# Patient Record
Sex: MALE | Race: BLACK OR AFRICAN AMERICAN | NOT HISPANIC OR LATINO | Employment: OTHER | URBAN - METROPOLITAN AREA
[De-identification: names, ages, dates, MRNs, and addresses within clinical notes are randomized per-mention and may not be internally consistent; named-entity substitution may affect disease eponyms.]

---

## 2023-03-28 LAB
ALANINE AMINOTRANSFERASE (SGPT) (U/L) IN SER/PLAS: 16 U/L (ref 10–52)
ALBUMIN (G/DL) IN SER/PLAS: 3.9 G/DL (ref 3.4–5)
ALKALINE PHOSPHATASE (U/L) IN SER/PLAS: 59 U/L (ref 33–136)
ASPARTATE AMINOTRANSFERASE (SGOT) (U/L) IN SER/PLAS: 18 U/L (ref 9–39)
BILIRUBIN DIRECT (MG/DL) IN SER/PLAS: 0.1 MG/DL (ref 0–0.3)
BILIRUBIN TOTAL (MG/DL) IN SER/PLAS: 0.5 MG/DL (ref 0–1.2)
CHOLESTEROL (MG/DL) IN SER/PLAS: 191 MG/DL (ref 0–199)
CHOLESTEROL IN HDL (MG/DL) IN SER/PLAS: 55.9 MG/DL
CHOLESTEROL/HDL RATIO: 3.4
ESTIMATED AVERAGE GLUCOSE FOR HBA1C: 137 MG/DL
HEMOGLOBIN A1C/HEMOGLOBIN TOTAL IN BLOOD: 6.4 %
INR IN PPP BY COAGULATION ASSAY: 2.8 (ref 0.9–1.1)
LDL: 122 MG/DL (ref 0–99)
PROSTATE SPECIFIC ANTIGEN,SCREEN: 0.9 NG/ML (ref 0–4)
PROTEIN TOTAL: 7 G/DL (ref 6.4–8.2)
PROTHROMBIN TIME (PT) IN PPP BY COAGULATION ASSAY: 33 SEC (ref 9.8–13.4)
TRIGLYCERIDE (MG/DL) IN SER/PLAS: 64 MG/DL (ref 0–149)
URATE (MG/DL) IN SER/PLAS: 2.3 MG/DL (ref 4–7.5)
VLDL: 13 MG/DL (ref 0–40)

## 2023-05-22 LAB
ALANINE AMINOTRANSFERASE (SGPT) (U/L) IN SER/PLAS: 23 U/L (ref 10–52)
ALBUMIN (G/DL) IN SER/PLAS: 3.8 G/DL (ref 3.4–5)
ALKALINE PHOSPHATASE (U/L) IN SER/PLAS: 60 U/L (ref 33–136)
ASPARTATE AMINOTRANSFERASE (SGOT) (U/L) IN SER/PLAS: 23 U/L (ref 9–39)
BILIRUBIN DIRECT (MG/DL) IN SER/PLAS: 0.1 MG/DL (ref 0–0.3)
BILIRUBIN TOTAL (MG/DL) IN SER/PLAS: 0.6 MG/DL (ref 0–1.2)
CHOLESTEROL (MG/DL) IN SER/PLAS: 197 MG/DL (ref 0–199)
CHOLESTEROL IN HDL (MG/DL) IN SER/PLAS: 57.9 MG/DL
CHOLESTEROL/HDL RATIO: 3.4
ESTIMATED AVERAGE GLUCOSE FOR HBA1C: 157 MG/DL
HEMOGLOBIN A1C/HEMOGLOBIN TOTAL IN BLOOD: 7.1 %
INR IN PPP BY COAGULATION ASSAY: 1.8 (ref 0.9–1.1)
LDL: 124 MG/DL (ref 0–99)
PROSTATE SPECIFIC ANTIGEN,SCREEN: 1.08 NG/ML (ref 0–4)
PROTEIN TOTAL: 6.9 G/DL (ref 6.4–8.2)
PROTHROMBIN TIME (PT) IN PPP BY COAGULATION ASSAY: 21.5 SEC (ref 9.8–13.4)
TRIGLYCERIDE (MG/DL) IN SER/PLAS: 74 MG/DL (ref 0–149)
URATE (MG/DL) IN SER/PLAS: 5.2 MG/DL (ref 4–7.5)
VLDL: 15 MG/DL (ref 0–40)

## 2023-06-12 LAB
INR IN PPP BY COAGULATION ASSAY: 2 (ref 0.9–1.1)
PROTHROMBIN TIME (PT) IN PPP BY COAGULATION ASSAY: 23.8 SEC (ref 9.8–13.4)

## 2023-07-17 LAB
INR IN PPP BY COAGULATION ASSAY: 3.3 (ref 0.9–1.1)
PROTHROMBIN TIME (PT) IN PPP BY COAGULATION ASSAY: 37.2 SEC (ref 9.8–12.8)

## 2023-07-27 LAB
INR IN PPP BY COAGULATION ASSAY: 3.8 (ref 0.9–1.1)
PROTHROMBIN TIME (PT) IN PPP BY COAGULATION ASSAY: 43.3 SEC (ref 9.8–12.8)

## 2023-09-14 LAB
INR IN PPP BY COAGULATION ASSAY: 2.4 (ref 0.9–1.1)
PROTHROMBIN TIME (PT) IN PPP BY COAGULATION ASSAY: 26.9 SEC (ref 9.8–12.8)

## 2023-09-21 PROBLEM — I10 HTN (HYPERTENSION): Status: ACTIVE | Noted: 2023-09-21

## 2023-09-21 PROBLEM — I12.0: Status: ACTIVE | Noted: 2023-09-21

## 2023-09-21 PROBLEM — G40.409: Status: ACTIVE | Noted: 2023-09-21

## 2023-09-21 PROBLEM — N18.5 ANEMIA DUE TO STAGE 5 CHRONIC KIDNEY DISEASE (MULTI): Status: ACTIVE | Noted: 2023-09-21

## 2023-09-21 PROBLEM — N18.5: Status: ACTIVE | Noted: 2023-09-21

## 2023-09-21 PROBLEM — Z99.2 ESRD ON DIALYSIS (MULTI): Status: ACTIVE | Noted: 2023-09-21

## 2023-09-21 PROBLEM — I48.0 PAROXYSMAL ATRIAL FIBRILLATION (MULTI): Status: ACTIVE | Noted: 2023-09-21

## 2023-09-21 PROBLEM — G62.9 PERIPHERAL NEUROPATHY: Status: ACTIVE | Noted: 2023-09-21

## 2023-09-21 PROBLEM — N18.6 ESRD ON DIALYSIS (MULTI): Status: ACTIVE | Noted: 2023-09-21

## 2023-09-21 PROBLEM — E10.22: Status: ACTIVE | Noted: 2023-09-21

## 2023-09-21 PROBLEM — E78.5 DYSLIPIDEMIA: Status: ACTIVE | Noted: 2023-09-21

## 2023-09-21 PROBLEM — D63.1 ANEMIA DUE TO STAGE 5 CHRONIC KIDNEY DISEASE (MULTI): Status: ACTIVE | Noted: 2023-09-21

## 2023-09-21 PROBLEM — R55 SPELL OF LOSS OF CONSCIOUSNESS: Status: ACTIVE | Noted: 2023-09-21

## 2023-09-21 PROBLEM — R40.4 SPELL OF ALTERED CONSCIOUSNESS: Status: ACTIVE | Noted: 2023-09-21
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
## 2023-09-21 PROBLEM — I67.82 ISCHEMIC BRAIN INJURY: Status: ACTIVE | Noted: 2023-09-21

## 2023-09-21 PROBLEM — T78.3XXA ANGIOEDEMA: Status: ACTIVE | Noted: 2023-09-21

## 2023-09-21 PROBLEM — I48.91 A-FIB (MULTI): Status: ACTIVE | Noted: 2023-09-21

## 2023-09-21 PROBLEM — I49.9 CARDIAC DYSRHYTHMIA: Status: ACTIVE | Noted: 2023-09-21

## 2023-09-21 PROBLEM — N28.9 RENAL INSUFFICIENCY: Status: ACTIVE | Noted: 2023-09-21

## 2023-09-21 PROBLEM — R55 SYNCOPE, NEAR: Status: ACTIVE | Noted: 2023-09-21

## 2023-09-21 PROBLEM — N40.0 BPH (BENIGN PROSTATIC HYPERPLASIA): Status: ACTIVE | Noted: 2023-09-21

## 2023-09-21 PROBLEM — I82.409 DVT, LOWER EXTREMITY (MULTI): Status: ACTIVE | Noted: 2023-09-21

## 2023-09-21 PROBLEM — G90.9 AUTONOMIC DYSFUNCTION: Status: ACTIVE | Noted: 2023-09-21

## 2023-09-21 PROBLEM — G93.1: Status: ACTIVE | Noted: 2023-09-21

## 2023-09-21 RX ORDER — NITROGLYCERIN 0.4 MG/1
TABLET SUBLINGUAL
COMMUNITY

## 2023-09-21 RX ORDER — INSULIN LISPRO 100 [IU]/ML
INJECTION, SUSPENSION SUBCUTANEOUS 4 TIMES DAILY
Status: ON HOLD | COMMUNITY
End: 2024-03-28 | Stop reason: ALTCHOICE

## 2023-09-21 RX ORDER — HYDROCHLOROTHIAZIDE 25 MG/1
1 TABLET ORAL DAILY
COMMUNITY

## 2023-09-21 RX ORDER — WARFARIN 6 MG/1
6 TABLET ORAL
COMMUNITY

## 2023-09-21 RX ORDER — BLOOD SUGAR DIAGNOSTIC
STRIP MISCELLANEOUS
COMMUNITY
Start: 2023-01-19

## 2023-09-21 RX ORDER — VITAMIN B COMPLEX
1 CAPSULE ORAL DAILY
COMMUNITY
End: 2023-10-20 | Stop reason: ALTCHOICE

## 2023-09-21 RX ORDER — WARFARIN 4 MG/1
4 TABLET ORAL
COMMUNITY

## 2023-09-21 RX ORDER — WARFARIN 2 MG/1
4 TABLET ORAL
COMMUNITY

## 2023-09-21 RX ORDER — IPRATROPIUM BROMIDE AND ALBUTEROL SULFATE 2.5; .5 MG/3ML; MG/3ML
SOLUTION RESPIRATORY (INHALATION)
COMMUNITY
Start: 2020-02-02

## 2023-09-21 RX ORDER — METOPROLOL TARTRATE 50 MG/1
50 TABLET ORAL 2 TIMES DAILY
COMMUNITY
Start: 2021-09-26

## 2023-09-21 RX ORDER — FINASTERIDE 5 MG/1
5 TABLET, FILM COATED ORAL EVERY MORNING
COMMUNITY

## 2023-09-21 RX ORDER — LEVETIRACETAM 500 MG/1
1000 TABLET, EXTENDED RELEASE ORAL DAILY
COMMUNITY
Start: 2020-12-30

## 2023-09-21 RX ORDER — ALCOHOL 2.38 KG/3.79L
1 GEL TOPICAL EVERY MORNING
COMMUNITY
End: 2023-10-20 | Stop reason: ALTCHOICE

## 2023-10-20 ENCOUNTER — OFFICE VISIT (OUTPATIENT)
Dept: CARDIOLOGY | Facility: CLINIC | Age: 83
End: 2023-10-20
Payer: MEDICARE

## 2023-10-20 VITALS
BODY MASS INDEX: 24.38 KG/M2 | HEIGHT: 72 IN | SYSTOLIC BLOOD PRESSURE: 118 MMHG | DIASTOLIC BLOOD PRESSURE: 54 MMHG | HEART RATE: 69 BPM | WEIGHT: 180 LBS

## 2023-10-20 DIAGNOSIS — R55 SYNCOPE, NEAR: ICD-10-CM

## 2023-10-20 DIAGNOSIS — Z78.9 NONSMOKER: ICD-10-CM

## 2023-10-20 DIAGNOSIS — I15.8 OTHER SECONDARY HYPERTENSION: ICD-10-CM

## 2023-10-20 DIAGNOSIS — E78.5 DYSLIPIDEMIA: ICD-10-CM

## 2023-10-20 DIAGNOSIS — R55 SPELL OF LOSS OF CONSCIOUSNESS: ICD-10-CM

## 2023-10-20 DIAGNOSIS — I48.0 PAROXYSMAL ATRIAL FIBRILLATION (MULTI): ICD-10-CM

## 2023-10-20 DIAGNOSIS — R40.4 SPELL OF ALTERED CONSCIOUSNESS: ICD-10-CM

## 2023-10-20 DIAGNOSIS — I47.10 SUPRAVENTRICULAR TACHYCARDIA (CMS-HCC): Primary | ICD-10-CM

## 2023-10-20 PROCEDURE — 3078F DIAST BP <80 MM HG: CPT | Performed by: INTERNAL MEDICINE

## 2023-10-20 PROCEDURE — 99214 OFFICE O/P EST MOD 30 MIN: CPT | Performed by: INTERNAL MEDICINE

## 2023-10-20 PROCEDURE — 1159F MED LIST DOCD IN RCRD: CPT | Performed by: INTERNAL MEDICINE

## 2023-10-20 PROCEDURE — 3074F SYST BP LT 130 MM HG: CPT | Performed by: INTERNAL MEDICINE

## 2023-10-20 PROCEDURE — 93000 ELECTROCARDIOGRAM COMPLETE: CPT | Performed by: INTERNAL MEDICINE

## 2023-10-20 PROCEDURE — 1126F AMNT PAIN NOTED NONE PRSNT: CPT | Performed by: INTERNAL MEDICINE

## 2023-10-20 ASSESSMENT — ENCOUNTER SYMPTOMS
RESPIRATORY NEGATIVE: 1
CARDIOVASCULAR NEGATIVE: 1

## 2023-10-20 NOTE — PROGRESS NOTES
Chief Complaint:   Follow-up (Patient presented today for a 1 year follow up./)     History Of Present Illness:    Tyshawn Coles is a 83 y.o. male presenting with followup.  He is accompanied by his wife.    He feels fine.  He denies any lightheadedness, near syncope, palpitation, or syncope.     Last Recorded Vitals:  Vitals:    10/20/23 1615   BP: 118/54   BP Location: Left arm   Patient Position: Sitting   BP Cuff Size: Adult   Pulse: 69   Weight: 81.6 kg (180 lb)   Height: 1.829 m (6')       Past Medical History:  He has a past medical history of Localized edema (02/18/2020), Personal history of other diseases of the musculoskeletal system and connective tissue, and Personal history of urinary calculi.    Past Surgical History:  He has a past surgical history that includes Other surgical history (02/07/2020); Other surgical history (02/07/2020); Other surgical history (02/07/2020); Other surgical history (06/21/2022); Other surgical history (06/21/2022); Other surgical history (06/21/2022); and IR CVC tunneled (8/20/2021).      Social History:  He has no history on file for tobacco use, alcohol use, and drug use.    Family History:  Family History   Problem Relation Name Age of Onset    Diabetes Mother      Cancer Father      Other (systemic lupus erythematosus') Daughter      Other (end-stage renal disease) Daughter          was on dialysis but had a renal transplant    Asthma Son      Hypertension Other      Sickle cell trait Other          Allergies:  Patient has no known allergies.    Outpatient Medications:  Current Outpatient Medications   Medication Instructions    Accu-Chek Aundrea Plus test strp strip     finasteride (PROSCAR) 5 mg, oral, Every morning    hydroCHLOROthiazide (HYDRODiuril) 25 mg tablet 1 tablet, oral, Daily    insulin lispro protamin-lispro (HumaLOG Mix 50-50 Insuln U-100) 100 unit/mL (50-50) injection subcutaneous, 4 times daily    ipratropium-albuteroL (Duo-Neb) 0.5-2.5 mg/3 mL nebulizer  solution nebulization    levETIRAcetam XR (KEPPRA XR) 1,000 mg, oral, Daily    metoprolol tartrate (LOPRESSOR) 50 mg, oral, 2 times daily    nitroglycerin (Nitrostat) 0.4 mg SL tablet  PLACE 1 TABLET UNDER THE TONGUE EVERY 5 MINUTES FOR UP TO 3 DOSES AS NEEDED FOR CHEST PAIN.CALL 911 IF PAIN PERSISTS.    warfarin (COUMADIN) 6 mg, oral, DAILY OR AS DIRECTED PER NOH    warfarin (COUMADIN) 4 mg, oral, DAILY OR AS DIRECTED PER NOH    warfarin (COUMADIN) 4 mg, oral,  every Monday, Wednesday, and Friday (at bedtime)<BR>       Physical Exam:  Constitutional:       Appearance: Healthy appearance. Not in distress.   Neck:      Vascular: No JVR. JVD normal.   Pulmonary:      Effort: Pulmonary effort is normal.      Breath sounds: Normal breath sounds. No wheezing. No rhonchi. No rales.   Chest:      Chest wall: Not tender to palpatation.   Cardiovascular:      PMI at left midclavicular line. Normal rate. Regular rhythm. Normal S1. Normal S2.       Murmurs: There is no murmur.      No gallop.  No click. No rub.      Comments: wheelchair  Pulses:     Intact distal pulses.   Edema:     Peripheral edema absent.   Abdominal:      General: Bowel sounds are normal.      Palpations: Abdomen is soft.      Tenderness: There is no abdominal tenderness.   Musculoskeletal: Normal range of motion.         General: No tenderness. Skin:     General: Skin is warm and dry.   Neurological:      General: No focal deficit present.      Mental Status: Alert and oriented to person, place and time.       Review of Systems   Cardiovascular: Negative.    Respiratory: Negative.     Musculoskeletal:  Positive for muscle weakness.          Last Labs:  CBC -  Lab Results   Component Value Date    WBC 8.2 11/05/2021    HGB 11.2 (L) 11/05/2021    HCT 33.5 (L) 11/05/2021    MCV 81 11/05/2021     11/05/2021       CMP -  Lab Results   Component Value Date    CALCIUM 8.6 11/05/2021    PHOS 3.5 04/28/2020    PROT 6.9 05/22/2023    ALBUMIN 3.8 05/22/2023     AST 23 05/22/2023    ALT 23 05/22/2023    ALKPHOS 60 05/22/2023    BILITOT 0.6 05/22/2023       LIPID PANEL -   Lab Results   Component Value Date    CHOL 197 05/22/2023    TRIG 74 05/22/2023    HDL 57.9 05/22/2023    CHHDL 3.4 05/22/2023    LDLF 124 (H) 05/22/2023    VLDL 15 05/22/2023       RENAL FUNCTION PANEL -   Lab Results   Component Value Date    GLUCOSE 402 (H) 11/05/2021     (L) 11/05/2021    K 5.1 11/05/2021    CL 92 (L) 11/05/2021    CO2 29 11/05/2021    ANIONGAP 14 11/05/2021    BUN 63 (H) 11/05/2021    CREATININE 4.28 (H) 11/05/2021    CALCIUM 8.6 11/05/2021    PHOS 3.5 04/28/2020    ALBUMIN 3.8 05/22/2023        Lab Results   Component Value Date     (H) 08/17/2021    HGBA1C 7.1 (A) 05/22/2023    HGBA1C 7.3 (H) 10/26/2021       Last Cardiology Tests:  ECG:  Today. NSR. Normal axis. Qtc 420 ms        Lab review: I have personally reviewed the laboratory result(s) see above    Assessment/Plan   Problem List Items Addressed This Visit       Cardiac dysrhythmia - Primary    Relevant Orders    ECG 12 lead (Ancillary Performed)    Dyslipidemia    HTN (hypertension)    Syncope, near    A-fib (CMS/[facility])    BMI 24.0-24.9, adult    Nonsmoker     Syncope, spells of altered consciousness and history of fall. No recent episodes. Negative EP evaluation and loop recorder in the past . Pt and family declined and continue to decline any evaluation including event monitor, repeat EPS, or possible loop recorder. They opt for yearly EP visit and ECG. They continue to have follow-up with PCP and neurology.  Remote history of syncope with other symptoms consistent with autonomic dysfunction in past. Behavioral modification reinforced.  Negative [place] monitor in past.   Negative loop recorder over 4 years of recording. s/p explant of loop recorder in 2015.   EPS with normal S-A, A-V, and His-Purkinje function   Normal Lexiscan stress test in 2011.   Hypertension, stable, benign, chronic, controlled.  Reviewed medications. Continue medications. Discussed refills.  Diabetes. Chronic. Controlled. Follows with PCP  Hyperlipidemia .  chronic. Declined change in treatment. Follows with PCP.  Weakness and decreased activity tolerance. Chronic. Stable.  Venous insufficiency with chronic lower extremity edema and DVT. Chronic. Stable. Anticoagulated long-term as per PCP  Chronic kidney disease stage V . Stable. Dialsysi.  Remote tobacco use.        AHA recommendations for exercise, diet, and behavioral modification reviewed with pt.    The patient, wife, and I discussed the mechanism of arrhythmia, syncope, spells, heart rate, blood pressure, declines any arrhythmia evaluation except for ECG and annual EP visit, indications for and types of medications, discussion if and what medication refills needed, treatment options, risks, benefits, and imponderables. American Heart Association lifestyle changes and behavioral modification discussed. All questions answered in detail. Counseling over 50% visit regarding above. Patient and wife very appreciative of care.            Sona Mckeon MD

## 2023-11-10 ENCOUNTER — LAB (OUTPATIENT)
Dept: LAB | Facility: LAB | Age: 83
End: 2023-11-10
Payer: MEDICARE

## 2023-11-10 DIAGNOSIS — I48.0 PAROXYSMAL ATRIAL FIBRILLATION (MULTI): Primary | ICD-10-CM

## 2023-11-10 LAB
INR PPP: 1.5 (ref 0.9–1.1)
PROTHROMBIN TIME: 17.1 SECONDS (ref 9.8–12.8)

## 2023-11-10 PROCEDURE — 85610 PROTHROMBIN TIME: CPT

## 2023-11-10 PROCEDURE — 36415 COLL VENOUS BLD VENIPUNCTURE: CPT

## 2023-11-13 ENCOUNTER — ANTICOAGULATION - WARFARIN VISIT (OUTPATIENT)
Dept: CARDIOLOGY | Facility: CLINIC | Age: 83
End: 2023-11-13
Payer: MEDICARE

## 2023-11-30 DIAGNOSIS — E10.9 TYPE 1 DIABETES MELLITUS WITHOUT COMPLICATIONS (MULTI): Primary | ICD-10-CM

## 2023-11-30 DIAGNOSIS — Z13.89 ENCOUNTER FOR SCREENING FOR OTHER DISORDER: ICD-10-CM

## 2023-11-30 DIAGNOSIS — Z12.5 ENCOUNTER FOR SCREENING FOR MALIGNANT NEOPLASM OF PROSTATE: ICD-10-CM

## 2023-11-30 DIAGNOSIS — N25.0 RENAL OSTEODYSTROPHY: ICD-10-CM

## 2023-11-30 DIAGNOSIS — N18.6 END STAGE RENAL DISEASE (MULTI): ICD-10-CM

## 2023-11-30 DIAGNOSIS — Z13.220 ENCOUNTER FOR SCREENING FOR LIPOID DISORDERS: ICD-10-CM

## 2023-11-30 DIAGNOSIS — I12.0 HYPERTENSIVE CHRONIC KIDNEY DISEASE WITH STAGE 5 CHRONIC KIDNEY DISEASE OR END STAGE RENAL DISEASE (MULTI): ICD-10-CM

## 2023-11-30 DIAGNOSIS — R94.5 ABNORMAL RESULTS OF LIVER FUNCTION STUDIES: ICD-10-CM

## 2024-01-02 DIAGNOSIS — I48.0 PAROXYSMAL ATRIAL FIBRILLATION (MULTI): ICD-10-CM

## 2024-01-02 DIAGNOSIS — I82.499 DEEP VEIN THROMBOSIS (DVT) OF OTHER VEIN OF LOWER EXTREMITY, UNSPECIFIED CHRONICITY, UNSPECIFIED LATERALITY (MULTI): ICD-10-CM

## 2024-03-27 ENCOUNTER — HOSPITAL ENCOUNTER (INPATIENT)
Facility: HOSPITAL | Age: 84
LOS: 5 days | Discharge: NURSING, REHAB OR LONG-TERM CARE | DRG: 689 | End: 2024-04-02
Attending: EMERGENCY MEDICINE | Admitting: STUDENT IN AN ORGANIZED HEALTH CARE EDUCATION/TRAINING PROGRAM
Payer: MEDICARE

## 2024-03-27 ENCOUNTER — APPOINTMENT (OUTPATIENT)
Dept: RADIOLOGY | Facility: HOSPITAL | Age: 84
DRG: 689 | End: 2024-03-27
Payer: MEDICARE

## 2024-03-27 DIAGNOSIS — R53.1 GENERALIZED WEAKNESS: Primary | ICD-10-CM

## 2024-03-27 PROCEDURE — 83605 ASSAY OF LACTIC ACID: CPT | Performed by: EMERGENCY MEDICINE

## 2024-03-27 PROCEDURE — 85025 COMPLETE CBC W/AUTO DIFF WBC: CPT | Performed by: EMERGENCY MEDICINE

## 2024-03-27 PROCEDURE — 83690 ASSAY OF LIPASE: CPT | Performed by: EMERGENCY MEDICINE

## 2024-03-27 PROCEDURE — 36415 COLL VENOUS BLD VENIPUNCTURE: CPT | Performed by: EMERGENCY MEDICINE

## 2024-03-27 PROCEDURE — 87040 BLOOD CULTURE FOR BACTERIA: CPT | Mod: ELYLAB | Performed by: EMERGENCY MEDICINE

## 2024-03-27 PROCEDURE — 71045 X-RAY EXAM CHEST 1 VIEW: CPT

## 2024-03-27 PROCEDURE — 84132 ASSAY OF SERUM POTASSIUM: CPT | Performed by: EMERGENCY MEDICINE

## 2024-03-27 PROCEDURE — 99285 EMERGENCY DEPT VISIT HI MDM: CPT

## 2024-03-27 PROCEDURE — 80053 COMPREHEN METABOLIC PANEL: CPT | Performed by: EMERGENCY MEDICINE

## 2024-03-27 PROCEDURE — 84484 ASSAY OF TROPONIN QUANT: CPT | Performed by: EMERGENCY MEDICINE

## 2024-03-27 ASSESSMENT — LIFESTYLE VARIABLES
EVER HAD A DRINK FIRST THING IN THE MORNING TO STEADY YOUR NERVES TO GET RID OF A HANGOVER: NO
HAVE YOU EVER FELT YOU SHOULD CUT DOWN ON YOUR DRINKING: NO
HAVE PEOPLE ANNOYED YOU BY CRITICIZING YOUR DRINKING: NO
EVER FELT BAD OR GUILTY ABOUT YOUR DRINKING: NO
TOTAL SCORE: 0

## 2024-03-27 ASSESSMENT — PAIN - FUNCTIONAL ASSESSMENT: PAIN_FUNCTIONAL_ASSESSMENT: UNABLE TO SELF-REPORT

## 2024-03-28 ENCOUNTER — APPOINTMENT (OUTPATIENT)
Dept: DIALYSIS | Facility: HOSPITAL | Age: 84
End: 2024-03-28
Payer: MEDICARE

## 2024-03-28 ENCOUNTER — APPOINTMENT (OUTPATIENT)
Dept: CARDIOLOGY | Facility: HOSPITAL | Age: 84
DRG: 689 | End: 2024-03-28
Payer: MEDICARE

## 2024-03-28 PROBLEM — R53.1 GENERALIZED WEAKNESS: Status: ACTIVE | Noted: 2024-03-28

## 2024-03-28 LAB
ALBUMIN SERPL BCP-MCNC: 3.1 G/DL (ref 3.4–5)
ALBUMIN SERPL BCP-MCNC: 3.5 G/DL (ref 3.4–5)
ALP SERPL-CCNC: 63 U/L (ref 33–136)
ALP SERPL-CCNC: 64 U/L (ref 33–136)
ALT SERPL W P-5'-P-CCNC: 16 U/L (ref 10–52)
ALT SERPL W P-5'-P-CCNC: 18 U/L (ref 10–52)
AMMONIA PLAS-SCNC: 27 UMOL/L (ref 16–53)
ANION GAP BLDV CALCULATED.4IONS-SCNC: 13 MMOL/L (ref 10–25)
ANION GAP SERPL CALC-SCNC: 15 MMOL/L (ref 10–20)
ANION GAP SERPL CALC-SCNC: 15 MMOL/L (ref 10–20)
APPEARANCE UR: ABNORMAL
APTT PPP: 45 SECONDS (ref 27–38)
AST SERPL W P-5'-P-CCNC: 26 U/L (ref 9–39)
AST SERPL W P-5'-P-CCNC: 27 U/L (ref 9–39)
BASE EXCESS BLDV CALC-SCNC: -6 MMOL/L (ref -2–3)
BASOPHILS # BLD AUTO: 0.02 X10*3/UL (ref 0–0.1)
BASOPHILS # BLD AUTO: 0.03 X10*3/UL (ref 0–0.1)
BASOPHILS NFR BLD AUTO: 0.1 %
BASOPHILS NFR BLD AUTO: 0.2 %
BILIRUB SERPL-MCNC: 0.4 MG/DL (ref 0–1.2)
BILIRUB SERPL-MCNC: 0.5 MG/DL (ref 0–1.2)
BILIRUB UR STRIP.AUTO-MCNC: NEGATIVE MG/DL
BODY TEMPERATURE: ABNORMAL
BUN SERPL-MCNC: 61 MG/DL (ref 6–23)
BUN SERPL-MCNC: 70 MG/DL (ref 6–23)
CA-I BLDV-SCNC: 0.81 MMOL/L (ref 1.1–1.33)
CALCIUM SERPL-MCNC: 8.5 MG/DL (ref 8.6–10.3)
CALCIUM SERPL-MCNC: 8.9 MG/DL (ref 8.6–10.3)
CARDIAC TROPONIN I PNL SERPL HS: 27 NG/L (ref 0–20)
CHLORIDE BLDV-SCNC: 109 MMOL/L (ref 98–107)
CHLORIDE SERPL-SCNC: 90 MMOL/L (ref 98–107)
CHLORIDE SERPL-SCNC: 93 MMOL/L (ref 98–107)
CO2 SERPL-SCNC: 27 MMOL/L (ref 21–32)
CO2 SERPL-SCNC: 29 MMOL/L (ref 21–32)
COLOR UR: YELLOW
CREAT SERPL-MCNC: 5.2 MG/DL (ref 0.5–1.3)
CREAT SERPL-MCNC: 5.35 MG/DL (ref 0.5–1.3)
EGFRCR SERPLBLD CKD-EPI 2021: 10 ML/MIN/1.73M*2
EGFRCR SERPLBLD CKD-EPI 2021: 10 ML/MIN/1.73M*2
EOSINOPHIL # BLD AUTO: 0.03 X10*3/UL (ref 0–0.4)
EOSINOPHIL # BLD AUTO: 0.04 X10*3/UL (ref 0–0.4)
EOSINOPHIL NFR BLD AUTO: 0.2 %
EOSINOPHIL NFR BLD AUTO: 0.3 %
ERYTHROCYTE [DISTWIDTH] IN BLOOD BY AUTOMATED COUNT: 16 % (ref 11.5–14.5)
ERYTHROCYTE [DISTWIDTH] IN BLOOD BY AUTOMATED COUNT: 16.1 % (ref 11.5–14.5)
EST. AVERAGE GLUCOSE BLD GHB EST-MCNC: 163 MG/DL
FERRITIN SERPL-MCNC: 1307 NG/ML (ref 20–300)
GLUCOSE BLD MANUAL STRIP-MCNC: 137 MG/DL (ref 74–99)
GLUCOSE BLD MANUAL STRIP-MCNC: 156 MG/DL (ref 74–99)
GLUCOSE BLD MANUAL STRIP-MCNC: 177 MG/DL (ref 74–99)
GLUCOSE BLD MANUAL STRIP-MCNC: 235 MG/DL (ref 74–99)
GLUCOSE BLDV-MCNC: 120 MG/DL (ref 74–99)
GLUCOSE SERPL-MCNC: 158 MG/DL (ref 74–99)
GLUCOSE SERPL-MCNC: 183 MG/DL (ref 74–99)
GLUCOSE UR STRIP.AUTO-MCNC: ABNORMAL MG/DL
HBA1C MFR BLD: 7.3 %
HBV SURFACE AB SER-ACNC: 36.6 MIU/ML
HBV SURFACE AG SERPL QL IA: NONREACTIVE
HCO3 BLDV-SCNC: 16.4 MMOL/L (ref 22–26)
HCT VFR BLD AUTO: 23.2 % (ref 41–52)
HCT VFR BLD AUTO: 25.8 % (ref 41–52)
HCT VFR BLD EST: 20 % (ref 41–52)
HGB BLD-MCNC: 8.4 G/DL (ref 13.5–17.5)
HGB BLD-MCNC: 9.3 G/DL (ref 13.5–17.5)
HGB BLDV-MCNC: 6.7 G/DL (ref 13.5–17.5)
HOLD SPECIMEN: NORMAL
HOLD SPECIMEN: NORMAL
IMM GRANULOCYTES # BLD AUTO: 0.05 X10*3/UL (ref 0–0.5)
IMM GRANULOCYTES # BLD AUTO: 0.05 X10*3/UL (ref 0–0.5)
IMM GRANULOCYTES NFR BLD AUTO: 0.3 % (ref 0–0.9)
IMM GRANULOCYTES NFR BLD AUTO: 0.4 % (ref 0–0.9)
INHALED O2 CONCENTRATION: 21 %
INR PPP: 3.6 (ref 0.9–1.1)
KETONES UR STRIP.AUTO-MCNC: ABNORMAL MG/DL
LACTATE BLDV-SCNC: 1.2 MMOL/L (ref 0.4–2)
LACTATE SERPL-SCNC: 1.3 MMOL/L (ref 0.4–2)
LEUKOCYTE ESTERASE UR QL STRIP.AUTO: ABNORMAL
LIPASE SERPL-CCNC: 7 U/L (ref 9–82)
LYMPHOCYTES # BLD AUTO: 1.76 X10*3/UL (ref 0.8–3)
LYMPHOCYTES # BLD AUTO: 1.96 X10*3/UL (ref 0.8–3)
LYMPHOCYTES NFR BLD AUTO: 12.4 %
LYMPHOCYTES NFR BLD AUTO: 13.5 %
MAGNESIUM SERPL-MCNC: 2.01 MG/DL (ref 1.6–2.4)
MCH RBC QN AUTO: 28.6 PG (ref 26–34)
MCH RBC QN AUTO: 28.8 PG (ref 26–34)
MCHC RBC AUTO-ENTMCNC: 36 G/DL (ref 32–36)
MCHC RBC AUTO-ENTMCNC: 36.2 G/DL (ref 32–36)
MCV RBC AUTO: 79 FL (ref 80–100)
MCV RBC AUTO: 80 FL (ref 80–100)
MONOCYTES # BLD AUTO: 2.02 X10*3/UL (ref 0.05–0.8)
MONOCYTES # BLD AUTO: 2.17 X10*3/UL (ref 0.05–0.8)
MONOCYTES NFR BLD AUTO: 14 %
MONOCYTES NFR BLD AUTO: 15.2 %
NEUTROPHILS # BLD AUTO: 10.19 X10*3/UL (ref 1.6–5.5)
NEUTROPHILS # BLD AUTO: 10.38 X10*3/UL (ref 1.6–5.5)
NEUTROPHILS NFR BLD AUTO: 71.6 %
NEUTROPHILS NFR BLD AUTO: 71.8 %
NITRITE UR QL STRIP.AUTO: NEGATIVE
NRBC BLD-RTO: 0 /100 WBCS (ref 0–0)
NRBC BLD-RTO: 0 /100 WBCS (ref 0–0)
OXYHGB MFR BLDV: 96.8 % (ref 45–75)
PCO2 BLDV: 21 MM HG (ref 41–51)
PH BLDV: 7.5 PH (ref 7.33–7.43)
PH UR STRIP.AUTO: 6 [PH]
PHOSPHATE SERPL-MCNC: 5.8 MG/DL (ref 2.5–4.9)
PLATELET # BLD AUTO: 174 X10*3/UL (ref 150–450)
PLATELET # BLD AUTO: 207 X10*3/UL (ref 150–450)
PO2 BLDV: 105 MM HG (ref 35–45)
POTASSIUM BLDV-SCNC: 4.2 MMOL/L (ref 3.5–5.3)
POTASSIUM SERPL-SCNC: 4.6 MMOL/L (ref 3.5–5.3)
POTASSIUM SERPL-SCNC: 4.6 MMOL/L (ref 3.5–5.3)
PROT SERPL-MCNC: 5.9 G/DL (ref 6.4–8.2)
PROT SERPL-MCNC: 6.4 G/DL (ref 6.4–8.2)
PROT UR STRIP.AUTO-MCNC: ABNORMAL MG/DL
PROTHROMBIN TIME: 40.6 SECONDS (ref 9.8–12.8)
PTH-INTACT SERPL-MCNC: 126.8 PG/ML (ref 18.5–88)
RBC # BLD AUTO: 2.94 X10*6/UL (ref 4.5–5.9)
RBC # BLD AUTO: 3.23 X10*6/UL (ref 4.5–5.9)
RBC # UR STRIP.AUTO: ABNORMAL /UL
RBC #/AREA URNS AUTO: >20 /HPF
SAO2 % BLDV: 100 % (ref 45–75)
SODIUM BLDV-SCNC: 134 MMOL/L (ref 136–145)
SODIUM SERPL-SCNC: 129 MMOL/L (ref 136–145)
SODIUM SERPL-SCNC: 130 MMOL/L (ref 136–145)
SP GR UR STRIP.AUTO: 1.01
UROBILINOGEN UR STRIP.AUTO-MCNC: <2 MG/DL
WBC # BLD AUTO: 14.2 X10*3/UL (ref 4.4–11.3)
WBC # BLD AUTO: 14.5 X10*3/UL (ref 4.4–11.3)
WBC #/AREA URNS AUTO: >50 /HPF
WBC CLUMPS #/AREA URNS AUTO: ABNORMAL /HPF

## 2024-03-28 PROCEDURE — 99223 1ST HOSP IP/OBS HIGH 75: CPT | Performed by: NURSE PRACTITIONER

## 2024-03-28 PROCEDURE — 87086 URINE CULTURE/COLONY COUNT: CPT | Mod: ELYLAB | Performed by: EMERGENCY MEDICINE

## 2024-03-28 PROCEDURE — 85025 COMPLETE CBC W/AUTO DIFF WBC: CPT | Performed by: NURSE PRACTITIONER

## 2024-03-28 PROCEDURE — 71045 X-RAY EXAM CHEST 1 VIEW: CPT | Performed by: RADIOLOGY

## 2024-03-28 PROCEDURE — 82728 ASSAY OF FERRITIN: CPT | Performed by: INTERNAL MEDICINE

## 2024-03-28 PROCEDURE — 84100 ASSAY OF PHOSPHORUS: CPT | Performed by: NURSE PRACTITIONER

## 2024-03-28 PROCEDURE — 36415 COLL VENOUS BLD VENIPUNCTURE: CPT | Performed by: INTERNAL MEDICINE

## 2024-03-28 PROCEDURE — 2500000002 HC RX 250 W HCPCS SELF ADMINISTERED DRUGS (ALT 637 FOR MEDICARE OP, ALT 636 FOR OP/ED): Performed by: NURSE PRACTITIONER

## 2024-03-28 PROCEDURE — 80053 COMPREHEN METABOLIC PANEL: CPT | Performed by: NURSE PRACTITIONER

## 2024-03-28 PROCEDURE — 82947 ASSAY GLUCOSE BLOOD QUANT: CPT

## 2024-03-28 PROCEDURE — 2500000004 HC RX 250 GENERAL PHARMACY W/ HCPCS (ALT 636 FOR OP/ED): Performed by: NURSE PRACTITIONER

## 2024-03-28 PROCEDURE — 97161 PT EVAL LOW COMPLEX 20 MIN: CPT | Mod: GP | Performed by: PHYSICAL THERAPIST

## 2024-03-28 PROCEDURE — 86706 HEP B SURFACE ANTIBODY: CPT | Mod: ELYLAB | Performed by: INTERNAL MEDICINE

## 2024-03-28 PROCEDURE — 82140 ASSAY OF AMMONIA: CPT | Performed by: INTERNAL MEDICINE

## 2024-03-28 PROCEDURE — 36415 COLL VENOUS BLD VENIPUNCTURE: CPT | Performed by: NURSE PRACTITIONER

## 2024-03-28 PROCEDURE — 81001 URINALYSIS AUTO W/SCOPE: CPT | Performed by: EMERGENCY MEDICINE

## 2024-03-28 PROCEDURE — 83735 ASSAY OF MAGNESIUM: CPT | Performed by: NURSE PRACTITIONER

## 2024-03-28 PROCEDURE — 87340 HEPATITIS B SURFACE AG IA: CPT | Mod: ELYLAB | Performed by: INTERNAL MEDICINE

## 2024-03-28 PROCEDURE — 85610 PROTHROMBIN TIME: CPT | Performed by: NURSE PRACTITIONER

## 2024-03-28 PROCEDURE — 2500000001 HC RX 250 WO HCPCS SELF ADMINISTERED DRUGS (ALT 637 FOR MEDICARE OP): Performed by: NURSE PRACTITIONER

## 2024-03-28 PROCEDURE — 93005 ELECTROCARDIOGRAM TRACING: CPT

## 2024-03-28 PROCEDURE — 83970 ASSAY OF PARATHORMONE: CPT | Mod: ELYLAB | Performed by: INTERNAL MEDICINE

## 2024-03-28 PROCEDURE — 5A1D70Z PERFORMANCE OF URINARY FILTRATION, INTERMITTENT, LESS THAN 6 HOURS PER DAY: ICD-10-PCS | Performed by: HOSPITALIST

## 2024-03-28 PROCEDURE — 83036 HEMOGLOBIN GLYCOSYLATED A1C: CPT | Mod: ELYLAB | Performed by: NURSE PRACTITIONER

## 2024-03-28 PROCEDURE — 8010000001 HC DIALYSIS - HEMODIALYSIS PER DAY

## 2024-03-28 PROCEDURE — 1200000002 HC GENERAL ROOM WITH TELEMETRY DAILY

## 2024-03-28 PROCEDURE — 97165 OT EVAL LOW COMPLEX 30 MIN: CPT | Mod: GO

## 2024-03-28 PROCEDURE — 99232 SBSQ HOSP IP/OBS MODERATE 35: CPT | Performed by: STUDENT IN AN ORGANIZED HEALTH CARE EDUCATION/TRAINING PROGRAM

## 2024-03-28 PROCEDURE — 6350000001 HC RX 635 EPOETIN >10,000 UNITS: Mod: JZ | Performed by: INTERNAL MEDICINE

## 2024-03-28 RX ORDER — CALCIUM ACETATE 667 MG/1
667 CAPSULE ORAL
Status: DISCONTINUED | OUTPATIENT
Start: 2024-03-29 | End: 2024-04-02 | Stop reason: HOSPADM

## 2024-03-28 RX ORDER — ALBUMIN HUMAN 250 G/1000ML
25 SOLUTION INTRAVENOUS DAILY
Status: CANCELLED | OUTPATIENT
Start: 2024-03-28

## 2024-03-28 RX ORDER — METOPROLOL TARTRATE 50 MG/1
50 TABLET ORAL 2 TIMES DAILY
Status: DISCONTINUED | OUTPATIENT
Start: 2024-03-28 | End: 2024-04-02 | Stop reason: HOSPADM

## 2024-03-28 RX ORDER — ONDANSETRON 4 MG/1
4 TABLET, FILM COATED ORAL EVERY 8 HOURS PRN
Status: DISCONTINUED | OUTPATIENT
Start: 2024-03-28 | End: 2024-04-02 | Stop reason: HOSPADM

## 2024-03-28 RX ORDER — TALC
3 POWDER (GRAM) TOPICAL NIGHTLY PRN
Status: DISCONTINUED | OUTPATIENT
Start: 2024-03-28 | End: 2024-04-02 | Stop reason: HOSPADM

## 2024-03-28 RX ORDER — HYDROCHLOROTHIAZIDE 25 MG/1
25 TABLET ORAL DAILY
Status: DISCONTINUED | OUTPATIENT
Start: 2024-03-28 | End: 2024-04-02 | Stop reason: HOSPADM

## 2024-03-28 RX ORDER — ACETAMINOPHEN 325 MG/1
650 TABLET ORAL EVERY 4 HOURS PRN
Status: DISCONTINUED | OUTPATIENT
Start: 2024-03-28 | End: 2024-04-02 | Stop reason: HOSPADM

## 2024-03-28 RX ORDER — ACETAMINOPHEN 160 MG/5ML
650 SOLUTION ORAL EVERY 4 HOURS PRN
Status: DISCONTINUED | OUTPATIENT
Start: 2024-03-28 | End: 2024-04-02 | Stop reason: HOSPADM

## 2024-03-28 RX ORDER — ACETAMINOPHEN 650 MG/1
650 SUPPOSITORY RECTAL EVERY 4 HOURS PRN
Status: DISCONTINUED | OUTPATIENT
Start: 2024-03-28 | End: 2024-04-02 | Stop reason: HOSPADM

## 2024-03-28 RX ORDER — CEFTRIAXONE 1 G/50ML
1 INJECTION, SOLUTION INTRAVENOUS EVERY 24 HOURS
Status: DISCONTINUED | OUTPATIENT
Start: 2024-03-28 | End: 2024-04-02 | Stop reason: HOSPADM

## 2024-03-28 RX ORDER — LIDOCAINE AND PRILOCAINE 25; 25 MG/G; MG/G
CREAM TOPICAL
Status: DISCONTINUED | OUTPATIENT
Start: 2024-03-29 | End: 2024-03-29 | Stop reason: SDUPTHER

## 2024-03-28 RX ORDER — IPRATROPIUM BROMIDE AND ALBUTEROL SULFATE 2.5; .5 MG/3ML; MG/3ML
3 SOLUTION RESPIRATORY (INHALATION) EVERY 4 HOURS PRN
Status: DISCONTINUED | OUTPATIENT
Start: 2024-03-28 | End: 2024-04-02 | Stop reason: HOSPADM

## 2024-03-28 RX ORDER — INSULIN LISPRO 100 [IU]/ML
0-5 INJECTION, SOLUTION INTRAVENOUS; SUBCUTANEOUS
Status: DISCONTINUED | OUTPATIENT
Start: 2024-03-28 | End: 2024-04-02 | Stop reason: HOSPADM

## 2024-03-28 RX ORDER — LEVETIRACETAM 500 MG/1
1000 TABLET, EXTENDED RELEASE ORAL DAILY
Status: DISCONTINUED | OUTPATIENT
Start: 2024-03-28 | End: 2024-03-30

## 2024-03-28 RX ORDER — NEPHROCAP 1 MG
1 CAP ORAL
COMMUNITY
Start: 2021-12-18

## 2024-03-28 RX ORDER — DEXTROSE 50 % IN WATER (D50W) INTRAVENOUS SYRINGE
12.5
Status: DISCONTINUED | OUTPATIENT
Start: 2024-03-28 | End: 2024-04-02 | Stop reason: HOSPADM

## 2024-03-28 RX ORDER — DOCUSATE SODIUM 100 MG/1
100 CAPSULE, LIQUID FILLED ORAL 2 TIMES DAILY
Status: DISCONTINUED | OUTPATIENT
Start: 2024-03-28 | End: 2024-04-02 | Stop reason: HOSPADM

## 2024-03-28 RX ORDER — SENNOSIDES 8.6 MG/1
1 TABLET ORAL NIGHTLY
Status: DISCONTINUED | OUTPATIENT
Start: 2024-03-28 | End: 2024-04-02 | Stop reason: HOSPADM

## 2024-03-28 RX ORDER — FINASTERIDE 5 MG/1
5 TABLET, FILM COATED ORAL EVERY MORNING
Status: DISCONTINUED | OUTPATIENT
Start: 2024-03-28 | End: 2024-04-02 | Stop reason: HOSPADM

## 2024-03-28 RX ORDER — ONDANSETRON HYDROCHLORIDE 2 MG/ML
4 INJECTION, SOLUTION INTRAVENOUS EVERY 8 HOURS PRN
Status: DISCONTINUED | OUTPATIENT
Start: 2024-03-28 | End: 2024-04-02 | Stop reason: HOSPADM

## 2024-03-28 RX ADMIN — METOPROLOL TARTRATE 50 MG: 50 TABLET, FILM COATED ORAL at 20:31

## 2024-03-28 RX ADMIN — EPOETIN ALFA-EPBX 10000 UNITS: 10000 INJECTION, SOLUTION INTRAVENOUS; SUBCUTANEOUS at 20:31

## 2024-03-28 RX ADMIN — LEVETIRACETAM 1000 MG: 500 TABLET, FILM COATED, EXTENDED RELEASE ORAL at 10:19

## 2024-03-28 RX ADMIN — FINASTERIDE 5 MG: 5 TABLET, FILM COATED ORAL at 10:19

## 2024-03-28 RX ADMIN — INSULIN LISPRO 1 UNITS: 100 INJECTION, SOLUTION INTRAVENOUS; SUBCUTANEOUS at 20:31

## 2024-03-28 RX ADMIN — INSULIN LISPRO 2 UNITS: 100 INJECTION, SOLUTION INTRAVENOUS; SUBCUTANEOUS at 11:40

## 2024-03-28 RX ADMIN — CEFTRIAXONE SODIUM 1 G: 1 INJECTION, SOLUTION INTRAVENOUS at 05:35

## 2024-03-28 RX ADMIN — Medication 1 CAPSULE: at 06:57

## 2024-03-28 RX ADMIN — DOCUSATE SODIUM 100 MG: 100 CAPSULE, LIQUID FILLED ORAL at 10:18

## 2024-03-28 RX ADMIN — METOPROLOL TARTRATE 50 MG: 50 TABLET, FILM COATED ORAL at 10:18

## 2024-03-28 SDOH — SOCIAL STABILITY: SOCIAL INSECURITY: HAS ANYONE EVER THREATENED TO HURT YOUR FAMILY OR YOUR PETS?: UNABLE TO ASSESS

## 2024-03-28 SDOH — SOCIAL STABILITY: SOCIAL INSECURITY: HAVE YOU HAD THOUGHTS OF HARMING ANYONE ELSE?: UNABLE TO ASSESS

## 2024-03-28 SDOH — SOCIAL STABILITY: SOCIAL INSECURITY: WERE YOU ABLE TO COMPLETE ALL THE BEHAVIORAL HEALTH SCREENINGS?: NO

## 2024-03-28 SDOH — SOCIAL STABILITY: SOCIAL INSECURITY: ABUSE: ADULT

## 2024-03-28 SDOH — SOCIAL STABILITY: SOCIAL INSECURITY: DO YOU FEEL ANYONE HAS EXPLOITED OR TAKEN ADVANTAGE OF YOU FINANCIALLY OR OF YOUR PERSONAL PROPERTY?: UNABLE TO ASSESS

## 2024-03-28 SDOH — SOCIAL STABILITY: SOCIAL INSECURITY: DO YOU FEEL UNSAFE GOING BACK TO THE PLACE WHERE YOU ARE LIVING?: UNABLE TO ASSESS

## 2024-03-28 SDOH — SOCIAL STABILITY: SOCIAL INSECURITY: ARE YOU OR HAVE YOU BEEN THREATENED OR ABUSED PHYSICALLY, EMOTIONALLY, OR SEXUALLY BY ANYONE?: UNABLE TO ASSESS

## 2024-03-28 SDOH — SOCIAL STABILITY: SOCIAL INSECURITY: ARE THERE ANY APPARENT SIGNS OF INJURIES/BEHAVIORS THAT COULD BE RELATED TO ABUSE/NEGLECT?: UNABLE TO ASSESS

## 2024-03-28 SDOH — SOCIAL STABILITY: SOCIAL INSECURITY: DOES ANYONE TRY TO KEEP YOU FROM HAVING/CONTACTING OTHER FRIENDS OR DOING THINGS OUTSIDE YOUR HOME?: UNABLE TO ASSESS

## 2024-03-28 ASSESSMENT — COGNITIVE AND FUNCTIONAL STATUS - GENERAL
TOILETING: A LOT
CLIMB 3 TO 5 STEPS WITH RAILING: TOTAL
TURNING FROM BACK TO SIDE WHILE IN FLAT BAD: A LOT
MOBILITY SCORE: 9
MOVING TO AND FROM BED TO CHAIR: TOTAL
STANDING UP FROM CHAIR USING ARMS: A LOT
HELP NEEDED FOR BATHING: TOTAL
TOILETING: A LOT
DAILY ACTIVITIY SCORE: 13
MOBILITY SCORE: 9
EATING MEALS: A LITTLE
STANDING UP FROM CHAIR USING ARMS: A LOT
STANDING UP FROM CHAIR USING ARMS: TOTAL
HELP NEEDED FOR BATHING: TOTAL
WALKING IN HOSPITAL ROOM: TOTAL
EATING MEALS: A LITTLE
PERSONAL GROOMING: A LITTLE
DRESSING REGULAR UPPER BODY CLOTHING: A LITTLE
CLIMB 3 TO 5 STEPS WITH RAILING: TOTAL
DRESSING REGULAR UPPER BODY CLOTHING: TOTAL
DRESSING REGULAR LOWER BODY CLOTHING: TOTAL
WALKING IN HOSPITAL ROOM: TOTAL
DRESSING REGULAR LOWER BODY CLOTHING: TOTAL
MOVING TO AND FROM BED TO CHAIR: TOTAL
MOVING TO AND FROM BED TO CHAIR: TOTAL
CLIMB 3 TO 5 STEPS WITH RAILING: TOTAL
TURNING FROM BACK TO SIDE WHILE IN FLAT BAD: A LOT
DAILY ACTIVITIY SCORE: 6
DRESSING REGULAR UPPER BODY CLOTHING: A LITTLE
TOILETING: TOTAL
WALKING IN HOSPITAL ROOM: TOTAL
EATING MEALS: TOTAL
DRESSING REGULAR LOWER BODY CLOTHING: TOTAL
TURNING FROM BACK TO SIDE WHILE IN FLAT BAD: TOTAL
PATIENT BASELINE BEDBOUND: YES
MOVING FROM LYING ON BACK TO SITTING ON SIDE OF FLAT BED WITH BEDRAILS: A LOT
PERSONAL GROOMING: TOTAL
PERSONAL GROOMING: A LITTLE
MOVING FROM LYING ON BACK TO SITTING ON SIDE OF FLAT BED WITH BEDRAILS: TOTAL
MOBILITY SCORE: 6
MOVING FROM LYING ON BACK TO SITTING ON SIDE OF FLAT BED WITH BEDRAILS: A LOT
HELP NEEDED FOR BATHING: TOTAL
DAILY ACTIVITIY SCORE: 13

## 2024-03-28 ASSESSMENT — ACTIVITIES OF DAILY LIVING (ADL)
BATHING_ASSISTANCE: TOTAL
JUDGMENT_ADEQUATE_SAFELY_COMPLETE_DAILY_ACTIVITIES: UNABLE TO ASSESS
ADEQUATE_TO_COMPLETE_ADL: YES
FEEDING YOURSELF: DEPENDENT
PATIENT'S MEMORY ADEQUATE TO SAFELY COMPLETE DAILY ACTIVITIES?: UNABLE TO ASSESS
GROOMING: DEPENDENT
LACK_OF_TRANSPORTATION: PATIENT UNABLE TO ANSWER
DRESSING YOURSELF: DEPENDENT
TOILETING: DEPENDENT
BATHING: DEPENDENT
HEARING - LEFT EAR: DIFFICULTY WITH NOISE
HEARING - RIGHT EAR: DIFFICULTY WITH NOISE
ASSISTIVE_DEVICE: WHEELCHAIR
ASSISTIVE_DEVICE: WHEELCHAIR
WALKS IN HOME: DEPENDENT

## 2024-03-28 ASSESSMENT — PAIN SCALES - PAIN ASSESSMENT IN ADVANCED DEMENTIA (PAINAD)
FACIALEXPRESSION: SAD, FRIGHTENED, FROWN
BODYLANGUAGE: TENSE, DISTRESSED PACING, FIDGETING
CONSOLABILITY: NO NEED TO CONSOLE
TOTALSCORE: 2
BREATHING: NORMAL

## 2024-03-28 ASSESSMENT — PAIN SCALES - GENERAL
PAINLEVEL_OUTOF10: 0 - NO PAIN
PAINLEVEL_OUTOF10: 6
PAINLEVEL_OUTOF10: 0 - NO PAIN

## 2024-03-28 ASSESSMENT — LIFESTYLE VARIABLES
AUDIT-C TOTAL SCORE: 0
AUDIT-C TOTAL SCORE: 0
HOW OFTEN DO YOU HAVE A DRINK CONTAINING ALCOHOL: NEVER
HOW MANY STANDARD DRINKS CONTAINING ALCOHOL DO YOU HAVE ON A TYPICAL DAY: PATIENT DOES NOT DRINK
HOW OFTEN DO YOU HAVE 6 OR MORE DRINKS ON ONE OCCASION: NEVER
SKIP TO QUESTIONS 9-10: 1

## 2024-03-28 ASSESSMENT — PAIN - FUNCTIONAL ASSESSMENT
PAIN_FUNCTIONAL_ASSESSMENT: 0-10
PAIN_FUNCTIONAL_ASSESSMENT: NO/DENIES PAIN
PAIN_FUNCTIONAL_ASSESSMENT: PAINAD (PAIN ASSESSMENT IN ADVANCED DEMENTIA SCALE)
PAIN_FUNCTIONAL_ASSESSMENT: 0-10
PAIN_FUNCTIONAL_ASSESSMENT: 0-10

## 2024-03-28 ASSESSMENT — PATIENT HEALTH QUESTIONNAIRE - PHQ9
SUM OF ALL RESPONSES TO PHQ9 QUESTIONS 1 & 2: 0
1. LITTLE INTEREST OR PLEASURE IN DOING THINGS: NOT AT ALL
2. FEELING DOWN, DEPRESSED OR HOPELESS: NOT AT ALL

## 2024-03-28 NOTE — PROGRESS NOTES
Physical Therapy    Physical Therapy Evaluation    Patient Name: Tyshawn Coles  MRN: [id number]  Today's Date: 3/28/2024   Time Calculation  Start Time: 0843  Stop Time: 0857  Time Calculation (min): 14 min    Assessment/Plan   PT Assessment  PT Assessment Results: Decreased strength, Decreased endurance, Impaired balance, Decreased mobility, Decreased cognition, Impaired judgement, Decreased safety awareness  Rehab Prognosis: Fair  Evaluation/Treatment Tolerance: Patient limited by fatigue  Medical Staff Made Aware: Yes  Strengths: Support of Caregivers, Living arrangement secure, Housing layout  Barriers to Participation: Ability to acquire knowledge, Comorbidities  End of Session Communication: Bedside nurse  Assessment Comment: Pt. is very weak and deconditioned. Pt. would benefit from continued therapy at a moderate level of intensity prior to return home.  End of Session Patient Position: Bed, 3 rail up, Alarm on  IP OR SWING BED PT PLAN  Inpatient or Swing Bed: Inpatient  PT Plan  Treatment/Interventions: Bed mobility, Transfer training, Gait training, Balance training, Strengthening, Endurance training, Therapeutic exercise  PT Plan: Skilled PT  PT Frequency: 3 times per week  PT Discharge Recommendations: Moderate intensity level of continued care  PT Recommended Transfer Status: Assist x2, Assistive device  Physical Therapy eval completed per MD requisition. P.T. recommendations as outlined above. Recommend D/C from acute care when medically appropriate as deemed by medical staff.    Subjective       General Visit Information:  General  Reason for Referral: impaired mobility  Referred By: WALE Herrmann CNP (PT/OT 3/28)  Past Medical History Relevant to Rehab: includes: HTN, neuropathy, A-fib, anemia, DM, ESRD with HD ([facility]), LE edema, renal calculi, PPM, ashleigh, YASMEEN, anoxic brain injury die to cardiac arrest in 2012. h/o seizures, L UE fistula, BPH, syncope, hypoxic emcephalopathy  Family/Caregiver Present:  "No  Prior to Session Communication: Bedside nurse  Patient Position Received: Bed, 3 rail up, Alarm on  Preferred Learning Style: auditory, verbal  General Comment: Pt. is an 84yo who presented to [facility] ED on 3/27/2024 with weakness x 3 days and possible HD fistula infection. In ED, BP 89/52.  CXR (3/28) (+) L basilar atelctasis, elevated L hemidippragm    Hgb (3/28) 8.4 trending down    Na (3/18) 130 trending up    Dx: UTI, weakness, metabolic encephalopathy     Pt. was D/C'd from [facility] in [place] on 3/27/2024 where he was treated for UTI and elevate INR.    Home Living:  Home Living  Home Living Comments: Pt. is a questionable historian. Per Pt.: Pt. lives alone in a 1 level house with 2 ANNIE with HR. BEd/bath on 1st floor with tub shower with a seat and grab bars.  Per OT skip at [facility] on 3/25/2024 infor was provided by hired caregiver from home who stated Pt. lives with his wife and has a caregiver is with him M-F during the day hours. Per HHA Pt. has a ramp to enter house.    Prior Level of Function:  Prior Function Per Pt/Caregiver Report  Prior Function Comments: Patient is a questionable historian. Per Pt.: Pt. amb with FWW PTA and wa sI wi ADLs and IADLs. Pt. denied falls in lasst 3 months. Pt. did not drive PTA. Per OT skip at [facility] on 3/25/2024: HHA reported that Pt.  has 24/7 assist between hired aides and family who assist with all ADL and total assist for IADL's.  Pt. amb with Rollator inside the house short distances with assist at all times.  Pt. uses W/C when going outside and if having a \"tired day\" will use W/C for mobility.    Precautions:  Precautions  Medical Precautions:  (Pt. has a h/o seizures per EMR but no seizure precautions noted in EMR and no seizure pads on bed  Activity order: OOB with A)  Precautions Comment: Per EMR: High fall risk         Objective     Pain:  Pain Assessment  Pain Assessment: 0-10  Pain Score: 0 - No pain    Cognition:  Cognition  Overall Cognitive Status: Impaired (Flat " affect)  Orientation Level: Disoriented to time, Disoriented to situation  Safety/Judgement: Exceptions to WFL  Complex Functional Tasks: Moderate  Insight: Moderate  Processing Speed: Delayed    General Assessments:  General Observation  General Observation: Tele   Activity Tolerance  Endurance: Decreased tolerance for upright activites                 Dynamic Sitting Balance  Dynamic Sitting-Comments: Fair static and dynamic sitting balance  Dynamic Standing Balance  Dynamic Standing-Comments: Poor static and dynamic standing balance with significant trunk extension    Functional Assessments:     Bed Mobility  Bed Mobility: Yes  Bed Mobility 1  Bed Mobility 1: Supine to sitting  Level of Assistance 1: Maximum assistance (x2)  Bed Mobility Comments 1: A to maneuver B LEs over EOB and to elevate trunk from bed. Pt with significant trunk extension in sitting on EOB.  Bed Mobility 2  Bed Mobility  2: Sitting to supine  Level of Assistance 2: Maximum assistance (x2)  Bed Mobility Comments 2: A to lift B LEs onto bed and to control descent/placement of trunk  Transfers  Transfer: Yes  Transfer 1  Technique 1: Sit to stand  Transfer Device 1:  (FWW)  Transfer Level of Assistance 1: Maximum assistance, +2  Trials/Comments 1: A to widen RANDOLPH and transfer weight forward over hips prior to standing. Then A for lifitng, transferring weight forward onto feet, blocking B feet and steadying. Pt. very retroplusive in standing.  Transfers 2  Technique 2: Stand to sit  Transfer Device 2:  (FWW)  Transfer Level of Assistance 2: Maximum assistance, +2  Trials/Comments 2: A to facilitate flexion at hips and to control descent.  Ambulation/Gait Training  Ambulation/Gait Training Performed: Yes  Ambulation/Gait Training 1  Surface 1: Level tile  Device 1: Rolling walker  Assistance 1: Maximum assistance (x2)  Quality of Gait 1: Narrow base of support (slow, step-to gait with very narrow RANDOLPH, weigh back on B heels in  stance)  Comments/Distance (ft) 1: 3' sidestepping; A for balance, lateral weight shift, facilation to keep COG more anterior to get weight evenly distributed over forefeet. A to maneuver FWW  Stairs  Stairs: No       Extremity/Trunk Assessments:        RLE   RLE :  (AROM WFL, strength 3+/5)  LLE   LLE :  (AROM WFL, strength 3+/5)    Outcome Measures:  [facility] Basic Mobility  Turning from your back to your side while in a flat bed without using bedrails: A lot  Moving from lying on your back to sitting on the side of a flat bed without using bedrails: A lot  Moving to and from bed to chair (including a wheelchair): Total  Standing up from a chair using your arms (e.g. wheelchair or bedside chair): A lot  To walk in hospital room: Total  Climbing 3-5 steps with railing: Total  Basic Mobility - Total Score: 9                            Goals:  Encounter Problems       Encounter Problems (Active)       PT Problem       Pt. will transfer supine/sit with MOD A x 1 (Progressing)       Start:  03/28/24    Expected End:  04/11/24            Pt. will transfer sit/stand with FWW with MOD A x 1 (Progressing)       Start:  03/28/24    Expected End:  04/11/24            Pt. will complete stand pivot transfers with FWW with MOD A x 1 (Progressing)       Start:  03/28/24    Expected End:  04/11/24            Pt.will ambulate 25' with FWW with MOD A x 1 (Progressing)       Start:  03/28/24    Expected End:  04/11/24            Pt. will perform 2 x 15 B LE AROM exercises  (Not Progressing)       Start:  03/28/24    Expected End:  04/11/24               Pain - Adult            Education Documentation  Mobility Training, taught by Papi Wilhelm, PT at 3/28/2024 10:42 AM.  Learner: Patient  Readiness: Acceptance  Method: Explanation  Response: Verbalizes Understanding, Needs Reinforcement  Comment: Role of PT, transfers, amb, safety, PT POC

## 2024-03-28 NOTE — PROGRESS NOTES
03/28/24 1613   Discharge Planning   Living Arrangements Spouse/significant other   Support Systems Spouse/significant other;Children;Family members   Assistance Needed adls and family completes iadls.able to stand from sitting and self trasnfer to wheelchair , self propels   Type of Residence Private residence   Who is requesting discharge planning? Provider   Home or Post Acute Services Post acute facilities (Rehab/SNF/etc)   Type of Post Acute Facility Services Skilled nursing   Patient expects to be discharged to: given snf list for preference   Does the patient need discharge transport arranged? Yes   RoundTrip coordination needed? Yes   Has discharge transport been arranged? No     Met w/ son Rocael  ( from [place]) who is hcpo[facility] along w/ pt's spouse. Pt has snf needs and spouse/son would like placement. Given snf list from cms. Son to visit some facilities and discu w/ pt/ spouse. Pt requires a precert.

## 2024-03-28 NOTE — ED PROVIDER NOTES
HPI   Chief Complaint   Patient presents with    Weakness, Gen     Weakness for past 3 days       Chief complaint generalized weakness  History of present illness an 83-year-old male who has a past medical history of end-stage renal disease he gets a dialysis on Tuesday Thursday and Saturday he has been weak for the last 3 days there was suspicion that his fistula could be infected he was seen at the [facility] he has become more weak.  And more wheelchair-bound of late.  And is here for assessment with a blood pressure 151/7079 temp of 37 respiratory 17 heart rate of 86 O2 saturation 99%      History provided by:  Relative                      [place] Coma Scale Score: 11                     Patient History   Past Medical History:   Diagnosis Date    Localized edema 02/18/2020    Lower extremity edema    Personal history of other diseases of the musculoskeletal system and connective tissue     History of gout    Personal history of urinary calculi     History of kidney stones     Past Surgical History:   Procedure Laterality Date    IR CVC TUNNELED  8/20/2021    IR CVC TUNNELED 8/20/2021 [facility] CLINICAL LEGACY    OTHER SURGICAL HISTORY  02/07/2020    Permanent pacemaker insertion    OTHER SURGICAL HISTORY  02/07/2020    Tonsillectomy with adenoidectomy    OTHER SURGICAL HISTORY  02/07/2020    Cholecystectomy    OTHER SURGICAL HISTORY  06/21/2022    Colonoscopy    OTHER SURGICAL HISTORY  06/21/2022    Hip replacement    OTHER SURGICAL HISTORY  06/21/2022    Cardiac event recorder insertion     Family History   Problem Relation Name Age of Onset    Diabetes Mother      Cancer Father      Other (systemic lupus erythematosus') Daughter      Other (end-stage renal disease) Daughter          was on dialysis but had a renal transplant    Asthma Son      Hypertension Other      Sickle cell trait Other       Social History     Tobacco Use    Smoking status: Never    Smokeless tobacco: Never   Substance Use Topics     Alcohol use: Never    Drug use: Never       Physical Exam   ED Triage Vitals [03/27/24 2330]   Temperature Heart Rate Respirations BP   37 °C (98.6 °F) 84 16 89/52      Pulse Ox Temp Source Heart Rate Source Patient Position   100 % Temporal Monitor Sitting      BP Location FiO2 (%)     Right arm --       Physical Exam  Vitals and nursing note reviewed. Exam conducted with a chaperone present.   Constitutional:       Comments: Patient is thin   HENT:      Head: Normocephalic and atraumatic.      Right Ear: Tympanic membrane normal.      Left Ear: Tympanic membrane normal.      Nose: Nose normal.      Mouth/Throat:      Mouth: Mucous membranes are moist.   Eyes:      Extraocular Movements: Extraocular movements intact.      Pupils: Pupils are equal, round, and reactive to light.   Cardiovascular:      Rate and Rhythm: Normal rate and regular rhythm.   Pulmonary:      Effort: Pulmonary effort is normal.      Breath sounds: Normal breath sounds.   Abdominal:      General: Abdomen is flat. Bowel sounds are normal.   Musculoskeletal:         General: Normal range of motion.      Cervical back: Normal range of motion and neck supple.   Skin:     General: Skin is dry.      Capillary Refill: Capillary refill takes 2 to 3 seconds.   Neurological:      Mental Status: He is alert.      Cranial Nerves: No cranial nerve deficit.      Sensory: No sensory deficit.      Motor: No weakness.      Coordination: Coordination normal.      Gait: Gait normal.         ED Course & MDM   Diagnoses as of 03/28/24 0148   Generalized weakness       Medical Decision Making  Differential diagnosis  Pneumonia  Anemia  Volume overload  Bacteremia  Electrolyte disturbance  Arrhythmia  Considering the above differential diagnosis following test and treatments were considered in order with shared decision making  Cardiac monitor EKG IV blood cultures CBC electrolytes arterial blood gas  Routine labs    Amount and/or Complexity of Data Reviewed  Labs:  ordered. Decision-making details documented in ED Course.     Details: White count of 14,000 hemoglobin 9.3 hematocrit 25, sodium 129 BUN of 61 creatinine of 5.2 LFTs alk phos total bili normal troponin 27 arterial blood gas pH is 7.5 pCO2 21 urinalysis moderate leuks lactic 1 3  Radiology: ordered and independent interpretation performed.     Details: Chest x-ray clear  ECG/medicine tests: ordered and independent interpretation performed.     Details: EKG normal sinus rhythm with a rate 83  Discussion of management or test interpretation with external provider(s): Case was discussed with Dr. Alanis marquez admitted the patient    Risk  Decision regarding hospitalization.      Labs Reviewed   CBC WITH AUTO DIFFERENTIAL - Abnormal       Result Value    WBC 14.5 (*)     nRBC 0.0      RBC 3.23 (*)     Hemoglobin 9.3 (*)     Hematocrit 25.8 (*)     MCV 80      MCH 28.8      MCHC 36.0      RDW 16.0 (*)     Platelets 207      Neutrophils % 71.8      Immature Granulocytes %, Automated 0.3      Lymphocytes % 13.5      Monocytes % 14.0      Eosinophils % 0.2      Basophils % 0.2      Neutrophils Absolute 10.38 (*)     Immature Granulocytes Absolute, Automated 0.05      Lymphocytes Absolute 1.96      Monocytes Absolute 2.02 (*)     Eosinophils Absolute 0.03      Basophils Absolute 0.03     COMPREHENSIVE METABOLIC PANEL - Abnormal    Glucose 183 (*)     Sodium 129 (*)     Potassium 4.6      Chloride 90 (*)     Bicarbonate 29      Anion Gap 15      Urea Nitrogen 61 (*)     Creatinine 5.20 (*)     eGFR 10 (*)     Calcium 8.9      Albumin 3.5      Alkaline Phosphatase 64      Total Protein 6.4      AST 27      Bilirubin, Total 0.5      ALT 18     TROPONIN I, HIGH SENSITIVITY - Abnormal    Troponin I, High Sensitivity 27 (*)     Narrative:     Less than 99th percentile of normal range cutoff-  Female and children under 18 years old <14 ng/L; Male <21 ng/L: Negative  Repeat testing should be performed if clinically indicated.      Female and children under 18 years old 14-50 ng/L; Male 21-50 ng/L:  Consistent with possible cardiac damage and possible increased clinical   risk. Serial measurements may help to assess extent of myocardial damage.     >50 ng/L: Consistent with cardiac damage, increased clinical risk and  myocardial infarction. Serial measurements may help assess extent of   myocardial damage.      NOTE: Children less than 1 year old may have higher baseline troponin   levels and results should be interpreted in conjunction with the overall   clinical context.     NOTE: Troponin I testing is performed using a different   testing methodology at [facility] than at other   [facility]. Direct result comparisons should only   be made within the same method.   LIPASE - Abnormal    Lipase 7 (*)     Narrative:     Venipuncture immediately after or during the administration of Metamizole may lead to falsely low results. Testing should be performed immediately prior to Metamizole dosing.   BLOOD GAS VENOUS FULL PANEL - Abnormal    POCT pH, Venous 7.50 (*)     POCT pCO2, Venous 21 (*)     POCT pO2, Venous 105 (*)     POCT SO2, Venous 100 (*)     POCT Oxy Hemoglobin, Venous 96.8 (*)     POCT Hematocrit Calculated, Venous 20.0 (*)     POCT Sodium, Venous 134 (*)     POCT Potassium, Venous 4.2      POCT Chloride, Venous 109 (*)     POCT Ionized Calicum, Venous 0.81 (*)     POCT Glucose, Venous 120 (*)     POCT Lactate, Venous 1.2      POCT Base Excess, Venous -6.0 (*)     POCT HCO3 Calculated, Venous 16.4 (*)     POCT Hemoglobin, Venous 6.7 (*)     POCT Anion Gap, Venous 13.0      Patient Temperature        FiO2 21     URINALYSIS WITH REFLEX CULTURE AND MICROSCOPIC - Abnormal    Color, Urine Yellow      Appearance, Urine Hazy (*)     Specific Gravity, Urine 1.013      pH, Urine 6.0      Protein, Urine >=500 (3+) (*)     Glucose, Urine 50 (1+) (*)     Blood, Urine SMALL (1+) (*)     Ketones, Urine 5 (TRACE) (*)      Bilirubin, Urine NEGATIVE      Urobilinogen, Urine <2.0      Nitrite, Urine NEGATIVE      Leukocyte Esterase, Urine MODERATE (2+) (*)    MICROSCOPIC ONLY, URINE - Abnormal    WBC, Urine >50 (*)     WBC Clumps, Urine MANY      RBC, Urine >20 (*)    LACTATE - Normal    Lactate 1.3      Narrative:     Venipuncture immediately after or during the administration of Metamizole may lead to falsely low results. Testing should be performed immediately  prior to Metamizole dosing.   BLOOD CULTURE   BLOOD CULTURE   URINE CULTURE   URINALYSIS WITH REFLEX CULTURE AND MICROSCOPIC    Narrative:     The following orders were created for panel order Urinalysis with Reflex Culture and Microscopic.  Procedure                               Abnormality         Status                     ---------                               -----------         ------                     Urinalysis with Reflex C...[[id number]]  Abnormal            Final result               Extra Urine Gray Tube[[id number]]                            In process                   Please view results for these tests on the individual orders.   EXTRA URINE GRAY TUBE        XR chest 1 view   Final Result   No airspace consolidation or pleural effusion.        Asymmetric elevation of left diaphragm        MACRO:   None        Signed by: Domenico Tafoya 3/28/2024 12:19 AM   Dictation workstation:   [id number]             Procedure  ECG 12 lead    Performed by: Jovanni Syed MD  Authorized by: Jovanni Syed MD    ECG interpreted by ED Physician in the absence of a cardiologist: yes    Interpretation:     Interpretation: normal    Rate:     ECG rate:  83    ECG rate assessment: normal    Rhythm:     Rhythm: sinus rhythm    Ectopy:     Ectopy: none    QRS:     QRS axis:  Normal  ST segments:     ST segments:  Normal  T waves:     T waves: normal         Jovanni Syed MD  03/28/24 0148

## 2024-03-28 NOTE — NURSING NOTE
Report from Sending RN:    Report From: FERNIE rOtiz  Recent Surgery of Procedure: No  Baseline Level of Consciousness (LOC): A&Ox2-3  Oxygen Use: No  Type: Room air  Diabetic: Yes  Last BP Med Given Day of Dialysis: AM meds given/Metoprolol  Last Pain Med Given: None  Lab Tests to be Obtained with Dialysis: No  Blood Transfusion to be Given During Dialysis: No  Available IV Access: Yes  Medications to be Administered During Dialysis: No  Continuous IV Infusion Running: No  Restraints on Currently or in the Last 24 Hours: No  Hand-Off Communication: None

## 2024-03-28 NOTE — H&P
" Medical Group History and Physical    ASSESSMENT & PLAN:     Discharged 3/27/2024 from [facility] after presenting with generalized weakness; final dc dx: UTI with Rx for cefpodoxime x 5 days [not taken]; noted elevated INR 3.4 with recommendations to hold Coumadin until recheck. Now presenting to [place] [facility] with ongoing UTI, generalized weakness and elevated INR.    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 Defer further work-up at this time.  - Consult to nephrology for HD orders - Dr Wise is PCP and Nephrologist  - usual schedule  T/Th/Sat  - renal diet  - labs ordered: CBC, CMP, Phos, Mag, Coag panel    DM  - A1c [ 7.1 ] on 05/2023; recheck this admission  - well controlled on Humalog SSI ONLY  - please review home meds and update rec as this has resulted in several near sentinel events in the past - per family    Paroxysmal Afib  - EKG shows SR  - keep on tele for now and reassess need for long term coumadin use in AM - family voiced concerns about the \"confusing regimen\"  - coumadin held on admission  - supra therapeutic INR - recheck in AM    VTE Prophylaxis: defer     Full Code    Sonali Herrmann, APRN-CNP    HISTORY OF PRESENT ILLNESS:   Chief Complaint: Progressive generalized weakness    History Of Present Illness:    Tyshawn Coles is a 83 y.o. male with a significant past medical history of mild dementia ? - anoxic brain injury d/t cardiac arrest from 2012, IDDM, ESRD on HD, AV Fistula LUE, seizures, HTN, paroxysmal atrial fibrillation on Coumadin, last INR 3.4 presenting to [place] [facility] with complaints of generalized weakness, " increased confusion, and difficulty caring for him in the home. Patient in unable to provide any information due to acute state, baseline mentation per family is A+O x2; son is at bedside to assist with history.     Discharged from [facility] [3/25-3/27], noted to have UTI with a prescription provided for cefpodoxime x5 days, also noted elevated INR 3.4, coumadin has been held, will need repeat INR in a.m. PCP and nephrologist is Dr. Wise who will be consulted for HD orders and further care.  Family voiced concerns for AV fistula infection because other family members have had this in the past, there is no obvious evidence on exam that he has developed an AV fistula infection. Family educated and updated.     Leukocytosis is present WBC 14.5, H/H 9.3/25.8 chronic anemia likely from ESRD no evidence of acute bleeding; UA [+] for UTI and ceftriaxone was started; CXR shows asymmetric elevation of diaphragm - not new, no respiratory distress, no supplemental o2 needed.    Vital signs are stable, patient is calm and comfortable, son is at bedside who voiced concerns regarding care provided at [facility] - Will need social work consult and follow-up throughout this admission.     Review of systems: 10 point review of systems is otherwise negative except as mentioned above.    PAST HISTORIES:       Past Medical History:  Medical Problems       Problem List       * (Principal) Generalized weakness    Anemia due to stage 5 chronic kidney disease (CMS/HCC)    Angioedema    Autonomic dysfunction    BPH (benign prostatic hyperplasia)    Cardiac dysrhythmia    DVT, lower extremity (CMS/[facility])    Dyslipidemia    ESRD on dialysis (CMS/[facility])    Fall    HTN (hypertension)    Syncope, near    Hypoxic encephalopathy (CMS/[facility])    Ischemic brain injury    A-fib (CMS/[facility])    Paroxysmal atrial fibrillation (CMS/[facility])    Peripheral neuropathy    Renal insufficiency    Seizure grand mal (CMS/[facility])    Spell of altered consciousness    Spell of loss  of consciousness    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 (CMS/[facility])    BMI 24.0-24.9, adult    Nonsmoker           Past Surgical History:  Past Surgical History:   Procedure Laterality Date    IR CVC TUNNELED  8/20/2021    IR CVC TUNNELED 8/20/2021 [facility] CLINICAL LEGACY    OTHER SURGICAL HISTORY  02/07/2020    Permanent pacemaker insertion    OTHER SURGICAL HISTORY  02/07/2020    Tonsillectomy with adenoidectomy    OTHER SURGICAL HISTORY  02/07/2020    Cholecystectomy    OTHER SURGICAL HISTORY  06/21/2022    Colonoscopy    OTHER SURGICAL HISTORY  06/21/2022    Hip replacement    OTHER SURGICAL HISTORY  06/21/2022    Cardiac event recorder insertion          Social History:  He reports that he has never smoked. He has never used smokeless tobacco. He reports that he does not drink alcohol and does not use drugs.    Family History:  Family History   Problem Relation Name Age of Onset    Diabetes Mother      Cancer Father      Other (systemic lupus erythematosus') Daughter      Other (end-stage renal disease) Daughter          was on dialysis but had a renal transplant    Asthma Son      Hypertension Other      Sickle cell trait Other          Allergies:  Patient has no known allergies.    OBJECTIVE:       Last Recorded Vitals:  Vitals:    03/27/24 2330 03/27/24 2346 03/28/24 0059 03/28/24 0212   BP: 89/52 140/73 151/79 149/85   BP Location: Right arm Right arm Right arm    Patient Position: Sitting Lying Lying    Pulse: 84 96 86 81   Resp: 16 17 17 16   Temp: 37 °C (98.6 °F)      TempSrc: Temporal      SpO2: 100% 98% 99% 99%   Weight: 81.6 kg (180 lb)      Height: 1.829 m (6')          Last I/O:  No intake/output data recorded.    Physical Exam  Vitals and nursing note reviewed.   Constitutional:       Appearance: Normal appearance. He is ill-appearing.   HENT:      Head: Normocephalic and atraumatic.      Nose: Nose normal.      Mouth/Throat:      Mouth: Mucous membranes are dry.      Pharynx: Oropharynx is  clear.   Eyes:      Extraocular Movements: Extraocular movements intact.      Conjunctiva/sclera: Conjunctivae normal.      Pupils: Pupils are equal, round, and reactive to light.   Cardiovascular:      Rate and Rhythm: Normal rate and regular rhythm.      Pulses: Normal pulses.      Heart sounds: Normal heart sounds.   Pulmonary:      Effort: Pulmonary effort is normal.      Breath sounds: Normal breath sounds.   Abdominal:      General: Abdomen is flat. Bowel sounds are normal.      Palpations: Abdomen is soft.   Genitourinary:     Comments: Not assessed  Musculoskeletal:         General: Normal range of motion.      Cervical back: Normal range of motion and neck supple.      Comments: No edema, LUE AVF [+] thrill/bruit   Skin:     General: Skin is warm and dry.      Capillary Refill: Capillary refill takes 2 to 3 seconds.   Neurological:      Mental Status: He is alert. Mental status is at baseline. He is disoriented.      Motor: Weakness present.   Psychiatric:      Comments: Unable to assess           Scheduled Medications    PRN Medications    Continuous Medications      Outpatient Medications:  Prior to Admission medications    Medication Sig Start Date End Date Taking? Authorizing Provider   Accu-Chek Aundrea Plus test strp strip  1/19/23   Historical Provider, MD   finasteride (Proscar) 5 mg tablet Take 1 tablet (5 mg) by mouth once daily in the morning.    Historical Provider, MD   hydroCHLOROthiazide (HYDRODiuril) 25 mg tablet Take 1 tablet (25 mg) by mouth once daily.    Historical Provider, MD   insulin lispro protamin-lispro (HumaLOG Mix 50-50 Insuln U-100) 100 unit/mL (50-50) injection Inject under the skin 4 times a day.    Historical Provider, MD   ipratropium-albuteroL (Duo-Neb) 0.5-2.5 mg/3 mL nebulizer solution Take by nebulization. 2/2/20   Historical Provider, MD   levETIRAcetam XR (Keppra XR) 500 mg 24 hr tablet Take 2 tablets (1,000 mg) by mouth once daily. 12/30/20   Historical Provider, MD    metoprolol tartrate (Lopressor) 50 mg tablet Take 1 tablet by mouth 2 times a day. 9/26/21   Historical Provider, MD   nitroglycerin (Nitrostat) 0.4 mg SL tablet PLACE 1 TABLET UNDER THE TONGUE EVERY 5 MINUTES FOR UP TO 3 DOSES AS NEEDED FOR CHEST PAIN.CALL 911 IF PAIN PERSISTS.    Historical Provider, MD   warfarin (Coumadin) 2 mg tablet Take 2 tablets (4 mg) by mouth. DAILY OR AS DIRECTED PER [facility]    Historical Provider, MD   warfarin (Coumadin) 4 mg tablet Take 1 tablet (4 mg) by mouth.  every Monday, Wednesday, and Friday (at bedtime)    Historical Provider, MD   warfarin (Coumadin) 6 mg tablet Take 1 tablet (6 mg) by mouth. DAILY OR AS DIRECTED PER [facility]    Historical Provider, MD       LABS AND IMAGING:     Labs:  Results for orders placed or performed during the hospital encounter of 03/27/24 (from the past 24 hour(s))   CBC and Auto Differential   Result Value Ref Range    WBC 14.5 (H) 4.4 - 11.3 x10*3/uL    nRBC 0.0 0.0 - 0.0 /100 WBCs    RBC 3.23 (L) 4.50 - 5.90 x10*6/uL    Hemoglobin 9.3 (L) 13.5 - 17.5 g/dL    Hematocrit 25.8 (L) 41.0 - 52.0 %    MCV 80 80 - 100 fL    MCH 28.8 26.0 - 34.0 pg    MCHC 36.0 32.0 - 36.0 g/dL    RDW 16.0 (H) 11.5 - 14.5 %    Platelets 207 150 - 450 x10*3/uL    Neutrophils % 71.8 40.0 - 80.0 %    Immature Granulocytes %, Automated 0.3 0.0 - 0.9 %    Lymphocytes % 13.5 13.0 - 44.0 %    Monocytes % 14.0 2.0 - 10.0 %    Eosinophils % 0.2 0.0 - 6.0 %    Basophils % 0.2 0.0 - 2.0 %    Neutrophils Absolute 10.38 (H) 1.60 - 5.50 x10*3/uL    Immature Granulocytes Absolute, Automated 0.05 0.00 - 0.50 x10*3/uL    Lymphocytes Absolute 1.96 0.80 - 3.00 x10*3/uL    Monocytes Absolute 2.02 (H) 0.05 - 0.80 x10*3/uL    Eosinophils Absolute 0.03 0.00 - 0.40 x10*3/uL    Basophils Absolute 0.03 0.00 - 0.10 x10*3/uL   Comprehensive Metabolic Panel   Result Value Ref Range    Glucose 183 (H) 74 - 99 mg/dL    Sodium 129 (L) 136 - 145 mmol/L    Potassium 4.6 3.5 - 5.3 mmol/L    Chloride 90 (L) 98 -  107 mmol/L    Bicarbonate 29 21 - 32 mmol/L    Anion Gap 15 10 - 20 mmol/L    Urea Nitrogen 61 (H) 6 - 23 mg/dL    Creatinine 5.20 (H) 0.50 - 1.30 mg/dL    eGFR 10 (L) >60 mL/min/1.73m*2    Calcium 8.9 8.6 - 10.3 mg/dL    Albumin 3.5 3.4 - 5.0 g/dL    Alkaline Phosphatase 64 33 - 136 U/L    Total Protein 6.4 6.4 - 8.2 g/dL    AST 27 9 - 39 U/L    Bilirubin, Total 0.5 0.0 - 1.2 mg/dL    ALT 18 10 - 52 U/L   Lactate   Result Value Ref Range    Lactate 1.3 0.4 - 2.0 mmol/L   Troponin I, High Sensitivity   Result Value Ref Range    Troponin I, High Sensitivity 27 (H) 0 - 20 ng/L   Lipase   Result Value Ref Range    Lipase 7 (L) 9 - 82 U/L   Blood Gas Venous Full Panel   Result Value Ref Range    POCT pH, Venous 7.50 (H) 7.33 - 7.43 pH    POCT pCO2, Venous 21 (L) 41 - 51 mm Hg    POCT pO2, Venous 105 (H) 35 - 45 mm Hg    POCT SO2, Venous 100 (H) 45 - 75 %    POCT Oxy Hemoglobin, Venous 96.8 (H) 45.0 - 75.0 %    POCT Hematocrit Calculated, Venous 20.0 (L) 41.0 - 52.0 %    POCT Sodium, Venous 134 (L) 136 - 145 mmol/L    POCT Potassium, Venous 4.2 3.5 - 5.3 mmol/L    POCT Chloride, Venous 109 (H) 98 - 107 mmol/L    POCT Ionized Calicum, Venous 0.81 (L) 1.10 - 1.33 mmol/L    POCT Glucose, Venous 120 (H) 74 - 99 mg/dL    POCT Lactate, Venous 1.2 0.4 - 2.0 mmol/L    POCT Base Excess, Venous -6.0 (L) -2.0 - 3.0 mmol/L    POCT HCO3 Calculated, Venous 16.4 (L) 22.0 - 26.0 mmol/L    POCT Hemoglobin, Venous 6.7 (L) 13.5 - 17.5 g/dL    POCT Anion Gap, Venous 13.0 10.0 - 25.0 mmol/L    Patient Temperature      FiO2 21 %   Urinalysis with Reflex Culture and Microscopic   Result Value Ref Range    Color, Urine Yellow Straw, Yellow    Appearance, Urine Hazy (N) Clear    Specific Gravity, Urine 1.013 1.005 - 1.035    pH, Urine 6.0 5.0, 5.5, 6.0, 6.5, 7.0, 7.5, 8.0    Protein, Urine >=500 (3+) (N) NEGATIVE mg/dL    Glucose, Urine 50 (1+) (A) NEGATIVE mg/dL    Blood, Urine SMALL (1+) (A) NEGATIVE    Ketones, Urine 5 (TRACE) (A) NEGATIVE  mg/dL    Bilirubin, Urine NEGATIVE NEGATIVE    Urobilinogen, Urine <2.0 <2.0 mg/dL    Nitrite, Urine NEGATIVE NEGATIVE    Leukocyte Esterase, Urine MODERATE (2+) (A) NEGATIVE   Microscopic Only, Urine   Result Value Ref Range    WBC, Urine >50 (A) 1-5, NONE /HPF    WBC Clumps, Urine MANY Reference range not established. /HPF    RBC, Urine >20 (A) NONE, 1-2, 3-5 /HPF        Imaging:  XR chest 1 view   Final Result   No airspace consolidation or pleural effusion.        Asymmetric elevation of left diaphragm        MACRO:   None        Signed by: Domenico Tafoya 3/28/2024 12:19 AM   Dictation workstation:   [id number]

## 2024-03-28 NOTE — NURSING NOTE
Report to Receiving RN:    Report From: FERNIE Lowry  Time Report Called: [id number]  Hand-Off Communication: Tx   Complications During Treatment: No  Ultrafiltration Treatment: No  Medications Administered During Dialysis: No  Blood Products Administered During Dialysis: No  Labs Sent During Dialysis: No  Heparin Drip Rate Changes: No    Electronic Signatures:     Authored: FERNIE Honeycutt/FERNIE GARCIA    Last Updated: 6:45 PM by MALLORY BACH

## 2024-03-28 NOTE — PROGRESS NOTES
Daily progress note    Tyshawn Coles is 83 y.o. male with a significant past medical history of mild dementia , cardiac arrest from 2012, IDDM, ESRD on HD, AV Fistula LUE, seizures, HTN, paroxysmal atrial fibrillation on Coumadin, last INR 3.4 presenting to [place] with complaints of generalized weakness, increased confusion, and difficulty caring for him in the home.     CBC showed WBC of 14.5, anemia with hemoglobin of 9.3 which is chronic, UA was positive for UTI, no fever noted, blood pressure was initially 89/52 but improved     Subjective  Patient was seen and examined at bedside  Patient is drowsy but arousable following commands  Not engaging in conversation not complaining of anything      Vital signs in last 24 hours:  Temp:  [36.8 °C (98.2 °F)-37.3 °C (99.1 °F)] 36.8 °C (98.2 °F)  Heart Rate:  [80-96] 80  Resp:  [12-17] 12  BP: ()/(52-85) 95/57  BP 95/57   Pulse 80   Temp 36.8 °C (98.2 °F)   Resp 12   Ht 1.829 m (6')   Wt 81.6 kg (180 lb)   SpO2 95%   BMI 24.41 kg/m²    Intake/Output last 3 shifts:  I/O last 3 completed shifts:  In: 50 (0.6 mL/kg) [IV Piggyback:50]  Out: - (0 mL/kg)   Weight: 81.6 kg   Intake/Output this shift:  No intake/output data recorded.    Physical Exam  Constitutional:       General: He is not in acute distress.     Appearance: Normal appearance. He is not ill-appearing, toxic-appearing or diaphoretic.   HENT:      Head: Normocephalic and atraumatic.      Mouth/Throat:      Mouth: Mucous membranes are moist.      Pharynx: No oropharyngeal exudate.   Eyes:      Extraocular Movements: Extraocular movements intact.      Pupils: Pupils are equal, round, and reactive to light.   Cardiovascular:      Rate and Rhythm: Normal rate and regular rhythm.      Heart sounds: No murmur heard.  Pulmonary:      Effort: Pulmonary effort is normal. No respiratory distress.      Breath sounds: Normal breath sounds. No wheezing.   Abdominal:      General: Abdomen is flat. Bowel sounds  are normal. There is no distension.      Tenderness: There is no abdominal tenderness. There is no guarding or rebound.   Musculoskeletal:         General: No swelling.      Right lower leg: No edema.      Left lower leg: No edema.   Skin:     Findings: No lesion or rash.   Neurological:      General: No focal deficit present.      Mental Status: He is disoriented.      Cranial Nerves: No cranial nerve deficit.      Motor: No weakness.   Psychiatric:         Mood and Affect: Mood normal.         Behavior: Behavior normal.         Current medication   Current Facility-Administered Medications   Medication Dose Route Frequency Provider Last Rate Last Admin    acetaminophen (Tylenol) tablet 650 mg  650 mg oral q4h PRN SOURAV Childs        Or    acetaminophen (Tylenol) oral liquid 650 mg  650 mg oral q4h PRN SOURAV Childs        Or    acetaminophen (Tylenol) suppository 650 mg  650 mg rectal q4h PRN SOURAV Childs        B complex-vitamin C-folic acid (Nephrocaps) capsule 1 capsule  1 capsule oral Daily SOURAV Childs   1 capsule at 03/28/24 0657    cefTRIAXone (Rocephin) IVPB 1 g  1 g intravenous q24h SOURAV Childs   Stopped at 03/28/24 0605    dextrose 50 % injection 12.5 g  12.5 g intravenous q15 min PRN SOURAV Childs        docusate sodium (Colace) capsule 100 mg  100 mg oral BID SOURAV Childs        finasteride (Proscar) tablet 5 mg  5 mg oral q AM SOURAV Childs        glucagon (Glucagen) injection 1 mg  1 mg intramuscular q15 min PRN SOURAV Childs        hydroCHLOROthiazide (HYDRODiuril) tablet 25 mg  25 mg oral Daily SOURAV Childs        insulin lispro (HumaLOG) injection 0-5 Units  0-5 Units subcutaneous TID with meals SOURAV Childs        ipratropium-albuteroL (Duo-Neb) 0.5-2.5 mg/3 mL nebulizer solution 3 mL  3 mL nebulization q4h PRN SOURAV Childs        levETIRAcetam  XR (Keppra XR) 24 hr tablet 1,000 mg  1,000 mg oral Daily SOURAV Childs        melatonin tablet 3 mg  3 mg oral Nightly PRN SOURAV Childs        metoprolol tartrate (Lopressor) tablet 50 mg  50 mg oral BID SOURAV Childs        ondansetron (Zofran) tablet 4 mg  4 mg oral q8h PRN SOURAV Childs        Or    ondansetron (Zofran) injection 4 mg  4 mg intravenous q8h PRN SOURAV Childs        sennosides (Senokot) tablet 8.6 mg  1 tablet oral Nightly SOURAV Childs            Labs  Lab Results   Component Value Date    WBC 14.2 (H) 03/28/2024    HGB 8.4 (L) 03/28/2024    HCT 23.2 (L) 03/28/2024    MCV 79 (L) 03/28/2024     03/28/2024     Lab Results   Component Value Date    HGBA1C 7.1 (A) 05/22/2023     Lab Results   Component Value Date    GLUCOSE 158 (H) 03/28/2024    CALCIUM 8.5 (L) 03/28/2024     (L) 03/28/2024    K 4.6 03/28/2024    CO2 27 03/28/2024    CL 93 (L) 03/28/2024    BUN 70 (H) 03/28/2024    CREATININE 5.35 (H) 03/28/2024           Principal Problem:    Generalized weakness      Assessment and Plan   #Acute metabolic encephalopathy due to UTI  #History of cardiac arrest in 2012  #Acute cystitis  #Transient hypotension resolved    Patient was recently admitted at a different hospital and was discharged on p.o. antibiotics for UTI  Continues to Rocephin for now  Follow urine and blood culture result       #ESRD on HD TTS  Continue HD as per nephrology     #Type 2 diabetes  Insulin sliding scale  Diabetic diet       #Paroxysmal Afib on Coumadin  #Supratherapeutic INR  Continue home medications  Hold Coumadin until INR is below 3  Will resume Coumadin once INR is below 3 at a lower dose  Monitor INR daily     VTE Prophylaxis Coumadin on hold for supratherapeutic INR    PT/OT  Recommended moderate intensity therapy  Family is agreeable for SNF placement  Disposition SNF once medically clear, discussed with patient's son and POA by  the bedside  CODE STATUS, discussed with the son MPOA and patient is full code       LOS: 0 days

## 2024-03-28 NOTE — PROGRESS NOTES
Occupational Therapy    Evaluation    Patient Name: Tyshawn Coles  MRN: [id number]  Today's Date: 3/28/2024  Time Calculation  Start Time: 0844  Stop Time: 0855  Time Calculation (min): 11 min      Assessment:  OT Assessment: Limited by impaired cognition, balance deficits, decreased strength and endurance.  End of Session Communication: Bedside nurse  End of Session Patient Position: Bed, 3 rail up, Alarm on  OT Assessment Results: Decreased ADL status, Decreased upper extremity strength, Decreased safe judgment during ADL, Decreased cognition, Decreased endurance, Decreased functional mobility  Plan:  Treatment Interventions: ADL retraining, Functional transfer training, UE strengthening/ROM, Endurance training, Patient/family training  OT Frequency: 2 times per week  OT Discharge Recommendations: Moderate intensity level of continued care  OT - OK to Discharge: Yes (Once medically appropriate.)  Treatment Interventions: ADL retraining, Functional transfer training, UE strengthening/ROM, Endurance training, Patient/family training    Subjective   Current Problem:  1. 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 h/o seizures, L UE fistula, BPH, syncope, hypoxic emcephalopathy  Prior to Session Communication: Bedside nurse (Cleared for therapy evaluation by RN.)  Patient Position Received: Bed, 3 rail up, Alarm on  General Comment: Pt. is an 82yo who presented [facility] ED on 3/27/2024 with weakness x 3 days and possible HD fistula infection. In ED, BP 89/52.  CXR (3/28) (+) L basilar atelctasis, elevated L hemidippragm  Hgb (3/28) 8.4 trending down  NA (3/18) 130 trending up  Dx: UTI, weakness, metabolic encephalopathy   Pt. was D/C'd from [facility] in [place]  "on 3/27/2024 where he was treated for UTI and elevate INR.  Precautions:  Medical Precautions: Fall precautions (Pt. has a h/o seizures per EMR but no seizure precautions noted in EMR and no seizure pads on bed Activity order: OOB with A)     Pain:  Pain Assessment  Pain Assessment: 0-10  Pain Score: 0 - No pain    Objective   Cognition:  Overall Cognitive Status:  (Flat affect. Lethargic. Soft spoken. Delayed processing. Patient follows ~25-49% commands.)  Orientation Level: Disoriented to time, Disoriented to situation  Processing Speed: Delayed           Home Living:  Home Living Comments: Pt. is a questionable historian. Per Pt.: Pt. lives alone in a 1 level house with 2 ANNIE with HR. Bed/bath on 1st floor with tub shower with a seat and grab bars. Per STEVE valdivia at [facility] on 3/25/2024 info. was provided by hired caregiver from home who stated Pt. lives with his wife and has a caregiver is with him M-F during the day hours. Per [facility]HA Pt. has a ramp to enter house.  Prior Function:  Prior Function Comments: Patient is a questionable historian. Per Pt.: Pt. amb with FWW PTA and was I with ADLs and IADLs. Pt. denied falls in last 3 months. Pt. did not drive PTA. Per OT skip at [facility] on 3/25/2024: A reported that Pt.  has 24/7 assist between hired aides and family who assist with all ADL and total assist for IADL's.  Pt. amb with Rollator inside the house short distances with assist at all times.  Pt. uses W/C when going outside and if having a \"tired day\" will use W/C for mobility.     ADL:  Eating Assistance: Minimal  Grooming Assistance: Moderate  Bathing Assistance: Total  UE Dressing Assistance: Moderate  LE Dressing Assistance: Total  Toileting Assistance with Device: Total  Activity Tolerance:  Endurance: Decreased tolerance for upright activites  Bed Mobility/Transfers: Bed Mobility 1  Bed Mobility 1: Supine to sitting, Sitting to supine  Bed Mobility Comments 1: HOB elevated. Max A x2 for supine <> sit, max cues for " technique.    Transfer 1  Technique 1: Stand to sit, Sit to stand  Transfer Device 1:  (FWW)  Trials/Comments 1: Patient completed sit <> stand at EOB and able to take a few side steps with a FWW, max A x2 for balance. Patient required assistance to advance the L LE when side stepping.     Sitting Balance:  Static Sitting Balance  Static Sitting-Comment/Number of Minutes: Fair  Standing Balance:  Static Standing Balance  Static Standing-Comment/Number of Minutes: Poor   Strength:  Strength Comments: Unable to formally assess d/t impaired cognition.     Extremities: RUE   RUE :  (Shoulder flexion ~90 degrees. Elbow flex/ext WFL.) and LUE   LUE:  (Shoulder flex ~90 degrees. Elbow flex/ext WFL.)    Outcome Measures:[facility] Daily Activity  Putting on and taking off regular lower body clothing: Total  Bathing (including washing, rinsing, drying): Total  Putting on and taking off regular upper body clothing: A little  Toileting, which includes using toilet, bedpan or urinal: A lot  Taking care of personal grooming such as brushing teeth: A little  Eating Meals: A little  Daily Activity - Total Score: 13    Education Documentation  ADL Training, taught by [place] OT at 3/28/2024  1:31 PM.  Learner: Patient  Readiness: Acceptance  Method: Explanation  Response: Needs Reinforcement    EDUCATION:  Education  Individual(s) Educated: Patient  Education Provided: POC discussed and agreed upon, Risk and benefits of OT discussed with patient or other, Fall precautons  Patient Response to Education:  (Recommend continued education for increased carryover.)    Goals:  Encounter Problems       Encounter Problems (Active)       OT Goals       Patient will complete functional transfers with a FWW at min A level.  (Progressing)       Start:  03/28/24    Expected End:  04/11/24            Patient will complete grooming while seated EOB with setup/S. (Progressing)       Start:  03/28/24    Expected End:  04/11/24            Patient  will demonstrate fair + dynamic sitting balance and fair static standing balance during functional tasks. (Progressing)       Start:  03/28/24    Expected End:  04/11/24            Patient will tolerate sitting EOB greater than 8 minutes during functional tasks. (Progressing)       Start:  03/28/24    Expected End:  04/11/24            Patient will complete 1x10 reps of UE strengthening exercises to increase strength for functional transfers.  (Progressing)       Start:  03/28/24    Expected End:  04/11/24

## 2024-03-29 LAB
ALBUMIN SERPL BCP-MCNC: 3.2 G/DL (ref 3.4–5)
ALP SERPL-CCNC: 52 U/L (ref 33–136)
ALT SERPL W P-5'-P-CCNC: 15 U/L (ref 10–52)
ANION GAP SERPL CALC-SCNC: 14 MMOL/L (ref 10–20)
AST SERPL W P-5'-P-CCNC: 28 U/L (ref 9–39)
ATRIAL RATE: 69 BPM
BACTERIA UR CULT: NO GROWTH
BASOPHILS # BLD AUTO: 0.04 X10*3/UL (ref 0–0.1)
BASOPHILS NFR BLD AUTO: 0.3 %
BILIRUB SERPL-MCNC: 0.5 MG/DL (ref 0–1.2)
BUN SERPL-MCNC: 42 MG/DL (ref 6–23)
CALCIUM SERPL-MCNC: 8.5 MG/DL (ref 8.6–10.3)
CHLORIDE SERPL-SCNC: 98 MMOL/L (ref 98–107)
CO2 SERPL-SCNC: 28 MMOL/L (ref 21–32)
CREAT SERPL-MCNC: 3.67 MG/DL (ref 0.5–1.3)
EGFRCR SERPLBLD CKD-EPI 2021: 15 ML/MIN/1.73M*2
EOSINOPHIL # BLD AUTO: 0.06 X10*3/UL (ref 0–0.4)
EOSINOPHIL NFR BLD AUTO: 0.4 %
ERYTHROCYTE [DISTWIDTH] IN BLOOD BY AUTOMATED COUNT: 16.2 % (ref 11.5–14.5)
GLUCOSE BLD MANUAL STRIP-MCNC: 146 MG/DL (ref 74–99)
GLUCOSE BLD MANUAL STRIP-MCNC: 171 MG/DL (ref 74–99)
GLUCOSE BLD MANUAL STRIP-MCNC: 257 MG/DL (ref 74–99)
GLUCOSE SERPL-MCNC: 147 MG/DL (ref 74–99)
HBV SURFACE AB SER-ACNC: 34.7 MIU/ML
HBV SURFACE AG SERPL QL IA: NONREACTIVE
HCT VFR BLD AUTO: 24.5 % (ref 41–52)
HGB BLD-MCNC: 8.8 G/DL (ref 13.5–17.5)
HOLD SPECIMEN: NORMAL
IMM GRANULOCYTES # BLD AUTO: 0.07 X10*3/UL (ref 0–0.5)
IMM GRANULOCYTES NFR BLD AUTO: 0.5 % (ref 0–0.9)
INR PPP: 2.9 (ref 0.9–1.1)
LYMPHOCYTES # BLD AUTO: 1.39 X10*3/UL (ref 0.8–3)
LYMPHOCYTES NFR BLD AUTO: 9.2 %
MAGNESIUM SERPL-MCNC: 1.95 MG/DL (ref 1.6–2.4)
MCH RBC QN AUTO: 29 PG (ref 26–34)
MCHC RBC AUTO-ENTMCNC: 35.9 G/DL (ref 32–36)
MCV RBC AUTO: 81 FL (ref 80–100)
MONOCYTES # BLD AUTO: 1.95 X10*3/UL (ref 0.05–0.8)
MONOCYTES NFR BLD AUTO: 12.9 %
NEUTROPHILS # BLD AUTO: 11.56 X10*3/UL (ref 1.6–5.5)
NEUTROPHILS NFR BLD AUTO: 76.7 %
NRBC BLD-RTO: 0 /100 WBCS (ref 0–0)
P AXIS: 35 DEGREES
P OFFSET: 180 MS
P ONSET: 133 MS
PLATELET # BLD AUTO: 180 X10*3/UL (ref 150–450)
POTASSIUM SERPL-SCNC: 4 MMOL/L (ref 3.5–5.3)
PR INTERVAL: 168 MS
PROT SERPL-MCNC: 6 G/DL (ref 6.4–8.2)
PROTHROMBIN TIME: 32.5 SECONDS (ref 9.8–12.8)
Q ONSET: 217 MS
QRS COUNT: 11 BEATS
QRS DURATION: 86 MS
QT INTERVAL: 392 MS
QTC CALCULATION(BAZETT): 420 MS
QTC FREDERICIA: 410 MS
R AXIS: 12 DEGREES
RBC # BLD AUTO: 3.03 X10*6/UL (ref 4.5–5.9)
SODIUM SERPL-SCNC: 136 MMOL/L (ref 136–145)
T AXIS: 27 DEGREES
T OFFSET: 413 MS
VENTRICULAR RATE: 69 BPM
WBC # BLD AUTO: 15.1 X10*3/UL (ref 4.4–11.3)

## 2024-03-29 PROCEDURE — 82947 ASSAY GLUCOSE BLOOD QUANT: CPT

## 2024-03-29 PROCEDURE — 1200000002 HC GENERAL ROOM WITH TELEMETRY DAILY

## 2024-03-29 PROCEDURE — 36415 COLL VENOUS BLD VENIPUNCTURE: CPT | Performed by: STUDENT IN AN ORGANIZED HEALTH CARE EDUCATION/TRAINING PROGRAM

## 2024-03-29 PROCEDURE — 2500000001 HC RX 250 WO HCPCS SELF ADMINISTERED DRUGS (ALT 637 FOR MEDICARE OP): Performed by: NURSE PRACTITIONER

## 2024-03-29 PROCEDURE — 85025 COMPLETE CBC W/AUTO DIFF WBC: CPT | Performed by: NURSE PRACTITIONER

## 2024-03-29 PROCEDURE — 2500000004 HC RX 250 GENERAL PHARMACY W/ HCPCS (ALT 636 FOR OP/ED): Performed by: NURSE PRACTITIONER

## 2024-03-29 PROCEDURE — 97530 THERAPEUTIC ACTIVITIES: CPT | Mod: GP,CQ | Performed by: PHYSICAL THERAPY ASSISTANT

## 2024-03-29 PROCEDURE — 2500000001 HC RX 250 WO HCPCS SELF ADMINISTERED DRUGS (ALT 637 FOR MEDICARE OP): Performed by: INTERNAL MEDICINE

## 2024-03-29 PROCEDURE — 80053 COMPREHEN METABOLIC PANEL: CPT | Performed by: NURSE PRACTITIONER

## 2024-03-29 PROCEDURE — 99233 SBSQ HOSP IP/OBS HIGH 50: CPT | Performed by: STUDENT IN AN ORGANIZED HEALTH CARE EDUCATION/TRAINING PROGRAM

## 2024-03-29 PROCEDURE — 83735 ASSAY OF MAGNESIUM: CPT | Performed by: NURSE PRACTITIONER

## 2024-03-29 PROCEDURE — 36415 COLL VENOUS BLD VENIPUNCTURE: CPT | Performed by: NURSE PRACTITIONER

## 2024-03-29 PROCEDURE — 85610 PROTHROMBIN TIME: CPT | Performed by: STUDENT IN AN ORGANIZED HEALTH CARE EDUCATION/TRAINING PROGRAM

## 2024-03-29 RX ORDER — LIDOCAINE AND PRILOCAINE 25; 25 MG/G; MG/G
CREAM TOPICAL
Status: DISCONTINUED | OUTPATIENT
Start: 2024-03-30 | End: 2024-04-01

## 2024-03-29 RX ADMIN — METOPROLOL TARTRATE 50 MG: 50 TABLET, FILM COATED ORAL at 08:51

## 2024-03-29 RX ADMIN — DOCUSATE SODIUM 100 MG: 100 CAPSULE, LIQUID FILLED ORAL at 21:12

## 2024-03-29 RX ADMIN — METOPROLOL TARTRATE 50 MG: 50 TABLET, FILM COATED ORAL at 21:12

## 2024-03-29 RX ADMIN — INSULIN LISPRO 2 UNITS: 100 INJECTION, SOLUTION INTRAVENOUS; SUBCUTANEOUS at 16:36

## 2024-03-29 RX ADMIN — CALCIUM ACETATE 667 MG: 667 CAPSULE ORAL at 11:50

## 2024-03-29 RX ADMIN — SENNOSIDES 8.6 MG: 8.6 TABLET, FILM COATED ORAL at 21:12

## 2024-03-29 RX ADMIN — CALCIUM ACETATE 667 MG: 667 CAPSULE ORAL at 16:39

## 2024-03-29 RX ADMIN — LEVETIRACETAM 1000 MG: 500 TABLET, FILM COATED, EXTENDED RELEASE ORAL at 08:51

## 2024-03-29 RX ADMIN — FINASTERIDE 5 MG: 5 TABLET, FILM COATED ORAL at 08:51

## 2024-03-29 RX ADMIN — CALCIUM ACETATE 667 MG: 667 CAPSULE ORAL at 08:50

## 2024-03-29 RX ADMIN — INSULIN LISPRO 3 UNITS: 100 INJECTION, SOLUTION INTRAVENOUS; SUBCUTANEOUS at 11:51

## 2024-03-29 RX ADMIN — CEFTRIAXONE SODIUM 1 G: 1 INJECTION, SOLUTION INTRAVENOUS at 05:33

## 2024-03-29 RX ADMIN — Medication 1 CAPSULE: at 05:33

## 2024-03-29 ASSESSMENT — COGNITIVE AND FUNCTIONAL STATUS - GENERAL
MOVING TO AND FROM BED TO CHAIR: TOTAL
STANDING UP FROM CHAIR USING ARMS: TOTAL
MOBILITY SCORE: 8
WALKING IN HOSPITAL ROOM: TOTAL
TURNING FROM BACK TO SIDE WHILE IN FLAT BAD: A LOT
CLIMB 3 TO 5 STEPS WITH RAILING: TOTAL
MOVING FROM LYING ON BACK TO SITTING ON SIDE OF FLAT BED WITH BEDRAILS: A LOT

## 2024-03-29 ASSESSMENT — PAIN SCALES - GENERAL
PAINLEVEL_OUTOF10: 0 - NO PAIN
PAINLEVEL_OUTOF10: 0 - NO PAIN

## 2024-03-29 ASSESSMENT — PAIN - FUNCTIONAL ASSESSMENT: PAIN_FUNCTIONAL_ASSESSMENT: 0-10

## 2024-03-29 NOTE — PROGRESS NOTES
Physical Therapy    Physical Therapy Treatment    Patient Name: Tyshawn Coles  MRN: [id number]  Today's Date: 3/29/2024  Time Calculation  Start Time: 1052  Stop Time: 1117  Time Calculation (min): 25 min       Assessment/Plan   PT Assessment  End of Session Communication: Bedside nurse  End of Session Patient Position: Bed, 3 rail up (positioned in right sidelying for pressure relief with pillow supports)     PT Plan  Treatment/Interventions: Bed mobility, Transfer training, Balance training, Strengthening  PT Plan: Skilled PT  PT Frequency: 3 times per week  General Visit Information:   PT  Visit  PT Received On: 03/29/24  General  Prior to Session Communication: Bedside nurse  Patient Position Received: Bed, 3 rail up    Subjective   Precautions:  Precautions  Precautions Comment:  (high fall risk,  OOB with assist.)    Treatments:     Transfers  Transfer: Yes (supine to sit with max x 1, sit to stand with mod x 1 trials with hand placement and positioning assist with high back chair for standing support weight shifting with education to advance side step unable with LLE assist given tolerated stand well.)    Outcome Measures:  [facility] Basic Mobility  Turning from your back to your side while in a flat bed without using bedrails: A lot  Moving from lying on your back to sitting on the side of a flat bed without using bedrails: A lot  Moving to and from bed to chair (including a wheelchair): Total  Standing up from a chair using your arms (e.g. wheelchair or bedside chair): Total  To walk in hospital room: Total  Climbing 3-5 steps with railing: Total  Basic Mobility - Total Score: 8      Encounter Problems       Encounter Problems (Active)       PT Problem       Pt. will transfer supine/sit with MOD A x 1 (Progressing)       Start:  03/28/24    Expected End:  03/30/24            Pt. will transfer sit/stand with FWW with MOD A x 1 (Progressing)       Start:  03/28/24    Expected End:  03/30/24            Pt. will  complete stand pivot transfers with FWW with MOD A x 1 (Progressing)       Start:  03/28/24    Expected End:  03/30/24            Pt.will ambulate 25' with FWW with MOD A x 1 (Progressing)       Start:  03/28/24    Expected End:  03/30/24            Pt. will perform 2 x 15 B LE AROM exercises  (Progressing)       Start:  03/28/24    Expected End:  03/30/24               Pain - Adult

## 2024-03-29 NOTE — PROGRESS NOTES
03/29/24 1701   Discharge Planning   Home or Post Acute Services Post acute facilities (Rehab/SNF/etc)   Type of Post Acute Facility Services Skilled nursing   Patient expects to be discharged to: [facility] vs [facility]   Does the patient need discharge transport arranged? Yes   RoundTrip coordination needed? Yes   Has discharge transport been arranged? No   Patient Choice   Provider Choice list and CMS website (https://medicare.gov/care-compare#search) for post-acute Quality and Resource Measure Data were provided and reviewed with: Family   Patient / Family choosing to utilize agency / facility established prior to hospitalization Yes     Referrals sent to [facility] and [facility]. Both facilities have responded that they will not have bed availability until next week. SW spoke with son, son states family will provide transport while pt is at facility for pt to go to and from dialysis. Son requesting to wait and see if facilities will have any availability at time pt is ready for discharge. Message sent to both facilities requesting updates when beds become available.

## 2024-03-29 NOTE — CONSULTS
Consults    Reason For Consult  This patient is known to me with end-stage kidney disease secondary to hypertensive and diabetic kidney disease as well as currently on hemodialysis  Hypertension  In the remote past eventually needing dialysis.  Apparently the patient was recently admitted to [facility] reportedly for generalized weakness and was treated for pneumonia.  The patient was then discharged and his wife called me last night wanting the patient to be admitted to [facility] because he could hardly stand up and was extremely weak and they would not be able to take care of him at home.  We advised him to go to the emergency room and was eventually admitted emergency room workup showed that the patient's electrolytes were normal other than hyponatremia with a sodium of 130, electrolytes and acid-base balance were normal and BUN and creatinine compatible with end-stage kidney disease.  Phosphorus was slightly elevated at 5.8, magnesium 2.01, blood sugar 183 nonfasting.  White cell count was elevated at 14.2 with a hemoglobin of 8.4 and platelet of 174 and lactic acid level normal at 1.3.  Patient's workup was suggestive of UTI on urinalysis and was started on IV Rocephin.  Arterial blood gases shows the patient is oxygenating well with a pH of 7.50, pCO2 of 21 and pO2 of 105.  Vital signs were stable.  Patient was then admitted and we are following him up for his kidney related issues.      History Of Present Illness  Tyshawn Coles is a 83 y.o. male presenting with generalized weakness was seen at the emergency room.  Workup is as discussed above.  The patient also had an INR of 3.6 and hyper coagulated as well as suspected UTI with metabolic encephalopathy secondary to UTI.       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    Surgical History  He has a past surgical history that includes Other surgical history (02/07/2020); Other surgical history (02/07/2020); Other surgical history (02/07/2020); Other surgical history (06/21/2022); Other surgical history (06/21/2022); Other surgical history (06/21/2022); and IR CVC tunneled (8/20/2021).     Social History  He reports that he has never smoked. He has never used smokeless tobacco. He reports that he does not drink alcohol and does not use drugs.  The patient used to work as a  for [employer] and is retired.    Family History  Family History   Problem Relation Name Age of Onset    Diabetes Mother      Cancer Father      Other (systemic lupus erythematosus') Daughter      Other (end-stage renal disease) Daughter          was on dialysis but had a renal transplant    Asthma Son      Hypertension Other      Sickle cell trait Other          Allergies  Patient has no known allergies.    Review of Systems  According to his wife the patient has been more confused lately and could hardly walk  He is basically homebound with slow mentation secondary to prior stroke history of ischemic encephalopathy now possibly with metabolic encephalopathy secondary to UTI.  Reportedly he moves his bowels he  hardly makes any more urine and there has been no complaints of chest pains.  He sleeps most of the time.  Otherwise 12 point review of systems essentially benign.  Patient is not a good historian       Physical Exam  Patient seems to be alert and oriented today slow mentation but able to answer questions properly.  Last blood pressure was 146/61 heart rate of 71 with pulse oximeter reading of 97% on room air  Patient looks cachectic with facial bones that are prominent  Neck veins are flat without any bruits  Lungs are clear there was no wheezing crackles or rhonchi  Abdomen soft and nontender good bowel sounds no guarding  Extremities shows atrophic muscles but no edema peripheral pulses are present  AV fistula on the left upper arm     Last Recorded Vitals  Blood pressure 146/61, pulse 71, temperature 36.8 °C (98.2 °F), resp. rate 16, height 1.829 m (6'), weight 81.6 kg (180 lb), SpO2 97 %.  Current Facility-Administered Medications:     acetaminophen (Tylenol) tablet 650 mg, 650 mg, oral, q4h PRN **OR** acetaminophen (Tylenol) oral liquid 650 mg, 650 mg, oral, q4h PRN **OR** acetaminophen (Tylenol) suppository 650 mg, 650 mg, rectal, q4h PRN, SOURAV Childs    B complex-vitamin C-folic acid (Nephrocaps) capsule 1 capsule, 1 capsule, oral, Daily, SOURAV Childs, 1 capsule at 03/28/24 0657    cefTRIAXone (Rocephin) IVPB 1 g, 1 g, intravenous, q24h, SOURAV Childs, Stopped at 03/28/24 0605    dextrose 50 % injection 12.5 g, 12.5 g, intravenous, q15 min PRN, SOURAV Childs    docusate sodium (Colace) capsule 100 mg, 100 mg, oral, BID, SOURAV Childs, 100 mg at 03/28/24 1018    epoetin sheila-epbx (Retacrit) injection 10,000 Units, 150 Units/kg, intravenous, After Dialysis, Edi Wise MD, 10,000 Units at 03/28/24 2031    finasteride (Proscar) tablet 5 mg, 5 mg, oral, q AM, Sonali Herrmann, APRN-CNP, 5 mg at 03/28/24 1019    glucagon (Glucagen) injection 1 mg,  1 mg, intramuscular, q15 min PRN, SOURAV Childs    [Held by provider] hydroCHLOROthiazide (HYDRODiuril) tablet 25 mg, 25 mg, oral, Daily, SOURAV Childs    insulin lispro (HumaLOG) injection 0-5 Units, 0-5 Units, subcutaneous, TID with meals, SOURAV Childs, 1 Units at 03/28/24 2031    ipratropium-albuteroL (Duo-Neb) 0.5-2.5 mg/3 mL nebulizer solution 3 mL, 3 mL, nebulization, q4h PRN, SOURAV Childs    levETIRAcetam XR (Keppra XR) 24 hr tablet 1,000 mg, 1,000 mg, oral, Daily, SOURAV Childs, 1,000 mg at 03/28/24 1019    [START ON 3/29/2024] lidocaine-prilocaine (Emla) cream, , Topical, Before Dialysis, Edi Wise MD    melatonin tablet 3 mg, 3 mg, oral, Nightly PRN, SOURAV Childs    metoprolol tartrate (Lopressor) tablet 50 mg, 50 mg, oral, BID, SOURAV Childs, 50 mg at 03/28/24 2031    ondansetron (Zofran) tablet 4 mg, 4 mg, oral, q8h PRN **OR** ondansetron (Zofran) injection 4 mg, 4 mg, intravenous, q8h PRN, SOURAV Childs    sennosides (Senokot) tablet 8.6 mg, 1 tablet, oral, Nightly, SOURAV Childs    sodium chloride 0.9 % bolus 200 mL, 200 mL, intravenous, After Dialysis, Edi Wise MD    sodium chloride 0.9 % bolus 200 mL, 200 mL, intravenous, q1h PRN, Edi Wise MD    sodium chloride 0.9 % bolus 200 mL, 200 mL, intravenous, q1h PRN, Edi Wise MD   Results for orders placed or performed during the hospital encounter of 03/27/24 (from the past 24 hour(s))   CBC and Auto Differential   Result Value Ref Range    WBC 14.5 (H) 4.4 - 11.3 x10*3/uL    nRBC 0.0 0.0 - 0.0 /100 WBCs    RBC 3.23 (L) 4.50 - 5.90 x10*6/uL    Hemoglobin 9.3 (L) 13.5 - 17.5 g/dL    Hematocrit 25.8 (L) 41.0 - 52.0 %    MCV 80 80 - 100 fL    MCH 28.8 26.0 - 34.0 pg    MCHC 36.0 32.0 - 36.0 g/dL    RDW 16.0 (H) 11.5 - 14.5 %    Platelets 207 150 - 450 x10*3/uL    Neutrophils % 71.8 40.0 - 80.0 %    Immature Granulocytes %,  Automated 0.3 0.0 - 0.9 %    Lymphocytes % 13.5 13.0 - 44.0 %    Monocytes % 14.0 2.0 - 10.0 %    Eosinophils % 0.2 0.0 - 6.0 %    Basophils % 0.2 0.0 - 2.0 %    Neutrophils Absolute 10.38 (H) 1.60 - 5.50 x10*3/uL    Immature Granulocytes Absolute, Automated 0.05 0.00 - 0.50 x10*3/uL    Lymphocytes Absolute 1.96 0.80 - 3.00 x10*3/uL    Monocytes Absolute 2.02 (H) 0.05 - 0.80 x10*3/uL    Eosinophils Absolute 0.03 0.00 - 0.40 x10*3/uL    Basophils Absolute 0.03 0.00 - 0.10 x10*3/uL   Comprehensive Metabolic Panel   Result Value Ref Range    Glucose 183 (H) 74 - 99 mg/dL    Sodium 129 (L) 136 - 145 mmol/L    Potassium 4.6 3.5 - 5.3 mmol/L    Chloride 90 (L) 98 - 107 mmol/L    Bicarbonate 29 21 - 32 mmol/L    Anion Gap 15 10 - 20 mmol/L    Urea Nitrogen 61 (H) 6 - 23 mg/dL    Creatinine 5.20 (H) 0.50 - 1.30 mg/dL    eGFR 10 (L) >60 mL/min/1.73m*2    Calcium 8.9 8.6 - 10.3 mg/dL    Albumin 3.5 3.4 - 5.0 g/dL    Alkaline Phosphatase 64 33 - 136 U/L    Total Protein 6.4 6.4 - 8.2 g/dL    AST 27 9 - 39 U/L    Bilirubin, Total 0.5 0.0 - 1.2 mg/dL    ALT 18 10 - 52 U/L   Lactate   Result Value Ref Range    Lactate 1.3 0.4 - 2.0 mmol/L   Blood Culture    Specimen: Peripheral Venipuncture; Blood culture   Result Value Ref Range    Blood Culture Loaded on Instrument - Culture in progress    Blood Culture    Specimen: Peripheral Venipuncture; Blood culture   Result Value Ref Range    Blood Culture Loaded on Instrument - Culture in progress    Troponin I, High Sensitivity   Result Value Ref Range    Troponin I, High Sensitivity 27 (H) 0 - 20 ng/L   Lipase   Result Value Ref Range    Lipase 7 (L) 9 - 82 U/L   Blood Gas Venous Full Panel   Result Value Ref Range    POCT pH, Venous 7.50 (H) 7.33 - 7.43 pH    POCT pCO2, Venous 21 (L) 41 - 51 mm Hg    POCT pO2, Venous 105 (H) 35 - 45 mm Hg    POCT SO2, Venous 100 (H) 45 - 75 %    POCT Oxy Hemoglobin, Venous 96.8 (H) 45.0 - 75.0 %    POCT Hematocrit Calculated, Venous 20.0 (L) 41.0 -  52.0 %    POCT Sodium, Venous 134 (L) 136 - 145 mmol/L    POCT Potassium, Venous 4.2 3.5 - 5.3 mmol/L    POCT Chloride, Venous 109 (H) 98 - 107 mmol/L    POCT Ionized Calicum, Venous 0.81 (L) 1.10 - 1.33 mmol/L    POCT Glucose, Venous 120 (H) 74 - 99 mg/dL    POCT Lactate, Venous 1.2 0.4 - 2.0 mmol/L    POCT Base Excess, Venous -6.0 (L) -2.0 - 3.0 mmol/L    POCT HCO3 Calculated, Venous 16.4 (L) 22.0 - 26.0 mmol/L    POCT Hemoglobin, Venous 6.7 (L) 13.5 - 17.5 g/dL    POCT Anion Gap, Venous 13.0 10.0 - 25.0 mmol/L    Patient Temperature      FiO2 21 %   ECG 12 lead   Result Value Ref Range    Ventricular Rate 69 BPM    Atrial Rate 69 BPM    AL Interval 168 ms    QRS Duration 86 ms    QT Interval 392 ms    QTC Calculation(Bazett) 420 ms    P Axis 35 degrees    R Axis 12 degrees    T Axis 27 degrees    QRS Count 11 beats    Q Onset 217 ms    P Onset 133 ms    P Offset 180 ms    T Offset 413 ms    QTC Fredericia 410 ms   Urinalysis with Reflex Culture and Microscopic   Result Value Ref Range    Color, Urine Yellow Straw, Yellow    Appearance, Urine Hazy (N) Clear    Specific Gravity, Urine 1.013 1.005 - 1.035    pH, Urine 6.0 5.0, 5.5, 6.0, 6.5, 7.0, 7.5, 8.0    Protein, Urine >=500 (3+) (N) NEGATIVE mg/dL    Glucose, Urine 50 (1+) (A) NEGATIVE mg/dL    Blood, Urine SMALL (1+) (A) NEGATIVE    Ketones, Urine 5 (TRACE) (A) NEGATIVE mg/dL    Bilirubin, Urine NEGATIVE NEGATIVE    Urobilinogen, Urine <2.0 <2.0 mg/dL    Nitrite, Urine NEGATIVE NEGATIVE    Leukocyte Esterase, Urine MODERATE (2+) (A) NEGATIVE   Extra Urine Gray Tube   Result Value Ref Range    Extra Tube Hold for add-ons.    Microscopic Only, Urine   Result Value Ref Range    WBC, Urine >50 (A) 1-5, NONE /HPF    WBC Clumps, Urine MANY Reference range not established. /HPF    RBC, Urine >20 (A) NONE, 1-2, 3-5 /HPF   Coagulation Screen   Result Value Ref Range    Protime 40.6 (H) 9.8 - 12.8 seconds    INR 3.6 (H) 0.9 - 1.1    aPTT 45 (H) 27 - 38 seconds    Comprehensive metabolic panel   Result Value Ref Range    Glucose 158 (H) 74 - 99 mg/dL    Sodium 130 (L) 136 - 145 mmol/L    Potassium 4.6 3.5 - 5.3 mmol/L    Chloride 93 (L) 98 - 107 mmol/L    Bicarbonate 27 21 - 32 mmol/L    Anion Gap 15 10 - 20 mmol/L    Urea Nitrogen 70 (H) 6 - 23 mg/dL    Creatinine 5.35 (H) 0.50 - 1.30 mg/dL    eGFR 10 (L) >60 mL/min/1.73m*2    Calcium 8.5 (L) 8.6 - 10.3 mg/dL    Albumin 3.1 (L) 3.4 - 5.0 g/dL    Alkaline Phosphatase 63 33 - 136 U/L    Total Protein 5.9 (L) 6.4 - 8.2 g/dL    AST 26 9 - 39 U/L    Bilirubin, Total 0.4 0.0 - 1.2 mg/dL    ALT 16 10 - 52 U/L   CBC and Auto Differential   Result Value Ref Range    WBC 14.2 (H) 4.4 - 11.3 x10*3/uL    nRBC 0.0 0.0 - 0.0 /100 WBCs    RBC 2.94 (L) 4.50 - 5.90 x10*6/uL    Hemoglobin 8.4 (L) 13.5 - 17.5 g/dL    Hematocrit 23.2 (L) 41.0 - 52.0 %    MCV 79 (L) 80 - 100 fL    MCH 28.6 26.0 - 34.0 pg    MCHC 36.2 (H) 32.0 - 36.0 g/dL    RDW 16.1 (H) 11.5 - 14.5 %    Platelets 174 150 - 450 x10*3/uL    Neutrophils % 71.6 40.0 - 80.0 %    Immature Granulocytes %, Automated 0.4 0.0 - 0.9 %    Lymphocytes % 12.4 13.0 - 44.0 %    Monocytes % 15.2 2.0 - 10.0 %    Eosinophils % 0.3 0.0 - 6.0 %    Basophils % 0.1 0.0 - 2.0 %    Neutrophils Absolute 10.19 (H) 1.60 - 5.50 x10*3/uL    Immature Granulocytes Absolute, Automated 0.05 0.00 - 0.50 x10*3/uL    Lymphocytes Absolute 1.76 0.80 - 3.00 x10*3/uL    Monocytes Absolute 2.17 (H) 0.05 - 0.80 x10*3/uL    Eosinophils Absolute 0.04 0.00 - 0.40 x10*3/uL    Basophils Absolute 0.02 0.00 - 0.10 x10*3/uL   Magnesium   Result Value Ref Range    Magnesium 2.01 1.60 - 2.40 mg/dL   Phosphorus   Result Value Ref Range    Phosphorus 5.8 (H) 2.5 - 4.9 mg/dL   Hemoglobin A1c   Result Value Ref Range    Hemoglobin A1C 7.3 (H) see below %    Estimated Average Glucose 163 Not Established mg/dL   SST TOP   Result Value Ref Range    Extra Tube Hold for add-ons.    POCT GLUCOSE   Result Value Ref Range    POCT Glucose  156 (H) 74 - 99 mg/dL   POCT GLUCOSE   Result Value Ref Range    POCT Glucose 235 (H) 74 - 99 mg/dL   Hepatitis B surface antibody   Result Value Ref Range    Hepatitis B Surface AB 36.6 (H) <10.0 mIU/mL   Hepatitis B surface antigen   Result Value Ref Range    Hepatitis B Surface AG Nonreactive Nonreactive   PTH, Intact   Result Value Ref Range    Parathyroid Hormone, Intact 126.8 (H) 18.5 - 88.0 pg/mL   Ferritin   Result Value Ref Range    Ferritin 1,307 (H) 20 - 300 ng/mL   Ammonia   Result Value Ref Range    Ammonia 27 16 - 53 umol/L   POCT GLUCOSE   Result Value Ref Range    POCT Glucose 137 (H) 74 - 99 mg/dL   POCT GLUCOSE   Result Value Ref Range    POCT Glucose 177 (H) 74 - 99 mg/dL    ECG 12 lead    Result Date: 3/28/2024  Normal sinus rhythm Normal ECG When compared with ECG of 28-MAR-2024 00:31, (unconfirmed) No significant change was found See ED provider note for full interpretation and clinical correlation    XR chest 1 view    Result Date: 3/28/2024  Interpreted By:  Domenico Tafoya, STUDY: XR CHEST 1 VIEW;  3/28/2024 12:01 am   INDICATION: Signs/Symptoms:Pneumonia.   COMPARISON: 8/17/2021   ACCESSION NUMBER(S): [id number]   ORDERING CLINICIAN: SARAI CASTELLON   FINDINGS: The cardiac silhouette is normal in size. Calcified mediastinal lymph nodes. There is asymmetric elevation of left diaphragm and mild left basilar subsegmental atelectasis. No significant pleural effusion. No pneumothorax.       No airspace consolidation or pleural effusion.   Asymmetric elevation of left diaphragm   MACRO: None   Signed by: Domenico Tafoya 3/28/2024 12:19 AM Dictation workstation:   [id number]      Relevant Results  As above     Assessment/Plan     Metabolic encephalopathy manifested by increased confusion and generalized weakness most likely secondary to UTI.  Ammonia level normal at 27.  His wife tells me the family is unable to take care of him at home at this time and will need rehab placement.  End-stage kidney  disease secondary to hypertensive and diabetic kidney disease with history of ischemic nephropathy currently on hemodialysis 3 times a week.  Anemia associated with chronic kidney disease treated with EPO during dialysis unable to give Venofer because of high ferritin level of 1307.  Phosphorus slightly elevated at 5.8 and was on Phoslyra prior to hospital admission , PTH pending.  Patient sodium was 130 and he was dialyzed today using a sodium bath of 140, hemodialysis treatment was uneventful.  Acute cystitis currently on Rocephin  Type 2 diabetes with controlled blood sugars at 137 and 177 currently on insulin sliding scale and diabetic diet  Paroxysmal atrial fibrillation on Coumadin with markedly elevated INR, Coumadin on hold and will be adjusted downwards.  Also on Metroprolol.  Benign prostatic hypertrophy currently on Proscar which has been resumed  Seizure disorder on Keppra  History of hypertension, hypotensive when admitted and was on Metroprolol 50 mg daily    I spent 60 minutes in the professional and overall care of this patient.    Edi Wise MD FACP

## 2024-03-30 ENCOUNTER — APPOINTMENT (OUTPATIENT)
Dept: DIALYSIS | Facility: HOSPITAL | Age: 84
End: 2024-03-30
Payer: MEDICARE

## 2024-03-30 ENCOUNTER — APPOINTMENT (OUTPATIENT)
Dept: RADIOLOGY | Facility: HOSPITAL | Age: 84
DRG: 689 | End: 2024-03-30
Payer: MEDICARE

## 2024-03-30 PROBLEM — N30.00 ACUTE CYSTITIS WITHOUT HEMATURIA: Status: ACTIVE | Noted: 2024-03-30

## 2024-03-30 PROBLEM — G93.41 ACUTE METABOLIC ENCEPHALOPATHY: Status: ACTIVE | Noted: 2024-03-30

## 2024-03-30 LAB
ALBUMIN SERPL BCP-MCNC: 2.8 G/DL (ref 3.4–5)
ALP SERPL-CCNC: 46 U/L (ref 33–136)
ALT SERPL W P-5'-P-CCNC: 17 U/L (ref 10–52)
ANION GAP SERPL CALC-SCNC: 14 MMOL/L (ref 10–20)
AST SERPL W P-5'-P-CCNC: 23 U/L (ref 9–39)
BASOPHILS # BLD AUTO: 0.03 X10*3/UL (ref 0–0.1)
BASOPHILS NFR BLD AUTO: 0.2 %
BILIRUB SERPL-MCNC: 0.5 MG/DL (ref 0–1.2)
BUN SERPL-MCNC: 72 MG/DL (ref 6–23)
CALCIUM SERPL-MCNC: 8.1 MG/DL (ref 8.6–10.3)
CHLORIDE SERPL-SCNC: 97 MMOL/L (ref 98–107)
CO2 SERPL-SCNC: 27 MMOL/L (ref 21–32)
CREAT SERPL-MCNC: 5.36 MG/DL (ref 0.5–1.3)
EGFRCR SERPLBLD CKD-EPI 2021: 10 ML/MIN/1.73M*2
EOSINOPHIL # BLD AUTO: 0.21 X10*3/UL (ref 0–0.4)
EOSINOPHIL NFR BLD AUTO: 1.6 %
ERYTHROCYTE [DISTWIDTH] IN BLOOD BY AUTOMATED COUNT: 16.4 % (ref 11.5–14.5)
GLUCOSE BLD MANUAL STRIP-MCNC: 135 MG/DL (ref 74–99)
GLUCOSE BLD MANUAL STRIP-MCNC: 161 MG/DL (ref 74–99)
GLUCOSE BLD MANUAL STRIP-MCNC: 182 MG/DL (ref 74–99)
GLUCOSE BLD MANUAL STRIP-MCNC: 188 MG/DL (ref 74–99)
GLUCOSE BLD MANUAL STRIP-MCNC: 233 MG/DL (ref 74–99)
GLUCOSE SERPL-MCNC: 182 MG/DL (ref 74–99)
HCT VFR BLD AUTO: 20 % (ref 41–52)
HGB BLD-MCNC: 7.2 G/DL (ref 13.5–17.5)
HOLD SPECIMEN: NORMAL
IMM GRANULOCYTES # BLD AUTO: 0.07 X10*3/UL (ref 0–0.5)
IMM GRANULOCYTES NFR BLD AUTO: 0.5 % (ref 0–0.9)
INR PPP: 2.3 (ref 0.9–1.1)
LYMPHOCYTES # BLD AUTO: 1.7 X10*3/UL (ref 0.8–3)
LYMPHOCYTES NFR BLD AUTO: 13.1 %
MAGNESIUM SERPL-MCNC: 1.94 MG/DL (ref 1.6–2.4)
MCH RBC QN AUTO: 28.8 PG (ref 26–34)
MCHC RBC AUTO-ENTMCNC: 36 G/DL (ref 32–36)
MCV RBC AUTO: 80 FL (ref 80–100)
MONOCYTES # BLD AUTO: 1.56 X10*3/UL (ref 0.05–0.8)
MONOCYTES NFR BLD AUTO: 12 %
NEUTROPHILS # BLD AUTO: 9.4 X10*3/UL (ref 1.6–5.5)
NEUTROPHILS NFR BLD AUTO: 72.6 %
NRBC BLD-RTO: 0 /100 WBCS (ref 0–0)
PLATELET # BLD AUTO: 183 X10*3/UL (ref 150–450)
POTASSIUM SERPL-SCNC: 4 MMOL/L (ref 3.5–5.3)
PROT SERPL-MCNC: 5.6 G/DL (ref 6.4–8.2)
PROTHROMBIN TIME: 25.8 SECONDS (ref 9.8–12.8)
RBC # BLD AUTO: 2.5 X10*6/UL (ref 4.5–5.9)
SODIUM SERPL-SCNC: 134 MMOL/L (ref 136–145)
WBC # BLD AUTO: 13 X10*3/UL (ref 4.4–11.3)

## 2024-03-30 PROCEDURE — 82947 ASSAY GLUCOSE BLOOD QUANT: CPT

## 2024-03-30 PROCEDURE — 2500000004 HC RX 250 GENERAL PHARMACY W/ HCPCS (ALT 636 FOR OP/ED): Performed by: NURSE PRACTITIONER

## 2024-03-30 PROCEDURE — 73080 X-RAY EXAM OF ELBOW: CPT | Mod: RIGHT SIDE | Performed by: STUDENT IN AN ORGANIZED HEALTH CARE EDUCATION/TRAINING PROGRAM

## 2024-03-30 PROCEDURE — 6350000001 HC RX 635 EPOETIN >10,000 UNITS: Mod: JZ | Performed by: INTERNAL MEDICINE

## 2024-03-30 PROCEDURE — 2500000001 HC RX 250 WO HCPCS SELF ADMINISTERED DRUGS (ALT 637 FOR MEDICARE OP): Performed by: NURSE PRACTITIONER

## 2024-03-30 PROCEDURE — 2500000001 HC RX 250 WO HCPCS SELF ADMINISTERED DRUGS (ALT 637 FOR MEDICARE OP): Performed by: INTERNAL MEDICINE

## 2024-03-30 PROCEDURE — 70450 CT HEAD/BRAIN W/O DYE: CPT

## 2024-03-30 PROCEDURE — 85610 PROTHROMBIN TIME: CPT | Performed by: STUDENT IN AN ORGANIZED HEALTH CARE EDUCATION/TRAINING PROGRAM

## 2024-03-30 PROCEDURE — 85025 COMPLETE CBC W/AUTO DIFF WBC: CPT | Performed by: STUDENT IN AN ORGANIZED HEALTH CARE EDUCATION/TRAINING PROGRAM

## 2024-03-30 PROCEDURE — P9047 ALBUMIN (HUMAN), 25%, 50ML: HCPCS | Mod: JZ | Performed by: INTERNAL MEDICINE

## 2024-03-30 PROCEDURE — 70450 CT HEAD/BRAIN W/O DYE: CPT | Performed by: RADIOLOGY

## 2024-03-30 PROCEDURE — 73080 X-RAY EXAM OF ELBOW: CPT | Mod: RT

## 2024-03-30 PROCEDURE — 2500000004 HC RX 250 GENERAL PHARMACY W/ HCPCS (ALT 636 FOR OP/ED): Mod: JZ | Performed by: INTERNAL MEDICINE

## 2024-03-30 PROCEDURE — 83735 ASSAY OF MAGNESIUM: CPT | Performed by: STUDENT IN AN ORGANIZED HEALTH CARE EDUCATION/TRAINING PROGRAM

## 2024-03-30 PROCEDURE — 8010000001 HC DIALYSIS - HEMODIALYSIS PER DAY

## 2024-03-30 PROCEDURE — 36415 COLL VENOUS BLD VENIPUNCTURE: CPT | Performed by: STUDENT IN AN ORGANIZED HEALTH CARE EDUCATION/TRAINING PROGRAM

## 2024-03-30 PROCEDURE — 1200000002 HC GENERAL ROOM WITH TELEMETRY DAILY

## 2024-03-30 PROCEDURE — 99233 SBSQ HOSP IP/OBS HIGH 50: CPT | Performed by: INTERNAL MEDICINE

## 2024-03-30 PROCEDURE — 80053 COMPREHEN METABOLIC PANEL: CPT | Performed by: STUDENT IN AN ORGANIZED HEALTH CARE EDUCATION/TRAINING PROGRAM

## 2024-03-30 RX ORDER — ALBUMIN HUMAN 250 G/1000ML
25 SOLUTION INTRAVENOUS ONCE
Status: COMPLETED | OUTPATIENT
Start: 2024-03-30 | End: 2024-03-30

## 2024-03-30 RX ORDER — LEVETIRACETAM 500 MG/1
1000 TABLET ORAL EVERY 24 HOURS
Status: DISCONTINUED | OUTPATIENT
Start: 2024-03-30 | End: 2024-04-02 | Stop reason: HOSPADM

## 2024-03-30 RX ADMIN — LEVETIRACETAM 1000 MG: 500 TABLET, FILM COATED ORAL at 17:07

## 2024-03-30 RX ADMIN — CEFTRIAXONE SODIUM 1 G: 1 INJECTION, SOLUTION INTRAVENOUS at 05:24

## 2024-03-30 RX ADMIN — CALCIUM ACETATE 667 MG: 667 CAPSULE ORAL at 17:07

## 2024-03-30 RX ADMIN — INSULIN LISPRO 2 UNITS: 100 INJECTION, SOLUTION INTRAVENOUS; SUBCUTANEOUS at 17:07

## 2024-03-30 RX ADMIN — FINASTERIDE 5 MG: 5 TABLET, FILM COATED ORAL at 08:29

## 2024-03-30 RX ADMIN — ALBUMIN HUMAN 25 G: 0.25 SOLUTION INTRAVENOUS at 11:08

## 2024-03-30 RX ADMIN — DOCUSATE SODIUM 100 MG: 100 CAPSULE, LIQUID FILLED ORAL at 20:24

## 2024-03-30 RX ADMIN — CALCIUM ACETATE 667 MG: 667 CAPSULE ORAL at 14:26

## 2024-03-30 RX ADMIN — SENNOSIDES 8.6 MG: 8.6 TABLET, FILM COATED ORAL at 20:24

## 2024-03-30 RX ADMIN — INSULIN LISPRO 1 UNITS: 100 INJECTION, SOLUTION INTRAVENOUS; SUBCUTANEOUS at 07:47

## 2024-03-30 RX ADMIN — Medication 1 CAPSULE: at 08:29

## 2024-03-30 RX ADMIN — EPOETIN ALFA-EPBX 10000 UNITS: 10000 INJECTION, SOLUTION INTRAVENOUS; SUBCUTANEOUS at 14:26

## 2024-03-30 RX ADMIN — METOPROLOL TARTRATE 50 MG: 50 TABLET, FILM COATED ORAL at 20:24

## 2024-03-30 ASSESSMENT — PAIN SCALES - GENERAL
PAINLEVEL_OUTOF10: 0 - NO PAIN

## 2024-03-30 ASSESSMENT — PAIN SCALES - PAIN ASSESSMENT IN ADVANCED DEMENTIA (PAINAD)
TOTALSCORE: 0
FACIALEXPRESSION: SMILING OR INEXPRESSIVE
BREATHING: NORMAL
BODYLANGUAGE: RELAXED
CONSOLABILITY: NO NEED TO CONSOLE

## 2024-03-30 ASSESSMENT — PAIN - FUNCTIONAL ASSESSMENT
PAIN_FUNCTIONAL_ASSESSMENT: 0-10
PAIN_FUNCTIONAL_ASSESSMENT: 0-10
PAIN_FUNCTIONAL_ASSESSMENT: NO/DENIES PAIN
PAIN_FUNCTIONAL_ASSESSMENT: 0-10
PAIN_FUNCTIONAL_ASSESSMENT: 0-10

## 2024-03-30 NOTE — NURSING NOTE
Patient found on floor by Dr. Fletcher. Patient attempting to get up and go to the bathroom and fell and hit right elbow. Vital signs and blood sugar checked. CT and Xray completed. Family updated.

## 2024-03-30 NOTE — NURSING NOTE
Report to Receiving RN:    Report From: FERNIE Duval  Time Report Called: 1870  Hand-Off Communication: Tx tolerated well. VSS. 1.5 Liters removed. VSS  Complications During Treatment: No  Ultrafiltration Treatment: No  Medications Administered During Dialysis: No  Blood Products Administered During Dialysis: No  Labs Sent During Dialysis: No  Heparin Drip Rate Changes: No    Electronic Signatures:   Authored: FERNIE Honeycutt/FERNIE GARCIA    Last Updated: 1:49 PM by MALLORY BACH

## 2024-03-30 NOTE — NURSING NOTE
Report from Sending RN:    Report From: FERNIE Duval  Recent Surgery of Procedure: No  Baseline Level of Consciousness (LOC): A&Ox3  Oxygen Use: No  Type:   Diabetic: Yes  Last BP Med Given Day of Dialysis: None  Last Pain Med Given: None  Lab Tests to be Obtained with Dialysis: No  Blood Transfusion to be Given During Dialysis: No  Available IV Access: Yes  Medications to be Administered During Dialysis: No  Continuous IV Infusion Running: No  Restraints on Currently or in the Last 24 Hours: No  Hand-Off Communication:

## 2024-03-30 NOTE — SIGNIFICANT EVENT
Jackson a loud thump in the room next-door therefore we evaluated the patient to find him on the ground after a fall out of bed    Patient denies loss of consciousness, states he did hit his head and right upper extremity on the ground.  Denied having any neck tenderness, or pain anywhere.  Range of motion intact in his neck.  Nontender to palpation of his cervical, thoracic, lumbar and sacral spine.  No visible injuries present on the head or neck as well.  Does have an abrasion on the right elbow.    Was a hard 4 person assist to lift him from the ground to the bed.  Patient had no other complaints for me, per bedside nurse he was at his baseline.  Did just return from dialysis.    CT head without contrast, right upper extremity x-rays ordered.  Advised nursing staff to have situation regarding patient's safety, bed alarm potential sitter as well.    Patient's warfarin has been on hold due to supratherapeutic INR on admission, INR of 2.3 this afternoon as well.    Primary attending notified as well.      ---Of note, this documentation is completed using the Dragon Dictation system (voice recognition software). There may be spelling and/or grammatical errors that were not corrected prior to final submission.---

## 2024-03-30 NOTE — PROGRESS NOTES
ADMISSION DATE: 3/27/2024  HOSPITAL DAY: 2    SUBJECTIVE:  Patient was seen at bedside.  Uneventful night.  Patient went for his routine hemodialysis.  He is being treated for metabolic encephalopathy secondary to UTI.    OBJECTIVE:  Vitals:    03/30/24 1200 03/30/24 1328 03/30/24 1418 03/30/24 1557   BP: (!) 130/44  104/54 138/61   BP Location:   Right arm Right arm   Patient Position:   Lying Lying   Pulse: 63 66 79 87   Resp:   16 16   Temp:  36.9 °C (98.4 °F) 36.7 °C (98.1 °F) 36.5 °C (97.7 °F)   TempSrc:  Temporal Temporal Temporal   SpO2:   99% 98%   Weight:       Height:            Intake/Output Summary (Last 24 hours) at 3/30/2024 1620  Last data filed at 3/30/2024 1500  Gross per 24 hour   Intake 1420 ml   Output 3400 ml   Net -1980 ml        PHYSICAL EXAM:  HEENT:  Atraumatic, PERRL.  Conjunctivae clear.   Moist nasal mucous membranes. oropharynx non erythematous,   Neck:  Supple without thyromegaly or lymphadenopathy.  Lungs:  Clear to auscultation without rales, rhonchi, or rub.  Heart:  RRR, S1, S2, without M.  Abdomen:  Soft, non tender, no organ enlargement, bruit. Bowel sounds present . No CVA tenderness.  Extremities:  No edema. No calf swelling or tenderness.    Skin:  No rash, ecchymosis or erythema.    CURRENT ACTIVE MEDS:  B complex-vitamin C-folic acid, 1 capsule, oral, Daily  calcium acetate, 667 mg, oral, TID with meals  cefTRIAXone, 1 g, intravenous, q24h  docusate sodium, 100 mg, oral, BID  finasteride, 5 mg, oral, q AM  [Held by provider] hydroCHLOROthiazide, 25 mg, oral, Daily  insulin lispro, 0-5 Units, subcutaneous, TID with meals  levETIRAcetam, 1,000 mg, oral, q24h  lidocaine-prilocaine, , Topical, Before Dialysis  metoprolol tartrate, 50 mg, oral, BID  sennosides, 1 tablet, oral, Nightly  sodium chloride, 200 mL, intravenous, After Dialysis  sodium chloride, 200 mL, intravenous, After Dialysis      LAB RESULTS:   CBC:   Results from last 7 days   Lab Units 03/30/24  0579  03/29/24  0552 03/28/24  0534   WBC AUTO x10*3/uL 13.0* 15.1* 14.2*   RBC AUTO x10*6/uL 2.50* 3.03* 2.94*   HEMOGLOBIN g/dL 7.2* 8.8* 8.4*   HEMATOCRIT % 20.0* 24.5* 23.2*   MCV fL 80 81 79*   MCH pg 28.8 29.0 28.6   MCHC g/dL 36.0 35.9 36.2*   RDW % 16.4* 16.2* 16.1*   PLATELETS AUTO x10*3/uL 183 180 174     CMP:    Results from last 7 days   Lab Units 03/30/24  0524 03/29/24  0552 03/28/24  0534   SODIUM mmol/L 134* 136 130*   POTASSIUM mmol/L 4.0 4.0 4.6   CHLORIDE mmol/L 97* 98 93*   CO2 mmol/L 27 28 27   BUN mg/dL 72* 42* 70*   CREATININE mg/dL 5.36* 3.67* 5.35*   GLUCOSE mg/dL 182* 147* 158*   PROTEIN TOTAL g/dL 5.6* 6.0* 5.9*   CALCIUM mg/dL 8.1* 8.5* 8.5*   BILIRUBIN TOTAL mg/dL 0.5 0.5 0.4   ALK PHOS U/L 46 52 63   AST U/L 23 28 26   ALT U/L 17 15 16     BMP:    Results from last 7 days   Lab Units 03/30/24  0524 03/29/24  0552 03/28/24  0534   SODIUM mmol/L 134* 136 130*   POTASSIUM mmol/L 4.0 4.0 4.6   CHLORIDE mmol/L 97* 98 93*   CO2 mmol/L 27 28 27   BUN mg/dL 72* 42* 70*   CREATININE mg/dL 5.36* 3.67* 5.35*   CALCIUM mg/dL 8.1* 8.5* 8.5*   GLUCOSE mg/dL 182* 147* 158*     Magnesium:  Results from last 7 days   Lab Units 03/30/24  0524 03/29/24  0552 03/28/24  0534   MAGNESIUM mg/dL 1.94 1.95 2.01     Troponin:    Results from last 7 days   Lab Units 03/27/24  2354   TROPHS ng/L 27*       IMAGING STUDIES:  ECG 12 lead  Result Date: 3/29/2024  Normal sinus rhythm Normal ECG When compared with ECG of 28-MAR-2024 00:31,     XR chest 1 view  Result Date: 3/28/2024  No airspace consolidation or pleural effusion.   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  He was getting his hemodialysis this morning.  Upon arrival in the floor he had a fall.  His INR is on hold because of INR being high.  He did not have any head bleed however he hit the head and there is a bruise of the right elbow.  The hospitalist team has ordered head CT and right elbow x-ray.  Will follow the imaging studies.  Meanwhile we will continue with current medical therapy and plan.  I am contemplating whether patient should be on Coumadin or not because of increased fall risk.  For now we will continue with IV ceftriaxone.  PT OT has been working with him.  Patient will need skilled nursing facility.  Possible discharge in next 24 to 48 hours.  Rest of the medical therapy will be continued as per admission orders.

## 2024-03-30 NOTE — PROGRESS NOTES
Tyshawn Coles is a 83 y.o. male on day 1 of admission presenting with Generalized weakness.    Subjective   Patient still extremely weak basically bedridden.  She seems to be oriented tonight.  He denies any chest pains, denies any shortness of breath.  Review of systems essentially benign.       Objective   Sodium is now normal at 136 with normal electrolytes and acid-base balance.  BUN and creatinine compatible with end-stage kidney disease and will be dialyzed tomorrow.  Albumin low at 3.2 most likely protein malnutrition with normal liver function tests  PTH is 147 and will not need any calcimemetics at this time.  Last phosphorus was 5.8.  Patient with over anticoagulation INR is now down to 2.8  White cell count still elevated at 15, suspected to have UTI hemoglobin is low at 8.8 with a ferritin level of 1307 unable to give Venofer but will keep on EPO      Physical Exam  Patient looks cachectic, slow in responding to questions but seems to be oriented at this time extremely weak looking and ill looking.  Vital signs are stable with a blood pressure of 120/54 heart rate of 63 and pulse oximeter reading of 95%  Head examination benign facial bones prominent otherwise no facial asymmetry and pupils are equal sclera were nonicteric  Neck veins flat without any bruits  AV fistula left upper arm with a strong bruit  Lungs are clear without wheezing crackles or rhonchi  Abdomen soft and nontender good bowel sounds no guarding  Extremities shows markedly atrophic muscles but no edema  Neurological examination without any focal neurological deficits however patient has significant generalized weakness    Last Recorded Vitals  Blood pressure 120/54, pulse 63, temperature 37 °C (98.6 °F), resp. rate 16, height 1.829 m (6'), weight 81.6 kg (180 lb), SpO2 95 %.  Intake/Output last 3 Shifts:  I/O last 3 completed shifts:  In: 1527 (18.7 mL/kg) [P.O.:240; I.V.:800 (9.8 mL/kg); Other:400; IV Piggyback:87]  Out: 4600 (56.3  mL/kg) [Urine:200 (0.1 mL/kg/hr); Other:4400]  Weight: 81.6 kg     Relevant Results          Current Facility-Administered Medications:     acetaminophen (Tylenol) tablet 650 mg, 650 mg, oral, q4h PRN **OR** acetaminophen (Tylenol) oral liquid 650 mg, 650 mg, oral, q4h PRN **OR** acetaminophen (Tylenol) suppository 650 mg, 650 mg, rectal, q4h PRN, SOURAV Childs    B complex-vitamin C-folic acid (Nephrocaps) capsule 1 capsule, 1 capsule, oral, Daily, SOURAV Childs, 1 capsule at 03/29/24 0533    calcium acetate (Phoslo) capsule 667 mg, 667 mg, oral, TID with meals, Edi Wise MD, 667 mg at 03/29/24 1639    cefTRIAXone (Rocephin) IVPB 1 g, 1 g, intravenous, q24h, SOURAV Childs, Stopped at 03/29/24 0603    dextrose 50 % injection 12.5 g, 12.5 g, intravenous, q15 min PRN, SOURAV Childs    docusate sodium (Colace) capsule 100 mg, 100 mg, oral, BID, SOURAV Childs, 100 mg at 03/29/24 2112    [START ON 3/30/2024] epoetin sheila-epbx (Retacrit) injection 10,000 Units, 150 Units/kg, intravenous, Once, Edi Wise MD    finasteride (Proscar) tablet 5 mg, 5 mg, oral, q AM, SOURAV Childs, 5 mg at 03/29/24 0851    glucagon (Glucagen) injection 1 mg, 1 mg, intramuscular, q15 min PRN, SOURAV Childs    [Held by provider] hydroCHLOROthiazide (HYDRODiuril) tablet 25 mg, 25 mg, oral, Daily, SOURAV Childs    insulin lispro (HumaLOG) injection 0-5 Units, 0-5 Units, subcutaneous, TID with meals, SOURAV Childs, 2 Units at 03/29/24 1636    ipratropium-albuteroL (Duo-Neb) 0.5-2.5 mg/3 mL nebulizer solution 3 mL, 3 mL, nebulization, q4h PRN, SOURAV Childs    levETIRAcetam XR (Keppra XR) 24 hr tablet 1,000 mg, 1,000 mg, oral, Daily, SOURAV Childs, 1,000 mg at 03/29/24 0851    [START ON 3/30/2024] lidocaine-prilocaine (Emla) cream, , Topical, Before Dialysis, Edi Wise MD    melatonin tablet 3 mg, 3 mg,  oral, Nightly PRN, SOURAV Childs    metoprolol tartrate (Lopressor) tablet 50 mg, 50 mg, oral, BID, SOURAV Childs, 50 mg at 03/29/24 2112    ondansetron (Zofran) tablet 4 mg, 4 mg, oral, q8h PRN **OR** ondansetron (Zofran) injection 4 mg, 4 mg, intravenous, q8h PRN, SOURAV Childs    sennosides (Senokot) tablet 8.6 mg, 1 tablet, oral, Nightly, SOURAV Childs, 8.6 mg at 03/29/24 2112    sodium chloride 0.9 % bolus 200 mL, 200 mL, intravenous, After Dialysis, Edi Wise MD    sodium chloride 0.9 % bolus 200 mL, 200 mL, intravenous, q1h PRN, Edi Wise MD    sodium chloride 0.9 % bolus 200 mL, 200 mL, intravenous, q1h PRN, Edi Wise MD    [START ON 3/30/2024] sodium chloride 0.9 % bolus 200 mL, 200 mL, intravenous, After Dialysis, Edi Wise MD    sodium chloride 0.9 % bolus 200 mL, 200 mL, intravenous, q1h PRN, Edi Wise MD   Results for orders placed or performed during the hospital encounter of 03/27/24 (from the past 24 hour(s))   Comprehensive metabolic panel   Result Value Ref Range    Glucose 147 (H) 74 - 99 mg/dL    Sodium 136 136 - 145 mmol/L    Potassium 4.0 3.5 - 5.3 mmol/L    Chloride 98 98 - 107 mmol/L    Bicarbonate 28 21 - 32 mmol/L    Anion Gap 14 10 - 20 mmol/L    Urea Nitrogen 42 (H) 6 - 23 mg/dL    Creatinine 3.67 (H) 0.50 - 1.30 mg/dL    eGFR 15 (L) >60 mL/min/1.73m*2    Calcium 8.5 (L) 8.6 - 10.3 mg/dL    Albumin 3.2 (L) 3.4 - 5.0 g/dL    Alkaline Phosphatase 52 33 - 136 U/L    Total Protein 6.0 (L) 6.4 - 8.2 g/dL    AST 28 9 - 39 U/L    Bilirubin, Total 0.5 0.0 - 1.2 mg/dL    ALT 15 10 - 52 U/L   CBC and Auto Differential   Result Value Ref Range    WBC 15.1 (H) 4.4 - 11.3 x10*3/uL    nRBC 0.0 0.0 - 0.0 /100 WBCs    RBC 3.03 (L) 4.50 - 5.90 x10*6/uL    Hemoglobin 8.8 (L) 13.5 - 17.5 g/dL    Hematocrit 24.5 (L) 41.0 - 52.0 %    MCV 81 80 - 100 fL    MCH 29.0 26.0 - 34.0 pg    MCHC 35.9 32.0 - 36.0 g/dL    RDW 16.2 (H) 11.5 -  14.5 %    Platelets 180 150 - 450 x10*3/uL    Neutrophils % 76.7 40.0 - 80.0 %    Immature Granulocytes %, Automated 0.5 0.0 - 0.9 %    Lymphocytes % 9.2 13.0 - 44.0 %    Monocytes % 12.9 2.0 - 10.0 %    Eosinophils % 0.4 0.0 - 6.0 %    Basophils % 0.3 0.0 - 2.0 %    Neutrophils Absolute 11.56 (H) 1.60 - 5.50 x10*3/uL    Immature Granulocytes Absolute, Automated 0.07 0.00 - 0.50 x10*3/uL    Lymphocytes Absolute 1.39 0.80 - 3.00 x10*3/uL    Monocytes Absolute 1.95 (H) 0.05 - 0.80 x10*3/uL    Eosinophils Absolute 0.06 0.00 - 0.40 x10*3/uL    Basophils Absolute 0.04 0.00 - 0.10 x10*3/uL   Magnesium   Result Value Ref Range    Magnesium 1.95 1.60 - 2.40 mg/dL   POCT GLUCOSE   Result Value Ref Range    POCT Glucose 146 (H) 74 - 99 mg/dL   POCT GLUCOSE   Result Value Ref Range    POCT Glucose 257 (H) 74 - 99 mg/dL   Green Top   Result Value Ref Range    Extra Tube Hold for add-ons.    Lavender Top   Result Value Ref Range    Extra Tube Hold for add-ons.    SST TOP   Result Value Ref Range    Extra Tube Hold for add-ons.    Gray Top   Result Value Ref Range    Extra Tube Hold for add-ons.    PST Top   Result Value Ref Range    Extra Tube Hold for add-ons.    Protime-INR   Result Value Ref Range    Protime 32.5 (H) 9.8 - 12.8 seconds    INR 2.9 (H) 0.9 - 1.1   POCT GLUCOSE   Result Value Ref Range    POCT Glucose 171 (H) 74 - 99 mg/dL    ECG 12 lead    Result Date: 3/29/2024  Normal sinus rhythm Normal ECG When compared with ECG of 28-MAR-2024 00:31, (unconfirmed) No significant change was found See ED provider note for full interpretation and clinical correlation Confirmed by Amber Javed ([id number]) on 3/29/2024 11:27:22 AM    XR chest 1 view    Result Date: 3/28/2024  Interpreted By:  Domenico Tafoya, STUDY: XR CHEST 1 VIEW;  3/28/2024 12:01 am   INDICATION: Signs/Symptoms:Pneumonia.   COMPARISON: 8/17/2021   ACCESSION NUMBER(S): [id number]   ORDERING CLINICIAN: SARAI CASTELLON   FINDINGS: The cardiac silhouette is  normal in size. Calcified mediastinal lymph nodes. There is asymmetric elevation of left diaphragm and mild left basilar subsegmental atelectasis. No significant pleural effusion. No pneumothorax.       No airspace consolidation or pleural effusion.   Asymmetric elevation of left diaphragm   MACRO: None   Signed by: Domenico Tafoya 3/28/2024 12:19 AM Dictation workstation:   [id number]             Assessment/Plan   Principal Problem:    Generalized weakness    Metabolic encephalopathy with reported increased confusion with generalized weakness in the presence of UTI.  Currently more alert and seemingly more oriented today.  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  Tolerating dialysis very well with a good functioning AV fistula left upper arm.  Anemia secondary to chronic illness and chronic kidney disease with high ferritin level unable to give Venofer IV during dialysis but will continue EPO.  Will also watch out for any blood loss.  Paroxysmal atrial fibrillation on Coumadin, admitted with hypercoagulation INR coming down  Hyponatremia corrected sodium low at 136 we will use regular sodium bath for dialysis  Acute cystitis on Rocephin  Type 2 diabetes with controlled blood sugars  Benign prostatic hypertrophy on Proscar which has been resumed  Seizure disorder on Keppra  Hypertension controlled with last blood pressure at 120/54,  Metroprolol on hold.  Patient is anuric and will not need hydrochlorothiazide will discontinue altogether.       I spent 30 minutes in the professional and overall care of this patient.      Edi Wise MD FACP

## 2024-03-30 NOTE — CARE PLAN
The clinical goals for the shift include Patient will remain free from additional injury for the remainder of the shift.    Over the shift, the patient did make progress toward the following goals.     Problem: Diabetes  Goal: Maintain electrolyte levels within acceptable range throughout shift  Outcome: Progressing  Goal: Maintain glucose levels >70mg/dl to <250mg/dl throughout shift  Outcome: Progressing     Problem: Skin  Goal: Prevent/manage excess moisture  Outcome: Progressing  Flowsheets (Taken 3/30/2024 1221)  Prevent/manage excess moisture:   Cleanse incontinence/protect with barrier cream   Moisturize dry skin  Goal: Prevent/minimize sheer/friction injuries  Outcome: Progressing  Flowsheets (Taken 3/30/2024 1221)  Prevent/minimize sheer/friction injuries:   Turn/reposition every 2 hours/use positioning/transfer devices   Use pull sheet     Problem: Pain - Adult  Goal: Verbalizes/displays adequate comfort level or baseline comfort level  Outcome: Progressing     Problem: Safety - Adult  Goal: Free from fall injury  Outcome: Progressing     Problem: Discharge Planning  Goal: Discharge to home or other facility with appropriate resources  Outcome: Progressing     Problem: Chronic Conditions and Co-morbidities  Goal: Patient's chronic conditions and co-morbidity symptoms are monitored and maintained or improved  Outcome: Progressing     Problem: Chronic Conditions and Co-morbidities  Goal: Patient's chronic conditions and co-morbidity symptoms are monitored and maintained or improved  Outcome: Progressing

## 2024-03-31 LAB
ALBUMIN SERPL BCP-MCNC: 3.2 G/DL (ref 3.4–5)
ALP SERPL-CCNC: 58 U/L (ref 33–136)
ALT SERPL W P-5'-P-CCNC: 26 U/L (ref 10–52)
ANION GAP SERPL CALC-SCNC: 15 MMOL/L (ref 10–20)
AST SERPL W P-5'-P-CCNC: 31 U/L (ref 9–39)
BASOPHILS # BLD AUTO: 0.05 X10*3/UL (ref 0–0.1)
BASOPHILS NFR BLD AUTO: 0.3 %
BILIRUB SERPL-MCNC: 0.7 MG/DL (ref 0–1.2)
BUN SERPL-MCNC: 50 MG/DL (ref 6–23)
CALCIUM SERPL-MCNC: 8.2 MG/DL (ref 8.6–10.3)
CHLORIDE SERPL-SCNC: 98 MMOL/L (ref 98–107)
CO2 SERPL-SCNC: 28 MMOL/L (ref 21–32)
CREAT SERPL-MCNC: 3.94 MG/DL (ref 0.5–1.3)
EGFRCR SERPLBLD CKD-EPI 2021: 14 ML/MIN/1.73M*2
EOSINOPHIL # BLD AUTO: 0.24 X10*3/UL (ref 0–0.4)
EOSINOPHIL NFR BLD AUTO: 1.6 %
ERYTHROCYTE [DISTWIDTH] IN BLOOD BY AUTOMATED COUNT: 16.3 % (ref 11.5–14.5)
GLUCOSE BLD MANUAL STRIP-MCNC: 170 MG/DL (ref 74–99)
GLUCOSE BLD MANUAL STRIP-MCNC: 174 MG/DL (ref 74–99)
GLUCOSE BLD MANUAL STRIP-MCNC: 199 MG/DL (ref 74–99)
GLUCOSE BLD MANUAL STRIP-MCNC: 226 MG/DL (ref 74–99)
GLUCOSE SERPL-MCNC: 208 MG/DL (ref 74–99)
HCT VFR BLD AUTO: 20.9 % (ref 41–52)
HGB BLD-MCNC: 7.3 G/DL (ref 13.5–17.5)
IMM GRANULOCYTES # BLD AUTO: 0.13 X10*3/UL (ref 0–0.5)
IMM GRANULOCYTES NFR BLD AUTO: 0.9 % (ref 0–0.9)
INR PPP: 1.5 (ref 0.9–1.1)
LYMPHOCYTES # BLD AUTO: 1.57 X10*3/UL (ref 0.8–3)
LYMPHOCYTES NFR BLD AUTO: 10.3 %
MAGNESIUM SERPL-MCNC: 1.85 MG/DL (ref 1.6–2.4)
MCH RBC QN AUTO: 28.5 PG (ref 26–34)
MCHC RBC AUTO-ENTMCNC: 34.9 G/DL (ref 32–36)
MCV RBC AUTO: 82 FL (ref 80–100)
MONOCYTES # BLD AUTO: 1.72 X10*3/UL (ref 0.05–0.8)
MONOCYTES NFR BLD AUTO: 11.3 %
NEUTROPHILS # BLD AUTO: 11.46 X10*3/UL (ref 1.6–5.5)
NEUTROPHILS NFR BLD AUTO: 75.6 %
NRBC BLD-RTO: 0.2 /100 WBCS (ref 0–0)
PLATELET # BLD AUTO: 210 X10*3/UL (ref 150–450)
POTASSIUM SERPL-SCNC: 4.6 MMOL/L (ref 3.5–5.3)
PROT SERPL-MCNC: 5.6 G/DL (ref 6.4–8.2)
PROTHROMBIN TIME: 17.3 SECONDS (ref 9.8–12.8)
RBC # BLD AUTO: 2.56 X10*6/UL (ref 4.5–5.9)
SODIUM SERPL-SCNC: 136 MMOL/L (ref 136–145)
WBC # BLD AUTO: 15.2 X10*3/UL (ref 4.4–11.3)

## 2024-03-31 PROCEDURE — 2500000004 HC RX 250 GENERAL PHARMACY W/ HCPCS (ALT 636 FOR OP/ED): Performed by: NURSE PRACTITIONER

## 2024-03-31 PROCEDURE — 2500000002 HC RX 250 W HCPCS SELF ADMINISTERED DRUGS (ALT 637 FOR MEDICARE OP, ALT 636 FOR OP/ED): Performed by: INTERNAL MEDICINE

## 2024-03-31 PROCEDURE — 83735 ASSAY OF MAGNESIUM: CPT | Performed by: STUDENT IN AN ORGANIZED HEALTH CARE EDUCATION/TRAINING PROGRAM

## 2024-03-31 PROCEDURE — 36415 COLL VENOUS BLD VENIPUNCTURE: CPT | Performed by: STUDENT IN AN ORGANIZED HEALTH CARE EDUCATION/TRAINING PROGRAM

## 2024-03-31 PROCEDURE — 85610 PROTHROMBIN TIME: CPT | Performed by: STUDENT IN AN ORGANIZED HEALTH CARE EDUCATION/TRAINING PROGRAM

## 2024-03-31 PROCEDURE — 85025 COMPLETE CBC W/AUTO DIFF WBC: CPT | Performed by: STUDENT IN AN ORGANIZED HEALTH CARE EDUCATION/TRAINING PROGRAM

## 2024-03-31 PROCEDURE — 80053 COMPREHEN METABOLIC PANEL: CPT | Performed by: STUDENT IN AN ORGANIZED HEALTH CARE EDUCATION/TRAINING PROGRAM

## 2024-03-31 PROCEDURE — 2500000001 HC RX 250 WO HCPCS SELF ADMINISTERED DRUGS (ALT 637 FOR MEDICARE OP): Performed by: INTERNAL MEDICINE

## 2024-03-31 PROCEDURE — 99233 SBSQ HOSP IP/OBS HIGH 50: CPT | Performed by: INTERNAL MEDICINE

## 2024-03-31 PROCEDURE — 2500000001 HC RX 250 WO HCPCS SELF ADMINISTERED DRUGS (ALT 637 FOR MEDICARE OP): Performed by: NURSE PRACTITIONER

## 2024-03-31 PROCEDURE — 82947 ASSAY GLUCOSE BLOOD QUANT: CPT

## 2024-03-31 PROCEDURE — 1200000002 HC GENERAL ROOM WITH TELEMETRY DAILY

## 2024-03-31 PROCEDURE — 97116 GAIT TRAINING THERAPY: CPT | Mod: GP,CQ

## 2024-03-31 PROCEDURE — 97530 THERAPEUTIC ACTIVITIES: CPT | Mod: GP,CQ

## 2024-03-31 RX ORDER — WARFARIN 2.5 MG/1
2.5 TABLET ORAL DAILY
Status: DISCONTINUED | OUTPATIENT
Start: 2024-03-31 | End: 2024-04-02 | Stop reason: HOSPADM

## 2024-03-31 RX ADMIN — LEVETIRACETAM 1000 MG: 500 TABLET, FILM COATED ORAL at 18:31

## 2024-03-31 RX ADMIN — INSULIN LISPRO 1 UNITS: 100 INJECTION, SOLUTION INTRAVENOUS; SUBCUTANEOUS at 17:39

## 2024-03-31 RX ADMIN — WARFARIN SODIUM 2.5 MG: 2.5 TABLET ORAL at 18:31

## 2024-03-31 RX ADMIN — INSULIN LISPRO 2 UNITS: 100 INJECTION, SOLUTION INTRAVENOUS; SUBCUTANEOUS at 11:16

## 2024-03-31 RX ADMIN — CALCIUM ACETATE 667 MG: 667 CAPSULE ORAL at 11:16

## 2024-03-31 RX ADMIN — METOPROLOL TARTRATE 50 MG: 50 TABLET, FILM COATED ORAL at 09:24

## 2024-03-31 RX ADMIN — DOCUSATE SODIUM 100 MG: 100 CAPSULE, LIQUID FILLED ORAL at 21:37

## 2024-03-31 RX ADMIN — Medication 1 CAPSULE: at 06:13

## 2024-03-31 RX ADMIN — METOPROLOL TARTRATE 50 MG: 50 TABLET, FILM COATED ORAL at 21:37

## 2024-03-31 RX ADMIN — DOCUSATE SODIUM 100 MG: 100 CAPSULE, LIQUID FILLED ORAL at 09:24

## 2024-03-31 RX ADMIN — INSULIN LISPRO 1 UNITS: 100 INJECTION, SOLUTION INTRAVENOUS; SUBCUTANEOUS at 09:25

## 2024-03-31 RX ADMIN — FINASTERIDE 5 MG: 5 TABLET, FILM COATED ORAL at 09:24

## 2024-03-31 RX ADMIN — CALCIUM ACETATE 667 MG: 667 CAPSULE ORAL at 17:39

## 2024-03-31 RX ADMIN — CALCIUM ACETATE 667 MG: 667 CAPSULE ORAL at 09:24

## 2024-03-31 RX ADMIN — CEFTRIAXONE SODIUM 1 G: 1 INJECTION, SOLUTION INTRAVENOUS at 06:13

## 2024-03-31 RX ADMIN — SENNOSIDES 8.6 MG: 8.6 TABLET, FILM COATED ORAL at 21:37

## 2024-03-31 ASSESSMENT — COGNITIVE AND FUNCTIONAL STATUS - GENERAL
EATING MEALS: A LITTLE
DAILY ACTIVITIY SCORE: 8
STANDING UP FROM CHAIR USING ARMS: A LOT
STANDING UP FROM CHAIR USING ARMS: A LITTLE
PERSONAL GROOMING: TOTAL
WALKING IN HOSPITAL ROOM: TOTAL
MOVING TO AND FROM BED TO CHAIR: A LOT
MOVING FROM LYING ON BACK TO SITTING ON SIDE OF FLAT BED WITH BEDRAILS: A LITTLE
MOVING TO AND FROM BED TO CHAIR: A LOT
CLIMB 3 TO 5 STEPS WITH RAILING: TOTAL
DRESSING REGULAR LOWER BODY CLOTHING: TOTAL
CLIMB 3 TO 5 STEPS WITH RAILING: TOTAL
MOBILITY SCORE: 12
MOVING FROM LYING ON BACK TO SITTING ON SIDE OF FLAT BED WITH BEDRAILS: A LITTLE
MOBILITY SCORE: 14
DRESSING REGULAR UPPER BODY CLOTHING: TOTAL
WALKING IN HOSPITAL ROOM: A LOT
TOILETING: TOTAL
TURNING FROM BACK TO SIDE WHILE IN FLAT BAD: A LITTLE
HELP NEEDED FOR BATHING: TOTAL
TURNING FROM BACK TO SIDE WHILE IN FLAT BAD: A LITTLE

## 2024-03-31 ASSESSMENT — PAIN SCALES - PAIN ASSESSMENT IN ADVANCED DEMENTIA (PAINAD)
BREATHING: NORMAL
BREATHING: NORMAL
TOTALSCORE: 0
TOTALSCORE: 0
FACIALEXPRESSION: SMILING OR INEXPRESSIVE
CONSOLABILITY: NO NEED TO CONSOLE
BODYLANGUAGE: RELAXED
FACIALEXPRESSION: SMILING OR INEXPRESSIVE
CONSOLABILITY: NO NEED TO CONSOLE
BODYLANGUAGE: RELAXED

## 2024-03-31 ASSESSMENT — PAIN - FUNCTIONAL ASSESSMENT
PAIN_FUNCTIONAL_ASSESSMENT: 0-10

## 2024-03-31 ASSESSMENT — PAIN SCALES - GENERAL
PAINLEVEL_OUTOF10: 0 - NO PAIN

## 2024-03-31 NOTE — PROGRESS NOTES
Physical Therapy    Physical Therapy Treatment    Patient Name: Tyshawn Coles  MRN: [id number]  Today's Date: 3/31/2024  Time Calculation  Start Time: 0828  Stop Time: 0854  Time Calculation (min): 26 min       Assessment/Plan   End of Session Communication: Bedside nurse  Assessment Comment: Patient presents with good effort throughout todays session. Receptive to cues and willing to participate in all tasks. Call light and all needs in reach. RN advised of purewick needing adjusted and bed continuously locking.  End of Session Patient Position: Alarm on, Bed, 3 rail up          General Visit Information:   PT  Visit  PT Received On: 03/31/24  General  Prior to Session Communication: Bedside nurse  Patient Position Received: Bed, 3 rail up, Alarm on  General Comment: Pt agreeable to therapy this date.  Subjective     Precautions:  Precautions  Medical Precautions: Fall precautions  Precautions Comment:  (high fall risk,  OOB with assist.)       Objective     Pain:  Pain Assessment  Pain Assessment: 0-10  Pain Score: 0 - No pain           Treatments:        Balance/Neuromuscular Re-Education  Balance/Neuromuscular Re-Education Activity Performed: Yes  Balance/Neuromuscular Re-Education Activity 1: Pt performed static seated balance EOB CGA progressing to close SUP for safety purposes. Seated tolerance ~15 mintues, weakness and faitgue present with PLB encouraged throughout.  Bed Mobility  Bed Mobility: Yes  Bed Mobility 1  Bed Mobility 1: Supine to sitting  Level of Assistance 1: Minimum assistance  Bed Mobility Comments 1: Pt performed supine<>EOB Fela with use of bedrails and therapist assist to lift trunk. Pt demonstrates ability to walk BLE off edge of bed and slowly scoot towards edge of bed CGA. VC provided for improved sequencing.  Bed Mobility 2  Bed Mobility  2: Sitting to supine  Level of Assistance 2: Moderate assistance  Bed Mobility Comments 2: Pt performed EOB<>supine ModA, unable to lift LLE onto bed  without assist, required therapist assist to control descent on trunk. VC provided for improved sequencing.  Bed Mobility 3  Bed Mobility 3: Rolling right, Rolling left  Level of Assistance 3: Moderate assistance  Bed Mobility Comments 3: Pt performed rolling R<>L for personal hygeine this date with ModA for complete S/L position. Pt performed 2x each direction, VC for sequencing and hand placement to increase pt assist.  Ambulation/Gait Training  Ambulation/Gait Training Performed: Yes  Ambulation/Gait Training 1  Surface 1: Level tile  Device 1: Rolling walker (FWW)  Gait Support Devices: Gait belt  Assistance 1: Moderate assistance  Quality of Gait 1: Soft knee(s), Forward flexed posture, Narrow base of support  Comments/Distance (ft) 1: Pt ambulated 5 lateral side steps towards HOB with FWW, ModA. Pt demonstrates NBOS with soft knees and forward flexed posture. Poor foot clearance. VC to correct. Fair AD management with cues for improvement.  Transfers  Transfer: Yes  Transfers 2  Transfer From 2: Bed to  Transfer to 2: Stand  Technique 2: Stand to sit, Sit to stand  Transfer Device 2: Walker (FWW)  Transfer Level of Assistance 2: Moderate assistance  Trials/Comments 2: Pt performed STS 2x from EOB<>FWW ModA for lifting to stand and stabilization upon standing. VC required for sequencing.          Outcome Measures:  [facility] Basic Mobility  Turning from your back to your side while in a flat bed without using bedrails: A little  Moving from lying on your back to sitting on the side of a flat bed without using bedrails: A little  Moving to and from bed to chair (including a wheelchair): A lot  Standing up from a chair using your arms (e.g. wheelchair or bedside chair): A little  To walk in hospital room: A lot  Climbing 3-5 steps with railing: Total  Basic Mobility - Total Score: 14  Education Documentation  Mobility Training, taught by Libby Heard PTA at 3/31/2024 10:03 AM.  Learner: Patient  Readiness:  Acceptance  Method: Explanation, Demonstration  Response: Verbalizes Understanding, Needs Reinforcement    Education Comments  No comments found.        EDUCATION:     Encounter Problems       Encounter Problems (Active)       PT Problem       Pt. will transfer supine/sit with MOD A x 1 (Progressing)       Start:  03/28/24    Expected End:  04/02/24            Pt. will transfer sit/stand with FWW with MOD A x 1 (Progressing)       Start:  03/28/24    Expected End:  04/02/24            Pt. will complete stand pivot transfers with FWW with MOD A x 1 (Progressing)       Start:  03/28/24    Expected End:  04/02/24            Pt.will ambulate 25' with FWW with MOD A x 1 (Progressing)       Start:  03/28/24    Expected End:  04/02/24            Pt. will perform 2 x 15 B LE AROM exercises  (Not Progressing)       Start:  03/28/24    Expected End:  04/02/24               Pain - Adult

## 2024-03-31 NOTE — PROGRESS NOTES
Tyshawn Coles is a 83 y.o. male on day 2 of admission presenting with Generalized weakness.    Subjective   Patient is extremely weak and unable to ambulate on his own.  He reportedly fell today after dialysis and reportedly hit his head somewhere and had a bruise on the right elbow.  CT scan of the brain was negative for any acute changes, right elbow is wrapped in clean bandage.  He denies any headache he denies any dizziness.  He denies any chest pains or any shortness of breath.  Reportedly his bowels are moving well.  He is basically anuric.       Objective   Patient had hemodialysis today and tolerated treatment well.    Vital signs are stable with a blood pressure 133/89 heart rate of 81 pulse oximeter reading of 95% on room air  CT scan of the brain shows no acute changes with chronic microvascular changes  X-ray of the right elbow is pending    Physical Exam  Patient is alert seemingly oriented and did not seem to be in distress but extremely weak and looking ill  Head examination shows no unusual bump on the scalp.  Pupils are equal and reactive to light there was no facial asymmetry.  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 rate 18, height 1.829 m (6'), weight 81.6 kg (180 lb), SpO2 95 %.  Intake/Output last 3 Shifts:  I/O last 3 completed shifts:  In: 1420 (17.4 mL/kg) [P.O.:120; I.V.:800 (9.8 mL/kg); Other:400; IV Piggyback:100]  Out: 3400 (41.6 mL/kg) [Other:3400]  Weight: 81.6 kg     Relevant Results          Current Facility-Administered Medications:     acetaminophen (Tylenol)  tablet 650 mg, 650 mg, oral, q4h PRN **OR** acetaminophen (Tylenol) oral liquid 650 mg, 650 mg, oral, q4h PRN **OR** acetaminophen (Tylenol) suppository 650 mg, 650 mg, rectal, q4h PRN, SOURAV Childs    B complex-vitamin C-folic acid (Nephrocaps) capsule 1 capsule, 1 capsule, oral, Daily, SOURAV Childs, 1 capsule at 03/30/24 0829    calcium acetate (Phoslo) capsule 667 mg, 667 mg, oral, TID with meals, Edi Wise MD, 667 mg at 03/30/24 1707    cefTRIAXone (Rocephin) IVPB 1 g, 1 g, intravenous, q24h, SOURAV Childs, Stopped at 03/30/24 0554    dextrose 50 % injection 12.5 g, 12.5 g, intravenous, q15 min PRN, SOURAV Childs    docusate sodium (Colace) capsule 100 mg, 100 mg, oral, BID, SOURAV Childs, 100 mg at 03/30/24 2024    finasteride (Proscar) tablet 5 mg, 5 mg, oral, q AM, SOURAV Childs, 5 mg at 03/30/24 0829    glucagon (Glucagen) injection 1 mg, 1 mg, intramuscular, q15 min PRN, SOURAV Childs    [Held by provider] hydroCHLOROthiazide (HYDRODiuril) tablet 25 mg, 25 mg, oral, Daily, SOURAV Childs    insulin lispro (HumaLOG) injection 0-5 Units, 0-5 Units, subcutaneous, TID with meals, SOURAV Childs, 2 Units at 03/30/24 1707    ipratropium-albuteroL (Duo-Neb) 0.5-2.5 mg/3 mL nebulizer solution 3 mL, 3 mL, nebulization, q4h PRN, SOURAV Childs    levETIRAcetam (Keppra) tablet 1,000 mg, 1,000 mg, oral, q24h, Hugh Lobo MD, 1,000 mg at 03/30/24 1707    lidocaine-prilocaine (Emla) cream, , Topical, Before Dialysis, Edi Wise MD    melatonin tablet 3 mg, 3 mg, oral, Nightly PRN, SOURAV Childs    metoprolol tartrate (Lopressor) tablet 50 mg, 50 mg, oral, BID, SOURAV Childs, 50 mg at 03/30/24 2024    ondansetron (Zofran) tablet 4 mg, 4 mg, oral, q8h PRN **OR** ondansetron (Zofran) injection 4 mg, 4 mg, intravenous, q8h PRN, SOURAV Childs    sennosides  (Senokot) tablet 8.6 mg, 1 tablet, oral, Nightly, Sonali Herrmann, APRN-CNP, 8.6 mg at 03/30/24 2024    sodium chloride 0.9 % bolus 200 mL, 200 mL, intravenous, After Dialysis, Edi Wise MD    sodium chloride 0.9 % bolus 200 mL, 200 mL, intravenous, q1h PRN, Edi Wise MD    sodium chloride 0.9 % bolus 200 mL, 200 mL, intravenous, q1h PRN, Edi Wise MD    sodium chloride 0.9 % bolus 200 mL, 200 mL, intravenous, After Dialysis, Edi Wise MD    sodium chloride 0.9 % bolus 200 mL, 200 mL, intravenous, q1h PRN, Edi Wise MD   Results for orders placed or performed during the hospital encounter of 03/27/24 (from the past 24 hour(s))   POCT GLUCOSE   Result Value Ref Range    POCT Glucose 188 (H) 74 - 99 mg/dL   CBC and Auto Differential   Result Value Ref Range    WBC 13.0 (H) 4.4 - 11.3 x10*3/uL    nRBC 0.0 0.0 - 0.0 /100 WBCs    RBC 2.50 (L) 4.50 - 5.90 x10*6/uL    Hemoglobin 7.2 (L) 13.5 - 17.5 g/dL    Hematocrit 20.0 (L) 41.0 - 52.0 %    MCV 80 80 - 100 fL    MCH 28.8 26.0 - 34.0 pg    MCHC 36.0 32.0 - 36.0 g/dL    RDW 16.4 (H) 11.5 - 14.5 %    Platelets 183 150 - 450 x10*3/uL    Neutrophils % 72.6 40.0 - 80.0 %    Immature Granulocytes %, Automated 0.5 0.0 - 0.9 %    Lymphocytes % 13.1 13.0 - 44.0 %    Monocytes % 12.0 2.0 - 10.0 %    Eosinophils % 1.6 0.0 - 6.0 %    Basophils % 0.2 0.0 - 2.0 %    Neutrophils Absolute 9.40 (H) 1.60 - 5.50 x10*3/uL    Immature Granulocytes Absolute, Automated 0.07 0.00 - 0.50 x10*3/uL    Lymphocytes Absolute 1.70 0.80 - 3.00 x10*3/uL    Monocytes Absolute 1.56 (H) 0.05 - 0.80 x10*3/uL    Eosinophils Absolute 0.21 0.00 - 0.40 x10*3/uL    Basophils Absolute 0.03 0.00 - 0.10 x10*3/uL   Comprehensive metabolic panel   Result Value Ref Range    Glucose 182 (H) 74 - 99 mg/dL    Sodium 134 (L) 136 - 145 mmol/L    Potassium 4.0 3.5 - 5.3 mmol/L    Chloride 97 (L) 98 - 107 mmol/L    Bicarbonate 27 21 - 32 mmol/L    Anion Gap 14 10 - 20 mmol/L    Urea Nitrogen 72  (H) 6 - 23 mg/dL    Creatinine 5.36 (H) 0.50 - 1.30 mg/dL    eGFR 10 (L) >60 mL/min/1.73m*2    Calcium 8.1 (L) 8.6 - 10.3 mg/dL    Albumin 2.8 (L) 3.4 - 5.0 g/dL    Alkaline Phosphatase 46 33 - 136 U/L    Total Protein 5.6 (L) 6.4 - 8.2 g/dL    AST 23 9 - 39 U/L    Bilirubin, Total 0.5 0.0 - 1.2 mg/dL    ALT 17 10 - 52 U/L   Magnesium   Result Value Ref Range    Magnesium 1.94 1.60 - 2.40 mg/dL   Protime-INR   Result Value Ref Range    Protime 25.8 (H) 9.8 - 12.8 seconds    INR 2.3 (H) 0.9 - 1.1   SST TOP   Result Value Ref Range    Extra Tube Hold for add-ons.    POCT GLUCOSE   Result Value Ref Range    POCT Glucose 161 (H) 74 - 99 mg/dL   POCT GLUCOSE   Result Value Ref Range    POCT Glucose 135 (H) 74 - 99 mg/dL   POCT GLUCOSE   Result Value Ref Range    POCT Glucose 233 (H) 74 - 99 mg/dL    CT head wo IV contrast    Result Date: 3/30/2024  Interpreted By:  Bj Pavon, STUDY: CT HEAD WO IV CONTRAST;  3/30/2024 4:41 pm   INDICATION: Signs/Symptoms:fall.   COMPARISON: 01/28/2020 head CT   ACCESSION NUMBER(S): [id number]   ORDERING CLINICIAN: SID SOMMERS   TECHNIQUE: Noncontrast axial CT scan of head was performed. Angled reformats in brain and bone windows were generated. The images were reviewed in bone, brain, blood and soft tissue windows.   FINDINGS: CSF Spaces: The sulci and to a lesser degree ventricles are moderately prominent for the patient's age in a nonspecific pattern, unchanged. There is no extraaxial fluid collection.   Parenchyma:  The white matter has ill-defined areas of hypodensity similar to the prior CT. No hemorrhage, mass or large acute infarct is noted.     Calvarium: The skin surface has multiple focal calcifications. Medial calcific sclerosis of the arteries is present as seen with diabetes or end stage renal disease. No underlying calvarial abnormality is noted.   Paranasal sinuses and mastoids: Visualized paranasal sinuses and mastoids are clear.       No acute infarct,  hemorrhage or mass is noted.   The parenchymal hypodensities are consistent with chronic microvascular ischemic changes.   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  Patient with generalized weakness worsening lately needing rehab prior to discharge.  Recent fall secondary to generalized weakness, patient advised very strongly not to get out of bed unless with somebody.  Patient with bed alarm on.  CT scan of the brain negative for acute changes.  Right elbow is bruised.  Range of motion full on the elbow.  X-rays are pending.  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  Treatment was uneventful.  Anemia secondary to chronic illness and chronic kidney disease with a hemoglobin lower at 7.2 currently on EPO during dialysis however unable to give Venofer because of high ferritin levels.  If on the lower patient may need blood transfusion.  Paroxysmal atrial fibrillation on Coumadin which is being held because patient is hyper coagulated.  INR has been coming down  Urinary tract infection on Rocephin 1 g daily  Hyponatremia has been corrected  Type 2 diabetes with controlled blood sugars on Humalog with coverage  Benign prostatic hypertrophy on Proscar  Seizure disorder on Keppra  Hypertension on metoprolol 50 mg twice daily, blood pressure at 133/89 heart rate of 81. Hydrochlorothiazide was discontinued patient is anuric.       I  spent 40 minutes in the professional and overall care of this patient.      Edi Wise MD FACP

## 2024-03-31 NOTE — PROGRESS NOTES
ADMISSION DATE: 3/27/2024  HOSPITAL DAY: 3    SUBJECTIVE:  Patient was seen at bedside.  Uneventful night.  This morning there is a sitter for him.  Patient had an accidental fall yesterday.  I reviewed the CT scan of the head as well as the x-ray of the right elbow.    OBJECTIVE:  Vitals:    03/31/24 0021 03/31/24 0818 03/31/24 0830 03/31/24 1125   BP: 133/86 131/60  107/59   BP Location: Right arm   Right arm   Patient Position: Lying   Sitting   Pulse: 96      Resp: 18   16   Temp: 36.5 °C (97.7 °F) 36.4 °C (97.5 °F)  37 °C (98.6 °F)   TempSrc: Temporal   Temporal   SpO2: 95% (!) 85% 95% 95%   Weight:       Height:            Intake/Output Summary (Last 24 hours) at 3/31/2024 1414  Last data filed at 3/31/2024 0650  Gross per 24 hour   Intake 390 ml   Output --   Net 390 ml        PHYSICAL EXAM:  HEENT:  Atraumatic, PERRL.  Conjunctivae clear.   Moist nasal mucous membranes. oropharynx non erythematous,   Neck:  Supple without thyromegaly or lymphadenopathy.  Lungs:  Clear to auscultation without rales, rhonchi, or rub.  Heart:  RRR, S1, S2, without M.  Abdomen:  Soft, non tender, no organ enlargement, bruit. Bowel sounds present . No CVA tenderness.  Extremities:  No edema. No calf swelling or tenderness.    Skin:  No rash, ecchymosis or erythema.    CURRENT ACTIVE MEDS:  B complex-vitamin C-folic acid, 1 capsule, oral, Daily  calcium acetate, 667 mg, oral, TID with meals  cefTRIAXone, 1 g, intravenous, q24h  docusate sodium, 100 mg, oral, BID  finasteride, 5 mg, oral, q AM  [Held by provider] hydroCHLOROthiazide, 25 mg, oral, Daily  insulin lispro, 0-5 Units, subcutaneous, TID with meals  levETIRAcetam, 1,000 mg, oral, q24h  lidocaine-prilocaine, , Topical, Before Dialysis  metoprolol tartrate, 50 mg, oral, BID  sennosides, 1 tablet, oral, Nightly  sodium chloride, 200 mL, intravenous, After Dialysis  sodium chloride, 200 mL, intravenous, After Dialysis      LAB RESULTS:   CBC:   Results from last 7 days   Lab  Units 03/31/24  0559 03/30/24  0524 03/29/24  0552   WBC AUTO x10*3/uL 15.2* 13.0* 15.1*   RBC AUTO x10*6/uL 2.56* 2.50* 3.03*   HEMOGLOBIN g/dL 7.3* 7.2* 8.8*   HEMATOCRIT % 20.9* 20.0* 24.5*   MCV fL 82 80 81   MCH pg 28.5 28.8 29.0   MCHC g/dL 34.9 36.0 35.9   RDW % 16.3* 16.4* 16.2*   PLATELETS AUTO x10*3/uL 210 183 180     CMP:    Results from last 7 days   Lab Units 03/31/24  0559 03/30/24  0524 03/29/24  0552   SODIUM mmol/L 136 134* 136   POTASSIUM mmol/L 4.6 4.0 4.0   CHLORIDE mmol/L 98 97* 98   CO2 mmol/L 28 27 28   BUN mg/dL 50* 72* 42*   CREATININE mg/dL 3.94* 5.36* 3.67*   GLUCOSE mg/dL 208* 182* 147*   PROTEIN TOTAL g/dL 5.6* 5.6* 6.0*   CALCIUM mg/dL 8.2* 8.1* 8.5*   BILIRUBIN TOTAL mg/dL 0.7 0.5 0.5   ALK PHOS U/L 58 46 52   AST U/L 31 23 28   ALT U/L 26 17 15     BMP:    Results from last 7 days   Lab Units 03/31/24  0559 03/30/24  0524 03/29/24  0552   SODIUM mmol/L 136 134* 136   POTASSIUM mmol/L 4.6 4.0 4.0   CHLORIDE mmol/L 98 97* 98   CO2 mmol/L 28 27 28   BUN mg/dL 50* 72* 42*   CREATININE mg/dL 3.94* 5.36* 3.67*   CALCIUM mg/dL 8.2* 8.1* 8.5*   GLUCOSE mg/dL 208* 182* 147*     Magnesium:  Results from last 7 days   Lab Units 03/31/24  0559 03/30/24  0524 03/29/24  0552   MAGNESIUM mg/dL 1.85 1.94 1.95     Troponin:    Results from last 7 days   Lab Units 03/27/24  2354   TROPHS ng/L 27*       IMAGING STUDIES:  ECG 12 lead  Result Date: 3/29/2024  Normal sinus rhythm Normal ECG When compared with ECG of 28-MAR-2024 00:31,     XR chest 1 view  Result Date: 3/28/2024  No airspace consolidation or pleural effusion.   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  He had an accidental fall yesterday.  He hit his head on the ground fortunately there is no hematoma, or intracranial hemorrhage from that.  Patient is not on Coumadin.  He has subtherapeutic INR.  I am contemplating whether to put him back on Coumadin or not because he has a high fall risk.  At the same time patient is also has atrial fibrillation with high CHADS2 score.  He is being treated for UTI with IV ceftriaxone.  Will continue the same.  He has end-stage renal disease for which she will go for hemodialysis on Tuesday.  Meanwhile patient is waiting to be transferred to a skilled nursing facility.  Possible transfer tomorrow, on Monday.

## 2024-04-01 ENCOUNTER — APPOINTMENT (OUTPATIENT)
Dept: RADIOLOGY | Facility: HOSPITAL | Age: 84
DRG: 689 | End: 2024-04-01
Payer: MEDICARE

## 2024-04-01 LAB
ALBUMIN SERPL BCP-MCNC: 3.1 G/DL (ref 3.4–5)
ALP SERPL-CCNC: 50 U/L (ref 33–136)
ALT SERPL W P-5'-P-CCNC: 19 U/L (ref 10–52)
ANION GAP SERPL CALC-SCNC: 16 MMOL/L (ref 10–20)
AST SERPL W P-5'-P-CCNC: 22 U/L (ref 9–39)
BACTERIA BLD CULT: NORMAL
BACTERIA BLD CULT: NORMAL
BASOPHILS # BLD AUTO: 0.05 X10*3/UL (ref 0–0.1)
BASOPHILS NFR BLD AUTO: 0.4 %
BILIRUB SERPL-MCNC: 0.6 MG/DL (ref 0–1.2)
BUN SERPL-MCNC: 68 MG/DL (ref 6–23)
CALCIUM SERPL-MCNC: 8.2 MG/DL (ref 8.6–10.3)
CHLORIDE SERPL-SCNC: 94 MMOL/L (ref 98–107)
CO2 SERPL-SCNC: 25 MMOL/L (ref 21–32)
CREAT SERPL-MCNC: 4.92 MG/DL (ref 0.5–1.3)
EGFRCR SERPLBLD CKD-EPI 2021: 11 ML/MIN/1.73M*2
EOSINOPHIL # BLD AUTO: 0.43 X10*3/UL (ref 0–0.4)
EOSINOPHIL NFR BLD AUTO: 3.2 %
ERYTHROCYTE [DISTWIDTH] IN BLOOD BY AUTOMATED COUNT: 16.7 % (ref 11.5–14.5)
GLUCOSE BLD MANUAL STRIP-MCNC: 168 MG/DL (ref 74–99)
GLUCOSE BLD MANUAL STRIP-MCNC: 237 MG/DL (ref 74–99)
GLUCOSE BLD MANUAL STRIP-MCNC: 239 MG/DL (ref 74–99)
GLUCOSE BLD MANUAL STRIP-MCNC: 274 MG/DL (ref 74–99)
GLUCOSE SERPL-MCNC: 160 MG/DL (ref 74–99)
HCT VFR BLD AUTO: 20.8 % (ref 41–52)
HGB BLD-MCNC: 7.3 G/DL (ref 13.5–17.5)
IMM GRANULOCYTES # BLD AUTO: 0.12 X10*3/UL (ref 0–0.5)
IMM GRANULOCYTES NFR BLD AUTO: 0.9 % (ref 0–0.9)
INR PPP: 1.3 (ref 0.9–1.1)
LYMPHOCYTES # BLD AUTO: 1.69 X10*3/UL (ref 0.8–3)
LYMPHOCYTES NFR BLD AUTO: 12.5 %
MAGNESIUM SERPL-MCNC: 1.82 MG/DL (ref 1.6–2.4)
MCH RBC QN AUTO: 28.7 PG (ref 26–34)
MCHC RBC AUTO-ENTMCNC: 35.1 G/DL (ref 32–36)
MCV RBC AUTO: 82 FL (ref 80–100)
MONOCYTES # BLD AUTO: 1.42 X10*3/UL (ref 0.05–0.8)
MONOCYTES NFR BLD AUTO: 10.5 %
NEUTROPHILS # BLD AUTO: 9.82 X10*3/UL (ref 1.6–5.5)
NEUTROPHILS NFR BLD AUTO: 72.5 %
NRBC BLD-RTO: 0.1 /100 WBCS (ref 0–0)
PLATELET # BLD AUTO: 187 X10*3/UL (ref 150–450)
POTASSIUM SERPL-SCNC: 4.4 MMOL/L (ref 3.5–5.3)
PROT SERPL-MCNC: 5.9 G/DL (ref 6.4–8.2)
PROTHROMBIN TIME: 14.4 SECONDS (ref 9.8–12.8)
RBC # BLD AUTO: 2.54 X10*6/UL (ref 4.5–5.9)
SODIUM SERPL-SCNC: 131 MMOL/L (ref 136–145)
WBC # BLD AUTO: 13.5 X10*3/UL (ref 4.4–11.3)

## 2024-04-01 PROCEDURE — 73521 X-RAY EXAM HIPS BI 2 VIEWS: CPT

## 2024-04-01 PROCEDURE — 82947 ASSAY GLUCOSE BLOOD QUANT: CPT

## 2024-04-01 PROCEDURE — 80053 COMPREHEN METABOLIC PANEL: CPT | Performed by: STUDENT IN AN ORGANIZED HEALTH CARE EDUCATION/TRAINING PROGRAM

## 2024-04-01 PROCEDURE — 36415 COLL VENOUS BLD VENIPUNCTURE: CPT | Performed by: STUDENT IN AN ORGANIZED HEALTH CARE EDUCATION/TRAINING PROGRAM

## 2024-04-01 PROCEDURE — 73560 X-RAY EXAM OF KNEE 1 OR 2: CPT | Mod: 50

## 2024-04-01 PROCEDURE — 2500000004 HC RX 250 GENERAL PHARMACY W/ HCPCS (ALT 636 FOR OP/ED): Performed by: NURSE PRACTITIONER

## 2024-04-01 PROCEDURE — 83735 ASSAY OF MAGNESIUM: CPT | Performed by: STUDENT IN AN ORGANIZED HEALTH CARE EDUCATION/TRAINING PROGRAM

## 2024-04-01 PROCEDURE — 73560 X-RAY EXAM OF KNEE 1 OR 2: CPT | Mod: BILATERAL PROCEDURE | Performed by: RADIOLOGY

## 2024-04-01 PROCEDURE — 1200000002 HC GENERAL ROOM WITH TELEMETRY DAILY

## 2024-04-01 PROCEDURE — 73521 X-RAY EXAM HIPS BI 2 VIEWS: CPT | Mod: BILATERAL PROCEDURE | Performed by: RADIOLOGY

## 2024-04-01 PROCEDURE — 2500000001 HC RX 250 WO HCPCS SELF ADMINISTERED DRUGS (ALT 637 FOR MEDICARE OP): Performed by: HOSPITALIST

## 2024-04-01 PROCEDURE — 2500000001 HC RX 250 WO HCPCS SELF ADMINISTERED DRUGS (ALT 637 FOR MEDICARE OP): Performed by: NURSE PRACTITIONER

## 2024-04-01 PROCEDURE — 85610 PROTHROMBIN TIME: CPT | Performed by: STUDENT IN AN ORGANIZED HEALTH CARE EDUCATION/TRAINING PROGRAM

## 2024-04-01 PROCEDURE — 85025 COMPLETE CBC W/AUTO DIFF WBC: CPT | Performed by: INTERNAL MEDICINE

## 2024-04-01 PROCEDURE — 2500000001 HC RX 250 WO HCPCS SELF ADMINISTERED DRUGS (ALT 637 FOR MEDICARE OP): Performed by: INTERNAL MEDICINE

## 2024-04-01 PROCEDURE — 99232 SBSQ HOSP IP/OBS MODERATE 35: CPT | Performed by: HOSPITALIST

## 2024-04-01 PROCEDURE — 97530 THERAPEUTIC ACTIVITIES: CPT | Mod: GO

## 2024-04-01 RX ORDER — ALBUMIN HUMAN 250 G/1000ML
25 SOLUTION INTRAVENOUS ONCE
Status: CANCELLED | OUTPATIENT
Start: 2024-04-02

## 2024-04-01 RX ORDER — LIDOCAINE AND PRILOCAINE 25; 25 MG/G; MG/G
CREAM TOPICAL
Status: DISCONTINUED | OUTPATIENT
Start: 2024-04-02 | End: 2024-04-02 | Stop reason: HOSPADM

## 2024-04-01 RX ORDER — WARFARIN 2 MG/1
4 TABLET ORAL DAILY
Status: DISCONTINUED | OUTPATIENT
Start: 2024-04-01 | End: 2024-04-02 | Stop reason: HOSPADM

## 2024-04-01 RX ORDER — CEPHALEXIN 500 MG/1
500 CAPSULE ORAL 2 TIMES DAILY
Qty: 6 CAPSULE | Refills: 0 | Status: SHIPPED | OUTPATIENT
Start: 2024-04-01 | End: 2024-04-04

## 2024-04-01 RX ADMIN — DOCUSATE SODIUM 100 MG: 100 CAPSULE, LIQUID FILLED ORAL at 20:05

## 2024-04-01 RX ADMIN — CALCIUM ACETATE 667 MG: 667 CAPSULE ORAL at 11:46

## 2024-04-01 RX ADMIN — CALCIUM ACETATE 667 MG: 667 CAPSULE ORAL at 17:23

## 2024-04-01 RX ADMIN — LEVETIRACETAM 1000 MG: 500 TABLET, FILM COATED ORAL at 17:23

## 2024-04-01 RX ADMIN — DOCUSATE SODIUM 100 MG: 100 CAPSULE, LIQUID FILLED ORAL at 09:08

## 2024-04-01 RX ADMIN — METOPROLOL TARTRATE 50 MG: 50 TABLET, FILM COATED ORAL at 09:08

## 2024-04-01 RX ADMIN — Medication 3 MG: at 20:05

## 2024-04-01 RX ADMIN — METOPROLOL TARTRATE 50 MG: 50 TABLET, FILM COATED ORAL at 20:05

## 2024-04-01 RX ADMIN — FINASTERIDE 5 MG: 5 TABLET, FILM COATED ORAL at 09:08

## 2024-04-01 RX ADMIN — SENNOSIDES 8.6 MG: 8.6 TABLET, FILM COATED ORAL at 20:05

## 2024-04-01 RX ADMIN — Medication 1 CAPSULE: at 09:08

## 2024-04-01 RX ADMIN — CALCIUM ACETATE 667 MG: 667 CAPSULE ORAL at 09:08

## 2024-04-01 RX ADMIN — WARFARIN SODIUM 4 MG: 2 TABLET ORAL at 17:23

## 2024-04-01 RX ADMIN — INSULIN LISPRO 2 UNITS: 100 INJECTION, SOLUTION INTRAVENOUS; SUBCUTANEOUS at 16:44

## 2024-04-01 RX ADMIN — INSULIN LISPRO 2 UNITS: 100 INJECTION, SOLUTION INTRAVENOUS; SUBCUTANEOUS at 11:47

## 2024-04-01 RX ADMIN — CEFTRIAXONE SODIUM 1 G: 1 INJECTION, SOLUTION INTRAVENOUS at 05:45

## 2024-04-01 ASSESSMENT — COGNITIVE AND FUNCTIONAL STATUS - GENERAL
MOVING TO AND FROM BED TO CHAIR: A LOT
DRESSING REGULAR LOWER BODY CLOTHING: TOTAL
DRESSING REGULAR UPPER BODY CLOTHING: A LOT
TOILETING: TOTAL
HELP NEEDED FOR BATHING: TOTAL
CLIMB 3 TO 5 STEPS WITH RAILING: TOTAL
TURNING FROM BACK TO SIDE WHILE IN FLAT BAD: A LITTLE
MOVING FROM LYING ON BACK TO SITTING ON SIDE OF FLAT BED WITH BEDRAILS: A LITTLE
TOILETING: TOTAL
STANDING UP FROM CHAIR USING ARMS: A LOT
DAILY ACTIVITIY SCORE: 10
DAILY ACTIVITIY SCORE: 11
DRESSING REGULAR LOWER BODY CLOTHING: TOTAL
MOBILITY SCORE: 12
EATING MEALS: A LITTLE
WALKING IN HOSPITAL ROOM: TOTAL
HELP NEEDED FOR BATHING: TOTAL
PERSONAL GROOMING: A LOT
PERSONAL GROOMING: A LITTLE
DRESSING REGULAR UPPER BODY CLOTHING: A LOT
EATING MEALS: A LITTLE

## 2024-04-01 ASSESSMENT — PAIN SCALES - GENERAL
PAINLEVEL_OUTOF10: 0 - NO PAIN

## 2024-04-01 ASSESSMENT — PAIN - FUNCTIONAL ASSESSMENT
PAIN_FUNCTIONAL_ASSESSMENT: 0-10
PAIN_FUNCTIONAL_ASSESSMENT: 0-10

## 2024-04-01 NOTE — CARE PLAN
The patient's goals for the shift include      The clinical goals for the shift include Patient will be free from injury and falls during this shift.

## 2024-04-01 NOTE — DISCHARGE SUMMARY
DISCHARGE DIAGNOSIS     Generalized deconditioning  Acute confusion, multifactorial from hemodynamically low blood pressure, possible urinary tract infection  History of paroxysmal atrial fibrillation on Coumadin  Fall  ESRD  Diabetes mellitus      HOSPITAL COURSE AND DETAILS     Mr. Coles is an 83-year-old male with past medical history of ESRD, anemia, atrial fibrillation, diabetes mellitus who presented to the emergency room with acute confusion.    He was discharged from [facility], was noted to have a urinary tract infection was on treatment, was given a prescription for cefpodoxime mean but was brought in due to fluctuating mentation.    He had a positive urine analysis, was started on ceftriaxone.  He continued to improve and his overall mentation during his hospitalization.  His urine culture was negative, could be related to his recent treatment, nonetheless he received 6 days of IV ceftriaxone and will be discharged with 3 days of Keflex to complete 10 days of antibiotics.    During his hospitalization, he did have a fall, his CT head was negative.  His wife requested several x-rays due to subjective swelling.  His x-rays of his hip, his x-rays of his knee did not show any evidence of acute findings.    He does have a history of atrial fibrillation, given his falls, he may be a candidate for a Watchman procedure.     I did speak to his wife and his daughter extensively in regards to his overall plan of care and discharge        DISCHARGE PHYSICAL EXAM     Last Recorded Vitals:  Vitals:    03/31/24 2017 04/01/24 0042 04/01/24 0724 04/01/24 1516   BP: 133/63 134/65 139/65 158/74   BP Location:   Right arm    Patient Position:   Lying    Pulse: 65  59    Resp: 16 16 16    Temp: 36.8 °C (98.2 °F) 36.6 °C (97.9 °F) 36.3 °C (97.3 °F) 36.4 °C (97.5 °F)   TempSrc:   Temporal    SpO2: 96% 98% 100%    Weight:       Height:           Physical Exam  PHYSICAL EXAM:   GENERAL: Laying in bed, does not appear to  be in any distress.   HEENT: HEAD: Normocephalic atraumatic.  Neck: Supple.  Eyes: Pupils are reactive to direct light.   CVS: S1, S2 heard. Regular rate and rhythm  LUNGS: Clear to auscultate bilaterally. No wheezing or rhonchi appreciated.  ABDOMEN: Soft, nontender to palpate. Positive bowel sounds. No guarding or rebound appreciated.  NEUROLOGICAL: No focal neurological deficits appreciated. Cranial nerves are grossly intact.  EXTREMITIES: Dorsalis pedis pulses can be appreciated. No edema appreciated.  SKIN:  Grossly intact, warm and dry.    DISCHARGE MEDICATIONS        Your medication list        CONTINUE taking these medications        Instructions Last Dose Given Next Dose Due   Accu-Chek Aundrea Plus test strp strip  Generic drug: blood sugar diagnostic           finasteride 5 mg tablet  Commonly known as: Proscar           hydroCHLOROthiazide 25 mg tablet  Commonly known as: HYDRODiuril           ipratropium-albuteroL 0.5-2.5 mg/3 mL nebulizer solution  Commonly known as: Duo-Neb           levETIRAcetam  mg 24 hr tablet  Commonly known as: Keppra XR           metoprolol tartrate 50 mg tablet  Commonly known as: Lopressor           Nitrostat 0.4 mg SL tablet  Generic drug: nitroglycerin           Virt-Caps 1 mg capsule  Generic drug: B complex-vitamin C-folic acid           warfarin 6 mg tablet  Commonly known as: Coumadin      Take as directed. If you are unsure how to take this medication, talk to your nurse or doctor.       warfarin 2 mg tablet  Commonly known as: Coumadin      Take as directed. If you are unsure how to take this medication, talk to your nurse or doctor.       warfarin 4 mg tablet  Commonly known as: Coumadin      Take as directed. If you are unsure how to take this medication, talk to your nurse or doctor.                  OUTPATIENT FOLLOW-UP     Future Appointments   Date Time Provider Department Center   10/18/2024  3:20 PM Sona Mckeon MD [id number]

## 2024-04-01 NOTE — PROGRESS NOTES
Occupational Therapy    OT Treatment    Patient Name: Tyshawn Coles  MRN: [id number]  Today's Date: 4/1/2024  Time Calculation  Start Time: 1340  Stop Time: 1400  Time Calculation (min): 20 min       Assessment:  End of Session Communication: Bedside nurse, PCT/NA/CTA  End of Session Patient Position: Bed, 3 rail up, Alarm on     Plan:  Treatment Interventions: ADL retraining, Functional transfer training, UE strengthening/ROM, Endurance training, Patient/family training  OT Frequency: 2 times per week  OT Discharge Recommendations: Moderate intensity level of continued care  OT - OK to Discharge: Yes (Once medically appropriate.)  Treatment Interventions: ADL retraining, Functional transfer training, UE strengthening/ROM, Endurance training, Patient/family training    Subjective      General:  General  Reason for Referral: ADLs  Prior to Session Communication: Bedside nurse (Cleared for therapy session by RN.)  Patient Position Received: Bed, 3 rail up, Alarm on  General Comment: Patient supine in bed, agreeable to OT treatment session. Patient soft spoken, periods of confusion.  Precautions:  Medical Precautions: Fall precautions     Pain:  Pain Assessment  Pain Assessment: 0-10  Pain Score: 0 - No pain    Objective    Cognition:  Cognition  Overall Cognitive Status: Impaired (Requires repeat of directions, cues for sequencing steps.)  Orientation Level:  (Oriented to self and place. Stated the year as 1938 and month as December.)     Bed Mobility/Transfers: Bed Mobility 1  Bed Mobility 1: Supine to sitting, Sitting to supine  Bed Mobility Comments 1: Mod A for supine to sit and max A for sit to supine; cues for technique/sequencing the steps.    Transfer 1  Technique 1: Stand to sit, Sit to stand  Transfer Device 1:  (FWW)  Trials/Comments 1: Patient completed sit <> stand at EOB with a FWW, mod A level for the sit -> stand and max A for stand -> sit. Patient requires cues for maintaining a wider RANDOLPH and for safe  hand placement. Patient stands with trunk flexed forward, unable to take any steps this date. Patient able to tolerate standing ~10 seconds prior to returning to seated position.     Sitting Balance:  Static Sitting Balance  Static Sitting-Comment/Number of Minutes: Fair  Standing Balance:  Static Standing Balance  Static Standing-Comment/Number of Minutes: Poor     Outcome Measures:[facility] Daily Activity  Putting on and taking off regular lower body clothing: Total  Bathing (including washing, rinsing, drying): Total  Putting on and taking off regular upper body clothing: A lot  Toileting, which includes using toilet, bedpan or urinal: Total  Taking care of personal grooming such as brushing teeth: A little  Eating Meals: A little  Daily Activity - Total Score: 11    Education Documentation  ADL Training, taught by [place], OT at 4/1/2024  2:41 PM.  Learner: Patient  Readiness: Acceptance  Method: Explanation, Demonstration  Response: Needs Reinforcement, No Evidence of Learning  Comment: Educated on safety with the walker during sit <> stands.    EDUCATION:  Education  Individual(s) Educated: Patient  Education Provided:  (Walker management during sit <> stands.)  Patient Response to Education:  (Recommend continued education for increased carryover.)    Goals:  Encounter Problems       Encounter Problems (Active)       OT Goals       Patient will complete functional transfers with a FWW at min A level.  (Progressing)       Start:  03/28/24    Expected End:  04/02/24            Patient will complete grooming while seated EOB with setup/S. (Progressing)       Start:  03/28/24    Expected End:  04/02/24            Patient will demonstrate fair + dynamic sitting balance and fair static standing balance during functional tasks. (Progressing)       Start:  03/28/24    Expected End:  04/02/24            Patient will tolerate sitting EOB greater than 8 minutes during functional tasks. (Progressing)       Start:   03/28/24    Expected End:  04/02/24            Patient will complete 1x10 reps of UE strengthening exercises to increase strength for functional transfers.  (Progressing)       Start:  03/28/24    Expected End:  04/02/24

## 2024-04-01 NOTE — PROGRESS NOTES
04/01/24 1450   Current Planned Discharge Disposition   Current Planned Discharge Disposition SNF  ([facility])     Insurance auth received and pt medically cleared for discharge today. Facility notified and confirms they are able to accommodate pt today. Care team and facility notified. SW notified pt and pt wife, pt and wife in agreement with discharge plan, wife has several clinical questions, all of which were directed to physician. Physician spoke with wife and updated SW that pt remains cleared for discharge today but requesting a late discharge to allow time for xrays to be obtained. SW updated care team and facility that transport is confirmed for 6pm pickup. SW contacted wife and updated on time, wife in agreement.      Update- Physician notified SW that she spoke with family and plan is to postpone discharge until tomorrow when Dr. Wise takes over pt care. Facility notified and transportation cancelled.

## 2024-04-01 NOTE — DISCHARGE INSTRUCTIONS
-You have been prescribed Keflex for urinary tract infection  -Please follow-up with neurology outpatient for dementia evaluation  -Please discuss with EP cardiology in regards to atrial fibrillation and Watchman procedure

## 2024-04-01 NOTE — PROGRESS NOTES
PROGRESS NOTE    ASSESSMENT AND PLAN:   Generalized deconditioning  Acute confusion, multifactorial from hemodynamically low blood pressure, possible urinary tract infection  History of paroxysmal atrial fibrillation on Coumadin  Fall  ESRD  Diabetes mellitus    Plan:   -Plan to switch to Keflex on discharge.  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    Spoke to Dr. Wise, who will take over care.      SUBJECTIVE:   Admit Date: 3/27/2024    Interval History: Feels okay, no active complaints      OBJECTIVE:   Vitals: /74   Pulse 59   Temp 36.4 °C (97.5 °F)   Resp 16   Ht 1.829 m (6')   Wt 81.6 kg (180 lb)   SpO2 100%   BMI 24.41 kg/m²    Wt Readings from Last 3 Encounters:   03/27/24 81.6 kg (180 lb)   10/20/23 81.6 kg (180 lb)   11/05/21 84 kg (185 lb 3 oz)      24HR INTAKE/OUTPUT:    Intake/Output Summary (Last 24 hours) at 4/1/2024 1550  Last data filed at 4/1/2024 1200  Gross per 24 hour   Intake 800 ml   Output --   Net 800 ml       PHYSICAL EXAM:   GENERAL: Laying in bed, does not appear to be in any distress.   HEENT: HEAD: Normocephalic atraumatic.  Neck: Supple.  Eyes: Pupils are reactive to direct light.   CVS: S1, S2 heard. Regular rate and rhythm  LUNGS: Clear to auscultate bilaterally. No wheezing or rhonchi appreciated.  ABDOMEN: Soft, nontender to palpate. Positive bowel sounds. No guarding or rebound appreciated.  NEUROLOGICAL: No focal neurological deficits appreciated. Cranial nerves are grossly intact.  EXTREMITIES: No  "edema appreciated.  SKIN:  Grossly intact, warm and dry.      LABS/IMAGING AND MEDICATIONS:   Scheduled Meds:B complex-vitamin C-folic acid, 1 capsule, oral, Daily  calcium acetate, 667 mg, oral, TID with meals  cefTRIAXone, 1 g, intravenous, q24h  docusate sodium, 100 mg, oral, BID  finasteride, 5 mg, oral, q AM  [Held by provider] hydroCHLOROthiazide, 25 mg, oral, Daily  insulin lispro, 0-5 Units, subcutaneous, TID with meals  levETIRAcetam, 1,000 mg, oral, q24h  lidocaine-prilocaine, , Topical, Before Dialysis  metoprolol tartrate, 50 mg, oral, BID  sennosides, 1 tablet, oral, Nightly  sodium chloride, 200 mL, intravenous, After Dialysis  sodium chloride, 200 mL, intravenous, After Dialysis  [Held by provider] warfarin, 2.5 mg, oral, Daily  warfarin, 4 mg, oral, Daily      PRN Meds:PRN medications: acetaminophen **OR** acetaminophen **OR** acetaminophen, dextrose, glucagon, ipratropium-albuteroL, melatonin, ondansetron **OR** ondansetron, sodium chloride, sodium chloride, sodium chloride    No lab exists for component: \"CBC\"   No lab exists for component: \"CMP\"   No lab exists for component: \"TROPONIN\"      Results from last 7 days   Lab Units 04/01/24  0559   INR  1.3*     No lab exists for component: \"LIPIDS\"       No lab exists for component: \"URINALYSIS\"          BMP:  Results from last 7 days   Lab Units 04/01/24  0600 03/31/24  0559 03/30/24  0524   SODIUM mmol/L 131* 136 134*   POTASSIUM mmol/L 4.4 4.6 4.0   CHLORIDE mmol/L 94* 98 97*   CO2 mmol/L 25 28 27   BUN mg/dL 68* 50* 72*   CREATININE mg/dL 4.92* 3.94* 5.36*       CBC:  Results from last 7 days   Lab Units 04/01/24  0600 03/31/24  0559 03/30/24  0524   WBC AUTO x10*3/uL 13.5* 15.2* 13.0*   RBC AUTO x10*6/uL 2.54* 2.56* 2.50*   HEMOGLOBIN g/dL 7.3* 7.3* 7.2*   HEMATOCRIT % 20.8* 20.9* 20.0*   MCV fL 82 82 80   MCH pg 28.7 28.5 28.8   MCHC g/dL 35.1 34.9 36.0   RDW % 16.7* 16.3* 16.4*   PLATELETS AUTO x10*3/uL 187 210 183       Cardiac Enzymes: " "  Results from last 7 days   Lab Units 03/27/24  2354   TROPHS ng/L 27*         Hepatic Function Panel:  Results from last 7 days   Lab Units 04/01/24  0600 03/31/24  0559 03/30/24  0524   ALK PHOS U/L 50 58 46   ALT U/L 19 26 17   AST U/L 22 31 23   PROTEIN TOTAL g/dL 5.9* 5.6* 5.6*   BILIRUBIN TOTAL mg/dL 0.6 0.7 0.5       Magnesium:  Results from last 7 days   Lab Units 04/01/24  0600   MAGNESIUM mg/dL 1.82       Pro-BNP:  No results found for: \"PROBNP\"    INR:  Results from last 7 days   Lab Units 04/01/24  0559 03/31/24  0559 03/30/24  0524   PROTIME seconds 14.4* 17.3* 25.8*   INR  1.3* 1.5* 2.3*       TSH:  No results found for: \"TSH\"    Lipid Profile:        No lab exists for component: \"LABVLDL\"    HgbA1C:        Lactate Level:  No lab exists for component: \"LACTA\"    CMP:  Results from last 7 days   Lab Units 04/01/24  0600 03/31/24  0559 03/30/24  0524   SODIUM mmol/L 131* 136 134*   POTASSIUM mmol/L 4.4 4.6 4.0   CHLORIDE mmol/L 94* 98 97*   CO2 mmol/L 25 28 27   BUN mg/dL 68* 50* 72*   CREATININE mg/dL 4.92* 3.94* 5.36*   GLUCOSE mg/dL 160* 208* 182*   CALCIUM mg/dL 8.2* 8.2* 8.1*   PROTEIN TOTAL g/dL 5.9* 5.6* 5.6*   BILIRUBIN TOTAL mg/dL 0.6 0.7 0.5   ALK PHOS U/L 50 58 46   AST U/L 22 31 23   ALT U/L 19 26 17       Amylase:        Lipase:  Results from last 7 days   Lab Units 03/27/24  2354   LIPASE U/L 7*       ABG:        No lab exists for component: \"PO2\", \"PCO2\", \"HCO3\", \"BE\", \"O2SAT\"       "

## 2024-04-02 ENCOUNTER — APPOINTMENT (OUTPATIENT)
Dept: DIALYSIS | Facility: HOSPITAL | Age: 84
End: 2024-04-02
Payer: MEDICARE

## 2024-04-02 VITALS
HEART RATE: 67 BPM | HEIGHT: 72 IN | RESPIRATION RATE: 16 BRPM | TEMPERATURE: 98.6 F | BODY MASS INDEX: 24.38 KG/M2 | OXYGEN SATURATION: 97 % | DIASTOLIC BLOOD PRESSURE: 44 MMHG | WEIGHT: 180 LBS | SYSTOLIC BLOOD PRESSURE: 110 MMHG

## 2024-04-02 LAB
ALBUMIN SERPL BCP-MCNC: 3.1 G/DL (ref 3.4–5)
ALP SERPL-CCNC: 62 U/L (ref 33–136)
ALT SERPL W P-5'-P-CCNC: 19 U/L (ref 10–52)
ANION GAP SERPL CALC-SCNC: 17 MMOL/L (ref 10–20)
AST SERPL W P-5'-P-CCNC: 17 U/L (ref 9–39)
BASOPHILS # BLD AUTO: 0.05 X10*3/UL (ref 0–0.1)
BASOPHILS NFR BLD AUTO: 0.3 %
BILIRUB SERPL-MCNC: 0.5 MG/DL (ref 0–1.2)
BUN SERPL-MCNC: 90 MG/DL (ref 6–23)
CALCIUM SERPL-MCNC: 8.2 MG/DL (ref 8.6–10.3)
CHLORIDE SERPL-SCNC: 93 MMOL/L (ref 98–107)
CO2 SERPL-SCNC: 25 MMOL/L (ref 21–32)
CREAT SERPL-MCNC: 6.04 MG/DL (ref 0.5–1.3)
EGFRCR SERPLBLD CKD-EPI 2021: 9 ML/MIN/1.73M*2
EOSINOPHIL # BLD AUTO: 0.39 X10*3/UL (ref 0–0.4)
EOSINOPHIL NFR BLD AUTO: 2.6 %
ERYTHROCYTE [DISTWIDTH] IN BLOOD BY AUTOMATED COUNT: 16.9 % (ref 11.5–14.5)
GLUCOSE BLD MANUAL STRIP-MCNC: 220 MG/DL (ref 74–99)
GLUCOSE BLD MANUAL STRIP-MCNC: 99 MG/DL (ref 74–99)
GLUCOSE SERPL-MCNC: 214 MG/DL (ref 74–99)
HCT VFR BLD AUTO: 21.8 % (ref 41–52)
HGB BLD-MCNC: 7.8 G/DL (ref 13.5–17.5)
HOLD SPECIMEN: NORMAL
IMM GRANULOCYTES # BLD AUTO: 0.15 X10*3/UL (ref 0–0.5)
IMM GRANULOCYTES NFR BLD AUTO: 1 % (ref 0–0.9)
INR PPP: 1.3 (ref 0.9–1.1)
LYMPHOCYTES # BLD AUTO: 1.53 X10*3/UL (ref 0.8–3)
LYMPHOCYTES NFR BLD AUTO: 10.1 %
MAGNESIUM SERPL-MCNC: 1.91 MG/DL (ref 1.6–2.4)
MCH RBC QN AUTO: 28.6 PG (ref 26–34)
MCHC RBC AUTO-ENTMCNC: 35.8 G/DL (ref 32–36)
MCV RBC AUTO: 80 FL (ref 80–100)
MONOCYTES # BLD AUTO: 1.65 X10*3/UL (ref 0.05–0.8)
MONOCYTES NFR BLD AUTO: 10.9 %
NEUTROPHILS # BLD AUTO: 11.36 X10*3/UL (ref 1.6–5.5)
NEUTROPHILS NFR BLD AUTO: 75.1 %
NRBC BLD-RTO: 0 /100 WBCS (ref 0–0)
PLATELET # BLD AUTO: 207 X10*3/UL (ref 150–450)
POTASSIUM SERPL-SCNC: 4.7 MMOL/L (ref 3.5–5.3)
PROT SERPL-MCNC: 6.1 G/DL (ref 6.4–8.2)
PROTHROMBIN TIME: 14.9 SECONDS (ref 9.8–12.8)
RBC # BLD AUTO: 2.73 X10*6/UL (ref 4.5–5.9)
SODIUM SERPL-SCNC: 130 MMOL/L (ref 136–145)
WBC # BLD AUTO: 15.1 X10*3/UL (ref 4.4–11.3)

## 2024-04-02 PROCEDURE — 85610 PROTHROMBIN TIME: CPT | Performed by: STUDENT IN AN ORGANIZED HEALTH CARE EDUCATION/TRAINING PROGRAM

## 2024-04-02 PROCEDURE — 80053 COMPREHEN METABOLIC PANEL: CPT | Performed by: INTERNAL MEDICINE

## 2024-04-02 PROCEDURE — 2500000001 HC RX 250 WO HCPCS SELF ADMINISTERED DRUGS (ALT 637 FOR MEDICARE OP): Performed by: NURSE PRACTITIONER

## 2024-04-02 PROCEDURE — 2500000004 HC RX 250 GENERAL PHARMACY W/ HCPCS (ALT 636 FOR OP/ED): Performed by: NURSE PRACTITIONER

## 2024-04-02 PROCEDURE — 6350000001 HC RX 635 EPOETIN >10,000 UNITS: Mod: JZ | Performed by: INTERNAL MEDICINE

## 2024-04-02 PROCEDURE — 8010000001 HC DIALYSIS - HEMODIALYSIS PER DAY

## 2024-04-02 PROCEDURE — 83735 ASSAY OF MAGNESIUM: CPT | Performed by: INTERNAL MEDICINE

## 2024-04-02 PROCEDURE — 2500000001 HC RX 250 WO HCPCS SELF ADMINISTERED DRUGS (ALT 637 FOR MEDICARE OP): Performed by: INTERNAL MEDICINE

## 2024-04-02 PROCEDURE — 36415 COLL VENOUS BLD VENIPUNCTURE: CPT | Performed by: INTERNAL MEDICINE

## 2024-04-02 PROCEDURE — 82947 ASSAY GLUCOSE BLOOD QUANT: CPT

## 2024-04-02 PROCEDURE — 85025 COMPLETE CBC W/AUTO DIFF WBC: CPT | Performed by: INTERNAL MEDICINE

## 2024-04-02 RX ADMIN — Medication 1 CAPSULE: at 06:35

## 2024-04-02 RX ADMIN — FINASTERIDE 5 MG: 5 TABLET, FILM COATED ORAL at 07:49

## 2024-04-02 RX ADMIN — DOCUSATE SODIUM 100 MG: 100 CAPSULE, LIQUID FILLED ORAL at 07:48

## 2024-04-02 RX ADMIN — CEFTRIAXONE SODIUM 1 G: 1 INJECTION, SOLUTION INTRAVENOUS at 06:33

## 2024-04-02 RX ADMIN — INSULIN LISPRO 2 UNITS: 100 INJECTION, SOLUTION INTRAVENOUS; SUBCUTANEOUS at 06:34

## 2024-04-02 RX ADMIN — CALCIUM ACETATE 667 MG: 667 CAPSULE ORAL at 07:48

## 2024-04-02 RX ADMIN — EPOETIN ALFA-EPBX 10000 UNITS: 10000 INJECTION, SOLUTION INTRAVENOUS; SUBCUTANEOUS at 13:46

## 2024-04-02 RX ADMIN — CALCIUM ACETATE 667 MG: 667 CAPSULE ORAL at 13:46

## 2024-04-02 ASSESSMENT — COGNITIVE AND FUNCTIONAL STATUS - GENERAL
STANDING UP FROM CHAIR USING ARMS: A LOT
EATING MEALS: A LITTLE
HELP NEEDED FOR BATHING: TOTAL
DAILY ACTIVITIY SCORE: 10
WALKING IN HOSPITAL ROOM: TOTAL
TURNING FROM BACK TO SIDE WHILE IN FLAT BAD: A LITTLE
MOBILITY SCORE: 12
DRESSING REGULAR LOWER BODY CLOTHING: TOTAL
MOVING TO AND FROM BED TO CHAIR: A LOT
DRESSING REGULAR UPPER BODY CLOTHING: A LOT
PERSONAL GROOMING: A LOT
TOILETING: TOTAL
MOVING FROM LYING ON BACK TO SITTING ON SIDE OF FLAT BED WITH BEDRAILS: A LITTLE
CLIMB 3 TO 5 STEPS WITH RAILING: TOTAL

## 2024-04-02 ASSESSMENT — PAIN - FUNCTIONAL ASSESSMENT
PAIN_FUNCTIONAL_ASSESSMENT: NO/DENIES PAIN
PAIN_FUNCTIONAL_ASSESSMENT: NO/DENIES PAIN
PAIN_FUNCTIONAL_ASSESSMENT: 0-10
PAIN_FUNCTIONAL_ASSESSMENT: 0-10

## 2024-04-02 ASSESSMENT — PAIN SCALES - GENERAL
PAINLEVEL_OUTOF10: 0 - NO PAIN

## 2024-04-02 NOTE — PROGRESS NOTES
Tyshawn Coles is a 83 y.o. male on day 4 of admission presenting with Generalized weakness.    Subjective   Patient is wide-awake tonight and answers questions appropriately.  He denies any chest pains or any shortness of breath.  He denies any abdominal pain and 12 point review of systems basically benign.  I called his wife Pamela and we discussed his current care and future care, she is happy with the care that he received so far and agrees for discharge tomorrow after dialysis.       Objective   Sodium is low again at 131, otherwise electrolytes and acid-base balance are normal.  You will use of 140 mEq sodium bath during dialysis tomorrow.  BUN and creatinine compatible with end-stage kidney disease  White cell count is elevated at 13.5 hemoglobin stable at 7.3 and platelet of 187.  Will maximize EPO dosing when he gets to the outpatient dialysis unit.  The liver function tests were normal  INR is down to 1.3, Coumadin has been restarted    Physical Exam  Patient is cachectic looking and obviously very weak  Alert oriented and not in any distress quite comfortable  Head examination benign, facial bones are prominent  Neck veins were flat with a faint bruit on the left side, AV fistula is located on the left upper arm with a strong bruit  Abdomen soft and nontender good bowel sounds and no guarding  Extremities without edema but muscles are markedly atrophic  No obvious focal neurological deficits    Last Recorded Vitals  Blood pressure (!) 123/94, pulse 70, temperature 36.8 °C (98.2 °F), temperature source Temporal, resp. rate 18, height 1.829 m (6'), weight 81.6 kg (180 lb), SpO2 95 %.  Intake/Output last 3 Shifts:  I/O last 3 completed shifts:  In: 800 (9.8 mL/kg) [P.O.:800]  Out: - (0 mL/kg)   Weight: 81.6 kg     Relevant Results          Current Facility-Administered Medications:     acetaminophen (Tylenol) tablet 650 mg, 650 mg, oral, q4h PRN **OR** acetaminophen (Tylenol) oral liquid 650 mg, 650 mg, oral,  q4h PRN **OR** acetaminophen (Tylenol) suppository 650 mg, 650 mg, rectal, q4h PRN, SOURAV Childs    B complex-vitamin C-folic acid (Nephrocaps) capsule 1 capsule, 1 capsule, oral, Daily, SOURAV Childs, 1 capsule at 04/01/24 0908    calcium acetate (Phoslo) capsule 667 mg, 667 mg, oral, TID with meals, Edi Wise MD, 667 mg at 04/01/24 1723    cefTRIAXone (Rocephin) IVPB 1 g, 1 g, intravenous, q24h, SOURAV Childs, Stopped at 04/01/24 0615    dextrose 50 % injection 12.5 g, 12.5 g, intravenous, q15 min PRN, SOURAV Childs    docusate sodium (Colace) capsule 100 mg, 100 mg, oral, BID, SOURAV Childs, 100 mg at 04/01/24 2005    finasteride (Proscar) tablet 5 mg, 5 mg, oral, q AM, SOURAV Childs, 5 mg at 04/01/24 0908    glucagon (Glucagen) injection 1 mg, 1 mg, intramuscular, q15 min PRN, SOURAV Childs    [Held by provider] hydroCHLOROthiazide (HYDRODiuril) tablet 25 mg, 25 mg, oral, Daily, SOURAV Childs    insulin lispro (HumaLOG) injection 0-5 Units, 0-5 Units, subcutaneous, TID with meals, SOURAV Childs, 2 Units at 04/01/24 1644    ipratropium-albuteroL (Duo-Neb) 0.5-2.5 mg/3 mL nebulizer solution 3 mL, 3 mL, nebulization, q4h PRN, SOURAV Childs    levETIRAcetam (Keppra) tablet 1,000 mg, 1,000 mg, oral, q24h, Hugh Lobo MD, 1,000 mg at 04/01/24 1723    lidocaine-prilocaine (Emla) cream, , Topical, Before Dialysis, Edi Wise MD    melatonin tablet 3 mg, 3 mg, oral, Nightly PRN, SOURAV Childs, 3 mg at 04/01/24 2005    metoprolol tartrate (Lopressor) tablet 50 mg, 50 mg, oral, BID, SOURAV Childs, 50 mg at 04/01/24 2005    ondansetron (Zofran) tablet 4 mg, 4 mg, oral, q8h PRN **OR** ondansetron (Zofran) injection 4 mg, 4 mg, intravenous, q8h PRN, SOURAV Childs    sennosides (Senokot) tablet 8.6 mg, 1 tablet, oral, Nightly, SOURAV Childs, 8.6  mg at 04/01/24 2005    sodium chloride 0.9 % bolus 200 mL, 200 mL, intravenous, After Dialysis, Edi Wise MD    sodium chloride 0.9 % bolus 200 mL, 200 mL, intravenous, q1h PRN, Edi Wise MD    sodium chloride 0.9 % bolus 200 mL, 200 mL, intravenous, q1h PRN, Edi Wise MD    sodium chloride 0.9 % bolus 200 mL, 200 mL, intravenous, After Dialysis, Edi Wise MD    sodium chloride 0.9 % bolus 200 mL, 200 mL, intravenous, q1h PRN, Edi Wise MD    [Held by provider] warfarin (Coumadin) tablet 2.5 mg, 2.5 mg, oral, Daily, Hugh Lobo MD, 2.5 mg at 03/31/24 1831    warfarin (Coumadin) tablet 4 mg, 4 mg, oral, Daily, Alex Dennison MD, 4 mg at 04/01/24 1723   Results for orders placed or performed during the hospital encounter of 03/27/24 (from the past 24 hour(s))   Protime-INR   Result Value Ref Range    Protime 14.4 (H) 9.8 - 12.8 seconds    INR 1.3 (H) 0.9 - 1.1   Comprehensive metabolic panel   Result Value Ref Range    Glucose 160 (H) 74 - 99 mg/dL    Sodium 131 (L) 136 - 145 mmol/L    Potassium 4.4 3.5 - 5.3 mmol/L    Chloride 94 (L) 98 - 107 mmol/L    Bicarbonate 25 21 - 32 mmol/L    Anion Gap 16 10 - 20 mmol/L    Urea Nitrogen 68 (H) 6 - 23 mg/dL    Creatinine 4.92 (H) 0.50 - 1.30 mg/dL    eGFR 11 (L) >60 mL/min/1.73m*2    Calcium 8.2 (L) 8.6 - 10.3 mg/dL    Albumin 3.1 (L) 3.4 - 5.0 g/dL    Alkaline Phosphatase 50 33 - 136 U/L    Total Protein 5.9 (L) 6.4 - 8.2 g/dL    AST 22 9 - 39 U/L    Bilirubin, Total 0.6 0.0 - 1.2 mg/dL    ALT 19 10 - 52 U/L   Magnesium   Result Value Ref Range    Magnesium 1.82 1.60 - 2.40 mg/dL   CBC and Auto Differential   Result Value Ref Range    WBC 13.5 (H) 4.4 - 11.3 x10*3/uL    nRBC 0.1 (H) 0.0 - 0.0 /100 WBCs    RBC 2.54 (L) 4.50 - 5.90 x10*6/uL    Hemoglobin 7.3 (L) 13.5 - 17.5 g/dL    Hematocrit 20.8 (L) 41.0 - 52.0 %    MCV 82 80 - 100 fL    MCH 28.7 26.0 - 34.0 pg    MCHC 35.1 32.0 - 36.0 g/dL    RDW 16.7 (H) 11.5 - 14.5 %     Platelets 187 150 - 450 x10*3/uL    Neutrophils % 72.5 40.0 - 80.0 %    Immature Granulocytes %, Automated 0.9 0.0 - 0.9 %    Lymphocytes % 12.5 13.0 - 44.0 %    Monocytes % 10.5 2.0 - 10.0 %    Eosinophils % 3.2 0.0 - 6.0 %    Basophils % 0.4 0.0 - 2.0 %    Neutrophils Absolute 9.82 (H) 1.60 - 5.50 x10*3/uL    Immature Granulocytes Absolute, Automated 0.12 0.00 - 0.50 x10*3/uL    Lymphocytes Absolute 1.69 0.80 - 3.00 x10*3/uL    Monocytes Absolute 1.42 (H) 0.05 - 0.80 x10*3/uL    Eosinophils Absolute 0.43 (H) 0.00 - 0.40 x10*3/uL    Basophils Absolute 0.05 0.00 - 0.10 x10*3/uL   POCT GLUCOSE   Result Value Ref Range    POCT Glucose 168 (H) 74 - 99 mg/dL   POCT GLUCOSE   Result Value Ref Range    POCT Glucose 237 (H) 74 - 99 mg/dL   POCT GLUCOSE   Result Value Ref Range    POCT Glucose 239 (H) 74 - 99 mg/dL   POCT GLUCOSE   Result Value Ref Range    POCT Glucose 274 (H) 74 - 99 mg/dL    XR knee 1-2 views bilateral  Results for orders placed or performed during the hospital encounter of 03/27/24 (from the past 96 hour(s))   Hepatitis B surface antibody   Result Value Ref Range    Hepatitis B Surface AB 34.7 (H) <10.0 mIU/mL   Hepatitis B surface antigen   Result Value Ref Range    Hepatitis B Surface AG Nonreactive Nonreactive   Comprehensive metabolic panel   Result Value Ref Range    Glucose 147 (H) 74 - 99 mg/dL    Sodium 136 136 - 145 mmol/L    Potassium 4.0 3.5 - 5.3 mmol/L    Chloride 98 98 - 107 mmol/L    Bicarbonate 28 21 - 32 mmol/L    Anion Gap 14 10 - 20 mmol/L    Urea Nitrogen 42 (H) 6 - 23 mg/dL    Creatinine 3.67 (H) 0.50 - 1.30 mg/dL    eGFR 15 (L) >60 mL/min/1.73m*2    Calcium 8.5 (L) 8.6 - 10.3 mg/dL    Albumin 3.2 (L) 3.4 - 5.0 g/dL    Alkaline Phosphatase 52 33 - 136 U/L    Total Protein 6.0 (L) 6.4 - 8.2 g/dL    AST 28 9 - 39 U/L    Bilirubin, Total 0.5 0.0 - 1.2 mg/dL    ALT 15 10 - 52 U/L   CBC and Auto Differential   Result Value Ref Range    WBC 15.1 (H) 4.4 - 11.3 x10*3/uL    nRBC 0.0 0.0 -  0.0 /100 WBCs    RBC 3.03 (L) 4.50 - 5.90 x10*6/uL    Hemoglobin 8.8 (L) 13.5 - 17.5 g/dL    Hematocrit 24.5 (L) 41.0 - 52.0 %    MCV 81 80 - 100 fL    MCH 29.0 26.0 - 34.0 pg    MCHC 35.9 32.0 - 36.0 g/dL    RDW 16.2 (H) 11.5 - 14.5 %    Platelets 180 150 - 450 x10*3/uL    Neutrophils % 76.7 40.0 - 80.0 %    Immature Granulocytes %, Automated 0.5 0.0 - 0.9 %    Lymphocytes % 9.2 13.0 - 44.0 %    Monocytes % 12.9 2.0 - 10.0 %    Eosinophils % 0.4 0.0 - 6.0 %    Basophils % 0.3 0.0 - 2.0 %    Neutrophils Absolute 11.56 (H) 1.60 - 5.50 x10*3/uL    Immature Granulocytes Absolute, Automated 0.07 0.00 - 0.50 x10*3/uL    Lymphocytes Absolute 1.39 0.80 - 3.00 x10*3/uL    Monocytes Absolute 1.95 (H) 0.05 - 0.80 x10*3/uL    Eosinophils Absolute 0.06 0.00 - 0.40 x10*3/uL    Basophils Absolute 0.04 0.00 - 0.10 x10*3/uL   Magnesium   Result Value Ref Range    Magnesium 1.95 1.60 - 2.40 mg/dL   POCT GLUCOSE   Result Value Ref Range    POCT Glucose 146 (H) 74 - 99 mg/dL   POCT GLUCOSE   Result Value Ref Range    POCT Glucose 257 (H) 74 - 99 mg/dL   Green Top   Result Value Ref Range    Extra Tube Hold for add-ons.    Lavender Top   Result Value Ref Range    Extra Tube Hold for add-ons.    SST TOP   Result Value Ref Range    Extra Tube Hold for add-ons.    Gray Top   Result Value Ref Range    Extra Tube Hold for add-ons.    PST Top   Result Value Ref Range    Extra Tube Hold for add-ons.    Protime-INR   Result Value Ref Range    Protime 32.5 (H) 9.8 - 12.8 seconds    INR 2.9 (H) 0.9 - 1.1   POCT GLUCOSE   Result Value Ref Range    POCT Glucose 182 (H) 74 - 99 mg/dL   POCT GLUCOSE   Result Value Ref Range    POCT Glucose 171 (H) 74 - 99 mg/dL   POCT GLUCOSE   Result Value Ref Range    POCT Glucose 188 (H) 74 - 99 mg/dL   CBC and Auto Differential   Result Value Ref Range    WBC 13.0 (H) 4.4 - 11.3 x10*3/uL    nRBC 0.0 0.0 - 0.0 /100 WBCs    RBC 2.50 (L) 4.50 - 5.90 x10*6/uL    Hemoglobin 7.2 (L) 13.5 - 17.5 g/dL    Hematocrit 20.0  (L) 41.0 - 52.0 %    MCV 80 80 - 100 fL    MCH 28.8 26.0 - 34.0 pg    MCHC 36.0 32.0 - 36.0 g/dL    RDW 16.4 (H) 11.5 - 14.5 %    Platelets 183 150 - 450 x10*3/uL    Neutrophils % 72.6 40.0 - 80.0 %    Immature Granulocytes %, Automated 0.5 0.0 - 0.9 %    Lymphocytes % 13.1 13.0 - 44.0 %    Monocytes % 12.0 2.0 - 10.0 %    Eosinophils % 1.6 0.0 - 6.0 %    Basophils % 0.2 0.0 - 2.0 %    Neutrophils Absolute 9.40 (H) 1.60 - 5.50 x10*3/uL    Immature Granulocytes Absolute, Automated 0.07 0.00 - 0.50 x10*3/uL    Lymphocytes Absolute 1.70 0.80 - 3.00 x10*3/uL    Monocytes Absolute 1.56 (H) 0.05 - 0.80 x10*3/uL    Eosinophils Absolute 0.21 0.00 - 0.40 x10*3/uL    Basophils Absolute 0.03 0.00 - 0.10 x10*3/uL   Comprehensive metabolic panel   Result Value Ref Range    Glucose 182 (H) 74 - 99 mg/dL    Sodium 134 (L) 136 - 145 mmol/L    Potassium 4.0 3.5 - 5.3 mmol/L    Chloride 97 (L) 98 - 107 mmol/L    Bicarbonate 27 21 - 32 mmol/L    Anion Gap 14 10 - 20 mmol/L    Urea Nitrogen 72 (H) 6 - 23 mg/dL    Creatinine 5.36 (H) 0.50 - 1.30 mg/dL    eGFR 10 (L) >60 mL/min/1.73m*2    Calcium 8.1 (L) 8.6 - 10.3 mg/dL    Albumin 2.8 (L) 3.4 - 5.0 g/dL    Alkaline Phosphatase 46 33 - 136 U/L    Total Protein 5.6 (L) 6.4 - 8.2 g/dL    AST 23 9 - 39 U/L    Bilirubin, Total 0.5 0.0 - 1.2 mg/dL    ALT 17 10 - 52 U/L   Magnesium   Result Value Ref Range    Magnesium 1.94 1.60 - 2.40 mg/dL   Protime-INR   Result Value Ref Range    Protime 25.8 (H) 9.8 - 12.8 seconds    INR 2.3 (H) 0.9 - 1.1   SST TOP   Result Value Ref Range    Extra Tube Hold for add-ons.    POCT GLUCOSE   Result Value Ref Range    POCT Glucose 161 (H) 74 - 99 mg/dL   POCT GLUCOSE   Result Value Ref Range    POCT Glucose 135 (H) 74 - 99 mg/dL   POCT GLUCOSE   Result Value Ref Range    POCT Glucose 233 (H) 74 - 99 mg/dL   CBC and Auto Differential   Result Value Ref Range    WBC 15.2 (H) 4.4 - 11.3 x10*3/uL    nRBC 0.2 (H) 0.0 - 0.0 /100 WBCs    RBC 2.56 (L) 4.50 - 5.90  x10*6/uL    Hemoglobin 7.3 (L) 13.5 - 17.5 g/dL    Hematocrit 20.9 (L) 41.0 - 52.0 %    MCV 82 80 - 100 fL    MCH 28.5 26.0 - 34.0 pg    MCHC 34.9 32.0 - 36.0 g/dL    RDW 16.3 (H) 11.5 - 14.5 %    Platelets 210 150 - 450 x10*3/uL    Neutrophils % 75.6 40.0 - 80.0 %    Immature Granulocytes %, Automated 0.9 0.0 - 0.9 %    Lymphocytes % 10.3 13.0 - 44.0 %    Monocytes % 11.3 2.0 - 10.0 %    Eosinophils % 1.6 0.0 - 6.0 %    Basophils % 0.3 0.0 - 2.0 %    Neutrophils Absolute 11.46 (H) 1.60 - 5.50 x10*3/uL    Immature Granulocytes Absolute, Automated 0.13 0.00 - 0.50 x10*3/uL    Lymphocytes Absolute 1.57 0.80 - 3.00 x10*3/uL    Monocytes Absolute 1.72 (H) 0.05 - 0.80 x10*3/uL    Eosinophils Absolute 0.24 0.00 - 0.40 x10*3/uL    Basophils Absolute 0.05 0.00 - 0.10 x10*3/uL   Comprehensive metabolic panel   Result Value Ref Range    Glucose 208 (H) 74 - 99 mg/dL    Sodium 136 136 - 145 mmol/L    Potassium 4.6 3.5 - 5.3 mmol/L    Chloride 98 98 - 107 mmol/L    Bicarbonate 28 21 - 32 mmol/L    Anion Gap 15 10 - 20 mmol/L    Urea Nitrogen 50 (H) 6 - 23 mg/dL    Creatinine 3.94 (H) 0.50 - 1.30 mg/dL    eGFR 14 (L) >60 mL/min/1.73m*2    Calcium 8.2 (L) 8.6 - 10.3 mg/dL    Albumin 3.2 (L) 3.4 - 5.0 g/dL    Alkaline Phosphatase 58 33 - 136 U/L    Total Protein 5.6 (L) 6.4 - 8.2 g/dL    AST 31 9 - 39 U/L    Bilirubin, Total 0.7 0.0 - 1.2 mg/dL    ALT 26 10 - 52 U/L   Magnesium   Result Value Ref Range    Magnesium 1.85 1.60 - 2.40 mg/dL   Protime-INR   Result Value Ref Range    Protime 17.3 (H) 9.8 - 12.8 seconds    INR 1.5 (H) 0.9 - 1.1   POCT GLUCOSE   Result Value Ref Range    POCT Glucose 199 (H) 74 - 99 mg/dL   POCT GLUCOSE   Result Value Ref Range    POCT Glucose 226 (H) 74 - 99 mg/dL   POCT GLUCOSE   Result Value Ref Range    POCT Glucose 174 (H) 74 - 99 mg/dL   POCT GLUCOSE   Result Value Ref Range    POCT Glucose 170 (H) 74 - 99 mg/dL   Protime-INR   Result Value Ref Range    Protime 14.4 (H) 9.8 - 12.8 seconds    INR  1.3 (H) 0.9 - 1.1   Comprehensive metabolic panel   Result Value Ref Range    Glucose 160 (H) 74 - 99 mg/dL    Sodium 131 (L) 136 - 145 mmol/L    Potassium 4.4 3.5 - 5.3 mmol/L    Chloride 94 (L) 98 - 107 mmol/L    Bicarbonate 25 21 - 32 mmol/L    Anion Gap 16 10 - 20 mmol/L    Urea Nitrogen 68 (H) 6 - 23 mg/dL    Creatinine 4.92 (H) 0.50 - 1.30 mg/dL    eGFR 11 (L) >60 mL/min/1.73m*2    Calcium 8.2 (L) 8.6 - 10.3 mg/dL    Albumin 3.1 (L) 3.4 - 5.0 g/dL    Alkaline Phosphatase 50 33 - 136 U/L    Total Protein 5.9 (L) 6.4 - 8.2 g/dL    AST 22 9 - 39 U/L    Bilirubin, Total 0.6 0.0 - 1.2 mg/dL    ALT 19 10 - 52 U/L   Magnesium   Result Value Ref Range    Magnesium 1.82 1.60 - 2.40 mg/dL   CBC and Auto Differential   Result Value Ref Range    WBC 13.5 (H) 4.4 - 11.3 x10*3/uL    nRBC 0.1 (H) 0.0 - 0.0 /100 WBCs    RBC 2.54 (L) 4.50 - 5.90 x10*6/uL    Hemoglobin 7.3 (L) 13.5 - 17.5 g/dL    Hematocrit 20.8 (L) 41.0 - 52.0 %    MCV 82 80 - 100 fL    MCH 28.7 26.0 - 34.0 pg    MCHC 35.1 32.0 - 36.0 g/dL    RDW 16.7 (H) 11.5 - 14.5 %    Platelets 187 150 - 450 x10*3/uL    Neutrophils % 72.5 40.0 - 80.0 %    Immature Granulocytes %, Automated 0.9 0.0 - 0.9 %    Lymphocytes % 12.5 13.0 - 44.0 %    Monocytes % 10.5 2.0 - 10.0 %    Eosinophils % 3.2 0.0 - 6.0 %    Basophils % 0.4 0.0 - 2.0 %    Neutrophils Absolute 9.82 (H) 1.60 - 5.50 x10*3/uL    Immature Granulocytes Absolute, Automated 0.12 0.00 - 0.50 x10*3/uL    Lymphocytes Absolute 1.69 0.80 - 3.00 x10*3/uL    Monocytes Absolute 1.42 (H) 0.05 - 0.80 x10*3/uL    Eosinophils Absolute 0.43 (H) 0.00 - 0.40 x10*3/uL    Basophils Absolute 0.05 0.00 - 0.10 x10*3/uL   POCT GLUCOSE   Result Value Ref Range    POCT Glucose 168 (H) 74 - 99 mg/dL   POCT GLUCOSE   Result Value Ref Range    POCT Glucose 237 (H) 74 - 99 mg/dL   POCT GLUCOSE   Result Value Ref Range    POCT Glucose 239 (H) 74 - 99 mg/dL   POCT GLUCOSE   Result Value Ref Range    POCT Glucose 274 (H) 74 - 99 mg/dL         Result Date: 4/1/2024  Interpreted By:  Schoenberger, Joseph, STUDY: XR KNEE 1-2 VIEWS BILATERAL; ;  4/1/2024 2:56 pm   INDICATION: Signs/Symptoms:knee pain fall.   COMPARISON: None.   ACCESSION NUMBER(S): [id number]   ORDERING CLINICIAN: DIANNA HUMPHREY   FINDINGS: Mild degenerative change in the tibiofemoral joint compartments. There is medial meniscal chondrocalcinosis. Extensive vascular calcifications are noted. There is no acute fracture or dislocation or joint effusion.       No definite acute bone or joint findings     MACRO: None   Signed by: Joseph Schoenberger 4/1/2024 3:03 PM Dictation workstation:   [id number]    XR hips bilateral 2 VW w pelvis when performed    Result Date: 4/1/2024  Interpreted By:  Schoenberger, Joseph, STUDY: XR HIPS BILATERAL 2 VW WITH PELVIS WHEN PERFORMED; ;  4/1/2024 2:56 pm   INDICATION: Signs/Symptoms:pain.   COMPARISON: None.   ACCESSION NUMBER(S): [id number]   ORDERING CLINICIAN: DIANNA HUMPHREY   FINDINGS: The surgical changes associated with the previous right hip hemiarthroplasty are noted. Apparent satisfactory positioning of prosthetic components without hardware failure or fracture or loosening. No acute fracture or dislocation on today's exam. Mild degenerative changes in the left hip.       No definite acute findings.     MACRO: None   Signed by: Joseph Schoenberger 4/1/2024 3:02 PM Dictation workstation:   [id number]    XR elbow right 3+ views    Result Date: 3/30/2024  Interpreted By:  Suhail Rao, STUDY: XR ELBOW RIGHT 3+ VIEWS; ;  3/30/2024 4:37 pm   INDICATION: Signs/Symptoms:fall.   COMPARISON: None.   ACCESSION NUMBER(S): [id number]   ORDERING CLINICIAN: SID SOMMERS   FINDINGS: There are subtle calcifications along the distal triceps tendon adjacent to the olecranon process without evidence of donor site or discrete fracture defect. The remaining osseous structures are intact. There is mild spurring of the  coronoid process. No joint effusion. Vascular calcifications are noted.       Curvilinear calcification within the distal triceps tendon that could be related to early enthesopathic changes versus micro avulsion fracture which can be reliably differentiated with palpation for pain/tenderness at this location. Otherwise no acute osseous abnormality of the right elbow.     MACRO: None   Signed by: Suhail Rao 3/30/2024 11:46 PM Dictation workstation:   [id number]    CT head wo IV contrast    Result Date: 3/30/2024  Interpreted By:  Bj Pavon, STUDY: CT HEAD WO IV CONTRAST;  3/30/2024 4:41 pm   INDICATION: Signs/Symptoms:fall.   COMPARISON: 01/28/2020 head CT   ACCESSION NUMBER(S): [id number]   ORDERING CLINICIAN: SID SOMMERS   TECHNIQUE: Noncontrast axial CT scan of head was performed. Angled reformats in brain and bone windows were generated. The images were reviewed in bone, brain, blood and soft tissue windows.   FINDINGS: CSF Spaces: The sulci and to a lesser degree ventricles are moderately prominent for the patient's age in a nonspecific pattern, unchanged. There is no extraaxial fluid collection.   Parenchyma:  The white matter has ill-defined areas of hypodensity similar to the prior CT. No hemorrhage, mass or large acute infarct is noted.     Calvarium: The skin surface has multiple focal calcifications. Medial calcific sclerosis of the arteries is present as seen with diabetes or end stage renal disease. No underlying calvarial abnormality is noted.   Paranasal sinuses and mastoids: Visualized paranasal sinuses and mastoids are clear.       No acute infarct, hemorrhage or mass is noted.   The parenchymal hypodensities are consistent with chronic microvascular ischemic changes.   MACRO: None   Signed by: Bj Pavon 3/30/2024 5:17 PM Dictation workstation:   [id number]    ECG 12 lead    Result Date: 3/29/2024  Normal sinus rhythm Normal ECG When compared with ECG of 28-MAR-2024 00:31,  (unconfirmed) No significant change was found See ED provider note for full interpretation and clinical correlation Confirmed by Amber Javed ([id number]) on 3/29/2024 11:27:22 AM    XR chest 1 view    Result Date: 3/28/2024  Interpreted By:  Domenico Tafoya, STUDY: XR CHEST 1 VIEW;  3/28/2024 12:01 am   INDICATION: Signs/Symptoms:Pneumonia.   COMPARISON: 8/17/2021   ACCESSION NUMBER(S): [id number]   ORDERING CLINICIAN: SARAI CASTELLON   FINDINGS: The cardiac silhouette is normal in size. Calcified mediastinal lymph nodes. There is asymmetric elevation of left diaphragm and mild left basilar subsegmental atelectasis. No significant pleural effusion. No pneumothorax.       No airspace consolidation or pleural effusion.   Asymmetric elevation of left diaphragm   MACRO: None   Signed by: Domenico Tafoya 3/28/2024 12:19 AM Dictation workstation:   [id number]    XR chest 1 view    Result Date: 3/25/2024  * * *Final Report* * * DATE OF EXAM: Mar 25 2024  3:12AM   [facility]   [id number]  -  XR CHEST 1V FRONTAL PORT  / ACCESSION #  [id number] PROCEDURE REASON: Shortness of breath      * * * * Physician Interpretation * * * *  EXAMINATION:  CHEST RADIOGRAPH (PORTABLE SINGLE VIEW AP) Exam Date/Time:  3/25/2024 3:12 AM CLINICAL INFORMATION (AS PROVIDED BY ORDERING CLINICIAN) :  Shortness of breath Comparison:  None available RESULT: See impression.    IMPRESSION: Lines, tubes, and devices:  None. Lungs and pleura:  No confluent infiltrate. No large pleural effusion. No pneumothorax. Cardiomediastinal silhouette:  Within normal limits. Calcified LEFT hilar lymph nodes. Other:  No acute bony abnormality. : PSCB   Transcribe Date/Time: Mar 25 2024  3:28A Dictated by : PRATIBHA STORM MD This examination was interpreted and the report reviewed and electronically signed by: PRATIBHA STORM MD on Mar 25 2024  3:28AM  EST    CT head wo IV contrast    Result Date: 3/25/2024  * * *Final Report* * * DATE OF EXAM: Mar 25 2024   3:07AM   [facility]   [id number]  -  CT BRAIN WO IVCON   / ACCESSION #  [id number] PROCEDURE REASON: Encephalitis      * * * * Physician Interpretation * * * *  EXAMINATION: CT BRAIN WO IVCON CLINICAL HISTORY: TECHNIQUE:  Serial axial images without IV contrast were obtained from the vertex to the foramen magnum. MQ:  CTBWO_3 CT Radiation dose: Integrated Dose-Length Product (DLP) for this visit =   776  mGy*cm CT Dose Reduction Employed: No dose reduction techniques were required COMPARISON: None. RESULT: Post-operative change: None. Acute change: No evidence of an acute infarct or other acute parenchymal process. Hemorrhage: No evidence of acute intracranial hemorrhage. ECASS hemorrhagic transformation score: Not Applicable Mass Lesion / Mass Effect: There is no evidence of an intracranial mass or extraaxial fluid collection.  No significant mass effect. Chronic change: Scattered patchy foci of low attenuation are present within supratentorial white matter which is a nonspecific finding but likely represents mild microvascular ischemia. Parenchyma: There is moderate generalized volume loss.  The brain parenchyma is otherwise within normal limits for age. Ventricles: Ventricular enlargement concordant with the degree of parenchymal volume loss. Paranasal sinuses and skull base: The visualized paranasal sinuses are grossly clear.   The skull base and imaged soft tissues are unremarkable.   (topogram) images:    IMPRESSION: No acute intracranial abnormality. 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  Patient alert oriented and conversant tonight.  Extremely weak looking and cachectic.  UTI on treatment.  Recent fall, negative workup and today patient is asymptomatic.  Patient is prone to falling, Coumadin has been restarted however patient might be a candidate for Watchman procedure.  Further discussions along this line in the future.  End-stage kidney disease, for hemodialysis tomorrow  Anemia secondary to end-stage kidney disease and chronic illness with stable hemoglobin of 7.3, will increase EPO dosing on outpatient dialysis unit.  For now we will give 10,000 units with each treatment.  Unable to use Venofer because of high ferritin level.  No signs of acute bleeding or blood loss.  Paroxysmal atrial fibrillation, with controlled rate.  Unfortunately patient will need to be anticoagulated, but will consider discussions about Watchman procedure  Urinary tract infection on Rocephin 1 g daily, cultures have been negative but will be sent home on cephalosporin  Hyponatremia sodium is 131, will use 140 mEq sodium bath for dialysis tomorrow  Type 2 diabetes with controlled blood pressure on Humalog with coverage  Benign prostatic hypertrophy continued on Proscar  Seizure disorder on Keppra  Hypertension, blood pressure is controlled at 123/94 heart rate of 70 with a pulse oximeter reading of 95% on Metroprolol 50 mg twice daily.  On renal standpoint patient may be discharged to rehab facility after hemodialysis tomorrow.  This was discussed with Mrs. Coles and she was agreeable.       I spent 40 minutes in the professional and overall care of this patient.      Edi Wise MD FACP

## 2024-04-02 NOTE — NURSING NOTE
Report to Receiving RN:    Report From: FERNIE Whipple  Time Report Called: [id number]  Hand-Off Communication: Tx tolerated well. VSS. 1.8 liters removed. Mild hypotension last 20 minutes of Tx.  Complications During Treatment: Yes, See above  Ultrafiltration Treatment: No  Medications Administered During Dialysis: No  Blood Products Administered During Dialysis: No  Labs Sent During Dialysis: No  Heparin Drip Rate Changes: No    Electronic Signatures:   Authored: FERNIE Honeycutt/FERNIE GARCIA    Last Updated: 1:06 PM by MALLORY BACH

## 2024-04-02 NOTE — PROGRESS NOTES
04/02/24 1006   Current Planned Discharge Disposition   Current Planned Discharge Disposition SNF  ([facility])     Discharge was postponed yesterday. Physician has cleared pt for discharge today. Facility notified and confirms they are able to accommodate pt today. Transport requested and confirmed 2pm pickup. Pt to continue with outpt dialysis at [facility] on T,TH,SA at 9:10am. SW notified pt spouse Pamela and contacted son Rocael as well. Wife and Rocael in agreement with discharge plan. Confirmed with Rocael that transport will be arranged for pt to go to dialysis on Thursday.     Called and spoke with nurse Contreras at [facility]. and updated on pt discharge plan. Diane aware that pt will be returning on Thursday 4/4 for treatment. AVS faxed to dialysis center at [phone] per request.

## 2024-04-02 NOTE — CARE PLAN
The patient's goals for the shift include  rest    The clinical goals for the shift include remain safe and hemodynamically stable    Over the shift, the patient did not make progress toward the following goals. Barriers to progression include forgetfulness. Recommendations to address these barriers include hourly rounding.      Problem: Diabetes  Goal: Maintain electrolyte levels within acceptable range throughout shift  Outcome: Progressing  Goal: Maintain glucose levels >70mg/dl to <250mg/dl throughout shift  Outcome: Progressing     Problem: Skin  Goal: Prevent/manage excess moisture  Outcome: Progressing  Flowsheets (Taken 4/1/2024 2230)  Prevent/manage excess moisture:   Cleanse incontinence/protect with barrier cream   Monitor for/manage infection if present   Moisturize dry skin  Goal: Prevent/minimize sheer/friction injuries  Outcome: Progressing  Flowsheets (Taken 4/1/2024 2230)  Prevent/minimize sheer/friction injuries:   Increase activity/out of bed for meals   Use pull sheet   HOB 30 degrees or less   Turn/reposition every 2 hours/use positioning/transfer devices     Problem: Pain - Adult  Goal: Verbalizes/displays adequate comfort level or baseline comfort level  Outcome: Progressing     Problem: Safety - Adult  Goal: Free from fall injury  Outcome: Progressing     Problem: Discharge Planning  Goal: Discharge to home or other facility with appropriate resources  Outcome: Progressing     Problem: Chronic Conditions and Co-morbidities  Goal: Patient's chronic conditions and co-morbidity symptoms are monitored and maintained or improved  Outcome: Progressing

## 2024-04-02 NOTE — NURSING NOTE
Report from Sending RN:    Report From: FERNIE Whipple  Recent Surgery of Procedure: No  Baseline Level of Consciousness (LOC): A&O1-2  Oxygen Use: No  Type: Room Air  Diabetic: Yes  Last BP Med Given Day of Dialysis: None  Last Pain Med Given: None  Lab Tests to be Obtained with Dialysis: No  Blood Transfusion to be Given During Dialysis: No  Available IV Access: Yes  Medications to be Administered During Dialysis: No  Continuous IV Infusion Running: No  Restraints on Currently or in the Last 24 Hours: No  Hand-Off Communication:

## 2024-04-02 NOTE — CARE PLAN
The patient's goals for the shift include      The clinical goals for the shift include remain safe and hemodynamically stable

## 2024-04-03 ENCOUNTER — NURSING HOME VISIT (OUTPATIENT)
Dept: POST ACUTE CARE | Facility: EXTERNAL LOCATION | Age: 84
End: 2024-04-03
Payer: MEDICARE

## 2024-04-03 DIAGNOSIS — E44.0 MODERATE PROTEIN-CALORIE MALNUTRITION (MULTI): ICD-10-CM

## 2024-04-03 DIAGNOSIS — E10.22 TYPE 1 DM WITH CKD STAGE 5 AND HYPERTENSION (MULTI): ICD-10-CM

## 2024-04-03 DIAGNOSIS — N18.5 TYPE 1 DM WITH CKD STAGE 5 AND HYPERTENSION (MULTI): ICD-10-CM

## 2024-04-03 DIAGNOSIS — I12.0 TYPE 1 DM WITH CKD STAGE 5 AND HYPERTENSION (MULTI): ICD-10-CM

## 2024-04-03 DIAGNOSIS — N18.6 ESRD ON DIALYSIS (MULTI): ICD-10-CM

## 2024-04-03 DIAGNOSIS — I48.0 PAROXYSMAL ATRIAL FIBRILLATION (MULTI): Primary | ICD-10-CM

## 2024-04-03 DIAGNOSIS — I10 HYPERTENSION, UNSPECIFIED TYPE: ICD-10-CM

## 2024-04-03 DIAGNOSIS — N18.5 ANEMIA DUE TO STAGE 5 CHRONIC KIDNEY DISEASE (MULTI): ICD-10-CM

## 2024-04-03 DIAGNOSIS — D63.1 ANEMIA DUE TO STAGE 5 CHRONIC KIDNEY DISEASE (MULTI): ICD-10-CM

## 2024-04-03 DIAGNOSIS — Z99.2 ESRD ON DIALYSIS (MULTI): ICD-10-CM

## 2024-04-03 DIAGNOSIS — R53.1 GENERALIZED WEAKNESS: ICD-10-CM

## 2024-04-03 PROCEDURE — 99306 1ST NF CARE HIGH MDM 50: CPT | Performed by: STUDENT IN AN ORGANIZED HEALTH CARE EDUCATION/TRAINING PROGRAM

## 2024-04-03 NOTE — LETTER
Patient: Tyshawn Coles  : 1940    Encounter Date: 2024    Subjective  Patient ID: Tyshawn Coles is a 83 y.o. male.    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  Patient was subsequently discharged from the hospital however presented back to the ED with generalized weakness and increased confusion and difficulty caring for the patient at home.  Patient was admitted for further management.  Was found to have a UTI, as well as elevated INR.  Coumadin was held secondary to this.  And patient was started on IV antibiotics.  Patient was given dialysis per his regular schedule.  Was recommended discharge to skilled nursing facility due to difficulties with ADLs.  On evaluation patient states he overall is feeling well.  Unfortunately, this morning did have a fall where he landed on his hip.  Otherwise, states no additional issues or concerns.  He is unsure of his dialysis schedule.  Also is unsure of his medications that he is supposed to be taking as well.  States no additional issues or concerns at this time.        Review of Systems   Constitutional:  Positive for fatigue.   HENT: Negative.     Respiratory: Negative.     Cardiovascular: Negative.    Gastrointestinal: Negative.    Musculoskeletal:  Positive for arthralgias and back pain.        Hip pain   Skin: Negative.    Neurological:  Positive for weakness.   Psychiatric/Behavioral: Negative.         ObjectiveVitals Reviewed via facility EMR   Physical Exam  Constitutional:       General: He is not in acute distress.     Appearance: He is not ill-appearing.   Eyes:      Pupils: Pupils are equal, round, and reactive to light.   Cardiovascular:      Rate and Rhythm: Normal rate and regular rhythm.      Pulses: Normal pulses.      Heart  sounds: No murmur heard.  Pulmonary:      Effort: No respiratory distress.      Breath sounds: No wheezing.   Abdominal:      General: Abdomen is flat. Bowel sounds are normal. There is no distension.   Musculoskeletal:      Right lower leg: No edema.      Left lower leg: No edema.      Comments: Internal rotation of R hip, full range of motion, mild tenderness   Skin:     General: Skin is warm and dry.   Neurological:      Mental Status: He is alert. Mental status is at baseline.      Cranial Nerves: No cranial nerve deficit.      Motor: No weakness.   Psychiatric:         Mood and Affect: Mood normal.         Behavior: Behavior normal.         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  Hospital course reviewed, and did have extensive hospitalizations with both [facility] as well as [facility].  Continue supportive care.  Due to recent fall, will obtain hip x-rays as patient is endorsing pain in this area.  Advised moving the patient or closer to the nursing station to help avoid further falls.  Will initiate dialysis schedule as per previous regimen.  Medications reviewed and reconciled, INR noted to be 1.4, will initiate 10 mg of Coumadin today due to low INR.  Closely monitor blood sugars on sliding scale.  Check A1c.  Family and staff updated with care plan and are agreeable.    Reviewed and approved by EMANUEL JUNG on 4/4/24 at 9:45 PM.         Electronically Signed By: Emanuel Jung DO   4/4/24  9:45 PM

## 2024-04-04 PROBLEM — E44.0 MODERATE PROTEIN-CALORIE MALNUTRITION (MULTI): Status: ACTIVE | Noted: 2024-04-04

## 2024-04-04 ASSESSMENT — ENCOUNTER SYMPTOMS
CARDIOVASCULAR NEGATIVE: 1
RESPIRATORY NEGATIVE: 1
PSYCHIATRIC NEGATIVE: 1
WEAKNESS: 1
BACK PAIN: 1
ARTHRALGIAS: 1
FATIGUE: 1
GASTROINTESTINAL NEGATIVE: 1

## 2024-04-05 ENCOUNTER — NURSING HOME VISIT (OUTPATIENT)
Dept: POST ACUTE CARE | Facility: EXTERNAL LOCATION | Age: 84
End: 2024-04-05
Payer: MEDICARE

## 2024-04-05 DIAGNOSIS — R53.1 GENERALIZED WEAKNESS: ICD-10-CM

## 2024-04-05 DIAGNOSIS — I10 HYPERTENSION, UNSPECIFIED TYPE: ICD-10-CM
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
## 2024-04-05 DIAGNOSIS — I48.0 PAROXYSMAL ATRIAL FIBRILLATION (MULTI): ICD-10-CM

## 2024-04-05 DIAGNOSIS — I12.0 TYPE 1 DM WITH CKD STAGE 5 AND HYPERTENSION (MULTI): Primary | ICD-10-CM

## 2024-04-05 DIAGNOSIS — N18.5 TYPE 1 DM WITH CKD STAGE 5 AND HYPERTENSION (MULTI): Primary | ICD-10-CM

## 2024-04-05 DIAGNOSIS — E10.22 TYPE 1 DM WITH CKD STAGE 5 AND HYPERTENSION (MULTI): Primary | ICD-10-CM

## 2024-04-05 PROCEDURE — 99309 SBSQ NF CARE MODERATE MDM 30: CPT | Performed by: STUDENT IN AN ORGANIZED HEALTH CARE EDUCATION/TRAINING PROGRAM

## 2024-04-05 ASSESSMENT — ENCOUNTER SYMPTOMS
PSYCHIATRIC NEGATIVE: 1
GASTROINTESTINAL NEGATIVE: 1
FATIGUE: 1
CARDIOVASCULAR NEGATIVE: 1
RESPIRATORY NEGATIVE: 1
WEAKNESS: 1
MUSCULOSKELETAL NEGATIVE: 1

## 2024-04-05 NOTE — PROGRESS NOTES
Subjective   Patient ID: Tyshawn Coles is a 83 y.o. male.    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  Patient was subsequently discharged from the hospital however presented back to the ED with generalized weakness and increased confusion and difficulty caring for the patient at home.  Patient was admitted for further management.  Was found to have a UTI, as well as elevated INR.  Coumadin was held secondary to this.  And patient was started on IV antibiotics.  Patient was given dialysis per his regular schedule.  Was recommended discharge to skilled nursing facility due to difficulties with ADLs.  On evaluation patient states he overall is feeling well.  Unfortunately, this morning did have a fall where he landed on his hip.  Otherwise, states no additional issues or concerns.  He is unsure of his dialysis schedule.  Also is unsure of his medications that he is supposed to be taking as well.  States no additional issues or concerns at this time.        Review of Systems   Constitutional:  Positive for fatigue.   HENT: Negative.     Respiratory: Negative.     Cardiovascular: Negative.    Gastrointestinal: Negative.    Musculoskeletal:  Positive for arthralgias and back pain.        Hip pain   Skin: Negative.    Neurological:  Positive for weakness.   Psychiatric/Behavioral: Negative.         Objective Vitals Reviewed via facility EMR   Physical Exam  Constitutional:       General: He is not in acute distress.     Appearance: He is not ill-appearing.   Eyes:      Pupils: Pupils are equal, round, and reactive to light.   Cardiovascular:      Rate and Rhythm: Normal rate and regular rhythm.      Pulses: Normal pulses.      Heart sounds: No murmur heard.  Pulmonary:      Effort: No respiratory distress.       Breath sounds: No wheezing.   Abdominal:      General: Abdomen is flat. Bowel sounds are normal. There is no distension.   Musculoskeletal:      Right lower leg: No edema.      Left lower leg: No edema.      Comments: Internal rotation of R hip, full range of motion, mild tenderness   Skin:     General: Skin is warm and dry.   Neurological:      Mental Status: He is alert. Mental status is at baseline.      Cranial Nerves: No cranial nerve deficit.      Motor: No weakness.   Psychiatric:         Mood and Affect: Mood normal.         Behavior: Behavior normal.         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  Hospital course reviewed, and did have extensive hospitalizations with both [facility] as well as [facility].  Continue supportive care.  Due to recent fall, will obtain hip x-rays as patient is endorsing pain in this area.  Advised moving the patient or closer to the nursing station to help avoid further falls.  Will initiate dialysis schedule as per previous regimen.  Medications reviewed and reconciled, INR noted to be 1.4, will initiate 10 mg of Coumadin today due to low INR.  Closely monitor blood sugars on sliding scale.  Check A1c.  Family and staff updated with care plan and are agreeable.    Reviewed and approved by VOLODYMYR JUNG on 4/4/24 at 9:45 PM.

## 2024-04-05 NOTE — LETTER
Patient: Tyshawn Coles  : 1940    Encounter Date: 2024    Subjective  Patient ID: Tyshawn Coles is a 83 y.o. male.    Patient seen and examined at bedside.  He regards that he is doing overall well with no concerns.  Did have a recent fall as of late, however states no acute injuries or issues.  Otherwise, he regards that his strength is overall improving.  Is tolerating his medications.  Per nutritionist at dialysis facility, patient will benefit from phosphate binder.  Otherwise, patient reports no acute complaints or concerns.        Review of Systems   Constitutional:  Positive for fatigue.   HENT: Negative.     Respiratory: Negative.     Cardiovascular: Negative.    Gastrointestinal: Negative.    Musculoskeletal: Negative.    Skin: Negative.    Neurological:  Positive for weakness.   Psychiatric/Behavioral: Negative.         ObjectiveVitals Reviewed via facility EMR   Physical Exam  Constitutional:       General: He is not in acute distress.     Appearance: He is not ill-appearing.      Comments: Elderly male   Eyes:      Pupils: Pupils are equal, round, and reactive to light.   Cardiovascular:      Rate and Rhythm: Normal rate and regular rhythm.      Pulses: Normal pulses.      Heart sounds: No murmur heard.  Pulmonary:      Effort: No respiratory distress.      Breath sounds: No wheezing.   Abdominal:      General: Abdomen is flat. Bowel sounds are normal. There is no distension.   Musculoskeletal:      Right lower leg: No edema.      Left lower leg: No edema.   Skin:     General: Skin is warm and dry.   Neurological:      Mental Status: He is alert. Mental status is at baseline.      Cranial Nerves: No cranial nerve deficit.      Motor: Weakness present.   Psychiatric:         Mood and Affect: Mood normal.         Behavior: Behavior normal.         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)  Hypertension, unspecified  type  Paroxysmal atrial fibrillation (CMS/HCC)  Generalized weakness  Fall, sequela  Patient seen and evaluated.  Overall medically stable at this time however did have a repeated fall.  We will try to lower the bed and move the patient closer to the nursing station to help prevent falls.  Extensively discussed with patient to call for help prior to getting out of bed and voiced understanding labs overall medically stable, initiate phosphate binder per nutritionist recommendations.  Continue fall precautions.  Supportive care, PT OT.  Will continue to monitor for metabolic and infectious abnormalities that could be leading to patient's falls however suspect that this is just general deconditioning at this time.  No change in medications.  Staff updated with care plan and is agreeable.    Reviewed and approved by EMANUEL JUNG on 4/5/24 at 9:19 PM.         Electronically Signed By: Emanuel Jung DO   4/5/24  9:19 PM

## 2024-04-06 NOTE — PROGRESS NOTES
Subjective   Patient ID: Tyshawn Coles is a 83 y.o. male.    Patient seen and examined at bedside.  He regards that he is doing overall well with no concerns.  Did have a recent fall as of late, however states no acute injuries or issues.  Otherwise, he regards that his strength is overall improving.  Is tolerating his medications.  Per nutritionist at dialysis facility, patient will benefit from phosphate binder.  Otherwise, patient reports no acute complaints or concerns.        Review of Systems   Constitutional:  Positive for fatigue.   HENT: Negative.     Respiratory: Negative.     Cardiovascular: Negative.    Gastrointestinal: Negative.    Musculoskeletal: Negative.    Skin: Negative.    Neurological:  Positive for weakness.   Psychiatric/Behavioral: Negative.         Objective Vitals Reviewed via facility EMR   Physical Exam  Constitutional:       General: He is not in acute distress.     Appearance: He is not ill-appearing.      Comments: Elderly male   Eyes:      Pupils: Pupils are equal, round, and reactive to light.   Cardiovascular:      Rate and Rhythm: Normal rate and regular rhythm.      Pulses: Normal pulses.      Heart sounds: No murmur heard.  Pulmonary:      Effort: No respiratory distress.      Breath sounds: No wheezing.   Abdominal:      General: Abdomen is flat. Bowel sounds are normal. There is no distension.   Musculoskeletal:      Right lower leg: No edema.      Left lower leg: No edema.   Skin:     General: Skin is warm and dry.   Neurological:      Mental Status: He is alert. Mental status is at baseline.      Cranial Nerves: No cranial nerve deficit.      Motor: Weakness present.   Psychiatric:         Mood and Affect: Mood normal.         Behavior: Behavior normal.         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  Overall medically stable at this time however did have a repeated fall.  We will try to lower the bed and move the patient closer to the nursing station to help prevent falls.  Extensively discussed with patient to call for help prior to getting out of bed and voiced understanding labs overall medically stable, initiate phosphate binder per nutritionist recommendations.  Continue fall precautions.  Supportive care, PT OT.  Will continue to monitor for metabolic and infectious abnormalities that could be leading to patient's falls however suspect that this is just general deconditioning at this time.  No change in medications.  Staff updated with care plan and is agreeable.    Reviewed and approved by VOLODYMYR JUNG on 4/5/24 at 9:19 PM.

## 2024-04-09 ENCOUNTER — NURSING HOME VISIT (OUTPATIENT)
Dept: POST ACUTE CARE | Facility: EXTERNAL LOCATION | Age: 84
End: 2024-04-09
Payer: MEDICARE

## 2024-04-09 DIAGNOSIS — E10.22 TYPE 1 DM WITH CKD STAGE 5 AND HYPERTENSION (MULTI): ICD-10-CM

## 2024-04-09 DIAGNOSIS — N18.6 ESRD ON DIALYSIS (MULTI): ICD-10-CM

## 2024-04-09 DIAGNOSIS — S51.019S: ICD-10-CM

## 2024-04-09 DIAGNOSIS — I48.0 PAROXYSMAL ATRIAL FIBRILLATION (MULTI): Primary | ICD-10-CM

## 2024-04-09 DIAGNOSIS — N18.5 TYPE 1 DM WITH CKD STAGE 5 AND HYPERTENSION (MULTI): ICD-10-CM

## 2024-04-09 DIAGNOSIS — R53.1 GENERALIZED WEAKNESS: ICD-10-CM

## 2024-04-09 DIAGNOSIS — Z99.2 ESRD ON DIALYSIS (MULTI): ICD-10-CM

## 2024-04-09 DIAGNOSIS — I12.0 TYPE 1 DM WITH CKD STAGE 5 AND HYPERTENSION (MULTI): ICD-10-CM

## 2024-04-09 DIAGNOSIS — G90.9 AUTONOMIC DYSFUNCTION: ICD-10-CM

## 2024-04-09 PROCEDURE — 99309 SBSQ NF CARE MODERATE MDM 30: CPT | Performed by: STUDENT IN AN ORGANIZED HEALTH CARE EDUCATION/TRAINING PROGRAM

## 2024-04-09 NOTE — LETTER
Patient: Tyshawn Coles  : 1940    Encounter Date: 2024    Subjective  Patient ID: Tyshawn Coles is a 83 y.o. male.    Patient seen and examined at bedside.  He states no acute issues or concerns, has been going to his dialysis sessions.  Unfortunately, while he was being transferred to his last dialysis session, he did slip out of his wheelchair.  Did have a slight skin tear, however has been undergoing wound care.  Otherwise, reports he is doing much better with therapy.  Denies any additional issues or concerns.        Review of Systems   Constitutional: Negative.    HENT: Negative.     Respiratory: Negative.     Cardiovascular: Negative.    Gastrointestinal: Negative.    Musculoskeletal: Negative.         Fall   Skin:  Positive for rash and wound.   Neurological: Negative.    Psychiatric/Behavioral: Negative.         ObjectiveVitals Reviewed via facility EMR   Physical Exam  Constitutional:       General: He is not in acute distress.     Appearance: He is not ill-appearing.      Comments: In wheelchair elderly male   Eyes:      Pupils: Pupils are equal, round, and reactive to light.   Cardiovascular:      Rate and Rhythm: Normal rate and regular rhythm.      Pulses: Normal pulses.      Heart sounds: No murmur heard.  Pulmonary:      Effort: No respiratory distress.      Breath sounds: No wheezing.   Abdominal:      General: Abdomen is flat. Bowel sounds are normal. There is no distension.   Musculoskeletal:      Right lower leg: No edema.      Left lower leg: No edema.   Skin:     General: Skin is warm and dry.      Findings: Lesion and rash present.      Comments: Skin tear, dressed in appropriate bandages, no signs of infection   Neurological:      Mental Status: He is alert. Mental status is at baseline.      Cranial Nerves: No cranial nerve deficit.      Motor: No weakness.   Psychiatric:         Mood and Affect: Mood normal.         Behavior: Behavior normal.         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  Unfortunately experienced a mechanical fall over the weekend.  As per HPI, he is undergoing local wound care.  Continue supportive care, continue fall precautions.  Continue to monitor labs and encourage follow-up with dialysis sessions.  Discharge planning underway.  Overall medically stable otherwise.      Reviewed and approved by EMANUEL JUNG on 4/10/24 at 9:06 PM.       Electronically Signed By: Emanuel Jung DO   4/10/24  9:06 PM

## 2024-04-10 ENCOUNTER — NURSING HOME VISIT (OUTPATIENT)
Dept: POST ACUTE CARE | Facility: EXTERNAL LOCATION | Age: 84
End: 2024-04-10
Payer: MEDICARE

## 2024-04-10 DIAGNOSIS — Z99.2 ESRD ON DIALYSIS (MULTI): ICD-10-CM

## 2024-04-10 DIAGNOSIS — E44.0 MODERATE PROTEIN-CALORIE MALNUTRITION (MULTI): ICD-10-CM

## 2024-04-10 DIAGNOSIS — I12.0 TYPE 1 DM WITH CKD STAGE 5 AND HYPERTENSION (MULTI): ICD-10-CM

## 2024-04-10 DIAGNOSIS — I48.0 PAROXYSMAL ATRIAL FIBRILLATION (MULTI): Primary | ICD-10-CM

## 2024-04-10 DIAGNOSIS — N18.5 TYPE 1 DM WITH CKD STAGE 5 AND HYPERTENSION (MULTI): ICD-10-CM

## 2024-04-10 DIAGNOSIS — G58.8 OTHER MONONEUROPATHY: ICD-10-CM

## 2024-04-10 DIAGNOSIS — E10.22 TYPE 1 DM WITH CKD STAGE 5 AND HYPERTENSION (MULTI): ICD-10-CM

## 2024-04-10 DIAGNOSIS — R53.1 GENERALIZED WEAKNESS: ICD-10-CM

## 2024-04-10 DIAGNOSIS — N18.6 ESRD ON DIALYSIS (MULTI): ICD-10-CM

## 2024-04-10 PROBLEM — S51.019A SKIN TEAR OF ELBOW WITHOUT COMPLICATION: Status: ACTIVE | Noted: 2024-04-10

## 2024-04-10 PROCEDURE — 99308 SBSQ NF CARE LOW MDM 20: CPT | Performed by: STUDENT IN AN ORGANIZED HEALTH CARE EDUCATION/TRAINING PROGRAM

## 2024-04-10 ASSESSMENT — ENCOUNTER SYMPTOMS
PSYCHIATRIC NEGATIVE: 1
FATIGUE: 1
MUSCULOSKELETAL NEGATIVE: 1
RESPIRATORY NEGATIVE: 1
CARDIOVASCULAR NEGATIVE: 1
GASTROINTESTINAL NEGATIVE: 1
RESPIRATORY NEGATIVE: 1
NEUROLOGICAL NEGATIVE: 1
CARDIOVASCULAR NEGATIVE: 1
MUSCULOSKELETAL NEGATIVE: 1
PSYCHIATRIC NEGATIVE: 1
GASTROINTESTINAL NEGATIVE: 1
CONSTITUTIONAL NEGATIVE: 1
WEAKNESS: 1
WOUND: 1

## 2024-04-10 NOTE — LETTER
Patient: Tyshawn Coles  : 1940    Encounter Date: 04/10/2024    Subjective  Patient ID: Tyshawn Coles is a 83 y.o. male.    Patient seen and examined.  He regards no acute complaints or concerns and feels well.  Is planned to go to dialysis today.  Regards that he is ambulating more in the hallways and is ambulating up to 50 to 60 feet.  Feels strength is overall improving.  Denies any additional issues or concerns.        Review of Systems   Constitutional:  Positive for fatigue.   HENT: Negative.     Respiratory: Negative.     Cardiovascular: Negative.    Gastrointestinal: Negative.    Musculoskeletal: Negative.    Skin: Negative.    Neurological:  Positive for weakness.   Psychiatric/Behavioral: Negative.         ObjectiveVitals Reviewed via facility EMR   Physical Exam  Constitutional:       General: He is not in acute distress.     Appearance: He is not ill-appearing.   Eyes:      Pupils: Pupils are equal, round, and reactive to light.   Cardiovascular:      Rate and Rhythm: Normal rate and regular rhythm.      Pulses: Normal pulses.      Heart sounds: No murmur heard.  Pulmonary:      Effort: No respiratory distress.      Breath sounds: No wheezing.   Abdominal:      General: Abdomen is flat. Bowel sounds are normal. There is no distension.   Musculoskeletal:      Right lower leg: No edema.      Left lower leg: No edema.   Skin:     General: Skin is warm and dry.      Comments: Fistula present   Neurological:      Mental Status: He is alert. Mental status is at baseline.      Cranial Nerves: No cranial nerve deficit.      Motor: No weakness.   Psychiatric:         Mood and Affect: Mood normal.         Behavior: Behavior normal.         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  Overall medically stable.  Continue PT OT, supportive care.  Labs reviewed and overall stable.  Continue dialysis per regularly scheduled appointments.  No changes in medications at this time.  Vitals reviewed and overall stable as well. Fall precautions at facility as patient is high risk of falls    Reviewed and approved by EMANUEL JUNG on 4/10/24 at 10:43 PM.         Electronically Signed By: Emanuel Jung DO   4/10/24 10:43 PM

## 2024-04-11 NOTE — PROGRESS NOTES
Subjective   Patient ID: Tyshawn Coles is a 83 y.o. male.    Patient seen and examined at bedside.  He states no acute issues or concerns, has been going to his dialysis sessions.  Unfortunately, while he was being transferred to his last dialysis session, he did slip out of his wheelchair.  Did have a slight skin tear, however has been undergoing wound care.  Otherwise, reports he is doing much better with therapy.  Denies any additional issues or concerns.        Review of Systems   Constitutional: Negative.    HENT: Negative.     Respiratory: Negative.     Cardiovascular: Negative.    Gastrointestinal: Negative.    Musculoskeletal: Negative.         Fall   Skin:  Positive for rash and wound.   Neurological: Negative.    Psychiatric/Behavioral: Negative.         Objective Vitals Reviewed via facility EMR   Physical Exam  Constitutional:       General: He is not in acute distress.     Appearance: He is not ill-appearing.      Comments: In wheelchair elderly male   Eyes:      Pupils: Pupils are equal, round, and reactive to light.   Cardiovascular:      Rate and Rhythm: Normal rate and regular rhythm.      Pulses: Normal pulses.      Heart sounds: No murmur heard.  Pulmonary:      Effort: No respiratory distress.      Breath sounds: No wheezing.   Abdominal:      General: Abdomen is flat. Bowel sounds are normal. There is no distension.   Musculoskeletal:      Right lower leg: No edema.      Left lower leg: No edema.   Skin:     General: Skin is warm and dry.      Findings: Lesion and rash present.      Comments: Skin tear, dressed in appropriate bandages, no signs of infection   Neurological:      Mental Status: He is alert. Mental status is at baseline.      Cranial Nerves: No cranial nerve deficit.      Motor: No weakness.   Psychiatric:         Mood and Affect: Mood normal.         Behavior: Behavior normal.         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  Unfortunately experienced a mechanical fall over the weekend.  As per HPI, he is undergoing local wound care.  Continue supportive care, continue fall precautions.  Continue to monitor labs and encourage follow-up with dialysis sessions.  Discharge planning underway.  Overall medically stable otherwise.      Reviewed and approved by VOLODYMYR JUNG on 4/10/24 at 9:06 PM.

## 2024-04-11 NOTE — PROGRESS NOTES
Subjective   Patient ID: Tyshawn Coles is a 83 y.o. male.    Patient seen and examined.  He regards no acute complaints or concerns and feels well.  Is planned to go to dialysis today.  Regards that he is ambulating more in the hallways and is ambulating up to 50 to 60 feet.  Feels strength is overall improving.  Denies any additional issues or concerns.        Review of Systems   Constitutional:  Positive for fatigue.   HENT: Negative.     Respiratory: Negative.     Cardiovascular: Negative.    Gastrointestinal: Negative.    Musculoskeletal: Negative.    Skin: Negative.    Neurological:  Positive for weakness.   Psychiatric/Behavioral: Negative.         Objective Vitals Reviewed via facility EMR   Physical Exam  Constitutional:       General: He is not in acute distress.     Appearance: He is not ill-appearing.   Eyes:      Pupils: Pupils are equal, round, and reactive to light.   Cardiovascular:      Rate and Rhythm: Normal rate and regular rhythm.      Pulses: Normal pulses.      Heart sounds: No murmur heard.  Pulmonary:      Effort: No respiratory distress.      Breath sounds: No wheezing.   Abdominal:      General: Abdomen is flat. Bowel sounds are normal. There is no distension.   Musculoskeletal:      Right lower leg: No edema.      Left lower leg: No edema.   Skin:     General: Skin is warm and dry.      Comments: Fistula present   Neurological:      Mental Status: He is alert. Mental status is at baseline.      Cranial Nerves: No cranial nerve deficit.      Motor: No weakness.   Psychiatric:         Mood and Affect: Mood normal.         Behavior: Behavior normal.         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  Overall medically stable.  Continue  PT OT, supportive care.  Labs reviewed and overall stable.  Continue dialysis per regularly scheduled appointments.  No changes in medications at this time.  Vitals reviewed and overall stable as well. Fall precautions at facility as patient is high risk of falls    Reviewed and approved by VOLODYMYR JUNG on 4/10/24 at 10:43 PM.

## 2024-04-17 ENCOUNTER — NURSING HOME VISIT (OUTPATIENT)
Dept: POST ACUTE CARE | Facility: EXTERNAL LOCATION | Age: 84
End: 2024-04-17
Payer: MEDICARE

## 2024-04-17 DIAGNOSIS — R53.1 GENERALIZED WEAKNESS: ICD-10-CM

## 2024-04-17 DIAGNOSIS — E10.22 TYPE 1 DM WITH CKD STAGE 5 AND HYPERTENSION (MULTI): ICD-10-CM

## 2024-04-17 DIAGNOSIS — Z99.2 ESRD ON DIALYSIS (MULTI): ICD-10-CM

## 2024-04-17 DIAGNOSIS — N18.5 TYPE 1 DM WITH CKD STAGE 5 AND HYPERTENSION (MULTI): ICD-10-CM

## 2024-04-17 DIAGNOSIS — N18.6 ESRD ON DIALYSIS (MULTI): ICD-10-CM

## 2024-04-17 DIAGNOSIS — I48.0 PAROXYSMAL ATRIAL FIBRILLATION (MULTI): Primary | ICD-10-CM

## 2024-04-17 DIAGNOSIS — I12.0 TYPE 1 DM WITH CKD STAGE 5 AND HYPERTENSION (MULTI): ICD-10-CM

## 2024-04-17 DIAGNOSIS — I10 HYPERTENSION, UNSPECIFIED TYPE: ICD-10-CM

## 2024-04-17 PROCEDURE — 99308 SBSQ NF CARE LOW MDM 20: CPT | Performed by: STUDENT IN AN ORGANIZED HEALTH CARE EDUCATION/TRAINING PROGRAM

## 2024-04-17 NOTE — LETTER
Patient: Tyshawn Coles  : 1940    Encounter Date: 2024    Subjective  Patient ID: Tyshawn Coles is a 83 y.o. male.    Patient seen and examined at bedside.  Regards that he is overall tolerating dialysis without any issues.  States no acute complaints or concerns and overall feels well.  Regards that he is working well with therapy.  Denies any additional issues.        Review of Systems   Constitutional: Negative.    HENT: Negative.     Respiratory: Negative.     Cardiovascular: Negative.    Gastrointestinal: Negative.    Musculoskeletal: Negative.    Skin: Negative.    Neurological: Negative.    Psychiatric/Behavioral: Negative.         ObjectiveVitals Reviewed via facility EMR   Physical Exam  Constitutional:       General: He is not in acute distress.     Appearance: He is not ill-appearing.      Comments: Elderly male   Eyes:      Pupils: Pupils are equal, round, and reactive to light.   Cardiovascular:      Rate and Rhythm: Normal rate and regular rhythm.      Pulses: Normal pulses.      Heart sounds: No murmur heard.  Pulmonary:      Effort: No respiratory distress.      Breath sounds: No wheezing.   Abdominal:      General: Abdomen is flat. Bowel sounds are normal. There is no distension.   Musculoskeletal:      Right lower leg: No edema.      Left lower leg: No edema.   Skin:     General: Skin is warm and dry.   Neurological:      Mental Status: He is alert. Mental status is at baseline.      Cranial Nerves: No cranial nerve deficit.      Motor: No weakness.   Psychiatric:         Mood and Affect: Mood normal.         Behavior: Behavior normal.         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  Overall medically stable.  Labs reviewed and overall stable.  No change in medications  at this time, continue dialysis per regular schedule visits.  Continue supportive care, routine labs.    Reviewed and approved by EMANUEL JUNG on 4/19/24 at 11:09 PM.       Electronically Signed By: Emanuel Jung DO   4/19/24 11:09 PM

## 2024-04-19 ASSESSMENT — ENCOUNTER SYMPTOMS
CONSTITUTIONAL NEGATIVE: 1
GASTROINTESTINAL NEGATIVE: 1
NEUROLOGICAL NEGATIVE: 1
PSYCHIATRIC NEGATIVE: 1
CARDIOVASCULAR NEGATIVE: 1
MUSCULOSKELETAL NEGATIVE: 1
RESPIRATORY NEGATIVE: 1

## 2024-04-24 ENCOUNTER — NURSING HOME VISIT (OUTPATIENT)
Dept: POST ACUTE CARE | Facility: EXTERNAL LOCATION | Age: 84
End: 2024-04-24
Payer: MEDICARE

## 2024-04-24 DIAGNOSIS — N18.5 TYPE 1 DM WITH CKD STAGE 5 AND HYPERTENSION (MULTI): ICD-10-CM

## 2024-04-24 DIAGNOSIS — I10 HYPERTENSION, UNSPECIFIED TYPE: ICD-10-CM

## 2024-04-24 DIAGNOSIS — I48.0 PAROXYSMAL ATRIAL FIBRILLATION (MULTI): Primary | ICD-10-CM

## 2024-04-24 DIAGNOSIS — I12.0 TYPE 1 DM WITH CKD STAGE 5 AND HYPERTENSION (MULTI): ICD-10-CM

## 2024-04-24 DIAGNOSIS — E10.22 TYPE 1 DM WITH CKD STAGE 5 AND HYPERTENSION (MULTI): ICD-10-CM

## 2024-04-24 DIAGNOSIS — R53.1 GENERALIZED WEAKNESS: ICD-10-CM

## 2024-04-24 PROCEDURE — 99308 SBSQ NF CARE LOW MDM 20: CPT | Performed by: STUDENT IN AN ORGANIZED HEALTH CARE EDUCATION/TRAINING PROGRAM

## 2024-04-24 NOTE — LETTER
Patient: Tyshawn Coles  : 1940    Encounter Date: 2024    Subjective  Patient ID: Tyshawn Coles is a 83 y.o. male.    Patient seen and examined on routine evaluation.  He regards that he is overall doing well without any complaints.  He is tolerating his dialysis sessions, no recent falls as of late, states he is working well with therapy and gaining back his strength.  Otherwise, regards no additional issues or concerns and overall feels well.        Review of Systems   Constitutional: Negative.    HENT: Negative.     Respiratory: Negative.     Cardiovascular: Negative.    Gastrointestinal: Negative.    Musculoskeletal: Negative.    Skin: Negative.    Neurological: Negative.    Psychiatric/Behavioral: Negative.         ObjectiveVitals Reviewed via facility EMR   Physical Exam  Constitutional:       General: He is not in acute distress.     Appearance: He is not ill-appearing.      Comments: Elderly male   Eyes:      Pupils: Pupils are equal, round, and reactive to light.   Cardiovascular:      Rate and Rhythm: Normal rate and regular rhythm.      Pulses: Normal pulses.      Heart sounds: No murmur heard.  Pulmonary:      Effort: No respiratory distress.      Breath sounds: No wheezing.   Abdominal:      General: Abdomen is flat. Bowel sounds are normal. There is no distension.   Musculoskeletal:      Right lower leg: No edema.      Left lower leg: No edema.   Skin:     General: Skin is warm and dry.   Neurological:      Mental Status: He is alert. Mental status is at baseline.      Cranial Nerves: No cranial nerve deficit.      Motor: No weakness.   Psychiatric:         Mood and Affect: Mood normal.         Behavior: Behavior normal.         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  Overall medically  stable.  Continue with dialysis sessions, labs reviewed and overall stable as well, consistent with underlying ESRD.  Blood sugars overall within goal.  Continue supportive care, PT OT.  Continue on dialysis schedule.  Patient likely optimized from physical therapy standpoint, medically stable for discharge.      Reviewed and approved by EMANUEL JUNG on 4/25/24 at 8:55 PM.       Electronically Signed By: Emanuel Jung DO   4/25/24  8:55 PM

## 2024-04-25 ASSESSMENT — ENCOUNTER SYMPTOMS
PSYCHIATRIC NEGATIVE: 1
MUSCULOSKELETAL NEGATIVE: 1
NEUROLOGICAL NEGATIVE: 1
CONSTITUTIONAL NEGATIVE: 1
RESPIRATORY NEGATIVE: 1
CARDIOVASCULAR NEGATIVE: 1
GASTROINTESTINAL NEGATIVE: 1

## 2024-04-26 NOTE — PROGRESS NOTES
Subjective   Patient ID: Tyshawn Coles is a 83 y.o. male.    Patient seen and examined on routine evaluation.  He regards that he is overall doing well without any complaints.  He is tolerating his dialysis sessions, no recent falls as of late, states he is working well with therapy and gaining back his strength.  Otherwise, regards no additional issues or concerns and overall feels well.        Review of Systems   Constitutional: Negative.    HENT: Negative.     Respiratory: Negative.     Cardiovascular: Negative.    Gastrointestinal: Negative.    Musculoskeletal: Negative.    Skin: Negative.    Neurological: Negative.    Psychiatric/Behavioral: Negative.         Objective Vitals Reviewed via facility EMR   Physical Exam  Constitutional:       General: He is not in acute distress.     Appearance: He is not ill-appearing.      Comments: Elderly male   Eyes:      Pupils: Pupils are equal, round, and reactive to light.   Cardiovascular:      Rate and Rhythm: Normal rate and regular rhythm.      Pulses: Normal pulses.      Heart sounds: No murmur heard.  Pulmonary:      Effort: No respiratory distress.      Breath sounds: No wheezing.   Abdominal:      General: Abdomen is flat. Bowel sounds are normal. There is no distension.   Musculoskeletal:      Right lower leg: No edema.      Left lower leg: No edema.   Skin:     General: Skin is warm and dry.   Neurological:      Mental Status: He is alert. Mental status is at baseline.      Cranial Nerves: No cranial nerve deficit.      Motor: No weakness.   Psychiatric:         Mood and Affect: Mood normal.         Behavior: Behavior normal.         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  Overall medically stable.  Continue with dialysis sessions, labs reviewed and overall  stable as well, consistent with underlying ESRD.  Blood sugars overall within goal.  Continue supportive care, PT OT.  Continue on dialysis schedule.  Patient likely optimized from physical therapy standpoint, medically stable for discharge.      Reviewed and approved by VOLODYMYR JUNG on 4/25/24 at 8:55 PM.

## 2024-05-01 ENCOUNTER — NURSING HOME VISIT (OUTPATIENT)
Dept: POST ACUTE CARE | Facility: EXTERNAL LOCATION | Age: 84
End: 2024-05-01
Payer: MEDICARE

## 2024-05-01 DIAGNOSIS — I12.0 TYPE 1 DM WITH CKD STAGE 5 AND HYPERTENSION (MULTI): ICD-10-CM

## 2024-05-01 DIAGNOSIS — R53.1 GENERALIZED WEAKNESS: ICD-10-CM

## 2024-05-01 DIAGNOSIS — N18.5 TYPE 1 DM WITH CKD STAGE 5 AND HYPERTENSION (MULTI): ICD-10-CM

## 2024-05-01 DIAGNOSIS — I10 HYPERTENSION, UNSPECIFIED TYPE: Primary | ICD-10-CM

## 2024-05-01 DIAGNOSIS — I48.0 PAROXYSMAL ATRIAL FIBRILLATION (MULTI): ICD-10-CM

## 2024-05-01 DIAGNOSIS — E10.22 TYPE 1 DM WITH CKD STAGE 5 AND HYPERTENSION (MULTI): ICD-10-CM

## 2024-05-01 PROCEDURE — 99308 SBSQ NF CARE LOW MDM 20: CPT | Performed by: STUDENT IN AN ORGANIZED HEALTH CARE EDUCATION/TRAINING PROGRAM

## 2024-05-01 NOTE — LETTER
Patient: Tyshawn Coles  : 1940    Encounter Date: 2024    Subjective  Patient ID: Tyshawn Coles is a 83 y.o. male.    Patient seen and evaluated at bedside.  He regards that he is overall doing well without any concerns, regards that he is tolerating his dialysis sessions.  Has not had a fall for quite some time.  Feels like he is getting closer to his baseline.  Denies any additional issues or concerns.        Review of Systems   Constitutional: Negative.    HENT: Negative.     Respiratory: Negative.     Cardiovascular: Negative.    Gastrointestinal: Negative.    Musculoskeletal: Negative.    Skin: Negative.    Neurological: Negative.    Psychiatric/Behavioral: Negative.         ObjectiveVitals Reviewed via facility EMR   Physical Exam  Constitutional:       General: He is not in acute distress.     Appearance: He is not ill-appearing.      Comments: Elderly male, NAD   Eyes:      Pupils: Pupils are equal, round, and reactive to light.   Cardiovascular:      Rate and Rhythm: Normal rate and regular rhythm.      Pulses: Normal pulses.      Heart sounds: No murmur heard.  Pulmonary:      Effort: No respiratory distress.      Breath sounds: No wheezing.   Abdominal:      General: Abdomen is flat. Bowel sounds are normal. There is no distension.   Musculoskeletal:      Right lower leg: No edema.      Left lower leg: No edema.   Skin:     General: Skin is warm and dry.   Neurological:      Mental Status: He is alert. Mental status is at baseline.      Cranial Nerves: No cranial nerve deficit.      Motor: No weakness.   Psychiatric:         Mood and Affect: Mood normal.         Behavior: Behavior normal.         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  Overall medically stable.  Continue supportive care, no  changes to medications.  Encourage compliance with dialysis.  Continue PT OT, fall precautions, patient is medically stable for discharge if optimized by physical therapy.  No additional issues at this time.  Labs and vitals reviewed and overall stable.    Reviewed and approved by EMANUEL JUNG on 5/2/24 at 11:21 PM.         Electronically Signed By: Emanuel Jung DO   5/2/24 11:22 PM

## 2024-05-02 ASSESSMENT — ENCOUNTER SYMPTOMS
CARDIOVASCULAR NEGATIVE: 1
GASTROINTESTINAL NEGATIVE: 1
MUSCULOSKELETAL NEGATIVE: 1
RESPIRATORY NEGATIVE: 1
PSYCHIATRIC NEGATIVE: 1
CONSTITUTIONAL NEGATIVE: 1
NEUROLOGICAL NEGATIVE: 1

## 2024-05-03 NOTE — PROGRESS NOTES
Subjective   Patient ID: Tyshawn Coles is a 83 y.o. male.    Patient seen and evaluated at bedside.  He regards that he is overall doing well without any concerns, regards that he is tolerating his dialysis sessions.  Has not had a fall for quite some time.  Feels like he is getting closer to his baseline.  Denies any additional issues or concerns.        Review of Systems   Constitutional: Negative.    HENT: Negative.     Respiratory: Negative.     Cardiovascular: Negative.    Gastrointestinal: Negative.    Musculoskeletal: Negative.    Skin: Negative.    Neurological: Negative.    Psychiatric/Behavioral: Negative.         Objective Vitals Reviewed via facility EMR   Physical Exam  Constitutional:       General: He is not in acute distress.     Appearance: He is not ill-appearing.      Comments: Elderly male, NAD   Eyes:      Pupils: Pupils are equal, round, and reactive to light.   Cardiovascular:      Rate and Rhythm: Normal rate and regular rhythm.      Pulses: Normal pulses.      Heart sounds: No murmur heard.  Pulmonary:      Effort: No respiratory distress.      Breath sounds: No wheezing.   Abdominal:      General: Abdomen is flat. Bowel sounds are normal. There is no distension.   Musculoskeletal:      Right lower leg: No edema.      Left lower leg: No edema.   Skin:     General: Skin is warm and dry.   Neurological:      Mental Status: He is alert. Mental status is at baseline.      Cranial Nerves: No cranial nerve deficit.      Motor: No weakness.   Psychiatric:         Mood and Affect: Mood normal.         Behavior: Behavior normal.         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  Overall medically stable.  Continue supportive care, no changes to medications.  Encourage compliance with dialysis.   Continue PT OT, fall precautions, patient is medically stable for discharge if optimized by physical therapy.  No additional issues at this time.  Labs and vitals reviewed and overall stable.    Reviewed and approved by VOLODYMYR JUNG on 5/2/24 at 11:21 PM.

## 2024-05-08 ENCOUNTER — NURSING HOME VISIT (OUTPATIENT)
Dept: POST ACUTE CARE | Facility: EXTERNAL LOCATION | Age: 84
End: 2024-05-08
Payer: MEDICARE

## 2024-05-08 DIAGNOSIS — I12.0 TYPE 1 DM WITH CKD STAGE 5 AND HYPERTENSION (MULTI): ICD-10-CM

## 2024-05-08 DIAGNOSIS — E10.22 TYPE 1 DM WITH CKD STAGE 5 AND HYPERTENSION (MULTI): ICD-10-CM

## 2024-05-08 DIAGNOSIS — N18.5 TYPE 1 DM WITH CKD STAGE 5 AND HYPERTENSION (MULTI): ICD-10-CM

## 2024-05-08 DIAGNOSIS — Z99.2 ESRD ON DIALYSIS (MULTI): ICD-10-CM

## 2024-05-08 DIAGNOSIS — N18.6 ESRD ON DIALYSIS (MULTI): ICD-10-CM

## 2024-05-08 DIAGNOSIS — R53.1 GENERALIZED WEAKNESS: ICD-10-CM

## 2024-05-08 DIAGNOSIS — I10 HYPERTENSION, UNSPECIFIED TYPE: Primary | ICD-10-CM

## 2024-05-08 PROCEDURE — 99308 SBSQ NF CARE LOW MDM 20: CPT | Performed by: STUDENT IN AN ORGANIZED HEALTH CARE EDUCATION/TRAINING PROGRAM

## 2024-05-08 NOTE — LETTER
Patient: Tyshawn Coles  : 1940    Encounter Date: 2024    Subjective  Patient ID: Tyshawn Coles is a 83 y.o. male.    Patient seen and examined at bedside.  Overall doing well.  Reports that he is tolerating his dialysis session and states no acute issues or concerns at this time.  States he is tolerating his medications, no recent falls as of late.        Review of Systems   Constitutional: Negative.    HENT: Negative.     Respiratory: Negative.     Cardiovascular: Negative.    Gastrointestinal: Negative.    Musculoskeletal: Negative.    Skin: Negative.    Neurological: Negative.    Psychiatric/Behavioral: Negative.         ObjectiveVitals Reviewed via facility EMR   Physical Exam  Constitutional:       General: He is not in acute distress.     Appearance: He is not ill-appearing.   Eyes:      Pupils: Pupils are equal, round, and reactive to light.   Cardiovascular:      Rate and Rhythm: Normal rate and regular rhythm.      Pulses: Normal pulses.      Heart sounds: No murmur heard.  Pulmonary:      Effort: No respiratory distress.      Breath sounds: No wheezing.   Abdominal:      General: Abdomen is flat. Bowel sounds are normal. There is no distension.   Musculoskeletal:      Right lower leg: No edema.      Left lower leg: No edema.   Skin:     General: Skin is warm and dry.   Neurological:      Mental Status: He is alert. Mental status is at baseline.      Cranial Nerves: No cranial nerve deficit.      Motor: No weakness.   Psychiatric:         Mood and Affect: Mood normal.         Behavior: Behavior normal.         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  Continue supportive care.  Routine labs.  Advised compliance with dialysis schedule.  Medically stable for discharge from facility.    Reviewed  and approved by EMANUEL JUNG on 5/9/24 at 9:09 PM.         Electronically Signed By: Emanuel Jung DO   5/9/24  9:09 PM

## 2024-05-09 ASSESSMENT — ENCOUNTER SYMPTOMS
CONSTITUTIONAL NEGATIVE: 1
MUSCULOSKELETAL NEGATIVE: 1
PSYCHIATRIC NEGATIVE: 1
RESPIRATORY NEGATIVE: 1
GASTROINTESTINAL NEGATIVE: 1
NEUROLOGICAL NEGATIVE: 1
CARDIOVASCULAR NEGATIVE: 1

## 2024-05-10 NOTE — PROGRESS NOTES
Subjective   Patient ID: Tyshawn Coles is a 83 y.o. male.    Patient seen and examined at bedside.  Overall doing well.  Reports that he is tolerating his dialysis session and states no acute issues or concerns at this time.  States he is tolerating his medications, no recent falls as of late.        Review of Systems   Constitutional: Negative.    HENT: Negative.     Respiratory: Negative.     Cardiovascular: Negative.    Gastrointestinal: Negative.    Musculoskeletal: Negative.    Skin: Negative.    Neurological: Negative.    Psychiatric/Behavioral: Negative.         Objective Vitals Reviewed via facility EMR   Physical Exam  Constitutional:       General: He is not in acute distress.     Appearance: He is not ill-appearing.   Eyes:      Pupils: Pupils are equal, round, and reactive to light.   Cardiovascular:      Rate and Rhythm: Normal rate and regular rhythm.      Pulses: Normal pulses.      Heart sounds: No murmur heard.  Pulmonary:      Effort: No respiratory distress.      Breath sounds: No wheezing.   Abdominal:      General: Abdomen is flat. Bowel sounds are normal. There is no distension.   Musculoskeletal:      Right lower leg: No edema.      Left lower leg: No edema.   Skin:     General: Skin is warm and dry.   Neurological:      Mental Status: He is alert. Mental status is at baseline.      Cranial Nerves: No cranial nerve deficit.      Motor: No weakness.   Psychiatric:         Mood and Affect: Mood normal.         Behavior: Behavior normal.         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  Continue supportive care.  Routine labs.  Advised compliance with dialysis schedule.  Medically stable for discharge from facility.    Reviewed and approved by VOLODYMYR JUNG on 5/9/24 at 9:09 PM.

## 2024-06-06 ENCOUNTER — LAB (OUTPATIENT)
Dept: LAB | Facility: LAB | Age: 84
End: 2024-06-06
Payer: MEDICARE

## 2024-06-06 DIAGNOSIS — I48.0 PAROXYSMAL ATRIAL FIBRILLATION (MULTI): ICD-10-CM

## 2024-06-06 LAB
INR PPP: 2.1 (ref 0.9–1.1)
PROTHROMBIN TIME: 23.7 SECONDS (ref 9.8–12.8)

## 2024-06-06 PROCEDURE — 36415 COLL VENOUS BLD VENIPUNCTURE: CPT

## 2024-06-06 PROCEDURE — 85610 PROTHROMBIN TIME: CPT

## 2024-06-07 ENCOUNTER — ANTICOAGULATION - WARFARIN VISIT (OUTPATIENT)
Dept: CARDIOLOGY | Facility: CLINIC | Age: 84
End: 2024-06-07
Payer: MEDICARE

## 2024-09-27 DIAGNOSIS — I82.499 DEEP VEIN THROMBOSIS (DVT) OF OTHER VEIN OF LOWER EXTREMITY, UNSPECIFIED CHRONICITY, UNSPECIFIED LATERALITY (MULTI): ICD-10-CM

## 2024-09-27 DIAGNOSIS — I48.0 PAROXYSMAL ATRIAL FIBRILLATION (MULTI): ICD-10-CM

## 2024-09-27 NOTE — PROGRESS NOTES
Modifying standing INR order from [facility] provider for [facility].   Pending for provider signature

## 2024-10-18 ENCOUNTER — APPOINTMENT (OUTPATIENT)
Dept: CARDIOLOGY | Facility: CLINIC | Age: 84
End: 2024-10-18
Payer: MEDICARE

## 2024-10-18 ENCOUNTER — APPOINTMENT (OUTPATIENT)
Dept: LAB | Facility: LAB | Age: 84
End: 2024-10-18
Payer: MEDICARE

## 2024-10-18 VITALS — SYSTOLIC BLOOD PRESSURE: 102 MMHG | DIASTOLIC BLOOD PRESSURE: 64 MMHG | HEART RATE: 63 BPM

## 2024-10-18 DIAGNOSIS — I12.0 TYPE 1 DM WITH CKD STAGE 5 AND HYPERTENSION (MULTI): ICD-10-CM

## 2024-10-18 DIAGNOSIS — I47.10 SUPRAVENTRICULAR TACHYCARDIA (CMS-HCC): ICD-10-CM

## 2024-10-18 DIAGNOSIS — I10 PRIMARY HYPERTENSION: ICD-10-CM

## 2024-10-18 DIAGNOSIS — Z71.2 ENCOUNTER TO DISCUSS TEST RESULTS: ICD-10-CM

## 2024-10-18 DIAGNOSIS — N18.5 TYPE 1 DM WITH CKD STAGE 5 AND HYPERTENSION (MULTI): ICD-10-CM

## 2024-10-18 DIAGNOSIS — R55 SYNCOPE AND COLLAPSE: Primary | ICD-10-CM

## 2024-10-18 DIAGNOSIS — E10.22 TYPE 1 DM WITH CKD STAGE 5 AND HYPERTENSION (MULTI): ICD-10-CM

## 2024-10-18 DIAGNOSIS — I48.0 PAROXYSMAL ATRIAL FIBRILLATION (MULTI): ICD-10-CM

## 2024-10-18 DIAGNOSIS — E78.5 DYSLIPIDEMIA: ICD-10-CM

## 2024-10-18 DIAGNOSIS — Z78.9 NONSMOKER: ICD-10-CM

## 2024-10-18 DIAGNOSIS — Z71.89 ENCOUNTER FOR MEDICATION COUNSELING: ICD-10-CM

## 2024-10-18 PROCEDURE — 1159F MED LIST DOCD IN RCRD: CPT | Performed by: INTERNAL MEDICINE

## 2024-10-18 PROCEDURE — 1123F ACP DISCUSS/DSCN MKR DOCD: CPT | Performed by: INTERNAL MEDICINE

## 2024-10-18 PROCEDURE — 99214 OFFICE O/P EST MOD 30 MIN: CPT | Performed by: INTERNAL MEDICINE

## 2024-10-18 PROCEDURE — 3078F DIAST BP <80 MM HG: CPT | Performed by: INTERNAL MEDICINE

## 2024-10-18 PROCEDURE — 3074F SYST BP LT 130 MM HG: CPT | Performed by: INTERNAL MEDICINE

## 2024-10-18 PROCEDURE — 93000 ELECTROCARDIOGRAM COMPLETE: CPT | Mod: DISTINCT PROCEDURAL SERVICE | Performed by: INTERNAL MEDICINE

## 2024-10-18 ASSESSMENT — ENCOUNTER SYMPTOMS
CONSTITUTIONAL NEGATIVE: 1
NEUROLOGICAL NEGATIVE: 1
CARDIOVASCULAR NEGATIVE: 1
RESPIRATORY NEGATIVE: 1

## 2024-10-18 NOTE — PROGRESS NOTES
Chief Complaint:   Follow-up (1 year with device check)     History Of Present Illness:    Tyshawn Coles is a 84 y.o. male presenting with follow-up.  He is accompanied by his wife.    He resides in a nursing facility.  He is minimally communicative.    She reports that he does not stand up on his own.  He may slide out of the chair, but he does not fall, because he requires full assistance with moving.. We did discuss having a type of belt to prevent him from slipping down in the chair may be helpful.  She will discuss this with the nursing facility.    Last Recorded Vitals:  Vitals:    10/18/24 1530   BP: 102/64   BP Location: Right arm   Patient Position: Sitting   Pulse: 63       Past Medical History:  See List.    Past Surgical History:  See list.      Social History:  He reports that he has never smoked. He has never used smokeless tobacco. He reports that he does not drink alcohol and does not use drugs.    Family History:  Family History   Problem Relation Name Age of Onset    Diabetes Mother      Cancer Father      Other (systemic lupus erythematosus') Daughter      Other (end-stage renal disease) Daughter          was on dialysis but had a renal transplant    Asthma Son      Hypertension Other      Sickle cell trait Other          Allergies:  Patient has no known allergies.    Outpatient Medications:  Current Outpatient Medications   Medication Instructions    Accu-Chek Aundrea Plus test strp strip     finasteride (PROSCAR) 5 mg, oral, Every morning    hydroCHLOROthiazide (HYDRODiuril) 25 mg tablet 1 tablet, oral, Daily    ipratropium-albuteroL (Duo-Neb) 0.5-2.5 mg/3 mL nebulizer solution nebulization    levETIRAcetam XR (KEPPRA XR) 1,000 mg, oral, Daily    metoprolol tartrate (LOPRESSOR) 50 mg, oral, 2 times daily    nitroglycerin (Nitrostat) 0.4 mg SL tablet  PLACE 1 TABLET UNDER THE TONGUE EVERY 5 MINUTES FOR UP TO 3 DOSES AS NEEDED FOR CHEST PAIN.CALL 911 IF PAIN PERSISTS.    Virt-Caps 1 mg capsule 1  capsule, oral, Daily RT    warfarin (COUMADIN) 6 mg, oral, DAILY OR AS DIRECTED PER NO    warfarin (COUMADIN) 4 mg, oral, DAILY OR AS DIRECTED PER NO    warfarin (COUMADIN) 4 mg, oral,  every Monday, Wednesday, and Friday (at bedtime)       Review of Systems   Constitutional: Negative.   Cardiovascular: Negative.    Respiratory: Negative.     Musculoskeletal:         Wheelchair for amublation   Neurological: Negative.    All other systems reviewed and are negative.        Physical Exam  Cardiovascular:      Rate and Rhythm: Normal rate.      Pulses: Normal pulses.      Heart sounds: Normal heart sounds.   Pulmonary:      Effort: Pulmonary effort is normal.   Musculoskeletal:      Comments: Wheelchair for ambulation   Neurological:      General: No focal deficit present.      Mental Status: He is alert.            Last Labs:  CBC -  Lab Results   Component Value Date    WBC 15.1 (H) 04/02/2024    HGB 7.8 (L) 04/02/2024    HCT 21.8 (L) 04/02/2024    MCV 80 04/02/2024     04/02/2024       CMP -  Lab Results   Component Value Date    CALCIUM 8.2 (L) 04/02/2024    PHOS 5.8 (H) 03/28/2024    PROT 6.1 (L) 04/02/2024    ALBUMIN 3.1 (L) 04/02/2024    AST 17 04/02/2024    ALT 19 04/02/2024    ALKPHOS 62 04/02/2024    BILITOT 0.5 04/02/2024       LIPID PANEL -   Lab Results   Component Value Date    CHOL 197 05/22/2023    TRIG 74 05/22/2023    HDL 57.9 05/22/2023    CHHDL 3.4 05/22/2023    LDLF 124 (H) 05/22/2023    VLDL 15 05/22/2023       RENAL FUNCTION PANEL -   Lab Results   Component Value Date    GLUCOSE 214 (H) 04/02/2024     (L) 04/02/2024    K 4.7 04/02/2024    CL 93 (L) 04/02/2024    CO2 25 04/02/2024    ANIONGAP 17 04/02/2024    BUN 90 (H) 04/02/2024    CREATININE 6.04 (H) 04/02/2024    CALCIUM 8.2 (L) 04/02/2024    PHOS 5.8 (H) 03/28/2024    ALBUMIN 3.1 (L) 04/02/2024        Lab Results   Component Value Date     (H) 08/17/2021    HGBA1C 6.7 (H) 04/03/2024    HGBA1C 7.3 (H) 03/28/2024        Last Cardiology Tests:  ECG:  ECG 12 lead 03/28/2024    Today.  Normal sinus rhythm.  Normal axis.  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 No recent episodes. Negative EP evaluation and loop recorder in the past . Pt and family declined and continue to decline any evaluation including event monitor, repeat EPS, or possible loop recorder.  Continue annual EP follow-up and ECG, per their request, and no other evaluation as described above.  Patient also follows with PCP and neurology.  Autonomic dysfunction by symptoms in past. Behavioral modification reinforced.  Negative [place] monitor in past.   Negative loop recorder over 4 years of recording. s/p explant of loop recorder in 2015.   EPS with normal S-A, A-V, and His-Purkinje function   Normal Lexiscan stress test in 2011.   Paroxysmal atrial fibrillation.  Anticoagulated.  High risk medication warfarin.  Patient's wife inquires about changing medication due to difficulty with blood draws.  Could consider Eliquis 2.5 mg twice daily.  She will check with insurance regarding coverage.  Hypertension, stable, benign, chronic, controlled. Reviewed medications. Continue medications. Discussed refills.  Diabetes. Chronic. Controlled. Follows with PCP  Hyperlipidemia .  chronic.  Follows with PCP.    Weakness and decreased activity tolerance. Chronic. Stable.  Venous insufficiency with chronic lower extremity edema and DVT. Chronic. Stable.  Also anticoagulated for DVT.  Chronic kidney disease stage V .  Dialysis graft with thrombus per patient family report.   New dialysis graft not placed yet.  Follows with vascular surgery  Remote tobacco use.   AHA recommendations for exercise, diet, and behavioral modification reviewed with pt.     Counseling over 50% visit performed.  The wife and I discussed the mechanism of arrhythmia, syncope, spells, ECG, conservative care per patient and family wishes, declines any arrhythmia evaluation except for ECG and annual EP visit, indications for and types of medications, difficulty with INR's, consideration of DOAC with Eliquis 2.5 mg twice daily, discussion if and what medication refills needed, treatment options, risks, benefits, and imponderables. American Heart Association lifestyle changes and behavioral modification discussed. All questions answered in detail.  Patient and wife very appreciative of care.

## 2024-12-15 NOTE — PATIENT INSTRUCTIONS
1 year visit Dr Mckeon  Continue same medications and treatments.   Patient educated on proper medication use.   Patient educated on risk factor modification.   Please bring any lab results from other providers / physicians to your next appointment.     Please bring all medicines, vitamins, and herbal supplements with you when you come to the office.     Prescriptions will not be filled unless you are compliant with your follow up appointments or have a follow up appointment scheduled as per instruction of your physician. Refills should be requested at the time of your visit.     ARTERIAL LINE INSERTION    Diagnosis: Shock    Indication: intra-arterial pressure monitoring    Time of Procedure: 12/15/2024 at 2:47 PM     Consent:  Verbal consent obtained over the phone by family member due to emergent situation     Procedure Team Members:   Performed by: Heladio Cobb MD   Supervised by: Dr. Sushma Chinni    Timeout: The procedure was interrupted to confirm correct patient, procedure, side, site, equipment, and safety procedures were followed.    Location of Procedure: Left radial artery    Technique  Artery palpated and collateral circulation intact.   Prep:  Cap, mask, hand washing, and sterile gloves  Prep: chlorhexidine  Drape: Local drape  Anesthesia: 1% Lidocaine without epinephrine (local)  Position: Left forearm extended & wrist dorsiflexed  Equipment:   Catheter: 4.45 cm 20 gauge  Technique: arterial line inserted via Seldinger technique.   Ultrasound used for needle placement: Yes  Catheter attached to pressure transducer and arterial waveform confirmed on the monitor.  HCG impregnated Tegaderm dressing applied    Patient Tolerance: Procedure tolerated well without incident    Findings: Location confirmed by transducer    Complications: None immediately apparent    Date of service: 12/15/2024    The procedure was directly supervised by Dr Sabine Cobb MD  PGY-1 Emergency Medicine

## 2025-03-05 ENCOUNTER — HOSPITAL ENCOUNTER (EMERGENCY)
Facility: HOSPITAL | Age: 85
Discharge: HOME | End: 2025-03-05
Attending: EMERGENCY MEDICINE
Payer: MEDICARE

## 2025-03-05 ENCOUNTER — APPOINTMENT (OUTPATIENT)
Dept: RADIOLOGY | Facility: HOSPITAL | Age: 85
End: 2025-03-05
Payer: MEDICARE

## 2025-03-05 ENCOUNTER — APPOINTMENT (OUTPATIENT)
Dept: CARDIOLOGY | Facility: HOSPITAL | Age: 85
End: 2025-03-05
Payer: MEDICARE

## 2025-03-05 VITALS
DIASTOLIC BLOOD PRESSURE: 113 MMHG | HEIGHT: 72 IN | BODY MASS INDEX: 23.98 KG/M2 | WEIGHT: 177.05 LBS | OXYGEN SATURATION: 99 % | TEMPERATURE: 97.5 F | RESPIRATION RATE: 18 BRPM | HEART RATE: 59 BPM | SYSTOLIC BLOOD PRESSURE: 179 MMHG

## 2025-03-05 DIAGNOSIS — S09.90XA CLOSED HEAD INJURY, INITIAL ENCOUNTER: Primary | ICD-10-CM
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
## 2025-03-05 DIAGNOSIS — E87.5 HYPERKALEMIA: ICD-10-CM

## 2025-03-05 LAB
ALBUMIN SERPL BCP-MCNC: 3.7 G/DL (ref 3.4–5)
ALP SERPL-CCNC: 69 U/L (ref 33–136)
ALT SERPL W P-5'-P-CCNC: 14 U/L (ref 10–52)
ANION GAP BLDV CALCULATED.4IONS-SCNC: 15 MMOL/L (ref 10–25)
ANION GAP SERPL CALC-SCNC: 20 MMOL/L (ref 10–20)
APTT PPP: 32 SECONDS (ref 26–36)
AST SERPL W P-5'-P-CCNC: 19 U/L (ref 9–39)
BASE EXCESS BLDV CALC-SCNC: 0.1 MMOL/L (ref -2–3)
BASOPHILS # BLD AUTO: 0.05 X10*3/UL (ref 0–0.1)
BASOPHILS NFR BLD AUTO: 0.4 %
BILIRUB SERPL-MCNC: 0.4 MG/DL (ref 0–1.2)
BODY TEMPERATURE: ABNORMAL
BUN SERPL-MCNC: 97 MG/DL (ref 6–23)
CA-I BLDV-SCNC: 1.17 MMOL/L (ref 1.1–1.33)
CALCIUM SERPL-MCNC: 9.1 MG/DL (ref 8.6–10.3)
CHLORIDE BLDV-SCNC: 92 MMOL/L (ref 98–107)
CHLORIDE SERPL-SCNC: 90 MMOL/L (ref 98–107)
CO2 SERPL-SCNC: 27 MMOL/L (ref 21–32)
CREAT SERPL-MCNC: 9.9 MG/DL (ref 0.5–1.3)
EGFRCR SERPLBLD CKD-EPI 2021: 5 ML/MIN/1.73M*2
EOSINOPHIL # BLD AUTO: 0.21 X10*3/UL (ref 0–0.4)
EOSINOPHIL NFR BLD AUTO: 1.8 %
ERYTHROCYTE [DISTWIDTH] IN BLOOD BY AUTOMATED COUNT: 17.4 % (ref 11.5–14.5)
GLUCOSE BLD MANUAL STRIP-MCNC: 172 MG/DL (ref 74–99)
GLUCOSE BLDV-MCNC: 192 MG/DL (ref 74–99)
GLUCOSE SERPL-MCNC: 206 MG/DL (ref 74–99)
HCO3 BLDV-SCNC: 26.8 MMOL/L (ref 22–26)
HCT VFR BLD AUTO: 31.9 % (ref 41–52)
HCT VFR BLD EST: 34 % (ref 41–52)
HGB BLD-MCNC: 11.2 G/DL (ref 13.5–17.5)
HGB BLDV-MCNC: 11.2 G/DL (ref 13.5–17.5)
HOLD SPECIMEN: NORMAL
IMM GRANULOCYTES # BLD AUTO: 0.05 X10*3/UL (ref 0–0.5)
IMM GRANULOCYTES NFR BLD AUTO: 0.4 % (ref 0–0.9)
INHALED O2 CONCENTRATION: 21 %
INR PPP: 1 (ref 0.9–1.1)
LACTATE BLDV-SCNC: 1.1 MMOL/L (ref 0.4–2)
LYMPHOCYTES # BLD AUTO: 2.19 X10*3/UL (ref 0.8–3)
LYMPHOCYTES NFR BLD AUTO: 18.7 %
MCH RBC QN AUTO: 28.4 PG (ref 26–34)
MCHC RBC AUTO-ENTMCNC: 35.1 G/DL (ref 32–36)
MCV RBC AUTO: 81 FL (ref 80–100)
MONOCYTES # BLD AUTO: 0.82 X10*3/UL (ref 0.05–0.8)
MONOCYTES NFR BLD AUTO: 7 %
NEUTROPHILS # BLD AUTO: 8.39 X10*3/UL (ref 1.6–5.5)
NEUTROPHILS NFR BLD AUTO: 71.7 %
NRBC BLD-RTO: 0 /100 WBCS (ref 0–0)
OXYHGB MFR BLDV: 47.5 % (ref 45–75)
PCO2 BLDV: 52 MM HG (ref 41–51)
PH BLDV: 7.32 PH (ref 7.33–7.43)
PLATELET # BLD AUTO: 184 X10*3/UL (ref 150–450)
PO2 BLDV: 36 MM HG (ref 35–45)
POTASSIUM BLDV-SCNC: 5.6 MMOL/L (ref 3.5–5.3)
POTASSIUM SERPL-SCNC: 5.6 MMOL/L (ref 3.5–5.3)
PROT SERPL-MCNC: 7.2 G/DL (ref 6.4–8.2)
PROTHROMBIN TIME: 10.7 SECONDS (ref 9.8–12.4)
RBC # BLD AUTO: 3.95 X10*6/UL (ref 4.5–5.9)
SAO2 % BLDV: 48 % (ref 45–75)
SODIUM BLDV-SCNC: 128 MMOL/L (ref 136–145)
SODIUM SERPL-SCNC: 131 MMOL/L (ref 136–145)
WBC # BLD AUTO: 11.7 X10*3/UL (ref 4.4–11.3)

## 2025-03-05 PROCEDURE — G0390 TRAUMA RESPONS W/HOSP CRITI: HCPCS

## 2025-03-05 PROCEDURE — 36415 COLL VENOUS BLD VENIPUNCTURE: CPT

## 2025-03-05 PROCEDURE — 70450 CT HEAD/BRAIN W/O DYE: CPT | Performed by: RADIOLOGY

## 2025-03-05 PROCEDURE — 2500000002 HC RX 250 W HCPCS SELF ADMINISTERED DRUGS (ALT 637 FOR MEDICARE OP, ALT 636 FOR OP/ED)

## 2025-03-05 PROCEDURE — 72170 X-RAY EXAM OF PELVIS: CPT

## 2025-03-05 PROCEDURE — 99291 CRITICAL CARE FIRST HOUR: CPT | Performed by: EMERGENCY MEDICINE

## 2025-03-05 PROCEDURE — 82947 ASSAY GLUCOSE BLOOD QUANT: CPT | Mod: 59

## 2025-03-05 PROCEDURE — 2500000002 HC RX 250 W HCPCS SELF ADMINISTERED DRUGS (ALT 637 FOR MEDICARE OP, ALT 636 FOR OP/ED): Mod: MUE

## 2025-03-05 PROCEDURE — 94640 AIRWAY INHALATION TREATMENT: CPT

## 2025-03-05 PROCEDURE — 71045 X-RAY EXAM CHEST 1 VIEW: CPT | Performed by: RADIOLOGY

## 2025-03-05 PROCEDURE — 85610 PROTHROMBIN TIME: CPT

## 2025-03-05 PROCEDURE — 99285 EMERGENCY DEPT VISIT HI MDM: CPT | Mod: 25 | Performed by: EMERGENCY MEDICINE

## 2025-03-05 PROCEDURE — 80053 COMPREHEN METABOLIC PANEL: CPT

## 2025-03-05 PROCEDURE — 2500000004 HC RX 250 GENERAL PHARMACY W/ HCPCS (ALT 636 FOR OP/ED)

## 2025-03-05 PROCEDURE — 72125 CT NECK SPINE W/O DYE: CPT

## 2025-03-05 PROCEDURE — 2500000001 HC RX 250 WO HCPCS SELF ADMINISTERED DRUGS (ALT 637 FOR MEDICARE OP)

## 2025-03-05 PROCEDURE — 71045 X-RAY EXAM CHEST 1 VIEW: CPT

## 2025-03-05 PROCEDURE — 36415 COLL VENOUS BLD VENIPUNCTURE: CPT | Performed by: EMERGENCY MEDICINE

## 2025-03-05 PROCEDURE — 70450 CT HEAD/BRAIN W/O DYE: CPT

## 2025-03-05 PROCEDURE — 85025 COMPLETE CBC W/AUTO DIFF WBC: CPT

## 2025-03-05 PROCEDURE — 72170 X-RAY EXAM OF PELVIS: CPT | Performed by: RADIOLOGY

## 2025-03-05 PROCEDURE — 93005 ELECTROCARDIOGRAM TRACING: CPT

## 2025-03-05 PROCEDURE — 84132 ASSAY OF SERUM POTASSIUM: CPT | Mod: 59 | Performed by: EMERGENCY MEDICINE

## 2025-03-05 PROCEDURE — 85730 THROMBOPLASTIN TIME PARTIAL: CPT

## 2025-03-05 RX ORDER — DONEPEZIL HYDROCHLORIDE 23 MG/1
23 TABLET, FILM COATED ORAL DAILY
Status: ON HOLD | COMMUNITY

## 2025-03-05 RX ORDER — METOPROLOL TARTRATE 50 MG/1
50 TABLET ORAL ONCE
Status: COMPLETED | OUTPATIENT
Start: 2025-03-05 | End: 2025-03-05

## 2025-03-05 RX ORDER — BISACODYL 10 MG/1
10 SUPPOSITORY RECTAL
Status: ON HOLD | COMMUNITY

## 2025-03-05 RX ORDER — SEVELAMER CARBONATE 0.8 G/1
1.6 POWDER, FOR SUSPENSION ORAL 2 TIMES DAILY
Status: ON HOLD | COMMUNITY

## 2025-03-05 RX ORDER — LOPERAMIDE HCL 2 MG
2 TABLET ORAL EVERY 12 HOURS PRN
Status: ON HOLD | COMMUNITY

## 2025-03-05 RX ORDER — ACETAMINOPHEN 650 MG/1
650 SUPPOSITORY RECTAL AS NEEDED
Status: ON HOLD | COMMUNITY

## 2025-03-05 RX ORDER — LEVETIRACETAM 500 MG/1
500 TABLET ORAL ONCE
Status: COMPLETED | OUTPATIENT
Start: 2025-03-05 | End: 2025-03-05

## 2025-03-05 RX ORDER — ALBUTEROL SULFATE 0.83 MG/ML
2.5 SOLUTION RESPIRATORY (INHALATION) ONCE
Status: COMPLETED | OUTPATIENT
Start: 2025-03-05 | End: 2025-03-05

## 2025-03-05 RX ORDER — ADHESIVE BANDAGE
30 BANDAGE TOPICAL
Status: ON HOLD | COMMUNITY

## 2025-03-05 RX ORDER — POLYETHYLENE GLYCOL 3350 17 G/17G
17 POWDER, FOR SOLUTION ORAL DAILY PRN
Status: ON HOLD | COMMUNITY

## 2025-03-05 RX ORDER — INSULIN LISPRO 100 [IU]/ML
2 INJECTION, SOLUTION INTRAVENOUS; SUBCUTANEOUS ONCE
Status: COMPLETED | OUTPATIENT
Start: 2025-03-05 | End: 2025-03-05

## 2025-03-05 RX ORDER — SEVELAMER CARBONATE 800 MG/1
1600 TABLET, FILM COATED ORAL 2 TIMES DAILY
COMMUNITY
End: 2025-03-05 | Stop reason: ENTERED-IN-ERROR

## 2025-03-05 RX ORDER — INSULIN GLARGINE 100 [IU]/ML
5 INJECTION, SOLUTION SUBCUTANEOUS NIGHTLY
Status: ON HOLD | COMMUNITY

## 2025-03-05 RX ORDER — GLUCAGON HCL 1 MG
1 VIAL (EA) INJECTION AS NEEDED
Status: ON HOLD | COMMUNITY

## 2025-03-05 RX ORDER — INSULIN LISPRO 100 [IU]/ML
INJECTION, SOLUTION INTRAVENOUS; SUBCUTANEOUS
Status: ON HOLD | COMMUNITY

## 2025-03-05 RX ORDER — ONDANSETRON 4 MG/1
4 TABLET, FILM COATED ORAL EVERY 6 HOURS PRN
Status: ON HOLD | COMMUNITY

## 2025-03-05 RX ORDER — LEVETIRACETAM 500 MG/1
500 TABLET, EXTENDED RELEASE ORAL ONCE
Status: DISCONTINUED | OUTPATIENT
Start: 2025-03-05 | End: 2025-03-05

## 2025-03-05 RX ORDER — ACETAMINOPHEN 650 MG/1
650 SUPPOSITORY RECTAL EVERY 4 HOURS PRN
COMMUNITY
End: 2025-03-09 | Stop reason: ENTERED-IN-ERROR

## 2025-03-05 RX ORDER — ACETAMINOPHEN, DIPHENHYDRAMINE HCL, PHENYLEPHRINE HCL 325; 25; 5 MG/1; MG/1; MG/1
1 TABLET ORAL NIGHTLY
Status: ON HOLD | COMMUNITY

## 2025-03-05 RX ORDER — SEVELAMER CARBONATE 0.8 G/1
1.6 POWDER, FOR SUSPENSION ORAL ONCE
Status: COMPLETED | OUTPATIENT
Start: 2025-03-05 | End: 2025-03-05

## 2025-03-05 RX ORDER — DEXTROSE 40 %
15 GEL (GRAM) ORAL ONCE AS NEEDED
Status: ON HOLD | COMMUNITY

## 2025-03-05 RX ORDER — VIT C/E/ZN/COPPR/LUTEIN/ZEAXAN 250MG-90MG
25 CAPSULE ORAL DAILY
Status: ON HOLD | COMMUNITY

## 2025-03-05 RX ADMIN — ALBUTEROL SULFATE 2.5 MG: 2.5 SOLUTION RESPIRATORY (INHALATION) at 17:59

## 2025-03-05 RX ADMIN — INSULIN LISPRO 2 UNITS: 100 INJECTION, SOLUTION INTRAVENOUS; SUBCUTANEOUS at 18:56

## 2025-03-05 RX ADMIN — SEVELAMER CARBONATE 1.6 G: 800 POWDER, FOR SUSPENSION ORAL at 18:57

## 2025-03-05 RX ADMIN — SODIUM ZIRCONIUM CYCLOSILICATE 10 G: 10 POWDER, FOR SUSPENSION ORAL at 17:12

## 2025-03-05 RX ADMIN — METOPROLOL TARTRATE 50 MG: 50 TABLET, FILM COATED ORAL at 18:27

## 2025-03-05 RX ADMIN — LEVETIRACETAM 500 MG: 500 TABLET, FILM COATED ORAL at 18:27

## 2025-03-05 RX ADMIN — APIXABAN 2.5 MG: 5 TABLET, FILM COATED ORAL at 18:27

## 2025-03-05 ASSESSMENT — LIFESTYLE VARIABLES
EVER HAD A DRINK FIRST THING IN THE MORNING TO STEADY YOUR NERVES TO GET RID OF A HANGOVER: NO
HAVE YOU EVER FELT YOU SHOULD CUT DOWN ON YOUR DRINKING: NO
TOTAL SCORE: 0
HAVE PEOPLE ANNOYED YOU BY CRITICIZING YOUR DRINKING: NO
EVER FELT BAD OR GUILTY ABOUT YOUR DRINKING: NO

## 2025-03-05 ASSESSMENT — PAIN SCALES - GENERAL
PAINLEVEL_OUTOF10: 0 - NO PAIN
PAINLEVEL_OUTOF10: 0 - NO PAIN

## 2025-03-05 ASSESSMENT — PAIN - FUNCTIONAL ASSESSMENT: PAIN_FUNCTIONAL_ASSESSMENT: 0-10

## 2025-03-05 ASSESSMENT — COLUMBIA-SUICIDE SEVERITY RATING SCALE - C-SSRS
1. IN THE PAST MONTH, HAVE YOU WISHED YOU WERE DEAD OR WISHED YOU COULD GO TO SLEEP AND NOT WAKE UP?: NO
2. HAVE YOU ACTUALLY HAD ANY THOUGHTS OF KILLING YOURSELF?: NO
6. HAVE YOU EVER DONE ANYTHING, STARTED TO DO ANYTHING, OR PREPARED TO DO ANYTHING TO END YOUR LIFE?: NO

## 2025-03-05 NOTE — ED PROVIDER NOTES
Emergency Department Provider Note        History of Present Illness     History provided by: Patient and EMS  Limitations to History: Dementia  External Records Reviewed with Brief Summary: None    HPI:  Tyshawn Coles is a 84 y.o. male with ESRD on hemodialysis Tuesday, Thursday, Saturday, dementia, hypertension, dyslipidemia, proximal atrial fibrillation on Eliquis, diabetes who presents for fall with head injury.  Patient was at his nursing facility and he was receiving a shower with assistance.  He was sitting down in the shower chair and fell forward, hitting his head.  This fall was witnessed.  He did not lose consciousness and has not had any nausea or vomiting.  He reports some pain in both his hips but is moving them fully.  Denies any other pain or injury, including the hematoma of his left forehead.    Physical Exam   Triage vitals:  T 36.3 °C (97.3 °F)  HR 58  /90  RR 18  O2 98 % None (Room air)    Physical Exam  Vitals and nursing note reviewed.   Constitutional:       General: He is not in acute distress.     Appearance: He is well-developed.   HENT:      Head:      Comments: Hematoma of left forehead.  Very small abrasion on the anterior neck.     Right Ear: Ear canal and external ear normal. There is impacted cerumen.      Left Ear: Ear canal and external ear normal. There is impacted cerumen.      Nose: Nose normal.      Mouth/Throat:      Mouth: Mucous membranes are moist.   Eyes:      General: No scleral icterus.     Extraocular Movements: Extraocular movements intact.      Conjunctiva/sclera: Conjunctivae normal.      Pupils: Pupils are equal, round, and reactive to light.   Cardiovascular:      Rate and Rhythm: Normal rate and regular rhythm.      Heart sounds: No murmur heard.  Pulmonary:      Effort: Pulmonary effort is normal. No respiratory distress.      Breath sounds: Normal breath sounds.   Abdominal:      Palpations: Abdomen is soft.      Tenderness: There is no abdominal  tenderness.   Musculoskeletal:         General: Tenderness present. No swelling.      Cervical back: Neck supple. No rigidity.      Comments: Mild tenderness to palpation of bilateral hips, but pelvis is stable to compression.   Skin:     General: Skin is warm and dry.   Neurological:      General: No focal deficit present.      Mental Status: He is alert.   Psychiatric:         Mood and Affect: Mood normal.          Medical Decision Making & ED Course   Medical Decision Makin y.o. male presenting for a fall with head injury on anticoagulation.  Fall was witnessed.  Patient did not lose consciousness.  He has dementia and is oriented only to self.  This is baseline for him.  He has pain in his bilateral hips but denies pain anywhere else.  Obtain CT imaging of his head, x-ray of his chest and x-ray of his pelvis.    CT head and C-spine are unremarkable.  X-ray of the chest and pelvis negative for acute findings.    Upon further review of the patient's chart, it was noted that he had not been able to have his dialysis session yesterday as his AV fistula was not functioning.  After speaking with the nursing home as well as the patient's wife, it was discovered that the patient's was scheduled for an IR fistulogram at 1 PM at [facility] today.  Due to the fall, patient was brought to [place] ED instead for evaluation.  We discussed this with vascular surgeon on-call, Dr. Cardozo.  She does not have any availability today or tomorrow.  If the patient requires urgent dialysis, patient will need a trialysis line placed.    CBC shows a mild but nonspecific leukocytosis 11.7, potentially reactive.  Hemoglobin of 11.2 is improved from prior lab work.  Chemistry panel shows a potassium 5.6.  BUN and creatinine elevated at 97 and 9.90 as expected with ESRD.  No EKG changes.  Patient given Lokelma.  Urgent dialysis not indicated at this time.    As we are unable to have the IR fistulogram performed here at [facility] today  or tomorrow, we discussed options with the patient's wife.  She does not want a central line placed as he has had multiple in the past..  We reached out to the office of the patient's vascular surgeon, Dr. Pedro Lowe, at [place].  However, he is out of the office at this time.  He does not have any availability for procedures tomorrow according to the scheduling office, although is in clinic.  We then reach out to the patient's nephrologist and primary care doctor, Dr. Edi Wise, as he had been able to schedule the patient for the artificial limb at [facility] a short notice.  Dr. Wise reviewed the patient's chart including his lab work.  He agrees urgent dialysis not indicated at this time.  He is able to schedule the patient for an IR appointment on Friday at [facility] with Dr. Schoenberger.  This plan was discussed with the patient's wife and daughter.  They do feel comfortable with this plan as they prefer this over having a central line placed.  Patient will be discharged back to his nursing facility.  Home care and return instructions discussed.  Patient's family expressed understanding and agreement. Patient discharged in stable condition.    Suhail Oliveira DO, PGY-4  Emergency Medicine Resident  ----     Social Determinants of Health which Significantly Impact Care: None identified     EKG Independent Interpretation:  EKG at 1533 on 3/5/2025 as interpreted by myself: Ventricular rate 58, , QRS 80, QTc 457.  Sinus bradycardia.  Intervals are normal.  No peaked T waves.  No EKG changes associated with hyperkalemia.  No acute injury pattern.    Independent Result Review and Interpretation: Relevant laboratory and radiographic results were reviewed and independently interpreted by myself.  As necessary, they are commented on in the ED Course.    Chronic conditions affecting the patient's care: As documented above in MDM    The patient was discussed with the following consultants/services:    [facility]    Care Considerations: As documented above in [facility]    ED Course:  ED Course as of 03/06/25 1538   Wed Mar 05, 2025   1500 Attending sign out to Dr Reese [PS]   1601 I evaluated patient after signout, his wife is at bedside and assists with additional history that was not initially gathered.  It appears the patient had a 1 PM IR fistulogram with [facility] scheduled.  She reports that the PCP ordered this as Dr. Lowe's office at [facility] was not available for the next 3 days, and his fistula did not work on dialysis yesterday.  Given this fall today, now clinically cleared, he missed this fistulogram.  We are attempting to discuss with IR here to see what options we have [JH]      ED Course User Index  [JH] Chace Reese MD  [PS] Tutu Correa DO         Diagnoses as of 03/06/25 1538   Closed head injury, initial encounter   Fall, initial encounter   Hyperkalemia     Disposition   As a result of the work-up, the patient was discharged home.  he was informed of his diagnosis and instructed to come back with any concerns or worsening of condition.  he and was agreeable to the plan as discussed above.  he was given the opportunity to ask questions.  All of the patient's questions were answered.    Procedures   Critical Care    Performed by: Tutu Correa DO  Authorized by: Tutu Correa DO    Critical care provider statement:     Critical care time (minutes):  35    Critical care time was exclusive of:  Separately billable procedures and treating other patients    Critical care was necessary to treat or prevent imminent or life-threatening deterioration of the following conditions:  Trauma and renal failure    Critical care was time spent personally by me on the following activities:  Development of treatment plan with patient or surrogate, discussions with consultants, discussions with primary provider, evaluation of patient's response to treatment, examination of patient, obtaining history from  patient or surrogate, ordering and performing treatments and interventions, ordering and review of radiographic studies, ordering and review of laboratory studies, review of old charts, re-evaluation of patient's condition and vascular access procedures      Patient seen and discussed with ED attending physician.    Tutu Correa DO  Emergency Medicine     Suhail Oliveira DO  Resident  03/05/25 3784        The patient was seen by the resident/fellow.  I have personally performed a substantive portion of the encounter.  I have seen and examined the patient; agree with the workup, evaluation, MDM, management and diagnosis.  The care plan has been discussed with the resident; I have reviewed the resident’s note and agree with the documented findings.                                                      Tutu Correa DO  03/06/25 [id number]

## 2025-03-07 ENCOUNTER — HOSPITAL ENCOUNTER (OUTPATIENT)
Dept: RADIOLOGY | Facility: HOSPITAL | Age: 85
Discharge: HOME | End: 2025-03-07
Payer: MEDICARE

## 2025-03-07 VITALS
SYSTOLIC BLOOD PRESSURE: 200 MMHG | HEART RATE: 62 BPM | RESPIRATION RATE: 16 BRPM | BODY MASS INDEX: 20.45 KG/M2 | WEIGHT: 150.79 LBS | OXYGEN SATURATION: 94 % | DIASTOLIC BLOOD PRESSURE: 94 MMHG

## 2025-03-07 DIAGNOSIS — T82.868A THROMBOSIS DUE TO VASCULAR PROSTHETIC DEVICES, IMPLANTS AND GRAFTS, INITIAL ENCOUNTER (CMS-HCC): ICD-10-CM

## 2025-03-07 LAB
ANION GAP SERPL CALC-SCNC: 22 MMOL/L (ref 10–20)
BUN SERPL-MCNC: 116 MG/DL (ref 6–23)
CALCIUM SERPL-MCNC: 8.6 MG/DL (ref 8.6–10.3)
CHLORIDE SERPL-SCNC: 91 MMOL/L (ref 98–107)
CO2 SERPL-SCNC: 24 MMOL/L (ref 21–32)
CREAT SERPL-MCNC: 11.27 MG/DL (ref 0.5–1.3)
EGFRCR SERPLBLD CKD-EPI 2021: 4 ML/MIN/1.73M*2
GLUCOSE SERPL-MCNC: 195 MG/DL (ref 74–99)
POTASSIUM SERPL-SCNC: 5.6 MMOL/L (ref 3.5–5.3)
SODIUM SERPL-SCNC: 131 MMOL/L (ref 136–145)

## 2025-03-07 PROCEDURE — 2720000007 HC OR 272 NO HCPCS

## 2025-03-07 PROCEDURE — C1769 GUIDE WIRE: HCPCS

## 2025-03-07 PROCEDURE — 77001 FLUOROGUIDE FOR VEIN DEVICE: CPT | Performed by: RADIOLOGY

## 2025-03-07 PROCEDURE — 36010 PLACE CATHETER IN VEIN: CPT | Performed by: RADIOLOGY

## 2025-03-07 PROCEDURE — 80048 BASIC METABOLIC PNL TOTAL CA: CPT | Performed by: RADIOLOGY

## 2025-03-07 PROCEDURE — 2500000004 HC RX 250 GENERAL PHARMACY W/ HCPCS (ALT 636 FOR OP/ED): Performed by: RADIOLOGY

## 2025-03-07 PROCEDURE — 36558 INSERT TUNNELED CV CATH: CPT | Performed by: RADIOLOGY

## 2025-03-07 PROCEDURE — C1750 CATH, HEMODIALYSIS,LONG-TERM: HCPCS

## 2025-03-07 PROCEDURE — 76937 US GUIDE VASCULAR ACCESS: CPT

## 2025-03-07 RX ORDER — HEPARIN SODIUM 1000 [USP'U]/ML
INJECTION, SOLUTION INTRAVENOUS; SUBCUTANEOUS
Status: COMPLETED | OUTPATIENT
Start: 2025-03-07 | End: 2025-03-07

## 2025-03-07 RX ORDER — LIDOCAINE HYDROCHLORIDE 20 MG/ML
INJECTION, SOLUTION EPIDURAL; INFILTRATION; INTRACAUDAL; PERINEURAL
Status: COMPLETED | OUTPATIENT
Start: 2025-03-07 | End: 2025-03-07

## 2025-03-07 RX ADMIN — HEPARIN SODIUM 4200 UNITS: 1000 INJECTION INTRAVENOUS; SUBCUTANEOUS at 12:19

## 2025-03-07 RX ADMIN — LIDOCAINE HYDROCHLORIDE 14 ML: 20 INJECTION, SOLUTION EPIDURAL; INFILTRATION; INTRACAUDAL; PERINEURAL at 11:57

## 2025-03-07 ASSESSMENT — PAIN SCALES - GENERAL
PAINLEVEL_OUTOF10: 0 - NO PAIN

## 2025-03-07 ASSESSMENT — PAIN - FUNCTIONAL ASSESSMENT: PAIN_FUNCTIONAL_ASSESSMENT: 0-10

## 2025-03-07 NOTE — Clinical Note
Prepped with: ChloraPrep. The patient was draped. Right Internal jugular and right chest prepped and draped.

## 2025-03-07 NOTE — Clinical Note
Tunneled catheter is in position.  15F x 23cm tunneled hemodialysis catheter.  Catheter is flushing well and has blood return.  Dr. Schoenberger suturing catheter in place.

## 2025-03-07 NOTE — Clinical Note
Hemodialysis center contacted to update.  Dialysis center is expecting him in center today.  Right internal jugular and right chest site stable.  CHG dressing applied.

## 2025-03-07 NOTE — Clinical Note
Pt's wife arrived to dept to speak with MD regarding procedure.  Pt resting quietly in wheelchair.  Pt will be brought to procedure room after consent.

## 2025-03-07 NOTE — POST-PROCEDURE NOTE
Interventional Radiology Brief Postprocedure Note    Attending: Schoenberger    Assistant: None    Diagnosis: Renal Failur, Clotted Graft, Hyper Kalemia    Description of procedure: Tunneled Dialysis Catheter     Anesthesia:  Local    Complications: None    Estimated Blood Loss: none    Medications (Filter: Administrations occurring from 1041 to 1230 on 03/07/25) As of 03/07/25 1230      lidocaine PF (Xylocaine) 20 mg/mL (2 %) injection (mL) Total volume:  14 mL      Date/Time Rate/Dose/Volume Action       03/07/25  1157 14 mL Given               heparin 1,000 unit/mL injection (Units) Total dose:  4,200 Units      Date/Time Rate/Dose/Volume Action       03/07/25  1219 4,200 Units Given                   No specimens collected      See detailed result report with images in PACS.    The patient tolerated the procedure well without incident or complication and is in stable condition.     Catheter tip upper Rt. Atrium. Ready to use for dialysis

## 2025-03-07 NOTE — Clinical Note
Dr. Schoenberger spoke to Dr. Wise via telephone.  They agree that pt should have a tunneled hemodialysis catheter placed and after being dialyzed reschedule for declot next week.  Stat bmp sent to lab.  Dr. Schoenberger said it is ok to get pt on the table to assess vessel for tunneled hemodialysis catheter.

## 2025-03-07 NOTE — Clinical Note
Dr. Schoenberger scanned right internal jugular for the site of hemodialysis catheter.  Pt requires frequent emotional reassurance.

## 2025-03-07 NOTE — Clinical Note
Dr. Schoenberger injecting lidocaine 2% to right chest site for the tunnel.  Pt tolerating procedure fairly. TRANSFER - IN REPORT: 
 
Verbal report received from Anderson Moya RN(name) on Ed Grammes  being received from [facility] ED(unit) for routine progression of care Report consisted of patients Situation, Background, Assessment and  
Recommendations(SBAR). Information from the following report(s) SBAR, Kardex, ED Summary, Intake/Output, MAR and Recent Results was reviewed with the receiving nurse. Opportunity for questions and clarification was provided. Assessment completed upon patients arrival to unit and care assumed.

## 2025-03-08 ENCOUNTER — APPOINTMENT (OUTPATIENT)
Dept: RADIOLOGY | Facility: HOSPITAL | Age: 85
DRG: 521 | End: 2025-03-08
Payer: MEDICARE

## 2025-03-08 ENCOUNTER — HOSPITAL ENCOUNTER (INPATIENT)
Facility: HOSPITAL | Age: 85
End: 2025-03-08
Attending: EMERGENCY MEDICINE | Admitting: INTERNAL MEDICINE
Payer: MEDICARE

## 2025-03-08 DIAGNOSIS — S72.002A CLOSED FRACTURE OF NECK OF LEFT FEMUR, INITIAL ENCOUNTER: Primary | ICD-10-CM

## 2025-03-08 DIAGNOSIS — N19 RENAL FAILURE: ICD-10-CM

## 2025-03-08 DIAGNOSIS — I48.91 ATRIAL FIBRILLATION, UNSPECIFIED TYPE (MULTI): ICD-10-CM

## 2025-03-08 DIAGNOSIS — R00.8 OTHER ABNORMALITIES OF HEART BEAT: ICD-10-CM

## 2025-03-08 LAB
ATRIAL RATE: 58 BPM
P AXIS: -13 DEGREES
P OFFSET: 191 MS
P ONSET: 139 MS
PR INTERVAL: 162 MS
Q ONSET: 220 MS
QRS COUNT: 10 BEATS
QRS DURATION: 80 MS
QT INTERVAL: 466 MS
QTC CALCULATION(BAZETT): 457 MS
QTC FREDERICIA: 460 MS
R AXIS: 48 DEGREES
T AXIS: 32 DEGREES
T OFFSET: 453 MS
VENTRICULAR RATE: 58 BPM

## 2025-03-08 PROCEDURE — 99285 EMERGENCY DEPT VISIT HI MDM: CPT | Performed by: EMERGENCY MEDICINE

## 2025-03-08 RX ORDER — ONDANSETRON HYDROCHLORIDE 2 MG/ML
4 INJECTION, SOLUTION INTRAVENOUS ONCE
Status: COMPLETED | OUTPATIENT
Start: 2025-03-09 | End: 2025-03-09

## 2025-03-08 ASSESSMENT — LIFESTYLE VARIABLES
HAVE PEOPLE ANNOYED YOU BY CRITICIZING YOUR DRINKING: NO
EVER FELT BAD OR GUILTY ABOUT YOUR DRINKING: NO
TOTAL SCORE: 0
HAVE YOU EVER FELT YOU SHOULD CUT DOWN ON YOUR DRINKING: NO
EVER HAD A DRINK FIRST THING IN THE MORNING TO STEADY YOUR NERVES TO GET RID OF A HANGOVER: NO

## 2025-03-08 ASSESSMENT — COLUMBIA-SUICIDE SEVERITY RATING SCALE - C-SSRS
6. HAVE YOU EVER DONE ANYTHING, STARTED TO DO ANYTHING, OR PREPARED TO DO ANYTHING TO END YOUR LIFE?: NO
1. IN THE PAST MONTH, HAVE YOU WISHED YOU WERE DEAD OR WISHED YOU COULD GO TO SLEEP AND NOT WAKE UP?: NO
2. HAVE YOU ACTUALLY HAD ANY THOUGHTS OF KILLING YOURSELF?: NO

## 2025-03-08 ASSESSMENT — PAIN SCALES - GENERAL: PAINLEVEL_OUTOF10: 0 - NO PAIN

## 2025-03-08 ASSESSMENT — PAIN - FUNCTIONAL ASSESSMENT: PAIN_FUNCTIONAL_ASSESSMENT: 0-10

## 2025-03-09 ENCOUNTER — APPOINTMENT (OUTPATIENT)
Dept: CARDIOLOGY | Facility: HOSPITAL | Age: 85
DRG: 521 | End: 2025-03-09
Payer: MEDICARE

## 2025-03-09 ENCOUNTER — APPOINTMENT (OUTPATIENT)
Dept: RADIOLOGY | Facility: HOSPITAL | Age: 85
DRG: 521 | End: 2025-03-09
Payer: MEDICARE

## 2025-03-09 ENCOUNTER — PREP FOR PROCEDURE (OUTPATIENT)
Dept: ORTHOPEDIC SURGERY | Facility: CLINIC | Age: 85
End: 2025-03-09

## 2025-03-09 VITALS
WEIGHT: 155 LBS | HEART RATE: 67 BPM | BODY MASS INDEX: 23.49 KG/M2 | HEIGHT: 68 IN | OXYGEN SATURATION: 97 % | SYSTOLIC BLOOD PRESSURE: 195 MMHG | DIASTOLIC BLOOD PRESSURE: 89 MMHG | TEMPERATURE: 97.3 F | RESPIRATION RATE: 16 BRPM
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
## 2025-03-09 PROBLEM — S51.019A SKIN TEAR OF ELBOW WITHOUT COMPLICATION: Status: RESOLVED | Noted: 2024-04-10 | Resolved: 2025-03-09

## 2025-03-09 PROBLEM — G62.9 PERIPHERAL NEUROPATHY: Status: RESOLVED | Noted: 2023-09-21 | Resolved: 2025-03-09

## 2025-03-09 PROBLEM — N30.00 ACUTE CYSTITIS WITHOUT HEMATURIA: Status: RESOLVED | Noted: 2024-03-30 | Resolved: 2025-03-09

## 2025-03-09 PROBLEM — E44.0 MODERATE PROTEIN-CALORIE MALNUTRITION (MULTI): Status: RESOLVED | Noted: 2024-04-04 | Resolved: 2025-03-09

## 2025-03-09 PROBLEM — I49.9 CARDIAC DYSRHYTHMIA: Status: RESOLVED | Noted: 2023-09-21 | Resolved: 2025-03-09

## 2025-03-09 PROBLEM — T78.3XXA ANGIOEDEMA: Status: RESOLVED | Noted: 2023-09-21 | Resolved: 2025-03-09

## 2025-03-09 PROBLEM — I48.0 PAROXYSMAL ATRIAL FIBRILLATION (MULTI): Status: RESOLVED | Noted: 2023-09-21 | Resolved: 2025-03-09

## 2025-03-09 PROBLEM — Z99.2 ESRD ON DIALYSIS (MULTI): Status: RESOLVED | Noted: 2023-09-21 | Resolved: 2025-03-09

## 2025-03-09 PROBLEM — R55 SYNCOPE AND COLLAPSE: Status: RESOLVED | Noted: 2024-10-18 | Resolved: 2025-03-09

## 2025-03-09 PROBLEM — R79.89 ELEVATED TROPONIN: Status: ACTIVE | Noted: 2025-03-09

## 2025-03-09 PROBLEM — S72.002A CLOSED FRACTURE OF NECK OF LEFT FEMUR, INITIAL ENCOUNTER: Status: ACTIVE | Noted: 2025-03-09

## 2025-03-09 PROBLEM — I82.409 DVT, LOWER EXTREMITY (MULTI): Status: RESOLVED | Noted: 2023-09-21 | Resolved: 2025-03-09

## 2025-03-09 PROBLEM — I12.0: Status: RESOLVED | Noted: 2023-09-21 | Resolved: 2025-03-09

## 2025-03-09 PROBLEM — Z71.2 ENCOUNTER TO DISCUSS TEST RESULTS: Status: RESOLVED | Noted: 2024-10-18 | Resolved: 2025-03-09

## 2025-03-09 PROBLEM — R55 SYNCOPE, NEAR: Status: RESOLVED | Noted: 2023-09-21 | Resolved: 2025-03-09

## 2025-03-09 PROBLEM — S09.90XA CLOSED HEAD INJURY: Status: ACTIVE | Noted: 2025-03-09

## 2025-03-09 PROBLEM — I48.91 A-FIB (MULTI): Status: RESOLVED | Noted: 2023-09-21 | Resolved: 2025-03-09

## 2025-03-09 PROBLEM — G93.41 ACUTE METABOLIC ENCEPHALOPATHY: Status: RESOLVED | Noted: 2024-03-30 | Resolved: 2025-03-09

## 2025-03-09 PROBLEM — R55 SPELL OF LOSS OF CONSCIOUSNESS: Status: RESOLVED | Noted: 2023-09-21 | Resolved: 2025-03-09

## 2025-03-09 PROBLEM — Z71.89 ENCOUNTER FOR MEDICATION COUNSELING: Status: RESOLVED | Noted: 2024-10-18 | Resolved: 2025-03-09
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
## 2025-03-09 PROBLEM — N18.6 ESRD ON DIALYSIS (MULTI): Status: RESOLVED | Noted: 2023-09-21 | Resolved: 2025-03-09

## 2025-03-09 PROBLEM — E10.22: Status: RESOLVED | Noted: 2023-09-21 | Resolved: 2025-03-09

## 2025-03-09 PROBLEM — G93.1: Status: RESOLVED | Noted: 2023-09-21 | Resolved: 2025-03-09

## 2025-03-09 PROBLEM — N18.5 ANEMIA DUE TO STAGE 5 CHRONIC KIDNEY DISEASE (MULTI): Status: RESOLVED | Noted: 2023-09-21 | Resolved: 2025-03-09

## 2025-03-09 PROBLEM — D63.1 ANEMIA DUE TO STAGE 5 CHRONIC KIDNEY DISEASE (MULTI): Status: RESOLVED | Noted: 2023-09-21 | Resolved: 2025-03-09

## 2025-03-09 PROBLEM — R40.4 SPELL OF ALTERED CONSCIOUSNESS: Status: RESOLVED | Noted: 2023-09-21 | Resolved: 2025-03-09

## 2025-03-09 PROBLEM — G40.409: Status: RESOLVED | Noted: 2023-09-21 | Resolved: 2025-03-09

## 2025-03-09 PROBLEM — N40.0 BPH (BENIGN PROSTATIC HYPERPLASIA): Status: RESOLVED | Noted: 2023-09-21 | Resolved: 2025-03-09

## 2025-03-09 PROBLEM — N28.9 RENAL INSUFFICIENCY: Status: RESOLVED | Noted: 2023-09-21 | Resolved: 2025-03-09

## 2025-03-09 PROBLEM — N18.5: Status: RESOLVED | Noted: 2023-09-21 | Resolved: 2025-03-09

## 2025-03-09 PROBLEM — Z78.9 NONSMOKER: Status: RESOLVED | Noted: 2023-10-20 | Resolved: 2025-03-09

## 2025-03-09 PROBLEM — I67.82 ISCHEMIC BRAIN INJURY: Status: RESOLVED | Noted: 2023-09-21 | Resolved: 2025-03-09

## 2025-03-09 PROBLEM — I10 HTN (HYPERTENSION): Status: RESOLVED | Noted: 2023-09-21 | Resolved: 2025-03-09

## 2025-03-09 PROBLEM — G90.9 AUTONOMIC DYSFUNCTION: Status: RESOLVED | Noted: 2023-09-21 | Resolved: 2025-03-09

## 2025-03-09 PROBLEM — E78.5 DYSLIPIDEMIA: Status: RESOLVED | Noted: 2023-09-21 | Resolved: 2025-03-09

## 2025-03-09 PROBLEM — R53.1 GENERALIZED WEAKNESS: Status: RESOLVED | Noted: 2024-03-28 | Resolved: 2025-03-09

## 2025-03-09 LAB
ABO GROUP (TYPE) IN BLOOD: NORMAL
ALBUMIN SERPL BCP-MCNC: 3.2 G/DL (ref 3.4–5)
ALP SERPL-CCNC: 62 U/L (ref 33–136)
ALT SERPL W P-5'-P-CCNC: 14 U/L (ref 10–52)
ANION GAP SERPL CALC-SCNC: 14 MMOL/L (ref 10–20)
AST SERPL W P-5'-P-CCNC: 24 U/L (ref 9–39)
BASOPHILS # BLD AUTO: 0.05 X10*3/UL (ref 0–0.1)
BASOPHILS NFR BLD AUTO: 0.4 %
BILIRUB SERPL-MCNC: 0.5 MG/DL (ref 0–1.2)
BUN SERPL-MCNC: 38 MG/DL (ref 6–23)
CALCIUM SERPL-MCNC: 8.8 MG/DL (ref 8.6–10.3)
CARDIAC TROPONIN I PNL SERPL HS: 69 NG/L (ref 0–20)
CARDIAC TROPONIN I PNL SERPL HS: 70 NG/L (ref 0–20)
CHLORIDE SERPL-SCNC: 96 MMOL/L (ref 98–107)
CO2 SERPL-SCNC: 30 MMOL/L (ref 21–32)
CREAT SERPL-MCNC: 5.11 MG/DL (ref 0.5–1.3)
EGFRCR SERPLBLD CKD-EPI 2021: 10 ML/MIN/1.73M*2
EOSINOPHIL # BLD AUTO: 0.37 X10*3/UL (ref 0–0.4)
EOSINOPHIL NFR BLD AUTO: 3 %
ERYTHROCYTE [DISTWIDTH] IN BLOOD BY AUTOMATED COUNT: 18.2 % (ref 11.5–14.5)
FLUAV RNA RESP QL NAA+PROBE: NOT DETECTED
FLUBV RNA RESP QL NAA+PROBE: NOT DETECTED
GLUCOSE BLD MANUAL STRIP-MCNC: 159 MG/DL (ref 74–99)
GLUCOSE BLD MANUAL STRIP-MCNC: 168 MG/DL (ref 74–99)
GLUCOSE BLD MANUAL STRIP-MCNC: 99 MG/DL (ref 74–99)
GLUCOSE SERPL-MCNC: 204 MG/DL (ref 74–99)
HCT VFR BLD AUTO: 28.1 % (ref 41–52)
HGB BLD-MCNC: 9.9 G/DL (ref 13.5–17.5)
IMM GRANULOCYTES # BLD AUTO: 0.05 X10*3/UL (ref 0–0.5)
IMM GRANULOCYTES NFR BLD AUTO: 0.4 % (ref 0–0.9)
INR PPP: 1.3 (ref 0.9–1.1)
LYMPHOCYTES # BLD AUTO: 1.8 X10*3/UL (ref 0.8–3)
LYMPHOCYTES NFR BLD AUTO: 14.8 %
MCH RBC QN AUTO: 28.7 PG (ref 26–34)
MCHC RBC AUTO-ENTMCNC: 35.2 G/DL (ref 32–36)
MCV RBC AUTO: 81 FL (ref 80–100)
MONOCYTES # BLD AUTO: 1.78 X10*3/UL (ref 0.05–0.8)
MONOCYTES NFR BLD AUTO: 14.6 %
NEUTROPHILS # BLD AUTO: 8.11 X10*3/UL (ref 1.6–5.5)
NEUTROPHILS NFR BLD AUTO: 66.8 %
NRBC BLD-RTO: 0 /100 WBCS (ref 0–0)
PLATELET # BLD AUTO: 178 X10*3/UL (ref 150–450)
POTASSIUM SERPL-SCNC: 4.3 MMOL/L (ref 3.5–5.3)
PROT SERPL-MCNC: 6.7 G/DL (ref 6.4–8.2)
PROTHROMBIN TIME: 13.9 SECONDS (ref 9.8–12.4)
RBC # BLD AUTO: 3.45 X10*6/UL (ref 4.5–5.9)
RH FACTOR (ANTIGEN D): NORMAL
RSV RNA RESP QL NAA+PROBE: NOT DETECTED
SARS-COV-2 RNA RESP QL NAA+PROBE: NOT DETECTED
SODIUM SERPL-SCNC: 136 MMOL/L (ref 136–145)
WBC # BLD AUTO: 12.2 X10*3/UL (ref 4.4–11.3)

## 2025-03-09 PROCEDURE — 70450 CT HEAD/BRAIN W/O DYE: CPT

## 2025-03-09 PROCEDURE — 71045 X-RAY EXAM CHEST 1 VIEW: CPT

## 2025-03-09 PROCEDURE — 5A1D70Z PERFORMANCE OF URINARY FILTRATION, INTERMITTENT, LESS THAN 6 HOURS PER DAY: ICD-10-PCS | Performed by: ORTHOPAEDIC SURGERY

## 2025-03-09 PROCEDURE — 87637 SARSCOV2&INF A&B&RSV AMP PRB: CPT

## 2025-03-09 PROCEDURE — 90471 IMMUNIZATION ADMIN: CPT | Performed by: NURSE PRACTITIONER

## 2025-03-09 PROCEDURE — 93010 ELECTROCARDIOGRAM REPORT: CPT | Performed by: INTERNAL MEDICINE

## 2025-03-09 PROCEDURE — 87081 CULTURE SCREEN ONLY: CPT | Mod: STJLAB | Performed by: NURSE PRACTITIONER

## 2025-03-09 PROCEDURE — 73560 X-RAY EXAM OF KNEE 1 OR 2: CPT | Mod: LEFT SIDE | Performed by: RADIOLOGY

## 2025-03-09 PROCEDURE — 36415 COLL VENOUS BLD VENIPUNCTURE: CPT

## 2025-03-09 PROCEDURE — 72125 CT NECK SPINE W/O DYE: CPT | Performed by: RADIOLOGY

## 2025-03-09 PROCEDURE — 85025 COMPLETE CBC W/AUTO DIFF WBC: CPT

## 2025-03-09 PROCEDURE — 73700 CT LOWER EXTREMITY W/O DYE: CPT | Mod: LEFT SIDE | Performed by: RADIOLOGY

## 2025-03-09 PROCEDURE — 84484 ASSAY OF TROPONIN QUANT: CPT

## 2025-03-09 PROCEDURE — 2500000004 HC RX 250 GENERAL PHARMACY W/ HCPCS (ALT 636 FOR OP/ED): Performed by: NURSE PRACTITIONER

## 2025-03-09 PROCEDURE — 1100000001 HC PRIVATE ROOM DAILY

## 2025-03-09 PROCEDURE — 85610 PROTHROMBIN TIME: CPT

## 2025-03-09 PROCEDURE — 72125 CT NECK SPINE W/O DYE: CPT

## 2025-03-09 PROCEDURE — 70450 CT HEAD/BRAIN W/O DYE: CPT | Performed by: RADIOLOGY

## 2025-03-09 PROCEDURE — 71045 X-RAY EXAM CHEST 1 VIEW: CPT | Mod: FOREIGN READ | Performed by: RADIOLOGY

## 2025-03-09 PROCEDURE — 99221 1ST HOSP IP/OBS SF/LOW 40: CPT | Performed by: STUDENT IN AN ORGANIZED HEALTH CARE EDUCATION/TRAINING PROGRAM

## 2025-03-09 PROCEDURE — 73700 CT LOWER EXTREMITY W/O DYE: CPT | Mod: LT

## 2025-03-09 PROCEDURE — 99235 HOSP IP/OBS SAME DATE MOD 70: CPT | Performed by: NURSE PRACTITIONER

## 2025-03-09 PROCEDURE — 82947 ASSAY GLUCOSE BLOOD QUANT: CPT

## 2025-03-09 PROCEDURE — 86901 BLOOD TYPING SEROLOGIC RH(D): CPT

## 2025-03-09 PROCEDURE — 2500000001 HC RX 250 WO HCPCS SELF ADMINISTERED DRUGS (ALT 637 FOR MEDICARE OP): Performed by: NURSE PRACTITIONER

## 2025-03-09 PROCEDURE — 90715 TDAP VACCINE 7 YRS/> IM: CPT | Performed by: NURSE PRACTITIONER

## 2025-03-09 PROCEDURE — 0SRS0JZ REPLACEMENT OF LEFT HIP JOINT, FEMORAL SURFACE WITH SYNTHETIC SUBSTITUTE, OPEN APPROACH: ICD-10-PCS | Performed by: ORTHOPAEDIC SURGERY

## 2025-03-09 PROCEDURE — 84075 ASSAY ALKALINE PHOSPHATASE: CPT

## 2025-03-09 PROCEDURE — 73560 X-RAY EXAM OF KNEE 1 OR 2: CPT | Mod: LT

## 2025-03-09 PROCEDURE — 93005 ELECTROCARDIOGRAM TRACING: CPT

## 2025-03-09 PROCEDURE — 2500000004 HC RX 250 GENERAL PHARMACY W/ HCPCS (ALT 636 FOR OP/ED)

## 2025-03-09 PROCEDURE — 2500000001 HC RX 250 WO HCPCS SELF ADMINISTERED DRUGS (ALT 637 FOR MEDICARE OP)

## 2025-03-09 PROCEDURE — 2500000002 HC RX 250 W HCPCS SELF ADMINISTERED DRUGS (ALT 637 FOR MEDICARE OP, ALT 636 FOR OP/ED): Performed by: NURSE PRACTITIONER

## 2025-03-09 RX ORDER — ALBUTEROL SULFATE 0.83 MG/ML
1.25 SOLUTION RESPIRATORY (INHALATION) EVERY 6 HOURS PRN
Status: ACTIVE | OUTPATIENT
Start: 2025-03-09

## 2025-03-09 RX ORDER — LEVETIRACETAM 500 MG/1
500 TABLET ORAL 2 TIMES DAILY
Status: DISPENSED | OUTPATIENT
Start: 2025-03-09

## 2025-03-09 RX ORDER — DEXTROSE 50 % IN WATER (D50W) INTRAVENOUS SYRINGE
25
Status: ACTIVE | OUTPATIENT
Start: 2025-03-09

## 2025-03-09 RX ORDER — ACETAMINOPHEN 500 MG
10 TABLET ORAL NIGHTLY
Status: DISPENSED | OUTPATIENT
Start: 2025-03-09

## 2025-03-09 RX ORDER — OXYCODONE HYDROCHLORIDE 5 MG/1
5 TABLET ORAL EVERY 6 HOURS PRN
Status: DISPENSED | OUTPATIENT
Start: 2025-03-09

## 2025-03-09 RX ORDER — SEVELAMER CARBONATE 0.8 G/1
1.6 POWDER, FOR SUSPENSION ORAL 2 TIMES DAILY
Status: DISPENSED | OUTPATIENT
Start: 2025-03-09

## 2025-03-09 RX ORDER — BISACODYL 10 MG/1
10 SUPPOSITORY RECTAL
Status: ACTIVE | OUTPATIENT
Start: 2025-03-09

## 2025-03-09 RX ORDER — CHOLECALCIFEROL (VITAMIN D3) 25 MCG
1000 TABLET ORAL DAILY
Status: ACTIVE | OUTPATIENT
Start: 2025-03-09

## 2025-03-09 RX ORDER — ACETAMINOPHEN 325 MG/1
975 TABLET ORAL EVERY 8 HOURS
Status: DISPENSED | OUTPATIENT
Start: 2025-03-09

## 2025-03-09 RX ORDER — ACETAMINOPHEN 160 MG/5ML
650 SOLUTION ORAL EVERY 4 HOURS PRN
Status: DISCONTINUED | OUTPATIENT
Start: 2025-03-09 | End: 2025-03-09

## 2025-03-09 RX ORDER — POLYETHYLENE GLYCOL 3350 17 G/17G
17 POWDER, FOR SOLUTION ORAL DAILY
Status: ACTIVE | OUTPATIENT
Start: 2025-03-09

## 2025-03-09 RX ORDER — LEVETIRACETAM 500 MG/1
500 TABLET ORAL 2 TIMES DAILY
Status: ON HOLD | COMMUNITY

## 2025-03-09 RX ORDER — INSULIN LISPRO 100 [IU]/ML
0-10 INJECTION, SOLUTION INTRAVENOUS; SUBCUTANEOUS
Status: DISPENSED | OUTPATIENT
Start: 2025-03-09

## 2025-03-09 RX ORDER — DONEPEZIL HYDROCHLORIDE 10 MG/1
20 TABLET, FILM COATED ORAL DAILY
Status: DISPENSED | OUTPATIENT
Start: 2025-03-09

## 2025-03-09 RX ORDER — ONDANSETRON HYDROCHLORIDE 2 MG/ML
4 INJECTION, SOLUTION INTRAVENOUS EVERY 8 HOURS PRN
Status: ACTIVE | OUTPATIENT
Start: 2025-03-09

## 2025-03-09 RX ORDER — HYDROMORPHONE HYDROCHLORIDE 0.2 MG/ML
0.2 INJECTION INTRAMUSCULAR; INTRAVENOUS; SUBCUTANEOUS
Status: ACTIVE | OUTPATIENT
Start: 2025-03-09

## 2025-03-09 RX ORDER — ACETAMINOPHEN 325 MG/1
650 TABLET ORAL EVERY 4 HOURS PRN
Status: ON HOLD | COMMUNITY

## 2025-03-09 RX ORDER — DEXTROSE 50 % IN WATER (D50W) INTRAVENOUS SYRINGE
12.5
Status: ACTIVE | OUTPATIENT
Start: 2025-03-09

## 2025-03-09 RX ORDER — TALC
3 POWDER (GRAM) TOPICAL NIGHTLY PRN
Status: DISCONTINUED | OUTPATIENT
Start: 2025-03-09 | End: 2025-03-09

## 2025-03-09 RX ORDER — FINASTERIDE 5 MG/1
5 TABLET, FILM COATED ORAL DAILY
Status: DISPENSED | OUTPATIENT
Start: 2025-03-09

## 2025-03-09 RX ORDER — ACETAMINOPHEN 650 MG/1
650 SUPPOSITORY RECTAL EVERY 4 HOURS PRN
Status: DISCONTINUED | OUTPATIENT
Start: 2025-03-09 | End: 2025-03-09

## 2025-03-09 RX ORDER — OXYCODONE HYDROCHLORIDE 5 MG/1
2.5 TABLET ORAL EVERY 6 HOURS PRN
Status: ACTIVE | OUTPATIENT
Start: 2025-03-09

## 2025-03-09 RX ORDER — ACETAMINOPHEN 325 MG/1
650 TABLET ORAL EVERY 4 HOURS PRN
Status: DISCONTINUED | OUTPATIENT
Start: 2025-03-09 | End: 2025-03-09

## 2025-03-09 RX ORDER — ALBUTEROL SULFATE 1.25 MG/3ML
1.25 SOLUTION RESPIRATORY (INHALATION) EVERY 6 HOURS PRN
Status: ON HOLD | COMMUNITY

## 2025-03-09 RX ORDER — OXYCODONE HYDROCHLORIDE 5 MG/1
5 TABLET ORAL ONCE
Status: COMPLETED | OUTPATIENT
Start: 2025-03-09 | End: 2025-03-09

## 2025-03-09 RX ORDER — METOPROLOL TARTRATE 50 MG/1
50 TABLET ORAL 2 TIMES DAILY
Status: DISPENSED | OUTPATIENT
Start: 2025-03-09

## 2025-03-09 RX ORDER — POLYETHYLENE GLYCOL 3350 17 G/17G
17 POWDER, FOR SOLUTION ORAL DAILY PRN
Status: DISCONTINUED | OUTPATIENT
Start: 2025-03-09 | End: 2025-03-09

## 2025-03-09 RX ORDER — ADHESIVE BANDAGE
30 BANDAGE TOPICAL
Status: ACTIVE | OUTPATIENT
Start: 2025-03-09

## 2025-03-09 RX ORDER — ONDANSETRON 4 MG/1
4 TABLET, ORALLY DISINTEGRATING ORAL EVERY 8 HOURS PRN
Status: ACTIVE | OUTPATIENT
Start: 2025-03-09

## 2025-03-09 RX ORDER — INSULIN GLARGINE 100 [IU]/ML
5 INJECTION, SOLUTION SUBCUTANEOUS NIGHTLY
Status: ACTIVE | OUTPATIENT
Start: 2025-03-09

## 2025-03-09 RX ORDER — SODIUM CHLORIDE 0.9 % (FLUSH) 0.9 %
10 SYRINGE (ML) INJECTION EVERY 8 HOURS SCHEDULED
Status: ACTIVE | OUTPATIENT
Start: 2025-03-09

## 2025-03-09 RX ADMIN — TETANUS TOXOID, REDUCED DIPHTHERIA TOXOID AND ACELLULAR PERTUSSIS VACCINE, ADSORBED 0.5 ML: 5; 2.5; 8; 8; 2.5 SUSPENSION INTRAMUSCULAR at 06:40

## 2025-03-09 RX ADMIN — OXYCODONE HYDROCHLORIDE 5 MG: 5 TABLET ORAL at 03:02

## 2025-03-09 RX ADMIN — Medication 10 ML: at 08:03

## 2025-03-09 RX ADMIN — HYDROMORPHONE HYDROCHLORIDE 0.5 MG: 1 INJECTION, SOLUTION INTRAMUSCULAR; INTRAVENOUS; SUBCUTANEOUS at 00:37

## 2025-03-09 RX ADMIN — LEVETIRACETAM 500 MG: 500 TABLET, FILM COATED ORAL at 19:18

## 2025-03-09 RX ADMIN — INSULIN LISPRO 2 UNITS: 100 INJECTION, SOLUTION INTRAVENOUS; SUBCUTANEOUS at 22:31

## 2025-03-09 RX ADMIN — Medication 10 ML: at 22:00

## 2025-03-09 RX ADMIN — ONDANSETRON 4 MG: 2 INJECTION INTRAMUSCULAR; INTRAVENOUS at 00:37

## 2025-03-09 RX ADMIN — DONEPEZIL HYDROCHLORIDE 20 MG: 10 TABLET ORAL at 22:31

## 2025-03-09 RX ADMIN — OXYCODONE HYDROCHLORIDE 5 MG: 5 TABLET ORAL at 22:10

## 2025-03-09 RX ADMIN — METOPROLOL TARTRATE 50 MG: 50 TABLET, FILM COATED ORAL at 22:26

## 2025-03-09 RX ADMIN — METOPROLOL TARTRATE 50 MG: 50 TABLET, FILM COATED ORAL at 18:15

## 2025-03-09 RX ADMIN — INSULIN LISPRO 2 UNITS: 100 INJECTION, SOLUTION INTRAVENOUS; SUBCUTANEOUS at 08:02

## 2025-03-09 RX ADMIN — ACETAMINOPHEN 975 MG: 325 TABLET ORAL at 22:10

## 2025-03-09 RX ADMIN — Medication 10 MG: at 22:10

## 2025-03-09 SDOH — SOCIAL STABILITY: SOCIAL INSECURITY: ARE YOU OR HAVE YOU BEEN THREATENED OR ABUSED PHYSICALLY, EMOTIONALLY, OR SEXUALLY BY ANYONE?: UNABLE TO ASSESS

## 2025-03-09 SDOH — SOCIAL STABILITY: SOCIAL INSECURITY: HAVE YOU HAD THOUGHTS OF HARMING ANYONE ELSE?: UNABLE TO ASSESS

## 2025-03-09 SDOH — SOCIAL STABILITY: SOCIAL INSECURITY: DO YOU FEEL UNSAFE GOING BACK TO THE PLACE WHERE YOU ARE LIVING?: UNABLE TO ASSESS

## 2025-03-09 SDOH — SOCIAL STABILITY: SOCIAL INSECURITY: ABUSE: ADULT

## 2025-03-09 SDOH — SOCIAL STABILITY: SOCIAL INSECURITY: HAVE YOU HAD ANY THOUGHTS OF HARMING ANYONE ELSE?: UNABLE TO ASSESS

## 2025-03-09 SDOH — SOCIAL STABILITY: SOCIAL INSECURITY: DO YOU FEEL ANYONE HAS EXPLOITED OR TAKEN ADVANTAGE OF YOU FINANCIALLY OR OF YOUR PERSONAL PROPERTY?: UNABLE TO ASSESS

## 2025-03-09 SDOH — SOCIAL STABILITY: SOCIAL INSECURITY: ARE THERE ANY APPARENT SIGNS OF INJURIES/BEHAVIORS THAT COULD BE RELATED TO ABUSE/NEGLECT?: UNABLE TO ASSESS

## 2025-03-09 SDOH — SOCIAL STABILITY: SOCIAL INSECURITY: WERE YOU ABLE TO COMPLETE ALL THE BEHAVIORAL HEALTH SCREENINGS?: NO

## 2025-03-09 SDOH — SOCIAL STABILITY: SOCIAL INSECURITY: HAS ANYONE EVER THREATENED TO HURT YOUR FAMILY OR YOUR PETS?: UNABLE TO ASSESS

## 2025-03-09 SDOH — SOCIAL STABILITY: SOCIAL INSECURITY: DOES ANYONE TRY TO KEEP YOU FROM HAVING/CONTACTING OTHER FRIENDS OR DOING THINGS OUTSIDE YOUR HOME?: UNABLE TO ASSESS

## 2025-03-09 ASSESSMENT — ACTIVITIES OF DAILY LIVING (ADL)
TOILETING: NEEDS ASSISTANCE
JUDGMENT_ADEQUATE_SAFELY_COMPLETE_DAILY_ACTIVITIES: NO
WALKS IN HOME: NEEDS ASSISTANCE
PATIENT'S MEMORY ADEQUATE TO SAFELY COMPLETE DAILY ACTIVITIES?: NO
HEARING - LEFT EAR: FUNCTIONAL
GROOMING: NEEDS ASSISTANCE
FEEDING YOURSELF: NEEDS ASSISTANCE
HEARING - RIGHT EAR: FUNCTIONAL
DRESSING YOURSELF: NEEDS ASSISTANCE
BATHING: NEEDS ASSISTANCE
ADEQUATE_TO_COMPLETE_ADL: YES

## 2025-03-09 ASSESSMENT — PAIN SCALES - GENERAL
PAINLEVEL_OUTOF10: 0 - NO PAIN
PAINLEVEL_OUTOF10: 0 - NO PAIN
PAINLEVEL_OUTOF10: 1
PAINLEVEL_OUTOF10: 5 - MODERATE PAIN
PAINLEVEL_OUTOF10: 8

## 2025-03-09 ASSESSMENT — COGNITIVE AND FUNCTIONAL STATUS - GENERAL
MOVING FROM LYING ON BACK TO SITTING ON SIDE OF FLAT BED WITH BEDRAILS: A LOT
EATING MEALS: A LOT
WALKING IN HOSPITAL ROOM: TOTAL
PATIENT BASELINE BEDBOUND: NO
MOBILITY SCORE: 10
TURNING FROM BACK TO SIDE WHILE IN FLAT BAD: A LOT
DRESSING REGULAR UPPER BODY CLOTHING: A LOT
MOVING FROM LYING ON BACK TO SITTING ON SIDE OF FLAT BED WITH BEDRAILS: A LOT
MOVING TO AND FROM BED TO CHAIR: A LOT
DAILY ACTIVITIY SCORE: 12
STANDING UP FROM CHAIR USING ARMS: A LOT
MOVING TO AND FROM BED TO CHAIR: A LOT
TURNING FROM BACK TO SIDE WHILE IN FLAT BAD: A LOT
TOILETING: A LOT
PATIENT BASELINE BEDBOUND: NO
DRESSING REGULAR LOWER BODY CLOTHING: A LOT
CLIMB 3 TO 5 STEPS WITH RAILING: TOTAL
PERSONAL GROOMING: A LOT
HELP NEEDED FOR BATHING: A LOT

## 2025-03-09 ASSESSMENT — PAIN DESCRIPTION - ORIENTATION: ORIENTATION: PROXIMAL;LEFT

## 2025-03-09 ASSESSMENT — PATIENT HEALTH QUESTIONNAIRE - PHQ9
1. LITTLE INTEREST OR PLEASURE IN DOING THINGS: NOT AT ALL
2. FEELING DOWN, DEPRESSED OR HOPELESS: NOT AT ALL
SUM OF ALL RESPONSES TO PHQ9 QUESTIONS 1 & 2: 0

## 2025-03-09 ASSESSMENT — LIFESTYLE VARIABLES
HOW OFTEN DO YOU HAVE 6 OR MORE DRINKS ON ONE OCCASION: PATIENT UNABLE TO ANSWER
AUDIT-C TOTAL SCORE: -1
SKIP TO QUESTIONS 9-10: 0
HOW OFTEN DO YOU HAVE A DRINK CONTAINING ALCOHOL: PATIENT UNABLE TO ANSWER
AUDIT-C TOTAL SCORE: -1
HOW MANY STANDARD DRINKS CONTAINING ALCOHOL DO YOU HAVE ON A TYPICAL DAY: PATIENT UNABLE TO ANSWER

## 2025-03-09 ASSESSMENT — PAIN DESCRIPTION - LOCATION: LOCATION: LEG

## 2025-03-09 NOTE — PROGRESS NOTES
Advanced Practice Admit Note    Tyshawn Coles is a 84 y.o. male presenting to [facility] on 3/8/2025 with Closed fracture of neck of left femur, initial encounter. Medical history is significant for dementia, seizure disorder, atrial fibrillation anticoagulated on apixaban, end-stage renal disease with malfunctioning AV fistula currently receiving hemodialysis on Tuesdays Thursdays and Saturdays via a tunneled dialysis catheter insulin-dependent diabetes, coronary artery disease with remote NSTEMI, peripheral vascular disease, and frequent falls.    He comes in tonight after having fallen at his facility.  He reportedly hit his head and immediately had pain in his left hip.  Chart review indicates that he has been seen in the emergency department multiple times for falls under similar circumstances.    Labs are noteworthy for blood glucose of 204, creatinine of 5.11 and GFR of 10 reflecting his known end-stage renal disease, white blood cell count of 12.2, stable hemoglobin at 9.9, initial troponin of 70 with recheck of 69.  Imaging suggest an acute impacted left sided femoral neck fracture and what appears to be a trace right frontal subdural hematoma.  This hematoma is not noted on his head CT performed just a few days earlier on March 5 but radiology reportedly looked back at the images and feel that the lesion is stable.  We will obtain a new CT in 6 hours to ensure ongoing stability given the fact that he hit his head tonight and is on apixaban.    Emergency department staff reportedly spoke with orthopedics who agrees to be by on consult.      Past Medical History  Past Medical History:   Diagnosis Date    Autonomic dysfunction     Benign prostatic hyperplasia     Chronic anemia     Coronary artery disease     Current use of long term anticoagulation     Apixaban    Dementia     Dyslipidemia     End-stage renal disease on hemodialysis (Multi)     Frequent falls     Gout     Heart failure with preserved ejection  fraction     History of anaphylaxis     History of closed head injury     History of Clostridioides difficile colitis     History of deep vein thrombosis     History of non-ST elevation myocardial infarction (NSTEMI)     History of sepsis     Hypertension     Hypoxic ischemic encephalopathy (Multi)     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)     Kidney stones     Multilevel degenerative disc disease     Osteoarthritis     Paroxysmal atrial fibrillation (Multi)     Peripheral neuropathy     Peripheral vascular disease (CMS-HCC)     Secondary hyperparathyroidism of renal origin (Multi)     Seizure disorder (Multi)        Surgical History  Past Surgical History:   Procedure Laterality Date    ADENOIDECTOMY      AV FISTULA PLACEMENT      CHOLECYSTECTOMY      COLONOSCOPY      IR CVC TUNNELED  08/20/2021    IR CVC TUNNELED 8/20/2021 [facility]    IR TUNNELED DIALYSIS CATHETER      PACEMAKER PLACEMENT      THROMBECTOMY      TONSILLECTOMY      TOTAL HIP ARTHROPLASTY Right         Social History  Social History     Tobacco Use    Smoking status: Former     Types: Cigarettes    Smokeless tobacco: Never   Substance Use Topics    Alcohol use: Never    Drug use: Never        Family History  Family History   Problem Relation Name Age of Onset    Diabetes Mother      Cancer Father      Lupus Daughter      Kidney failure Daughter      Asthma Son      Hypertension Other      Sickle cell trait Other          Allergies  Patient has no known allergies.    Review of Systems   Unable to perform ROS: Dementia       Physical Exam  Constitutional:       General: He is not in acute distress.     Appearance: Normal appearance. He is not ill-appearing.   HENT:      Head: Normocephalic.      Right Ear: External ear normal.      Left Ear: External ear normal.      Nose: Nose normal.      Mouth/Throat:      Mouth: Mucous membranes are moist.   Eyes:      General: No scleral icterus.  Cardiovascular:      Rate and Rhythm:  "Normal rate and regular rhythm.      Pulses: Normal pulses.   Pulmonary:      Effort: Pulmonary effort is normal.      Breath sounds: Normal breath sounds.   Abdominal:      General: Bowel sounds are normal. There is no distension.      Palpations: Abdomen is soft.      Tenderness: There is no abdominal tenderness.   Musculoskeletal:         General: Tenderness and signs of injury present.      Cervical back: No rigidity or tenderness.   Skin:     General: Skin is warm and dry.      Capillary Refill: Capillary refill takes less than 2 seconds.   Neurological:      General: No focal deficit present.      Mental Status: He is alert. Mental status is at baseline.   Psychiatric:         Mood and Affect: Mood normal.         Behavior: Behavior normal.         Last Recorded Vitals  Blood pressure 168/88, pulse 70, temperature 37.2 °C (99 °F), temperature source Temporal, resp. rate 17, height 1.727 m (5' 8\"), weight 70.3 kg (155 lb), SpO2 95%.  Intake/Output last 3 Shifts:  No intake/output data recorded.    Relevant Results  Results for orders placed or performed during the hospital encounter of 03/08/25 (from the past 24 hours)   CBC and Auto Differential   Result Value Ref Range    WBC 12.2 (H) 4.4 - 11.3 x10*3/uL    nRBC 0.0 0.0 - 0.0 /100 WBCs    RBC 3.45 (L) 4.50 - 5.90 x10*6/uL    Hemoglobin 9.9 (L) 13.5 - 17.5 g/dL    Hematocrit 28.1 (L) 41.0 - 52.0 %    MCV 81 80 - 100 fL    MCH 28.7 26.0 - 34.0 pg    MCHC 35.2 32.0 - 36.0 g/dL    RDW 18.2 (H) 11.5 - 14.5 %    Platelets 178 150 - 450 x10*3/uL    Neutrophils % 66.8 40.0 - 80.0 %    Immature Granulocytes %, Automated 0.4 0.0 - 0.9 %    Lymphocytes % 14.8 13.0 - 44.0 %    Monocytes % 14.6 2.0 - 10.0 %    Eosinophils % 3.0 0.0 - 6.0 %    Basophils % 0.4 0.0 - 2.0 %    Neutrophils Absolute 8.11 (H) 1.60 - 5.50 x10*3/uL    Immature Granulocytes Absolute, Automated 0.05 0.00 - 0.50 x10*3/uL    Lymphocytes Absolute 1.80 0.80 - 3.00 x10*3/uL    Monocytes Absolute 1.78 (H) " 0.05 - 0.80 x10*3/uL    Eosinophils Absolute 0.37 0.00 - 0.40 x10*3/uL    Basophils Absolute 0.05 0.00 - 0.10 x10*3/uL   Comprehensive metabolic panel   Result Value Ref Range    Glucose 204 (H) 74 - 99 mg/dL    Sodium 136 136 - 145 mmol/L    Potassium 4.3 3.5 - 5.3 mmol/L    Chloride 96 (L) 98 - 107 mmol/L    Bicarbonate 30 21 - 32 mmol/L    Anion Gap 14 10 - 20 mmol/L    Urea Nitrogen 38 (H) 6 - 23 mg/dL    Creatinine 5.11 (H) 0.50 - 1.30 mg/dL    eGFR 10 (L) >60 mL/min/1.73m*2    Calcium 8.8 8.6 - 10.3 mg/dL    Albumin 3.2 (L) 3.4 - 5.0 g/dL    Alkaline Phosphatase 62 33 - 136 U/L    Total Protein 6.7 6.4 - 8.2 g/dL    AST 24 9 - 39 U/L    Bilirubin, Total 0.5 0.0 - 1.2 mg/dL    ALT 14 10 - 52 U/L   Protime-INR   Result Value Ref Range    Protime 13.9 (H) 9.8 - 12.4 seconds    INR 1.3 (H) 0.9 - 1.1   Type And Screen   Result Value Ref Range    ABO TYPE O     Rh TYPE POS    Troponin I, High Sensitivity   Result Value Ref Range    Troponin I, High Sensitivity 70 (HH) 0 - 20 ng/L   Sars-CoV-2, Influenza A/B and RSV PCR   Result Value Ref Range    Coronavirus 2019, PCR Not Detected Not Detected    Flu A Result Not Detected Not Detected    Flu B Result Not Detected Not Detected    RSV PCR Not Detected Not Detected   Troponin I, High Sensitivity   Result Value Ref Range    Troponin I, High Sensitivity 69 (HH) 0 - 20 ng/L        Medications  Scheduled medications  diph,pertuss(acel),tet vac (PF), 0.5 mL, intramuscular, Once      Continuous medications     PRN medications       Imaging  XR knee left 1-2 views    Result Date: 3/9/2025  STUDY: Knee Radiographs; 3/9/2025 3:19 AM INDICATION: Pain after a fall. COMPARISON: None Available. ACCESSION NUMBER(S): [id number] ORDERING CLINICIAN: ELVIS EMERY TECHNIQUE:  Two view(s) of the left knee. FINDINGS:  There is no displaced fracture.  The alignment is anatomic.  No soft tissue abnormality is seen.  Extensive vascular calcification noted. There is no joint  effusion.    No acute displaced fracture or dislocation is evident on these images.  The fibula is not fully imaged. Signed by Emanuel Lopez MD    CT hip left wo IV contrast    Result Date: 3/9/2025  STUDY: CT Hip without IV Contrast; 03/09/2025 12:56AM INDICATION: Left femoral neck fracture. COMPARISON: CT Pelvis with bilateral Hip 01/30/2020. ACCESSION NUMBER(S): [id number] ORDERING CLINICIAN: ELVIS EMERY TECHNIQUE:  Thin section axial images were obtained through the left hip without intravenous contrast.  Orthogonal reconstructed images were obtained and reviewed.  2143 DICOM images received. Automated mA/kV exposure control was utilized and patient examination was performed in strict accordance with principles of ALARA. FINDINGS: Left Hip: Generalized osseous demineralization is noted.  There is an acute, traumatic fracture of the left femoral neck with impaction. Visualized soft tissues in the included left lower pelvis reveal no acute pathology.  Moderate age-related osteoarthritic changes are seen in the left sacroiliac joint.  Peripheral vascular calcifications are noted.    Acute, traumatic, impacted fracture of the left femoral neck. Pronounced generalized osseous demineralization. Signed by Timothy Persaud    XR chest 1 view    Result Date: 3/9/2025  STUDY: Chest Radiograph;  03/09/2025   12:49AM INDICATION: Pre-op left femoral neck fracture COMPARISON: Chest, single portable view obtained on 08/17/2021 at 10:07 hours. ACCESSION NUMBER(S): [id number] ORDERING CLINICIAN: BARBER ALVES TECHNIQUE:  Frontal chest was obtained at 00:49 hours. FINDINGS: Right central venous line is seen, its tip is at the lower SVC/right atrium. CARDIOMEDIASTINAL SILHOUETTE: Cardiomediastinal silhouette is normal in size and configuration.  LUNGS: Lungs are clear.  ABDOMEN: No remarkable upper abdominal findings.  BONES: No acute osseous changes.    1.No acute pulmonary pathology. 2.Right central venous line. Signed by  Tomas Funez MD    CT head wo IV contrast    Result Date: 3/9/2025  Interpreted By:  Fara Browne, STUDY: CT HEAD WO IV CONTRAST; CT CERVICAL SPINE WO IV CONTRAST;  3/9/2025 1:02 am   INDICATION: Signs/Symptoms:fall.   COMPARISON: CT head and cervical spine 03/05/2025, CT head 03/30/2024   ACCESSION NUMBER(S): [id number]; [id number]   ORDERING CLINICIAN: BARBER ALVES   TECHNIQUE: Axial noncontrast images of the head  with coronal  and sagittal reconstructed images . Axial noncontrast images of the cervical spine with coronal and sagittal reconstructed images.   FINDINGS: There is motion artifact. BRAIN PARENCHYMA: There are periventricular white matter hypodensities, probably representing chronic microvascular ischemic changes. Otherwise, the gray-white matter interfaces are preserved. No acute territorial infarct, mass effect or midline shift. HEMORRHAGE: No acute intracranial hemorrhage. VENTRICLES and EXTRA-AXIAL SPACES: Prominent, consistent with diffuse parenchymal volume loss. There is trace amount of extra-axial hypodense fluid overlying the right frontal region measuring 3 mm in thickness. EXTRACRANIAL SOFT TISSUES: Extensive vascular calcifications are noted at the extracranial soft tissues. CALVARIUM: No depressed calvarial fracture. PARANASAL SINUSES: There is polypoid mucosal thickening with calcifications at the left maxillary sinus. There is polypoid mucosal thickening at the right sphenoid sinus. MASTOIDS: There is partial fluid opacification of the bilateral mastoid air cells.   CERVICAL SPINE: Evaluation degraded by motion artifact. ALIGNMENT: Redemonstration of straightening of the cervical lordosis. No traumatic facet widening is identified. VERTEBRAE: The evaluation for acute fracture is degraded by motion artifact. No definite evidence for acute displaced fracture is identified. DISCS: There is multilevel degenerative disc disease with osteophytosis. There is multilevel facet  arthropathy. PREVERTEBRAL SOFT TISSUES: No prevertebral soft tissue swelling. LUNG APICES: No acute findings at the visualized lung apices. Partially visualized right-sided central catheter.       1.  No acute intracranial hemorrhage or depressed calvarial fracture. 2. Trace amount of extra-axial hypodense fluid overlying the right frontal region, suggestive of chronic subdural hematoma. 3. Partial fluid opacification of the bilateral mastoid air cells. 4. The evaluation of the cervical spine is degraded by motion artifact. Degenerative changes. No definite evidence for acute displaced fracture of the cervical spine; if there is persistent clinical concern, a repeat CT can be obtained when the patient will be able to cooperate with positioning.       MACRO: None.   Signed by: Fara Browne 3/9/2025 1:24 AM Dictation workstation:   [id number]    CT cervical spine wo IV contrast    Result Date: 3/9/2025  Interpreted By:  Fara Browne, STUDY: CT HEAD WO IV CONTRAST; CT CERVICAL SPINE WO IV CONTRAST;  3/9/2025 1:02 am   INDICATION: Signs/Symptoms:fall.   COMPARISON: CT head and cervical spine 03/05/2025, CT head 03/30/2024   ACCESSION NUMBER(S): [id number]; [id number]   ORDERING CLINICIAN: BARBER ALVES   TECHNIQUE: Axial noncontrast images of the head  with coronal  and sagittal reconstructed images . Axial noncontrast images of the cervical spine with coronal and sagittal reconstructed images.   FINDINGS: There is motion artifact. BRAIN PARENCHYMA: There are periventricular white matter hypodensities, probably representing chronic microvascular ischemic changes. Otherwise, the gray-white matter interfaces are preserved. No acute territorial infarct, mass effect or midline shift. HEMORRHAGE: No acute intracranial hemorrhage. VENTRICLES and EXTRA-AXIAL SPACES: Prominent, consistent with diffuse parenchymal volume loss. There is trace amount of extra-axial hypodense fluid overlying the right frontal region  measuring 3 mm in thickness. EXTRACRANIAL SOFT TISSUES: Extensive vascular calcifications are noted at the extracranial soft tissues. CALVARIUM: No depressed calvarial fracture. PARANASAL SINUSES: There is polypoid mucosal thickening with calcifications at the left maxillary sinus. There is polypoid mucosal thickening at the right sphenoid sinus. MASTOIDS: There is partial fluid opacification of the bilateral mastoid air cells.   CERVICAL SPINE: Evaluation degraded by motion artifact. ALIGNMENT: Redemonstration of straightening of the cervical lordosis. No traumatic facet widening is identified. VERTEBRAE: The evaluation for acute fracture is degraded by motion artifact. No definite evidence for acute displaced fracture is identified. DISCS: There is multilevel degenerative disc disease with osteophytosis. There is multilevel facet arthropathy. PREVERTEBRAL SOFT TISSUES: No prevertebral soft tissue swelling. LUNG APICES: No acute findings at the visualized lung apices. Partially visualized right-sided central catheter.       1.  No acute intracranial hemorrhage or depressed calvarial fracture. 2. Trace amount of extra-axial hypodense fluid overlying the right frontal region, suggestive of chronic subdural hematoma. 3. Partial fluid opacification of the bilateral mastoid air cells. 4. The evaluation of the cervical spine is degraded by motion artifact. Degenerative changes. No definite evidence for acute displaced fracture of the cervical spine; if there is persistent clinical concern, a repeat CT can be obtained when the patient will be able to cooperate with positioning.       MACRO: None.   Signed by: Fara Browne 3/9/2025 1:24 AM Dictation workstation:   [id number]      Assessment & Plan  Closed fracture of neck of left femur, initial encounter  Orthopedics consult, careful analgesia  Falls, initial encounter  Fall precautions, will need to be evaluated by physical and occupational therapies once cleared for  activity  Closed head injury  Repeat head CT in 6 hours  Elevated troponin  Unlikely clinically significant    I spent 45 minutes in the professional and overall care of this patient.      Code Status: DO NOT RESUSCITATE Comfort Care arrest with no intubation    See orders for additional plan elements, full history and physical to follow from attending physician.    Misty Pendleton, APRN-CNP  [facility] [phone]

## 2025-03-09 NOTE — CONSULTS
Reason For Consult  Left hip fracture    History Of Present Illness  Tyshawn Coles is a 84 y.o. male presenting with left femoral neck fracture.  Past medical history significant for dementia, seizure disorder, A-fib on Eliquis, end-stage renal disease with malfunctioning AV fistula receiving hemodialysis Tuesday Thursday and Saturdays, CAD with remote NSTEMI, peripheral vascular disease and frequent falls.    Mechanical fall at facility.  Hit his head and immediately had pain in his left hip.  CT scan of the head with acute versus chronic subdural.         Past Medical History  He has a past medical history of Autonomic dysfunction, Benign prostatic hyperplasia, Chronic anemia, Coronary artery disease, Current use of long term anticoagulation, Dementia, Dyslipidemia, End-stage renal disease on hemodialysis (Multi), Frequent falls, Gout, Heart failure with preserved ejection fraction, History of anaphylaxis, History of closed head injury, History of Clostridioides difficile colitis, History of deep vein thrombosis, History of non-ST elevation myocardial infarction (NSTEMI), History of sepsis, Hypertension, Hypoxic ischemic encephalopathy (Multi), 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), Kidney stones, Multilevel degenerative disc disease, Osteoarthritis, Paroxysmal atrial fibrillation (Multi), Peripheral neuropathy, Peripheral vascular disease (CMS-HCC), Secondary hyperparathyroidism of renal origin (Multi), and Seizure disorder (Multi).    Surgical History  He has a past surgical history that includes IR CVC tunneled (08/20/2021); IR tunneled dialysis catheter; AV fistula placement; pacemaker placement; Total hip arthroplasty (Right); Thrombectomy; Tonsillectomy; Adenoidectomy; Cholecystectomy; and Colonoscopy.     Social History  He reports that he has quit smoking. His smoking use included cigarettes. He has never used smokeless tobacco. He reports that he does not drink alcohol and does not use  "drugs.    Family History  Family History   Problem Relation Name Age of Onset    Diabetes Mother      Cancer Father      Lupus Daughter      Kidney failure Daughter      Asthma Son      Hypertension Other      Sickle cell trait Other          Allergies  Patient has no known allergies.    Review of Systems  ANO x 1.  Endorsing hip pain     Physical Exam  Left leg shortened and externally rotated.  Neurovascular intact distally     Last Recorded Vitals  Blood pressure (!) 193/96, pulse 72, temperature 37.2 °C (99 °F), temperature source Temporal, resp. rate 16, height 1.727 m (5' 8\"), weight 70.3 kg (155 lb), SpO2 96%.    Relevant Results      Scheduled medications  insulin lispro, 0-10 Units, subcutaneous, Before meals & nightly  sodium chloride 0.9%, 10 mL, intravenous, q8h JEREMY      Continuous medications     PRN medications  PRN medications: acetaminophen **OR** acetaminophen **OR** acetaminophen, alteplase, alteplase, dextrose, dextrose, glucagon, glucagon, HYDROmorphone, melatonin, ondansetron ODT **OR** ondansetron, oxyCODONE, oxyCODONE, polyethylene glycol  Results for orders placed or performed during the hospital encounter of 03/08/25 (from the past 24 hours)   CBC and Auto Differential   Result Value Ref Range    WBC 12.2 (H) 4.4 - 11.3 x10*3/uL    nRBC 0.0 0.0 - 0.0 /100 WBCs    RBC 3.45 (L) 4.50 - 5.90 x10*6/uL    Hemoglobin 9.9 (L) 13.5 - 17.5 g/dL    Hematocrit 28.1 (L) 41.0 - 52.0 %    MCV 81 80 - 100 fL    MCH 28.7 26.0 - 34.0 pg    MCHC 35.2 32.0 - 36.0 g/dL    RDW 18.2 (H) 11.5 - 14.5 %    Platelets 178 150 - 450 x10*3/uL    Neutrophils % 66.8 40.0 - 80.0 %    Immature Granulocytes %, Automated 0.4 0.0 - 0.9 %    Lymphocytes % 14.8 13.0 - 44.0 %    Monocytes % 14.6 2.0 - 10.0 %    Eosinophils % 3.0 0.0 - 6.0 %    Basophils % 0.4 0.0 - 2.0 %    Neutrophils Absolute 8.11 (H) 1.60 - 5.50 x10*3/uL    Immature Granulocytes Absolute, Automated 0.05 0.00 - 0.50 x10*3/uL    Lymphocytes Absolute 1.80 0.80 - " 3.00 x10*3/uL    Monocytes Absolute 1.78 (H) 0.05 - 0.80 x10*3/uL    Eosinophils Absolute 0.37 0.00 - 0.40 x10*3/uL    Basophils Absolute 0.05 0.00 - 0.10 x10*3/uL   Comprehensive metabolic panel   Result Value Ref Range    Glucose 204 (H) 74 - 99 mg/dL    Sodium 136 136 - 145 mmol/L    Potassium 4.3 3.5 - 5.3 mmol/L    Chloride 96 (L) 98 - 107 mmol/L    Bicarbonate 30 21 - 32 mmol/L    Anion Gap 14 10 - 20 mmol/L    Urea Nitrogen 38 (H) 6 - 23 mg/dL    Creatinine 5.11 (H) 0.50 - 1.30 mg/dL    eGFR 10 (L) >60 mL/min/1.73m*2    Calcium 8.8 8.6 - 10.3 mg/dL    Albumin 3.2 (L) 3.4 - 5.0 g/dL    Alkaline Phosphatase 62 33 - 136 U/L    Total Protein 6.7 6.4 - 8.2 g/dL    AST 24 9 - 39 U/L    Bilirubin, Total 0.5 0.0 - 1.2 mg/dL    ALT 14 10 - 52 U/L   Protime-INR   Result Value Ref Range    Protime 13.9 (H) 9.8 - 12.4 seconds    INR 1.3 (H) 0.9 - 1.1   Type And Screen   Result Value Ref Range    ABO TYPE O     Rh TYPE POS    Troponin I, High Sensitivity   Result Value Ref Range    Troponin I, High Sensitivity 70 (HH) 0 - 20 ng/L   Sars-CoV-2, Influenza A/B and RSV PCR   Result Value Ref Range    Coronavirus 2019, PCR Not Detected Not Detected    Flu A Result Not Detected Not Detected    Flu B Result Not Detected Not Detected    RSV PCR Not Detected Not Detected   Troponin I, High Sensitivity   Result Value Ref Range    Troponin I, High Sensitivity 69 (HH) 0 - 20 ng/L   POCT GLUCOSE   Result Value Ref Range    POCT Glucose 159 (H) 74 - 99 mg/dL        Assessment/Plan   84-year-old male with left femoral neck fracture.  He is ANO x 1.wife and family listed in chart as emergency contacts.  I believe they will be present later today to discuss planned intervention.  Would recommend operative fixation in form of hip hemiarthroplasty      We reviewed the patient's injury.  We discussed medical risk factors related to hip fractures including DVT, PE, significant cardiac and pulmonary events, including pneumonia, stroke, cardiac  ischemia, prolonged ventilation and the possibility of mortality.     We discussed the importance of early mobilization and role of surgery for both functional improvement as well as pain management.  We discussed that despite the risk of medical complications the risk is even higher with non-surgical management and the patient is limited to bed rest (risk of DVT, pressure sores, pneumonia).  N.p.o. at midnight for planned hemiarthroplasty tomorrow pending medical clearance   Will need to obtain consent from family      Sigrid Garcia MD

## 2025-03-09 NOTE — ASSESSMENT & PLAN NOTE
Fracture of the left femur patient will need a cardiac clearance since I cannot assess his functional capacity history of coronary artery disease history of atrial fibrillation will get echocardiogram consult cardiology patient had mild elevation of his troponin  Atrial fibrillation rate controlled hold anticoagulation in case the patient will need surgery  End-stage renal disease on hemodialysis  Hypertension continue to monitor adjust blood pressure medicine  Dementia monitor

## 2025-03-09 NOTE — H&P
History Of Present Illness  Tyshawn Coles is a 84 y.o. male presenting with past medical history of dementia admitted to the hospital status post fall patient is demented cannot give any history does not remember anything patient lives in facility patient had a history of atrial fibrillation on Eliquis history of dementia seizure end-stage renal disease and coronary artery disease cannot assess his functional capacity.     Past Medical History  Past Medical History:   Diagnosis Date    Autonomic dysfunction     Benign prostatic hyperplasia     Chronic anemia     Coronary artery disease     Current use of long term anticoagulation     Apixaban    Dementia     Dyslipidemia     End-stage renal disease on hemodialysis (Multi)     Frequent falls     Gout     Heart failure with preserved ejection fraction     History of anaphylaxis     History of closed head injury     History of Clostridioides difficile colitis     History of deep vein thrombosis     History of non-ST elevation myocardial infarction (NSTEMI)     History of sepsis     Hypertension     Hypoxic ischemic encephalopathy (Multi)     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 CLINICAL LEGACY    IR TUNNELED DIALYSIS CATHETER      PACEMAKER PLACEMENT      THROMBECTOMY      TONSILLECTOMY      TOTAL HIP ARTHROPLASTY Right         Social History  He reports that he has quit smoking. His smoking use included cigarettes. He has never used smokeless tobacco. He reports  "that he does not drink alcohol and does not use drugs.    Family History  Family History   Problem Relation Name Age of Onset    Diabetes Mother      Cancer Father      Lupus Daughter      Kidney failure Daughter      Asthma Son      Hypertension Other      Sickle cell trait Other          Allergies  Patient has no known allergies.    Review of Systems   Reason unable to perform ROS: Patient is very demented cannot get any review of system.        Physical Exam  HENT:      Right Ear: External ear normal.      Left Ear: External ear normal.      Mouth/Throat:      Mouth: Mucous membranes are moist.   Cardiovascular:      Rate and Rhythm: Normal rate and regular rhythm.      Heart sounds: No murmur heard.     No friction rub. No gallop.   Pulmonary:      Effort: No accessory muscle usage or respiratory distress.      Breath sounds: No stridor. No wheezing or rhonchi.   Chest:      Chest wall: No tenderness.   Abdominal:      General: There is no distension.      Palpations: There is no mass.      Tenderness: There is no abdominal tenderness. There is no guarding or rebound.   Musculoskeletal:         General: No deformity or signs of injury.      Cervical back: No rigidity or tenderness. Normal range of motion.      Right lower leg: No edema.      Left lower leg: No edema.   Skin:     Coloration: Skin is not jaundiced or pale.      Findings: No lesion.   Neurological:      Mental Status: He is alert and easily aroused.      Cranial Nerves: No cranial nerve deficit.      Sensory: No sensory deficit.      Motor: No weakness.      Comments: Very confused          Last Recorded Vitals  Blood pressure (!) 182/87, pulse 69, temperature 37 °C (98.6 °F), resp. rate 17, height 1.727 m (5' 8\"), weight 70.3 kg (155 lb), SpO2 97%.    Relevant Results             Assessment/Plan   Assessment & Plan  Closed fracture of neck of left femur, initial encounter    Falls, initial encounter    Closed head injury    Elevated " troponin  Fracture of the left femur patient will need a cardiac clearance since I cannot assess his functional capacity history of coronary artery disease history of atrial fibrillation will get echocardiogram consult cardiology patient had mild elevation of his troponin  Atrial fibrillation rate controlled hold anticoagulation in case the patient will need surgery  End-stage renal disease on hemodialysis  Hypertension continue to monitor adjust blood pressure medicine  Dementia monitor           I spent 55 minutes in the professional and overall care of this patient.      ADELINA East MD

## 2025-03-09 NOTE — ASSESSMENT & PLAN NOTE
Fall precautions, will need to be evaluated by physical and occupational therapies once cleared for activity

## 2025-03-09 NOTE — ED PROVIDER NOTES
HPI   Chief Complaint   Patient presents with    Leg Injury       84 years old male patient currently on dialysis Tuesday, Thursday, Saturday presented to the emergency department with a concern of left hip fracture from a skilled nursing facility.  He is DO NOT RESUSCITATE Comfort Care arrest DO NOT INTUBATE.  Patient fell, and hit his head.  Patient is alert and oriented x 1 which is baseline.  History of present illness as well as review of system were limited secondary to the patient degree of alertness.  Family reported multiple falls recently.                Patient History   Past Medical History:   Diagnosis Date    Localized edema 02/18/2020    Lower extremity edema    Personal history of other diseases of the musculoskeletal system and connective tissue     History of gout    Personal history of urinary calculi     History of kidney stones     Past Surgical History:   Procedure Laterality Date    IR CVC TUNNELED  8/20/2021    IR CVC TUNNELED 8/20/2021 [facility]ICAL LEGAC[facility]    OTHER SURGICAL HISTORY  02/07/2020    Permanent pacemaker insertion    OTHER SURGICAL HISTORY  02/07/2020    Tonsillectomy with adenoidectomy    OTHER SURGICAL HISTORY  02/07/2020    Cholecystectomy    OTHER SURGICAL HISTORY  06/21/2022    Colonoscopy    OTHER SURGICAL HISTORY  06/21/2022    Hip replacement    OTHER SURGICAL HISTORY  06/21/2022    Cardiac event recorder insertion     Family History   Problem Relation Name Age of Onset    Diabetes Mother      Cancer Father      Other (systemic lupus erythematosus') Daughter      Other (end-stage renal disease) Daughter          was on dialysis but had a renal transplant    Asthma Son      Hypertension Other      Sickle cell trait Other       Social History     Tobacco Use    Smoking status: Never    Smokeless tobacco: Never   Substance Use Topics    Alcohol use: Never    Drug use: Never       Physical Exam   ED Triage Vitals   Temperature Heart Rate Respirations BP   03/08/25 2347  03/09/25 0009 03/09/25 0009 03/09/25 0010   37.2 °C (99 °F) 70 20 177/85      Pulse Ox Temp Source Heart Rate Source Patient Position   03/09/25 0009 03/08/25 2347 03/09/25 0009 03/09/25 0139   95 % Temporal Monitor Sitting      BP Location FiO2 (%)     03/09/25 0139 --     Right arm        Physical Exam  Vitals and nursing note reviewed.   Constitutional:       General: He is not in acute distress.     Appearance: He is well-developed. He is ill-appearing.      Comments: Appears chronically ill   HENT:      Head: Normocephalic and atraumatic.   Eyes:      Conjunctiva/sclera: Conjunctivae normal.   Cardiovascular:      Rate and Rhythm: Normal rate and regular rhythm.      Heart sounds: No murmur heard.  Pulmonary:      Effort: Pulmonary effort is normal. No respiratory distress.      Breath sounds: Normal breath sounds.   Abdominal:      Palpations: Abdomen is soft.      Tenderness: There is no abdominal tenderness.   Musculoskeletal:         General: No swelling.      Cervical back: Neck supple.      Comments: Left lower extremity shortened and externally rotated.  There is a small abrasion over the left knee.  Intact distal circulation.   Skin:     General: Skin is warm and dry.      Capillary Refill: Capillary refill takes less than 2 seconds.   Neurological:      Mental Status: He is alert.   Psychiatric:         Mood and Affect: Mood normal.           ED Course & MDM   ED Course as of 03/09/25 0536   [place] Mar 09, 2025   0006 Dr. Edwards orthopedics recommends admit to medicine.  They will see the patient on consult [RD]   0111 Troponin I, High Sensitivity(!!): 70  No acute injury pattern EKG, patient is ESRD, will get delta troponin, suspect this is likely secondary to poor renal clearance. [RD]   0111 EKG independently interpreted shows sinus rhythm 70 bpm, QTc 436, no acute injury pattern. [RD]   0305 Troponin I, High Sensitivity(!!): 69  stable [CB]   0340 CT hip left wo IV contrast  Acute, traumatic,  impacted fracture of the left femoral neck.  Pronounced generalized osseous demineralization.   [CB]   0346 CT head wo IV contrast  1.  No acute intracranial hemorrhage or depressed calvarial fracture.  2. Trace amount of extra-axial hypodense fluid overlying the right  frontal region, suggestive of chronic subdural hematoma.  3. Partial fluid opacification of the bilateral mastoid air cells. [CB]   0346 CT cervical spine wo IV contrast  The evaluation of the cervical spine is degraded by motion  artifact. Degenerative changes. No definite evidence for acute  displaced fracture of the cervical spine; if there is persistent  clinical concern, a repeat CT can be obtained when the patient will  be able to cooperate with positioning.   [CB]   0347 XR chest 1 view  1.No acute pulmonary pathology.  2.Right central venous line.   [CB]   0422 Called rad ops for clarification given discrepancy between chronic SDH reported today not previously mentioned 3/5/25. Discussed with Dr. Rao, radiologist, findings similar to 3/5, appears to be hygroma vs chronic SDH. Not new.   [CB]      ED Course User Index  [CB] Esa Mendoza DO  [RD] Darius Long DO         Diagnoses as of 03/09/25 0536   Closed fracture of neck of left femur, initial encounter                 No data recorded     [place] Coma Scale Score: 14 (03/08/25 2350 : Blanca Aguero RN)                           Medical Decision Making  Patient seen and examined.  Obtain CT head, cervical spine.  There is chronic subdural hemorrhage.  Blood pressure is 202/100.  Will administer pain control and reassess the blood pressure and treat the hypertension accordingly.  We spoke to Dr. Yancey orthopedic surgery consultant who recommends admission to medicine and to be on consult.    I spoke to the radiologist regarding the chronic subdural hemorrhage, study was compared to a previous study that was obtained few days ago and revealed the same findings.  Patient is  admitted in stable condition.      I saw and evaluated the patient. I personally obtained the key and critical portions of the history and physical exam or was physically present for key and critical portions performed by the resident/fellow. I reviewed the resident/fellow's documentation and discussed the patient with the resident/fellow. I agree with the resident/fellow's medical decision making as documented in the note.    Bob Horn,          Procedure  Procedures     Bob Horn,   03/09/25 0537

## 2025-03-10 ENCOUNTER — ANESTHESIA (OUTPATIENT)
Dept: OPERATING ROOM | Facility: HOSPITAL | Age: 85
End: 2025-03-10
Payer: MEDICARE

## 2025-03-10 ENCOUNTER — APPOINTMENT (OUTPATIENT)
Dept: RADIOLOGY | Facility: HOSPITAL | Age: 85
DRG: 521 | End: 2025-03-10
Payer: MEDICARE

## 2025-03-10 ENCOUNTER — ANESTHESIA EVENT (OUTPATIENT)
Dept: OPERATING ROOM | Facility: HOSPITAL | Age: 85
End: 2025-03-10
Payer: MEDICARE

## 2025-03-10 ENCOUNTER — APPOINTMENT (OUTPATIENT)
Dept: CARDIOLOGY | Facility: HOSPITAL | Age: 85
DRG: 521 | End: 2025-03-10
Payer: MEDICARE

## 2025-03-10 LAB
ANION GAP SERPL CALC-SCNC: 15 MMOL/L (ref 10–20)
AORTIC VALVE MEAN GRADIENT: 7 MMHG
AORTIC VALVE PEAK VELOCITY: 1.7 M/S
AV PEAK GRADIENT: 12 MMHG
BUN SERPL-MCNC: 56 MG/DL (ref 6–23)
CALCIUM SERPL-MCNC: 8.8 MG/DL (ref 8.6–10.3)
CHLORIDE SERPL-SCNC: 99 MMOL/L (ref 98–107)
CO2 SERPL-SCNC: 29 MMOL/L (ref 21–32)
CREAT SERPL-MCNC: 7.11 MG/DL (ref 0.5–1.3)
EGFRCR SERPLBLD CKD-EPI 2021: 7 ML/MIN/1.73M*2
EJECTION FRACTION APICAL 4 CHAMBER: 60
EJECTION FRACTION: 65 %
ERYTHROCYTE [DISTWIDTH] IN BLOOD BY AUTOMATED COUNT: 18.1 % (ref 11.5–14.5)
GLUCOSE BLD MANUAL STRIP-MCNC: 170 MG/DL (ref 74–99)
GLUCOSE SERPL-MCNC: 160 MG/DL (ref 74–99)
HCT VFR BLD AUTO: 27.5 % (ref 41–52)
HGB BLD-MCNC: 9.6 G/DL (ref 13.5–17.5)
LEFT VENTRICLE INTERNAL DIMENSION DIASTOLE: 3.8 CM (ref 3.5–6)
LEFT VENTRICULAR OUTFLOW TRACT DIAMETER: 2.31 CM
LV EJECTION FRACTION BIPLANE: 65 %
MCH RBC QN AUTO: 29.1 PG (ref 26–34)
MCHC RBC AUTO-ENTMCNC: 34.9 G/DL (ref 32–36)
MCV RBC AUTO: 83 FL (ref 80–100)
MITRAL VALVE E/A RATIO: 0.9
NRBC BLD-RTO: 0 /100 WBCS (ref 0–0)
PLATELET # BLD AUTO: 173 X10*3/UL (ref 150–450)
POTASSIUM SERPL-SCNC: 4.3 MMOL/L (ref 3.5–5.3)
RBC # BLD AUTO: 3.3 X10*6/UL (ref 4.5–5.9)
RIGHT VENTRICLE FREE WALL PEAK S': 11 CM/S
RIGHT VENTRICLE PEAK SYSTOLIC PRESSURE: 42.7 MMHG
SODIUM SERPL-SCNC: 139 MMOL/L (ref 136–145)
STAPHYLOCOCCUS SPEC CULT: NORMAL
TRICUSPID ANNULAR PLANE SYSTOLIC EXCURSION: 1.7 CM
WBC # BLD AUTO: 15.9 X10*3/UL (ref 4.4–11.3)

## 2025-03-10 PROCEDURE — 2500000004 HC RX 250 GENERAL PHARMACY W/ HCPCS (ALT 636 FOR OP/ED): Performed by: ANESTHESIOLOGIST ASSISTANT

## 2025-03-10 PROCEDURE — 27236 TREAT THIGH FRACTURE: CPT | Performed by: PHYSICIAN ASSISTANT

## 2025-03-10 PROCEDURE — 2500000005 HC RX 250 GENERAL PHARMACY W/O HCPCS: Performed by: ANESTHESIOLOGIST ASSISTANT

## 2025-03-10 PROCEDURE — C1776 JOINT DEVICE (IMPLANTABLE): HCPCS | Performed by: ORTHOPAEDIC SURGERY

## 2025-03-10 PROCEDURE — 72170 X-RAY EXAM OF PELVIS: CPT | Performed by: RADIOLOGY

## 2025-03-10 PROCEDURE — 99222 1ST HOSP IP/OBS MODERATE 55: CPT | Performed by: INTERNAL MEDICINE

## 2025-03-10 PROCEDURE — 80048 BASIC METABOLIC PNL TOTAL CA: CPT | Performed by: NURSE PRACTITIONER

## 2025-03-10 PROCEDURE — 2780000003 HC OR 278 NO HCPCS: Performed by: ORTHOPAEDIC SURGERY

## 2025-03-10 PROCEDURE — 27236 TREAT THIGH FRACTURE: CPT | Performed by: ORTHOPAEDIC SURGERY

## 2025-03-10 PROCEDURE — 2500000001 HC RX 250 WO HCPCS SELF ADMINISTERED DRUGS (ALT 637 FOR MEDICARE OP): Performed by: NURSE PRACTITIONER

## 2025-03-10 PROCEDURE — A27236 PR FEMORAL FX, OPEN TX: Performed by: ANESTHESIOLOGIST ASSISTANT

## 2025-03-10 PROCEDURE — 3600000010 HC OR TIME - EACH INCREMENTAL 1 MINUTE - PROCEDURE LEVEL FIVE: Performed by: ORTHOPAEDIC SURGERY

## 2025-03-10 PROCEDURE — 99140 ANES COMP EMERGENCY COND: CPT | Performed by: ANESTHESIOLOGY

## 2025-03-10 PROCEDURE — 82947 ASSAY GLUCOSE BLOOD QUANT: CPT

## 2025-03-10 PROCEDURE — 3700000001 HC GENERAL ANESTHESIA TIME - INITIAL BASE CHARGE: Performed by: ORTHOPAEDIC SURGERY

## 2025-03-10 PROCEDURE — 2500000002 HC RX 250 W HCPCS SELF ADMINISTERED DRUGS (ALT 637 FOR MEDICARE OP, ALT 636 FOR OP/ED): Performed by: NURSE PRACTITIONER

## 2025-03-10 PROCEDURE — 93306 TTE W/DOPPLER COMPLETE: CPT

## 2025-03-10 PROCEDURE — 2720000007 HC OR 272 NO HCPCS: Performed by: ORTHOPAEDIC SURGERY

## 2025-03-10 PROCEDURE — 1100000001 HC PRIVATE ROOM DAILY

## 2025-03-10 PROCEDURE — 2500000004 HC RX 250 GENERAL PHARMACY W/ HCPCS (ALT 636 FOR OP/ED): Mod: JZ | Performed by: ANESTHESIOLOGY

## 2025-03-10 PROCEDURE — C1713 ANCHOR/SCREW BN/BN,TIS/BN: HCPCS | Performed by: ORTHOPAEDIC SURGERY

## 2025-03-10 PROCEDURE — A27236 PR FEMORAL FX, OPEN TX: Performed by: ANESTHESIOLOGY

## 2025-03-10 PROCEDURE — 3700000002 HC GENERAL ANESTHESIA TIME - EACH INCREMENTAL 1 MINUTE: Performed by: ORTHOPAEDIC SURGERY

## 2025-03-10 PROCEDURE — 85027 COMPLETE CBC AUTOMATED: CPT | Performed by: NURSE PRACTITIONER

## 2025-03-10 PROCEDURE — 3600000005 HC OR TIME - INITIAL BASE CHARGE - PROCEDURE LEVEL FIVE: Performed by: ORTHOPAEDIC SURGERY

## 2025-03-10 PROCEDURE — 72170 X-RAY EXAM OF PELVIS: CPT

## 2025-03-10 PROCEDURE — 93306 TTE W/DOPPLER COMPLETE: CPT | Performed by: INTERNAL MEDICINE

## 2025-03-10 PROCEDURE — A6213 FOAM DRG >16<=48 SQ IN W/BDR: HCPCS | Performed by: ORTHOPAEDIC SURGERY

## 2025-03-10 PROCEDURE — 36415 COLL VENOUS BLD VENIPUNCTURE: CPT | Performed by: NURSE PRACTITIONER

## 2025-03-10 PROCEDURE — 7100000002 HC RECOVERY ROOM TIME - EACH INCREMENTAL 1 MINUTE: Performed by: ORTHOPAEDIC SURGERY

## 2025-03-10 PROCEDURE — 7100000001 HC RECOVERY ROOM TIME - INITIAL BASE CHARGE: Performed by: ORTHOPAEDIC SURGERY

## 2025-03-10 PROCEDURE — 99100 ANES PT EXTEME AGE<1 YR&>70: CPT | Performed by: ANESTHESIOLOGY

## 2025-03-10 PROCEDURE — 2500000004 HC RX 250 GENERAL PHARMACY W/ HCPCS (ALT 636 FOR OP/ED): Performed by: NURSE PRACTITIONER

## 2025-03-10 PROCEDURE — 2500000005 HC RX 250 GENERAL PHARMACY W/O HCPCS: Performed by: ANESTHESIOLOGY

## 2025-03-10 DEVICE — IMPLANTABLE DEVICE
Type: IMPLANTABLE DEVICE | Site: HIP | Status: FUNCTIONAL
Brand: BIOMET® HIP SYSTEM

## 2025-03-10 DEVICE — IMPLANTABLE DEVICE
Type: IMPLANTABLE DEVICE | Site: HIP | Status: FUNCTIONAL
Brand: VERSYS®

## 2025-03-10 DEVICE — FULL DOSE BONE CEMENT, 10 PACK CATALOG NUMBER IS 6191-1-010
Type: IMPLANTABLE DEVICE | Site: HIP | Status: FUNCTIONAL
Brand: SIMPLEX

## 2025-03-10 RX ORDER — LIDOCAINE HYDROCHLORIDE 20 MG/ML
INJECTION, SOLUTION EPIDURAL; INFILTRATION; INTRACAUDAL; PERINEURAL AS NEEDED
Status: DISCONTINUED | OUTPATIENT
Start: 2025-03-10 | End: 2025-03-10

## 2025-03-10 RX ORDER — DIPHENHYDRAMINE HYDROCHLORIDE 50 MG/ML
12.5 INJECTION, SOLUTION INTRAMUSCULAR; INTRAVENOUS ONCE AS NEEDED
Status: DISCONTINUED | OUTPATIENT
Start: 2025-03-10 | End: 2025-03-10 | Stop reason: HOSPADM

## 2025-03-10 RX ORDER — ONDANSETRON 4 MG/1
4 TABLET, FILM COATED ORAL EVERY 8 HOURS PRN
Status: CANCELLED | OUTPATIENT
Start: 2025-03-10

## 2025-03-10 RX ORDER — FENTANYL CITRATE 50 UG/ML
INJECTION, SOLUTION INTRAMUSCULAR; INTRAVENOUS AS NEEDED
Status: DISCONTINUED | OUTPATIENT
Start: 2025-03-10 | End: 2025-03-10

## 2025-03-10 RX ORDER — ACETAMINOPHEN 325 MG/1
650 TABLET ORAL EVERY 6 HOURS SCHEDULED
Status: CANCELLED | OUTPATIENT
Start: 2025-03-10

## 2025-03-10 RX ORDER — HYDRALAZINE HYDROCHLORIDE 20 MG/ML
5 INJECTION INTRAMUSCULAR; INTRAVENOUS EVERY 30 MIN PRN
Status: DISCONTINUED | OUTPATIENT
Start: 2025-03-10 | End: 2025-03-10 | Stop reason: HOSPADM

## 2025-03-10 RX ORDER — ALBUTEROL SULFATE 0.83 MG/ML
2.5 SOLUTION RESPIRATORY (INHALATION) ONCE AS NEEDED
Status: DISCONTINUED | OUTPATIENT
Start: 2025-03-10 | End: 2025-03-10 | Stop reason: HOSPADM

## 2025-03-10 RX ORDER — GLYCOPYRROLATE 0.2 MG/ML
INJECTION INTRAMUSCULAR; INTRAVENOUS AS NEEDED
Status: DISCONTINUED | OUTPATIENT
Start: 2025-03-10 | End: 2025-03-10

## 2025-03-10 RX ORDER — HYDROMORPHONE HYDROCHLORIDE 1 MG/ML
1 INJECTION, SOLUTION INTRAMUSCULAR; INTRAVENOUS; SUBCUTANEOUS EVERY 5 MIN PRN
Status: DISCONTINUED | OUTPATIENT
Start: 2025-03-10 | End: 2025-03-10 | Stop reason: HOSPADM

## 2025-03-10 RX ORDER — OXYCODONE HYDROCHLORIDE 5 MG/1
2.5 TABLET ORAL EVERY 6 HOURS PRN
Status: CANCELLED | OUTPATIENT
Start: 2025-03-10

## 2025-03-10 RX ORDER — FENTANYL CITRATE 50 UG/ML
12.5 INJECTION, SOLUTION INTRAMUSCULAR; INTRAVENOUS EVERY 5 MIN PRN
Status: DISCONTINUED | OUTPATIENT
Start: 2025-03-10 | End: 2025-03-10 | Stop reason: HOSPADM

## 2025-03-10 RX ORDER — MEPERIDINE HYDROCHLORIDE 50 MG/ML
12.5 INJECTION INTRAMUSCULAR; INTRAVENOUS; SUBCUTANEOUS EVERY 10 MIN PRN
Status: DISCONTINUED | OUTPATIENT
Start: 2025-03-10 | End: 2025-03-10 | Stop reason: HOSPADM

## 2025-03-10 RX ORDER — METOPROLOL TARTRATE 100 MG/1
100 TABLET ORAL 2 TIMES DAILY
Status: DISCONTINUED | OUTPATIENT
Start: 2025-03-10 | End: 2025-03-12 | Stop reason: HOSPADM

## 2025-03-10 RX ORDER — OXYCODONE HYDROCHLORIDE 5 MG/1
5 TABLET ORAL EVERY 4 HOURS PRN
Status: CANCELLED | OUTPATIENT
Start: 2025-03-10

## 2025-03-10 RX ORDER — ONDANSETRON HYDROCHLORIDE 2 MG/ML
4 INJECTION, SOLUTION INTRAVENOUS ONCE AS NEEDED
Status: DISCONTINUED | OUTPATIENT
Start: 2025-03-10 | End: 2025-03-10 | Stop reason: HOSPADM

## 2025-03-10 RX ORDER — DIPHENHYDRAMINE HCL 12.5MG/5ML
12.5 LIQUID (ML) ORAL EVERY 6 HOURS PRN
Status: CANCELLED | OUTPATIENT
Start: 2025-03-10

## 2025-03-10 RX ORDER — PROPOFOL 10 MG/ML
INJECTION, EMULSION INTRAVENOUS AS NEEDED
Status: DISCONTINUED | OUTPATIENT
Start: 2025-03-10 | End: 2025-03-10

## 2025-03-10 RX ORDER — DIPHENHYDRAMINE HYDROCHLORIDE 50 MG/ML
12.5 INJECTION, SOLUTION INTRAMUSCULAR; INTRAVENOUS EVERY 6 HOURS PRN
Status: CANCELLED | OUTPATIENT
Start: 2025-03-10

## 2025-03-10 RX ORDER — CEFAZOLIN SODIUM 2 G/100ML
2 INJECTION, SOLUTION INTRAVENOUS EVERY 8 HOURS
Status: CANCELLED | OUTPATIENT
Start: 2025-03-10 | End: 2025-03-11

## 2025-03-10 RX ORDER — PHENYLEPHRINE HCL IN 0.9% NACL 1 MG/10 ML
SYRINGE (ML) INTRAVENOUS AS NEEDED
Status: DISCONTINUED | OUTPATIENT
Start: 2025-03-10 | End: 2025-03-10

## 2025-03-10 RX ORDER — LIDOCAINE HYDROCHLORIDE 10 MG/ML
0.1 INJECTION, SOLUTION INFILTRATION; PERINEURAL ONCE
Status: DISCONTINUED | OUTPATIENT
Start: 2025-03-10 | End: 2025-03-10 | Stop reason: HOSPADM

## 2025-03-10 RX ORDER — CEFAZOLIN SODIUM 2 G/100ML
INJECTION, SOLUTION INTRAVENOUS AS NEEDED
Status: DISCONTINUED | OUTPATIENT
Start: 2025-03-10 | End: 2025-03-10

## 2025-03-10 RX ORDER — ROCURONIUM BROMIDE 50 MG/5 ML
SYRINGE (ML) INTRAVENOUS AS NEEDED
Status: DISCONTINUED | OUTPATIENT
Start: 2025-03-10 | End: 2025-03-10

## 2025-03-10 RX ORDER — SODIUM CHLORIDE, SODIUM LACTATE, POTASSIUM CHLORIDE, CALCIUM CHLORIDE 600; 310; 30; 20 MG/100ML; MG/100ML; MG/100ML; MG/100ML
100 INJECTION, SOLUTION INTRAVENOUS CONTINUOUS
Status: DISCONTINUED | OUTPATIENT
Start: 2025-03-10 | End: 2025-03-10 | Stop reason: HOSPADM

## 2025-03-10 RX ORDER — ACETAMINOPHEN 325 MG/1
975 TABLET ORAL ONCE
Status: DISCONTINUED | OUTPATIENT
Start: 2025-03-10 | End: 2025-03-10

## 2025-03-10 RX ORDER — BISACODYL 5 MG
10 TABLET, DELAYED RELEASE (ENTERIC COATED) ORAL DAILY PRN
Status: CANCELLED | OUTPATIENT
Start: 2025-03-10

## 2025-03-10 RX ORDER — ENOXAPARIN SODIUM 100 MG/ML
40 INJECTION SUBCUTANEOUS DAILY
Status: CANCELLED | OUTPATIENT
Start: 2025-03-10

## 2025-03-10 RX ORDER — SODIUM CHLORIDE, SODIUM LACTATE, POTASSIUM CHLORIDE, CALCIUM CHLORIDE 600; 310; 30; 20 MG/100ML; MG/100ML; MG/100ML; MG/100ML
50 INJECTION, SOLUTION INTRAVENOUS CONTINUOUS
Status: CANCELLED | OUTPATIENT
Start: 2025-03-10 | End: 2025-03-11

## 2025-03-10 RX ORDER — POLYETHYLENE GLYCOL 3350 17 G/17G
17 POWDER, FOR SOLUTION ORAL DAILY
Status: CANCELLED | OUTPATIENT
Start: 2025-03-10

## 2025-03-10 RX ORDER — OXYCODONE HYDROCHLORIDE 5 MG/1
5 TABLET ORAL EVERY 4 HOURS PRN
Status: DISCONTINUED | OUTPATIENT
Start: 2025-03-10 | End: 2025-03-10

## 2025-03-10 RX ORDER — NALOXONE HYDROCHLORIDE 0.4 MG/ML
0.2 INJECTION, SOLUTION INTRAMUSCULAR; INTRAVENOUS; SUBCUTANEOUS EVERY 5 MIN PRN
Status: CANCELLED | OUTPATIENT
Start: 2025-03-10

## 2025-03-10 RX ORDER — MIDAZOLAM HYDROCHLORIDE 1 MG/ML
1 INJECTION, SOLUTION INTRAMUSCULAR; INTRAVENOUS ONCE AS NEEDED
Status: DISCONTINUED | OUTPATIENT
Start: 2025-03-10 | End: 2025-03-10 | Stop reason: HOSPADM

## 2025-03-10 RX ORDER — ONDANSETRON HYDROCHLORIDE 2 MG/ML
4 INJECTION, SOLUTION INTRAVENOUS EVERY 8 HOURS PRN
Status: CANCELLED | OUTPATIENT
Start: 2025-03-10

## 2025-03-10 RX ADMIN — Medication 10 MG: at 15:58

## 2025-03-10 RX ADMIN — PROPOFOL 20 MG: 10 INJECTION, EMULSION INTRAVENOUS at 15:55

## 2025-03-10 RX ADMIN — FINASTERIDE 5 MG: 5 TABLET, FILM COATED ORAL at 21:17

## 2025-03-10 RX ADMIN — HYDRALAZINE HYDROCHLORIDE 5 MG: 20 INJECTION INTRAMUSCULAR; INTRAVENOUS at 18:00

## 2025-03-10 RX ADMIN — FENTANYL CITRATE 50 MCG: 50 INJECTION, SOLUTION INTRAMUSCULAR; INTRAVENOUS at 16:49

## 2025-03-10 RX ADMIN — LEVETIRACETAM 500 MG: 500 TABLET, FILM COATED ORAL at 10:02

## 2025-03-10 RX ADMIN — HYDROMORPHONE HYDROCHLORIDE 0.5 MG: 1 INJECTION, SOLUTION INTRAMUSCULAR; INTRAVENOUS; SUBCUTANEOUS at 17:40

## 2025-03-10 RX ADMIN — SEVELAMER CARBONATE 1.6 G: 800 POWDER, FOR SUSPENSION ORAL at 21:17

## 2025-03-10 RX ADMIN — PROPOFOL 20 MG: 10 INJECTION, EMULSION INTRAVENOUS at 16:42

## 2025-03-10 RX ADMIN — Medication 6 L/MIN: at 17:27

## 2025-03-10 RX ADMIN — Medication 50 MG: at 15:30

## 2025-03-10 RX ADMIN — PROPOFOL 100 MG: 10 INJECTION, EMULSION INTRAVENOUS at 15:30

## 2025-03-10 RX ADMIN — INSULIN LISPRO 2 UNITS: 100 INJECTION, SOLUTION INTRAVENOUS; SUBCUTANEOUS at 21:27

## 2025-03-10 RX ADMIN — HYDROMORPHONE HYDROCHLORIDE 0.5 MG: 1 INJECTION, SOLUTION INTRAMUSCULAR; INTRAVENOUS; SUBCUTANEOUS at 17:53

## 2025-03-10 RX ADMIN — METOPROLOL TARTRATE 100 MG: 100 TABLET, FILM COATED ORAL at 21:17

## 2025-03-10 RX ADMIN — PROPOFOL 20 MG: 10 INJECTION, EMULSION INTRAVENOUS at 16:09

## 2025-03-10 RX ADMIN — OXYCODONE HYDROCHLORIDE 5 MG: 5 TABLET ORAL at 21:22

## 2025-03-10 RX ADMIN — SODIUM CHLORIDE: 9 INJECTION, SOLUTION INTRAVENOUS at 15:25

## 2025-03-10 RX ADMIN — Medication 10 MG: at 21:17

## 2025-03-10 RX ADMIN — CEFAZOLIN SODIUM 2 G: 2 INJECTION, SOLUTION INTRAVENOUS at 15:40

## 2025-03-10 RX ADMIN — FENTANYL CITRATE 50 MCG: 50 INJECTION, SOLUTION INTRAMUSCULAR; INTRAVENOUS at 15:30

## 2025-03-10 RX ADMIN — Medication 10 ML: at 10:39

## 2025-03-10 RX ADMIN — LIDOCAINE HYDROCHLORIDE 80 MG: 20 INJECTION, SOLUTION EPIDURAL; INFILTRATION; INTRACAUDAL; PERINEURAL at 15:30

## 2025-03-10 RX ADMIN — LEVETIRACETAM 500 MG: 500 TABLET, FILM COATED ORAL at 21:17

## 2025-03-10 RX ADMIN — Medication 100 MCG: at 16:36

## 2025-03-10 RX ADMIN — SUGAMMADEX 200 MG: 100 INJECTION, SOLUTION INTRAVENOUS at 17:16

## 2025-03-10 RX ADMIN — PROPOFOL 20 MG: 10 INJECTION, EMULSION INTRAVENOUS at 16:15

## 2025-03-10 RX ADMIN — METOPROLOL TARTRATE 50 MG: 50 TABLET, FILM COATED ORAL at 10:02

## 2025-03-10 RX ADMIN — PROPOFOL 20 MG: 10 INJECTION, EMULSION INTRAVENOUS at 16:24

## 2025-03-10 RX ADMIN — GLYCOPYRROLATE 0.4 MG: 0.2 INJECTION, SOLUTION INTRAMUSCULAR; INTRAVENOUS at 15:37

## 2025-03-10 SDOH — SOCIAL STABILITY: SOCIAL INSECURITY: WITHIN THE LAST YEAR, HAVE YOU BEEN AFRAID OF YOUR PARTNER OR EX-PARTNER?: PATIENT UNABLE TO ANSWER

## 2025-03-10 SDOH — ECONOMIC STABILITY: TRANSPORTATION INSECURITY
IN THE PAST 12 MONTHS, HAS LACK OF TRANSPORTATION KEPT YOU FROM MEDICAL APPOINTMENTS OR FROM GETTING MEDICATIONS?: PATIENT UNABLE TO ANSWER

## 2025-03-10 SDOH — ECONOMIC STABILITY: FOOD INSECURITY
WITHIN THE PAST 12 MONTHS, THE FOOD YOU BOUGHT JUST DIDN'T LAST AND YOU DIDN'T HAVE MONEY TO GET MORE.: PATIENT UNABLE TO ANSWER

## 2025-03-10 SDOH — ECONOMIC STABILITY: INCOME INSECURITY
IN THE PAST 12 MONTHS HAS THE ELECTRIC, GAS, OIL, OR WATER COMPANY THREATENED TO SHUT OFF SERVICES IN YOUR HOME?: PATIENT UNABLE TO ANSWER

## 2025-03-10 SDOH — ECONOMIC STABILITY: HOUSING INSECURITY
IN THE LAST 12 MONTHS, WAS THERE A TIME WHEN YOU WERE NOT ABLE TO PAY THE MORTGAGE OR RENT ON TIME?: PATIENT UNABLE TO ANSWER

## 2025-03-10 SDOH — SOCIAL STABILITY: SOCIAL INSECURITY
WITHIN THE LAST YEAR, HAVE YOU BEEN HUMILIATED OR EMOTIONALLY ABUSED IN OTHER WAYS BY YOUR PARTNER OR EX-PARTNER?: PATIENT UNABLE TO ANSWER

## 2025-03-10 SDOH — ECONOMIC STABILITY: FOOD INSECURITY
HOW HARD IS IT FOR YOU TO PAY FOR THE VERY BASICS LIKE FOOD, HOUSING, MEDICAL CARE, AND HEATING?: PATIENT UNABLE TO ANSWER

## 2025-03-10 SDOH — ECONOMIC STABILITY: FOOD INSECURITY
WITHIN THE PAST 12 MONTHS, YOU WORRIED THAT YOUR FOOD WOULD RUN OUT BEFORE YOU GOT THE MONEY TO BUY MORE.: PATIENT UNABLE TO ANSWER

## 2025-03-10 SDOH — ECONOMIC STABILITY: HOUSING INSECURITY: AT ANY TIME IN THE PAST 12 MONTHS, WERE YOU HOMELESS OR LIVING IN A SHELTER (INCLUDING NOW)?: PATIENT UNABLE TO ANSWER

## 2025-03-10 SDOH — ECONOMIC STABILITY: HOUSING INSECURITY: IN THE PAST 12 MONTHS, HOW MANY TIMES HAVE YOU MOVED WHERE YOU WERE LIVING?: 0

## 2025-03-10 SDOH — SOCIAL STABILITY: SOCIAL INSECURITY
WITHIN THE LAST YEAR, HAVE YOU BEEN KICKED, HIT, SLAPPED, OR OTHERWISE PHYSICALLY HURT BY YOUR PARTNER OR EX-PARTNER?: PATIENT UNABLE TO ANSWER

## 2025-03-10 SDOH — SOCIAL STABILITY: SOCIAL INSECURITY
WITHIN THE LAST YEAR, HAVE YOU BEEN RAPED OR FORCED TO HAVE ANY KIND OF SEXUAL ACTIVITY BY YOUR PARTNER OR EX-PARTNER?: PATIENT UNABLE TO ANSWER

## 2025-03-10 ASSESSMENT — PAIN SCALES - WONG BAKER
WONGBAKER_NUMERICALRESPONSE: NO HURT
WONGBAKER_NUMERICALRESPONSE: HURTS EVEN MORE
WONGBAKER_NUMERICALRESPONSE: HURTS WHOLE LOT
WONGBAKER_NUMERICALRESPONSE: HURTS LITTLE BIT
WONGBAKER_NUMERICALRESPONSE: HURTS EVEN MORE
WONGBAKER_NUMERICALRESPONSE: HURTS LITTLE BIT
WONGBAKER_NUMERICALRESPONSE: HURTS LITTLE MORE

## 2025-03-10 ASSESSMENT — PAIN - FUNCTIONAL ASSESSMENT
PAIN_FUNCTIONAL_ASSESSMENT: WONG-BAKER FACES

## 2025-03-10 ASSESSMENT — ACTIVITIES OF DAILY LIVING (ADL): LACK_OF_TRANSPORTATION: PATIENT UNABLE TO ANSWER

## 2025-03-10 ASSESSMENT — PAIN SCALES - GENERAL
PAINLEVEL_OUTOF10: 0 - NO PAIN
PAINLEVEL_OUTOF10: 0 - NO PAIN

## 2025-03-10 NOTE — PROGRESS NOTES
"Subjective  Patient had uneventful night does not complain about any chest pain shortness of breath very confused  Nursing staff was interviewed  Objectives    Last Recorded Vitals  Blood pressure 150/71, pulse 68, temperature 37.2 °C (99 °F), temperature source Temporal, resp. rate 17, height 1.727 m (5' 8\"), weight 70.3 kg (155 lb), SpO2 97%.    Physical Exam  HENT:      Right Ear: External ear normal.      Left Ear: External ear normal.      Mouth/Throat:      Mouth: Mucous membranes are moist.   Cardiovascular:      Rate and Rhythm: Normal rate and regular rhythm.      Heart sounds: No murmur heard.     No friction rub. No gallop.   Pulmonary:      Effort: No accessory muscle usage or respiratory distress.      Breath sounds: No stridor. No wheezing or rhonchi.   Chest:      Chest wall: No tenderness.   Abdominal:   Limited range of motion over the left hip  Musculoskeletal:         General: No deformity or signs of injury.      Cervical back: No rigidity or tenderness. Normal range of motion.      Right lower leg: No edema.      Left lower leg: No edema.   Skin:     Coloration: Skin is not jaundiced or pale.      Findings: No lesion.   Neurological:   Very confused but alert       Labs    Admission on 03/08/2025   Component Date Value Ref Range Status    WBC 03/09/2025 12.2 (H)  4.4 - 11.3 x10*3/uL Final    nRBC 03/09/2025 0.0  0.0 - 0.0 /100 WBCs Final    RBC 03/09/2025 3.45 (L)  4.50 - 5.90 x10*6/uL Final    Hemoglobin 03/09/2025 9.9 (L)  13.5 - 17.5 g/dL Final    Hematocrit 03/09/2025 28.1 (L)  41.0 - 52.0 % Final    MCV 03/09/2025 81  80 - 100 fL Final    MCH 03/09/2025 28.7  26.0 - 34.0 pg Final    MCHC 03/09/2025 35.2  32.0 - 36.0 g/dL Final    RDW 03/09/2025 18.2 (H)  11.5 - 14.5 % Final    Platelets 03/09/2025 178  150 - 450 x10*3/uL Final    Neutrophils % 03/09/2025 66.8  40.0 - 80.0 % Final    Immature Granulocytes %, Automated 03/09/2025 0.4  0.0 - 0.9 % Final    Immature Granulocyte Count (IG) " includes promyelocytes, myelocytes and metamyelocytes but does not include bands. Percent differential counts (%) should be interpreted in the context of the absolute cell counts (cells/UL).    Lymphocytes % 03/09/2025 14.8  13.0 - 44.0 % Final    Monocytes % 03/09/2025 14.6  2.0 - 10.0 % Final    Eosinophils % 03/09/2025 3.0  0.0 - 6.0 % Final    Basophils % 03/09/2025 0.4  0.0 - 2.0 % Final    Neutrophils Absolute 03/09/2025 8.11 (H)  1.60 - 5.50 x10*3/uL Final    Percent differential counts (%) should be interpreted in the context of the absolute cell counts (cells/uL).    Immature Granulocytes Absolute, Au* 03/09/2025 0.05  0.00 - 0.50 x10*3/uL Final    Lymphocytes Absolute 03/09/2025 1.80  0.80 - 3.00 x10*3/uL Final    Monocytes Absolute 03/09/2025 1.78 (H)  0.05 - 0.80 x10*3/uL Final    Eosinophils Absolute 03/09/2025 0.37  0.00 - 0.40 x10*3/uL Final    Basophils Absolute 03/09/2025 0.05  0.00 - 0.10 x10*3/uL Final    Glucose 03/09/2025 204 (H)  74 - 99 mg/dL Final    Sodium 03/09/2025 136  136 - 145 mmol/L Final    Potassium 03/09/2025 4.3  3.5 - 5.3 mmol/L Final    Chloride 03/09/2025 96 (L)  98 - 107 mmol/L Final    Bicarbonate 03/09/2025 30  21 - 32 mmol/L Final    Anion Gap 03/09/2025 14  10 - 20 mmol/L Final    Urea Nitrogen 03/09/2025 38 (H)  6 - 23 mg/dL Final    Creatinine 03/09/2025 5.11 (H)  0.50 - 1.30 mg/dL Final    eGFR 03/09/2025 10 (L)  >60 mL/min/1.73m*2 Final    Calculations of estimated GFR are performed using the 2021 CKD-EPI Study Refit equation without the race variable for the IDMS-Traceable creatinine methods.  https://jasn.asnjournals.org/content/early/2021/09/22/ASN.7756126111    Calcium 03/09/2025 8.8  8.6 - 10.3 mg/dL Final    Albumin 03/09/2025 3.2 (L)  3.4 - 5.0 g/dL Final    Alkaline Phosphatase 03/09/2025 62  33 - 136 U/L Final    Total Protein 03/09/2025 6.7  6.4 - 8.2 g/dL Final    AST 03/09/2025 24  9 - 39 U/L Final    Bilirubin, Total 03/09/2025 0.5  0.0 - 1.2 mg/dL Final     ALT 03/09/2025 14  10 - 52 U/L Final    Patients treated with Sulfasalazine may generate falsely decreased results for ALT.    Protime 03/09/2025 13.9 (H)  9.8 - 12.4 seconds Final    INR 03/09/2025 1.3 (H)  0.9 - 1.1 Final    ABO TYPE 03/09/2025 O   Final    Rh TYPE 03/09/2025 POS   Final    Troponin I, High Sensitivity 03/09/2025 70 (HH)  0 - 20 ng/L Final    Ventricular Rate 03/09/2025 70  BPM Preliminary    Atrial Rate 03/09/2025 70  BPM Preliminary    AZ Interval 03/09/2025 166  ms Preliminary    QRS Duration 03/09/2025 72  ms Preliminary    QT Interval 03/09/2025 404  ms Preliminary    QTC Calculation(Bazett) 03/09/2025 436  ms Preliminary    P [place] 03/09/2025 78  degrees Preliminary    R [place] 03/09/2025 77  degrees Preliminary    T [place] 03/09/2025 80  degrees Preliminary    QRS Count 03/09/2025 11  beats Preliminary    Q Onset 03/09/2025 222  ms Preliminary    P Onset 03/09/2025 139  ms Preliminary    P Offset 03/09/2025 198  ms Preliminary    T Offset 03/09/2025 424  ms Preliminary    QTC [place] 03/09/2025 425  ms Preliminary    Coronavirus 2019, PCR 03/09/2025 Not Detected  Not Detected Final    Flu A Result 03/09/2025 Not Detected  Not Detected Final    Flu B Result 03/09/2025 Not Detected  Not Detected Final    RSV PCR 03/09/2025 Not Detected  Not Detected Final    Troponin I, High Sensitivity 03/09/2025 69 (HH)  0 - 20 ng/L Final    Previous result verified on 3/9/2025 0103 on specimen/case [id number] called with component [facility] for procedure Troponin I, High Sensitivity with value 70 ng/L.    POCT Glucose 03/09/2025 159 (H)  74 - 99 mg/dL Final    POCT Glucose 03/09/2025 99  74 - 99 mg/dL Final    AV pk omar 03/10/2025 1.70  m/s Final    LV Biplane EF 03/10/2025 65  % Final    LVOT diam 03/10/2025 2.31  cm Final    MV E/A ratio 03/10/2025 0.90   Final    Tricuspid annular plane systolic e* 03/10/2025 1.7  cm Final    AV mn grad 03/10/2025 7  mmHg Final    LV EF 03/10/2025 65  % Final    RV free  wall pk S' 03/10/2025 11.00  cm/s Final    RVSP 03/10/2025 42.7  mmHg Final    LVIDd 03/10/2025 3.80  cm Final    AV pk grad 03/10/2025 12  mmHg Final    LV A4C EF 03/10/2025 60.0   Final    WBC 03/10/2025 15.9 (H)  4.4 - 11.3 x10*3/uL Final    nRBC 03/10/2025 0.0  0.0 - 0.0 /100 WBCs Final    RBC 03/10/2025 3.30 (L)  4.50 - 5.90 x10*6/uL Final    Hemoglobin 03/10/2025 9.6 (L)  13.5 - 17.5 g/dL Final    Hematocrit 03/10/2025 27.5 (L)  41.0 - 52.0 % Final    MCV 03/10/2025 83  80 - 100 fL Final    MCH 03/10/2025 29.1  26.0 - 34.0 pg Final    MCHC 03/10/2025 34.9  32.0 - 36.0 g/dL Final    RDW 03/10/2025 18.1 (H)  11.5 - 14.5 % Final    Platelets 03/10/2025 173  150 - 450 x10*3/uL Final    Glucose 03/10/2025 160 (H)  74 - 99 mg/dL Final    Sodium 03/10/2025 139  136 - 145 mmol/L Final    Potassium 03/10/2025 4.3  3.5 - 5.3 mmol/L Final    Chloride 03/10/2025 99  98 - 107 mmol/L Final    Bicarbonate 03/10/2025 29  21 - 32 mmol/L Final    Anion Gap 03/10/2025 15  10 - 20 mmol/L Final    Urea Nitrogen 03/10/2025 56 (H)  6 - 23 mg/dL Final    Creatinine 03/10/2025 7.11 (H)  0.50 - 1.30 mg/dL Final    eGFR 03/10/2025 7 (L)  >60 mL/min/1.73m*2 Final    Calculations of estimated GFR are performed using the 2021 CKD-EPI Study Refit equation without the race variable for the IDMS-Traceable creatinine methods.  https://jasn.asnjournals.org/content/early/2021/09/22/ASN.4329454492    Calcium 03/10/2025 8.8  8.6 - 10.3 mg/dL Final    POCT Glucose 03/09/2025 168 (H)  74 - 99 mg/dL Final         Imaging     Transthoracic Echo (TTE) Complete              [facility]  [address], [facility] [address]     Tel [phone] Fax [phone]    TRANSTHORACIC ECHOCARDIOGRAM REPORT    Patient Name:       EVELYN Coppola Physician:    [id number] Kelvin Barrientos MD  Study Date:         3/10/2025            Ordering Provider:    [id number] KAYE REEVES                                                                  LOTTIE  MRN/PID:            [id number]             Fellow:  Accession#:         [id number]         Nurse:  Date of Birth/Age:  1940 / 84 years Sonographer:          Rani Quintanilla RDCS  Gender Assigned at  M                    Additional Staff:  Birth:  Height:             172.72 cm            Admit Date:  Weight:             70.31 kg             Admission Status:     Inpatient -                                                                 Routine  BSA / BMI:          1.83 m2 / 23.57      Department Location:  [facility]                      kg/m2  Blood Pressure: 141 /74 mmHg    Study Type:    TRANSTHORACIC ECHO (TTE) COMPLETE  Diagnosis/ICD: Unspecified atrial fibrillation-I48.91  Indication:    Afib, cardiac clearance  CPT Codes:     Echo Complete w Full Doppler-[id number]   Study Detail: The following Echo studies were performed: 2D, M-Mode, Doppler and                color flow. Technically challenging study due to patient lying in                supine position.       PHYSICIAN INTERPRETATION:  Left Ventricle: Left ventricular ejection fraction is normal, calculated by Bailey's biplane at 65%. There are no regional wall motion abnormalities. The left ventricular cavity size is normal. There is normal septal and normal posterior left ventricular wall thickness. There is left ventricular concentric remodeling. Spectral Doppler shows a Grade I (impaired relaxation pattern) of left ventricular diastolic filling with normal left atrial filling pressure.  Left Atrium: The left atrial size is normal.  Right Ventricle: The right ventricle is normal in size. There is normal right ventricular global systolic function.  Right Atrium: The right atrial size is normal.  Aortic Valve: The aortic valve is trileaflet. There is evidence of mildly elevated transaortic gradients consistent with sclerosis of  the aortic valve.  There is no evidence of aortic valve regurgitation. The peak instantaneous gradient of the aortic valve is 12 mmHg. The mean gradient of the aortic valve is 7 mmHg.  Mitral Valve: The mitral valve is normal in structure. There is trace mitral valve regurgitation.  Tricuspid Valve: The tricuspid valve is structurally normal. There is mild tricuspid regurgitation. The Doppler estimated RVSP is mildly elevated right ventricular systolic pressure at 42.7 mmHg.  Pulmonic Valve: The pulmonic valve is structurally normal. There is no indication of pulmonic valve regurgitation.  Pericardium: No pericardial effusion noted.  Aorta: The aortic root is normal.       CONCLUSIONS:   1. Left ventricular ejection fraction is normal, calculated by Bailey's biplane at 65%.   2. Spectral Doppler shows a Grade I (impaired relaxation pattern) of left ventricular diastolic filling with normal left atrial filling pressure.   3. There is normal right ventricular global systolic function.   4. Mildly elevated right ventricular systolic pressure.   5. Aortic valve sclerosis.    QUANTITATIVE DATA SUMMARY:     2D MEASUREMENTS:             Normal Ranges:  LAs:             2.18 cm     (2.7-4.0cm)  IVSd:            0.88 cm     (0.6-1.1cm)  LVPWd:           0.87 cm     (0.6-1.1cm)  LVIDd:           3.80 cm     (3.9-5.9cm)  LVIDs:           2.93 cm  LV Mass Index:   53.3 g/m2  LVEDV Index:     35.41 ml/m2  LV % FS          23.0 %       LEFT ATRIUM:                 Normal Ranges:  LA Area A4C:      10.0 cm2  LA Area A2C:      17.0 cm2  LA Volume Index:  17.0 ml/m2  LA Vol A4C:       15.8 ml  LA Vol A2C:       38.2 ml  LA Vol Index BSA: 14.7 ml/m2       M-MODE MEASUREMENTS:         Normal Ranges:  Ao Root:             3.50 cm (2.0-3.7cm)  AoV Exc:             1.76 cm (1.5-2.5cm)       AORTA MEASUREMENTS:         Normal Ranges:  AoV Exc:            1.76 cm (1.5-2.5cm)  Ao Sinus, d:        3.80 cm (2.1-3.5cm)  Ao STJ, d:           3.00 cm (1.7-3.4cm)  Asc Ao, d:          3.50 cm (2.1-3.4cm)       LV SYSTOLIC FUNCTION:                       Normal Ranges:  EF-A4C View:    60 % (>=55%)  EF-A2C View:    69 %  EF-Biplane:     65 %  LV EF Reported: 65 %       LV DIASTOLIC FUNCTION:           Normal Ranges:  MV Peak E:             1.01 m/s  (0.7-1.2 m/s)  MV Peak A:             1.12 m/s  (0.42-0.7 m/s)  E/A Ratio:             0.90      (1.0-2.2)  MV e'                  0.085 m/s (>8.0)  MV lateral e'          0.09 m/s  MV medial e'           0.08 m/s  E/e' Ratio:            11.90     (<8.0)       MITRAL VALVE:          Normal Ranges:  MV DT:        211 msec (150-240msec)       AORTIC VALVE:               Normal Ranges:  AoV Vmax:         1.70 m/s  (<=1.7m/s)  AoV Peak P.5 mmHg (<20mmHg)  AoV Mean P.6 mmHg  (1.7-11.5mmHg)  AoV VTI:          39.43 cm  (18-25cm)  LVOT Diameter:    2.31 cm   (1.8-2.4cm)  AoV Area, planim: 1.34 cm2  (2.5-4.5cm2)       RIGHT VENTRICLE:  RV Basal 3.30 cm  RV Mid   2.10 cm  RV Major 7.0 cm  TAPSE:   17.0 mm  RV s'    0.11 m/s       TRICUSPID VALVE/RVSP:          Normal Ranges:  Peak TR Velocity:     3.15 m/s  RV Syst Pressure:     43 mmHg  (< 30mmHg)  IVC Diam:             1.71 cm       AORTA:  Asc Ao Diam 3.49 cm       [id number] Kelvin Barrientos MD  Electronically signed on 3/10/2025 at 12:07:16 PM       ** Final **  ECG 12 lead  Normal sinus rhythm  Normal ECG  When compared with ECG of 05-MAR-2025 15:33,  Premature atrial complexes are no longer Present  T wave amplitude has decreased in Lateral leads       Patient Active Problem List   Diagnosis    Closed fracture of neck of left femur, initial encounter    Falls, initial encounter    Closed head injury    Elevated troponin         Assessment/Plan   Assessment & Plan  Closed fracture of neck of left femur, initial encounter    Falls, initial encounter    Closed head injury    Elevated troponin      Left hip fracture orthopedic wanted to do surgery on the  patient I am waiting for cardiology clearance based on history of coronary artery disease waiting for the echocardiogram  History of end-stage renal disease on hemodialysis  Blood pressure continue to be elevated I will adjust his blood pressure medication       I spent 25 minutes in the professional and overall care of this patient.      ADELINA East MD

## 2025-03-10 NOTE — ANESTHESIA PREPROCEDURE EVALUATION
Patient: Tyshawn Coles    Procedure Information       Date/Time: 03/10/25 5759    Procedure: HEMIARTHROPLASTY, HIP (Left: Hip)    Location: [facility] OR 04 / Virtual [facility] OR    Surgeons: Marcel Dawson MD            Relevant Problems   No relevant active problems       Clinical information reviewed:   Tobacco  Allergies  Meds  Problems  Med Hx  Surg Hx   Fam Hx  Soc   Hx        NPO Detail:  No data recorded     Physical Exam    Airway  Mallampati: II  TM distance: >3 FB  Neck ROM: full     Cardiovascular - normal exam     Dental    Pulmonary - normal exam     Abdominal - normal exam             Anesthesia Plan    History of general anesthesia?: yes  History of complications of general anesthesia?: no    ASA 3 - emergent     general     intravenous induction   Anesthetic plan and risks discussed with patient.    Plan discussed with CRNA.

## 2025-03-10 NOTE — PROGRESS NOTES
Patient from [facility]. Left message for his wife to confirm plan of return and skilled vs ltc.   Patient to have surgery tomorrow.     1224  Received call back from patient's wife. She said patient did recently move into long term care at [facility]. She asked about him going to [facility] after surgery prior to return to ltc. Explained criteria for AR and that he has to return to home in community. They do not take people who are ltc in facility. Also explained he would have to tolerate three hours of therapy a day and she does not feel he can do that. She asked about him going to a different facility for rehab. Explained he could go to a different skilled but if plan is to return to ltc at [facility],that might make the most sense. She wants to talk to her and and she will let this worker know.

## 2025-03-10 NOTE — PROGRESS NOTES
Spiritual Care Visit  Spiritual Care Request    Reason for Visit:  Routine Visit: Introduction     Request Received From:       Focus of Care:  Visited With: Patient         Refer to :          Spiritual Care Assessment    Spiritual Assessment:                      Care Provided:  Intended Effects: Promote sense of peace, Preserve dignity and respect, Meaning-making  Methods: Offer spiritual/Church support  Interventions: Share words of hope and inspiration, [place]    Sense of Community and or Zoroastrian Affiliation:  Lutheran         Addressed Needs/Concerns and/or Neal Through:          Outcome:        Advance Directives:         Spiritual Care Annotation    Annotation:  Patient spoke very soft.   prayed at his request and was a supportive presence.

## 2025-03-10 NOTE — ANESTHESIA PROCEDURE NOTES
Airway  Date/Time: 3/10/2025 3:32 PM  Urgency: elective    Airway not difficult    Staffing  Performed: JAMISON   Authorized by: Raymon Robledo DO    Performed by: JAMISON Lorenzana  Patient location during procedure: OR    Indications and Patient Condition  Indications for airway management: anesthesia  Spontaneous Ventilation: absent  Sedation level: deep  Preoxygenated: yes  Patient position: sniffing  Mask difficulty assessment: 1 - vent by mask  Planned trial extubation    Final Airway Details  Final airway type: endotracheal airway      Successful airway: ETT  Cuffed: yes   Successful intubation technique: direct laryngoscopy  Facilitating devices/methods: intubating stylet  Blade: [place]  Blade size: #4  ETT size (mm): 7.5  Cormack-Lehane Classification: grade I - full view of glottis  Placement verified by: chest auscultation and capnometry   Measured from: lips  ETT to lips (cm): 23  Number of attempts at approach: 1

## 2025-03-10 NOTE — CARE PLAN
Patient is off the floor for surgery and unable to see the patient.  Orders placed for hemodialysis session tomorrow  For 3.5 hours, 2K/2.5 calcium/35 bicarb  Target dry weight is 67.5 kg

## 2025-03-10 NOTE — CONSULTS
INPATIENT INITIAL NEPHROLOGY CONSULT    SERVICE DATE: 3/9/25   SERVICE TIME:  3:45PM    REASON FOR CONSULT: For evaluation management of ESRD  REQUESTING PHYSICIAN:   PRIMARY CARE PHYSICIAN: Edi Wise MD    CC:    Subjective   Mr. Coles is a 84 year old male with a past medical history of ESRD on HD following TTS schedule, HTN, HLD, T2DM, dementia, and seizure disorder who presented to the hospital with fall.  Patient had a left femur fracture and plan for surgery.  Patient is with advanced dementia and unable to provide much medical history.  Most of the history was gathered from wife at bedside.  Patient had a clotted AV graft and tunneled dialysis catheter was placed during I had missed dialysis for him for almost 1 week and last dialysis was Friday and Saturday and 2 days a row    On presentation to the hospital lab significant for serum sodium 136, potassium 4.3, chloride 96, bicarbonate 30, BUN 38, creatinine 5.1, calcium 8 pregnant, albumin 3.2, WBC 12.2, hemoglobin 9.9 and a platelet count 178 and imaging with CT hip acute traumatic impacted fracture of the left femoral neck.    Past Medical History:   Diagnosis Date    Autonomic dysfunction     Benign prostatic hyperplasia     Chronic anemia     Coronary artery disease     Current use of long term anticoagulation     Apixaban    Dementia     Dyslipidemia     End-stage renal disease on hemodialysis (Multi)     Frequent falls     Gout     Heart failure with preserved ejection fraction     History of anaphylaxis     History of closed head injury     History of Clostridioides difficile colitis     History of deep vein thrombosis     History of non-ST elevation myocardial infarction (NSTEMI)     History of sepsis     Hypertension     Hypoxic ischemic encephalopathy (Multi)     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)     Kidney stones     Multilevel degenerative disc disease     Osteoarthritis     Paroxysmal atrial fibrillation  (Multi)     Peripheral neuropathy     Peripheral vascular disease (CMS-HCC)     Secondary hyperparathyroidism of renal origin (Multi)     Seizure disorder (Multi)      Past Surgical History:   Procedure Laterality Date    ADENOIDECTOMY      AV FISTULA PLACEMENT      CHOLECYSTECTOMY      COLONOSCOPY      IR CVC TUNNELED  08/20/2021    IR CVC TUNNELED 8/20/2021 [facility]    IR TUNNELED DIALYSIS CATHETER      PACEMAKER PLACEMENT      THROMBECTOMY      TONSILLECTOMY      TOTAL HIP ARTHROPLASTY Right      Family History   Problem Relation Name Age of Onset    Diabetes Mother      Cancer Father      Lupus Daughter      Kidney failure Daughter      Asthma Son      Hypertension Other      Sickle cell trait Other       Social History     Tobacco Use    Smoking status: Former     Types: Cigarettes    Smokeless tobacco: Never   Substance Use Topics    Alcohol use: Never    Drug use: Never      Medications Prior to Admission   Medication Sig Dispense Refill Last Dose/Taking    acetaminophen (Tylenol) 325 mg tablet Take 2 tablets (650 mg) by mouth every 4 hours if needed for mild pain (1 - 3).   Past Week    acetaminophen (Tylenol) 650 mg suppository Insert 1 suppository (650 mg) into the rectum if needed for mild pain (1 - 3), moderate pain (4 - 6) or fever (temp greater than 38.0 C).   Past Week    albuterol 1.25 mg/3 mL nebulizer solution Take 3 mL (1.25 mg) by nebulization every 6 hours if needed for wheezing.   Past Week    apixaban (Eliquis) 2.5 mg tablet Take 1 tablet (2.5 mg) by mouth 2 times a day.   3/8/2025 Evening    bisacodyl (Dulcolax) 10 mg suppository Insert 1 suppository (10 mg) into the rectum every 24 (twenty four) hours if needed for constipation.   Past Week    cholecalciferol (Vitamin D-3) 25 MCG (1000 UT) capsule Take 1 capsule (25 mcg) by mouth once daily.   3/8/2025 Morning    donepezil (Aricept) 23 mg tablet Take 1 tablet (23 mg) by mouth once daily.   3/8/2025 Morning    finasteride  (Proscar) 5 mg tablet Take 1 tablet (5 mg) by mouth once daily.   3/8/2025 Morning    glucagon HCL (Glucagon, HCl, Emergency Kit) 1 mg recon soln Inject 1 mg as directed if needed (hypoglycemic emergency).   Past Month    glucose (Glutose) 40 % gel oral gel Take 15 g by mouth 1 time if needed for low blood sugar - see comments. Give 1 tube (15g) po if BG<70 and symptomatic or BG= 60 or without any symptoms. May repeat with 1 tube in 15 min if needed   Past Month    insulin glargine (Lantus U-100 Insulin) 100 unit/mL injection Inject 5 Units under the skin once daily at bedtime. Take as directed per insulin instructions.   3/8/2025 Bedtime    insulin lispro 100 unit/mL injection Inject under the skin 3 times daily (morning, midday, late afternoon). Per sliding scale  <150 = 0 units  151-200 = 2 units  201-250 = 4 units  251-300 = 6 units  301-350 = 8 units  351-400 = 10 units  >400 = call MD   3/8/2025    levETIRAcetam (Keppra) 500 mg tablet Take 1 tablet (500 mg) by mouth 2 times a day.   3/8/2025    loperamide (Imodium A-D) 2 mg tablet Take 1 tablet (2 mg) by mouth every 12 hours if needed for diarrhea.   Past Week    magnesium hydroxide (Milk of Magnesia) 400 mg/5 mL suspension Take 30 mL by mouth every 24 (twenty four) hours if needed for constipation.   Past Week    melatonin 10 mg tablet Take 1 tablet (10 mg) by mouth once daily at bedtime.   3/8/2025 Bedtime    metoprolol tartrate (Lopressor) 50 mg tablet Take 1 tablet by mouth 2 times a day.   3/8/2025    ondansetron (Zofran) 4 mg tablet Take 1 tablet (4 mg) by mouth every 6 hours if needed for nausea or vomiting.   Past Week    polyethylene glycol (Glycolax, Miralax) 17 gram packet Take 17 g by mouth once daily as needed.   Past Week    sevelamer carbonate (Renvela) 0.8 gram packet Take 2 packets (1.6 g) by mouth 2 times a day.   3/8/2025 Evening    sodium phosphates (Fleet, Pediatric,) 9.5-3.5 gram/59 mL enema Insert 1 enema into the rectum once every 24  hours.   Past Week    Virt-Caps 1 mg capsule Take 1 capsule by mouth once daily.   3/8/2025 Morning      [unfilled]  Allergies as of 03/08/2025    (No Known Allergies)       COMPLETE REVIEW OF SYSTEMS:    Unable to gather.       Objective     PHYSICAL EXAM:   GENERAL: Alert, no distress, cooperative  SKIN: Skin color, texture, turgor normal. No rashes or lesions.  HEAD/SINUSES: No significant findings  EYES: PE  OROPHARYNX: oral mucosa moist. Oropharynx normal.  NECK: No jugulovenous distention, No carotid bruits, Supple  LUNGS: Lungs clear to auscultation, no wheeze, rhonchi, or rales  CARDIAC: Normal S1 and S2; no rubs, murmurs, or gallops  ABDOMEN: Abdomen soft, non-tender, BS normal, No masses. No abdominal bruits.  EXTREMITIES: Left lower extremity shater than RLE  NEURO: No focal deficits. Able to move all 4 extremities.  PULSES: 2+ radial, 2 + femoral      Patient Vitals for the past 24 hrs:   BP Temp Temp src Pulse Resp SpO2   03/09/25 2000 (!) 195/89 36.3 °C (97.3 °F) Temporal 67 16 97 %   03/09/25 1815 (!) 189/91 -- -- 77 -- --   03/09/25 1800 -- -- -- 72 12 98 %   03/09/25 1625 -- -- -- 72 17 --   03/09/25 1620 (!) 178/98 -- -- 72 17 --   03/09/25 1615 -- -- -- 76 11 95 %   03/09/25 1610 -- -- -- 72 16 96 %   03/09/25 1605 -- -- -- 76 11 97 %   03/09/25 1600 -- -- -- 72 16 97 %   03/09/25 1555 -- -- -- 74 15 96 %   03/09/25 1550 178/87 -- -- 72 13 96 %   03/09/25 1545 -- -- -- 76 12 98 %   03/09/25 1540 -- -- -- 74 20 95 %   03/09/25 1535 -- -- -- 72 15 96 %   03/09/25 1530 -- -- -- 72 15 97 %   03/09/25 1525 176/86 -- -- 70 14 97 %   03/09/25 1520 (!) 197/88 -- -- 70 13 96 %   03/09/25 1515 -- -- -- 70 14 97 %   03/09/25 1510 -- -- -- 72 15 96 %   03/09/25 1505 -- -- -- 72 15 96 %   03/09/25 1500 -- -- -- 72 14 96 %   03/09/25 1455 -- -- -- 72 14 96 %   03/09/25 1450 165/85 -- -- 72 15 96 %   03/09/25 1445 -- -- -- 72 14 96 %   03/09/25 1440 -- -- -- 72 14 97 %   03/09/25 1435 -- -- -- 72 15 97 %   03/09/25  1430 -- -- -- 74 15 96 %   03/09/25 1425 (!) 183/79 -- -- 74 13 97 %   03/09/25 1420 (!) 199/93 -- -- 72 19 97 %   03/09/25 1415 -- -- -- 76 13 97 %   03/09/25 1410 -- -- -- 80 17 97 %   03/09/25 1405 -- -- -- 72 17 97 %   03/09/25 1400 165/85 -- -- 72 17 96 %   03/09/25 1355 (!) 186/92 -- -- 72 18 (!) 92 %   03/09/25 1350 (!) 197/92 -- -- 72 13 94 %   03/09/25 1345 -- -- -- 66 15 96 %   03/09/25 1340 -- -- -- 68 15 97 %   03/09/25 1335 -- -- -- 68 15 98 %   03/09/25 1330 -- -- -- 70 16 96 %   03/09/25 1325 -- -- -- 68 16 96 %   03/09/25 1320 (!) 184/100 -- -- 72 20 96 %   03/09/25 1315 -- -- -- 68 17 97 %   03/09/25 1310 -- -- -- 68 17 96 %   03/09/25 1305 -- -- -- 68 14 96 %   03/09/25 1300 175/84 -- -- 68 13 97 %   03/09/25 1255 -- -- -- 70 17 97 %   03/09/25 1250 -- -- -- 68 15 97 %   03/09/25 1245 -- -- -- 70 14 98 %   03/09/25 1240 -- -- -- 70 19 97 %   03/09/25 1238 (!) 182/87 -- -- 69 17 97 %   03/09/25 1235 -- -- -- 70 12 94 %   03/09/25 1230 -- -- -- 70 14 99 %   03/09/25 1225 -- -- -- 68 16 96 %   03/09/25 1220 (!) 182/87 -- -- 66 12 98 %   03/09/25 1215 -- -- -- 68 15 95 %   03/09/25 1200 -- -- -- 68 14 96 %   03/09/25 1044 174/84 -- -- 77 18 95 %   03/09/25 1000 -- -- -- 68 13 96 %   03/09/25 0930 (!) 181/97 37 °C (98.6 °F) -- 72 12 100 %   03/09/25 0804 (!) 193/96 -- -- 72 16 96 %   03/09/25 0800 -- -- -- 72 14 97 %   03/09/25 0620 (!) 183/84 -- -- 72 16 (!) 91 %   03/09/25 0600 -- -- -- 70 15 (!) 92 %   03/09/25 0435 168/88 -- -- 70 17 95 %   03/09/25 0400 -- -- -- 70 17 96 %   03/09/25 0139 (!) 202/100 -- -- 70 17 95 %     Body mass index is 23.57 kg/m².    DATA:   Diagnostic tests reviewed for today's visit:    Most recent labs and imaging results.    CBC: @NERXVZJ43(wbc,rbc,hb,hct,plt,mcv,mch,mpv,rdw)@  Coags: @AGWOLRS33(pt,inr,aptt)@  CMP: @BGBBRHR26(na,k,chlor,co2,bun,creat,gluc,tprot,ca,mg,albumin,tbili,alkphos,ALT,AST,anion)@  ABG's:  "@WJXSHOA61(PH,PCO2,PO2,BE,HCO3,CO2CT,O2HB,COHB,MHGB,TEMP,PHTC,PCO2T,PO2T,O2AD)@  MG/PHOS: @SMZMFSO89(mg,p)@    Urobilinogen, Urine   Date Value Ref Range Status   03/28/2024 <2.0 <2.0 mg/dL Final       No components found for: \"PROTTIMED\", \"UALBCR\", \"UPROT\", \"CREAT\"    No results found for: \"CHOL\", \"HDL\", \"LDL\"       Impression      Mr. Coles is a 84 year old male with a past medical history of ESRD on HD following TTS schedule, HTN, HLD, T2DM, dementia, and seizure disorder who presented to the hospital with fall.  Patient had a left femur fracture and plan for surgery.  Patient is with advanced dementia and unable to provide much medical history.  Most of the history was gathered from wife at bedside.  Patient had a clotted AV graft and tunneled dialysis catheter was placed during I had missed dialysis for him for almost 1 week and last dialysis was Friday and Saturday and 2 days a row    On presentation to the hospital lab significant for serum sodium 136, potassium 4.3, chloride 96, bicarbonate 30, BUN 38, creatinine 5.1, calcium 8 pregnant, albumin 3.2, WBC 12.2, hemoglobin 9.9 and a platelet count 178 and imaging with CT hip acute traumatic impacted fracture of the left femoral neck.    1.  ESRD on hemodialysis on TTS schedule at [facility] [place] [facility], primary nephrologist Dr. Wise  2.  Hypertension.  3.  Anemia of chronic disease.  4.  Secondary hyperparathyroidism.  5.  Atrial fibrillation on Eliquis.  6.  Type 2 diabetes mellitus.  7.  Dementia.  8.  Seizure disorder.    Plan.  -Patient labs are acceptable.  No acute indication for renal replacement therapy today.  Will plan persistent hemodialysis again Tuesday.  -Blood pressure has been suboptimally controlled likely in setting of pain from femur fracture.  -Currently metoprolol 50 mg p.o. twice daily and use hydralazine as needed.  -Continue hemodialysis on TTS schedule.  -Left femur fracture, plan for surgery by orthopedics.  Thank you for the consult and " involving me in the patients care. I will continue to follow and co-manage the patient along with you during their hospital stay.    Анна Hogan MD      SIGNATURE: Анна Hogan MD PATIENT NAME: Tyshawn Coles   DATE: March 10, 2025 MRN: [id number]   TIME: 12:32 AM

## 2025-03-10 NOTE — OP NOTE
Preoperative diagnosis: Left femoral neck fracture    Postop diagnosis: Above    Procedure: Left hip hemiarthroplasty for fracture    Surgeon: Marcel Dawson M.D.    Asst.: Hernan MOMINThe physician assistant was present through the entire case. Given the nature of the disease process and the procedure to be performed a skilled surgical assistant was necessary during the case. The assistant was necessary in order to hold retractors and directly assist in the operation. A certified scrub tech was at the back table managing instruments and supplies for the surgical case.    Anesthesia: Gen.    HPI: Patient fell and sustained the above described injury.  I had a long discussion with his wife Pamela and she provided consent for surgery.    Operative implants: Meli size 9 stem, 53 mm bipolar head +0 neck length.    Blood Loss: 100 cc    Complications: None    Operative procedure: The patient was wheeled from the preanesthesia care unit to the theater. The patient was placed in supine position and all bony prominences were well-padded. For the entire procedure anesthesia maintainined control of the patient's head neck. The patient was then rolled into the lateral decubitus position with the right hip up and the downside well-padded. Pegs were placed at the pubic symphysis above the crack of the buttocks the inferior portion of the sternum in the back and were well-padded. This held the patient in a nice lateral position. Blankets were placed between the patient's legs to provide padding. The right hip was marked and prepped and draped in normal sterile fashion.    Timeout was performed, site verification marking was verified, appropriate antibiotic demonstration was confirmed, and history and physical being updated and signed was verified. Next an incision on the lateral aspect of the hip was performed. Dissection was carried down to the skin with a knife. Bovie was used to dissect down to the fat to expose the  fascia. The lateral fascia was incised longitudinally and the muscle was split manually fingers. A retractor was placed over the greater trochanter. A second retractor was placed just proximal to the piriformis under the gluteus medius retracting in a superior anterior direction. The hip was internally rotated and extended. The Bovie was used to release the piriformis external rotators and capsules a single layer off the bone of the greater trochanter. This was teed proximally as well. This exposed the fractured femoral neck. A corkscrew was placed into the femoral head and the femoral head was removed and sized. The acetabulum was healthy and normal. The acetabulum was irrigated with copious amounts of normal saline and removed of all foreign debris.    The hip was flexed internally rotated and adducted. A femoral neck retractor was placed under the femoral neck, a retractor was placed over the greater trochanter, and a blunt Jerardo was placed underneath the femoral neck retractor. This nicely exposed the proximal femur. Bovie was used to remove the soft tissues on the medial side of the greater trochanter. Cookie cutter was used to lateralize. Hand entry reaming was performed.  Power reaming was then used to lateralize.  Broaching was performed to the appropriate-sized broach was reached.  Calcar reaming was performed.  Trialing was performed and the appropriate neck length was determined.  The broach was removed, the canal was irrigated with copious month's normal saline, a cement restrictor was placed, the canal was irrigated again and suctioned dry and a canal tampon was placed.  Cement was mixed the appropriate consistency.  The canal tampon was removed and cement was placed in a retrograde fashion and then pressurized.  The implant was inserted into the canal with the appropriate amount of version and all extraneous cement was removed.  The collar was resting on the calcar.  The cement was allowed to harden  and trialing was performed.  The appropriate sized implants were determined and the permanent bipolar implant was tamped onto a cleaned trunion after the acetabulum was irrigated copiously. The hip was reduced. Leg lengths felt equal by palpating the heels and knees. The hip was stable with flexion up to 100°, flexion to 90 and internal rotation past 50°, and flexion to 45 and internal rotation past 70°. The hip could be extended to neutral and the knee could be flexed to 90 without any excessive tension. The hip was stable in external rotation as well.    The joint was irrigated with copious month's normal saline. A #1 Tycron suture was placed through the piriformis and capsule in a figure-of-eight fashion. A second #1 Tycron suture was placed just distal to that through the capsule in a figure-of-eight fashion. All sutures were thrown from posterior to anteriors so there was no contact with the sciatic nerve. The sutures were brought up through 2 drill holes in the greater trochanter and tied to each other with nicely reduced the posterior capsule. The T in the capsule was closed with #1 Tycron suture as well sewing posterior to anterior away from the sciatic nerve. There was no contact of the sciatic nerve that the case. The wound was irrigated and suctioned dry one more time. There was no excessive bleeding at this time except for a very minimal ooze.    The fascia was closed with #1 Tycron figure-of-eight interrupted sutures. The fatty layer was closed with running 0 Vicryl. The skin was closed with 2-0 Vicryl 4-0 Monocryl and glue. A sterile dressing was applied. At the conclusion of the case the patient's toes were pink and warm with brisk capillary refill.      This dictation was created using voice recognition software. If there are any questions about inaccuracies please do not hesitate to contact me.

## 2025-03-10 NOTE — CONSULTS
Consults    Reason For Consult  Pre-op CV risk assessment    History Of Present Illness  Tyshawn Coles is a 84 y.o. male presenting with left femoral neck fracture.  Past medical history significant for dementia, seizure disorder, A-fib on Eliquis, end-stage renal disease with malfunctioning AV fistula receiving hemodialysis Tuesday Thursday and Saturdays, CAD with remote NSTEMI, peripheral vascular disease and frequent falls.     Mechanical fall at facility.  Hit his head and immediately had pain in his left hip.  CT scan of the head with acute versus chronic subdural.    Patient denies any active CP, palpitations, or SOB. Says he follows with a cardiologist through [facility].      Past Medical History  He has a past medical history of Autonomic dysfunction, Benign prostatic hyperplasia, Chronic anemia, Coronary artery disease, Current use of long term anticoagulation, Dementia, Dyslipidemia, End-stage renal disease on hemodialysis (Multi), Frequent falls, Gout, Heart failure with preserved ejection fraction, History of anaphylaxis, History of closed head injury, History of Clostridioides difficile colitis, History of deep vein thrombosis, History of non-ST elevation myocardial infarction (NSTEMI), History of sepsis, Hypertension, Hypoxic ischemic encephalopathy (Multi), 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), Kidney stones, Multilevel degenerative disc disease, Osteoarthritis, Paroxysmal atrial fibrillation (Multi), Peripheral neuropathy, Peripheral vascular disease (CMS-[facility]), Secondary hyperparathyroidism of renal origin (Multi), and Seizure disorder (Multi).    Surgical History  He has a past surgical history that includes IR CVC tunneled (08/20/2021); IR tunneled dialysis catheter; AV fistula placement; pacemaker placement; Total hip arthroplasty (Right); Thrombectomy; Tonsillectomy; Adenoidectomy; Cholecystectomy; and Colonoscopy.     Social History  He reports that he has quit smoking. His  smoking use included cigarettes. He has never used smokeless tobacco. He reports that he does not drink alcohol and does not use drugs.    Family History  Family History   Problem Relation Name Age of Onset    Diabetes Mother      Cancer Father      Lupus Daughter      Kidney failure Daughter      Asthma Son      Hypertension Other      Sickle cell trait Other          Allergies  Patient has no known allergies.    Review of Systems  Patient is very demented cannot get any review of system.      Physical Exam  HENT:      Right Ear: External ear normal.      Left Ear: External ear normal.      Mouth/Throat:      Mouth: Mucous membranes are moist.   Cardiovascular:      Rate and Rhythm: Normal rate and regular rhythm.      Heart sounds: No murmur heard.     No friction rub. No gallop.   Pulmonary:      Effort: No accessory muscle usage or respiratory distress.      Breath sounds: No stridor. No wheezing or rhonchi.   Chest:      Chest wall: No tenderness.   Abdominal:      General: There is no distension.      Palpations: There is no mass.      Tenderness: There is no abdominal tenderness. There is no guarding or rebound.   Musculoskeletal:         General: No deformity or signs of injury.      Cervical back: No rigidity or tenderness. Normal range of motion.      Right lower leg: No edema.      Left lower leg: No edema.   Skin:     Coloration: Skin is not jaundiced or pale.      Findings: No lesion.   Neurological:      Mental Status: He is alert and easily aroused.      Cranial Nerves: No cranial nerve deficit.      Sensory: No sensory deficit.      Motor: No weakness.      Comments: Very confused        Last Recorded Vitals  /71 (BP Location: Right arm, Patient Position: Lying)   Pulse 68   Temp 37.2 °C (99 °F) (Temporal)   Resp 17   Wt 70.3 kg (155 lb)   SpO2 97%     Relevant Results  ECG with normal sinus rhythm and otherwise normal ECG     Assessment/Plan     1) preoperative cardiovascular risk  assessment: Patient is not having any active cardiovascular issues.  ECG is unremarkable as is his exam.  Echocardiogram with preserved LV function.  At this time I would say that the patient is an acceptable risk from a cardiovascular standpoint to proceed with hip surgery.      Johnathan Freeman MD

## 2025-03-10 NOTE — ANESTHESIA POSTPROCEDURE EVALUATION
Patient: Tyshawn Coles    Procedure Summary       Date: 03/10/25 Room / Location: [facility] OR 04 / Virtual [facility] OR    Anesthesia Start: 1525 Anesthesia Stop: 1728    Procedure: HEMIARTHROPLASTY, HIP (Left: Hip) Diagnosis:       Closed fracture of neck of left femur, initial encounter      (Closed fracture of neck of left femur, initial encounter [S72.002A])    Surgeons: Marcel Dawson MD Responsible Provider: Raymon Robledo DO    Anesthesia Type: general ASA Status: 3 - Emergent            Anesthesia Type: general    Vitals Value Taken Time   /93 03/10/25 1728   Temp 36.3 03/10/25 1728   Pulse 81 03/10/25 1728   Resp 12 03/10/25 1728   SpO2 100 03/10/25 1728       Anesthesia Post Evaluation    Patient location during evaluation: PACU  Patient participation: complete - patient cannot participate  Level of consciousness: awake  Pain management: satisfactory to patient  Airway patency: patent  Cardiovascular status: acceptable  Respiratory status: acceptable, face mask and spontaneous ventilation  Hydration status: acceptable  Postoperative Nausea and Vomiting: none        No notable events documented.

## 2025-03-10 NOTE — CARE PLAN
The patient's goals for the shift include      The clinical goals for the shift include To keep pt free from falls.    Over the shift, the patient did not make progress toward the following goals. Barriers to progression include . Recommendations to address these barriers include .

## 2025-03-10 NOTE — PROGRESS NOTES
Spoke to Unique. She wishes for us to proceed with hip kareem arthroplasty.  She said she originally had some concerns because of his Eliquis but he has been off that for 48 hours now.  I explained to her that there is risk of performing surgery after stopping Eliquis for 48 hours and there is also risk for not having a hip surgery for more than 48 hours and being off his Eliquis for a prolonged period of time.  There is risks in either direction and she understands that and wishes to proceed with surgery.  We then talked about what the operation would be and explained to her that would be very similar to the operation he had on the other side which would be a hemiarthroplasty for his left hip.    All of the risks benefits and potential complications of operative versus nonoperative treatment were discussed with the patient's wife Pamela.  Operative risks discussed included but were not limited to anesthesia complications, infections, excessive bleeding, damaged to uninjured healthy structures, and failure of surgery requiring the need for reoperation resulting in chronic pain and disability.  The patient's wife Pamela completely understands those risks and wished to proceed with operative intervention.  Pamela gave me permission to document in the chart and signed for her on the consent that she is the POA and gave permission for surgery.    We are going to perform a left hip hemiarthroplasty.       [___ Days ago] : [unfilled] days ago [Constant] : constant [Moderate] : moderate [Heat] : heat [OTC Remedies] : OTC remedies [At Night] : at night [Worsening] : worsening [de-identified] : right lower back [FreeTextEntry8] : 55-year-old woman comes to the office acutely with pain along the right side of her lower back radiating slightly into the buttock she describes the pain as sharp and shooting when it is occurring it can be as strong as a 7/10 tried to take some Advil but it upset her stomach is just been using Tylenol which supplies little relief the pain does not radiate down her leg has been no associated numbness or weakness she denies temperature chills sweats or myalgias no skin rashes or skin sensitivity she denies headache sinus congestion sore throat cough wheezing pleurisy chest pain shortness of breath exertional dyspnea lightheadedness palpitations dizziness vertigo or syncope she denies abdominal pain nausea vomiting diarrhea or constipation bright blood per rectum or black stools her appetite has been good her weight has been stable she denies urinary symptoms leg edema or skin rashes

## 2025-03-10 NOTE — CARE PLAN
The patient's goals for the shift include      The clinical goals for the shift include To keep pt free from falls.      Problem: Skin  Goal: Participates in plan/prevention/treatment measures  Flowsheets (Taken 3/10/2025 1767)  Participates in plan/prevention/treatment measures:   Discuss with provider PT/OT consult   Increase activity/out of bed for meals

## 2025-03-11 ENCOUNTER — APPOINTMENT (OUTPATIENT)
Dept: DIALYSIS | Facility: HOSPITAL | Age: 85
End: 2025-03-11
Payer: MEDICARE

## 2025-03-11 LAB
ANION GAP SERPL CALC-SCNC: 19 MMOL/L (ref 10–20)
BUN SERPL-MCNC: 68 MG/DL (ref 6–23)
CALCIUM SERPL-MCNC: 8.9 MG/DL (ref 8.6–10.3)
CHLORIDE SERPL-SCNC: 99 MMOL/L (ref 98–107)
CO2 SERPL-SCNC: 28 MMOL/L (ref 21–32)
CREAT SERPL-MCNC: 8.42 MG/DL (ref 0.5–1.3)
EGFRCR SERPLBLD CKD-EPI 2021: 6 ML/MIN/1.73M*2
ERYTHROCYTE [DISTWIDTH] IN BLOOD BY AUTOMATED COUNT: 17.9 % (ref 11.5–14.5)
GLUCOSE BLD MANUAL STRIP-MCNC: 177 MG/DL (ref 74–99)
GLUCOSE BLD MANUAL STRIP-MCNC: 187 MG/DL (ref 74–99)
GLUCOSE BLD MANUAL STRIP-MCNC: 200 MG/DL (ref 74–99)
GLUCOSE SERPL-MCNC: 160 MG/DL (ref 74–99)
HCT VFR BLD AUTO: 28.4 % (ref 41–52)
HGB BLD-MCNC: 9.9 G/DL (ref 13.5–17.5)
MCH RBC QN AUTO: 29.1 PG (ref 26–34)
MCHC RBC AUTO-ENTMCNC: 34.9 G/DL (ref 32–36)
MCV RBC AUTO: 84 FL (ref 80–100)
NRBC BLD-RTO: 0 /100 WBCS (ref 0–0)
PLATELET # BLD AUTO: 183 X10*3/UL (ref 150–450)
POTASSIUM SERPL-SCNC: 5.2 MMOL/L (ref 3.5–5.3)
RBC # BLD AUTO: 3.4 X10*6/UL (ref 4.5–5.9)
SODIUM SERPL-SCNC: 141 MMOL/L (ref 136–145)
WBC # BLD AUTO: 17.5 X10*3/UL (ref 4.4–11.3)

## 2025-03-11 PROCEDURE — 2500000001 HC RX 250 WO HCPCS SELF ADMINISTERED DRUGS (ALT 637 FOR MEDICARE OP): Performed by: NURSE PRACTITIONER

## 2025-03-11 PROCEDURE — 2500000004 HC RX 250 GENERAL PHARMACY W/ HCPCS (ALT 636 FOR OP/ED): Performed by: INTERNAL MEDICINE

## 2025-03-11 PROCEDURE — 97161 PT EVAL LOW COMPLEX 20 MIN: CPT | Mod: GP

## 2025-03-11 PROCEDURE — 82947 ASSAY GLUCOSE BLOOD QUANT: CPT

## 2025-03-11 PROCEDURE — 36415 COLL VENOUS BLD VENIPUNCTURE: CPT | Performed by: NURSE PRACTITIONER

## 2025-03-11 PROCEDURE — 8010000001 HC DIALYSIS - HEMODIALYSIS PER DAY

## 2025-03-11 PROCEDURE — 2500000002 HC RX 250 W HCPCS SELF ADMINISTERED DRUGS (ALT 637 FOR MEDICARE OP, ALT 636 FOR OP/ED): Performed by: NURSE PRACTITIONER

## 2025-03-11 PROCEDURE — 1100000001 HC PRIVATE ROOM DAILY

## 2025-03-11 PROCEDURE — 80048 BASIC METABOLIC PNL TOTAL CA: CPT | Performed by: NURSE PRACTITIONER

## 2025-03-11 PROCEDURE — 2500000004 HC RX 250 GENERAL PHARMACY W/ HCPCS (ALT 636 FOR OP/ED): Performed by: NURSE PRACTITIONER

## 2025-03-11 PROCEDURE — 2500000001 HC RX 250 WO HCPCS SELF ADMINISTERED DRUGS (ALT 637 FOR MEDICARE OP): Performed by: PHYSICIAN ASSISTANT

## 2025-03-11 PROCEDURE — 85027 COMPLETE CBC AUTOMATED: CPT | Performed by: NURSE PRACTITIONER

## 2025-03-11 PROCEDURE — 97165 OT EVAL LOW COMPLEX 30 MIN: CPT | Mod: GO | Performed by: OCCUPATIONAL THERAPIST

## 2025-03-11 RX ORDER — HEPARIN SODIUM 1000 [USP'U]/ML
1000 INJECTION, SOLUTION INTRAVENOUS; SUBCUTANEOUS ONCE
Status: COMPLETED | OUTPATIENT
Start: 2025-03-11 | End: 2025-03-11

## 2025-03-11 RX ORDER — HEPARIN SODIUM 1000 [USP'U]/ML
1000 INJECTION, SOLUTION INTRAVENOUS; SUBCUTANEOUS
Status: DISCONTINUED | OUTPATIENT
Start: 2025-03-12 | End: 2025-03-12 | Stop reason: HOSPADM

## 2025-03-11 RX ORDER — OXYCODONE HYDROCHLORIDE 5 MG/1
5 TABLET ORAL EVERY 6 HOURS PRN
Start: 2025-03-11

## 2025-03-11 RX ORDER — OXYCODONE HYDROCHLORIDE 5 MG/1
2.5 TABLET ORAL EVERY 6 HOURS PRN
Start: 2025-03-11

## 2025-03-11 RX ADMIN — LEVETIRACETAM 500 MG: 500 TABLET, FILM COATED ORAL at 14:12

## 2025-03-11 RX ADMIN — HEPARIN SODIUM 1000 UNITS: 1000 INJECTION INTRAVENOUS; SUBCUTANEOUS at 13:01

## 2025-03-11 RX ADMIN — Medication 1 CAPSULE: at 05:26

## 2025-03-11 RX ADMIN — OXYCODONE HYDROCHLORIDE 5 MG: 5 TABLET ORAL at 18:20

## 2025-03-11 RX ADMIN — Medication 10 ML: at 05:30

## 2025-03-11 RX ADMIN — LEVETIRACETAM 500 MG: 500 TABLET, FILM COATED ORAL at 21:30

## 2025-03-11 RX ADMIN — Medication 10 ML: at 13:36

## 2025-03-11 RX ADMIN — Medication 10 MG: at 21:30

## 2025-03-11 RX ADMIN — FINASTERIDE 5 MG: 5 TABLET, FILM COATED ORAL at 14:13

## 2025-03-11 RX ADMIN — Medication 1000 UNITS: at 14:13

## 2025-03-11 RX ADMIN — INSULIN LISPRO 2 UNITS: 100 INJECTION, SOLUTION INTRAVENOUS; SUBCUTANEOUS at 21:31

## 2025-03-11 RX ADMIN — DONEPEZIL HYDROCHLORIDE 20 MG: 10 TABLET ORAL at 14:15

## 2025-03-11 RX ADMIN — SEVELAMER CARBONATE 1.6 G: 800 POWDER, FOR SUSPENSION ORAL at 21:31

## 2025-03-11 RX ADMIN — HEPARIN SODIUM 1000 UNITS: 1000 INJECTION INTRAVENOUS; SUBCUTANEOUS at 13:00

## 2025-03-11 RX ADMIN — OXYCODONE HYDROCHLORIDE 5 MG: 5 TABLET ORAL at 10:58

## 2025-03-11 RX ADMIN — METOPROLOL TARTRATE 100 MG: 100 TABLET, FILM COATED ORAL at 14:13

## 2025-03-11 RX ADMIN — SEVELAMER CARBONATE 1.6 G: 800 POWDER, FOR SUSPENSION ORAL at 14:14

## 2025-03-11 RX ADMIN — OXYCODONE HYDROCHLORIDE 5 MG: 5 TABLET ORAL at 05:25

## 2025-03-11 RX ADMIN — INSULIN LISPRO 2 UNITS: 100 INJECTION, SOLUTION INTRAVENOUS; SUBCUTANEOUS at 17:50

## 2025-03-11 RX ADMIN — APIXABAN 2.5 MG: 2.5 TABLET, FILM COATED ORAL at 14:17

## 2025-03-11 ASSESSMENT — COGNITIVE AND FUNCTIONAL STATUS - GENERAL
WALKING IN HOSPITAL ROOM: TOTAL
TURNING FROM BACK TO SIDE WHILE IN FLAT BAD: TOTAL
DRESSING REGULAR UPPER BODY CLOTHING: TOTAL
STANDING UP FROM CHAIR USING ARMS: TOTAL
MOVING FROM LYING ON BACK TO SITTING ON SIDE OF FLAT BED WITH BEDRAILS: TOTAL
PERSONAL GROOMING: TOTAL
HELP NEEDED FOR BATHING: TOTAL
MOVING TO AND FROM BED TO CHAIR: TOTAL
DAILY ACTIVITIY SCORE: 7
EATING MEALS: A LOT
TOILETING: TOTAL
MOBILITY SCORE: 6
DRESSING REGULAR LOWER BODY CLOTHING: TOTAL
CLIMB 3 TO 5 STEPS WITH RAILING: TOTAL

## 2025-03-11 ASSESSMENT — PAIN SCALES - GENERAL
PAINLEVEL_OUTOF10: 7
PAINLEVEL_OUTOF10: 9

## 2025-03-11 ASSESSMENT — PAIN - FUNCTIONAL ASSESSMENT
PAIN_FUNCTIONAL_ASSESSMENT: 0-10
PAIN_FUNCTIONAL_ASSESSMENT: WONG-BAKER FACES
PAIN_FUNCTIONAL_ASSESSMENT: 0-10

## 2025-03-11 ASSESSMENT — PAIN DESCRIPTION - ORIENTATION: ORIENTATION: LEFT

## 2025-03-11 ASSESSMENT — PAIN DESCRIPTION - LOCATION: LOCATION: HIP

## 2025-03-11 ASSESSMENT — PAIN SCALES - WONG BAKER
WONGBAKER_NUMERICALRESPONSE: HURTS WHOLE LOT
WONGBAKER_NUMERICALRESPONSE: NO HURT

## 2025-03-11 ASSESSMENT — ACTIVITIES OF DAILY LIVING (ADL): ADL_ASSISTANCE: NEEDS ASSISTANCE

## 2025-03-11 NOTE — PROGRESS NOTES
INPATIENT NEPHROLOGY CONSULT PROGRESS NOTES    Patient Name: Tyshawn Coles   MRN: [id number]  CONSULTING SERVICE: Nephrology    Impression :   Mr. Coles is a 84 year old male with a past medical history of ESRD on HD following TTS schedule, HTN, HLD, T2DM, dementia, and seizure disorder who presented to the hospital with fall.  Patient had a left femur fracture and plan for surgery.  Patient is with advanced dementia and unable to provide much medical history.  Most of the history was gathered from wife at bedside.  Patient had a clotted AV graft and tunneled dialysis catheter was placed during I had missed dialysis for him for almost 1 week and last dialysis was Friday and Saturday and 2 days a row     On presentation to the hospital lab significant for serum sodium 136, potassium 4.3, chloride 96, bicarbonate 30, BUN 38, creatinine 5.1, calcium 8 pregnant, albumin 3.2, WBC 12.2, hemoglobin 9.9 and a platelet count 178 and imaging with CT hip acute traumatic impacted fracture of the left femoral neck.     1.  ESRD on hemodialysis on TTS schedule at [facility], primary nephrologist Dr. Wise  2.  Hypertension.  3.  Anemia of chronic disease.  4.  Secondary hyperparathyroidism.  5.  Atrial fibrillation on Eliquis.  6.  Type 2 diabetes mellitus.  7.  Dementia.  8.  Seizure disorder.     Plan.  -Patient had a session of intermittent hemodialysis this morning tolerated fairly well with 1.8 L of ultrafiltration.  -Blood pressure improving, systolic in the range of 110s mostly and had slight hypotension during dialysis.  Patient is currently on metoprolol 100 mg p.o. twice daily.  Continue to monitor blood pressure closely.  -Status post open reduction and internal fixation of left hip.  -Continue hemodialysis on TTS schedule.  -Okay to discharge from nephrology standpoint.  ================================================================  INTERVAL HPI: The patient was seen and examined. No acute event  overnight.  PERTINENT ROS:   GENERAL: No fever/chills.  RESPIRATORY: Negative for cough, wheezing or shortness of breath.  CARDIOVASCULAR: Negative for chest pain or palpitations.  GI: Negative for nausea, vomiting,  Diarrhea, abdominal pain.    : Negative for dysuria and hematuria    MEDICATIONS:  Current active Inpatient Medication reviewed.   Current Facility-Administered Medications   Medication Dose Route Frequency Provider Last Rate Last Admin    albuterol 2.5 mg /3 mL (0.083 %) nebulizer solution 1.25 mg  1.25 mg nebulization q6h PRN Latoya Vorai, APRN-CNP        alteplase (Cathflo Activase) injection 2 mg  2 mg intra-catheter PRN Latoya Crenshawrici, APRN-CNP        alteplase (Cathflo Activase) injection 2 mg  2 mg intra-catheter PRN Latoya Cisse, APRN-CNP        apixaban (Eliquis) tablet 2.5 mg  2.5 mg oral BID Violet Wilkins PA-C   2.5 mg at 03/11/25 1417    bisacodyl (Dulcolax) suppository 10 mg  10 mg rectal q24h PRN Latoya Crenshawrici, APRN-CNP        cholecalciferol (Vitamin D-3) tablet 1,000 Units  1,000 Units oral Daily Latoya  Betito, APRN-CNP   1,000 Units at 03/11/25 1413    dextrose 50 % injection 12.5 g  12.5 g intravenous q15 min PRN Latoya Vorai, APRN-CNP        dextrose 50 % injection 25 g  25 g intravenous q15 min PRN Latoya Vorai, APRN-CNP        donepezil (Aricept) tablet 20 mg  20 mg oral Daily Latoya Crenshawrici, APRN-CNP   20 mg at 03/11/25 1415    finasteride (Proscar) tablet 5 mg  5 mg oral Daily Latoya Crenshawrici, APRN-CNP   5 mg at 03/11/25 1413    glucagon (Glucagen) injection 1 mg  1 mg intramuscular q15 min PRN Latoya Crenshawrici, APRN-CNP        glucagon (Glucagen) injection 1 mg  1 mg intramuscular q15 min PRN Latoya Cisse, APRN-CNP        [START ON 3/12/2025] heparin 1,000 unit/mL injection 1,000 Units  1,000 Units intra-catheter After Dialysis Анна Hogan MD   1,000 Units at 03/11/25 1300    HYDROmorphone PF (Dilaudid) injection 0.2 mg  0.2 mg intravenous q3h  "PRN FRANKIE Truong-CNP        [Held by provider] insulin glargine (Lantus) injection 5 Units  5 Units subcutaneous Nightly Latoya Cisse APRN-CNP        insulin lispro injection 0-10 Units  0-10 Units subcutaneous Before meals & nightly Latoya Cisse APRN-CNP   2 Units at 03/10/25 2127    levETIRAcetam (Keppra) tablet 500 mg  500 mg oral BID Latoya Cisse, APRN-CNP   500 mg at 03/11/25 1412    magnesium hydroxide (Milk of Magnesia) 400 mg/5 mL suspension 30 mL  30 mL oral q24h PRN Latoya Cisse APRN-CNP        melatonin tablet 10 mg  10 mg oral Nightly Latoya Cisse, APRN-CNP   10 mg at 03/10/25 2117    metoprolol tartrate (Lopressor) tablet 100 mg  100 mg oral BID Latoya Cisse, APRN-CNP   100 mg at 03/11/25 1413    ondansetron ODT (Zofran-ODT) disintegrating tablet 4 mg  4 mg oral q8h PRN SOURAV Truong        Or    ondansetron (Zofran) injection 4 mg  4 mg intravenous q8h PRN Latoya Cisse APRN-CNP        oxyCODONE (Roxicodone) immediate release tablet 2.5 mg  2.5 mg oral q6h PRN Latoya Cisse, APRN-CNP        oxyCODONE (Roxicodone) immediate release tablet 5 mg  5 mg oral q6h PRN FRANKIE Truong-CNP   5 mg at 03/11/25 1058    sevelamer carbonate (Renvela) packet 1.6 g  1.6 g oral BID Latoya Cisse, APRN-CNP   1.6 g at 03/11/25 1414    sodium chloride 0.9% flush 10 mL  10 mL intravenous q8h JEREMY Latoya Cisse, APRN-CNP   10 mL at 03/11/25 1336    vitamin B complex-vitamin C-folic acid (Nephrocaps) capsule 1 capsule  1 capsule oral Daily Latoya Cisse APRN-CNP   1 capsule at 03/11/25 0526       PHYSICAL EXAM:   /58   Pulse 68   Temp 36.9 °C (98.4 °F) (Temporal)   Resp 16   Ht 1.727 m (5' 8\")   Wt 69.9 kg (154 lb)   SpO2 93%   BMI 23.42 kg/m²   Patient Vitals for the past 24 hrs:   BP   03/11/25 1427 111/58   03/11/25 1300 106/61   03/11/25 1245 105/53   03/11/25 1230 101/51   03/11/25 1215 100/51   03/11/25 1200 99/54   03/11/25 1145 108/54   03/11/25 " "1130 103/52   03/11/25 1115 93/52   03/11/25 1100 82/53   03/11/25 1045 94/56   03/11/25 1030 106/59   03/11/25 1015 115/59   03/11/25 1000 124/60   03/11/25 0945 131/64   03/11/25 0930 138/66   03/11/25 0800 135/65   03/11/25 0000 130/72   03/10/25 2000 146/80   03/10/25 1855 144/80   03/10/25 1830 145/68   03/10/25 1815 150/71   03/10/25 1805 160/76   03/10/25 1800 (!) 197/93   03/10/25 1745 (!) 186/95   03/10/25 1730 (!) 187/93   03/10/25 1727 (!) 189/95       Intake/Output Summary (Last 24 hours) at 3/11/2025 1536  Last data filed at 3/11/2025 1310  Gross per 24 hour   Intake 2730 ml   Output 1800 ml   Net 930 ml     GENERAL: Alert, no distress, cooperative  SKIN: Skin color, texture, turgor normal. No rashes or lesions.  HEAD/SINUSES: No significant findings  EYES: PE  OROPHARYNX: oral mucosa moist. Oropharynx normal.  NECK: No jugulovenous distention, No carotid bruits, Supple  LUNGS: Lungs clear to auscultation, no wheeze, rhonchi, or rales  CARDIAC: Normal S1 and S2; no rubs, murmurs, or gallops  ABDOMEN: Abdomen soft, non-tender, BS normal, No masses. No abdominal bruits.  EXTREMITIES: Normal exam of the extremities.   NEURO: No focal deficits. Able to move all 4 extremities.  PULSES: 2+ radial, 2 + femoral    DATA:   Diagnostic tests reviewed for today's visit:    CBC: @SLYPVKR61(wbc,rbc,hb,hct,plt,mcv,mch,mpv,rdw)@  Coags: @YQTXWCS95(pt,inr,aptt)@  CMP: @TAFLLSN67(na,k,chlor,co2,bun,creat,gluc,tprot,ca,mg,albumin,tbili,alkphos,ALT,AST,anion)@  ABG's: @MZMJLNJ12(PH,PCO2,PO2,BE,HCO3,CO2CT,O2HB,COHB,MHGB,TEMP,PHTC,PCO2T,PO2T,O2AD)@  MG/PHOS: @LUYTRIF84(mg,p)@    Urobilinogen, Urine   Date Value Ref Range Status   03/28/2024 <2.0 <2.0 mg/dL Final       No components found for: \"PROTTIMED\", \"UALBCR\", \"UPROT\", \"CREAT\"    No results found for: \"CHOL\", \"HDL\", \"LDL\"  IMAGING:  reviewed in [facility] images    SIGNATURE: Анна Hogan MD        "

## 2025-03-11 NOTE — PROGRESS NOTES
"Tyshawn Coles is a 84 y.o. male on day 2 of admission presenting with Closed fracture of neck of left femur, initial encounter.    Subjective   Mr. Coles is laying in bed upon entering room.  Currently verbalizing no complaints.  Reports no pain in surgical area as long as he is lying still, but has been in bed since surgery so is unable to determine the amount of pain with movement or activity.       Objective     Physical Exam  Constitutional:       Appearance: Normal appearance.   HENT:      Head: Normocephalic and atraumatic.      Nose: Nose normal.      Mouth/Throat:      Mouth: Mucous membranes are moist.   Cardiovascular:      Rate and Rhythm: Normal rate.   Pulmonary:      Effort: Pulmonary effort is normal.   Abdominal:      General: Abdomen is flat.      Palpations: Abdomen is soft.   Musculoskeletal:      Cervical back: Neck supple.      Comments: Left hip with surgical dressing clean dry and intact, sensation appears present throughout left lower extremity, plantar and dorsiflexion of left foot against resistance present, DP pulse palpable   Skin:     General: Skin is warm and dry.   Neurological:      General: No focal deficit present.      Mental Status: He is alert.   Psychiatric:         Mood and Affect: Mood normal.         Last Recorded Vitals  Blood pressure 138/66, pulse 68, temperature 36.7 °C (98.1 °F), temperature source Temporal, resp. rate 16, height 1.727 m (5' 8\"), weight 69.9 kg (154 lb), SpO2 97%.  Intake/Output last 3 Shifts:  I/O last 3 completed shifts:  In: 730 (10.5 mL/kg) [P.O.:480; I.V.:150 (2.1 mL/kg); IV Piggyback:100]  Out: - (0 mL/kg)   Weight: 69.9 kg     Relevant Results  Results for orders placed or performed during the hospital encounter of 03/08/25 (from the past 24 hours)   POCT GLUCOSE   Result Value Ref Range    POCT Glucose 170 (H) 74 - 99 mg/dL   CBC   Result Value Ref Range    WBC 17.5 (H) 4.4 - 11.3 x10*3/uL    nRBC 0.0 0.0 - 0.0 /100 WBCs    RBC 3.40 (L) 4.50 - " 5.90 x10*6/uL    Hemoglobin 9.9 (L) 13.5 - 17.5 g/dL    Hematocrit 28.4 (L) 41.0 - 52.0 %    MCV 84 80 - 100 fL    MCH 29.1 26.0 - 34.0 pg    MCHC 34.9 32.0 - 36.0 g/dL    RDW 17.9 (H) 11.5 - 14.5 %    Platelets 183 150 - 450 x10*3/uL   Basic metabolic panel   Result Value Ref Range    Glucose 160 (H) 74 - 99 mg/dL    Sodium 141 136 - 145 mmol/L    Potassium 5.2 3.5 - 5.3 mmol/L    Chloride 99 98 - 107 mmol/L    Bicarbonate 28 21 - 32 mmol/L    Anion Gap 19 10 - 20 mmol/L    Urea Nitrogen 68 (H) 6 - 23 mg/dL    Creatinine 8.42 (H) 0.50 - 1.30 mg/dL    eGFR 6 (L) >60 mL/min/1.73m*2    Calcium 8.9 8.6 - 10.3 mg/dL   POCT GLUCOSE   Result Value Ref Range    POCT Glucose 177 (H) 74 - 99 mg/dL     Scheduled medications  [Held by provider] apixaban, 2.5 mg, oral, BID  cholecalciferol, 1,000 Units, oral, Daily  donepezil, 20 mg, oral, Daily  finasteride, 5 mg, oral, Daily  [Held by provider] insulin glargine, 5 Units, subcutaneous, Nightly  insulin lispro, 0-10 Units, subcutaneous, Before meals & nightly  levETIRAcetam, 500 mg, oral, BID  melatonin, 10 mg, oral, Nightly  metoprolol tartrate, 100 mg, oral, BID  sevelamer carbonate, 1.6 g, oral, BID  sodium chloride 0.9%, 10 mL, intravenous, q8h [facility] hospital  vitamin B complex-vitamin C-folic acid, 1 capsule, oral, Daily      Continuous medications     PRN medications  PRN medications: albuterol, alteplase, alteplase, bisacodyl, dextrose, dextrose, glucagon, glucagon, HYDROmorphone, magnesium hydroxide, ondansetron ODT **OR** ondansetron, oxyCODONE, oxyCODONE    Assessment/Plan   Assessment & Plan  Closed fracture of neck of left femur, initial encounter    Falls, initial encounter    Closed head injury    Elevated troponin    Postop day 1 of left hip hemiarthroplasty  Physical and Occupational Therapy are on consult  Weightbearing as tolerated with walker  Eliquis for VTE prophylaxis  Labs reviewed  Multimodal pain regime  Bowel regime  Encourage incentive spirometry  Discharge  planning after seen by therapy, patient from [facility] will most likely need an off  Follow-up with Sigrid Edwards as directed       I spent 15 minutes in the professional and overall care of this patient.      Violet Wilkins PA-C

## 2025-03-11 NOTE — ASSESSMENT & PLAN NOTE
Left hip fracture status post open reduction internal fixation patient is doing well start patient on physical therapy continue with pain medications  Hypertension blood pressure is better significantly  End-stage renal disease on hemodialysis  Coronary artery disease stable postoperatively

## 2025-03-11 NOTE — PRE-PROCEDURE NOTE
Report from Sending RN:    Report From: Violet ENCISO  Recent Surgery of Procedure: Yes  Baseline Level of Consciousness (LOC): Alert  Oxygen Use: No  Type: N/A  Diabetic: Yes  Last BP Med Given Day of Dialysis: See MAR  Last Pain Med Given: See MAR  Lab Tests to be Obtained with Dialysis: No  Blood Transfusion to be Given During Dialysis: No  Available IV Access: Yes  Medications to be Administered During Dialysis: No  Continuous IV Infusion Running: No  Restraints on Currently or in the Last 24 Hours: No  Hand-Off Communication: Stable for HD  Dialysis Catheter Dressing: will assess @ bedside  Last Dressing Change: will assess @ bedside

## 2025-03-11 NOTE — POST-PROCEDURE NOTE
Report to Receiving RN:    Report To: Violet ENCISO  Time Report Called: [id number]  Hand-Off Communication: VSS, 1.5 liters removed, CVC clamped, capped and secure, heparin dwell in lumens, dressing changed  Complications During Treatment: Yes, hypotension  Ultrafiltration Treatment: No  Medications Administered During Dialysis: Yes, See MAR  Blood Products Administered During Dialysis: No  Labs Sent During Dialysis: No  Heparin Drip Rate Changes: No  Dialysis Catheter Dressing: clean/dry/intact  Last Dressing Change: 3/11/25      Last Updated: 12:55 PM by NELL DC

## 2025-03-11 NOTE — CARE PLAN
The patient's goals for the shift include      The clinical goals for the shift include Patient will have pain managed at a tolerable level this shift. Patient will remain free from falls. Patient will have increased mobility/ROM this shift.      Problem: Pain - Adult  Goal: Verbalizes/displays adequate comfort level or baseline comfort level  Outcome: Met     Problem: Safety - Adult  Goal: Free from fall injury  Outcome: Met     Problem: Discharge Planning  Goal: Discharge to home or other facility with appropriate resources  Outcome: Progressing     Problem: Chronic Conditions and Co-morbidities  Goal: Patient's chronic conditions and co-morbidity symptoms are monitored and maintained or improved  Outcome: Progressing     Problem: Nutrition  Goal: Nutrient intake appropriate for maintaining nutritional needs  Outcome: Met     Problem: Skin  Goal: Participates in plan/prevention/treatment measures  Outcome: Progressing     Problem: Diabetes  Goal: Maintain glucose levels >70mg/dl to <250mg/dl throughout shift  Outcome: Met

## 2025-03-11 NOTE — PROGRESS NOTES
Physical Therapy    Physical Therapy Evaluation    Patient Name: Tyshawn Coles  MRN: [id number]  Today's Date: 3/11/2025   Time Calculation  Start Time: 1344  Stop Time: 1359  Time Calculation (min): 15 min  4108/4108-A    Assessment/Plan   PT Assessment  PT Assessment Results: Decreased strength, Decreased range of motion, Decreased endurance, Impaired balance, Decreased mobility, Decreased safety awareness, Pain, Impaired judgement, Decreased cognition, Orthopedic restrictions  Rehab Prognosis: Good  Barriers to Discharge Home: Cognition needs, Physical needs  Cognition Needs: 24hr supervision for safety awareness needed, Recollection or understanding of precautions/restrictions limited, Insight of patient limited regarding functional ability/needs, Cognition-related high falls risk  Physical Needs: 24hr mobility assistance needed, Intermittent mobility assistance needed, 24hr ADL assistance needed, High falls risk due to function or environment, Returning to long-term care/other facility  Evaluation/Treatment Tolerance:  (limited secondary to cognition)  Medical Staff Made Aware: Yes  End of Session Communication: Bedside nurse  Assessment Comment: Pt presents today below baseline level of function and requires continued PT during hospital stay. Pt may benefit from PT at a moderate intensity level at discharge to maximize functional mobility and safety.    End of Session Patient Position: Up in chair, Alarm on  IP OR SWING BED PT PLAN  Inpatient or Swing Bed: Inpatient  PT Plan  Treatment/Interventions: Bed mobility, Transfer training, Gait training, Balance training, Neuromuscular re-education, Strengthening, Endurance training, Range of motion, Therapeutic exercise, Therapeutic activity, Home exercise program  PT Plan: Ongoing PT  PT Frequency: 4 times per week  PT Discharge Recommendations: Moderate intensity level of continued care  Equipment Recommended upon Discharge: Wheeled walker, Wheelchair  PT Recommended  Transfer Status: Assist x2  PT - OK to Discharge: Yes (To next level of care when cleared by medical team   )    Subjective       General Visit Information:  General  Reason for Referral: recent surgery  Referred By: ADELINA East MD  Past Medical History Relevant to Rehab: To hospital following fall. Pt found to have left femur fracture. Pt underwent left hip hemiarthroplasty. PMH: autonomic dysfunction, anemia, CAD, dementia, DM, peripheral neuropathy, PVD, seizure disorder, right YASMEEN, pacemaker placement  Co-Treatment: OT  Co-Treatment Reason: for safety  Prior to Session Communication: Bedside nurse  Patient Position Received: Bed, 3 rail up, Alarm on  Preferred Learning Style: verbal, visual  General Comment: Pt agreeable to PT, nursing cleared for treatment.    Home Living:  Home Living  Home Living Comments: Pt is a long term care resident at [facility] per EMR    Prior Level of Function:  Prior Function Per Pt/Caregiver Report  ADL Assistance: Needs assistance  Homemaking Assistance: Needs assistance  Ambulatory Assistance: Needs assistance (pt reports ambulating with walker with assist)    Precautions:  Precautions  LE Weight Bearing Status: Weight Bearing as Tolerated (left LE)  Medical Precautions: Fall precautions  Post-Surgical Precautions:  (left posterior hip precautions)    Objective     Pain:  Pain Assessment  Pain Assessment: 0-10  0-10 (Numeric) Pain Score:  (pt did not rate but wincing with movement of left LE)  Pain Type: Surgical pain  Pain Location: Hip  Pain Orientation: Left  Pain Interventions: Ambulation/increased activity, Repositioned    Cognition:  Cognition  Overall Cognitive Status:  (falt affect)  Orientation Level: Disoriented to place, Disoriented to time, Disoriented to situation  Sustained Attention: Impaired  Safety/Judgement:  (impaired)  Insight: Severe  Processing Speed: Delayed    General Assessments:      Activity Tolerance  Endurance: Decreased tolerance for upright  activites                 Static Sitting Balance  Static Sitting-Comment/Number of Minutes: poor       Functional Assessments:     Bed Mobility  Bed Mobility: Yes  Bed Mobility 1  Bed Mobility 1: Supine to sitting, Sitting to supine  Level of Assistance 1: Dependent  Bed Mobility Comments 1: x 2, pt resistive and pushing back when seated at EOB  Transfers  Transfer: No  Ambulation/Gait Training  Ambulation/Gait Training Performed: No          Extremity/Trunk Assessments:        RLE   RLE :  (A/PROM limited, rigid throughout)  LLE   LLE :  (A/PROM limited due to pain, rigid throughout)    Outcome Measures:     [facility] Basic Mobility  Turning from your back to your side while in a flat bed without using bedrails: Total  Moving from lying on your back to sitting on the side of a flat bed without using bedrails: Total  Moving to and from bed to chair (including a wheelchair): Total  Standing up from a chair using your arms (e.g. wheelchair or bedside chair): Total  To walk in hospital room: Total  Climbing 3-5 steps with railing: Total  Basic Mobility - Total Score: 6                               Goals:  Encounter Problems       Encounter Problems (Active)       PT Problem       Pt will perform bed mobility with mod A.  (Progressing)       Start:  03/11/25    Expected End:  03/25/25            Pt will complete sit <> stand and bed <> chair transfers with max A.  (Not Progressing)       Start:  03/11/25    Expected End:  03/25/25            Pt will progress to completing 3 x 20 supine/seated exercises in order to increase strength and improve gait mechanics.   (Not Progressing)       Start:  03/11/25    Expected End:  03/25/25                 Education Documentation  Precautions, taught by Tamie Sierra, PT at 3/11/2025  3:39 PM.  Learner: Patient  Readiness: Acceptance  Method: Explanation  Response: No Evidence of Learning    Body Mechanics, taught by Tamie Sierra, PT at 3/11/2025  3:39 PM.  Learner:  Patient  Readiness: Acceptance  Method: Explanation  Response: No Evidence of Learning    Mobility Training, taught by Tamie Sierra PT at 3/11/2025  3:39 PM.  Learner: Patient  Readiness: Acceptance  Method: Explanation  Response: No Evidence of Learning    Education Comments  No comments found.

## 2025-03-11 NOTE — PROGRESS NOTES
03/11/25 1612   Discharge Planning   Home or Post Acute Services Post acute facilities (Rehab/SNF/etc)   Type of Post Acute Facility Services Skilled nursing   Expected Discharge Disposition SNF  ([facility])   Does the patient need discharge transport arranged? Yes   RoundTrip coordination needed? Yes   Has discharge transport been arranged? No   Intensity of Service   Intensity of Service 0-30 min     Spoke with pt's wife to confirm the discharge plan. She would like pt to return [facility] at skilled level of care so he could receive therapy when he returns.  Referral sent to [facility] and request was sent to start pre-cert for Skilled placement. SW to follow.    4:45 Pt is cleared for discharge. Facility states pre-cert is needed prior to return. Pre-cert has been started. SW to follow.

## 2025-03-11 NOTE — PROGRESS NOTES
Occupational Therapy  Evaluation    Patient Name: Tyshawn Coles  MRN: [id number]  Today's Date: 3/11/2025  Time Calculation  Start Time: 1343  Stop Time: 1358  Time Calculation (min): 15 min  4108/4108-A    Assessment  IP OT Assessment  OT Assessment: Patient demonstrates decreased independence with self care, decreased independence with functional transfers, decreased endurance, decreased balance and decreased strength.  Patient will benefit from skilled OT to address deficits.  Anticipate patient will benefit from moderate intensity therapy post hospital stay.  Prognosis: Fair  Barriers to Discharge Home: Physical needs, Cognition needs  Cognition Needs: Cognition-related high falls risk  Physical Needs: 24hr mobility assistance needed, 24hr ADL assistance needed  Evaluation/Treatment Tolerance: Patient limited by pain  Medical Staff Made Aware: Yes  End of Session Communication: Bedside nurse  End of Session Patient Position: Bed, 3 rail up, Alarm on    Plan:  Treatment Interventions: ADL retraining, Functional transfer training, UE strengthening/ROM, Endurance training, Cognitive reorientation, Patient/family training  OT Frequency: 4 times per week  OT Discharge Recommendations: Moderate intensity level of continued care  Equipment Recommended upon Discharge: Wheeled walker  OT Recommended Transfer Status: Maximum assist, Assist of 2  OT - OK to Discharge: Yes (to next level of care when medically cleared by physician)    Subjective     Current Problem:  1. Closed fracture of neck of left femur, initial encounter  Case Request Operating Room: HEMIARTHROPLASTY, HIP    Case Request Operating Room: HEMIARTHROPLASTY, HIP    oxyCODONE (Roxicodone) 5 mg immediate release tablet    oxyCODONE (Roxicodone) 5 mg immediate release tablet      2. Atrial fibrillation, unspecified type (Multi)  Transthoracic Echo (TTE) Complete    Transthoracic Echo (TTE) Complete      3. Other abnormalities of heart beat  Transthoracic Echo  (TTE) Complete    Transthoracic Echo (TTE) Complete      4. Renal failure  IR angiogram fistula graft    IR angiogram fistula graft          General:  General  Reason for Referral: s/p ORIF left hip  Referred By: Dr. East  Past Medical History Relevant to Rehab: [facility], falls, ESRD, CAD, HTN  Co-Treatment: PT  Co-Treatment Reason: 2 person assist  Prior to Session Communication: Bedside nurse  Patient Position Received: Bed, 3 rail up, Alarm on  Preferred Learning Style: verbal, visual  General Comment: Patient agreeable to therapy.    Precautions:  LE Weight Bearing Status: Weight Bearing as Tolerated  Medical Precautions: Fall precautions  Post-Surgical Precautions: Left hip precautions      Pain:  Pain Assessment  Pain Assessment: 0-10  0-10 (Numeric) Pain Score:  (did not rate but was pulling away when attempting to move left leg)  Pain Interventions: Repositioned    Objective     Cognition:  Orientation Level: Disoriented to situation, Disoriented to place, Disoriented to time        Home Living:  Home Living Comments: from [facility]     Prior Function:  Level of [place]: Needs assistance with ADLs, Needs assistance with homemaking, Needs assistance with functional transfers  Prior Function Comments: patient reports using walker for mobility with assist      ADL:  LE Dressing Assistance: Total  Toileting Assistance with Device: Total    Activity Tolerance:  Endurance: Decreased tolerance for upright activites    Bed Mobility/Transfers:   Bed Mobility  Bed Mobility: Yes  Bed Mobility 1  Bed Mobility 1: Supine to sitting, Sitting to supine  Level of Assistance 1: Maximum assistance, +2  Bed Mobility Comments 1: patient pushing back when seated  Transfers  Transfer: No    Ambulation/Gait Training:  Functional Mobility  Functional Mobility Performed: No    Sitting Balance:  Static Sitting Balance  Static Sitting-Balance Support: Bilateral upper extremity supported, Feet supported  Static Sitting-Level of  Assistance: Maximum assistance      Outcome Measures: [facility] Daily Activity  Putting on and taking off regular lower body clothing: Total  Bathing (including washing, rinsing, drying): Total  Putting on and taking off regular upper body clothing: Total  Toileting, which includes using toilet, bedpan or urinal: Total  Taking care of personal grooming such as brushing teeth: Total  Eating Meals: A lot  Daily Activity - Total Score: 7           EDUCATION:  Education  Individual(s) Educated: Patient  Education Provided: Fall precautons, Risk and benefits of OT discussed with patient or other  Plan of Care Discussed and Agreed Upon: yes  Patient Response to Education: Patient/Caregiver Verbalized Understanding of Information (needs reinforcement)      Goals:   Encounter Problems       Encounter Problems (Active)       OT Goals       Patient will complete functional transfers with mod A. (Progressing)       Start:  03/11/25    Expected End:  03/25/25            Patient will complete bed mobility with mod A. (Progressing)       Start:  03/11/25    Expected End:  03/25/25            Patient will complete grooming seated edge of bed with mod A. (Progressing)       Start:  03/11/25    Expected End:  03/25/25

## 2025-03-11 NOTE — PROGRESS NOTES
"Subjective  Patient had uneventful night does not complain about any chest pain shortness of breath status post open reduction internal fixation of the left hip  Nursing staff was interviewed  Objectives    Last Recorded Vitals  Blood pressure 103/52, pulse 62, temperature 36.7 °C (98.1 °F), temperature source Temporal, resp. rate 16, height 1.727 m (5' 8\"), weight 69.9 kg (154 lb), SpO2 97%.    Physical Exam  HENT:      Right Ear: External ear normal.      Left Ear: External ear normal.      Mouth/Throat:      Mouth: Mucous membranes are moist.   Cardiovascular:      Rate and Rhythm: Normal rate and regular rhythm.      Heart sounds: No murmur heard.     No friction rub. No gallop.   Pulmonary:      Effort: No accessory muscle usage or respiratory distress.      Breath sounds: No stridor. No wheezing or rhonchi.   Chest:      Chest wall: No tenderness.   Abdominal:      General: There is no distension.      Palpations: There is no mass.      Tenderness: There is no abdominal tenderness. There is no guarding or rebound.   Musculoskeletal:         Limited range of motion over the left hip  Skin:     Coloration: Skin is not jaundiced or pale.      Findings: No lesion.   Neurological:      Alert but very confused     Labs    Admission on 03/08/2025   Component Date Value Ref Range Status    WBC 03/09/2025 12.2 (H)  4.4 - 11.3 x10*3/uL Final    nRBC 03/09/2025 0.0  0.0 - 0.0 /100 WBCs Final    RBC 03/09/2025 3.45 (L)  4.50 - 5.90 x10*6/uL Final    Hemoglobin 03/09/2025 9.9 (L)  13.5 - 17.5 g/dL Final    Hematocrit 03/09/2025 28.1 (L)  41.0 - 52.0 % Final    MCV 03/09/2025 81  80 - 100 fL Final    MCH 03/09/2025 28.7  26.0 - 34.0 pg Final    MCHC 03/09/2025 35.2  32.0 - 36.0 g/dL Final    RDW 03/09/2025 18.2 (H)  11.5 - 14.5 % Final    Platelets 03/09/2025 178  150 - 450 x10*3/uL Final    Neutrophils % 03/09/2025 66.8  40.0 - 80.0 % Final    Immature Granulocytes %, Automated 03/09/2025 0.4  0.0 - 0.9 % Final    Immature " Granulocyte Count (IG) includes promyelocytes, myelocytes and metamyelocytes but does not include bands. Percent differential counts (%) should be interpreted in the context of the absolute cell counts (cells/UL).    Lymphocytes % 03/09/2025 14.8  13.0 - 44.0 % Final    Monocytes % 03/09/2025 14.6  2.0 - 10.0 % Final    Eosinophils % 03/09/2025 3.0  0.0 - 6.0 % Final    Basophils % 03/09/2025 0.4  0.0 - 2.0 % Final    Neutrophils Absolute 03/09/2025 8.11 (H)  1.60 - 5.50 x10*3/uL Final    Percent differential counts (%) should be interpreted in the context of the absolute cell counts (cells/uL).    Immature Granulocytes Absolute, Au* 03/09/2025 0.05  0.00 - 0.50 x10*3/uL Final    Lymphocytes Absolute 03/09/2025 1.80  0.80 - 3.00 x10*3/uL Final    Monocytes Absolute 03/09/2025 1.78 (H)  0.05 - 0.80 x10*3/uL Final    Eosinophils Absolute 03/09/2025 0.37  0.00 - 0.40 x10*3/uL Final    Basophils Absolute 03/09/2025 0.05  0.00 - 0.10 x10*3/uL Final    Glucose 03/09/2025 204 (H)  74 - 99 mg/dL Final    Sodium 03/09/2025 136  136 - 145 mmol/L Final    Potassium 03/09/2025 4.3  3.5 - 5.3 mmol/L Final    Chloride 03/09/2025 96 (L)  98 - 107 mmol/L Final    Bicarbonate 03/09/2025 30  21 - 32 mmol/L Final    Anion Gap 03/09/2025 14  10 - 20 mmol/L Final    Urea Nitrogen 03/09/2025 38 (H)  6 - 23 mg/dL Final    Creatinine 03/09/2025 5.11 (H)  0.50 - 1.30 mg/dL Final    eGFR 03/09/2025 10 (L)  >60 mL/min/1.73m*2 Final    Calculations of estimated GFR are performed using the 2021 CKD-EPI Study Refit equation without the race variable for the IDMS-Traceable creatinine methods.  https://jasn.asnjournals.org/content/early/2021/09/22/ASN.3996920590    Calcium 03/09/2025 8.8  8.6 - 10.3 mg/dL Final    Albumin 03/09/2025 3.2 (L)  3.4 - 5.0 g/dL Final    Alkaline Phosphatase 03/09/2025 62  33 - 136 U/L Final    Total Protein 03/09/2025 6.7  6.4 - 8.2 g/dL Final    AST 03/09/2025 24  9 - 39 U/L Final    Bilirubin, Total 03/09/2025 0.5   0.0 - 1.2 mg/dL Final    ALT 03/09/2025 14  10 - 52 U/L Final    Patients treated with Sulfasalazine may generate falsely decreased results for ALT.    Protime 03/09/2025 13.9 (H)  9.8 - 12.4 seconds Final    INR 03/09/2025 1.3 (H)  0.9 - 1.1 Final    ABO TYPE 03/09/2025 O   Final    Rh TYPE 03/09/2025 POS   Final    Troponin I, High Sensitivity 03/09/2025 70 (HH)  0 - 20 ng/L Final    Ventricular Rate 03/09/2025 70  BPM Preliminary    Atrial Rate 03/09/2025 70  BPM Preliminary    MS Interval 03/09/2025 166  ms Preliminary    QRS Duration 03/09/2025 72  ms Preliminary    QT Interval 03/09/2025 404  ms Preliminary    QTC Calculation(Bazett) 03/09/2025 436  ms Preliminary    P [place] 03/09/2025 78  degrees Preliminary    R [place] 03/09/2025 77  degrees Preliminary    T [place] 03/09/2025 80  degrees Preliminary    QRS Count 03/09/2025 11  beats Preliminary    Q Onset 03/09/2025 222  ms Preliminary    P Onset 03/09/2025 139  ms Preliminary    P Offset 03/09/2025 198  ms Preliminary    T Offset 03/09/2025 424  ms Preliminary    QTC [place] 03/09/2025 425  ms Preliminary    Coronavirus 2019, PCR 03/09/2025 Not Detected  Not Detected Final    Flu A Result 03/09/2025 Not Detected  Not Detected Final    Flu B Result 03/09/2025 Not Detected  Not Detected Final    RSV PCR 03/09/2025 Not Detected  Not Detected Final    Troponin I, High Sensitivity 03/09/2025 69 (HH)  0 - 20 ng/L Final    Previous result verified on 3/9/2025 0103 on specimen/case [id number] called with component [facility] for procedure Troponin I, High Sensitivity with value 70 ng/L.    Staph/MRSA Screen Culture 03/09/2025 No Staphylococcus aureus isolated   Final    POCT Glucose 03/09/2025 159 (H)  74 - 99 mg/dL Final    POCT Glucose 03/09/2025 99  74 - 99 mg/dL Final    AV pk omar 03/10/2025 1.70  m/s Final    LV Biplane EF 03/10/2025 65  % Final    LVOT diam 03/10/2025 2.31  cm Final    MV E/A ratio 03/10/2025 0.90   Final    Tricuspid annular plane systolic e*  03/10/2025 1.7  cm Final    AV mn grad 03/10/2025 7  mmHg Final    LV EF 03/10/2025 65  % Final    RV free wall pk S' 03/10/2025 11.00  cm/s Final    RVSP 03/10/2025 42.7  mmHg Final    LVIDd 03/10/2025 3.80  cm Final    AV pk grad 03/10/2025 12  mmHg Final    LV A4C EF 03/10/2025 60.0   Final    WBC 03/10/2025 15.9 (H)  4.4 - 11.3 x10*3/uL Final    nRBC 03/10/2025 0.0  0.0 - 0.0 /100 WBCs Final    RBC 03/10/2025 3.30 (L)  4.50 - 5.90 x10*6/uL Final    Hemoglobin 03/10/2025 9.6 (L)  13.5 - 17.5 g/dL Final    Hematocrit 03/10/2025 27.5 (L)  41.0 - 52.0 % Final    MCV 03/10/2025 83  80 - 100 fL Final    MCH 03/10/2025 29.1  26.0 - 34.0 pg Final    MCHC 03/10/2025 34.9  32.0 - 36.0 g/dL Final    RDW 03/10/2025 18.1 (H)  11.5 - 14.5 % Final    Platelets 03/10/2025 173  150 - 450 x10*3/uL Final    Glucose 03/10/2025 160 (H)  74 - 99 mg/dL Final    Sodium 03/10/2025 139  136 - 145 mmol/L Final    Potassium 03/10/2025 4.3  3.5 - 5.3 mmol/L Final    Chloride 03/10/2025 99  98 - 107 mmol/L Final    Bicarbonate 03/10/2025 29  21 - 32 mmol/L Final    Anion Gap 03/10/2025 15  10 - 20 mmol/L Final    Urea Nitrogen 03/10/2025 56 (H)  6 - 23 mg/dL Final    Creatinine 03/10/2025 7.11 (H)  0.50 - 1.30 mg/dL Final    eGFR 03/10/2025 7 (L)  >60 mL/min/1.73m*2 Final    Calculations of estimated GFR are performed using the 2021 CKD-EPI Study Refit equation without the race variable for the IDMS-Traceable creatinine methods.  https://jasn.asnjournals.org/content/early/2021/09/22/ASN.6822759392    Calcium 03/10/2025 8.8  8.6 - 10.3 mg/dL Final    POCT Glucose 03/09/2025 168 (H)  74 - 99 mg/dL Final    WBC 03/11/2025 17.5 (H)  4.4 - 11.3 x10*3/uL Final    nRBC 03/11/2025 0.0  0.0 - 0.0 /100 WBCs Final    RBC 03/11/2025 3.40 (L)  4.50 - 5.90 x10*6/uL Final    Hemoglobin 03/11/2025 9.9 (L)  13.5 - 17.5 g/dL Final    Hematocrit 03/11/2025 28.4 (L)  41.0 - 52.0 % Final    MCV 03/11/2025 84  80 - 100 fL Final    MCH 03/11/2025 29.1  26.0 - 34.0  pg Final    MCHC 03/11/2025 34.9  32.0 - 36.0 g/dL Final    RDW 03/11/2025 17.9 (H)  11.5 - 14.5 % Final    Platelets 03/11/2025 183  150 - 450 x10*3/uL Final    Glucose 03/11/2025 160 (H)  74 - 99 mg/dL Final    Sodium 03/11/2025 141  136 - 145 mmol/L Final    Potassium 03/11/2025 5.2  3.5 - 5.3 mmol/L Final    Chloride 03/11/2025 99  98 - 107 mmol/L Final    Bicarbonate 03/11/2025 28  21 - 32 mmol/L Final    Anion Gap 03/11/2025 19  10 - 20 mmol/L Final    Urea Nitrogen 03/11/2025 68 (H)  6 - 23 mg/dL Final    Creatinine 03/11/2025 8.42 (H)  0.50 - 1.30 mg/dL Final    eGFR 03/11/2025 6 (L)  >60 mL/min/1.73m*2 Final    Calculations of estimated GFR are performed using the 2021 CKD-EPI Study Refit equation without the race variable for the IDMS-Traceable creatinine methods.  https://jasn.asnjournals.org/content/early/2021/09/22/ASN.4737715521    Calcium 03/11/2025 8.9  8.6 - 10.3 mg/dL Final    POCT Glucose 03/10/2025 170 (H)  74 - 99 mg/dL Final    POCT Glucose 03/11/2025 177 (H)  74 - 99 mg/dL Final         Imaging     XR pelvis 1-2 views  Narrative: Interpreted By:  Matthieu Peters,   STUDY:  XR PELVIS 1-2 VIEWS; ;  3/10/2025 5:52 pm      INDICATION:  Signs/Symptoms:post surgery film in PACU.          COMPARISON:  03/05/2025      ACCESSION NUMBER(S):  [id number]      ORDERING CLINICIAN:  BETY CALIX      FINDINGS:  Pelvis, single view      Interval left hip hemiarthroplasty. No periprosthetic fracture  lucency seen on this view. Right hip hemiarthroplasty noted as well.      Study limited by severe osteopenia.      Impression: Interval left hip hemiarthroplasty without acute abnormality.          MACRO:  None      Signed by: Matthieu Petesr 3/10/2025 6:22 PM  Dictation workstation:   [id number]  Transthoracic Echo (TTE) Complete              [facility]  [address], [facility] [address]     Tel [phone] Fax [phone]    TRANSTHORACIC ECHOCARDIOGRAM REPORT    Patient Name:        EVELYN Coppola Physician:    [id number] Kelvin Barrientos MD  Study Date:         3/10/2025            Ordering Provider:    [id number] KAYE TAFOYA  MRN/PID:            [id number]             Fellow:  Accession#:         [id number]         Nurse:  Date of Birth/Age:  1940 / 84 years Sonographer:          Rani Quintanilla RDCS  Gender Assigned at  M                    Additional Staff:  Birth:  Height:             172.72 cm            Admit Date:  Weight:             70.31 kg             Admission Status:     Inpatient -                                                                 Routine  BSA / BMI:          1.83 m2 / 23.57      Department Location:  [facility] Echo Lab                      kg/m2  Blood Pressure: 141 /74 mmHg    Study Type:    TRANSTHORACIC ECHO (TTE) COMPLETE  Diagnosis/ICD: Unspecified atrial fibrillation-I48.91  Indication:    Afib, cardiac clearance  CPT Codes:     Echo Complete w Full Doppler-[id number]   Study Detail: The following Echo studies were performed: 2D, M-Mode, Doppler and                color flow. Technically challenging study due to patient lying in                supine position.       PHYSICIAN INTERPRETATION:  Left Ventricle: Left ventricular ejection fraction is normal, calculated by Bailey's biplane at 65%. There are no regional wall motion abnormalities. The left ventricular cavity size is normal. There is normal septal and normal posterior left ventricular wall thickness. There is left ventricular concentric remodeling. Spectral Doppler shows a Grade I (impaired relaxation pattern) of left ventricular diastolic filling with normal left atrial filling pressure.  Left Atrium: The left atrial size is normal.  Right Ventricle: The right ventricle is normal in size. There is normal  right ventricular global systolic function.  Right Atrium: The right atrial size is normal.  Aortic Valve: The aortic valve is trileaflet. There is evidence of mildly elevated transaortic gradients consistent with sclerosis of the aortic valve.  There is no evidence of aortic valve regurgitation. The peak instantaneous gradient of the aortic valve is 12 mmHg. The mean gradient of the aortic valve is 7 mmHg.  Mitral Valve: The mitral valve is normal in structure. There is trace mitral valve regurgitation.  Tricuspid Valve: The tricuspid valve is structurally normal. There is mild tricuspid regurgitation. The Doppler estimated RVSP is mildly elevated right ventricular systolic pressure at 42.7 mmHg.  Pulmonic Valve: The pulmonic valve is structurally normal. There is no indication of pulmonic valve regurgitation.  Pericardium: No pericardial effusion noted.  Aorta: The aortic root is normal.       CONCLUSIONS:   1. Left ventricular ejection fraction is normal, calculated by Bailey's biplane at 65%.   2. Spectral Doppler shows a Grade I (impaired relaxation pattern) of left ventricular diastolic filling with normal left atrial filling pressure.   3. There is normal right ventricular global systolic function.   4. Mildly elevated right ventricular systolic pressure.   5. Aortic valve sclerosis.    QUANTITATIVE DATA SUMMARY:     2D MEASUREMENTS:             Normal Ranges:  LAs:             2.18 cm     (2.7-4.0cm)  IVSd:            0.88 cm     (0.6-1.1cm)  LVPWd:           0.87 cm     (0.6-1.1cm)  LVIDd:           3.80 cm     (3.9-5.9cm)  LVIDs:           2.93 cm  LV Mass Index:   53.3 g/m2  LVEDV Index:     35.41 ml/m2  LV % FS          23.0 %       LEFT ATRIUM:                 Normal Ranges:  LA Area A4C:      10.0 cm2  LA Area A2C:      17.0 cm2  LA Volume Index:  17.0 ml/m2  LA Vol A4C:       15.8 ml  LA Vol A2C:       38.2 ml  LA Vol Index BSA: 14.7 ml/m2       M-MODE MEASUREMENTS:         Normal Ranges:  Ao Root:              3.50 cm (2.0-3.7cm)  AoV Exc:             1.76 cm (1.5-2.5cm)       AORTA MEASUREMENTS:         Normal Ranges:  AoV Exc:            1.76 cm (1.5-2.5cm)  Ao Sinus, d:        3.80 cm (2.1-3.5cm)  Ao STJ, d:          3.00 cm (1.7-3.4cm)  Asc Ao, d:          3.50 cm (2.1-3.4cm)       LV SYSTOLIC FUNCTION:                       Normal Ranges:  EF-A4C View:    60 % (>=55%)  EF-A2C View:    69 %  EF-Biplane:     65 %  LV EF Reported: 65 %       LV DIASTOLIC FUNCTION:           Normal Ranges:  MV Peak E:             1.01 m/s  (0.7-1.2 m/s)  MV Peak A:             1.12 m/s  (0.42-0.7 m/s)  E/A Ratio:             0.90      (1.0-2.2)  MV e'                  0.085 m/s (>8.0)  MV lateral e'          0.09 m/s  MV medial e'           0.08 m/s  E/e' Ratio:            11.90     (<8.0)       MITRAL VALVE:          Normal Ranges:  MV DT:        211 msec (150-240msec)       AORTIC VALVE:               Normal Ranges:  AoV Vmax:         1.70 m/s  (<=1.7m/s)  AoV Peak P.5 mmHg (<20mmHg)  AoV Mean P.6 mmHg  (1.7-11.5mmHg)  AoV VTI:          39.43 cm  (18-25cm)  LVOT Diameter:    2.31 cm   (1.8-2.4cm)  AoV Area, planim: 1.34 cm2  (2.5-4.5cm2)       RIGHT VENTRICLE:  RV Basal 3.30 cm  RV Mid   2.10 cm  RV Major 7.0 cm  TAPSE:   17.0 mm  RV s'    0.11 m/s       TRICUSPID VALVE/RVSP:          Normal Ranges:  Peak TR Velocity:     3.15 m/s  RV Syst Pressure:     43 mmHg  (< 30mmHg)  IVC Diam:             1.71 cm       AORTA:  Asc Ao Diam 3.49 cm       [id number] Kelvin Barrientos MD  Electronically signed on 3/10/2025 at 12:07:16 PM       ** Final **  ECG 12 lead  Normal sinus rhythm  Normal ECG  When compared with ECG of 05-MAR-2025 15:33,  Premature atrial complexes are no longer Present  T wave amplitude has decreased in Lateral leads       Patient Active Problem List   Diagnosis    Closed fracture of neck of left femur, initial encounter    Falls, initial encounter    Closed head injury    Elevated troponin          Assessment/Plan   Assessment & Plan  Closed fracture of neck of left femur, initial encounter    Falls, initial encounter    Closed head injury    Elevated troponin  Left hip fracture status post open reduction internal fixation patient is doing well start patient on physical therapy continue with pain medications  Hypertension blood pressure is better significantly  End-stage renal disease on hemodialysis  Coronary artery disease stable postoperatively           I spent 25 minutes in the professional and overall care of this patient.      ADELINA East MD

## 2025-03-12 ENCOUNTER — APPOINTMENT (OUTPATIENT)
Dept: RADIOLOGY | Facility: HOSPITAL | Age: 85
End: 2025-03-12
Payer: MEDICARE

## 2025-03-12 VITALS
BODY MASS INDEX: 23.34 KG/M2 | SYSTOLIC BLOOD PRESSURE: 117 MMHG | RESPIRATION RATE: 16 BRPM | HEIGHT: 68 IN | OXYGEN SATURATION: 96 % | HEART RATE: 67 BPM | WEIGHT: 154 LBS | TEMPERATURE: 98.4 F | DIASTOLIC BLOOD PRESSURE: 53 MMHG

## 2025-03-12 LAB
ANION GAP SERPL CALC-SCNC: 16 MMOL/L (ref 10–20)
BUN SERPL-MCNC: 36 MG/DL (ref 6–23)
CALCIUM SERPL-MCNC: 8.5 MG/DL (ref 8.6–10.3)
CHLORIDE SERPL-SCNC: 97 MMOL/L (ref 98–107)
CO2 SERPL-SCNC: 28 MMOL/L (ref 21–32)
CREAT SERPL-MCNC: 5.41 MG/DL (ref 0.5–1.3)
EGFRCR SERPLBLD CKD-EPI 2021: 10 ML/MIN/1.73M*2
ERYTHROCYTE [DISTWIDTH] IN BLOOD BY AUTOMATED COUNT: 17.7 % (ref 11.5–14.5)
GLUCOSE BLD MANUAL STRIP-MCNC: 163 MG/DL (ref 74–99)
GLUCOSE BLD MANUAL STRIP-MCNC: 276 MG/DL (ref 74–99)
GLUCOSE SERPL-MCNC: 180 MG/DL (ref 74–99)
HCT VFR BLD AUTO: 27.3 % (ref 41–52)
HGB BLD-MCNC: 9.3 G/DL (ref 13.5–17.5)
MCH RBC QN AUTO: 28.8 PG (ref 26–34)
MCHC RBC AUTO-ENTMCNC: 34.1 G/DL (ref 32–36)
MCV RBC AUTO: 85 FL (ref 80–100)
NRBC BLD-RTO: 0 /100 WBCS (ref 0–0)
PLATELET # BLD AUTO: 165 X10*3/UL (ref 150–450)
POTASSIUM SERPL-SCNC: 4.1 MMOL/L (ref 3.5–5.3)
RBC # BLD AUTO: 3.23 X10*6/UL (ref 4.5–5.9)
SODIUM SERPL-SCNC: 137 MMOL/L (ref 136–145)
WBC # BLD AUTO: 15.7 X10*3/UL (ref 4.4–11.3)

## 2025-03-12 PROCEDURE — 80048 BASIC METABOLIC PNL TOTAL CA: CPT | Performed by: NURSE PRACTITIONER

## 2025-03-12 PROCEDURE — 2500000002 HC RX 250 W HCPCS SELF ADMINISTERED DRUGS (ALT 637 FOR MEDICARE OP, ALT 636 FOR OP/ED): Performed by: NURSE PRACTITIONER

## 2025-03-12 PROCEDURE — 2500000001 HC RX 250 WO HCPCS SELF ADMINISTERED DRUGS (ALT 637 FOR MEDICARE OP): Performed by: NURSE PRACTITIONER

## 2025-03-12 PROCEDURE — 2500000004 HC RX 250 GENERAL PHARMACY W/ HCPCS (ALT 636 FOR OP/ED): Performed by: NURSE PRACTITIONER

## 2025-03-12 PROCEDURE — 36415 COLL VENOUS BLD VENIPUNCTURE: CPT | Performed by: NURSE PRACTITIONER

## 2025-03-12 PROCEDURE — 85027 COMPLETE CBC AUTOMATED: CPT | Performed by: NURSE PRACTITIONER

## 2025-03-12 PROCEDURE — 2500000004 HC RX 250 GENERAL PHARMACY W/ HCPCS (ALT 636 FOR OP/ED): Performed by: INTERNAL MEDICINE

## 2025-03-12 PROCEDURE — 2500000001 HC RX 250 WO HCPCS SELF ADMINISTERED DRUGS (ALT 637 FOR MEDICARE OP): Performed by: PHYSICIAN ASSISTANT

## 2025-03-12 PROCEDURE — 82947 ASSAY GLUCOSE BLOOD QUANT: CPT

## 2025-03-12 RX ADMIN — Medication 1 CAPSULE: at 08:46

## 2025-03-12 RX ADMIN — SEVELAMER CARBONATE 1.6 G: 800 POWDER, FOR SUSPENSION ORAL at 08:38

## 2025-03-12 RX ADMIN — DONEPEZIL HYDROCHLORIDE 20 MG: 10 TABLET ORAL at 08:37

## 2025-03-12 RX ADMIN — APIXABAN 2.5 MG: 2.5 TABLET, FILM COATED ORAL at 08:37

## 2025-03-12 RX ADMIN — INSULIN LISPRO 6 UNITS: 100 INJECTION, SOLUTION INTRAVENOUS; SUBCUTANEOUS at 12:25

## 2025-03-12 RX ADMIN — METOPROLOL TARTRATE 100 MG: 100 TABLET, FILM COATED ORAL at 08:37

## 2025-03-12 RX ADMIN — LEVETIRACETAM 500 MG: 500 TABLET, FILM COATED ORAL at 08:37

## 2025-03-12 RX ADMIN — FINASTERIDE 5 MG: 5 TABLET, FILM COATED ORAL at 08:37

## 2025-03-12 RX ADMIN — Medication 1000 UNITS: at 08:37

## 2025-03-12 RX ADMIN — OXYCODONE HYDROCHLORIDE 5 MG: 5 TABLET ORAL at 08:37

## 2025-03-12 RX ADMIN — INSULIN LISPRO 2 UNITS: 100 INJECTION, SOLUTION INTRAVENOUS; SUBCUTANEOUS at 08:41

## 2025-03-12 ASSESSMENT — COGNITIVE AND FUNCTIONAL STATUS - GENERAL
WALKING IN HOSPITAL ROOM: TOTAL
MOVING FROM LYING ON BACK TO SITTING ON SIDE OF FLAT BED WITH BEDRAILS: A LOT
CLIMB 3 TO 5 STEPS WITH RAILING: TOTAL
MOVING TO AND FROM BED TO CHAIR: A LOT
TURNING FROM BACK TO SIDE WHILE IN FLAT BAD: A LOT
MOBILITY SCORE: 10
STANDING UP FROM CHAIR USING ARMS: A LOT

## 2025-03-12 ASSESSMENT — PAIN SCALES - GENERAL: PAINLEVEL_OUTOF10: 7

## 2025-03-12 NOTE — PROGRESS NOTES
03/12/25 1129   Discharge Planning   Home or Post Acute Services Post acute facilities (Rehab/SNF/etc)   Type of Post Acute Facility Services Skilled nursing   Expected Discharge Disposition SNF  ([facility])   What day is the transport expected? 03/12/25     Spoke with pt's wife. Notified her that pre-cert was received and pt is cleared for return to [facility]. Wife is concerned pt is being discharged to soon. Asked bedside nurse to call with an update and notified Attending and NP of wife's concerns. Reviewed Medicare Rights and provided the number to Richa. Wife will get an update from nursing before she makes her decision. Sw to follow.    12:00 Nursing spoke with bedside nurse and wife is now comfortable with pt's discharge. Pt now cleared for a 2:00 pick-up. Confirmed plan with pt's wife and updated her on the new discharge time.

## 2025-03-12 NOTE — DISCHARGE SUMMARY
Discharge Diagnosis  Closed fracture of neck of left femur, initial encounter    Issues Requiring Follow-Up  Discharged to skilled nursing facility    Discharge Meds     Medication List      START taking these medications     * oxyCODONE 5 mg immediate release tablet; Commonly known as:   Roxicodone; Take 1 tablet (5 mg) by mouth every 6 hours if needed for   severe pain (7 - 10).   * oxyCODONE 5 mg immediate release tablet; Commonly known as:   Roxicodone; Take 0.5 tablets (2.5 mg) by mouth every 6 hours if needed for   moderate pain (4 - 6).  * This list has 2 medication(s) that are the same as other medications   prescribed for you. Read the directions carefully, and ask your doctor or   other care provider to review them with you.     CONTINUE taking these medications     * acetaminophen 650 mg suppository; Commonly known as: Tylenol   * acetaminophen 325 mg tablet; Commonly known as: Tylenol   albuterol 1.25 mg/3 mL nebulizer solution   apixaban 2.5 mg tablet; Commonly known as: Eliquis   bisacodyl 10 mg suppository; Commonly known as: Dulcolax   cholecalciferol 25 MCG (1000 UT) capsule; Commonly known as: Vitamin D-3   donepezil 23 mg tablet; Commonly known as: Aricept   finasteride 5 mg tablet; Commonly known as: Proscar   Glucagon (HCl) Emergency Kit 1 mg recon soln; Generic drug: glucagon HCL   glucose 40 % gel oral gel; Commonly known as: Glutose   insulin lispro 100 unit/mL injection   Lantus U-100 Insulin 100 unit/mL injection; Generic drug: insulin   glargine   levETIRAcetam 500 mg tablet; Commonly known as: Keppra   loperamide 2 mg tablet; Commonly known as: Imodium A-D   magnesium hydroxide 400 mg/5 mL suspension; Commonly known as: Milk of   Magnesia   melatonin 10 mg tablet   metoprolol tartrate 50 mg tablet; Commonly known as: Lopressor   ondansetron 4 mg tablet; Commonly known as: Zofran   polyethylene glycol 17 gram packet; Commonly known as: Glycolax, Miralax   sevelamer carbonate 0.8 gram  packet; Commonly known as: Renvela   sodium phosphates 9.5-3.5 gram/59 mL enema; Commonly known as: Fleet   (Pediatric)   Virt-Caps 1 mg capsule; Generic drug: vitamin B complex-vitamin C-folic   acid  * This list has 2 medication(s) that are the same as other medications   prescribed for you. Read the directions carefully, and ask your doctor or   other care provider to review them with you.       Test Results Pending At Discharge  Pending Labs       No current pending labs.            Hospital Course   Patient was admitted to the hospital secondary to fall at the assisted living with fracture of the left femur patient underwent open reduction internal fixation of the left femur physical therapy recommended skilled nursing facility patient is demented    Pertinent Physical Exam At Time of Discharge  Physical Exam  HENT:      Right Ear: External ear normal.      Left Ear: External ear normal.      Mouth/Throat:      Mouth: Mucous membranes are moist.   Cardiovascular:      Rate and Rhythm: Normal rate and regular rhythm.      Heart sounds: No murmur heard.     No friction rub. No gallop.   Pulmonary:      Effort: No accessory muscle usage or respiratory distress.      Breath sounds: No stridor. No wheezing or rhonchi.   Chest:      Chest wall: No tenderness.   Abdominal:      General: There is no distension.      Palpations: There is no mass.      Tenderness: There is no abdominal tenderness. There is no guarding or rebound.   Musculoskeletal:         General: No deformity or signs of injury.      Cervical back: No rigidity or tenderness. Normal range of motion.      Right lower leg: No edema.      Left lower leg: No edema.      Comments: Limited range of motion of the left hip with the scar related to surgery   Skin:     Coloration: Skin is not jaundiced or pale.      Findings: No lesion.   Neurological:      Mental Status: He is alert and easily aroused.      Cranial Nerves: No cranial nerve deficit.      Sensory:  No sensory deficit.      Motor: No weakness.         Outpatient Follow-Up  Future Appointments   Date Time Provider Department Center   10/17/2025  3:40 PM Sona Mckeon MD [id number] MD

## 2025-03-12 NOTE — PROGRESS NOTES
"Tyshawn Coles is a 84 y.o. male on day 3 of admission presenting with Closed fracture of neck of left femur, initial encounter.    Subjective   Lying in bed upon entering room.  Verbalizing no complaints at this time.  Denies pain in surgical area.  Tolerating a diet with no nausea vomiting.  Plan is for discharge to SNF.       Objective     Physical Exam  Constitutional:       Appearance: Normal appearance.   HENT:      Head: Normocephalic and atraumatic.      Nose: Nose normal.      Mouth/Throat:      Mouth: Mucous membranes are moist.   Cardiovascular:      Rate and Rhythm: Normal rate.   Pulmonary:      Effort: Pulmonary effort is normal.   Abdominal:      General: Abdomen is flat.      Palpations: Abdomen is soft.   Musculoskeletal:      Cervical back: Neck supple.      Comments: Left hip with surgical dressing clean dry and intact, sensation in left lower extremity appears intact, plantar and dorsiflexion of left foot against resistance present, DP pulse palpable left foot   Skin:     General: Skin is warm and dry.   Neurological:      General: No focal deficit present.      Mental Status: He is alert.   Psychiatric:         Mood and Affect: Mood normal.         Last Recorded Vitals  Blood pressure 117/53, pulse 67, temperature 36.9 °C (98.4 °F), temperature source Temporal, resp. rate 16, height 1.727 m (5' 8\"), weight 69.9 kg (154 lb), SpO2 96%.  Intake/Output last 3 Shifts:  I/O last 3 completed shifts:  In: 2480 (35.5 mL/kg) [P.O.:480; I.V.:1400 (20 mL/kg); Other:600]  Out: 1800 (25.8 mL/kg) [Other:1800]  Weight: 69.9 kg     Relevant Results  Results for orders placed or performed during the hospital encounter of 03/08/25 (from the past 24 hours)   POCT GLUCOSE   Result Value Ref Range    POCT Glucose 200 (H) 74 - 99 mg/dL   POCT GLUCOSE   Result Value Ref Range    POCT Glucose 187 (H) 74 - 99 mg/dL   CBC   Result Value Ref Range    WBC 15.7 (H) 4.4 - 11.3 x10*3/uL    nRBC 0.0 0.0 - 0.0 /100 WBCs    RBC " 3.23 (L) 4.50 - 5.90 x10*6/uL    Hemoglobin 9.3 (L) 13.5 - 17.5 g/dL    Hematocrit 27.3 (L) 41.0 - 52.0 %    MCV 85 80 - 100 fL    MCH 28.8 26.0 - 34.0 pg    MCHC 34.1 32.0 - 36.0 g/dL    RDW 17.7 (H) 11.5 - 14.5 %    Platelets 165 150 - 450 x10*3/uL   Basic metabolic panel   Result Value Ref Range    Glucose 180 (H) 74 - 99 mg/dL    Sodium 137 136 - 145 mmol/L    Potassium 4.1 3.5 - 5.3 mmol/L    Chloride 97 (L) 98 - 107 mmol/L    Bicarbonate 28 21 - 32 mmol/L    Anion Gap 16 10 - 20 mmol/L    Urea Nitrogen 36 (H) 6 - 23 mg/dL    Creatinine 5.41 (H) 0.50 - 1.30 mg/dL    eGFR 10 (L) >60 mL/min/1.73m*2    Calcium 8.5 (L) 8.6 - 10.3 mg/dL   POCT GLUCOSE   Result Value Ref Range    POCT Glucose 163 (H) 74 - 99 mg/dL   POCT GLUCOSE   Result Value Ref Range    POCT Glucose 276 (H) 74 - 99 mg/dL     Scheduled medications  apixaban, 2.5 mg, oral, BID  cholecalciferol, 1,000 Units, oral, Daily  donepezil, 20 mg, oral, Daily  finasteride, 5 mg, oral, Daily  heparin, 1,000 Units, intra-catheter, After Dialysis  [Held by provider] insulin glargine, 5 Units, subcutaneous, Nightly  insulin lispro, 0-10 Units, subcutaneous, Before meals & nightly  levETIRAcetam, 500 mg, oral, BID  melatonin, 10 mg, oral, Nightly  metoprolol tartrate, 100 mg, oral, BID  sevelamer carbonate, 1.6 g, oral, BID  sodium chloride 0.9%, 10 mL, intravenous, q8h [facility]  vitamin B complex-vitamin C-folic acid, 1 capsule, oral, Daily      Continuous medications     PRN medications  PRN medications: albuterol, alteplase, alteplase, bisacodyl, dextrose, dextrose, glucagon, glucagon, HYDROmorphone, magnesium hydroxide, ondansetron ODT **OR** ondansetron, oxyCODONE, oxyCODONE    Assessment/Plan   Assessment & Plan  Closed fracture of neck of left femur, initial encounter    Falls, initial encounter    Closed head injury    Elevated troponin    Postop day 2 of left hemiarthroplasty  Physical and Occupational Therapy are on consult  Weightbearing as tolerated with  walker  Patient is on Xarelto which will cover for VTE prophylaxis  Labs reviewed  Multimodal pain regime  Bowel regime  Encourage incentive spirometry  Plans on discharge to SNF, okay from orthopedic standpoint, thank you for allowing us to participate in this patient's care, appreciate the consult, please call with any further questions or concerns  Follow-up with Dr. Sigrid Edwards in 2 weeks as directed       I spent 15 minutes in the professional and overall care of this patient.      Violet Wilkins PA-C

## 2025-03-12 NOTE — HOSPITAL COURSE
1.  ESRD on hemodialysis on TTS schedule at [facility], primary nephrologist Dr. Wise  2.  Hypertension.  3.  Anemia of chronic disease.  4.  Secondary hyperparathyroidism.  5.  Atrial fibrillation on Eliquis.  6.  Type 2 diabetes mellitus.  7.  Dementia.  8.  Seizure disorder.     HD tomrrow

## 2025-03-12 NOTE — NURSING NOTE
1143-The writer gave report to FERNIE Bates at [facility].    1431-[facility]. Shayne was a member of the transport team. The white discharge envelope was given to transport team. Iv was removed. The writer called the wife with an update.

## 2025-03-12 NOTE — PROGRESS NOTES
"Subjective   No acute overnight events. Patient remained afebrile and hemodynamically stable. Tolerated dialysis yesterday and had 1.8L fluid removed. Denies any complaints at this time.    Objective     Last Recorded Vitals:  Blood pressure 117/53, pulse 67, temperature 36.9 °C (98.4 °F), temperature source Temporal, resp. rate 16, height 1.727 m (5' 8\"), weight 69.9 kg (154 lb), SpO2 96%.    I&O  I/O last 3 completed shifts:  In: 2480 (35.5 mL/kg) [P.O.:480; I.V.:1400 (20 mL/kg); Other:600]  Out: 1800 (25.8 mL/kg) [Other:1800]  Weight: 69.9 kg   No intake/output data recorded.    Physical Exam  Vitals and nursing note reviewed.   Constitutional:       General: He is not in acute distress.     Appearance: Normal appearance.   Eyes:      General: No scleral icterus.     Extraocular Movements: Extraocular movements intact.      Conjunctiva/sclera: Conjunctivae normal.   Cardiovascular:      Rate and Rhythm: Normal rate and regular rhythm.      Pulses: Normal pulses.      Heart sounds: Normal heart sounds. No murmur heard.  Pulmonary:      Effort: Pulmonary effort is normal. No respiratory distress.      Breath sounds: Normal breath sounds. No wheezing, rhonchi or rales.   Abdominal:      General: There is no distension.      Palpations: Abdomen is soft.      Tenderness: There is no abdominal tenderness.   Musculoskeletal:         General: No tenderness.      Cervical back: Neck supple.      Right lower leg: No edema.      Left lower leg: No edema.   Skin:     General: Skin is warm and dry.   Neurological:      Mental Status: He is alert.         Relevant results  Labs  Results for orders placed or performed during the hospital encounter of 03/08/25 (from the past 24 hours)   POCT GLUCOSE   Result Value Ref Range    POCT Glucose 200 (H) 74 - 99 mg/dL   POCT GLUCOSE   Result Value Ref Range    POCT Glucose 187 (H) 74 - 99 mg/dL   CBC   Result Value Ref Range    WBC 15.7 (H) 4.4 - 11.3 x10*3/uL    nRBC 0.0 0.0 - 0.0 " "/100 WBCs    RBC 3.23 (L) 4.50 - 5.90 x10*6/uL    Hemoglobin 9.3 (L) 13.5 - 17.5 g/dL    Hematocrit 27.3 (L) 41.0 - 52.0 %    MCV 85 80 - 100 fL    MCH 28.8 26.0 - 34.0 pg    MCHC 34.1 32.0 - 36.0 g/dL    RDW 17.7 (H) 11.5 - 14.5 %    Platelets 165 150 - 450 x10*3/uL   Basic metabolic panel   Result Value Ref Range    Glucose 180 (H) 74 - 99 mg/dL    Sodium 137 136 - 145 mmol/L    Potassium 4.1 3.5 - 5.3 mmol/L    Chloride 97 (L) 98 - 107 mmol/L    Bicarbonate 28 21 - 32 mmol/L    Anion Gap 16 10 - 20 mmol/L    Urea Nitrogen 36 (H) 6 - 23 mg/dL    Creatinine 5.41 (H) 0.50 - 1.30 mg/dL    eGFR 10 (L) >60 mL/min/1.73m*2    Calcium 8.5 (L) 8.6 - 10.3 mg/dL   POCT GLUCOSE   Result Value Ref Range    POCT Glucose 163 (H) 74 - 99 mg/dL   POCT GLUCOSE   Result Value Ref Range    POCT Glucose 276 (H) 74 - 99 mg/dL     No components found for: \"[id number]\"  No results found for: \"ALBUR\", \"OCY59MPX\"  No results found for: \"ALBUR\", \"[id number]\"    Assessment/Plan   This is a 84 y.o. male with past medical history of of ESRD on HD following TTS schedule, HTN, HLD, T2DM, dementia, and seizure disorder, who presented to the hospital after a fall and sustained a left femur fracture now status post open reduction and internal fixation. Patient is with advanced dementia and unable to provide much medical history. Most of the history was gathered from wife at bedside. Patient had a clotted AV graft and tunneled dialysis catheter was placed. He had missed dialysis for almost 1 week. His last dialysis was Friday and Saturday, which was 2 days in a row.    Assessment:   # Left femoral fracture status post open reduction and internal fixation  # ESRD on hemodialysis on TTS schedule at [facility], primary nephrologist Dr. Miclat  # Anemia of chronic disease  # Secondary hyperparathyroidism  # Hypertension  # Atrial fibrillation on Eliquis  # Type 2 diabetes mellitus  # Dementia  # Seizure disorder    Plan:  -Continue with HD TTS  -Continue " with metoprolol 100 mg p.o. twice daily and monitor blood pressure closely.   -Clear for discharge from nephrology standpoint    Patient was seen and case discussed with the attending.  Tanja Parra,   Internal Medicine PGY-1  Date: March 12, 2025

## 2025-03-13 LAB
ATRIAL RATE: 70 BPM
P AXIS: 78 DEGREES
P OFFSET: 198 MS
P ONSET: 139 MS
PR INTERVAL: 166 MS
Q ONSET: 222 MS
QRS COUNT: 11 BEATS
QRS DURATION: 72 MS
QT INTERVAL: 404 MS
QTC CALCULATION(BAZETT): 436 MS
QTC FREDERICIA: 425 MS
R AXIS: 77 DEGREES
T AXIS: 80 DEGREES
T OFFSET: 424 MS
VENTRICULAR RATE: 70 BPM

## 2025-03-18 ENCOUNTER — TELEPHONE (OUTPATIENT)
Dept: ORTHOPEDIC SURGERY | Facility: CLINIC | Age: 85
End: 2025-03-18

## 2025-03-18 NOTE — TELEPHONE ENCOUNTER
SOMEONE NAMED ROSENDO CALLED FOR PT ASKING ABOUT HIS BANDAGES FROM SURGERY AND FOLLOW UP CARE. THE NUMBER SHE GAVE ME -[phone] AND SHE SAID TO ASK FOR THE [address] [place] [address] NURSE. IF SHE HAS ANY OTHER ISSUES SEND THEM TO ME PLEASE

## 2025-03-24 LAB — GLUCOSE BLD MANUAL STRIP-MCNC: 129 MG/DL (ref 74–99)

## 2025-03-28 ENCOUNTER — OFFICE VISIT (OUTPATIENT)
Dept: ORTHOPEDIC SURGERY | Facility: CLINIC | Age: 85
End: 2025-03-28
Payer: MEDICARE

## 2025-03-28 ENCOUNTER — HOSPITAL ENCOUNTER (OUTPATIENT)
Dept: RADIOLOGY | Facility: CLINIC | Age: 85
Discharge: HOME | End: 2025-03-28
Payer: MEDICARE

## 2025-03-28 DIAGNOSIS — S72.002A CLOSED FRACTURE OF NECK OF LEFT FEMUR, INITIAL ENCOUNTER: Primary | ICD-10-CM

## 2025-03-28 DIAGNOSIS — S72.002A CLOSED FRACTURE OF NECK OF LEFT FEMUR, INITIAL ENCOUNTER: ICD-10-CM

## 2025-03-28 PROCEDURE — 73502 X-RAY EXAM HIP UNI 2-3 VIEWS: CPT | Mod: LT

## 2025-03-28 PROCEDURE — 99211 OFF/OP EST MAY X REQ PHY/QHP: CPT | Performed by: ORTHOPAEDIC SURGERY

## 2025-03-28 NOTE — PROGRESS NOTES
Tyshawn Coles is a 84 y.o. male who presents for Post-op of the Left Hip (3/10/25 Lt Hip Luis/2 1/2 weeks PO/X-rays today).    HPI:  84-year-old gentleman here for postop visit.  He is 2 weeks out from a left hip hemiarthroplasty.  He denies any fever chills nausea vomiting night sweats.  He has no bowel or bladder complaints.    Physical exam:  Physical exam is limited, patient is nonambulatory and in a wheelchair.  He is not describing any pain.  No tenderness to palpation over either hip.  He can move his ankles and toes.  He has good sensation in the lower extremity.  His incision is well-healed.  Staples have been removed.    Imaging studies:  X-rays of the left hip were ordered and reviewed today.    Assessment:  84-year-old gentleman here for postop visit.  He is 2 weeks out from a left hemiarthroplasty.  He is in a wheelchair, he is mostly nonambulatory.  He can move his feet and toes, he has good sensation in the lower extremity.  The incision is well-healed and he is not describing any tenderness over either hip.  His x-rays look good.  He had a fall recently but it does not appear that the implant or the socket sustained any damage from that.    Plan:  We will have him engage in activities as tolerated.  He can ambulate as tolerated.  We will see him back in 3 months with AP lateral x-rays of the left hip.    Garth Wong PA-C

## 2025-03-31 ENCOUNTER — TELEPHONE (OUTPATIENT)
Dept: INTERNAL MEDICINE | Facility: HOSPITAL | Age: 85
End: 2025-03-31
Payer: MEDICARE

## 2025-03-31 ENCOUNTER — HOSPITAL ENCOUNTER (INPATIENT)
Facility: HOSPITAL | Age: 85
End: 2025-03-31
Attending: INTERNAL MEDICINE | Admitting: INTERNAL MEDICINE
Payer: MEDICARE

## 2025-03-31 ENCOUNTER — ANESTHESIA (OUTPATIENT)
Dept: INPATIENT UNIT | Facility: HOSPITAL | Age: 85
DRG: 559 | End: 2025-03-31
Payer: MEDICARE

## 2025-03-31 ENCOUNTER — ANESTHESIA (OUTPATIENT)
Dept: OPERATING ROOM | Facility: HOSPITAL | Age: 85
End: 2025-03-31
Payer: MEDICARE

## 2025-03-31 ENCOUNTER — ANESTHESIA EVENT (OUTPATIENT)
Dept: OPERATING ROOM | Facility: HOSPITAL | Age: 85
End: 2025-03-31
Payer: MEDICARE

## 2025-03-31 ENCOUNTER — ANESTHESIA EVENT (OUTPATIENT)
Dept: INPATIENT UNIT | Facility: HOSPITAL | Age: 85
DRG: 559 | End: 2025-03-31
Payer: MEDICARE

## 2025-03-31 ENCOUNTER — APPOINTMENT (OUTPATIENT)
Dept: RADIOLOGY | Facility: HOSPITAL | Age: 85
End: 2025-03-31
Payer: MEDICARE

## 2025-03-31 DIAGNOSIS — L02.419 ABSCESS OF HIP: Primary | ICD-10-CM

## 2025-03-31 DIAGNOSIS — E86.1 HYPOTENSION DUE TO HYPOVOLEMIA: ICD-10-CM

## 2025-03-31 DIAGNOSIS — I95.89 HYPOTENSION DUE TO HYPOVOLEMIA: ICD-10-CM

## 2025-03-31 DIAGNOSIS — N18.6 ESRD (END STAGE RENAL DISEASE) (MULTI): ICD-10-CM

## 2025-03-31 DIAGNOSIS — N18.9 HISTORY OF ANEMIA DUE TO CHRONIC KIDNEY DISEASE: ICD-10-CM

## 2025-03-31 DIAGNOSIS — E86.0 HYPOVOLEMIA DUE TO DEHYDRATION: ICD-10-CM

## 2025-03-31 DIAGNOSIS — S73.005A HIP DISLOCATION, LEFT, INITIAL ENCOUNTER (MULTI): ICD-10-CM

## 2025-03-31 DIAGNOSIS — E86.1 HYPOVOLEMIA DUE TO DEHYDRATION: ICD-10-CM

## 2025-03-31 DIAGNOSIS — Z86.2 HISTORY OF ANEMIA DUE TO CHRONIC KIDNEY DISEASE: ICD-10-CM

## 2025-03-31 DIAGNOSIS — L89.100 PRESSURE INJURY OF BACK, UNSTAGEABLE (MULTI): ICD-10-CM

## 2025-03-31 LAB
ANION GAP SERPL CALC-SCNC: 16 MMOL/L (ref 10–20)
APPEARANCE UR: CLEAR
BILIRUB UR STRIP.AUTO-MCNC: NEGATIVE MG/DL
BUN SERPL-MCNC: 48 MG/DL (ref 6–23)
CALCIUM SERPL-MCNC: 9 MG/DL (ref 8.6–10.3)
CHLORIDE SERPL-SCNC: 95 MMOL/L (ref 98–107)
CO2 SERPL-SCNC: 30 MMOL/L (ref 21–32)
COLOR UR: YELLOW
CREAT SERPL-MCNC: 6.25 MG/DL (ref 0.5–1.3)
EGFRCR SERPLBLD CKD-EPI 2021: 8 ML/MIN/1.73M*2
ERYTHROCYTE [DISTWIDTH] IN BLOOD BY AUTOMATED COUNT: 17.3 % (ref 11.5–14.5)
GLUCOSE BLD MANUAL STRIP-MCNC: 119 MG/DL (ref 74–99)
GLUCOSE BLD MANUAL STRIP-MCNC: 184 MG/DL (ref 74–99)
GLUCOSE BLD MANUAL STRIP-MCNC: 189 MG/DL (ref 74–99)
GLUCOSE SERPL-MCNC: 116 MG/DL (ref 74–99)
GLUCOSE UR STRIP.AUTO-MCNC: ABNORMAL MG/DL
HCT VFR BLD AUTO: 22.2 % (ref 41–52)
HGB BLD-MCNC: 7.8 G/DL (ref 13.5–17.5)
KETONES UR STRIP.AUTO-MCNC: NEGATIVE MG/DL
LACTATE SERPL-SCNC: 1 MMOL/L (ref 0.4–2)
LEUKOCYTE ESTERASE UR QL STRIP.AUTO: ABNORMAL
MAGNESIUM SERPL-MCNC: 2.19 MG/DL (ref 1.6–2.4)
MCH RBC QN AUTO: 29.1 PG (ref 26–34)
MCHC RBC AUTO-ENTMCNC: 35.1 G/DL (ref 32–36)
MCV RBC AUTO: 83 FL (ref 80–100)
NITRITE UR QL STRIP.AUTO: NEGATIVE
NRBC BLD-RTO: 0 /100 WBCS (ref 0–0)
PH UR STRIP.AUTO: 7 [PH]
PHOSPHATE SERPL-MCNC: 3.3 MG/DL (ref 2.5–4.9)
PLATELET # BLD AUTO: 252 X10*3/UL (ref 150–450)
POTASSIUM SERPL-SCNC: 4.6 MMOL/L (ref 3.5–5.3)
PROT UR STRIP.AUTO-MCNC: ABNORMAL MG/DL
RBC # BLD AUTO: 2.68 X10*6/UL (ref 4.5–5.9)
RBC # UR STRIP.AUTO: ABNORMAL MG/DL
RBC #/AREA URNS AUTO: NORMAL /HPF
SODIUM SERPL-SCNC: 136 MMOL/L (ref 136–145)
SP GR UR STRIP.AUTO: 1.01
SQUAMOUS #/AREA URNS AUTO: NORMAL /HPF
UROBILINOGEN UR STRIP.AUTO-MCNC: ABNORMAL MG/DL
WBC # BLD AUTO: 28.7 X10*3/UL (ref 4.4–11.3)
WBC #/AREA URNS AUTO: NORMAL /HPF

## 2025-03-31 PROCEDURE — 83605 ASSAY OF LACTIC ACID: CPT | Performed by: INTERNAL MEDICINE

## 2025-03-31 PROCEDURE — 2500000001 HC RX 250 WO HCPCS SELF ADMINISTERED DRUGS (ALT 637 FOR MEDICARE OP): Performed by: INTERNAL MEDICINE

## 2025-03-31 PROCEDURE — 2500000002 HC RX 250 W HCPCS SELF ADMINISTERED DRUGS (ALT 637 FOR MEDICARE OP, ALT 636 FOR OP/ED): Performed by: ORTHOPAEDIC SURGERY

## 2025-03-31 PROCEDURE — 3700000001 HC GENERAL ANESTHESIA TIME - INITIAL BASE CHARGE: Performed by: ORTHOPAEDIC SURGERY

## 2025-03-31 PROCEDURE — 3600000007 HC OR TIME - EACH INCREMENTAL 1 MINUTE - PROCEDURE LEVEL TWO: Performed by: ORTHOPAEDIC SURGERY

## 2025-03-31 PROCEDURE — 2500000005 HC RX 250 GENERAL PHARMACY W/O HCPCS: Performed by: ANESTHESIOLOGY

## 2025-03-31 PROCEDURE — 87185 SC STD ENZYME DETCJ PER NZM: CPT | Mod: STJLAB | Performed by: ORTHOPAEDIC SURGERY

## 2025-03-31 PROCEDURE — 1200000002 HC GENERAL ROOM WITH TELEMETRY DAILY

## 2025-03-31 PROCEDURE — 2500000005 HC RX 250 GENERAL PHARMACY W/O HCPCS: Performed by: ANESTHESIOLOGIST ASSISTANT

## 2025-03-31 PROCEDURE — 2500000004 HC RX 250 GENERAL PHARMACY W/ HCPCS (ALT 636 FOR OP/ED): Performed by: INTERNAL MEDICINE

## 2025-03-31 PROCEDURE — 36415 COLL VENOUS BLD VENIPUNCTURE: CPT | Performed by: INTERNAL MEDICINE

## 2025-03-31 PROCEDURE — 0SWBXJZ REVISION OF SYNTHETIC SUBSTITUTE IN LEFT HIP JOINT, EXTERNAL APPROACH: ICD-10-PCS | Performed by: ORTHOPAEDIC SURGERY

## 2025-03-31 PROCEDURE — 7100000001 HC RECOVERY ROOM TIME - INITIAL BASE CHARGE: Performed by: ORTHOPAEDIC SURGERY

## 2025-03-31 PROCEDURE — 83735 ASSAY OF MAGNESIUM: CPT | Performed by: INTERNAL MEDICINE

## 2025-03-31 PROCEDURE — 80048 BASIC METABOLIC PNL TOTAL CA: CPT | Performed by: INTERNAL MEDICINE

## 2025-03-31 PROCEDURE — 2500000004 HC RX 250 GENERAL PHARMACY W/ HCPCS (ALT 636 FOR OP/ED): Performed by: ORTHOPAEDIC SURGERY

## 2025-03-31 PROCEDURE — 84100 ASSAY OF PHOSPHORUS: CPT | Performed by: INTERNAL MEDICINE

## 2025-03-31 PROCEDURE — 87075 CULTR BACTERIA EXCEPT BLOOD: CPT | Mod: STJLAB | Performed by: INTERNAL MEDICINE

## 2025-03-31 PROCEDURE — 85027 COMPLETE CBC AUTOMATED: CPT | Performed by: INTERNAL MEDICINE

## 2025-03-31 PROCEDURE — 87070 CULTURE OTHR SPECIMN AEROBIC: CPT | Mod: STJLAB | Performed by: ORTHOPAEDIC SURGERY

## 2025-03-31 PROCEDURE — 87040 BLOOD CULTURE FOR BACTERIA: CPT | Mod: STJLAB | Performed by: INTERNAL MEDICINE

## 2025-03-31 PROCEDURE — 81001 URINALYSIS AUTO W/SCOPE: CPT | Performed by: INTERNAL MEDICINE

## 2025-03-31 PROCEDURE — 3700000002 HC GENERAL ANESTHESIA TIME - EACH INCREMENTAL 1 MINUTE: Performed by: ORTHOPAEDIC SURGERY

## 2025-03-31 PROCEDURE — 7100000002 HC RECOVERY ROOM TIME - EACH INCREMENTAL 1 MINUTE: Performed by: ORTHOPAEDIC SURGERY

## 2025-03-31 PROCEDURE — 87086 URINE CULTURE/COLONY COUNT: CPT | Mod: STJLAB | Performed by: INTERNAL MEDICINE

## 2025-03-31 PROCEDURE — 2500000004 HC RX 250 GENERAL PHARMACY W/ HCPCS (ALT 636 FOR OP/ED): Performed by: ANESTHESIOLOGIST ASSISTANT

## 2025-03-31 PROCEDURE — 2500000001 HC RX 250 WO HCPCS SELF ADMINISTERED DRUGS (ALT 637 FOR MEDICARE OP): Performed by: ORTHOPAEDIC SURGERY

## 2025-03-31 PROCEDURE — 99223 1ST HOSP IP/OBS HIGH 75: CPT | Performed by: INTERNAL MEDICINE

## 2025-03-31 PROCEDURE — 82947 ASSAY GLUCOSE BLOOD QUANT: CPT

## 2025-03-31 PROCEDURE — 94640 AIRWAY INHALATION TREATMENT: CPT

## 2025-03-31 PROCEDURE — 3600000002 HC OR TIME - INITIAL BASE CHARGE - PROCEDURE LEVEL TWO: Performed by: ORTHOPAEDIC SURGERY

## 2025-03-31 PROCEDURE — 2500000004 HC RX 250 GENERAL PHARMACY W/ HCPCS (ALT 636 FOR OP/ED)

## 2025-03-31 RX ORDER — ONDANSETRON HYDROCHLORIDE 2 MG/ML
4 INJECTION, SOLUTION INTRAVENOUS EVERY 8 HOURS PRN
Status: ACTIVE | OUTPATIENT
Start: 2025-03-31

## 2025-03-31 RX ORDER — INSULIN LISPRO 100 [IU]/ML
0-10 INJECTION, SOLUTION INTRAVENOUS; SUBCUTANEOUS
Status: DISPENSED | OUTPATIENT
Start: 2025-03-31

## 2025-03-31 RX ORDER — METOPROLOL TARTRATE 1 MG/ML
2.5 INJECTION, SOLUTION INTRAVENOUS EVERY 30 MIN PRN
Status: DISCONTINUED | OUTPATIENT
Start: 2025-03-31 | End: 2025-03-31 | Stop reason: HOSPADM

## 2025-03-31 RX ORDER — ACETAMINOPHEN 650 MG/1
650 SUPPOSITORY RECTAL EVERY 6 HOURS PRN
Status: ACTIVE | OUTPATIENT
Start: 2025-03-31

## 2025-03-31 RX ORDER — FENTANYL CITRATE 50 UG/ML
25 INJECTION, SOLUTION INTRAMUSCULAR; INTRAVENOUS EVERY 5 MIN PRN
Status: DISCONTINUED | OUTPATIENT
Start: 2025-03-31 | End: 2025-03-31 | Stop reason: HOSPADM

## 2025-03-31 RX ORDER — OXYCODONE HYDROCHLORIDE 5 MG/1
5 TABLET ORAL EVERY 6 HOURS PRN
Status: ACTIVE | OUTPATIENT
Start: 2025-03-31

## 2025-03-31 RX ORDER — IPRATROPIUM BROMIDE AND ALBUTEROL SULFATE 2.5; .5 MG/3ML; MG/3ML
3 SOLUTION RESPIRATORY (INHALATION)
Status: DISCONTINUED | OUTPATIENT
Start: 2025-03-31 | End: 2025-04-01

## 2025-03-31 RX ORDER — ACETAMINOPHEN 160 MG/5ML
650 SOLUTION ORAL EVERY 6 HOURS PRN
Status: DISPENSED | OUTPATIENT
Start: 2025-03-31

## 2025-03-31 RX ORDER — LIDOCAINE HYDROCHLORIDE 20 MG/ML
INJECTION, SOLUTION EPIDURAL; INFILTRATION; INTRACAUDAL; PERINEURAL AS NEEDED
Status: DISCONTINUED | OUTPATIENT
Start: 2025-03-31 | End: 2025-04-01 | Stop reason: HOSPADM

## 2025-03-31 RX ORDER — INSULIN GLARGINE 100 [IU]/ML
5 INJECTION, SOLUTION SUBCUTANEOUS NIGHTLY
Status: DISCONTINUED | OUTPATIENT
Start: 2025-03-31 | End: 2025-04-04

## 2025-03-31 RX ORDER — ACETAMINOPHEN 325 MG/1
975 TABLET ORAL ONCE
Status: CANCELLED | OUTPATIENT
Start: 2025-03-31 | End: 2025-03-31

## 2025-03-31 RX ORDER — GUAIFENESIN 600 MG/1
600 TABLET, EXTENDED RELEASE ORAL EVERY 12 HOURS PRN
Status: ACTIVE | OUTPATIENT
Start: 2025-03-31

## 2025-03-31 RX ORDER — VANCOMYCIN HYDROCHLORIDE 1 G/20ML
INJECTION, POWDER, LYOPHILIZED, FOR SOLUTION INTRAVENOUS DAILY PRN
Status: DISCONTINUED | OUTPATIENT
Start: 2025-03-31 | End: 2025-04-02

## 2025-03-31 RX ORDER — PROCHLORPERAZINE MALEATE 10 MG
10 TABLET ORAL EVERY 6 HOURS PRN
Status: ACTIVE | OUTPATIENT
Start: 2025-03-31

## 2025-03-31 RX ORDER — LEVETIRACETAM 500 MG/1
500 TABLET ORAL 2 TIMES DAILY
Status: DISPENSED | OUTPATIENT
Start: 2025-03-31

## 2025-03-31 RX ORDER — SODIUM CHLORIDE 9 MG/ML
100 INJECTION, SOLUTION INTRAVENOUS CONTINUOUS
Status: ACTIVE | OUTPATIENT
Start: 2025-03-31 | End: 2025-03-31

## 2025-03-31 RX ORDER — ROCURONIUM BROMIDE 50 MG/5 ML
SYRINGE (ML) INTRAVENOUS AS NEEDED
Status: DISCONTINUED | OUTPATIENT
Start: 2025-03-31 | End: 2025-04-01 | Stop reason: HOSPADM

## 2025-03-31 RX ORDER — ALBUTEROL SULFATE 0.83 MG/ML
2.5 SOLUTION RESPIRATORY (INHALATION) ONCE AS NEEDED
Status: DISCONTINUED | OUTPATIENT
Start: 2025-03-31 | End: 2025-03-31 | Stop reason: HOSPADM

## 2025-03-31 RX ORDER — ACETAMINOPHEN 500 MG
10 TABLET ORAL NIGHTLY
Status: DISCONTINUED | OUTPATIENT
Start: 2025-03-31 | End: 2025-04-06

## 2025-03-31 RX ORDER — ACETAMINOPHEN 325 MG/1
650 TABLET ORAL EVERY 6 HOURS PRN
Status: DISPENSED | OUTPATIENT
Start: 2025-03-31

## 2025-03-31 RX ORDER — DOCUSATE SODIUM 100 MG/1
100 CAPSULE, LIQUID FILLED ORAL DAILY
Status: ON HOLD | COMMUNITY

## 2025-03-31 RX ORDER — PROCHLORPERAZINE EDISYLATE 5 MG/ML
10 INJECTION INTRAMUSCULAR; INTRAVENOUS EVERY 6 HOURS PRN
Status: ACTIVE | OUTPATIENT
Start: 2025-03-31

## 2025-03-31 RX ORDER — PROCHLORPERAZINE 25 MG/1
25 SUPPOSITORY RECTAL EVERY 12 HOURS PRN
Status: ACTIVE | OUTPATIENT
Start: 2025-03-31

## 2025-03-31 RX ORDER — PROPOFOL 10 MG/ML
INJECTION, EMULSION INTRAVENOUS AS NEEDED
Status: DISCONTINUED | OUTPATIENT
Start: 2025-03-31 | End: 2025-04-01 | Stop reason: HOSPADM

## 2025-03-31 RX ORDER — ONDANSETRON HYDROCHLORIDE 2 MG/ML
4 INJECTION, SOLUTION INTRAVENOUS ONCE AS NEEDED
Status: DISCONTINUED | OUTPATIENT
Start: 2025-03-31 | End: 2025-03-31 | Stop reason: HOSPADM

## 2025-03-31 RX ORDER — ACETAMINOPHEN 325 MG/1
650 TABLET ORAL EVERY 4 HOURS PRN
Status: DISCONTINUED | OUTPATIENT
Start: 2025-03-31 | End: 2025-03-31 | Stop reason: HOSPADM

## 2025-03-31 RX ORDER — HYDRALAZINE HYDROCHLORIDE 20 MG/ML
5 INJECTION INTRAMUSCULAR; INTRAVENOUS EVERY 30 MIN PRN
Status: DISCONTINUED | OUTPATIENT
Start: 2025-03-31 | End: 2025-03-31 | Stop reason: HOSPADM

## 2025-03-31 RX ORDER — SEVELAMER CARBONATE 0.8 G/1
1.6 POWDER, FOR SUSPENSION ORAL 2 TIMES DAILY
Status: DISCONTINUED | OUTPATIENT
Start: 2025-03-31 | End: 2025-04-03

## 2025-03-31 RX ORDER — SODIUM CHLORIDE, SODIUM LACTATE, POTASSIUM CHLORIDE, CALCIUM CHLORIDE 600; 310; 30; 20 MG/100ML; MG/100ML; MG/100ML; MG/100ML
75 INJECTION, SOLUTION INTRAVENOUS CONTINUOUS
Status: DISCONTINUED | OUTPATIENT
Start: 2025-03-31 | End: 2025-03-31 | Stop reason: HOSPADM

## 2025-03-31 RX ORDER — FINASTERIDE 5 MG/1
5 TABLET, FILM COATED ORAL DAILY
Status: DISPENSED | OUTPATIENT
Start: 2025-03-31

## 2025-03-31 RX ORDER — VANCOMYCIN 1.75 GRAM/500 ML IN 0.9 % SODIUM CHLORIDE INTRAVENOUS
1750 ONCE
Status: COMPLETED | OUTPATIENT
Start: 2025-03-31 | End: 2025-03-31

## 2025-03-31 RX ORDER — ONDANSETRON 4 MG/1
4 TABLET, FILM COATED ORAL EVERY 8 HOURS PRN
Status: ACTIVE | OUTPATIENT
Start: 2025-03-31

## 2025-03-31 RX ORDER — OXYCODONE HYDROCHLORIDE 5 MG/1
2.5 TABLET ORAL EVERY 6 HOURS PRN
Status: ACTIVE | OUTPATIENT
Start: 2025-03-31

## 2025-03-31 RX ORDER — POLYETHYLENE GLYCOL 3350 17 G/17G
17 POWDER, FOR SOLUTION ORAL DAILY PRN
Status: ACTIVE | OUTPATIENT
Start: 2025-03-31

## 2025-03-31 RX ORDER — METOPROLOL TARTRATE 50 MG/1
50 TABLET ORAL 2 TIMES DAILY
Status: DISPENSED | OUTPATIENT
Start: 2025-03-31

## 2025-03-31 RX ORDER — HYDROPHILIC CREAM
1 PASTE (GRAM) TOPICAL DAILY
Status: ON HOLD | COMMUNITY

## 2025-03-31 RX ORDER — FENTANYL CITRATE 50 UG/ML
12.5 INJECTION, SOLUTION INTRAMUSCULAR; INTRAVENOUS EVERY 5 MIN PRN
Status: DISCONTINUED | OUTPATIENT
Start: 2025-03-31 | End: 2025-03-31 | Stop reason: HOSPADM

## 2025-03-31 RX ORDER — CHOLECALCIFEROL (VITAMIN D3) 25 MCG
1000 TABLET ORAL DAILY
Status: DISPENSED | OUTPATIENT
Start: 2025-03-31

## 2025-03-31 RX ADMIN — Medication 3 L/MIN: at 14:30

## 2025-03-31 RX ADMIN — SEVELAMER CARBONATE 1.6 G: 800 POWDER, FOR SUSPENSION ORAL at 20:52

## 2025-03-31 RX ADMIN — INSULIN GLARGINE 5 UNITS: 100 INJECTION, SOLUTION SUBCUTANEOUS at 20:49

## 2025-03-31 RX ADMIN — Medication 50 MG: at 13:50

## 2025-03-31 RX ADMIN — ONDANSETRON 4 MG: 2 INJECTION, SOLUTION INTRAMUSCULAR; INTRAVENOUS at 14:03

## 2025-03-31 RX ADMIN — IPRATROPIUM BROMIDE AND ALBUTEROL SULFATE 3 ML: 2.5; .5 SOLUTION RESPIRATORY (INHALATION) at 20:21

## 2025-03-31 RX ADMIN — LEVETIRACETAM 500 MG: 500 TABLET, FILM COATED ORAL at 11:12

## 2025-03-31 RX ADMIN — SODIUM CHLORIDE 100 ML/HR: 9 INJECTION, SOLUTION INTRAVENOUS at 11:15

## 2025-03-31 RX ADMIN — Medication 10 MG: at 20:49

## 2025-03-31 RX ADMIN — METOPROLOL TARTRATE 50 MG: 50 TABLET, FILM COATED ORAL at 20:49

## 2025-03-31 RX ADMIN — SUGAMMADEX 200 MG: 100 INJECTION, SOLUTION INTRAVENOUS at 14:08

## 2025-03-31 RX ADMIN — SUGAMMADEX 200 MG: 100 INJECTION, SOLUTION INTRAVENOUS at 14:05

## 2025-03-31 RX ADMIN — PIPERACILLIN SODIUM AND TAZOBACTAM SODIUM 3.38 G: 3; .375 INJECTION, SOLUTION INTRAVENOUS at 11:12

## 2025-03-31 RX ADMIN — INSULIN LISPRO 2 UNITS: 100 INJECTION, SOLUTION INTRAVENOUS; SUBCUTANEOUS at 16:47

## 2025-03-31 RX ADMIN — PIPERACILLIN SODIUM AND TAZOBACTAM SODIUM 3.38 G: 3; .375 INJECTION, SOLUTION INTRAVENOUS at 22:28

## 2025-03-31 RX ADMIN — LIDOCAINE HYDROCHLORIDE 50 MG: 20 INJECTION, SOLUTION EPIDURAL; INFILTRATION; INTRACAUDAL; PERINEURAL at 13:50

## 2025-03-31 RX ADMIN — LEVETIRACETAM 500 MG: 500 TABLET, FILM COATED ORAL at 20:48

## 2025-03-31 RX ADMIN — METOPROLOL TARTRATE 50 MG: 50 TABLET, FILM COATED ORAL at 11:12

## 2025-03-31 RX ADMIN — PROPOFOL 100 MG: 10 INJECTION, EMULSION INTRAVENOUS at 13:50

## 2025-03-31 RX ADMIN — Medication 1750 MG: at 15:58

## 2025-03-31 SDOH — HEALTH STABILITY: PHYSICAL HEALTH: ON AVERAGE, HOW MANY MINUTES DO YOU ENGAGE IN EXERCISE AT THIS LEVEL?: 0 MIN

## 2025-03-31 SDOH — SOCIAL STABILITY: SOCIAL INSECURITY: HAVE YOU HAD ANY THOUGHTS OF HARMING ANYONE ELSE?: UNABLE TO ASSESS

## 2025-03-31 SDOH — SOCIAL STABILITY: SOCIAL NETWORK
DO YOU BELONG TO ANY CLUBS OR ORGANIZATIONS SUCH AS CHURCH GROUPS, UNIONS, FRATERNAL OR ATHLETIC GROUPS, OR SCHOOL GROUPS?: PATIENT UNABLE TO ANSWER

## 2025-03-31 SDOH — SOCIAL STABILITY: SOCIAL INSECURITY
WITHIN THE LAST YEAR, HAVE YOU BEEN RAPED OR FORCED TO HAVE ANY KIND OF SEXUAL ACTIVITY BY YOUR PARTNER OR EX-PARTNER?: NO

## 2025-03-31 SDOH — HEALTH STABILITY: MENTAL HEALTH
DO YOU FEEL STRESS - TENSE, RESTLESS, NERVOUS, OR ANXIOUS, OR UNABLE TO SLEEP AT NIGHT BECAUSE YOUR MIND IS TROUBLED ALL THE TIME - THESE DAYS?: PATIENT UNABLE TO ANSWER

## 2025-03-31 SDOH — HEALTH STABILITY: PHYSICAL HEALTH: ON AVERAGE, HOW MANY DAYS PER WEEK DO YOU ENGAGE IN MODERATE TO STRENUOUS EXERCISE (LIKE A BRISK WALK)?: 0 DAYS

## 2025-03-31 SDOH — SOCIAL STABILITY: SOCIAL NETWORK: HOW OFTEN DO YOU ATTEND CHURCH OR RELIGIOUS SERVICES?: NEVER

## 2025-03-31 SDOH — SOCIAL STABILITY: SOCIAL NETWORK: HOW OFTEN DO YOU GET TOGETHER WITH FRIENDS OR RELATIVES?: MORE THAN THREE TIMES A WEEK

## 2025-03-31 SDOH — ECONOMIC STABILITY: HOUSING INSECURITY: IN THE LAST 12 MONTHS, WAS THERE A TIME WHEN YOU WERE NOT ABLE TO PAY THE MORTGAGE OR RENT ON TIME?: NO

## 2025-03-31 SDOH — SOCIAL STABILITY: SOCIAL INSECURITY: WITHIN THE LAST YEAR, HAVE YOU BEEN AFRAID OF YOUR PARTNER OR EX-PARTNER?: NO

## 2025-03-31 SDOH — ECONOMIC STABILITY: FOOD INSECURITY: WITHIN THE PAST 12 MONTHS, YOU WORRIED THAT YOUR FOOD WOULD RUN OUT BEFORE YOU GOT THE MONEY TO BUY MORE.: NEVER TRUE

## 2025-03-31 SDOH — ECONOMIC STABILITY: FOOD INSECURITY: HOW HARD IS IT FOR YOU TO PAY FOR THE VERY BASICS LIKE FOOD, HOUSING, MEDICAL CARE, AND HEATING?: NOT HARD AT ALL

## 2025-03-31 SDOH — HEALTH STABILITY: PHYSICAL HEALTH
HOW OFTEN DO YOU NEED TO HAVE SOMEONE HELP YOU WHEN YOU READ INSTRUCTIONS, PAMPHLETS, OR OTHER WRITTEN MATERIAL FROM YOUR DOCTOR OR PHARMACY?: SOMETIMES

## 2025-03-31 SDOH — SOCIAL STABILITY: SOCIAL INSECURITY: ARE YOU MARRIED, WIDOWED, DIVORCED, SEPARATED, NEVER MARRIED, OR LIVING WITH A PARTNER?: MARRIED

## 2025-03-31 SDOH — HEALTH STABILITY: MENTAL HEALTH: EXPERIENCED ANY OF THE FOLLOWING LIFE EVENTS: OTHER (COMMENT)

## 2025-03-31 SDOH — SOCIAL STABILITY: SOCIAL INSECURITY
WITHIN THE LAST YEAR, HAVE YOU BEEN KICKED, HIT, SLAPPED, OR OTHERWISE PHYSICALLY HURT BY YOUR PARTNER OR EX-PARTNER?: NO

## 2025-03-31 SDOH — SOCIAL STABILITY: SOCIAL INSECURITY: DOES ANYONE TRY TO KEEP YOU FROM HAVING/CONTACTING OTHER FRIENDS OR DOING THINGS OUTSIDE YOUR HOME?: UNABLE TO ASSESS

## 2025-03-31 SDOH — ECONOMIC STABILITY: INCOME INSECURITY: IN THE PAST 12 MONTHS HAS THE ELECTRIC, GAS, OIL, OR WATER COMPANY THREATENED TO SHUT OFF SERVICES IN YOUR HOME?: NO

## 2025-03-31 SDOH — SOCIAL STABILITY: SOCIAL INSECURITY: ARE THERE ANY APPARENT SIGNS OF INJURIES/BEHAVIORS THAT COULD BE RELATED TO ABUSE/NEGLECT?: UNABLE TO ASSESS

## 2025-03-31 SDOH — SOCIAL STABILITY: SOCIAL NETWORK: HOW OFTEN DO YOU ATTEND MEETINGS OF THE CLUBS OR ORGANIZATIONS YOU BELONG TO?: PATIENT UNABLE TO ANSWER

## 2025-03-31 SDOH — ECONOMIC STABILITY: FOOD INSECURITY: WITHIN THE PAST 12 MONTHS, THE FOOD YOU BOUGHT JUST DIDN'T LAST AND YOU DIDN'T HAVE MONEY TO GET MORE.: NEVER TRUE

## 2025-03-31 SDOH — SOCIAL STABILITY: SOCIAL INSECURITY: WITHIN THE LAST YEAR, HAVE YOU BEEN HUMILIATED OR EMOTIONALLY ABUSED IN OTHER WAYS BY YOUR PARTNER OR EX-PARTNER?: NO

## 2025-03-31 SDOH — SOCIAL STABILITY: SOCIAL INSECURITY: HAS ANYONE EVER THREATENED TO HURT YOUR FAMILY OR YOUR PETS?: UNABLE TO ASSESS

## 2025-03-31 SDOH — SOCIAL STABILITY: SOCIAL INSECURITY: DO YOU FEEL UNSAFE GOING BACK TO THE PLACE WHERE YOU ARE LIVING?: UNABLE TO ASSESS

## 2025-03-31 SDOH — SOCIAL STABILITY: SOCIAL INSECURITY: ARE YOU OR HAVE YOU BEEN THREATENED OR ABUSED PHYSICALLY, EMOTIONALLY, OR SEXUALLY BY ANYONE?: UNABLE TO ASSESS

## 2025-03-31 SDOH — SOCIAL STABILITY: SOCIAL NETWORK
IN A TYPICAL WEEK, HOW MANY TIMES DO YOU TALK ON THE PHONE WITH FAMILY, FRIENDS, OR NEIGHBORS?: MORE THAN THREE TIMES A WEEK

## 2025-03-31 SDOH — SOCIAL STABILITY: SOCIAL INSECURITY: HAVE YOU HAD THOUGHTS OF HARMING ANYONE ELSE?: UNABLE TO ASSESS

## 2025-03-31 SDOH — SOCIAL STABILITY: SOCIAL INSECURITY: ABUSE: ADULT

## 2025-03-31 SDOH — SOCIAL STABILITY: SOCIAL INSECURITY: POSSIBLE ABUSE REPORTED TO:: OTHER (COMMENT)

## 2025-03-31 SDOH — HEALTH STABILITY: MENTAL HEALTH: CURRENT SMOKER: 0

## 2025-03-31 SDOH — SOCIAL STABILITY: SOCIAL INSECURITY: DO YOU FEEL ANYONE HAS EXPLOITED OR TAKEN ADVANTAGE OF YOU FINANCIALLY OR OF YOUR PERSONAL PROPERTY?: UNABLE TO ASSESS

## 2025-03-31 ASSESSMENT — ACTIVITIES OF DAILY LIVING (ADL)
ASSISTIVE_DEVICE: WHEELCHAIR
HEARING - RIGHT EAR: FUNCTIONAL
ADEQUATE_TO_COMPLETE_ADL: YES
BATHING: DEPENDENT
TOILETING: DEPENDENT
WALKS IN HOME: DEPENDENT
PATIENT'S MEMORY ADEQUATE TO SAFELY COMPLETE DAILY ACTIVITIES?: NO
GROOMING: NEEDS ASSISTANCE
DRESSING YOURSELF: NEEDS ASSISTANCE
JUDGMENT_ADEQUATE_SAFELY_COMPLETE_DAILY_ACTIVITIES: NO
FEEDING YOURSELF: NEEDS ASSISTANCE
LACK_OF_TRANSPORTATION: NO
HEARING - LEFT EAR: FUNCTIONAL

## 2025-03-31 ASSESSMENT — COLUMBIA-SUICIDE SEVERITY RATING SCALE - C-SSRS
2. HAVE YOU ACTUALLY HAD ANY THOUGHTS OF KILLING YOURSELF?: NO
1. IN THE PAST MONTH, HAVE YOU WISHED YOU WERE DEAD OR WISHED YOU COULD GO TO SLEEP AND NOT WAKE UP?: NO
6. HAVE YOU EVER DONE ANYTHING, STARTED TO DO ANYTHING, OR PREPARED TO DO ANYTHING TO END YOUR LIFE?: NO

## 2025-03-31 ASSESSMENT — PAIN SCALES - GENERAL
PAINLEVEL_OUTOF10: 0 - NO PAIN

## 2025-03-31 ASSESSMENT — PAIN - FUNCTIONAL ASSESSMENT
PAIN_FUNCTIONAL_ASSESSMENT: 0-10

## 2025-03-31 ASSESSMENT — LIFESTYLE VARIABLES
PRESCIPTION_ABUSE_PAST_12_MONTHS: NO
HOW OFTEN DO YOU HAVE 6 OR MORE DRINKS ON ONE OCCASION: NEVER
HOW OFTEN DO YOU HAVE A DRINK CONTAINING ALCOHOL: NEVER
AUDIT-C TOTAL SCORE: 0
HOW MANY STANDARD DRINKS CONTAINING ALCOHOL DO YOU HAVE ON A TYPICAL DAY: PATIENT DOES NOT DRINK
AUDIT-C TOTAL SCORE: 0
SUBSTANCE_ABUSE_PAST_12_MONTHS: NO
SKIP TO QUESTIONS 9-10: 1

## 2025-03-31 ASSESSMENT — PATIENT HEALTH QUESTIONNAIRE - PHQ9
SUM OF ALL RESPONSES TO PHQ9 QUESTIONS 1 & 2: 0
2. FEELING DOWN, DEPRESSED OR HOPELESS: NOT AT ALL
1. LITTLE INTEREST OR PLEASURE IN DOING THINGS: NOT AT ALL

## 2025-03-31 ASSESSMENT — COGNITIVE AND FUNCTIONAL STATUS - GENERAL
MOVING FROM LYING ON BACK TO SITTING ON SIDE OF FLAT BED WITH BEDRAILS: A LOT
STANDING UP FROM CHAIR USING ARMS: TOTAL
DRESSING REGULAR UPPER BODY CLOTHING: A LOT
WALKING IN HOSPITAL ROOM: TOTAL
MOVING TO AND FROM BED TO CHAIR: A LOT
CLIMB 3 TO 5 STEPS WITH RAILING: TOTAL
EATING MEALS: A LOT
TURNING FROM BACK TO SIDE WHILE IN FLAT BAD: A LOT
HELP NEEDED FOR BATHING: A LOT
MOBILITY SCORE: 9
PATIENT BASELINE BEDBOUND: NO
DAILY ACTIVITIY SCORE: 12
TOILETING: A LOT
PERSONAL GROOMING: A LOT
DRESSING REGULAR LOWER BODY CLOTHING: A LOT

## 2025-03-31 NOTE — ANESTHESIA PROCEDURE NOTES
Airway  Date/Time: 3/31/2025 1:54 PM  Urgency: elective    Airway not difficult    Staffing  Performed: JAMISON   Authorized by: Jackson Palafox MD    Performed by: JAMISON Yang  Patient location during procedure: OR    Indications and Patient Condition  Indications for airway management: anesthesia  Spontaneous Ventilation: absent  Sedation level: deep  Preoxygenated: yes  Patient position: sniffing  MILS maintained throughout  Mask difficulty assessment: 1 - vent by mask    Final Airway Details  Final airway type: endotracheal airway      Successful airway: ETT  Cuffed: yes   Successful intubation technique: direct laryngoscopy  Endotracheal tube insertion site: oral  Blade: Bernardino  Blade size: #4  ETT size (mm): 7.0  Cormack-Lehane Classification: grade I - full view of glottis  Placement verified by: chest auscultation and capnometry   Cuff volume (mL): 6  Measured from: lips  ETT to lips (cm): 22  Number of attempts at approach: 1    Additional Comments  Lips and teeth in pre anesthetic conditions after laryngoscopy

## 2025-03-31 NOTE — CONSULTS
Reason For Consult  Left hip hemiarthroplasty dislocation    History Of Present Illness  Tyshawn Coles is a 84 y.o. male presenting with remote fall and left hip hemiarthroplasty dislocation.  Patient had a fall approximately 1 week ago which resulted in pain and inability to ambulate.  Patient was ultimately taken to [facility] where he was diagnosed with a prosthetic left hip dislocation.  Patient was transferred to [facility] per family request.  When I am evaluating the patient he is not alert oriented to person place or time.  His wife provides the majority of the history.     Past Medical History  He has a past medical history of Autonomic dysfunction, Benign prostatic hyperplasia, Chronic anemia, Coronary artery disease, Current use of long term anticoagulation, Dementia, Dyslipidemia, End-stage renal disease on hemodialysis (Multi), Frequent falls, Gout, Heart failure with preserved ejection fraction, History of anaphylaxis, History of closed head injury, History of Clostridioides difficile colitis, History of deep vein thrombosis, History of non-ST elevation myocardial infarction (NSTEMI), History of sepsis, Hypertension, Hypoxic ischemic encephalopathy (Multi), 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), Kidney stones, Multilevel degenerative disc disease, Osteoarthritis, Paroxysmal atrial fibrillation (Multi), Peripheral neuropathy, Peripheral vascular disease ([facility]), Secondary hyperparathyroidism of renal origin (Multi), and Seizure disorder (Multi).    Surgical History  He has a past surgical history that includes IR CVC tunneled (08/20/2021); IR tunneled dialysis catheter; AV fistula placement; pacemaker placement; Total hip arthroplasty (Right); Thrombectomy; Tonsillectomy; Adenoidectomy; Cholecystectomy; and Colonoscopy.     Social History  He reports that he has quit smoking. His smoking use included cigarettes. He has never used smokeless tobacco. He reports that  he does not drink alcohol and does not use drugs.    Family History  Family History   Problem Relation Name Age of Onset    Diabetes Mother      Cancer Father      Lupus Daughter      Kidney failure Daughter      Asthma Son      Hypertension Other      Sickle cell trait Other          Allergies  Patient has no known allergies.    Review of Systems  Unable to perform given dementia     Physical Exam  Left hip: Patient is sitting in a flexed and internally rotated position on the left side.  Surgical incision was evaluated in the left side and Steri-Strips are in place with no evidence of erythema, drainage or evidence of wound compromise.  Patient tender to palpation about this region.     Last Recorded Vitals  Blood pressure 113/57, pulse 84, temperature (!) 38.2 °C (100.8 °F), temperature source Temporal, resp. rate 18, SpO2 100%.    Relevant Results  Pertinent imaging includes CT scan of the left hip    Findings demonstrate intact acetabular rim with bipolar hip hemiarthroplasty dislocated in a posterior and superior location.  Of note the gas and fluid appreciated around the left hip hemiarthroplasty is very consistent with recent hip hemiarthroplasty surgery and in my opinion is inconsistent with abscess or infectious development.    Scheduled medications  cholecalciferol, 1,000 Units, oral, Daily  donepezil, 22.5 mg, oral, Daily  finasteride, 5 mg, oral, Daily  insulin glargine, 5 Units, subcutaneous, Nightly  insulin lispro, 0-10 Units, subcutaneous, TID AC  ipratropium-albuteroL, 3 mL, nebulization, q6h  levETIRAcetam, 500 mg, oral, BID  melatonin, 10 mg, oral, Nightly  metoprolol tartrate, 50 mg, oral, BID  piperacillin-tazobactam, 3.375 g, intravenous, q12h  sevelamer carbonate, 1.6 g, oral, BID  vancomycin, 1,750 mg, intravenous, Once  vitamin B complex-vitamin C-folic acid, 1 capsule, oral, Daily      Continuous medications  sodium chloride 0.9%, 100 mL/hr, Last Rate: 100 mL/hr (03/31/25 1115)      PRN  medications  PRN medications: acetaminophen **OR** acetaminophen **OR** acetaminophen, benzocaine-menthol, guaiFENesin, ondansetron **OR** ondansetron, oxyCODONE, oxyCODONE, polyethylene glycol, prochlorperazine **OR** prochlorperazine **OR** prochlorperazine, vancomycin  Results for orders placed or performed during the hospital encounter of 03/31/25 (from the past 24 hours)   Lactate   Result Value Ref Range    Lactate 1.0 0.4 - 2.0 mmol/L   CBC   Result Value Ref Range    WBC 28.7 (H) 4.4 - 11.3 x10*3/uL    nRBC 0.0 0.0 - 0.0 /100 WBCs    RBC 2.68 (L) 4.50 - 5.90 x10*6/uL    Hemoglobin 7.8 (L) 13.5 - 17.5 g/dL    Hematocrit 22.2 (L) 41.0 - 52.0 %    MCV 83 80 - 100 fL    MCH 29.1 26.0 - 34.0 pg    MCHC 35.1 32.0 - 36.0 g/dL    RDW 17.3 (H) 11.5 - 14.5 %    Platelets 252 150 - 450 x10*3/uL   Basic Metabolic Panel   Result Value Ref Range    Glucose 116 (H) 74 - 99 mg/dL    Sodium 136 136 - 145 mmol/L    Potassium 4.6 3.5 - 5.3 mmol/L    Chloride 95 (L) 98 - 107 mmol/L    Bicarbonate 30 21 - 32 mmol/L    Anion Gap 16 10 - 20 mmol/L    Urea Nitrogen 48 (H) 6 - 23 mg/dL    Creatinine 6.25 (H) 0.50 - 1.30 mg/dL    eGFR 8 (L) >60 mL/min/1.73m*2    Calcium 9.0 8.6 - 10.3 mg/dL   Magnesium   Result Value Ref Range    Magnesium 2.19 1.60 - 2.40 mg/dL   POCT GLUCOSE   Result Value Ref Range    POCT Glucose 119 (H) 74 - 99 mg/dL        Assessment/Plan     Dislocated left hip hemiarthroplasty subacute in nature    Complex situation as patient is substantially limited from a medical standpoint.  Patient's dementia will substantially limit his ability to follow posterior hip precautions as well as to recover from a another recent surgery.  I discussed at length with the patient's wife that even in the setting of an open reduction and relocation of the hip patient can still dislocate this hip and given the substantial subacute nature of this condition may be at risk for spontaneous dislocation of the hip even following  appropriate hip precautions.  As such, in an effort to reduce pain and discomfort associated with this condition despite high chance of redislocation and attempted closed reduction of the left hip seems appropriate at this time.  I do not feel that given patient's other medical comorbidities and current circumstances that an open reduction would be appropriate in place patient at too much perioperative risk most notably infection and recurrent dislocation without guarantee of substantial benefit.  Risks of attempted closed reduction of the left hip were discussed extensively with patient's wife including not limited to damage to nerves, tendons, vessels, fracture, persistence of dislocation, persistent pain, need for revision surgery, cardiopulmonary complications associated with the procedure and anesthesia including not limited to DVT, PE, stroke, heart attack with goals of surgery to attempt to relocate the left hip to allow for stability and maximize chance of functional recovery.  With knowledge of risk benefits and alternatives patient wife is electing to proceed.    As patient has many other areas that could benefit from treatment with antibiotics I think that antibiotic coverage is excellent and warranted at this time.  In regards to radiographic findings about left hip as well as clinical exam findings I do not see any evidence of left hip infection and appears more consistent with typical postoperative changes.    Patient has been n.p.o. since midnight.  Does Not require antibiotics on-call to the OR.    Eliezer Dumont MD

## 2025-03-31 NOTE — CONSULTS
INPATIENT NEPHROLOGY CONSULT NOTE        CONSULT: Nephrology SERVICE    PATIENT NAME: Tyshawn Coles    MRN: [id number]  DATE of SERVICE: March 31, 2025  TIME of SERVICE: 6:24 PM      REASON FOR CONSULT:  ESRD  REQUESTING PHYSICIAN:  Dr. Guevara   PRIMARY CARE PHYSICIAN: Edi Wise MD    History of present illness:  Mr. Coles is a 84 y.o. male who presented to [facility] March 31, 2025 for evaluation of left hip dislocation after remote fall.     Patient with past medical history significant for end-stage renal disease on hemodialysis per TTS schedule, hypertension, hyperlipidemia, diabetes mellitus, dementia, seizure disorder.   Patient seen and evaluated at bedside he is with advanced dementia unable to provide additional history.  Patient wife at bedside who was able to provide the majority of the history.  Recently patient had clotted AV graft and currently is receiving dialysis via right tunneled dialysis catheter.  He resides at [facility] and being transported to his dialysis unit.  He has been Edison lifted.  This admission patient presented to [facility] for evaluation of left hip pain and diagnosed with dislocated left hip hemiarthroplasty and scheduled to go to surgery this afternoon.    Last hemodialysis was Saturday.  Labs noted for creatinine of 6.2 BUN of 48 EGFR of 8 sodium 136 potassium 4.6 chloride 95.  Urinalysis with 250 leukocyte esterase sepsis lactate of around 1 hemoglobin 7.8 milligram per deciliter.  CT abdomen and pelvis today reviewed demonstrated posterior superior dislocation left hip hemiarthroplasty.  Large left hip and surrounding soft tissue air-filled collection can represent abscess.  Sacrococcygeal decubitus ulcer with suggestion of chronic osteomyelitis of the coccyx.      Dialysis prescription:  ESRD on hemodialysis  Tuesday Thursday Saturday  Primary nephrologist: Dr. Wise  Estimated dry  weight: To be determined  Duration: 3-1/2 hours  Intradialytic Midodrine  Right tunneled dialysis catheter in place       ASSESSMENT AND PLAN:  ESRD: iHD per Tuesday Thursday Saturday schedule  --Last hemodialysis was Saturday  Volume status:  Hypovolemia receiving IV normal saline at 100 mL/h  Electrolytes: Within acceptable range  Acid-base: Metabolic acidosis from renal failure (controlled with HD)  Bone mineral disease (Ca/P/PTH): controlled  Anemia from renal failure, hemoglobin below target at 7.8 mg/g  Dislocated left hip hemiarthroplasty scheduled for surgery today  Chronic osteomyelitis of the coccyx  Left hip abscess  Hypotension: Fluid responsive  T2DM      PLAN:  End-stage renal disease patient with complex acute/subacute dislocated left hip hemiarthroplasty with left hip abscess, chronic osteomyelitis of the coccyx:    Electrolytes and volume status within acceptable range no urgent indication for renal replacement therapy today, cleared from renal standpoint to proceed with scheduled surgery today.  Tolerating 1 L of normal saline at the 100 cc/h without side effects no signs of fluid retention  Anemia multifactorial to check iron stores will benefit from IV iron and Epogen with dialysis  Malnutrition to start protein supplements 3 times daily  Strict input and output to guide volume management, can have intermittent IV boluses to maintain hemodynamic stability  Will follow pending cultures.  To check phosphorus level to decide about Renvela dosing  For hemodialysis tomorrow    Will follow, thank you!    Standard ESRD recommendations and precautions:   - Strict I&Os and Daily weight   - Consider a RENAL Diet for HD patients  - Start Nephrocap or other renal multivitamin to replace water-soluble vitamins lost during dialysis   - Avoid Lovenox, Demerol, Morphine, K-containing IVF, Mg- or Phos- containing enemas   - Dose meds GFR 10 ml/min       I sincerely, thank you Dr. Guevara for this opportunity to  participate in the care of your patient, I will follow from Nephrology point view!    PAST MEDICAL HISTORY:    Past Medical History:   Diagnosis Date    Autonomic dysfunction     Benign prostatic hyperplasia     Chronic anemia     Coronary artery disease     Current use of long term anticoagulation     Apixaban    Dementia     Dyslipidemia     End-stage renal disease on hemodialysis (Multi)     Frequent falls     Gout     Heart failure with preserved ejection fraction     History of anaphylaxis     History of closed head injury     History of Clostridioides difficile colitis     History of deep vein thrombosis     History of non-ST elevation myocardial infarction (NSTEMI)     History of sepsis     Hypertension     Hypoxic ischemic encephalopathy (Multi)     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)     Kidney stones     Multilevel degenerative disc disease     Osteoarthritis     Paroxysmal atrial fibrillation (Multi)     Peripheral neuropathy     Peripheral vascular disease (CMS-HCC)     Secondary hyperparathyroidism of renal origin (Multi)     Seizure disorder (Multi)        PAST SURGICAL HISTORY:    Past Surgical History:   Procedure Laterality Date    ADENOIDECTOMY      AV FISTULA PLACEMENT      CHOLECYSTECTOMY      COLONOSCOPY      IR CVC TUNNELED  08/20/2021    IR CVC TUNNELED 8/20/2021 [facility]    IR TUNNELED DIALYSIS CATHETER      PACEMAKER PLACEMENT      THROMBECTOMY      TONSILLECTOMY      TOTAL HIP ARTHROPLASTY Right        FAMILY HISTORY:    Family History   Problem Relation Name Age of Onset    Diabetes Mother      Cancer Father      Lupus Daughter      Kidney failure Daughter      Asthma Son      Hypertension Other      Sickle cell trait Other         SOCIAL HISTORY:    Social History     Tobacco Use    Smoking status: Former     Types: Cigarettes    Smokeless tobacco: Never   Substance Use Topics    Alcohol use: Never    Drug use: Never       MEDICATIONS:  Prior to Admission  Medications:  Medications Prior to Admission   Medication Sig Dispense Refill Last Dose/Taking    acetaminophen (Tylenol) 325 mg tablet Take 2 tablets (650 mg) by mouth every 4 hours.   3/30/2025 Bedtime    albuterol 1.25 mg/3 mL nebulizer solution Take 3 mL (1.25 mg) by nebulization every 6 hours if needed for wheezing.   Past Week    apixaban (Eliquis) 2.5 mg tablet Take 1 tablet (2.5 mg) by mouth 2 times a day.   3/30/2025 Bedtime    bisacodyl (Dulcolax) 10 mg suppository Insert 1 suppository (10 mg) into the rectum every 24 (twenty four) hours if needed for constipation.   Past Week    cholecalciferol (Vitamin D-3) 25 MCG (1000 UT) capsule Take 1 capsule (25 mcg) by mouth once daily.   3/30/2025 Morning    docusate sodium (Colace) 100 mg capsule Take 1 capsule (100 mg) by mouth once daily.   3/30/2025 Morning    donepezil (Aricept) 23 mg tablet Take 1 tablet (23 mg) by mouth once daily.   3/30/2025 Morning    finasteride (Proscar) 5 mg tablet Take 1 tablet (5 mg) by mouth once daily.   3/30/2025 Morning    glucose (Glutose) 40 % gel oral gel Take 15 g by mouth 1 time if needed for low blood sugar - see comments. Give 1 tube (15g) po if BG<70 and symptomatic or BG= 60 or without any symptoms. May repeat with 1 tube in 15 min if needed   Past Week    insulin glargine (Lantus U-100 Insulin) 100 unit/mL injection Inject 5 Units under the skin once daily at bedtime. Take as directed per insulin instructions.   3/30/2025 Bedtime    insulin lispro 100 unit/mL injection Inject under the skin 3 times daily (morning, midday, late afternoon). Per sliding scale  <150 = 0 units  151-200 = 2 units  201-250 = 4 units  251-300 = 6 units  301-350 = 8 units  351-400 = 10 units  >400 = call MD   Past Week    levETIRAcetam (Keppra) 500 mg tablet Take 1 tablet (500 mg) by mouth 2 times a day.   3/30/2025 Bedtime    loperamide (Imodium A-D) 2 mg tablet Take 1 tablet (2 mg) by mouth every 12 hours if needed for diarrhea.   Past Week     magnesium hydroxide (Milk of Magnesia) 400 mg/5 mL suspension Take 30 mL by mouth every 24 (twenty four) hours if needed for constipation.   Past Week    melatonin 10 mg tablet Take 1 tablet (10 mg) by mouth once daily at bedtime.   3/30/2025 Bedtime    metoprolol tartrate (Lopressor) 50 mg tablet Take 1 tablet by mouth 2 times a day.   3/30/2025 Bedtime    ondansetron (Zofran) 4 mg tablet Take 1 tablet (4 mg) by mouth every 6 hours if needed for nausea or vomiting.   Past Week    oxyCODONE (Roxicodone) 5 mg immediate release tablet Take 1 tablet (5 mg) by mouth every 6 hours if needed for severe pain (7 - 10).   Past Week    oxyCODONE (Roxicodone) 5 mg immediate release tablet Take 0.5 tablets (2.5 mg) by mouth every 6 hours if needed for moderate pain (4 - 6).   Past Week    polyethylene glycol (Glycolax, Miralax) 17 gram packet Take 17 g by mouth once daily as needed (for constipation).   Past Week    sevelamer carbonate (Renvela) 0.8 gram packet Take 2 packets (1.6 g) by mouth 2 times a day.   3/30/2025 Bedtime    Virt-Caps 1 mg capsule Take 1 capsule by mouth once daily.   3/30/2025 Morning    wound dressing (Triad Wound Dressing) paste Apply 1 Application topically once daily.   3/30/2025 Morning    acetaminophen (Tylenol) 650 mg suppository Insert 1 suppository (650 mg) into the rectum if needed for mild pain (1 - 3), moderate pain (4 - 6) or fever (temp greater than 38.0 C).       glucagon HCL (Glucagon, HCl, Emergency Kit) 1 mg recon soln Inject 1 mg as directed if needed (hypoglycemic emergency).       sodium phosphates (Fleet, Pediatric,) 9.5-3.5 gram/59 mL enema Insert 1 enema into the rectum once every 24 hours.          INPATIENT MEDICATIONS:    Current Facility-Administered Medications:     acetaminophen (Tylenol) tablet 650 mg, 650 mg, oral, q6h PRN **OR** acetaminophen (Tylenol) oral liquid 650 mg, 650 mg, oral, q6h PRN **OR** acetaminophen (Tylenol) suppository 650 mg, 650 mg, rectal, q6h PRN,  Eliezer Dumont MD    benzocaine-menthol (Cepastat Sore Throat) lozenge 1 lozenge, 1 lozenge, Mouth/Throat, q2h PRN, Eliezer Dumont MD    cholecalciferol (Vitamin D-3) tablet 1,000 Units, 1,000 Units, oral, Daily, Eliezer Dumont MD    donepezil (Aricept) tablet 22.5 mg, 22.5 mg, oral, Daily, Eliezer Dumont MD    finasteride (Proscar) tablet 5 mg, 5 mg, oral, Daily, Eliezer Dumont MD    guaiFENesin (Mucinex) 12 hr tablet 600 mg, 600 mg, oral, q12h PRN, Eliezer Dumont MD    insulin glargine (Lantus) injection 5 Units, 5 Units, subcutaneous, Nightly, Eliezer Dumont MD    insulin lispro injection 0-10 Units, 0-10 Units, subcutaneous, TID AC, Eliezer Dumont MD, 2 Units at 03/31/25 1647    ipratropium-albuteroL (Duo-Neb) 0.5-2.5 mg/3 mL nebulizer solution 3 mL, 3 mL, nebulization, q6h, Eliezer Dumont MD    levETIRAcetam (Keppra) tablet 500 mg, 500 mg, oral, BID, Eliezer Dumont MD, 500 mg at 03/31/25 1112    melatonin tablet 10 mg, 10 mg, oral, Nightly, Eliezer Dumont MD    metoprolol tartrate (Lopressor) tablet 50 mg, 50 mg, oral, BID, Eliezer Dumont MD, 50 mg at 03/31/25 1112    ondansetron (Zofran) tablet 4 mg, 4 mg, oral, q8h PRN **OR** ondansetron (Zofran) injection 4 mg, 4 mg, intravenous, q8h PRN, Eliezer Dumont MD, 4 mg at 03/31/25 1403    oxyCODONE (Roxicodone) immediate release tablet 2.5 mg, 2.5 mg, oral, q6h PRN, Eliezer Dumont MD    oxyCODONE (Roxicodone) immediate release tablet 5 mg, 5 mg, oral, q6h PRN, Eliezer Dumont MD    piperacillin-tazobactam (Zosyn) 3.375 g in dextrose (iso) IV 50 mL, 3.375 g, intravenous, q12h, Eliezer Dumont MD, Stopped at 03/31/25 1619    polyethylene glycol (Glycolax, Miralax) packet 17 g, 17 g, oral, Daily PRN, Eliezer Dumont MD    prochlorperazine (Compazine) tablet 10 mg, 10 mg, oral, q6h PRN **OR** prochlorperazine (Compazine) injection 10 mg, 10 mg, intravenous, q6h PRN **OR** prochlorperazine (Compazine) suppository 25 mg, 25 mg, rectal, q12h  PRN, Eliezer Dumont MD    sevelamer carbonate (Renvela) packet 1.6 g, 1.6 g, oral, BID, Eliezer Dumont MD    sodium chloride 0.9% infusion, 100 mL/hr, intravenous, Continuous, Eliezer Dumont MD, Last Rate: 100 mL/hr at 03/31/25 1115, 100 mL/hr at 03/31/25 1115    vancomycin (Vancocin) pharmacy to dose - pharmacy monitoring, , miscellaneous, Daily PRN, Eliezer Dumont MD    vitamin B complex-vitamin C-folic acid (Nephrocaps) capsule 1 capsule, 1 capsule, oral, Daily, Eliezer Dumont MD    Facility-Administered Medications Ordered in Other Encounters:     lidocaine PF (Xylocaine) 20 mg/mL (2 %) injection, , epidural, PRN, Rogelio P Nearman, CAA, 50 mg at 03/31/25 1350    propofol (Diprivan) injection, , intravenous, PRN, Rogelio P Nearman, CAA, 100 mg at 03/31/25 1350    rocuronium (Zemuron) injection, , intravenous, PRN, Rogelio P Nearman, CAA, 50 mg at 03/31/25 1350    sugammadex (Bridion) injection, , intravenous, PRN, Rogelio P Nearman, CAA, 200 mg at 03/31/25 1408    ALLERGIES:  No Known Allergies    COMPLETE REVIEW OF SYSTEMS:    A comprehensive 14 point review of systems was obtained.  Constitutional: Chronically ill looking, moaning in pain.  HENT: Negative for congestion and sore throat.    Eyes: Negative for pain and visual disturbance.  Respiratory: Negative for cough and shortness of breath.    Cardiovascular: Negative for chest pain and palpitations.  Gastrointestinal: Negative for abdominal pain, diarrhea and vomiting.  Genitourinary: Residual minimal urine output  Musculoskeletal: Reports hip pain  Skin: negative for wound. Negative for color change and rash.  Neurological: Negative for weakness and headaches.  Hematological: Negative for adenopathy. Does not bruise/bleed easily.  Psychiatric/Behavioral: Oriented to self  All other reviewed and negative other than HPI.    PHYSICAL EXAM: Physical exam performed.  Patient Vitals for the past 24 hrs:   BP Temp Temp src Pulse Resp SpO2 Height Weight  "  03/31/25 1659 -- -- -- -- -- -- 1.828 m (5' 11.97\") 67.6 kg (149 lb)   03/31/25 1522 126/64 37.1 °C (98.8 °F) Temporal 68 17 100 % -- --   03/31/25 1500 124/62 36.5 °C (97.7 °F) Temporal 68 (!) 30 90 % -- --   03/31/25 1445 111/57 -- -- 68 15 100 % -- --   03/31/25 1430 110/57 -- -- 68 14 100 % -- --   03/31/25 1421 108/57 36.5 °C (97.7 °F) Temporal 67 14 100 % -- --   03/31/25 0915 113/57 (!) 38.2 °C (100.8 °F) Temporal 84 18 100 % -- --     Body mass index is 20.23 kg/m².    CONSTITUTIONAL:  Vital signs reviewed.  Febrile  GENERAL: Chronically ill looking, cachectic, oriented to self  SKIN: Right tunneled dialysis catheter in place  HEAD/SINUSES: Atraumatic  EYES: PERRLA, + pallor  OROPHARYNX: Dry mucous membranes  NECK: no  jugulovenous distention, No carotid bruits, Carotid pulse normal contour, Supple  LUNGS: Diminished air entry bilaterally  CARDIAC: distant S1 and S2; no rubs, murmurs, or gallops  ABDOMEN: Abdomen soft, non-tender, BS normal, not distended  EXTREMITIES: Good capillary refill, no edema.  NEUROLOGIC: Awake, moaning in pain, oriented to self, baseline dementia  HEMATOLOGIC/Lymphatic/Immunologic: No abnormal bleeding, echymosis, inflammation. No cervical or supraclavicular lymphadenopathy.    DATA:  Intake/Output last 3 shifts:  No intake/output data recorded.    Diagnostic tests reviewed for today's visit:    CBC, Coags, RNP, Mg, Phos   Results from last 7 days   Lab Units 03/31/25  1032   WBC AUTO x10*3/uL 28.7*   RBC AUTO x10*6/uL 2.68*   HEMOGLOBIN g/dL 7.8*   HEMATOCRIT % 22.2*     Results from last 7 days   Lab Units 03/31/25  1032   SODIUM mmol/L 136   POTASSIUM mmol/L 4.6   CHLORIDE mmol/L 95*   CO2 mmol/L 30   BUN mg/dL 48*   CREATININE mg/dL 6.25*   CALCIUM mg/dL 9.0   MAGNESIUM mg/dL 2.19     Results from last 7 days   Lab Units 03/31/25  1746   COLOR U  Yellow   APPEARANCE U  Clear   PH U  7.0   SPEC GRAV UR  1.015   PROTEIN U mg/dL 100 (2+)*   BLOOD UR mg/dL 0.06 (1+)*   NITRITE U  " "NEGATIVE   WBC UR /HPF 1-5     IMAGING: CXR reviewed in  images.      SIGNATURE: Carlene Norman MD  Nephrology and Hypertension  [address] [place]  Office phone: [phone]  FAX: [phone]      This note was partially created using voice recognition software and is inherently subject to errors including those of syntax and \"sound-alike\" substitutions which may escape proofreading.  In such instances, original meaning may be extrapolated by contextual derivation.  "

## 2025-03-31 NOTE — ANESTHESIA PREPROCEDURE EVALUATION
Patient: Tyshawn Coles    Procedure Information       Date/Time: 03/31/25 6810    Procedure: CLOSED REDUCTION, DISLOCATION, TOTAL ARTHROPLASTY HARDWARE, HIP (Left: Hip)    Location: [facility] OR 07 / [facility]    Surgeons: Eliezer Dumont MD            Relevant Problems   No relevant active problems       Clinical information reviewed:   Tobacco  Allergies  Meds  Problems  Med Hx  Surg Hx   Fam Hx          NPO Detail:  No data recorded     Physical Exam    Airway  Mallampati: I  TM distance: >3 FB  Neck ROM: full     Cardiovascular   Rhythm: regular  Rate: abnormal     Dental    Pulmonary   Breath sounds clear to auscultation  (+) decreased breath sounds     Abdominal - normal exam  Abdomen: soft  Bowel sounds: normal           Anesthesia Plan    History of general anesthesia?: yes  History of complications of general anesthesia?: no    ASA 4     general     The patient is not a current smoker.  Patient was previously instructed to abstain from smoking on day of procedure.  Patient did not smoke on day of procedure.  Education provided regarding risk of obstructive sleep apnea.  intravenous induction   Anesthetic plan and risks discussed with patient, spouse and legal guardian.  Use of blood products discussed with patient, spouse and legal guardian who consented to blood products.    Plan discussed with CAA.

## 2025-03-31 NOTE — CONSULTS
Reason For Consult    goals care of care       History Of Present Illness  Tyshawn Coles is a 84 y.o. male presenting with hip abscess.        Past Medical History  He has a past medical history of Autonomic dysfunction, Benign prostatic hyperplasia, Chronic anemia, Coronary artery disease, Current use of long term anticoagulation, Dementia, Dyslipidemia, End-stage renal disease on hemodialysis (Multi), Frequent falls, Gout, Heart failure with preserved ejection fraction, History of anaphylaxis, History of closed head injury, History of Clostridioides difficile colitis, History of deep vein thrombosis, History of non-ST elevation myocardial infarction (NSTEMI), History of sepsis, Hypertension, Hypoxic ischemic encephalopathy (Multi), 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), Kidney stones, Multilevel degenerative disc disease, Osteoarthritis, Paroxysmal atrial fibrillation (Multi), Peripheral neuropathy, Peripheral vascular disease (CMS-[facility]), Secondary hyperparathyroidism of renal origin (Multi), and Seizure disorder (Multi).    Surgical History  He has a past surgical history that includes IR CVC tunneled (08/20/2021); IR tunneled dialysis catheter; AV fistula placement; pacemaker placement; Total hip arthroplasty (Right); Thrombectomy; Tonsillectomy; Adenoidectomy; Cholecystectomy; and Colonoscopy.     Social History  He reports that he has quit smoking. His smoking use included cigarettes. He has never used smokeless tobacco. He reports that he does not drink alcohol and does not use drugs.    Family History  Family History   Problem Relation Name Age of Onset    Diabetes Mother      Cancer Father      Lupus Daughter      Kidney failure Daughter      Asthma Son      Hypertension Other      Sickle cell trait Other          Allergies  Patient has no known allergies.       Last Recorded Vitals  Blood pressure 113/57, pulse 84, temperature (!) 38.2 °C (100.8 °F), temperature source Temporal, resp. rate 18,  SpO2 100%.    Assessment/Plan     Pt from LTC at [facility]. Pt's code status is DNR-DNI. Pt's contact is spouse Pamela Coles . Pt with pmhx of   ESRD on hemodialysis Tuesday Thursday Saturday, hypertension, hyperlipidemia, type 2 diabetes mellitus, dementia who is minimally responsive at baseline and just groans and moans presented from outside hospital for concern for posteriorly dislocated hip along with concern for possible abscess.  Plan to introduce Palliative Care to family and discuss goals of care.    12p  Rounded on pt, pt down to OR for surgery, written info left bedside. Call placed to spouse. Long discussion with spouse on pt's pmhx, current plan of care, goals of care etc. Introduced Palliative Care to spouse . Spouse exhausted and has been here all night, spouse agreeable to continued goals conversations , and to discuss options for outpatient follow up. Support provided. Will follow.     Giselle Rob LCSW

## 2025-03-31 NOTE — CONSULTS
Wound Care Consult     Visit Date: 3/31/2025      Patient Name: Tyshawn Coles         MRN: [id number]           YOB: 1940     Reason for Consult: Sacral Wound         Wound History: Pressure Injury, unstageable      Pertinent Labs:   Albumin   Date Value Ref Range Status   03/09/2025 3.2 (L) 3.4 - 5.0 g/dL Final       Wound Assessment:  Wound 03/30/24 Elbow Dorsal;Right (Active)       Wound 03/05/25 Abrasion Face Left;Upper (Active)       Wound 03/05/25 Abrasion Finger D3, long Right (Active)       Wound 03/05/25 Abrasion Knee Left (Active)       Wound 03/09/25 Buttock (Active)       Wound 03/10/25 Incision Hip Left;Proximal;Upper;Ventral (Active)       Wound Team Summary Assessment: Received consult to see patient,, chart and pictures reviewed.  Patient off the floor at this time - in OR.     Wound Team Plan: Based on picture, wound care recommendation for sacral wound - cleanse with NS, apply Medi honey and cover with foam dressing daily. Will follow up later today if possible, if not tomorrow.      Princess Easley RN  3/31/2025  1:28 PM

## 2025-03-31 NOTE — OP NOTE
CLOSED REDUCTION, DISLOCATION, TOTAL ARTHROPLASTY HARDWARE, HIP (L) Operative Note     Date: 3/31/2025  OR Location: [facility]    Name: Tyshawn Coles, : 1940, Age: 84 y.o., MRN: [id number], Sex: male    Diagnosis  Pre-op Diagnosis      * Unspecified dislocation of left hip, initial encounter (Multi) [S73.005A] Post-op Diagnosis     * Unspecified dislocation of left hip, initial encounter (Multi) [S73.005A]     Procedures  CLOSED REDUCTION, DISLOCATION, TOTAL ARTHROPLASTY HARDWARE, HIP  32854 - CT CLTX POST HIP ARTHRP DISLC W/O ANES      Surgeons      * Eliezer Dumont - Primary    Resident/Fellow/Other Assistant:  Surgeons and Role:  * No surgeons found with a matching role *    Staff:   Circulator: Tayo Vazquez Person: Tessa Vazquez Person: Doug  Circulator: Yamil  Circulator: Zen    Anesthesia Staff: No anesthesia staff entered.    Procedure Summary  Anesthesia: Anesthesia type not filed in the log.  ASA: ASA status not filed in the log.  Estimated Blood Loss: 0mL  Intra-op Medications: Administrations occurring from 1330 to 1425 on 25:  * No intraprocedure medications in log *           Anesthesia Record               Intraprocedure I/O Totals       None           Specimen: No specimens collected              Drains and/or Catheters: * None in log *    Tourniquet Times:         Implants:     Findings: Close dislocation of the left hemiarthroplasty    Indications: Tyshawn Coles is an 84 y.o. male who is having surgery for dislocated left hip hemiarthroplasty.  Patient was having pain and inability to ambulate and wife requested treatment.  Patient has advanced dementia and wife is the one who provided recommendations in regards to goals of care for patient.  Discussion was had with patient's wife in regards to treatment options and recommendation was made for attempted closed reduction of the left hip hemiarthroplasty.  Risk benefits and alternatives were discussed extents with patient and  recommendation was to proceed with closed reduction of the left hip hemiarthroplasty.    The patient was seen in the preoperative area. The risks, benefits, complications, treatment options, non-operative alternatives, expected recovery and outcomes were discussed with the patient. The possibilities of reaction to medication, pulmonary aspiration, injury to surrounding structures, bleeding, recurrent infection, the need for additional procedures, failure to diagnose a condition, and creating a complication requiring transfusion or operation were discussed with the patient. The patient concurred with the proposed plan, giving informed consent.  The site of surgery was properly noted/marked if necessary per policy. The patient has been actively warmed in preoperative area. Preoperative antibiotics are not indicated. Venous thrombosis prophylaxis have been ordered including bilateral sequential compression devices and chemical prophylaxis    Procedure Details: Patient was taken the operating room and was intubated and sedated on the hospital bed.  After a timeout was performed and all parties identified the correct surgical site was noted.  Stabilizing the pelvis with assistance the hip was flexed with lateral directed pressure at the proximal thigh traction was applied with the hip at 90 degrees and relocation of the left hip was performed.  There was substantial fluid collection consistent with recent surgery and likely soft tissue defects and the trochanter was easily palpable and appeared to be bare.  As such feel that there is substantial soft tissue damage that occurred as a result of this dislocation.  Hip remained reduced and an abduction pillow was subsequently applied.  Confirmed this reduction with fluoroscopic imaging.  Patient was transferred back onto his bed and leg lengths remain symmetric.      Complications:  None; patient tolerated the procedure well.    Disposition: PACU - hemodynamically  stable.  Condition: stable       Postoperative plan: Patient will be kept bedrest with abduction pillow at all times to allow for soft tissue scarring with the exception of weightbearing as tolerate activity for transfers only.  If medically necessary would be okay with use of a Edison lift however should limit its use given the concern for redislocation.      Eliezer Dumont  Phone Number: [phone]

## 2025-03-31 NOTE — PROGRESS NOTES
03/31/25 1144   Discharge Planning   Living Arrangements Other (Comment)   Support Systems Spouse/significant other   Type of Residence Skilled nursing facility   Home or Post Acute Services Post acute facilities (Rehab/SNF/etc)   Type of Post Acute Facility Services Skilled nursing   Expected Discharge Disposition SNF   Does the patient need discharge transport arranged? Yes   RoundTrip coordination needed? Yes   Patient Choice   Provider Choice list and CMS website (https://medicare.gov/care-compare#search) for post-acute Quality and Resource Measure Data were provided and reviewed with: Family     Pt arrived from [facility] for hip abscess. Pt transports to outpatient HD. Dr Norman requesting referral to [facility] where he can receive on site HD. Referral sent in [facility].     1541 Met with spouse at bedside. She would like to tour [facility] before making her decision about changing facilities. [facility] is not in [facility] network, but may be able to accept patient into their long them care unit under his Medicaid benefit.

## 2025-03-31 NOTE — PROGRESS NOTES
PHARMACIST CONSULT  RENAL DOSING ADJUSTMENT    Patient Name: Tyshawn Coles  MRN: [id number]  Admission Date: 3/31/2025  9:39 AM  Date/Time of Consult: 03/31/25 at 10:19 AM    In accordance with the inpatient pharmacy renal dose adjustment consult agreement the following medication changes have been made:  Antimicrobial: currently ordered Zosyn 3.375 grams q 6hr, will be adjusted to 3.375 grams q 12 hr    Indication if pertinent for dosing: SSTI          Creatinine clearance cannot be calculated (Patient's most recent lab result is older than the maximum 3 days allowed.)      Per consult agreement, pharmacy will order related labs, evaluate results, and adjust dosing as it pertains to the drug therapy being managed.  Thank you for allowing me to participate in the care for this patient.    Signature: Rogelio Alcala, PharmD  03/31/25 at 10:19 AM

## 2025-03-31 NOTE — CONSULTS
Vancomycin Dosing by Pharmacy- INITIAL    Tyshawn Coles is a 84 y.o. year old male who Pharmacy has been consulted for vancomycin dosing for cellulitis, skin and soft tissue. Based on the patient's indication and renal status this patient will be dosed based on a goal pre-HD level of 20-25.     Renal function is currently stable.    Visit Vitals  /57 (BP Location: Right arm, Patient Position: Lying)   Pulse 84   Temp (!) 38.2 °C (100.8 °F) (Temporal)   Resp 18        Lab Results   Component Value Date    CREATININE 6.25 (H) 2025    CREATININE 5.41 (H) 2025    CREATININE 8.42 (H) 2025    CREATININE 7.11 (H) 03/10/2025        Patient weight is as follows: There were no vitals filed for this visit.    Cultures:  No results found for the encounter in last 14 days.        No intake/output data recorded.  I/O during current shift:  No intake/output data recorded.    Temp (24hrs), Av.2 °C (100.8 °F), Min:38.2 °C (100.8 °F), Max:38.2 °C (100.8 °F)         Assessment/Plan     Patient will be given a loading dose of 1750 mg.  Follow-up level will be ordered on  at 0500 unless clinically indicated sooner.  Will continue to monitor renal function daily while on vancomycin and order serum creatinine at least every 48 hours if not already ordered.  Follow for continued vancomycin needs, clinical response, and signs/symptoms of toxicity.       Rogelio Alcala, PharmD

## 2025-03-31 NOTE — SIGNIFICANT EVENT
84-year-old male with a past medical history of insulin-dependent type 2 diabetes, ESRD, dementia, atrial fibrillation on Eliquis initially presented to the emergency department due to hyperglycemia.  Noted to have baseline dementia.  Described as a poor historian.  Does endorse hyperglycemia recently.  He has been compliant with his dialysis treatments.  Patient recently underwent left hip hemiarthroplasty with a noted left femur fracture on 3/10/2025.  Initial workup in the emergency department did reveal worsening anemia prompting 1 unit of PRBC to be transfused.  CT of the hip was obtained showing a dislocated left hip hemiarthroplasty with surrounding soft tissue air-fluid collection concerning for possible abscess.  It was the recommendation of the orthopedic service at the outside hospital as well as the preference of family members to return to this facility with the surgery was initially performed for further management.  Case was discussed with orthopedic service, Dr. Dumont who agreed to consult on the patient, but recommended admission to medical service which given the long list of comorbidities and potential nonsurgical issues is very appropriate.  There is concern given the appearance of the dislocation that this may be more surgically complex than typical dislocations however both ED physician at OSH and myself agreed that type of assessment best made by orthopedic service who will see on consultation in AM.    Patient is therefore being transferred to this facility for management of dislocated left hip hemiarthroplasty with recent intervention within the last 30 days at this facility,  concern for possible abscess at the surgical site with an elevated WBC count, and acute anemia in the setting of ESRD status post 1 unit PRBC transfusion.    Patient has remained hemodynamically stable.  To be admitted to inpatient telemetry on the regular medical floor.

## 2025-04-01 ENCOUNTER — APPOINTMENT (OUTPATIENT)
Dept: DIALYSIS | Facility: HOSPITAL | Age: 85
End: 2025-04-01
Payer: MEDICARE

## 2025-04-01 LAB
ANION GAP SERPL CALC-SCNC: 18 MMOL/L (ref 10–20)
BASOPHILS # BLD AUTO: 0.04 X10*3/UL (ref 0–0.1)
BASOPHILS NFR BLD AUTO: 0.1 %
BUN SERPL-MCNC: 62 MG/DL (ref 6–23)
CALCIUM SERPL-MCNC: 8.6 MG/DL (ref 8.6–10.3)
CHLORIDE SERPL-SCNC: 98 MMOL/L (ref 98–107)
CO2 SERPL-SCNC: 26 MMOL/L (ref 21–32)
CREAT SERPL-MCNC: 7.2 MG/DL (ref 0.5–1.3)
EGFRCR SERPLBLD CKD-EPI 2021: 7 ML/MIN/1.73M*2
EOSINOPHIL # BLD AUTO: 0.05 X10*3/UL (ref 0–0.4)
EOSINOPHIL NFR BLD AUTO: 0.2 %
ERYTHROCYTE [DISTWIDTH] IN BLOOD BY AUTOMATED COUNT: 17.9 % (ref 11.5–14.5)
FLUAV RNA RESP QL NAA+PROBE: NOT DETECTED
FLUBV RNA RESP QL NAA+PROBE: NOT DETECTED
GLUCOSE BLD MANUAL STRIP-MCNC: 131 MG/DL (ref 74–99)
GLUCOSE BLD MANUAL STRIP-MCNC: 168 MG/DL (ref 74–99)
GLUCOSE BLD MANUAL STRIP-MCNC: 200 MG/DL (ref 74–99)
GLUCOSE BLD MANUAL STRIP-MCNC: 211 MG/DL (ref 74–99)
GLUCOSE SERPL-MCNC: 144 MG/DL (ref 74–99)
HCT VFR BLD AUTO: 21.1 % (ref 41–52)
HGB BLD-MCNC: 7.4 G/DL (ref 13.5–17.5)
HOLD SPECIMEN: NORMAL
IMM GRANULOCYTES # BLD AUTO: 0.21 X10*3/UL (ref 0–0.5)
IMM GRANULOCYTES NFR BLD AUTO: 0.8 % (ref 0–0.9)
LYMPHOCYTES # BLD AUTO: 1.9 X10*3/UL (ref 0.8–3)
LYMPHOCYTES NFR BLD AUTO: 6.9 %
MAGNESIUM SERPL-MCNC: 2.36 MG/DL (ref 1.6–2.4)
MCH RBC QN AUTO: 28.9 PG (ref 26–34)
MCHC RBC AUTO-ENTMCNC: 35.1 G/DL (ref 32–36)
MCV RBC AUTO: 82 FL (ref 80–100)
MONOCYTES # BLD AUTO: 1.89 X10*3/UL (ref 0.05–0.8)
MONOCYTES NFR BLD AUTO: 6.9 %
NEUTROPHILS # BLD AUTO: 23.44 X10*3/UL (ref 1.6–5.5)
NEUTROPHILS NFR BLD AUTO: 85.1 %
NRBC BLD-RTO: 0 /100 WBCS (ref 0–0)
PLATELET # BLD AUTO: 227 X10*3/UL (ref 150–450)
POTASSIUM SERPL-SCNC: 4.8 MMOL/L (ref 3.5–5.3)
RBC # BLD AUTO: 2.56 X10*6/UL (ref 4.5–5.9)
RSV RNA RESP QL NAA+PROBE: NOT DETECTED
SARS-COV-2 RNA RESP QL NAA+PROBE: NOT DETECTED
SODIUM SERPL-SCNC: 137 MMOL/L (ref 136–145)
WBC # BLD AUTO: 27.5 X10*3/UL (ref 4.4–11.3)

## 2025-04-01 PROCEDURE — 5A1D70Z PERFORMANCE OF URINARY FILTRATION, INTERMITTENT, LESS THAN 6 HOURS PER DAY: ICD-10-PCS | Performed by: INTERNAL MEDICINE

## 2025-04-01 PROCEDURE — 2500000001 HC RX 250 WO HCPCS SELF ADMINISTERED DRUGS (ALT 637 FOR MEDICARE OP): Performed by: ORTHOPAEDIC SURGERY

## 2025-04-01 PROCEDURE — 83735 ASSAY OF MAGNESIUM: CPT | Performed by: ORTHOPAEDIC SURGERY

## 2025-04-01 PROCEDURE — 2500000002 HC RX 250 W HCPCS SELF ADMINISTERED DRUGS (ALT 637 FOR MEDICARE OP, ALT 636 FOR OP/ED): Performed by: ORTHOPAEDIC SURGERY

## 2025-04-01 PROCEDURE — 94640 AIRWAY INHALATION TREATMENT: CPT

## 2025-04-01 PROCEDURE — 2500000001 HC RX 250 WO HCPCS SELF ADMINISTERED DRUGS (ALT 637 FOR MEDICARE OP): Performed by: INTERNAL MEDICINE

## 2025-04-01 PROCEDURE — 80048 BASIC METABOLIC PNL TOTAL CA: CPT | Performed by: ORTHOPAEDIC SURGERY

## 2025-04-01 PROCEDURE — 97161 PT EVAL LOW COMPLEX 20 MIN: CPT | Mod: GP

## 2025-04-01 PROCEDURE — 2500000004 HC RX 250 GENERAL PHARMACY W/ HCPCS (ALT 636 FOR OP/ED): Performed by: ORTHOPAEDIC SURGERY

## 2025-04-01 PROCEDURE — 97166 OT EVAL MOD COMPLEX 45 MIN: CPT | Mod: GO | Performed by: OCCUPATIONAL THERAPIST

## 2025-04-01 PROCEDURE — 1200000002 HC GENERAL ROOM WITH TELEMETRY DAILY

## 2025-04-01 PROCEDURE — 87637 SARSCOV2&INF A&B&RSV AMP PRB: CPT | Performed by: INTERNAL MEDICINE

## 2025-04-01 PROCEDURE — 8010000001 HC DIALYSIS - HEMODIALYSIS PER DAY

## 2025-04-01 PROCEDURE — 99231 SBSQ HOSP IP/OBS SF/LOW 25: CPT | Performed by: PHYSICIAN ASSISTANT

## 2025-04-01 PROCEDURE — 99233 SBSQ HOSP IP/OBS HIGH 50: CPT | Performed by: INTERNAL MEDICINE

## 2025-04-01 PROCEDURE — 85025 COMPLETE CBC W/AUTO DIFF WBC: CPT | Performed by: ORTHOPAEDIC SURGERY

## 2025-04-01 PROCEDURE — 36415 COLL VENOUS BLD VENIPUNCTURE: CPT | Performed by: ORTHOPAEDIC SURGERY

## 2025-04-01 PROCEDURE — 97530 THERAPEUTIC ACTIVITIES: CPT | Mod: GP

## 2025-04-01 PROCEDURE — 82947 ASSAY GLUCOSE BLOOD QUANT: CPT

## 2025-04-01 RX ORDER — DOCUSATE SODIUM 100 MG/1
100 CAPSULE, LIQUID FILLED ORAL DAILY
Status: DISPENSED | OUTPATIENT
Start: 2025-04-01

## 2025-04-01 RX ORDER — IPRATROPIUM BROMIDE AND ALBUTEROL SULFATE 2.5; .5 MG/3ML; MG/3ML
3 SOLUTION RESPIRATORY (INHALATION) EVERY 2 HOUR PRN
Status: ACTIVE | OUTPATIENT
Start: 2025-04-01

## 2025-04-01 RX ORDER — FENTANYL CITRATE 50 UG/ML
INJECTION, SOLUTION INTRAMUSCULAR; INTRAVENOUS CONTINUOUS PRN
Status: DISCONTINUED | OUTPATIENT
Start: 2025-04-01 | End: 2025-04-01 | Stop reason: HOSPADM

## 2025-04-01 RX ORDER — VANCOMYCIN HYDROCHLORIDE 500 MG/100ML
500 INJECTION, SOLUTION INTRAVENOUS ONCE
Status: COMPLETED | OUTPATIENT
Start: 2025-04-01 | End: 2025-04-01

## 2025-04-01 RX ADMIN — ACETAMINOPHEN 650 MG: 325 TABLET ORAL at 09:27

## 2025-04-01 RX ADMIN — IPRATROPIUM BROMIDE AND ALBUTEROL SULFATE 3 ML: 2.5; .5 SOLUTION RESPIRATORY (INHALATION) at 07:54

## 2025-04-01 RX ADMIN — INSULIN LISPRO 4 UNITS: 100 INJECTION, SOLUTION INTRAVENOUS; SUBCUTANEOUS at 16:02

## 2025-04-01 RX ADMIN — DONEPEZIL HYDROCHLORIDE 22.5 MG: 10 TABLET ORAL at 08:40

## 2025-04-01 RX ADMIN — SEVELAMER CARBONATE 1.6 G: 800 POWDER, FOR SUSPENSION ORAL at 08:41

## 2025-04-01 RX ADMIN — FINASTERIDE 5 MG: 5 TABLET, FILM COATED ORAL at 08:40

## 2025-04-01 RX ADMIN — LEVETIRACETAM 500 MG: 500 TABLET, FILM COATED ORAL at 08:40

## 2025-04-01 RX ADMIN — INSULIN LISPRO 2 UNITS: 100 INJECTION, SOLUTION INTRAVENOUS; SUBCUTANEOUS at 11:47

## 2025-04-01 RX ADMIN — SEVELAMER CARBONATE 1.6 G: 800 POWDER, FOR SUSPENSION ORAL at 23:17

## 2025-04-01 RX ADMIN — DOCUSATE SODIUM 100 MG: 100 CAPSULE, LIQUID FILLED ORAL at 09:27

## 2025-04-01 RX ADMIN — APIXABAN 2.5 MG: 2.5 TABLET, FILM COATED ORAL at 23:17

## 2025-04-01 RX ADMIN — INSULIN GLARGINE 5 UNITS: 100 INJECTION, SOLUTION SUBCUTANEOUS at 23:17

## 2025-04-01 RX ADMIN — LEVETIRACETAM 500 MG: 500 TABLET, FILM COATED ORAL at 23:17

## 2025-04-01 RX ADMIN — IPRATROPIUM BROMIDE AND ALBUTEROL SULFATE 3 ML: 2.5; .5 SOLUTION RESPIRATORY (INHALATION) at 02:45

## 2025-04-01 RX ADMIN — Medication 10 MG: at 23:17

## 2025-04-01 RX ADMIN — VANCOMYCIN HYDROCHLORIDE 500 MG: 500 INJECTION, SOLUTION INTRAVENOUS at 23:00

## 2025-04-01 RX ADMIN — INSULIN LISPRO 2 UNITS: 100 INJECTION, SOLUTION INTRAVENOUS; SUBCUTANEOUS at 09:27

## 2025-04-01 RX ADMIN — Medication 1 CAPSULE: at 08:40

## 2025-04-01 RX ADMIN — Medication 1000 UNITS: at 08:41

## 2025-04-01 RX ADMIN — PIPERACILLIN SODIUM AND TAZOBACTAM SODIUM 3.38 G: 3; .375 INJECTION, SOLUTION INTRAVENOUS at 23:38

## 2025-04-01 RX ADMIN — METOPROLOL TARTRATE 50 MG: 50 TABLET, FILM COATED ORAL at 23:17

## 2025-04-01 RX ADMIN — PIPERACILLIN SODIUM AND TAZOBACTAM SODIUM 3.38 G: 3; .375 INJECTION, SOLUTION INTRAVENOUS at 11:02

## 2025-04-01 RX ADMIN — APIXABAN 2.5 MG: 2.5 TABLET, FILM COATED ORAL at 09:27

## 2025-04-01 ASSESSMENT — COGNITIVE AND FUNCTIONAL STATUS - GENERAL
CLIMB 3 TO 5 STEPS WITH RAILING: TOTAL
TOILETING: A LOT
DRESSING REGULAR UPPER BODY CLOTHING: TOTAL
HELP NEEDED FOR BATHING: A LOT
HELP NEEDED FOR BATHING: TOTAL
MOBILITY SCORE: 6
STANDING UP FROM CHAIR USING ARMS: TOTAL
MOVING TO AND FROM BED TO CHAIR: TOTAL
TOILETING: TOTAL
TURNING FROM BACK TO SIDE WHILE IN FLAT BAD: A LOT
DAILY ACTIVITIY SCORE: 12
MOBILITY SCORE: 9
TURNING FROM BACK TO SIDE WHILE IN FLAT BAD: A LOT
TURNING FROM BACK TO SIDE WHILE IN FLAT BAD: TOTAL
MOVING FROM LYING ON BACK TO SITTING ON SIDE OF FLAT BED WITH BEDRAILS: A LOT
PERSONAL GROOMING: TOTAL
DRESSING REGULAR LOWER BODY CLOTHING: A LOT
MOVING FROM LYING ON BACK TO SITTING ON SIDE OF FLAT BED WITH BEDRAILS: TOTAL
MOVING TO AND FROM BED TO CHAIR: A LOT
MOBILITY SCORE: 9
MOVING FROM LYING ON BACK TO SITTING ON SIDE OF FLAT BED WITH BEDRAILS: A LOT
DAILY ACTIVITIY SCORE: 8
MOVING TO AND FROM BED TO CHAIR: A LOT
CLIMB 3 TO 5 STEPS WITH RAILING: TOTAL
DRESSING REGULAR UPPER BODY CLOTHING: A LOT
WALKING IN HOSPITAL ROOM: TOTAL
PERSONAL GROOMING: A LOT
CLIMB 3 TO 5 STEPS WITH RAILING: TOTAL
WALKING IN HOSPITAL ROOM: TOTAL
EATING MEALS: A LITTLE
WALKING IN HOSPITAL ROOM: TOTAL
STANDING UP FROM CHAIR USING ARMS: TOTAL
STANDING UP FROM CHAIR USING ARMS: TOTAL
DRESSING REGULAR LOWER BODY CLOTHING: TOTAL
EATING MEALS: A LOT

## 2025-04-01 ASSESSMENT — PAIN SCALES - WONG BAKER: WONGBAKER_NUMERICALRESPONSE: HURTS LITTLE BIT

## 2025-04-01 ASSESSMENT — PAIN SCALES - GENERAL
PAINLEVEL_OUTOF10: 10 - WORST POSSIBLE PAIN
PAINLEVEL_OUTOF10: 10 - WORST POSSIBLE PAIN
PAINLEVEL_OUTOF10: 0 - NO PAIN

## 2025-04-01 ASSESSMENT — ACTIVITIES OF DAILY LIVING (ADL)
ADL_ASSISTANCE: NEEDS ASSISTANCE
ADL_ASSISTANCE: NEEDS ASSISTANCE

## 2025-04-01 ASSESSMENT — PAIN - FUNCTIONAL ASSESSMENT
PAIN_FUNCTIONAL_ASSESSMENT: 0-10
PAIN_FUNCTIONAL_ASSESSMENT: 0-10
PAIN_FUNCTIONAL_ASSESSMENT: WONG-BAKER FACES

## 2025-04-01 NOTE — PROGRESS NOTES
"Nutrition Initial Assessment:   Nutrition Assessment    Reason for Assessment: Admission nursing screening (MST of 2)    Patient is a 84 y.o. male presenting from [facility] with recent history of L hip hemiarthroplasty on 3/10/25.  ED notation indicates imaging showing dislocated left hip hemiarthroplasty and possible abscess.  Pt now s/p closed reduction on 3/31.  Wound care on consult for multiple abrasions and sacral pressure injury.  Per case management notation pt's spouse would like him to go to a new SNF that has on site HD.    PMH: ESRD on hemodialysis Tuesday Thursday Saturday, hypertension, hyperlipidemia, type 2 diabetes mellitus, dementia     Nutrition History:  Energy Intake: Fair 50-75 %  Food and Nutrient History: Pt states that his appetite has been good.  Denies recent weight loss.  Agreeable to oral supplement to consume additional protein.  Pt soft spoken but able to answer simple questions.  Per nursing he has eaten his meals well but pocketing meds with applesauce at times.       Anthropometrics:  Height: 182.8 cm (5' 11.97\")   Weight: 67.6 kg (149 lb)   BMI (Calculated): 20.23             Weight History:    03/07/25 10:22 03/08/25 23:47 03/11/25 06:00   Weight 68.4 kg (150 lb 12.7 oz) [1] 70.3 kg (155 lb) 69.9 kg (154 lb)   Weight Change %:  Significant Weight Loss: No    Nutrition Focused Physical Exam Findings:  Subcutaneous Fat Loss:   Orbital Fat Pads: Mild-Moderate (slight dark circles and slight hollowing)  Buccal Fat Pads: Mild-Moderate (flat cheeks, minimal bounce)  Triceps: Defer  Ribs: Defer  Muscle Wasting:  Temporalis: Mild-Moderate (slight depression)  Pectoralis (Clavicular Region): Mild-Moderate (some protrusion of clavicle)  Deltoid/Trapezius: Mild-Moderate (slight protrusion of acromion process)  Interosseous: Defer  Trapezius/Infraspinatus/Supraspinatus (Scapular Region): Mild-Moderate (slight protrusion of scapula)  Quadriceps: Defer  Gastrocnemius: Defer  Edema:  Edema:  " (nonpitting)  Edema Location: LLE  Physical Findings:  Hair: Negative  Eyes: Negative  Nails: Negative  Skin: Positive (multiple abrasions and sacral pressure injury)  Respiratory : Negative  Mouth Findings:  (nursing noting that pt pockets meds with applesauce at times but has eaten meals without difficulty)    Nutrition Significant Labs:  BMP Trend:   Results from last 7 days   Lab Units 04/01/25  0514 03/31/25  1032   GLUCOSE mg/dL 144* 116*   CALCIUM mg/dL 8.6 9.0   SODIUM mmol/L 137 136   POTASSIUM mmol/L 4.8 4.6   CO2 mmol/L 26 30   CHLORIDE mmol/L 98 95*   BUN mg/dL 62* 48*   CREATININE mg/dL 7.20* 6.25*    , A1C:  Lab Results   Component Value Date    HGBA1C 8.7 (H) 10/24/2024       Nutrition Specific Medications:  Scheduled medications  apixaban, 2.5 mg, oral, BID  cholecalciferol, 1,000 Units, oral, Daily  docusate sodium, 100 mg, oral, Daily  donepezil, 22.5 mg, oral, Daily  finasteride, 5 mg, oral, Daily  insulin glargine, 5 Units, subcutaneous, Nightly  insulin lispro, 0-10 Units, subcutaneous, TID AC  levETIRAcetam, 500 mg, oral, BID  melatonin, 10 mg, oral, Nightly  metoprolol tartrate, 50 mg, oral, BID  piperacillin-tazobactam, 3.375 g, intravenous, q12h  sevelamer carbonate, 1.6 g, oral, BID  vancomycin, 500 mg, intravenous, Once  vitamin B complex-vitamin C-folic acid, 1 capsule, oral, Daily      I/O:   Last BM Date: 04/01/25; Stool Appearance: Loose, Formed (04/01/25 1025)    Dietary Orders (From admission, onward)       Start     Ordered    03/31/25 1716  May Not Participate in Room Service  ( ROOM SERVICE MAY NOT PARTICIPATE)  Once        Question:  .  Answer:  Yes    03/31/25 1715    03/31/25 1542  Adult diet Consistent Carb; CCD 45 gm/meal  Diet effective now        Question Answer Comment   Diet type Consistent Carb    Carb diet selection: CCD 45 gm/meal        03/31/25 1541                     Estimated Needs:     Method for Estimating Needs: 1890-2230kcal or 28-33kcal/kg CBW  Method for  Estimating 24 Hour Protein Needs: 81-101g or 1.2-1.5g/kg pro  Method for Estimating 24 Hour Fluid Needs: 1ml/kcal or per MD recs        Nutrition Diagnosis   Malnutrition Diagnosis  Patient has Malnutrition Diagnosis: No    Nutrition Diagnosis  Patient has Nutrition Diagnosis: Yes  Diagnosis Status (1): New  Nutrition Diagnosis 1: Increased nutrient needs  Related to (1): protein/calories  As Evidenced by (1): recent left hip fx s/p surgery with post op complications with increased protein needs at baseline from ESRD on HD.       Nutrition Interventions/Recommendations   Nutrition prescription for oral nutrition    Nutrition Recommendations:  Individualized Nutrition Prescription Provided for : Recommend change diet to 60g CHO/meal for more liberalized approach to promote protein/calorie intakes.    Nutrition Interventions/Goals:   Meals and Snacks: General healthful diet  Goal: offer food/drink every 2-3 hours throughout the day  Medical Food Supplement: Commercial beverage medical food supplement therapy  Goal: Recommend Glucerna (chocolate)(220kcal, 10g pro each) BID with breakfast and dinner, recommend Gelatein SF once daily with lunch for additional 80kcal, 20g pro  Coordination of Care with Providers: Nursing       Nutrition Monitoring and Evaluation   Food/Nutrient Related History Monitoring  Monitoring and Evaluation Plan: Intake / amount of food, Estimated Energy Intake  Estimated Energy Intake: Energy intake 50 -75% of estimated energy needs  Intake / Amount of food: Consumes > or equal to 70% of supplement    Anthropometric Measurements  Monitoring and Evaluation Plan: Body weight  Body Weight: Body weight - Maintain stable weight    Biochemical Data, Medical Tests and Procedures  Monitoring and Evaluation Plan: Electrolyte/renal panel, Glucose/endocrine profile  Electrolyte and Renal Panel: Electrolytes within normal limits  Criteria: maintain wnl  Glucose/Endocrine Profile: Glucose within normal limits  ( mg/dL)    Physical Exam Findings  Monitoring and Evaluation Plan: Skin  Skin Finding: Impaired wound healing - Improved wound healing    Goal Status: New goal(s) identified    Time Spent (min): 60 minutes

## 2025-04-01 NOTE — CARE PLAN
The patient's goals for the shift include Pt to not fall all shift    The clinical goals for the shift include Patient will remain Hemodynamically stable this shift. SBP will remain greater than 90. MAP greater than 65.      Problem: Pain - Adult  Goal: Verbalizes/displays adequate comfort level or baseline comfort level  Outcome: Met     Problem: Safety - Adult  Goal: Free from fall injury  Outcome: Met     Problem: Discharge Planning  Goal: Discharge to home or other facility with appropriate resources  Outcome: Progressing     Problem: Chronic Conditions and Co-morbidities  Goal: Patient's chronic conditions and co-morbidity symptoms are monitored and maintained or improved  Outcome: Progressing     Problem: Nutrition  Goal: Nutrient intake appropriate for maintaining nutritional needs  Outcome: Progressing

## 2025-04-01 NOTE — ADDENDUM NOTE
Addendum  created 04/01/25 0858 by JAMISON Yang    Clinical Note Signed, Intraprocedure Event edited, Intraprocedure Staff edited, Narcotic reconciliation edited, Orders acknowledged in Narrator

## 2025-04-01 NOTE — PROGRESS NOTES
04/01/25 1432   Discharge Planning   Living Arrangements Other (Comment)   Support Systems Spouse/significant other   Type of Residence Nursing home/residential care   Expected Discharge Disposition Inter   Does the patient need discharge transport arranged? Yes   RoundTrip coordination needed? Yes   Intensity of Service   Intensity of Service 0-30 min     Spoke with pt's wife and notified her that [facility] accept. She plans to tour the facility tomorrow or Thursday and will let SW know what she has decided. SW explained that is does take some time to arrange dialysis at the facility, so the sooner she can let us know the better. Wife voiced understanding. SW to follow.

## 2025-04-01 NOTE — PROGRESS NOTES
Vancomycin Dosing by Pharmacy- INITIAL (HEMODIALYSIS)    Tyshawn Coles is a 84 y.o. year old male who Pharmacy has been consulted for vancomycin dosing for Vancomycin Indications: Skin & Soft Tissue. Based on the patient's indication and renal status this patient will be dosed based on a Pre-HD level of 15-25 mcg/mL.     Visit Vitals  /67 (BP Location: Right arm, Patient Position: Lying)   Pulse 79   Temp 36.6 °C (97.9 °F) (Temporal)   Resp 19           Lab Results   Component Value Date    CREATININE 7.20 (H) 04/01/2025    CREATININE 6.25 (H) 03/31/2025    CREATININE 5.41 (H) 03/12/2025    CREATININE 8.42 (H) 03/11/2025       I/O last 3 completed shifts:  In: 1145 (16.9 mL/kg) [P.O.:240; I.V.:855 (12.7 mL/kg); IV Piggyback:50]  Out: - (0 mL/kg)   Weight: 67.6 kg     Patient is currently on hemodialysis Tues/Thurs/Sat     Assessment/Plan     1. Initial Loading Dosing:has already been given a loading dose of 1750 mg    2. Vancomycin maintenance dose: 500 mg after each dialysis session Tues/Thurs/Sat. Just entered as a one time dose for now after HD today at 2300.   3. Pre-HD level will be obtained on 4/3 at 0500. May be obtained sooner if clinically indicated.    4. Will continue to monitor renal function daily while on vancomycin and order serum creatinine at least every 48 hours if not already ordered.   5. Follow for continued vancomycin needs, clinical response, and signs/symptoms of toxicity.      Bebeto Bhardawj, PharmD

## 2025-04-01 NOTE — PROGRESS NOTES
Patient is postoperative day 1 status post closed reduction of left hip hemiarthroplasty dislocation.    Continuation of postoperative protocol which includes essentially minimizing any type of mobilization with hip abductor pillow at all times.    May only weight-bear as tolerated for transfer related purposes only.      Patient is at high risk of redislocation based on subacute presentation of the dislocation.    Primary team was requesting potential aspiration of the left hip as a possible source of his leukocytosis and concern for infection.    From my perspective clinically there is no evidence of infection and radiographically the CT scan is consistent with postoperative changes and hematoma due to anticoagulation and subacute dislocation of the left hip.  Given the benign nature of the surgical incision and no evidence of active infection I do feel that an attempted hip aspiration could contaminate a hematoma and ultimately resulted in infection of the left hip.    Cultures from the decubitus ulcers growing polymicrobial organisms is most likely the cause of patient's substantially elevated white count.    After an extensive discussion with the patient's wife I do not feel that any measure beyond suppressive antibiotic therapy and closed reduction of the hip would be consistent with her wishes in regards to care for her  and as such I find little utility in aspirating the left hip given the overall benign clinical picture at this time.    If the situation should change and there becomes evidence of wound compromise could consider hip aspiration or culture of any drainage from the surgical site but at this time fortunately remains benign.        Eliezer Dumont MD

## 2025-04-01 NOTE — PROGRESS NOTES
Physical Therapy    Physical Therapy Evaluation and Treatment    Patient Name: Tyshawn Coles  MRN: [id number]  Today's Date: 4/1/2025   Time Calculation  Start Time: 1010  Stop Time: 1037  Time Calculation (min): 27 min  4105/4105-A    Assessment/Plan   PT Assessment  PT Assessment Results: Decreased strength, Decreased range of motion, Decreased endurance, Impaired balance, Decreased mobility, Pain, Orthopedic restrictions, Decreased safety awareness, Impaired judgement, Decreased cognition  Rehab Prognosis: Fair  Barriers to Discharge Home: Cognition needs, Physical needs  Cognition Needs: 24hr supervision for safety awareness needed, Recollection or understanding of precautions/restrictions limited, Insight of patient limited regarding functional ability/needs, Cognition-related high falls risk  Physical Needs: Returning to long-term care/other facility, Weight bearing precautions unable to be safely maintained, High falls risk due to function or environment, 24hr mobility assistance needed  Evaluation/Treatment Tolerance: Patient limited by fatigue (and dizziness with sitting up)  Medical Staff Made Aware: Yes  Barriers to Participation: Ability to acquire knowledge, Insight into problems  End of Session Communication: Bedside nurse, Physician  Assessment Comment: Pt presents today below baseline level of function and requires continued PT during hospital stay. Pt may benefit from PT at a moderate intensity level at discharge to maximize functional mobility and safety.    End of Session Patient Position: Alarm on, Bed, 4 rail up (seizure pads in place)  IP OR SWING BED PT PLAN  Inpatient or Swing Bed: Inpatient  PT Plan  Treatment/Interventions: Bed mobility, Transfer training, Balance training, Neuromuscular re-education, Strengthening, Endurance training, Range of motion, Therapeutic exercise, Therapeutic activity, Home exercise program, Positioning  PT Plan: Ongoing PT  PT Frequency: 2 times per week  PT  Discharge Recommendations: Moderate intensity level of continued care  Equipment Recommended upon Discharge: Wheeled walker, Wheelchair  PT Recommended Transfer Status: Total assist  PT - OK to Discharge: Yes (To next level of care when cleared by medical team   )    Subjective       General Visit Information:  General  Reason for Referral: impaired mobility  Referred By: Paul Guevara MD  Past Medical History Relevant to Rehab: To hospital with left hip dislocation following fall. Pt underwent CLOSED REDUCTION, DISLOCATION, TOTAL ARTHROPLASTY HARDWARE, HIP (L) 3/31. PMH: dementia, anemia, CAD  Family/Caregiver Present: No  Co-Treatment: OT  Co-Treatment Reason: for safety  Prior to Session Communication: Bedside nurse  Patient Position Received: Alarm on, Bed, 4 rail up (seizure pads in place)  Preferred Learning Style: verbal  General Comment: Pt agreeable to PT, nursing cleared for treatment.    Home Living:  Home Living  Home Living Comments: Pt is a long term care resident    Prior Level of Function:  Prior Function Per Pt/Caregiver Report  ADL Assistance: Needs assistance  Homemaking Assistance: Needs assistance  Ambulatory Assistance: Needs assistance  Prior Function Comments: Pt is a questionable historian due to confusion. Per pt, at baseline he ambulates with a walker and needs assist for transfers and ADLs.    Precautions:  Precautions  LE Weight Bearing Status: Weight Bearing as Tolerated (left LE)  Medical Precautions: Fall precautions  Post-Surgical Precautions:  (left posterior hip precautions)  Precautions Comment: Patient should remain at bedrest at all times with the use of abduction pillow at all times with the exception of weightbearing as tolerate activity for transfer related purposes only       Objective     Pain:  Pain Assessment  Pain Assessment: 0-10  0-10 (Numeric) Pain Score: 10 - Worst possible pain  Pain Type: Acute pain  Pain Location: Hip  Pain Orientation: Left  Pain Interventions:  Ambulation/increased activity, Repositioned    Cognition:  Cognition  Overall Cognitive Status:  (flat affect)  Orientation Level: Disoriented to place, Disoriented to time, Disoriented to situation  Sustained Attention: Impaired  Insight: Severe    General Assessments:      Activity Tolerance  Endurance: Decreased tolerance for upright activites (due to dizziness and weakness)        Perception  Initiation: Hand over hand to initiate tasks  Motor Planning: Hand over hand to sequence tasks        Static Sitting Balance  Static Sitting-Comment/Number of Minutes: poor  Dynamic Sitting Balance  Dynamic Sitting-Comments: poor       Functional Assessments:     Bed Mobility  Bed Mobility: Yes  Bed Mobility 1  Bed Mobility 1: Supine to sitting, Sitting to supine  Level of Assistance 1: Dependent  Bed Mobility Comments 1: x 2  Bed Mobility 2  Bed Mobility  2: Rolling right, Rolling left  Level of Assistance 2: Dependent, +2  Bed Mobility Comments 2: RN at bedside to perform pericare as pt was incontinent of stool  Transfers  Transfer: No  Ambulation/Gait Training  Ambulation/Gait Training Performed: No        Treatment    Pt educated on posterior hip precautions. Verbal and tactile cues to facilitate abduction/adduction of BLE's and UE placement to transition supine<>sitting EOB. Cues for placing feet flat on floor at EOB and squaring hips to maximize safety. Verbal and tactile cues given to facilitate upright posture and gaze sitting at EOB to increase postural control.     Extremity/Trunk Assessments:        RLE   RLE : Exceptions to WFL  Strength RLE  RLE Overall Strength:  (grossly 3-/5 throughout)  AROM LLE (degrees)  LLE AROM Comment: limited throughout due to pain  Strength LLE  LLE Overall Strength:  (grossly 2/5 throughout)    Outcome Measures:     [facility] Basic Mobility  Turning from your back to your side while in a flat bed without using bedrails: Total  Moving from lying on your back to sitting on the side of a  flat bed without using bedrails: Total  Moving to and from bed to chair (including a wheelchair): Total  Standing up from a chair using your arms (e.g. wheelchair or bedside chair): Total  To walk in hospital room: Total  Climbing 3-5 steps with railing: Total  Basic Mobility - Total Score: 6                   Goals:  Encounter Problems       Encounter Problems (Active)       PT Problem       Pt will perform bed mobility with max A.  (Progressing)       Start:  04/01/25    Expected End:  04/15/25            Pt will complete sit <> stand and bed <> chair transfers with max A.  (Not Progressing)       Start:  04/01/25    Expected End:  04/15/25            Pt will progress to completing 3 x 10 supine/seated exercises in order to increase strength and improve gait mechanics.   (Not Progressing)       Start:  04/01/25    Expected End:  04/15/25            Pt will tolerate at least 5 minutes of sitting at EOB with max A.  (Progressing)       Start:  04/01/25    Expected End:  04/15/25                     Education Documentation  Precautions, taught by Tamie Sierra, PT at 4/1/2025 12:34 PM.  Learner: Patient  Readiness: Acceptance  Method: Explanation  Response: Needs Reinforcement    Body Mechanics, taught by Tamie Sierra, PT at 4/1/2025 12:34 PM.  Learner: Patient  Readiness: Acceptance  Method: Explanation  Response: Needs Reinforcement    Mobility Training, taught by Tamie Sierra, PT at 4/1/2025 12:34 PM.  Learner: Patient  Readiness: Acceptance  Method: Explanation  Response: Needs Reinforcement    Education Comments  No comments found.

## 2025-04-01 NOTE — ANESTHESIA POSTPROCEDURE EVALUATION
Patient: Tyshawn Coles    Procedure Summary       Date: 03/31/25 Room / Location: [facility]    Anesthesia Start: 1343 Anesthesia Stop: 1431    Procedure: CLOSED REDUCTION, DISLOCATION, TOTAL ARTHROPLASTY HARDWARE, HIP (Left) Diagnosis:       Hip dislocation, left, initial encounter (Multi)      (Hip dislocation, left, initial encounter (Multi) [S73.005A])    Surgeons: Eliezer Dumont MD Responsible Provider: Jackson Palafox MD    Anesthesia Type: general ASA Status: Not recorded            Anesthesia Type: general    Vitals Value Taken Time   /83 03/31/25 1430   Temp 36 03/31/25 1430   Pulse 68 03/31/25 1430   Resp 12 03/31/25 1430   SpO2 100 03/31/25 1430       Anesthesia Post Evaluation    Patient location during evaluation: PACU  Patient participation: complete - patient participated  Level of consciousness: awake and alert  Pain management: satisfactory to patient  Airway patency: patent  Cardiovascular status: acceptable  Respiratory status: acceptable, spontaneous ventilation, room air and nonlabored ventilation  Hydration status: acceptable  Postoperative Nausea and Vomiting: none        No notable events documented.

## 2025-04-01 NOTE — PROGRESS NOTES
Occupational Therapy  Evaluation    Patient Name: Tyshawn Coles  MRN: [id number]  Today's Date: 4/1/2025  Time Calculation  Start Time: 1011  Stop Time: 1037  Time Calculation (min): 26 min  4105/4105-A    Assessment  IP OT Assessment  OT Assessment: Patient demonstrates decreased independence with self care, decreased independence with functional transfers, decreased endurance, decreased balance and decresaed strength. Patient with baseline cognitive deficits.  Patient will benefit from skilled OT to address deficits.  Anticipate patient will benefit from moderate intensity therapy post hospital stay.  Prognosis: Good  Barriers to Discharge Home: Physical needs, Cognition needs  Cognition Needs: 24hr supervision for safety awareness needed  Physical Needs: 24hr mobility assistance needed, 24hr ADL assistance needed  Evaluation/Treatment Tolerance: Patient limited by pain  Medical Staff Made Aware: Yes  End of Session Communication: Bedside nurse, Physician  End of Session Patient Position: Alarm on, Bed, 4 rail up (seizure pads in place)    Plan:  Treatment Interventions: ADL retraining, Functional transfer training, UE strengthening/ROM, Endurance training, Cognitive reorientation, Patient/family training  OT Frequency: 2 times per week  OT Discharge Recommendations: Moderate intensity level of continued care, 24 hr supervision due to cognition  Equipment Recommended upon Discharge: Wheeled walker, Wheelchair  OT Recommended Transfer Status: Dependent, Assist of 2  OT - OK to Discharge: Yes (to next level of care when medically cleared by physician)    Subjective     Current Problem:  1. Abscess of hip        2. Hip dislocation, left, initial encounter (Multi)  CANCELED: Case Request Operating Room: CLOSED REDUCTION, DISLOCATION, TOTAL ARTHROPLASTY HARDWARE, HIP    CANCELED: Case Request Operating Room: CLOSED REDUCTION, DISLOCATION, TOTAL ARTHROPLASTY HARDWARE, HIP      3. Hypovolemia due to dehydration        4.  History of anemia due to chronic kidney disease        5. ESRD (end stage renal disease) (Multi)        6. Hypotension due to hypovolemia            General:  General  Reason for Referral: impaired self care  Referred By: Paul Guevara MD  Past Medical History Relevant to Rehab: To hospital with left hip dislocation following fall. Pt underwent CLOSED REDUCTION, DISLOCATION, TOTAL ARTHROPLASTY HARDWARE, HIP (L) 3/31. PMH: dementia, anemia, CAD  Family/Caregiver Present: No  Co-Treatment: PT  Co-Treatment Reason: for safety  Prior to Session Communication: Bedside nurse  Patient Position Received: Alarm on, Bed, 4 rail up (seizure pads in place)  Preferred Learning Style: verbal  General Comment: Pt agreeable to OT, nursing cleared for treatment.    Precautions:  LE Weight Bearing Status: Weight Bearing as Tolerated (left LE)  Medical Precautions: Fall precautions  Post-Surgical Precautions:  (left posterior hip precautions)  Precautions Comment: Patient should remain at bedrest at all times with the use of abduction pillow at all times with the exception of weightbearing as tolerate activity for transfer related purposes only      Pain:  Pain Assessment  Pain Assessment: 0-10  0-10 (Numeric) Pain Score: 10 - Worst possible pain  Pain Type: Acute pain  Pain Location: Hip  Pain Orientation: Left  Pain Interventions: Repositioned    Objective     Cognition:  Overall Cognitive Status: Impaired (flat affect)  Orientation Level: Disoriented to place, Disoriented to time, Disoriented to situation  Sustained Attention: Impaired  Insight: Severe          Home Living:  Type of Home: Long Term Care facility     Prior Function:  ADL Assistance: Needs assistance  Homemaking Assistance: Needs assistance  Ambulatory Assistance: Needs assistance  Prior Function Comments: Pt is a questionable historian due to confusion. Per pt, at baseline he ambulates with a walker and needs assist for transfers and ADLs.      ADL:  LE Dressing  Assistance: Total  Toileting Assistance with Device: Total (incontinent of bowels)    Activity Tolerance:  Endurance: Decreased tolerance for upright activites (due to dizziness and weakness)    Bed Mobility/Transfers:   Bed Mobility  Bed Mobility: Yes  Bed Mobility 1  Bed Mobility 1: Supine to sitting, Sitting to supine  Level of Assistance 1: Dependent  Bed Mobility Comments 1: x2  Bed Mobility 2  Bed Mobility  2: Rolling right, Rolling left  Level of Assistance 2: Dependent, +2  Bed Mobility Comments 2: RN at bedside to perform pericare as pt was incontinent of stool  Transfers  Transfer: No      Perception:  Initiation: Hand over hand to initiate tasks  Motor Planning: Hand over hand to sequence tasks      Extremities: RUE   RUE :  (difficuty following directions to test strength) and LUE   LUE:  (difficulty following directions to test strength)    Outcome Measures: [facility] Daily Activity  Putting on and taking off regular lower body clothing: Total  Bathing (including washing, rinsing, drying): Total  Putting on and taking off regular upper body clothing: Total  Toileting, which includes using toilet, bedpan or urinal: Total  Taking care of personal grooming such as brushing teeth: Total  Eating Meals: A little  Daily Activity - Total Score: 8      EDUCATION:  Education  Individual(s) Educated: Patient  Education Provided: Risk and benefits of OT discussed with patient or other, Fall precautons  Plan of Care Discussed and Agreed Upon: yes  Patient Response to Education: Patient/Caregiver Verbalized Understanding of Information      Goals:   Encounter Problems       Encounter Problems (Active)       OT Goals       Patient will complete bed mobility with mod A. (Progressing)       Start:  04/01/25    Expected End:  04/15/25            Patient will complete functional transfers with max A. (Progressing)       Start:  04/01/25    Expected End:  04/15/25            Patient will complete grooming seated with min A.  (Progressing)       Start:  04/01/25    Expected End:  04/15/25

## 2025-04-01 NOTE — PROGRESS NOTES
Tyshawn Coles is a 84 y.o. male on day 1 of admission presenting with Abscess of hip.      Subjective   Patient doing better.  Was working with therapy and evaluated his decubitus ulcer.  No fever since yesterday.  Otherwise doing well       Objective     Last Recorded Vitals  /67 (BP Location: Right arm, Patient Position: Lying)   Pulse 79   Temp 36.6 °C (97.9 °F) (Temporal)   Resp 19   Wt 67.6 kg (149 lb)   SpO2 (!) 88%   Intake/Output last 3 Shifts:    Intake/Output Summary (Last 24 hours) at 4/1/2025 1247  Last data filed at 4/1/2025 0234  Gross per 24 hour   Intake 1145 ml   Output --   Net 1145 ml       Admission Weight  Weight: 67.6 kg (149 lb) (03/31/25 1659)    Daily Weight  03/31/25 : 67.6 kg (149 lb)      Physical Exam:  General: Alert, not in acute distress  HEENT: PERRLA, head intact and normocephalic  Neck: Normal to inspection  Lungs: Clear to auscultation, work of breathing within normal limit  Cardiac: Regular rate and rhythm  Abdomen: Soft nontender, positive bowel sounds  : Exam deferred  Skin: Multiple superficial decubitus ulcer noted with skin breakdown and erythematous base with some foul drainage  Hematology: No petechia or excessive ecchymosis  Musculoskeletal: Left hip Steri-Strip is noted with some hardness.  Neurological: Alert awake oriented x 1, unable to assess the rest.  Does not seem to have focal deficit  Psych: Unable to assess the rest    Relevant Results  Scheduled medications  apixaban, 2.5 mg, oral, BID  cholecalciferol, 1,000 Units, oral, Daily  docusate sodium, 100 mg, oral, Daily  donepezil, 22.5 mg, oral, Daily  finasteride, 5 mg, oral, Daily  insulin glargine, 5 Units, subcutaneous, Nightly  insulin lispro, 0-10 Units, subcutaneous, TID AC  levETIRAcetam, 500 mg, oral, BID  melatonin, 10 mg, oral, Nightly  metoprolol tartrate, 50 mg, oral, BID  piperacillin-tazobactam, 3.375 g, intravenous, q12h  sevelamer carbonate, 1.6 g, oral, BID  vitamin B complex-vitamin  C-folic acid, 1 capsule, oral, Daily      Continuous medications     PRN medications  PRN medications: acetaminophen **OR** acetaminophen **OR** acetaminophen, benzocaine-menthol, guaiFENesin, ipratropium-albuteroL, ondansetron **OR** ondansetron, oxyCODONE, oxyCODONE, polyethylene glycol, prochlorperazine **OR** prochlorperazine **OR** prochlorperazine, vancomycin   Labs  Results from last 7 days   Lab Units 04/01/25  0514 03/31/25  1032   WBC AUTO x10*3/uL 27.5* 28.7*   HEMOGLOBIN g/dL 7.4* 7.8*   HEMATOCRIT % 21.1* 22.2*   PLATELETS AUTO x10*3/uL 227 252     Results from last 7 days   Lab Units 04/01/25  0514 03/31/25  1032   SODIUM mmol/L 137 136   POTASSIUM mmol/L 4.8 4.6   CHLORIDE mmol/L 98 95*   CO2 mmol/L 26 30   BUN mg/dL 62* 48*   CREATININE mg/dL 7.20* 6.25*   CALCIUM mg/dL 8.6 9.0   GLUCOSE mg/dL 144* 116*       Imaging  XR hip left with pelvis when performed 2 or 3 views    Result Date: 3/31/2025  IMPRESSION: Dislocated left hip hemiarthroplasty.  Please see concurrent CT report for additional information. : SMITH   Transcribe Date/Time: Mar 31 2025  2:42A Dictated by : JORJE DICK MD This examination was interpreted and the report reviewed and electronically signed by: JORJE DICK MD on Mar 31 2025  2:46AM  EST    CT abdomen pelvis w IV contrast    Result Date: 3/31/2025  IMPRESSION: 1.  Posterior superior dislocation left hip hemiarthroplasty.  Large left hip and surrounding soft tissue air-fluid collection can represent abscess. 2.  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 : SMITH   Transcribe Date/Time:  Mar 30 2025 11:22P Dictated by : GINNY TREVINO MD This examination was interpreted and the report reviewed and electronically signed by: GINNY TREVINO MD on Mar 30 2025 11:23PM  EST     Cardiology, Vascular, and Other Imaging  FL fluoro images no charge    Result Date: 3/31/2025  These images are not reportable by radiology and will not be interpreted by  Radiologists.    XR hip left with pelvis when performed 2 or 3 views    Result Date: 3/31/2025  * * *Final Report* * * DATE OF EXAM: Mar 31 2025  2:15AM   [facility]   5351  -  XR HIP 3V PELV+ AP/LAT LT  / ACCESSION #  [id number] PROCEDURE REASON: Hip pain, acute, fx suspected, initial exam      * * * * Physician Interpretation * * * *  HISTORY: abnormal finding on CT Hip pain, acute, fx suspected, initial exam Hip pain, acute, fx suspected, initial exam RESULT: Frontal view of the pelvis and frontal and crosstable lateral views of the left hip, compared to CT on the same day and conventional radiographs on 08/14/2024. Post bilateral hemiarthroplasty of the hips.  The left acetabular cup and femoral prosthesis are dislocated posteriorly and superiorly with respect to the acetabulum.  There is generalized osteopenia.  Significant vascular calcifications are seen in the soft tissues.    IMPRESSION: Dislocated left hip hemiarthroplasty.  Please see concurrent CT report for additional information. : SMITH   Transcribe Date/Time: Mar 31 2025  2:42A Dictated by : JORJE DICK MD This examination was interpreted and the report reviewed and electronically signed by: JORJE DICK MD on Mar 31 2025  2:46AM  EST    CT abdomen pelvis w IV contrast    Result Date: 3/31/2025  * * *Final Report* * * DATE OF EXAM: Mar 31 2025 12:18AM   [facility]   0530  -  CT ABD/PEL W IVCON  / ACCESSION #  [id number] PROCEDURE REASON: Abdominal pain, acute, nonlocalized      * * * * Physician Interpretation * * * *  EXAMINATION:  CT ABDOMEN AND PELVIS WITH IV CONTRAST CLINICAL HISTORY:  Abdominal pain TECHNIQUE: CT of the abdomen and pelvis was performed using standard technique, scanning from just above the dome of the diaphragm to the symphysis pubis. MQ:  CTAP_3 Contrast: IV:  100 ml of Omnipaque 350 : ml of CT Radiation dose: Integrated Dose-length product (DLP) for this visit =   623 mGy*cm. CT Dose Reduction Employed: Automated exposure control(AEC) and iterative recon COMPARISON: 08/14/2024. RESULT: Coronary artery calcifications.  Suprarenal IVC filter.  Colonic diverticulosis.  Mild rectal stool burden.  Aortoiliac calcifications.   Punctate right nephrolithiasis.  No hydronephrosis.  Mild urinary bladder wall thickening.  Posterior dislocated left hip hemiarthroplasty.  Within the left hip joint, and posterior to the arthroplasty there is large air-fluid collection 11 x 6 x 13 cm.  Right hip hemiarthroplasty present.  Sacral decubitus ulceration with large open defect extending to the sacrum, coccyx no evidence of osteolysis or periosteal reaction.  There is mild sclerosis of the coccyx.    IMPRESSION: 1.  Posterior superior dislocation left hip hemiarthroplasty.  Large left hip and surrounding soft tissue air-fluid collection can represent abscess. 2.  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 : [facility]   Transcribe Date/Time: Mar 31 2025  1:24A Dictated by : BRENT POLK MD This examination was interpreted and the report reviewed and electronically signed by: BRENT POLK MD on Mar 31 2025  1:37AM  EST    XR chest 1 view    Result Date: 3/30/2025  * * *Final Report* * * DATE OF EXAM: Mar 30 2025 10:23PM   [facility]   5376  -  XR CHEST 1V FRONTAL PORT  / ACCESSION #  [id number] PROCEDURE REASON: Cough      * * * * Physician Interpretation * * * *  EXAMINATION:  CHEST RADIOGRAPH (PORTABLE SINGLE VIEW AP) Exam Date/Time:  3/30/2025 10:23 PM CLINICAL HISTORY: Cough MQ:  XCPR_5 Comparison:  10/20/2024 RESULT: Lines, tubes,  and devices:  RIGHT tunneled dialysis catheter tip projects at the superior cavoatrial junction. Lungs and pleura:  No focal airspace opacity, pleural effusion, or pneumothorax. Cardiomediastinal silhouette:  Nonenlarged cardiac silhouette.  Unchanged calcified aortopulmonary and LEFT hilar lymph nodes indicative of prior granulomatous disease. Other:  No acute osseous findings.    IMPRESSION: No acute findings. : SMITH   Transcribe Date/Time: Mar 30 2025 11:22P Dictated by : GINNY TREVINO MD This examination was interpreted and the report reviewed and electronically signed by: GINNY TREVINO MD on Mar 30 2025 11:23PM  EST    XR hip left with pelvis when performed 2 or 3 views    Result Date: 3/28/2025  Interpreted By:  Jesenia Dawson, STUDY: XR HIP LEFT WITH PELVIS WHEN PERFORMED 2 OR 3 VIEWS; 3/28/2025 1:52 pm   INDICATION: Signs/Symptoms:left hip fracture.   ACCESSION NUMBER(S): [id number]   ORDERING CLINICIAN: JESENIA DASWON   FINDINGS: AP lateral x-rays of the left hip show a hemiarthroplasty. Cement mantle and stem are in good position without any evidence of failure. Hip is nicely located within the acetabulum. There is significant calcification of the vasculature. There is surgical staples present laterally. There is degenerative changes of the pubic symphysis and SI joint on the left. No fractures are visualized.     Signed by: Jesenia Dawson 3/28/2025 2:39 PM Dictation workstation:   [id number]                     Assessment/Plan   Tyshawn Coles is a 84 y.o. male with past medical history of ESRD on hemodialysis Tuesday Thursday Saturday, hypertension, hyperlipidemia, type 2 diabetes mellitus, dementia who is minimally responsive at baseline and just groans and moans presented from outside hospital for concern for posteriorly dislocated hip along with concern for possible abscess.  Patient was started on vancomycin and Zosyn due to fever and concern for possible infection.  Hip  does not seem to be primary source of infection as per orthopedic surgery but waiting on trying to localize a source.  He went for hip reduction with Dr. Dumont and tolerated the procedure well.  Assessment & Plan  Abscess of hip    Hip dislocation, left, initial encounter (Multi)      Recent left hip replacement now with posterior superior dislocation of the left hip hemiarthroplasty with surrounding fluid collection concerning for abscess versus reactive  Continue with empiric coverage of vancomycin and Zosyn  Orthopedic surgery reduced hip under general anesthesia  Weightbearing as tolerated for transfers related purposes only  Will hold off on aspiration for now  Follow-up blood culture results  Palliative care consulted     Leukocytosis-slightly improving  ID consult is noted  Could be from the dialysis catheter or decubitus ulcer  Follow-up blood culture results and trend WBC count  Continue with vancomycin and Zosyn    Decubitus ulcer  Wound care is consulted  Continue with supportive care and frequent turning  Follow-up culture results     ESRD on hemodialysis Tuesday Thursday Saturday  Nephrology consulted  Renally adjust medication using pharmacy's help  Continue with Renvela, Nephrocaps     Hypertension  Continue metoprolol     Dementia  Continue with the Aricept  Continue with Keppra  Continue with melatonin     Type 2 diabetes mellitus  Accu-Chek with sliding scale insulin     BPH  Continue with finasteride  Follow-up urine culture results     Plan discussed with patient and primary nurse at bedside at bedside    High level of MDM based on above issue and discussing plan    This note is created using voice recognition software. All efforts are made to minimize errors, if there are errors there due to transcription.    Paul Guevara  Hospitalist

## 2025-04-01 NOTE — PROGRESS NOTES
"Tyshawn Coles is a 84 y.o. male on day 1 of admission presenting with Abscess of hip.    Subjective   Mr Coles rouses from sleep easily to name.  He denies pain.  His only complaint is being cold.       Objective     Physical Exam  Vitals reviewed.   HENT:      Head: Normocephalic.      Mouth/Throat:      Mouth: Mucous membranes are moist.      Pharynx: Oropharynx is clear.   Eyes:      Extraocular Movements: Extraocular movements intact.   Cardiovascular:      Rate and Rhythm: Normal rate.   Pulmonary:      Effort: Pulmonary effort is normal.   Abdominal:      Palpations: Abdomen is soft.   Musculoskeletal:      Comments: Hip abductor wedge pillow securely in place.  Left hip incision is well-approximated with Steri-Strips, no drainage or erythema noted. light touch sensation is intact, ankle dorsiflexion/plantar flexion is intact. DP 1+/2 palpable     Skin:     General: Skin is warm and dry.      Capillary Refill: Capillary refill takes less than 2 seconds.   Neurological:      Mental Status: He is alert. Mental status is at baseline.         Last Recorded Vitals  Blood pressure 113/69, pulse 75, temperature 37.4 °C (99.3 °F), temperature source Temporal, resp. rate 21, height 1.828 m (5' 11.97\"), weight 67.6 kg (149 lb), SpO2 99%.  Intake/Output last 3 Shifts:  I/O last 3 completed shifts:  In: 1145 (16.9 mL/kg) [P.O.:240; I.V.:855 (12.7 mL/kg); IV Piggyback:50]  Out: - (0 mL/kg)   Weight: 67.6 kg     Relevant Results      Scheduled medications  cholecalciferol, 1,000 Units, oral, Daily  donepezil, 22.5 mg, oral, Daily  finasteride, 5 mg, oral, Daily  insulin glargine, 5 Units, subcutaneous, Nightly  insulin lispro, 0-10 Units, subcutaneous, TID AC  levETIRAcetam, 500 mg, oral, BID  melatonin, 10 mg, oral, Nightly  metoprolol tartrate, 50 mg, oral, BID  piperacillin-tazobactam, 3.375 g, intravenous, q12h  sevelamer carbonate, 1.6 g, oral, BID  vitamin B complex-vitamin C-folic acid, 1 capsule, oral, " Daily      Continuous medications     PRN medications  PRN medications: acetaminophen **OR** acetaminophen **OR** acetaminophen, benzocaine-menthol, guaiFENesin, ipratropium-albuteroL, ondansetron **OR** ondansetron, oxyCODONE, oxyCODONE, polyethylene glycol, prochlorperazine **OR** prochlorperazine **OR** prochlorperazine, vancomycin  Results for orders placed or performed during the hospital encounter of 03/31/25 (from the past 24 hours)   Blood Culture    Specimen: Peripheral Venipuncture; Blood culture   Result Value Ref Range    Blood Culture Loaded on Instrument - Culture in progress    Lactate   Result Value Ref Range    Lactate 1.0 0.4 - 2.0 mmol/L   CBC   Result Value Ref Range    WBC 28.7 (H) 4.4 - 11.3 x10*3/uL    nRBC 0.0 0.0 - 0.0 /100 WBCs    RBC 2.68 (L) 4.50 - 5.90 x10*6/uL    Hemoglobin 7.8 (L) 13.5 - 17.5 g/dL    Hematocrit 22.2 (L) 41.0 - 52.0 %    MCV 83 80 - 100 fL    MCH 29.1 26.0 - 34.0 pg    MCHC 35.1 32.0 - 36.0 g/dL    RDW 17.3 (H) 11.5 - 14.5 %    Platelets 252 150 - 450 x10*3/uL   Basic Metabolic Panel   Result Value Ref Range    Glucose 116 (H) 74 - 99 mg/dL    Sodium 136 136 - 145 mmol/L    Potassium 4.6 3.5 - 5.3 mmol/L    Chloride 95 (L) 98 - 107 mmol/L    Bicarbonate 30 21 - 32 mmol/L    Anion Gap 16 10 - 20 mmol/L    Urea Nitrogen 48 (H) 6 - 23 mg/dL    Creatinine 6.25 (H) 0.50 - 1.30 mg/dL    eGFR 8 (L) >60 mL/min/1.73m*2    Calcium 9.0 8.6 - 10.3 mg/dL   Magnesium   Result Value Ref Range    Magnesium 2.19 1.60 - 2.40 mg/dL   Phosphorus   Result Value Ref Range    Phosphorus 3.3 2.5 - 4.9 mg/dL   Blood Culture    Specimen: Peripheral Venipuncture; Blood culture   Result Value Ref Range    Blood Culture Loaded on Instrument - Culture in progress    POCT GLUCOSE   Result Value Ref Range    POCT Glucose 119 (H) 74 - 99 mg/dL   POCT GLUCOSE   Result Value Ref Range    POCT Glucose 184 (H) 74 - 99 mg/dL   Tissue/Wound Culture/Smear    Specimen: DECUBITUS ULCER; Tissue/Biopsy   Result  Value Ref Range    Gram Stain No polymorphonuclear leukocytes seen (A)     Gram Stain (A)      (4+) Abundant Mixed Gram positive and Gram negative bacteria   Urinalysis with Reflex Culture and Microscopic   Result Value Ref Range    Color, Urine Yellow Straw, Yellow    Appearance, Urine Clear Clear    Specific Gravity, Urine 1.015 1.005 - 1.035    pH, Urine 7.0 5.0, 5.5, 6.0, 6.5, 7.0, 7.5, 8.0    Protein, Urine 100 (2+) (A) NEGATIVE, 10 (TRACE) mg/dL    Glucose, Urine 1000 (4+) (A) NEGATIVE mg/dL    Blood, Urine 0.06 (1+) (A) NEGATIVE mg/dL    Ketones, Urine NEGATIVE NEGATIVE mg/dL    Bilirubin, Urine NEGATIVE NEGATIVE mg/dL    Urobilinogen, Urine NORM NORM mg/dL    Nitrite, Urine NEGATIVE NEGATIVE    Leukocyte Esterase, Urine 250 Sadie/uL (A) NEGATIVE   Extra Urine Gray Tube   Result Value Ref Range    Extra Tube Hold for add-ons.    Microscopic Only, Urine   Result Value Ref Range    WBC, Urine 1-5 1-5, NONE /HPF    RBC, Urine 1-2 NONE, 1-2, 3-5 /HPF    Squamous Epithelial Cells, Urine 1-9 (SPARSE) Reference range not established. /HPF   POCT GLUCOSE   Result Value Ref Range    POCT Glucose 189 (H) 74 - 99 mg/dL   Basic metabolic panel   Result Value Ref Range    Glucose 144 (H) 74 - 99 mg/dL    Sodium 137 136 - 145 mmol/L    Potassium 4.8 3.5 - 5.3 mmol/L    Chloride 98 98 - 107 mmol/L    Bicarbonate 26 21 - 32 mmol/L    Anion Gap 18 10 - 20 mmol/L    Urea Nitrogen 62 (H) 6 - 23 mg/dL    Creatinine 7.20 (H) 0.50 - 1.30 mg/dL    eGFR 7 (L) >60 mL/min/1.73m*2    Calcium 8.6 8.6 - 10.3 mg/dL   CBC and Auto Differential   Result Value Ref Range    WBC 27.5 (H) 4.4 - 11.3 x10*3/uL    nRBC 0.0 0.0 - 0.0 /100 WBCs    RBC 2.56 (L) 4.50 - 5.90 x10*6/uL    Hemoglobin 7.4 (L) 13.5 - 17.5 g/dL    Hematocrit 21.1 (L) 41.0 - 52.0 %    MCV 82 80 - 100 fL    MCH 28.9 26.0 - 34.0 pg    MCHC 35.1 32.0 - 36.0 g/dL    RDW 17.9 (H) 11.5 - 14.5 %    Platelets 227 150 - 450 x10*3/uL    Neutrophils % 85.1 40.0 - 80.0 %    Immature  Granulocytes %, Automated 0.8 0.0 - 0.9 %    Lymphocytes % 6.9 13.0 - 44.0 %    Monocytes % 6.9 2.0 - 10.0 %    Eosinophils % 0.2 0.0 - 6.0 %    Basophils % 0.1 0.0 - 2.0 %    Neutrophils Absolute 23.44 (H) 1.60 - 5.50 x10*3/uL    Immature Granulocytes Absolute, Automated 0.21 0.00 - 0.50 x10*3/uL    Lymphocytes Absolute 1.90 0.80 - 3.00 x10*3/uL    Monocytes Absolute 1.89 (H) 0.05 - 0.80 x10*3/uL    Eosinophils Absolute 0.05 0.00 - 0.40 x10*3/uL    Basophils Absolute 0.04 0.00 - 0.10 x10*3/uL   Magnesium   Result Value Ref Range    Magnesium 2.36 1.60 - 2.40 mg/dL   POCT GLUCOSE   Result Value Ref Range    POCT Glucose 168 (H) 74 - 99 mg/dL           FL fluoro images no charge    Result Date: 3/31/2025  These images are not reportable by radiology and will not be interpreted by  Radiologists.    XR hip left with pelvis when performed 2 or 3 views    Result Date: 3/31/2025  * * *Final Report* * * DATE OF EXAM: Mar 31 2025  2:15AM   VHX   5351  -  XR HIP 3V PELV+ AP/LAT LT  / ACCESSION #  [id number] PROCEDURE REASON: Hip pain, acute, fx suspected, initial exam      * * * * Physician Interpretation * * * *  HISTORY: abnormal finding on CT Hip pain, acute, fx suspected, initial exam Hip pain, acute, fx suspected, initial exam RESULT: Frontal view of the pelvis and frontal and crosstable lateral views of the left hip, compared to CT on the same day and conventional radiographs on 08/14/2024. Post bilateral hemiarthroplasty of the hips.  The left acetabular cup and femoral prosthesis are dislocated posteriorly and superiorly with respect to the acetabulum.  There is generalized osteopenia.  Significant vascular calcifications are seen in the soft tissues.    IMPRESSION: Dislocated left hip hemiarthroplasty.  Please see concurrent CT report for additional information. : SMITH   Transcribe Date/Time: Mar 31 2025  2:42A Dictated by : JORJE DICK MD This examination was interpreted and the report reviewed  and electronically signed by: JORJE DICK MD on Mar 31 2025  2:46AM  EST    CT abdomen pelvis w IV contrast    Result Date: 3/31/2025  * * *Final Report* * * DATE OF EXAM: Mar 31 2025 12:18AM   [facility]   0530  -  CT ABD/PEL W IVCON  / ACCESSION #  [id number] PROCEDURE REASON: Abdominal pain, acute, nonlocalized      * * * * Physician Interpretation * * * *  EXAMINATION:  CT ABDOMEN AND PELVIS WITH IV CONTRAST CLINICAL HISTORY: Abdominal pain TECHNIQUE: CT of the abdomen and pelvis was performed using standard technique, scanning from just above the dome of the diaphragm to the symphysis pubis. MQ:  CTAP_3 Contrast: IV:  100 ml of Omnipaque 350 : ml of CT Radiation dose: Integrated Dose-length product (DLP) for this visit =   623 mGy*cm. CT Dose Reduction Employed: Automated exposure control(AEC) and iterative recon COMPARISON: 08/14/2024. RESULT: Coronary artery calcifications.  Suprarenal IVC filter.  Colonic diverticulosis.  Mild rectal stool burden.  Aortoiliac calcifications.   Punctate right nephrolithiasis.  No hydronephrosis.  Mild urinary bladder wall thickening.  Posterior dislocated left hip hemiarthroplasty.  Within the left hip joint, and posterior to the arthroplasty there is large air-fluid collection 11 x 6 x 13 cm.  Right hip hemiarthroplasty present.  Sacral decubitus ulceration with large open defect extending to the sacrum, coccyx no evidence of osteolysis or periosteal reaction.  There is mild sclerosis of the coccyx.    IMPRESSION: 1.  Posterior superior dislocation left hip hemiarthroplasty.  Large left hip and surrounding soft tissue air-fluid collection can represent abscess. 2.  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 : PSCB   Transcribe Date/Time: Mar 31 2025  1:24A Dictated by : BRENT POLK MD This examination was interpreted and the report reviewed and electronically signed by: BRENT POLK MD on Mar 31 2025   1:37AM  EST    XR chest 1 view    Result Date: 3/30/2025  * * *Final Report* * * DATE OF EXAM: Mar 30 2025 10:23PM   VHX   5376  -  XR CHEST 1V FRONTAL PORT  / ACCESSION #  [id number] PROCEDURE REASON: Cough      * * * * Physician Interpretation * * * *  EXAMINATION:  CHEST RADIOGRAPH (PORTABLE SINGLE VIEW AP) Exam Date/Time:  3/30/2025 10:23 PM CLINICAL HISTORY: Cough MQ:  XCPR_5 Comparison:  10/20/2024 RESULT: Lines, tubes, and devices:  RIGHT tunneled dialysis catheter tip projects at the superior cavoatrial junction. Lungs and pleura:  No focal airspace opacity, pleural effusion, or pneumothorax. Cardiomediastinal silhouette:  Nonenlarged cardiac silhouette.  Unchanged calcified aortopulmonary and LEFT hilar lymph nodes indicative of prior granulomatous disease. Other:  No acute osseous findings.    IMPRESSION: No acute findings. : SMITH   Transcribe Date/Time: Mar 30 2025 11:22P Dictated by : GINNY TREVINO MD This examination was interpreted and the report reviewed and electronically signed by: GINNY TREVINO MD on Mar 30 2025 11:23PM  EST    XR hip left with pelvis when performed 2 or 3 views    Result Date: 3/28/2025  Interpreted By:  Marcel Dawson, STUDY: XR HIP LEFT WITH PELVIS WHEN PERFORMED 2 OR 3 VIEWS; 3/28/2025 1:52 pm   INDICATION: Signs/Symptoms:left hip fracture.   ACCESSION NUMBER(S): [id number]   ORDERING CLINICIAN: MARCEL DAWSON   FINDINGS: AP lateral x-rays of the left hip show a hemiarthroplasty. Cement mantle and stem are in good position without any evidence of failure. Hip is nicely located within the acetabulum. There is significant calcification of the vasculature. There is surgical staples present laterally. There is degenerative changes of the pubic symphysis and SI joint on the left. No fractures are visualized.     Signed by: Marcel Dawson 3/28/2025 2:39 PM Dictation workstation:   [id number]    ECG 12 lead    Result Date: 3/13/2025  Normal sinus rhythm  Normal ECG When compared with ECG of 05-MAR-2025 15:33, Premature atrial complexes are no longer Present T wave amplitude has decreased in Lateral leads Confirmed by Filippo Cope ([id number]) on 3/13/2025 2:12:06 PM    XR pelvis 1-2 views    Result Date: 3/10/2025  Interpreted By:  Matthieu Peters, STUDY: XR PELVIS 1-2 VIEWS; ;  3/10/2025 5:52 pm   INDICATION: Signs/Symptoms:post surgery film in PACU.     COMPARISON: 03/05/2025   ACCESSION NUMBER(S): [id number]   ORDERING CLINICIAN: BETY CALIX   FINDINGS: Pelvis, single view   Interval left hip hemiarthroplasty. No periprosthetic fracture lucency seen on this view. Right hip hemiarthroplasty noted as well.   Study limited by severe osteopenia.       Interval left hip hemiarthroplasty without acute abnormality.     MACRO: None   Signed by: Matthieu Peters 3/10/2025 6:22 PM Dictation workstation:   [id number]    Transthoracic Echo (TTE) Complete    Result Date: 3/10/2025            [facility] [address] [address]    Tel [phone] Fax [phone] TRANSTHORACIC ECHOCARDIOGRAM REPORT Patient Name:       EVELYN HOU         Reading Physician:    [id number] Kelvin Barrientos MD Study Date:         3/10/2025            Ordering Provider:    [id number] KAYE TAFOYA MRN/PID:            [id number]             Fellow: Accession#:         [id number]         Nurse: Date of Birth/Age:  1940 / 84 years Sonographer:          Rani Quintanilla                                                                [facility] Gender Assigned at  M                    Additional Staff: Birth: Height:             172.72 cm            Admit Date: Weight:             70.31 kg             Admission Status:     Inpatient -                                                                Routine BSA / BMI:          1.83 m2 / 23.57      Department  Location:  [facility] Echo Lab                     kg/m2 Blood Pressure: 141 /74 mmHg Study Type:    TRANSTHORACIC ECHO (TTE) COMPLETE Diagnosis/ICD: Unspecified atrial fibrillation-I48.91 Indication:    Afib, cardiac clearance CPT Codes:     Echo Complete w Full Doppler-14683  Study Detail: The following Echo studies were performed: 2D, M-Mode, Doppler and               color flow. Technically challenging study due to patient lying in               supine position.  PHYSICIAN INTERPRETATION: Left Ventricle: Left ventricular ejection fraction is normal, calculated by Bailey's biplane at 65%. There are no regional wall motion abnormalities. The left ventricular cavity size is normal. There is normal septal and normal posterior left ventricular wall thickness. There is left ventricular concentric remodeling. Spectral Doppler shows a Grade I (impaired relaxation pattern) of left ventricular diastolic filling with normal left atrial filling pressure. Left Atrium: The left atrial size is normal. Right Ventricle: The right ventricle is normal in size. There is normal right ventricular global systolic function. Right Atrium: The right atrial size is normal. Aortic Valve: The aortic valve is trileaflet. There is evidence of mildly elevated transaortic gradients consistent with sclerosis of the aortic valve. There is no evidence of aortic valve regurgitation. The peak instantaneous gradient of the aortic valve is 12 mmHg. The mean gradient of the aortic valve is 7 mmHg. Mitral Valve: The mitral valve is normal in structure. There is trace mitral valve regurgitation. Tricuspid Valve: The tricuspid valve is structurally normal. There is mild tricuspid regurgitation. The Doppler estimated RVSP is mildly elevated right ventricular systolic pressure at 42.7 mmHg. Pulmonic Valve: The pulmonic valve is structurally normal. There is no indication of pulmonic valve regurgitation. Pericardium: No pericardial effusion noted. Aorta: The  aortic root is normal.  CONCLUSIONS:  1. Left ventricular ejection fraction is normal, calculated by Bailey's biplane at 65%.  2. Spectral Doppler shows a Grade I (impaired relaxation pattern) of left ventricular diastolic filling with normal left atrial filling pressure.  3. There is normal right ventricular global systolic function.  4. Mildly elevated right ventricular systolic pressure.  5. Aortic valve sclerosis. QUANTITATIVE DATA SUMMARY:  2D MEASUREMENTS:             Normal Ranges: LAs:             2.18 cm     (2.7-4.0cm) IVSd:            0.88 cm     (0.6-1.1cm) LVPWd:           0.87 cm     (0.6-1.1cm) LVIDd:           3.80 cm     (3.9-5.9cm) LVIDs:           2.93 cm LV Mass Index:   53.3 g/m2 LVEDV Index:     35.41 ml/m2 LV % FS          23.0 %  LEFT ATRIUM:                 Normal Ranges: LA Area A4C:      10.0 cm2 LA Area A2C:      17.0 cm2 LA Volume Index:  17.0 ml/m2 LA Vol A4C:       15.8 ml LA Vol A2C:       38.2 ml LA Vol Index BSA: 14.7 ml/m2  M-MODE MEASUREMENTS:         Normal Ranges: Ao Root:             3.50 cm (2.0-3.7cm) AoV Exc:             1.76 cm (1.5-2.5cm)  AORTA MEASUREMENTS:         Normal Ranges: AoV Exc:            1.76 cm (1.5-2.5cm) Ao Sinus, d:        3.80 cm (2.1-3.5cm) Ao STJ, d:          3.00 cm (1.7-3.4cm) Asc Ao, d:          3.50 cm (2.1-3.4cm)  LV SYSTOLIC FUNCTION:                      Normal Ranges: EF-A4C View:    60 % (>=55%) EF-A2C View:    69 % EF-Biplane:     65 % LV EF Reported: 65 %  LV DIASTOLIC FUNCTION:           Normal Ranges: MV Peak E:             1.01 m/s  (0.7-1.2 m/s) MV Peak A:             1.12 m/s  (0.42-0.7 m/s) E/A Ratio:             0.90      (1.0-2.2) MV e'                  0.085 m/s (>8.0) MV lateral e'          0.09 m/s MV medial e'           0.08 m/s E/e' Ratio:            11.90     (<8.0)  MITRAL VALVE:          Normal Ranges: MV DT:        211 msec (150-240msec)  AORTIC VALVE:               Normal Ranges: AoV Vmax:         1.70 m/s  (<=1.7m/s) AoV  Peak P.5 mmHg (<20mmHg) AoV Mean P.6 mmHg  (1.7-11.5mmHg) AoV VTI:          39.43 cm  (18-25cm) LVOT Diameter:    2.31 cm   (1.8-2.4cm) AoV Area, planim: 1.34 cm2  (2.5-4.5cm2)  RIGHT VENTRICLE: RV Basal 3.30 cm RV Mid   2.10 cm RV Major 7.0 cm TAPSE:   17.0 mm RV s'    0.11 m/s  TRICUSPID VALVE/RVSP:          Normal Ranges: Peak TR Velocity:     3.15 m/s RV Syst Pressure:     43 mmHg  (< 30mmHg) IVC Diam:             1.71 cm  AORTA: Asc Ao Diam 3.49 cm  [id number] Kelvin Barrientos MD Electronically signed on 3/10/2025 at 12:07:16 PM  ** Final **     CT head wo IV contrast    Result Date: 3/9/2025  Interpreted By:  Annemarie Grayson, STUDY: CT HEAD WO IV CONTRAST;  3/9/2025 7:42 am   INDICATION: Signs/Symptoms:? right frontal SDH.   COMPARISON: None.   ACCESSION NUMBER(S): [id number]   ORDERING CLINICIAN: NAN NUNEZ   TECHNIQUE: Noncontrast axial CT scan of head was performed. Angled reformats in brain and bone windows were generated. The images were reviewed in bone, brain, blood and soft tissue windows.   FINDINGS: CSF Spaces: Moderate brain atrophy evidence by prominence of ventricles, sulci and cisterns. There is no extraaxial fluid collection.   Parenchyma: Confluent areas of subcortical and periventricular white matter changes which given patient's age are suggestive of chronic small vessel ischemic disease. Benign calcification of bilateral basal ganglia similar to prior. The grey-white differentiation is intact. There is no mass effect or midline shift.  There is no intracranial hemorrhage.   Calvarium: The calvarium is unremarkable. Benign calcified scalp nodules similar to prior.   Paranasal sinuses and mastoids: Partial opacification of bilateral mastoid air cells and mucosal wall thickening of the sphenoid sinus similar to prior. Partial opacification of the left maxillary sinus again noted. This is also stable.       No CT evidence for sizeable subdural hematoma in the current study. No  evidence of acute cortical infarct or intracranial hemorrhage.   MACRO: None   Signed by: Annemarie Grayson 3/9/2025 7:52 AM Dictation workstation:   [id number]    XR knee left 1-2 views    Result Date: 3/9/2025  STUDY: Knee Radiographs; 3/9/2025 3:19 AM INDICATION: Pain after a fall. COMPARISON: None Available. ACCESSION NUMBER(S): [id number] ORDERING CLINICIAN: ELVIS EMERY TECHNIQUE:  Two view(s) of the left knee. FINDINGS:  There is no displaced fracture.  The alignment is anatomic.  No soft tissue abnormality is seen.  Extensive vascular calcification noted. There is no joint effusion.    No acute displaced fracture or dislocation is evident on these images.  The fibula is not fully imaged. Signed by Emanuel Lopez MD    CT hip left wo IV contrast    Result Date: 3/9/2025  STUDY: CT Hip without IV Contrast; 03/09/2025 12:56AM INDICATION: Left femoral neck fracture. COMPARISON: CT Pelvis with bilateral Hip 01/30/2020. ACCESSION NUMBER(S): [id number] ORDERING CLINICIAN: ELVIS EMERY TECHNIQUE:  Thin section axial images were obtained through the left hip without intravenous contrast.  Orthogonal reconstructed images were obtained and reviewed.  2143 DICOM images received. Automated mA/kV exposure control was utilized and patient examination was performed in strict accordance with principles of ALARA. FINDINGS: Left Hip: Generalized osseous demineralization is noted.  There is an acute, traumatic fracture of the left femoral neck with impaction. Visualized soft tissues in the included left lower pelvis reveal no acute pathology.  Moderate age-related osteoarthritic changes are seen in the left sacroiliac joint.  Peripheral vascular calcifications are noted.    Acute, traumatic, impacted fracture of the left femoral neck. Pronounced generalized osseous demineralization. Signed by Timothy Persaud    XR chest 1 view    Result Date: 3/9/2025  STUDY: Chest Radiograph;  03/09/2025   12:49AM INDICATION: Pre-op left  femoral neck fracture COMPARISON: Chest, single portable view obtained on 08/17/2021 at 10:07 hours. ACCESSION NUMBER(S): [id number] ORDERING CLINICIAN: BARBER ALVES TECHNIQUE:  Frontal chest was obtained at 00:49 hours. FINDINGS: Right central venous line is seen, its tip is at the lower SVC/right atrium. CARDIOMEDIASTINAL SILHOUETTE: Cardiomediastinal silhouette is normal in size and configuration.  LUNGS: Lungs are clear.  ABDOMEN: No remarkable upper abdominal findings.  BONES: No acute osseous changes.    1.No acute pulmonary pathology. 2.Right central venous line. Signed by Tomas Funez MD    CT head wo IV contrast    Result Date: 3/9/2025  Interpreted By:  Fara Browne, STUDY: CT HEAD WO IV CONTRAST; CT CERVICAL SPINE WO IV CONTRAST;  3/9/2025 1:02 am   INDICATION: Signs/Symptoms:fall.   COMPARISON: CT head and cervical spine 03/05/2025, CT head 03/30/2024   ACCESSION NUMBER(S): [id number]; [id number]   ORDERING CLINICIAN: BARBER ALVES   TECHNIQUE: Axial noncontrast images of the head  with coronal  and sagittal reconstructed images . Axial noncontrast images of the cervical spine with coronal and sagittal reconstructed images.   FINDINGS: There is motion artifact. BRAIN PARENCHYMA: There are periventricular white matter hypodensities, probably representing chronic microvascular ischemic changes. Otherwise, the gray-white matter interfaces are preserved. No acute territorial infarct, mass effect or midline shift. HEMORRHAGE: No acute intracranial hemorrhage. VENTRICLES and EXTRA-AXIAL SPACES: Prominent, consistent with diffuse parenchymal volume loss. There is trace amount of extra-axial hypodense fluid overlying the right frontal region measuring 3 mm in thickness. EXTRACRANIAL SOFT TISSUES: Extensive vascular calcifications are noted at the extracranial soft tissues. CALVARIUM: No depressed calvarial fracture. PARANASAL SINUSES: There is polypoid mucosal thickening with calcifications at the left  maxillary sinus. There is polypoid mucosal thickening at the right sphenoid sinus. MASTOIDS: There is partial fluid opacification of the bilateral mastoid air cells.   CERVICAL SPINE: Evaluation degraded by motion artifact. ALIGNMENT: Redemonstration of straightening of the cervical lordosis. No traumatic facet widening is identified. VERTEBRAE: The evaluation for acute fracture is degraded by motion artifact. No definite evidence for acute displaced fracture is identified. DISCS: There is multilevel degenerative disc disease with osteophytosis. There is multilevel facet arthropathy. PREVERTEBRAL SOFT TISSUES: No prevertebral soft tissue swelling. LUNG APICES: No acute findings at the visualized lung apices. Partially visualized right-sided central catheter.       1.  No acute intracranial hemorrhage or depressed calvarial fracture. 2. Trace amount of extra-axial hypodense fluid overlying the right frontal region, suggestive of chronic subdural hematoma. 3. Partial fluid opacification of the bilateral mastoid air cells. 4. The evaluation of the cervical spine is degraded by motion artifact. Degenerative changes. No definite evidence for acute displaced fracture of the cervical spine; if there is persistent clinical concern, a repeat CT can be obtained when the patient will be able to cooperate with positioning.       MACRO: None.   Signed by: Fara Browne 3/9/2025 1:24 AM Dictation workstation:   [id number]    CT cervical spine wo IV contrast    Result Date: 3/9/2025  Interpreted By:  Fara Browne, STUDY: CT HEAD WO IV CONTRAST; CT CERVICAL SPINE WO IV CONTRAST;  3/9/2025 1:02 am   INDICATION: Signs/Symptoms:fall.   COMPARISON: CT head and cervical spine 03/05/2025, CT head 03/30/2024   ACCESSION NUMBER(S): [id number]; [id number]   ORDERING CLINICIAN: BARBER ALVES   TECHNIQUE: Axial noncontrast images of the head  with coronal  and sagittal reconstructed images . Axial noncontrast images of the cervical  spine with coronal and sagittal reconstructed images.   FINDINGS: There is motion artifact. BRAIN PARENCHYMA: There are periventricular white matter hypodensities, probably representing chronic microvascular ischemic changes. Otherwise, the gray-white matter interfaces are preserved. No acute territorial infarct, mass effect or midline shift. HEMORRHAGE: No acute intracranial hemorrhage. VENTRICLES and EXTRA-AXIAL SPACES: Prominent, consistent with diffuse parenchymal volume loss. There is trace amount of extra-axial hypodense fluid overlying the right frontal region measuring 3 mm in thickness. EXTRACRANIAL SOFT TISSUES: Extensive vascular calcifications are noted at the extracranial soft tissues. CALVARIUM: No depressed calvarial fracture. PARANASAL SINUSES: There is polypoid mucosal thickening with calcifications at the left maxillary sinus. There is polypoid mucosal thickening at the right sphenoid sinus. MASTOIDS: There is partial fluid opacification of the bilateral mastoid air cells.   CERVICAL SPINE: Evaluation degraded by motion artifact. ALIGNMENT: Redemonstration of straightening of the cervical lordosis. No traumatic facet widening is identified. VERTEBRAE: The evaluation for acute fracture is degraded by motion artifact. No definite evidence for acute displaced fracture is identified. DISCS: There is multilevel degenerative disc disease with osteophytosis. There is multilevel facet arthropathy. PREVERTEBRAL SOFT TISSUES: No prevertebral soft tissue swelling. LUNG APICES: No acute findings at the visualized lung apices. Partially visualized right-sided central catheter.       1.  No acute intracranial hemorrhage or depressed calvarial fracture. 2. Trace amount of extra-axial hypodense fluid overlying the right frontal region, suggestive of chronic subdural hematoma. 3. Partial fluid opacification of the bilateral mastoid air cells. 4. The evaluation of the cervical spine is degraded by motion artifact.  Degenerative changes. No definite evidence for acute displaced fracture of the cervical spine; if there is persistent clinical concern, a repeat CT can be obtained when the patient will be able to cooperate with positioning.       MACRO: None.   Signed by: Fara Browne 3/9/2025 1:24 AM Dictation workstation:   [id number]    ECG 12 lead    Result Date: 3/8/2025  Sinus bradycardia with Premature atrial complexes Otherwise normal ECG When compared with ECG of 28-MAR-2024 00:43, Premature atrial complexes are now Present Confirmed by Ti Curiel ([id number]) on 3/8/2025 3:41:27 PM    IR CVC tunneled    Result Date: 3/7/2025  Interpreted By:  Schoenberger, Joseph, STUDY: IR CVC TUNNELED; ;  3/7/2025 12:48 pm   INDICATION: Signs/Symptoms:no bruit/no pulse. Hyperkalemia. Altered mental status.   ,T82.868A Thrombosis due to vascular prosthetic devices, implants and grafts, initial encounter (CMS-[facility])   COMPARISON: None.   ACCESSION NUMBER(S): [id number]   ORDERING CLINICIAN: MADISON TOLENTINO   TECHNIQUE: The base of the right neck and right upper chest wall were sterilely prepped and draped. Local anesthesia was administered over the internal jugular vein. This was is sonographicaly patent and compressible. This was accessed under direct sonographic visualization using the Seldinger technique. The tip of the needle was verified sonographically in the lumen of the vein. Using fluoroscopy guidance, a guidewire was advanced into the right atrium. An appropriate subclavicular tunnel exit was localized. Local anesthetic was administered at this site and small incisions were made at this site and at the needle insertion site. The subcutaneous tissues between the 2 incisions were then anesthetized. A 15.5 F 28 cm. total length Arrow VectorFlow chronic hemodialysis catheter was pulled through the tunnel exit to the jugular access site in the usual manner. At this point, a guidewire was advanced through the indwelling jugular  catheter into the right atrium. Serial dilations remained over this guidewire. The introducer for the tunnel catheter was then advanced over the guidewire. With the patient in suspended expiration, the tunneled catheter was advanced through the introducer and positioned such that its distal tip was in the superior vena cava and retention cuff was 5 millimeters deep to the tunnel exit. Each catheter port freely aspirated and flushed and was primed with 1000 units heparin per 1 cc saline isovolumetric to the catheter lumen. The incision at the base of the neck was closed using a single subcuticular 4-0 Vicryl suture. Catheter was anchored below the tunnel with a 2-0 Prolene suture. The catheter is then dressed appropriately. Patient tolerated the procedure well without immediate complication.   FINDINGS: Catheter terminates in the upper right atrium. Is ready to use for hemodialysis.       Due to patient's hyperkalemia, altered mental status, lack of dialysis for 6 days, and intermittent is spasms, it is elected to place a dialysis catheter in calf 2 treatments of dialysis before attempting grafts salvage.   Successful placement of a cuffed, tunneled hemodialysis catheter. The catheter is ready to use for hemodialysis.   Patient is scheduled for attempted access salvage of his left upper extremity AV graft.     MACRO: Joseph Schoenberger discussed the significance and urgency of this critical finding by telephone with  MADISON TOLENTINO on 3/7/2025 at 2:27 pm.  (**-RCF-**) Findings:  See findings.   Signed by: Joseph Schoenberger 3/7/2025 2:29 PM Dictation workstation:   [id number]    XR chest 1 view    Result Date: 3/5/2025  Interpreted By:  Dao Garay, STUDY: XR CHEST 1 VIEW;  3/5/2025 12:42 pm   INDICATION: Signs/Symptoms:Fall on Eliquis.   COMPARISON: 03/27/2024   ACCESSION NUMBER(S): [id number]   ORDERING CLINICIAN: RHEA WILDER   FINDINGS: No consolidation. No pleural effusion or pneumothorax. Low lung  volumes and vascular crowding. Normal heart size. No acute osseous abnormality.       Low lung volumes with vascular crowding. No acute cardiopulmonary abnormality.   Signed by: Dao Garay 3/5/2025 12:53 PM Dictation workstation:   [id number]    XR pelvis 1-2 views    Result Date: 3/5/2025  Interpreted By:  Dao Garay, STUDY: XR PELVIS 1-2 VIEWS; ;  3/5/2025 12:42 pm   INDICATION: Signs/Symptoms:Bilateral hip pain after fall.   COMPARISON: 04/01/2024   ACCESSION NUMBER(S): [id number]   ORDERING CLINICIAN: RHEA WILDER   FINDINGS: No acute fracture or dislocation. Total right hip arthroplasty. Chronic fracture of the lesser trochanter. No evidence of hardware fracture or abnormal perihardware lucency. Atherosclerosis. Degenerative changes in the lumbar spine.       Intact total right knee arthroplasty.   Signed by: Dao Garay 3/5/2025 12:52 PM Dictation workstation:   [id number]    CT cervical spine wo IV contrast    Result Date: 3/5/2025  Interpreted By:  Juan Meyer, STUDY: CT CERVICAL SPINE WO IV CONTRAST; 3/5/2025 12:06 pm   INDICATION: CLINICAL DATA: Signs/Symptoms:Fall on Eliquis, left forehead hematoma.   COMPARISON: 01/27/2020   ACCESSION NUMBER(S): [id number]   ORDERING CLINICIAN: RHEA WILDER   TECHNIQUE: A helical acquisition of data was obtained with multiplanar reconstructions performed.   One or more of the following dose reduction techniques were used: Automated exposure control Adjustment of the mA and/or kV according to patient size, and/or use of iterative reconstruction technique.   FINDINGS: No fractures or destructive lesions are identified. There is disc space narrowing with anterior and posterior marginal spurring from C2 through C7. There is dense ligamentum flavum calcification at C7, more marked to the left of midline.   Foraminal stenosis from uncovertebral spurring is present bilaterally at C3-4 and C4-5. Extensive vascular calcification is present.        No evidence for a fracture on this exam. Cervical spondylosis as described.   MACRO: none   Signed by: Juan Meyer 3/5/2025 12:19 PM Dictation workstation:   [id number]    CT head W O contrast trauma protocol    Result Date: 3/5/2025  Interpreted By:  Juan Meyer, STUDY: CT HEAD W/O CONTRAST TRAUMA PROTOCOL; 3/5/2025 12:06 pm   INDICATION: Signs/Symptoms:Fall on Eliquis, left forehead hematoma.   COMPARISON: 03/30/2024   ACCESSION NUMBER(S): [id number]   ORDERING CLINICIAN: RHEA WILDER   TECHNIQUE: A helical acquisition data was obtained.   One or more of the following dose reduction techniques were used: Automated exposure control Adjustment of the mA and/or kV according to patient size, and/or use of iterative reconstruction technique.   FINDINGS: Intracranial findings: There is no evidence for intracranial hemorrhage or mass effect. Age-related atrophy is present. Periventricular white matter hypodensity is present, most consistent with age-related change and/or white matter ischemic disease. Bilateral basal ganglia calcifications are present. Extensive scalp calcification is noted.   Paranasal sinuses and temporal bone findings: There is a focal masslike thickening of the posterior aspect of the left maxillary sinus with central calcification along with thickening of the adjacent sinus walls, likely secondary to chronic sinusitis., unchanged from 03/30/2024. There is mucosal thickening or fluid within scattered mastoid air cells bilaterally inferiorly. There is opacification of the right middle ear cavity which could be secondary to otitis media. There is fluid within the sphenoid sinus on the right. The remainder of the visualized portions of the paranasal sinuses, mastoid air cells, and middle ear cavities are clear.   Orbital findings: The visualized portions of the orbits are unremarkable.       No acute intracranial pathologic findings are identified. Paranasal sinusitis as above.  Opacification right middle ear cavity possibly secondary to otitis media. Please correlate with clinical evaluation. Small amount of fluid or mucosal thickening mastoid air cells bilaterally as well.   MACRO: none   Signed by: Juan Meyer 3/5/2025 12:15 PM Dictation workstation:   [id number]                  Assessment/Plan   Assessment & Plan  Abscess of hip    Hip dislocation, left, initial encounter (Multi)    POD #1 s/p left hip hemiarthroplasty reduction done under anesthesia  Continue PT/OT, WBAT left  LE, hip abductor wedge when in bed. Posterior left hip precautions  Using incentive spirometer   Reviewed am labs  Multimodal pain regimen  Surgical hip dressing to be removed on post op day #7  VTE prophylaxis: eliquis 2.5mg BID  Dispo: to be determined  Follow up with Dr. Dawson in 2 weeks         I spent 25 minutes in the professional and overall care of this patient.      Mel Yancey PA-C

## 2025-04-01 NOTE — CONSULTS
Consults  Reason for Consult:  Evaluation and management of possible hip abscess    Patient is seen at the request of Eliezer Dumont    Subjective   History of Present Illness:  Tyshawn Coels is a 84 y.o. male who presented with right hip pain.  The patient recently had a left hip replacement and then suffered a fall.  He came into [place] emergency department and was found to have a dislocated hip.  [place] [facility] [place] and on arrival had temperature of 30.2 with a white blood count of 20.7.  He was placed on vancomycin and Zosyn.  He had a CT scan of the abdomen pelvis that showed the dislocation of his left hip arthroplasty.  There was concern for a large fluid collection as well as chronic osteomyelitis of the coccyx.  He was seen by orthopedics and went for a closed reduction of his left hip.  He is currently sleeping and briefly arousable.  His wife is at the bedside and answers questions.    Past Medical History:   has a past medical history of Autonomic dysfunction, Benign prostatic hyperplasia, Chronic anemia, Coronary artery disease, Current use of long term anticoagulation, Dementia, Dyslipidemia, End-stage renal disease on hemodialysis (Multi), Frequent falls, Gout, Heart failure with preserved ejection fraction, History of anaphylaxis, History of closed head injury, History of Clostridioides difficile colitis, History of deep vein thrombosis, History of non-ST elevation myocardial infarction (NSTEMI), History of sepsis, Hypertension, Hypoxic ischemic encephalopathy (Multi), 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), Kidney stones, Multilevel degenerative disc disease, Osteoarthritis, Paroxysmal atrial fibrillation (Multi), Peripheral neuropathy, Peripheral vascular disease (CMS-HCC), Secondary hyperparathyroidism of renal origin (Multi), and Seizure disorder (Multi).    has a past surgical history that includes IR CVC tunneled (08/20/2021); IR tunneled dialysis catheter;  AV fistula placement; pacemaker placement; Total hip arthroplasty (Right); Thrombectomy; Tonsillectomy; Adenoidectomy; Cholecystectomy; and Colonoscopy.     Social History:   reports that he has quit smoking. His smoking use included cigarettes. He has never used smokeless tobacco. He reports that he does not drink alcohol and does not use drugs.     Family History:  No sick contacts    Review of Systems:  Fever, left hip pain    Allergies:  Patient has no known allergies.      Objective   Current Facility-Administered Medications   Medication Dose Route Frequency Provider Last Rate Last Admin    acetaminophen (Tylenol) tablet 650 mg  650 mg oral q6h PRN Eliezer Dumont MD        Or    acetaminophen (Tylenol) oral liquid 650 mg  650 mg oral q6h PRN Eliezer Dumont MD        Or    acetaminophen (Tylenol) suppository 650 mg  650 mg rectal q6h PRN Eliezer Dumont MD        benzocaine-menthol (Cepastat Sore Throat) lozenge 1 lozenge  1 lozenge Mouth/Throat q2h PRN Eliezer Dumont MD        cholecalciferol (Vitamin D-3) tablet 1,000 Units  1,000 Units oral Daily Eliezer Dumont MD        donepezil (Aricept) tablet 22.5 mg  22.5 mg oral Daily Eliezer Dumont MD        finasteride (Proscar) tablet 5 mg  5 mg oral Daily Eliezer Dumont MD        guaiFENesin (Mucinex) 12 hr tablet 600 mg  600 mg oral q12h PRN Eliezer Dumont MD        insulin glargine (Lantus) injection 5 Units  5 Units subcutaneous Nightly Eliezer Dumont MD   5 Units at 03/31/25 2049    insulin lispro injection 0-10 Units  0-10 Units subcutaneous TID AC Eliezer Dumont MD   2 Units at 03/31/25 1647    ipratropium-albuteroL (Duo-Neb) 0.5-2.5 mg/3 mL nebulizer solution 3 mL  3 mL nebulization q6h Eliezer Dumont MD   3 mL at 03/31/25 2021    levETIRAcetam (Keppra) tablet 500 mg  500 mg oral BID Eliezer Dumont MD   500 mg at 03/31/25 2048    melatonin tablet 10 mg  10 mg oral Nightly Eliezer Dumont MD   10 mg at 03/31/25 2049    metoprolol  tartrate (Lopressor) tablet 50 mg  50 mg oral BID Eliezer Dumont MD   50 mg at 03/31/25 2049    ondansetron (Zofran) tablet 4 mg  4 mg oral q8h PRN Eliezer Dumont MD        Or    ondansetron (Zofran) injection 4 mg  4 mg intravenous q8h PRN Eliezer Dumont MD   4 mg at 03/31/25 1403    oxyCODONE (Roxicodone) immediate release tablet 2.5 mg  2.5 mg oral q6h PRN Eliezer Dumont MD        oxyCODONE (Roxicodone) immediate release tablet 5 mg  5 mg oral q6h PRN Eliezer Dumont MD        piperacillin-tazobactam (Zosyn) 3.375 g in dextrose (iso) IV 50 mL  3.375 g intravenous q12h Eliezer Dumont MD 0 mL/hr at 03/31/25 1619 3.375 g at 03/31/25 2228    polyethylene glycol (Glycolax, Miralax) packet 17 g  17 g oral Daily PRN Eliezer Dumont MD        prochlorperazine (Compazine) tablet 10 mg  10 mg oral q6h PRN Eliezer Dumont MD        Or    prochlorperazine (Compazine) injection 10 mg  10 mg intravenous q6h PRN Eliezer Dumont MD        Or    prochlorperazine (Compazine) suppository 25 mg  25 mg rectal q12h PRN Eliezer Dumont MD        sevelamer carbonate (Renvela) packet 1.6 g  1.6 g oral BID Eliezer Dumont MD   1.6 g at 03/31/25 2052    vancomycin (Vancocin) pharmacy to dose - pharmacy monitoring   miscellaneous Daily PRN Eliezer Dumont MD        vitamin B complex-vitamin C-folic acid (Nephrocaps) capsule 1 capsule  1 capsule oral Daily Eliezer Dumont MD         Facility-Administered Medications Ordered in Other Encounters   Medication Dose Route Frequency Provider Last Rate Last Admin    lidocaine PF (Xylocaine) 20 mg/mL (2 %) injection   epidural PRN Rogelio Jackson CAA   50 mg at 03/31/25 1350    propofol (Diprivan) injection   intravenous PRN Rogelio Jacksno CAA   100 mg at 03/31/25 1350    rocuronium (Zemuron) injection   intravenous PRN Rogelio Jackson, CAA   50 mg at 03/31/25 1350    sugammadex (Bridion) injection   intravenous PRN Rogelio Jackson, CAA   200 mg at 03/31/25 1408        Physical Exam:  /65 (BP Location: Right arm, Patient Position: Lying)   Pulse 83   Temp 37.4 °C (99.3 °F) (Temporal)   Resp 20   Wt 67.6 kg (149 lb)   SpO2 100%    General: no acute distress, lying in bed, sleeping, briefly arousable  HEENT: Pharynx not examined  CVS: RRR  Resp: decreased breath sounds in bases, wearing nasal cannula oxygen  ABD: soft, NT, ND  EXT: lower extremity in immobilizer  Skin: sacrum not examined; right chest dialysis catheter is clean dry and intact    Relevant Results:    Labs:  Results from last 72 hours   Lab Units 03/31/25  1032   SODIUM mmol/L 136   POTASSIUM mmol/L 4.6   CHLORIDE mmol/L 95*   BUN mg/dL 48*   CREATININE mg/dL 6.25*   MAGNESIUM mg/dL 2.19   PHOSPHORUS mg/dL 3.3     Results from last 72 hours   Lab Units 03/31/25  1032   WBC AUTO x10*3/uL 28.7*   HEMOGLOBIN g/dL 7.8*   HEMATOCRIT % 22.2*   PLATELETS AUTO x10*3/uL 252       Microbiology data: I have personally and independently reviewed and intrepreted the lab results  3/31/2025 blood cultures pending  3/31/2025 decubitus wound culture is pending    Imaging data: I have personally and independently reviewed and interpreted the imaging studies  3/28/2025 left hip x-ray shows no acute issues  3/28/2025 chest x-ray shows no acute issues  3/31/2025 CT abdomen and pelvis shows dislocation of left hip arthroplasty; there is a large fluid collection; chronic osteomyelitis of the coccyx  3/31/2025 x-ray of the left hip shows dislocation     Assessment/Plan     MY IMPRESSION & RECOMMENDATIONS:  Fever and leukocytosis of unclear etiology.  He has come into the hospital with a left prosthetic hip dislocation.  He has undergone a closed reduction by orthopedics.  Imaging shows a fluid collection.  Orthopedics has not identified any signs of infection.  I would recommend that we continue on vancomycin and Zosyn.  He does have multiple potential sources for infection, including his dialysis catheter and a decubitus  ulcer.  I will adjust the management based on his clinical progress and further culture results.    -Continue vancomycin for now and monitor levels  -Continue Zosyn for now  -Await blood cultures  -CBC tomorrow  -Monitor temperatures    Other issues:  #Hypertension  #Hyperlipidemia  #End-stage renal disease on dialysis, which can affect the dosing of antimicrobials  #Type 2 diabetes, which is chronic condition that increases his risk for infection  #Dementia    ~I have personally and independently reviewed and interpreted the laboratory tests, imaging studies, and the documentation from other healthcare providers.  I am monitoring for side effects from vancomycin and Zosyn, which can include rash, diarrhea, bone marrow suppression, and nephrotoxicity.  His prognosis is uncertain due to his multiple comorbidities.         Juan Warren MD

## 2025-04-01 NOTE — PROGRESS NOTES
Tyshawn Coles is a 84 y.o. male on day 1 of admission presenting with Abscess of hip.      Plan to continue goals of care conversation with pt's spouse.    Rounded on pt several times, no family present. Spoke with RN.     Spoke with spouse, she is considering changing facilities, she is exhausted from events over the last couple days. We discussed Hospice care, and she is not in agreement with stopping HD. We also discussed outpatient Palliative Care, she would like to think about it, and wishes for us to call her back tmrw to discuss , support provided, will follow.                  Giselle Rob, PAMELAW

## 2025-04-02 LAB
ABO GROUP (TYPE) IN BLOOD: NORMAL
ANION GAP SERPL CALC-SCNC: 14 MMOL/L (ref 10–20)
ANTIBODY SCREEN: NORMAL
BACTERIA UR CULT: NO GROWTH
BASOPHILS # BLD AUTO: 0.03 X10*3/UL (ref 0–0.1)
BASOPHILS NFR BLD AUTO: 0.1 %
BUN SERPL-MCNC: 34 MG/DL (ref 6–23)
CALCIUM SERPL-MCNC: 8.2 MG/DL (ref 8.6–10.3)
CHLORIDE SERPL-SCNC: 99 MMOL/L (ref 98–107)
CO2 SERPL-SCNC: 28 MMOL/L (ref 21–32)
CREAT SERPL-MCNC: 4.23 MG/DL (ref 0.5–1.3)
EGFRCR SERPLBLD CKD-EPI 2021: 13 ML/MIN/1.73M*2
EOSINOPHIL # BLD AUTO: 0.12 X10*3/UL (ref 0–0.4)
EOSINOPHIL NFR BLD AUTO: 0.4 %
ERYTHROCYTE [DISTWIDTH] IN BLOOD BY AUTOMATED COUNT: 17.9 % (ref 11.5–14.5)
GLUCOSE BLD MANUAL STRIP-MCNC: 123 MG/DL (ref 74–99)
GLUCOSE BLD MANUAL STRIP-MCNC: 131 MG/DL (ref 74–99)
GLUCOSE BLD MANUAL STRIP-MCNC: 173 MG/DL (ref 74–99)
GLUCOSE BLD MANUAL STRIP-MCNC: 303 MG/DL (ref 74–99)
GLUCOSE SERPL-MCNC: 146 MG/DL (ref 74–99)
HCT VFR BLD AUTO: 20.4 % (ref 41–52)
HGB BLD-MCNC: 7.1 G/DL (ref 13.5–17.5)
IMM GRANULOCYTES # BLD AUTO: 0.34 X10*3/UL (ref 0–0.5)
IMM GRANULOCYTES NFR BLD AUTO: 1.2 % (ref 0–0.9)
LYMPHOCYTES # BLD AUTO: 1.36 X10*3/UL (ref 0.8–3)
LYMPHOCYTES NFR BLD AUTO: 4.8 %
MAGNESIUM SERPL-MCNC: 2.05 MG/DL (ref 1.6–2.4)
MCH RBC QN AUTO: 28.7 PG (ref 26–34)
MCHC RBC AUTO-ENTMCNC: 34.8 G/DL (ref 32–36)
MCV RBC AUTO: 83 FL (ref 80–100)
MONOCYTES # BLD AUTO: 1.45 X10*3/UL (ref 0.05–0.8)
MONOCYTES NFR BLD AUTO: 5.1 %
NEUTROPHILS # BLD AUTO: 25.02 X10*3/UL (ref 1.6–5.5)
NEUTROPHILS NFR BLD AUTO: 88.4 %
NRBC BLD-RTO: 0 /100 WBCS (ref 0–0)
PLATELET # BLD AUTO: 236 X10*3/UL (ref 150–450)
POTASSIUM SERPL-SCNC: 4.2 MMOL/L (ref 3.5–5.3)
RBC # BLD AUTO: 2.47 X10*6/UL (ref 4.5–5.9)
RH FACTOR (ANTIGEN D): NORMAL
SODIUM SERPL-SCNC: 137 MMOL/L (ref 136–145)
WBC # BLD AUTO: 28.3 X10*3/UL (ref 4.4–11.3)

## 2025-04-02 PROCEDURE — 80048 BASIC METABOLIC PNL TOTAL CA: CPT | Performed by: INTERNAL MEDICINE

## 2025-04-02 PROCEDURE — 83735 ASSAY OF MAGNESIUM: CPT | Performed by: INTERNAL MEDICINE

## 2025-04-02 PROCEDURE — 1200000002 HC GENERAL ROOM WITH TELEMETRY DAILY

## 2025-04-02 PROCEDURE — 85025 COMPLETE CBC W/AUTO DIFF WBC: CPT | Performed by: INTERNAL MEDICINE

## 2025-04-02 PROCEDURE — 86850 RBC ANTIBODY SCREEN: CPT | Performed by: INTERNAL MEDICINE

## 2025-04-02 PROCEDURE — 82947 ASSAY GLUCOSE BLOOD QUANT: CPT

## 2025-04-02 PROCEDURE — 36415 COLL VENOUS BLD VENIPUNCTURE: CPT | Performed by: INTERNAL MEDICINE

## 2025-04-02 PROCEDURE — 2500000004 HC RX 250 GENERAL PHARMACY W/ HCPCS (ALT 636 FOR OP/ED): Performed by: ORTHOPAEDIC SURGERY

## 2025-04-02 PROCEDURE — 2500000001 HC RX 250 WO HCPCS SELF ADMINISTERED DRUGS (ALT 637 FOR MEDICARE OP): Performed by: ORTHOPAEDIC SURGERY

## 2025-04-02 PROCEDURE — 2500000002 HC RX 250 W HCPCS SELF ADMINISTERED DRUGS (ALT 637 FOR MEDICARE OP, ALT 636 FOR OP/ED): Performed by: ORTHOPAEDIC SURGERY

## 2025-04-02 PROCEDURE — 99231 SBSQ HOSP IP/OBS SF/LOW 25: CPT | Performed by: PHYSICIAN ASSISTANT

## 2025-04-02 PROCEDURE — 2500000001 HC RX 250 WO HCPCS SELF ADMINISTERED DRUGS (ALT 637 FOR MEDICARE OP): Performed by: INTERNAL MEDICINE

## 2025-04-02 PROCEDURE — 99233 SBSQ HOSP IP/OBS HIGH 50: CPT | Performed by: INTERNAL MEDICINE

## 2025-04-02 PROCEDURE — 86901 BLOOD TYPING SEROLOGIC RH(D): CPT | Performed by: INTERNAL MEDICINE

## 2025-04-02 RX ADMIN — LEVETIRACETAM 500 MG: 500 TABLET, FILM COATED ORAL at 08:57

## 2025-04-02 RX ADMIN — Medication 1 CAPSULE: at 08:53

## 2025-04-02 RX ADMIN — LEVETIRACETAM 500 MG: 500 TABLET, FILM COATED ORAL at 21:48

## 2025-04-02 RX ADMIN — APIXABAN 2.5 MG: 2.5 TABLET, FILM COATED ORAL at 22:04

## 2025-04-02 RX ADMIN — PIPERACILLIN SODIUM AND TAZOBACTAM SODIUM 3.38 G: 3; .375 INJECTION, SOLUTION INTRAVENOUS at 12:51

## 2025-04-02 RX ADMIN — FINASTERIDE 5 MG: 5 TABLET, FILM COATED ORAL at 08:57

## 2025-04-02 RX ADMIN — INSULIN GLARGINE 5 UNITS: 100 INJECTION, SOLUTION SUBCUTANEOUS at 21:44

## 2025-04-02 RX ADMIN — METOPROLOL TARTRATE 50 MG: 50 TABLET, FILM COATED ORAL at 08:54

## 2025-04-02 RX ADMIN — PIPERACILLIN SODIUM AND TAZOBACTAM SODIUM 3.38 G: 3; .375 INJECTION, SOLUTION INTRAVENOUS at 22:46

## 2025-04-02 RX ADMIN — DONEPEZIL HYDROCHLORIDE 22.5 MG: 10 TABLET ORAL at 08:53

## 2025-04-02 RX ADMIN — SEVELAMER CARBONATE 1.6 G: 800 POWDER, FOR SUSPENSION ORAL at 22:47

## 2025-04-02 RX ADMIN — Medication 10 MG: at 22:46

## 2025-04-02 RX ADMIN — Medication 1000 UNITS: at 08:54

## 2025-04-02 RX ADMIN — APIXABAN 2.5 MG: 2.5 TABLET, FILM COATED ORAL at 08:57

## 2025-04-02 ASSESSMENT — PAIN SCALES - PAIN ASSESSMENT IN ADVANCED DEMENTIA (PAINAD)
BODYLANGUAGE: RELAXED
BREATHING: NORMAL
FACIALEXPRESSION: SMILING OR INEXPRESSIVE
CONSOLABILITY: NO NEED TO CONSOLE
TOTALSCORE: 0

## 2025-04-02 ASSESSMENT — COGNITIVE AND FUNCTIONAL STATUS - GENERAL
CLIMB 3 TO 5 STEPS WITH RAILING: TOTAL
MOVING FROM LYING ON BACK TO SITTING ON SIDE OF FLAT BED WITH BEDRAILS: A LOT
STANDING UP FROM CHAIR USING ARMS: TOTAL
MOVING TO AND FROM BED TO CHAIR: A LOT
MOBILITY SCORE: 9
TURNING FROM BACK TO SIDE WHILE IN FLAT BAD: A LOT
WALKING IN HOSPITAL ROOM: TOTAL

## 2025-04-02 ASSESSMENT — PAIN SCALES - GENERAL: PAINLEVEL_OUTOF10: 0 - NO PAIN

## 2025-04-02 ASSESSMENT — PAIN - FUNCTIONAL ASSESSMENT: PAIN_FUNCTIONAL_ASSESSMENT: PAINAD (PAIN ASSESSMENT IN ADVANCED DEMENTIA SCALE)

## 2025-04-02 NOTE — PRE-PROCEDURE NOTE
Report from Sending RN:    Report From: Esha Cabrera   Recent Surgery of Procedure: Yes  Baseline Level of Consciousness (LOC): A&O x 1  Oxygen Use: Yes  Type: No  Diabetic: Yes  Last BP Med Given Day of Dialysis: See MAR  Last Pain Med Given: See MAR  Lab Tests to be Obtained with Dialysis: No   Blood Transfusion to be Given During Dialysis: No  Available IV Access: Yes  Medications to be Administered During Dialysis: No  Continuous IV Infusion Running: No  Restraints on Currently or in the Last 24 Hours: No  Hand-Off Communication: Patient stable for HD.  Dialysis Catheter Dressing: Intact  Last Dressing Change: Dressing will be changed post HD.

## 2025-04-02 NOTE — PROGRESS NOTES
INPATIENT NEPHROLOGY CONSULT PROGRESS NOTE      Patient Name: Tyshawn Coles MRN: [id number]  DATE of SERVICE: April 1, 2025  TIME of SERVICE: 04:17 PM  CONSULTING SERVICE: Nephrology    REASON for CONSULT: ESRD    SUBJECTIVE:  Seen and evaluated at bedside.  Hip pain better controlled today postop day 1 status post closed reduction of left hip hemiarthroplasty dislocation..   Hemodialysis today with minimal ultrafiltration    SUMMARY OF STAY:  Mr. Coles is a 84 y.o. male who presented to [facility] March 31, 2025 for evaluation of left hip dislocation after remote fall.      Patient with past medical history significant for end-stage renal disease on hemodialysis per TTS schedule, hypertension, hyperlipidemia, diabetes mellitus, dementia, seizure disorder.   Patient seen and evaluated at bedside he is with advanced dementia unable to provide additional history.  Patient wife at bedside who was able to provide the majority of the history.  Recently patient had clotted AV graft and currently is receiving dialysis via right tunneled dialysis catheter.  He resides at [facility] and being transported to his dialysis unit.  He has been [facility] lifted.  This admission patient presented to [facility] for evaluation of left hip pain and diagnosed with dislocated left hip hemiarthroplasty and scheduled to go to surgery this afternoon.    Dialysis prescription:  ESRD on hemodialysis  Tuesday Thursday Saturday  Primary nephrologist: Dr. Wise  Estimated dry weight: To be determined  Duration: 3-1/2 hours  Intradialytic Midodrine  Right tunneled dialysis catheter in place        ASSESSMENT AND PLAN:  ESRD: iHD per Tuesday Thursday Saturday schedule  --Last hemodialysis was Saturday  Volume status:  Hypovolemia receiving IV normal saline at 100 mL/h  Electrolytes: Within acceptable range  Acid-base: Metabolic acidosis from renal failure (controlled with  HD)  Bone mineral disease (Ca/P/PTH): controlled  Anemia from renal failure, hemoglobin below target at 7.8 mg/g  Dislocated left hip hemiarthroplasty scheduled for surgery today  Chronic osteomyelitis of the coccyx  Left hip abscess  Hypotension: Fluid responsive  T2DM     PLAN:  End-stage renal disease patient with complex acute/subacute dislocated left hip hemiarthroplasty with left hip abscess, chronic osteomyelitis of the coccyx:     Receiving hemodialysis today April 1, ultrafiltration 1 L as tolerated.  Single evaluation, dialysis flowsheet reviewed and confirmed   IV albumin as needed during dialysis to maintain hemodynamic stability  Anemia multifactorial to check iron stores will benefit from IV iron and Epogen with dialysis  Malnutrition to continue protein supplements 3 times daily  Strict input and output to guide volume management, can have intermittent IV boluses to maintain hemodynamic stability  Will follow pending cultures.  phosphorus level at 3.3, to hold renvela   For hemodialysis per TTS schedule      Will follow, thank you!      Medications:    Current Facility-Administered Medications:     acetaminophen (Tylenol) tablet 650 mg, 650 mg, oral, q6h PRN, 650 mg at 04/01/25 0927 **OR** acetaminophen (Tylenol) oral liquid 650 mg, 650 mg, oral, q6h PRN **OR** acetaminophen (Tylenol) suppository 650 mg, 650 mg, rectal, q6h PRN, Eliezer Dumont MD    apixaban (Eliquis) tablet 2.5 mg, 2.5 mg, oral, BID, Paul Guevara MD, 2.5 mg at 04/01/25 0927    benzocaine-menthol (Cepastat Sore Throat) lozenge 1 lozenge, 1 lozenge, Mouth/Throat, q2h PRN, Eliezer Dumont MD    cholecalciferol (Vitamin D-3) tablet 1,000 Units, 1,000 Units, oral, Daily, Eliezer Dumont MD, 1,000 Units at 04/01/25 0841    docusate sodium (Colace) capsule 100 mg, 100 mg, oral, Daily, Paul Guevara MD, 100 mg at 04/01/25 0927    donepezil (Aricept) tablet 22.5 mg, 22.5 mg, oral, Daily, Eliezer Dumont MD, 22.5 mg at 04/01/25 0840     finasteride (Proscar) tablet 5 mg, 5 mg, oral, Daily, Eliezer Dumont MD, 5 mg at 04/01/25 0840    guaiFENesin (Mucinex) 12 hr tablet 600 mg, 600 mg, oral, q12h PRN, Eliezer Dumont MD    insulin glargine (Lantus) injection 5 Units, 5 Units, subcutaneous, Nightly, Eliezer Dumont MD, 5 Units at 03/31/25 2049    insulin lispro injection 0-10 Units, 0-10 Units, subcutaneous, TID AC, Eliezer Dumont MD, 4 Units at 04/01/25 1602    ipratropium-albuteroL (Duo-Neb) 0.5-2.5 mg/3 mL nebulizer solution 3 mL, 3 mL, nebulization, q2h PRN, Paul Guevara MD    levETIRAcetam (Keppra) tablet 500 mg, 500 mg, oral, BID, Eliezer Dumont MD, 500 mg at 04/01/25 0840    melatonin tablet 10 mg, 10 mg, oral, Nightly, Eliezer Dumont MD, 10 mg at 03/31/25 2049    metoprolol tartrate (Lopressor) tablet 50 mg, 50 mg, oral, BID, Eliezer Dumont MD, 50 mg at 03/31/25 2049    ondansetron (Zofran) tablet 4 mg, 4 mg, oral, q8h PRN **OR** ondansetron (Zofran) injection 4 mg, 4 mg, intravenous, q8h PRN, Eliezer Dumont MD, 4 mg at 03/31/25 1403    oxyCODONE (Roxicodone) immediate release tablet 2.5 mg, 2.5 mg, oral, q6h PRN, Eliezer Dumont MD    oxyCODONE (Roxicodone) immediate release tablet 5 mg, 5 mg, oral, q6h PRN, Eliezer Dumont MD    piperacillin-tazobactam (Zosyn) 3.375 g in dextrose (iso) IV 50 mL, 3.375 g, intravenous, q12h, Eliezer Dumont MD, Stopped at 04/01/25 1604    polyethylene glycol (Glycolax, Miralax) packet 17 g, 17 g, oral, Daily PRN, Eliezer Dumont MD    prochlorperazine (Compazine) tablet 10 mg, 10 mg, oral, q6h PRN **OR** prochlorperazine (Compazine) injection 10 mg, 10 mg, intravenous, q6h PRN **OR** prochlorperazine (Compazine) suppository 25 mg, 25 mg, rectal, q12h PRN, Eliezer Dumont MD    sevelamer carbonate (Renvela) packet 1.6 g, 1.6 g, oral, BID, Eliezer Dumont MD, 1.6 g at 04/01/25 0841    vancomycin (Vancocin) 500 mg in dextrose 5%  mL, 500 mg, intravenous, Once, Eliezer Dumont MD     vancomycin (Vancocin) pharmacy to dose - pharmacy monitoring, , miscellaneous, Daily PRN, Eliezer Dumont MD    vitamin B complex-vitamin C-folic acid (Nephrocaps) capsule 1 capsule, 1 capsule, oral, Daily, Eliezer Dumont MD, 1 capsule at 04/01/25 0840    PERTINENT ROS:  GENERAL:  positive for fatigue, poor appetite.  No fever/chills  RESPIRATORY:  positive for shortness of breath.  Negative for cough, wheezing.  CARDIOVASCULAR:   Negative for chest pain or palpitation.  GI:  Negative for abdominal pain, diarrhea, heartburn, nausea, vomiting  : External catheter in place with dark concentrated urine    Physical Exam:  Vital signs in last 24 hours:  Temp:  [36.6 °C (97.9 °F)-37.4 °C (99.3 °F)] 37.3 °C (99.1 °F)  Heart Rate:  [60-79] 72  Resp:  [19-21] 19  BP: (109-153)/(67-83) 153/72    General: Awake, oriented to self, pain better controlled  HEENT:  NCAT,  mucous membranes moist and pink  NECK: no JVD, no carotid bruit, supple, no cervical mass or thyromegaly  LUNGS;  Diminished breath sounds, no Rales  CV:  Distant, regular rate and rhythm, no murmurs  ABDOMEN:  abdomen soft, nontender, BS normal, no masses or organomegaly  EDEMA:  no lower extremity edema/dependent edema  SKIN: Left hip with a dressing in place      Intake/Output last 3 shifts:  I/O last 3 completed shifts:  In: 1345 (19.9 mL/kg) [P.O.:240; I.V.:1055 (15.6 mL/kg); IV Piggyback:50]  Out: - (0 mL/kg)   Weight: 67.6 kg     DATA:  Diagnotic tests reviewed for Todays visit:  Results from last 7 days   Lab Units 04/01/25  0514   WBC AUTO x10*3/uL 27.5*   RBC AUTO x10*6/uL 2.56*   HEMOGLOBIN g/dL 7.4*   HEMATOCRIT % 21.1*     Results from last 7 days   Lab Units 04/01/25  0514 03/31/25  1032   SODIUM mmol/L 137 136   POTASSIUM mmol/L 4.8 4.6   CHLORIDE mmol/L 98 95*   CO2 mmol/L 26 30   BUN mg/dL 62* 48*   CREATININE mg/dL 7.20* 6.25*   CALCIUM mg/dL 8.6 9.0   PHOSPHORUS mg/dL  --  3.3   MAGNESIUM mg/dL 2.36 2.19     Results from last 7 days   Lab  Units 03/31/25  1746   COLOR U  Yellow   APPEARANCE U  Clear   PH U  7.0   SPEC GRAV UR  1.015   PROTEIN U mg/dL 100 (2+)*   BLOOD UR mg/dL 0.06 (1+)*   NITRITE U  NEGATIVE   WBC UR /HPF 1-5     IMAGING: CXR reviewed in  images      SIGNATURE: Carlene Norman MD  Nephrology and Hypertension  [address] [facility] [place]  Office phone: [phone]  FAX: [phone]    This note was partially generated using the Dragon voice recognition system, and there may be some incorrect words, spelling's and punctuation that were not noted in checking the note before saving.

## 2025-04-02 NOTE — PROGRESS NOTES
04/02/25 1640   Discharge Planning   Home or Post Acute Services Post acute facilities (Rehab/SNF/etc)   Expected Discharge Disposition Inter     Spoke with pt's wife. She is still unsure if she would like pt transfer to [facility] at discharge. She is aware pt can return to [facility] if she is unable to decide at this time. Wife voiced understanding. Referral sent to [facility] for likely return at discharge.

## 2025-04-02 NOTE — CARE PLAN
Problem: Discharge Planning  Goal: Discharge to home or other facility with appropriate resources  Outcome: Progressing     Problem: Chronic Conditions and Co-morbidities  Goal: Patient's chronic conditions and co-morbidity symptoms are monitored and maintained or improved  Outcome: Progressing     Problem: Nutrition  Goal: Nutrient intake appropriate for maintaining nutritional needs  Outcome: Progressing     Problem: Skin  Goal: Participates in plan/prevention/treatment measures  Outcome: Progressing     Problem: Diabetes  Goal: Maintain glucose levels >70mg/dl to <250mg/dl throughout shift  Outcome: Progressing

## 2025-04-02 NOTE — CONSULTS
Wound Care Consult     Visit Date: 4/2/2025      Patient Name: Tyshawn Coles         MRN: [id number]           YOB: 1940     Reason for Consult: Follow up sacral wound        Wound History: Present on admission     Pertinent Labs:   Albumin   Date Value Ref Range Status   03/09/2025 3.2 (L) 3.4 - 5.0 g/dL Final       Wound Assessment:  Wound 03/30/24 Elbow Dorsal;Right (Active)       Wound 03/05/25 Abrasion Knee Left (Active)   Margins Undefined edges 03/31/25 1947   Dressing Open to air 04/02/25 0800   Dressing Status Clean;Dry 03/31/25 1947       Wound 03/09/25 Buttock (Active)   Dressing Foam 04/02/25 1556   Dressing Changed Changed 04/02/25 1556   Dressing Status Clean;Dry 04/02/25 0800       Wound 03/10/25 Incision Hip Left;Proximal;Upper;Ventral (Active)       Wound  Leg Left;Upper (Active)   Margins Other (Comment) 03/31/25 2000   Drainage Description None 03/31/25 2000   Drainage Amount None 03/31/25 2000   Dressing Open to air 04/02/25 0800   Dressing Status Clean;Dry 04/02/25 0800       Wound Team Summary Assessment: Soft necrotic tissue covering sacral ulcer. Karen wound skin 1-11:00 partial thickness wound. Medihoney applied along with Mepilex.      Wound Team Plan: Continue with Triad cream and Mepilex daily. Waffle mattress to bed and apply heel boots. Will continue to follow.     Nohemi Medina RN  4/2/2025  5:31 PM

## 2025-04-02 NOTE — PROGRESS NOTES
"Tyshawn Coles is a 84 y.o. male on day 2 of admission presenting with Abscess of hip.    Subjective   Mr Coles is much more alert today.  He complains of left hip pain, but is not able to quantify the pain.        Objective     Physical Exam  Vitals reviewed.   HENT:      Head: Normocephalic.      Mouth/Throat:      Mouth: Mucous membranes are moist.      Pharynx: Oropharynx is clear.   Eyes:      Extraocular Movements: Extraocular movements intact.   Cardiovascular:      Rate and Rhythm: Normal rate.   Pulmonary:      Effort: Pulmonary effort is normal.   Abdominal:      Palpations: Abdomen is soft.   Musculoskeletal:      Comments: Hip abductor wedge pillow securely in place.  Left hip incision is well-approximated with Steri-Strips, no drainage or erythema noted. light touch sensation is intact, ankle dorsiflexion/plantar flexion is intact. DP 1+/2 palpable     Skin:     General: Skin is warm and dry.      Capillary Refill: Capillary refill takes less than 2 seconds.   Neurological:      Mental Status: He is alert. Mental status is at baseline.         Last Recorded Vitals  Blood pressure 109/50, pulse 70, temperature 36.8 °C (98.2 °F), temperature source Oral, resp. rate 18, height 1.828 m (5' 11.97\"), weight 67.6 kg (149 lb), SpO2 94%.  Intake/Output last 3 Shifts:  I/O last 3 completed shifts:  In: 1555 (23 mL/kg) [P.O.:100; I.V.:1255 (18.6 mL/kg); IV Piggyback:200]  Out: - (0 mL/kg)   Weight: 67.6 kg     Relevant Results      Scheduled medications  apixaban, 2.5 mg, oral, BID  cholecalciferol, 1,000 Units, oral, Daily  docusate sodium, 100 mg, oral, Daily  donepezil, 22.5 mg, oral, Daily  finasteride, 5 mg, oral, Daily  insulin glargine, 5 Units, subcutaneous, Nightly  insulin lispro, 0-10 Units, subcutaneous, TID AC  levETIRAcetam, 500 mg, oral, BID  melatonin, 10 mg, oral, Nightly  metoprolol tartrate, 50 mg, oral, BID  piperacillin-tazobactam, 3.375 g, intravenous, q12h  sevelamer carbonate, 1.6 g, oral, " BID  vitamin B complex-vitamin C-folic acid, 1 capsule, oral, Daily      Continuous medications     PRN medications  PRN medications: acetaminophen **OR** acetaminophen **OR** acetaminophen, alteplase, benzocaine-menthol, guaiFENesin, ipratropium-albuteroL, ondansetron **OR** ondansetron, oxyCODONE, oxyCODONE, polyethylene glycol, prochlorperazine **OR** prochlorperazine **OR** prochlorperazine, vancomycin  Results for orders placed or performed during the hospital encounter of 03/31/25 (from the past 24 hours)   POCT GLUCOSE   Result Value Ref Range    POCT Glucose 131 (H) 74 - 99 mg/dL   Basic Metabolic Panel   Result Value Ref Range    Glucose 146 (H) 74 - 99 mg/dL    Sodium 137 136 - 145 mmol/L    Potassium 4.2 3.5 - 5.3 mmol/L    Chloride 99 98 - 107 mmol/L    Bicarbonate 28 21 - 32 mmol/L    Anion Gap 14 10 - 20 mmol/L    Urea Nitrogen 34 (H) 6 - 23 mg/dL    Creatinine 4.23 (H) 0.50 - 1.30 mg/dL    eGFR 13 (L) >60 mL/min/1.73m*2    Calcium 8.2 (L) 8.6 - 10.3 mg/dL   CBC and Auto Differential   Result Value Ref Range    WBC 28.3 (H) 4.4 - 11.3 x10*3/uL    nRBC 0.0 0.0 - 0.0 /100 WBCs    RBC 2.47 (L) 4.50 - 5.90 x10*6/uL    Hemoglobin 7.1 (L) 13.5 - 17.5 g/dL    Hematocrit 20.4 (L) 41.0 - 52.0 %    MCV 83 80 - 100 fL    MCH 28.7 26.0 - 34.0 pg    MCHC 34.8 32.0 - 36.0 g/dL    RDW 17.9 (H) 11.5 - 14.5 %    Platelets 236 150 - 450 x10*3/uL    Neutrophils % 88.4 40.0 - 80.0 %    Immature Granulocytes %, Automated 1.2 (H) 0.0 - 0.9 %    Lymphocytes % 4.8 13.0 - 44.0 %    Monocytes % 5.1 2.0 - 10.0 %    Eosinophils % 0.4 0.0 - 6.0 %    Basophils % 0.1 0.0 - 2.0 %    Neutrophils Absolute 25.02 (H) 1.60 - 5.50 x10*3/uL    Immature Granulocytes Absolute, Automated 0.34 0.00 - 0.50 x10*3/uL    Lymphocytes Absolute 1.36 0.80 - 3.00 x10*3/uL    Monocytes Absolute 1.45 (H) 0.05 - 0.80 x10*3/uL    Eosinophils Absolute 0.12 0.00 - 0.40 x10*3/uL    Basophils Absolute 0.03 0.00 - 0.10 x10*3/uL   Magnesium   Result Value Ref  Range    Magnesium 2.05 1.60 - 2.40 mg/dL   Type and screen   Result Value Ref Range    ABO TYPE O     Rh TYPE POS     ANTIBODY SCREEN NEG    POCT GLUCOSE   Result Value Ref Range    POCT Glucose 123 (H) 74 - 99 mg/dL   POCT GLUCOSE   Result Value Ref Range    POCT Glucose 131 (H) 74 - 99 mg/dL           FL fluoro images no charge    Result Date: 3/31/2025  These images are not reportable by radiology and will not be interpreted by  Radiologists.    XR hip left with pelvis when performed 2 or 3 views    Result Date: 3/31/2025  * * *Final Report* * * DATE OF EXAM: Mar 31 2025  2:15AM   [facility]   5351  -  XR HIP 3V PELV+ AP/LAT LT  / ACCESSION #  [id number] PROCEDURE REASON: Hip pain, acute, fx suspected, initial exam      * * * * Physician Interpretation * * * *  HISTORY: abnormal finding on CT Hip pain, acute, fx suspected, initial exam Hip pain, acute, fx suspected, initial exam RESULT: Frontal view of the pelvis and frontal and crosstable lateral views of the left hip, compared to CT on the same day and conventional radiographs on 08/14/2024. Post bilateral hemiarthroplasty of the hips.  The left acetabular cup and femoral prosthesis are dislocated posteriorly and superiorly with respect to the acetabulum.  There is generalized osteopenia.  Significant vascular calcifications are seen in the soft tissues.    IMPRESSION: Dislocated left hip hemiarthroplasty.  Please see concurrent CT report for additional information. : SMITH   Transcribe Date/Time: Mar 31 2025  2:42A Dictated by : JORJE DICK MD This examination was interpreted and the report reviewed and electronically signed by: JORJE DICK MD on Mar 31 2025  2:46AM  EST    CT abdomen pelvis w IV contrast    Result Date: 3/31/2025  * * *Final Report* * * DATE OF EXAM: Mar 31 2025 12:18AM   [facility]   0530  -  CT ABD/PEL W IVCON  / ACCESSION #  [id number] PROCEDURE REASON: Abdominal pain, acute, nonlocalized      * * * * Physician Interpretation * * * *   EXAMINATION:  CT ABDOMEN AND PELVIS WITH IV CONTRAST CLINICAL HISTORY: Abdominal pain TECHNIQUE: CT of the abdomen and pelvis was performed using standard technique, scanning from just above the dome of the diaphragm to the symphysis pubis. MQ:  CTAP_3 Contrast: IV:  100 ml of Omnipaque 350 : ml of CT Radiation dose: Integrated Dose-length product (DLP) for this visit =   623 mGy*cm. CT Dose Reduction Employed: Automated exposure control(AEC) and iterative recon COMPARISON: 08/14/2024. RESULT: Coronary artery calcifications.  Suprarenal IVC filter.  Colonic diverticulosis.  Mild rectal stool burden.  Aortoiliac calcifications.   Punctate right nephrolithiasis.  No hydronephrosis.  Mild urinary bladder wall thickening.  Posterior dislocated left hip hemiarthroplasty.  Within the left hip joint, and posterior to the arthroplasty there is large air-fluid collection 11 x 6 x 13 cm.  Right hip hemiarthroplasty present.  Sacral decubitus ulceration with large open defect extending to the sacrum, coccyx no evidence of osteolysis or periosteal reaction.  There is mild sclerosis of the coccyx.    IMPRESSION: 1.  Posterior superior dislocation left hip hemiarthroplasty.  Large left hip and surrounding soft tissue air-fluid collection can represent abscess. 2.  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 : PSCB   Transcribe Date/Time: Mar 31 2025  1:24A Dictated by : BRENT POLK MD This examination was interpreted and the report reviewed and electronically signed by: BRENT POLK MD on Mar 31 2025  1:37AM  EST    XR chest 1 view    Result Date: 3/30/2025  * * *Final Report* * * DATE OF EXAM: Mar 30 2025 10:23PM   [facility]   5376  -  XR CHEST 1V FRONTAL PORT  / ACCESSION #  [id number] PROCEDURE REASON: Cough      * * * * Physician Interpretation * * * *  EXAMINATION:  CHEST RADIOGRAPH (PORTABLE SINGLE VIEW AP) Exam Date/Time:  3/30/2025 10:23 PM CLINICAL  HISTORY: Cough MQ:  XCPR_5 Comparison:  10/20/2024 RESULT: Lines, tubes, and devices:  RIGHT tunneled dialysis catheter tip projects at the superior cavoatrial junction. Lungs and pleura:  No focal airspace opacity, pleural effusion, or pneumothorax. Cardiomediastinal silhouette:  Nonenlarged cardiac silhouette.  Unchanged calcified aortopulmonary and LEFT hilar lymph nodes indicative of prior granulomatous disease. Other:  No acute osseous findings.    IMPRESSION: No acute findings. : PSCB   Transcribe Date/Time: Mar 30 2025 11:22P Dictated by : GINNY TREVINO MD This examination was interpreted and the report reviewed and electronically signed by: GINNY TREVINO MD on Mar 30 2025 11:23PM  EST    XR hip left with pelvis when performed 2 or 3 views    Result Date: 3/28/2025  Interpreted By:  Jesenia Davenport, STUDY: XR HIP LEFT WITH PELVIS WHEN PERFORMED 2 OR 3 VIEWS; 3/28/2025 1:52 pm   INDICATION: Signs/Symptoms:left hip fracture.   ACCESSION NUMBER(S): [id number]   ORDERING CLINICIAN: JESENIA DAVENPORT   FINDINGS: AP lateral x-rays of the left hip show a hemiarthroplasty. Cement mantle and stem are in good position without any evidence of failure. Hip is nicely located within the acetabulum. There is significant calcification of the vasculature. There is surgical staples present laterally. There is degenerative changes of the pubic symphysis and SI joint on the left. No fractures are visualized.     Signed by: Jesenia Davenport 3/28/2025 2:39 PM Dictation workstation:   [id number]    ECG 12 lead    Result Date: 3/13/2025  Normal sinus rhythm Normal ECG When compared with ECG of 05-MAR-2025 15:33, Premature atrial complexes are no longer Present T wave amplitude has decreased in Lateral leads Confirmed by Filippo Cope ([id number]) on 3/13/2025 2:12:06 PM    XR pelvis 1-2 views    Result Date: 3/10/2025  Interpreted By:  Matthieu Peters, STUDY: XR PELVIS 1-2 VIEWS; ;  3/10/2025 5:52 pm   INDICATION:  Signs/Symptoms:post surgery film in PACU.     COMPARISON: 03/05/2025   ACCESSION NUMBER(S): [id number]   ORDERING CLINICIAN: BETY CALIX   FINDINGS: Pelvis, single view   Interval left hip hemiarthroplasty. No periprosthetic fracture lucency seen on this view. Right hip hemiarthroplasty noted as well.   Study limited by severe osteopenia.       Interval left hip hemiarthroplasty without acute abnormality.     MACRO: None   Signed by: Matthieu Peters 3/10/2025 6:22 PM Dictation workstation:   [id number]    Transthoracic Echo (TTE) Complete    Result Date: 3/10/2025            [facility] [address], [facility] [address]    Tel [phone] Fax [phone] TRANSTHORACIC ECHOCARDIOGRAM REPORT Patient Name:       EVELYN HOU         Reading Physician:    [id number] Kelvin Barrientos MD Study Date:         3/10/2025            Ordering Provider:    [id number] KAYE TAFOYA MRN/PID:            [id number]             Fellow: Accession#:         [id number]         Nurse: Date of Birth/Age:  1940 / 84 years Sonographer:          Rani Quintanilla                                                                [facility] Gender Assigned at  M                    Additional Staff: Birth: Height:             172.72 cm            Admit Date: Weight:             70.31 kg             Admission Status:     Inpatient -                                                                Routine BSA / BMI:          1.83 m2 / 23.57      Department Location:  [facility] Echo Lab                     kg/m2 Blood Pressure: 141 /74 mmHg Study Type:    TRANSTHORACIC ECHO (TTE) COMPLETE Diagnosis/ICD: Unspecified atrial fibrillation-I48.91 Indication:    Afib, cardiac clearance CPT Codes:     Echo Complete w Full Doppler-[id number]  Study Detail: The following Echo studies were performed: 2D, M-Mode, Doppler and                color flow. Technically challenging study due to patient lying in               supine position.  PHYSICIAN INTERPRETATION: Left Ventricle: Left ventricular ejection fraction is normal, calculated by Bailey's biplane at 65%. There are no regional wall motion abnormalities. The left ventricular cavity size is normal. There is normal septal and normal posterior left ventricular wall thickness. There is left ventricular concentric remodeling. Spectral Doppler shows a Grade I (impaired relaxation pattern) of left ventricular diastolic filling with normal left atrial filling pressure. Left Atrium: The left atrial size is normal. Right Ventricle: The right ventricle is normal in size. There is normal right ventricular global systolic function. Right Atrium: The right atrial size is normal. Aortic Valve: The aortic valve is trileaflet. There is evidence of mildly elevated transaortic gradients consistent with sclerosis of the aortic valve. There is no evidence of aortic valve regurgitation. The peak instantaneous gradient of the aortic valve is 12 mmHg. The mean gradient of the aortic valve is 7 mmHg. Mitral Valve: The mitral valve is normal in structure. There is trace mitral valve regurgitation. Tricuspid Valve: The tricuspid valve is structurally normal. There is mild tricuspid regurgitation. The Doppler estimated RVSP is mildly elevated right ventricular systolic pressure at 42.7 mmHg. Pulmonic Valve: The pulmonic valve is structurally normal. There is no indication of pulmonic valve regurgitation. Pericardium: No pericardial effusion noted. Aorta: The aortic root is normal.  CONCLUSIONS:  1. Left ventricular ejection fraction is normal, calculated by Bailey's biplane at 65%.  2. Spectral Doppler shows a Grade I (impaired relaxation pattern) of left ventricular diastolic filling with normal left atrial filling pressure.  3. There is normal right ventricular global systolic function.  4. Mildly elevated right  ventricular systolic pressure.  5. Aortic valve sclerosis. QUANTITATIVE DATA SUMMARY:  2D MEASUREMENTS:             Normal Ranges: LAs:             2.18 cm     (2.7-4.0cm) IVSd:            0.88 cm     (0.6-1.1cm) LVPWd:           0.87 cm     (0.6-1.1cm) LVIDd:           3.80 cm     (3.9-5.9cm) LVIDs:           2.93 cm LV Mass Index:   53.3 g/m2 LVEDV Index:     35.41 ml/m2 LV % FS          23.0 %  LEFT ATRIUM:                 Normal Ranges: LA Area A4C:      10.0 cm2 LA Area A2C:      17.0 cm2 LA Volume Index:  17.0 ml/m2 LA Vol A4C:       15.8 ml LA Vol A2C:       38.2 ml LA Vol Index BSA: 14.7 ml/m2  M-MODE MEASUREMENTS:         Normal Ranges: Ao Root:             3.50 cm (2.0-3.7cm) AoV Exc:             1.76 cm (1.5-2.5cm)  AORTA MEASUREMENTS:         Normal Ranges: AoV Exc:            1.76 cm (1.5-2.5cm) Ao Sinus, d:        3.80 cm (2.1-3.5cm) Ao STJ, d:          3.00 cm (1.7-3.4cm) Asc Ao, d:          3.50 cm (2.1-3.4cm)  LV SYSTOLIC FUNCTION:                      Normal Ranges: EF-A4C View:    60 % (>=55%) EF-A2C View:    69 % EF-Biplane:     65 % LV EF Reported: 65 %  LV DIASTOLIC FUNCTION:           Normal Ranges: MV Peak E:             1.01 m/s  (0.7-1.2 m/s) MV Peak A:             1.12 m/s  (0.42-0.7 m/s) E/A Ratio:             0.90      (1.0-2.2) MV e'                  0.085 m/s (>8.0) MV lateral e'          0.09 m/s MV medial e'           0.08 m/s E/e' Ratio:            11.90     (<8.0)  MITRAL VALVE:          Normal Ranges: MV DT:        211 msec (150-240msec)  AORTIC VALVE:               Normal Ranges: AoV Vmax:         1.70 m/s  (<=1.7m/s) AoV Peak P.5 mmHg (<20mmHg) AoV Mean P.6 mmHg  (1.7-11.5mmHg) AoV VTI:          39.43 cm  (18-25cm) LVOT Diameter:    2.31 cm   (1.8-2.4cm) AoV Area, planim: 1.34 cm2  (2.5-4.5cm2)  RIGHT VENTRICLE: RV Basal 3.30 cm RV Mid   2.10 cm RV Major 7.0 cm TAPSE:   17.0 mm RV s'    0.11 m/s  TRICUSPID VALVE/RVSP:          Normal Ranges: Peak TR Velocity:      3.15 m/s RV Syst Pressure:     43 mmHg  (< 30mmHg) IVC Diam:             1.71 cm  AORTA: Asc Ao Diam 3.49 cm  [id number] Kelvin Barrientos MD Electronically signed on 3/10/2025 at 12:07:16 PM  ** Final **     CT head wo IV contrast    Result Date: 3/9/2025  Interpreted By:  Annemarie Grayson, STUDY: CT HEAD WO IV CONTRAST;  3/9/2025 7:42 am   INDICATION: Signs/Symptoms:? right frontal SDH.   COMPARISON: None.   ACCESSION NUMBER(S): [id number]   ORDERING CLINICIAN: NAN NUNEZ   TECHNIQUE: Noncontrast axial CT scan of head was performed. Angled reformats in brain and bone windows were generated. The images were reviewed in bone, brain, blood and soft tissue windows.   FINDINGS: CSF Spaces: Moderate brain atrophy evidence by prominence of ventricles, sulci and cisterns. There is no extraaxial fluid collection.   Parenchyma: Confluent areas of subcortical and periventricular white matter changes which given patient's age are suggestive of chronic small vessel ischemic disease. Benign calcification of bilateral basal ganglia similar to prior. The grey-white differentiation is intact. There is no mass effect or midline shift.  There is no intracranial hemorrhage.   Calvarium: The calvarium is unremarkable. Benign calcified scalp nodules similar to prior.   Paranasal sinuses and mastoids: Partial opacification of bilateral mastoid air cells and mucosal wall thickening of the sphenoid sinus similar to prior. Partial opacification of the left maxillary sinus again noted. This is also stable.       No CT evidence for sizeable subdural hematoma in the current study. No evidence of acute cortical infarct or intracranial hemorrhage.   MACRO: None   Signed by: Annemarie Grayson 3/9/2025 7:52 AM Dictation workstation:   [id number]    XR knee left 1-2 views    Result Date: 3/9/2025  STUDY: Knee Radiographs; 3/9/2025 3:19 AM INDICATION: Pain after a fall. COMPARISON: None Available. ACCESSION NUMBER(S): [id number] ORDERING CLINICIAN:  ELVIS EMERY TECHNIQUE:  Two view(s) of the left knee. FINDINGS:  There is no displaced fracture.  The alignment is anatomic.  No soft tissue abnormality is seen.  Extensive vascular calcification noted. There is no joint effusion.    No acute displaced fracture or dislocation is evident on these images.  The fibula is not fully imaged. Signed by Emanuel Lopez MD    CT hip left wo IV contrast    Result Date: 3/9/2025  STUDY: CT Hip without IV Contrast; 03/09/2025 12:56AM INDICATION: Left femoral neck fracture. COMPARISON: CT Pelvis with bilateral Hip 01/30/2020. ACCESSION NUMBER(S): [id number] ORDERING CLINICIAN: ELVIS EMERY TECHNIQUE:  Thin section axial images were obtained through the left hip without intravenous contrast.  Orthogonal reconstructed images were obtained and reviewed.  2143 DICOM images received. Automated mA/kV exposure control was utilized and patient examination was performed in strict accordance with principles of ALARA. FINDINGS: Left Hip: Generalized osseous demineralization is noted.  There is an acute, traumatic fracture of the left femoral neck with impaction. Visualized soft tissues in the included left lower pelvis reveal no acute pathology.  Moderate age-related osteoarthritic changes are seen in the left sacroiliac joint.  Peripheral vascular calcifications are noted.    Acute, traumatic, impacted fracture of the left femoral neck. Pronounced generalized osseous demineralization. Signed by Timothy Persaud    XR chest 1 view    Result Date: 3/9/2025  STUDY: Chest Radiograph;  03/09/2025   12:49AM INDICATION: Pre-op left femoral neck fracture COMPARISON: Chest, single portable view obtained on 08/17/2021 at 10:07 hours. ACCESSION NUMBER(S): [id number] ORDERING CLINICIAN: BARBER ALVES TECHNIQUE:  Frontal chest was obtained at 00:49 hours. FINDINGS: Right central venous line is seen, its tip is at the lower SVC/right atrium. CARDIOMEDIASTINAL SILHOUETTE: Cardiomediastinal  silhouette is normal in size and configuration.  LUNGS: Lungs are clear.  ABDOMEN: No remarkable upper abdominal findings.  BONES: No acute osseous changes.    1.No acute pulmonary pathology. 2.Right central venous line. Signed by Tomas Funez MD    CT head wo IV contrast    Result Date: 3/9/2025  Interpreted By:  Fara Browne, STUDY: CT HEAD WO IV CONTRAST; CT CERVICAL SPINE WO IV CONTRAST;  3/9/2025 1:02 am   INDICATION: Signs/Symptoms:fall.   COMPARISON: CT head and cervical spine 03/05/2025, CT head 03/30/2024   ACCESSION NUMBER(S): [id number]; [id number]   ORDERING CLINICIAN: BARBER ALVES   TECHNIQUE: Axial noncontrast images of the head  with coronal  and sagittal reconstructed images . Axial noncontrast images of the cervical spine with coronal and sagittal reconstructed images.   FINDINGS: There is motion artifact. BRAIN PARENCHYMA: There are periventricular white matter hypodensities, probably representing chronic microvascular ischemic changes. Otherwise, the gray-white matter interfaces are preserved. No acute territorial infarct, mass effect or midline shift. HEMORRHAGE: No acute intracranial hemorrhage. VENTRICLES and EXTRA-AXIAL SPACES: Prominent, consistent with diffuse parenchymal volume loss. There is trace amount of extra-axial hypodense fluid overlying the right frontal region measuring 3 mm in thickness. EXTRACRANIAL SOFT TISSUES: Extensive vascular calcifications are noted at the extracranial soft tissues. CALVARIUM: No depressed calvarial fracture. PARANASAL SINUSES: There is polypoid mucosal thickening with calcifications at the left maxillary sinus. There is polypoid mucosal thickening at the right sphenoid sinus. MASTOIDS: There is partial fluid opacification of the bilateral mastoid air cells.   CERVICAL SPINE: Evaluation degraded by motion artifact. ALIGNMENT: Redemonstration of straightening of the cervical lordosis. No traumatic facet widening is identified. VERTEBRAE: The  evaluation for acute fracture is degraded by motion artifact. No definite evidence for acute displaced fracture is identified. DISCS: There is multilevel degenerative disc disease with osteophytosis. There is multilevel facet arthropathy. PREVERTEBRAL SOFT TISSUES: No prevertebral soft tissue swelling. LUNG APICES: No acute findings at the visualized lung apices. Partially visualized right-sided central catheter.       1.  No acute intracranial hemorrhage or depressed calvarial fracture. 2. Trace amount of extra-axial hypodense fluid overlying the right frontal region, suggestive of chronic subdural hematoma. 3. Partial fluid opacification of the bilateral mastoid air cells. 4. The evaluation of the cervical spine is degraded by motion artifact. Degenerative changes. No definite evidence for acute displaced fracture of the cervical spine; if there is persistent clinical concern, a repeat CT can be obtained when the patient will be able to cooperate with positioning.       MACRO: None.   Signed by: Fara Browne 3/9/2025 1:24 AM Dictation workstation:   [id number]    CT cervical spine wo IV contrast    Result Date: 3/9/2025  Interpreted By:  Fara Browne, STUDY: CT HEAD WO IV CONTRAST; CT CERVICAL SPINE WO IV CONTRAST;  3/9/2025 1:02 am   INDICATION: Signs/Symptoms:fall.   COMPARISON: CT head and cervical spine 03/05/2025, CT head 03/30/2024   ACCESSION NUMBER(S): [id number]; [id number]   ORDERING CLINICIAN: BARBER ALVES   TECHNIQUE: Axial noncontrast images of the head  with coronal  and sagittal reconstructed images . Axial noncontrast images of the cervical spine with coronal and sagittal reconstructed images.   FINDINGS: There is motion artifact. BRAIN PARENCHYMA: There are periventricular white matter hypodensities, probably representing chronic microvascular ischemic changes. Otherwise, the gray-white matter interfaces are preserved. No acute territorial infarct, mass effect or midline shift.  HEMORRHAGE: No acute intracranial hemorrhage. VENTRICLES and EXTRA-AXIAL SPACES: Prominent, consistent with diffuse parenchymal volume loss. There is trace amount of extra-axial hypodense fluid overlying the right frontal region measuring 3 mm in thickness. EXTRACRANIAL SOFT TISSUES: Extensive vascular calcifications are noted at the extracranial soft tissues. CALVARIUM: No depressed calvarial fracture. PARANASAL SINUSES: There is polypoid mucosal thickening with calcifications at the left maxillary sinus. There is polypoid mucosal thickening at the right sphenoid sinus. MASTOIDS: There is partial fluid opacification of the bilateral mastoid air cells.   CERVICAL SPINE: Evaluation degraded by motion artifact. ALIGNMENT: Redemonstration of straightening of the cervical lordosis. No traumatic facet widening is identified. VERTEBRAE: The evaluation for acute fracture is degraded by motion artifact. No definite evidence for acute displaced fracture is identified. DISCS: There is multilevel degenerative disc disease with osteophytosis. There is multilevel facet arthropathy. PREVERTEBRAL SOFT TISSUES: No prevertebral soft tissue swelling. LUNG APICES: No acute findings at the visualized lung apices. Partially visualized right-sided central catheter.       1.  No acute intracranial hemorrhage or depressed calvarial fracture. 2. Trace amount of extra-axial hypodense fluid overlying the right frontal region, suggestive of chronic subdural hematoma. 3. Partial fluid opacification of the bilateral mastoid air cells. 4. The evaluation of the cervical spine is degraded by motion artifact. Degenerative changes. No definite evidence for acute displaced fracture of the cervical spine; if there is persistent clinical concern, a repeat CT can be obtained when the patient will be able to cooperate with positioning.       MACRO: None.   Signed by: Fara Browne 3/9/2025 1:24 AM Dictation workstation:   [id number]    ECG 12  lead    Result Date: 3/8/2025  Sinus bradycardia with Premature atrial complexes Otherwise normal ECG When compared with ECG of 28-MAR-2024 00:43, Premature atrial complexes are now Present Confirmed by Ti Curiel ([id number]) on 3/8/2025 3:41:27 PM    IR CVC tunneled    Result Date: 3/7/2025  Interpreted By:  Schoenberger, Joseph, STUDY: IR CVC TUNNELED; ;  3/7/2025 12:48 pm   INDICATION: Signs/Symptoms:no bruit/no pulse. Hyperkalemia. Altered mental status.   ,T82.868A Thrombosis due to vascular prosthetic devices, implants and grafts, initial encounter (CMS-[facility])   COMPARISON: None.   ACCESSION NUMBER(S): [id number]   ORDERING CLINICIAN: MADISON TOLENTINO   TECHNIQUE: The base of the right neck and right upper chest wall were sterilely prepped and draped. Local anesthesia was administered over the internal jugular vein. This was is sonographicaly patent and compressible. This was accessed under direct sonographic visualization using the Seldinger technique. The tip of the needle was verified sonographically in the lumen of the vein. Using fluoroscopy guidance, a guidewire was advanced into the right atrium. An appropriate subclavicular tunnel exit was localized. Local anesthetic was administered at this site and small incisions were made at this site and at the needle insertion site. The subcutaneous tissues between the 2 incisions were then anesthetized. A 15.5 F 28 cm. total length Arrow VectorFlow chronic hemodialysis catheter was pulled through the tunnel exit to the jugular access site in the usual manner. At this point, a guidewire was advanced through the indwelling jugular catheter into the right atrium. Serial dilations remained over this guidewire. The introducer for the tunnel catheter was then advanced over the guidewire. With the patient in suspended expiration, the tunneled catheter was advanced through the introducer and positioned such that its distal tip was in the superior vena cava and retention cuff  was 5 millimeters deep to the tunnel exit. Each catheter port freely aspirated and flushed and was primed with 1000 units heparin per 1 cc saline isovolumetric to the catheter lumen. The incision at the base of the neck was closed using a single subcuticular 4-0 Vicryl suture. Catheter was anchored below the tunnel with a 2-0 Prolene suture. The catheter is then dressed appropriately. Patient tolerated the procedure well without immediate complication.   FINDINGS: Catheter terminates in the upper right atrium. Is ready to use for hemodialysis.       Due to patient's hyperkalemia, altered mental status, lack of dialysis for 6 days, and intermittent is spasms, it is elected to place a dialysis catheter in calf 2 treatments of dialysis before attempting grafts salvage.   Successful placement of a cuffed, tunneled hemodialysis catheter. The catheter is ready to use for hemodialysis.   Patient is scheduled for attempted access salvage of his left upper extremity AV graft.     MACRO: Joseph Schoenberger discussed the significance and urgency of this critical finding by telephone with  MADISON TOLENTINO on 3/7/2025 at 2:27 pm.  (**-RCF-**) Findings:  See findings.   Signed by: Joseph Schoenberger 3/7/2025 2:29 PM Dictation workstation:   [id number]    XR chest 1 view    Result Date: 3/5/2025  Interpreted By:  Dao Garay, STUDY: XR CHEST 1 VIEW;  3/5/2025 12:42 pm   INDICATION: Signs/Symptoms:Fall on Eliquis.   COMPARISON: 03/27/2024   ACCESSION NUMBER(S): [id number]   ORDERING CLINICIAN: RHEA WILDER   FINDINGS: No consolidation. No pleural effusion or pneumothorax. Low lung volumes and vascular crowding. Normal heart size. No acute osseous abnormality.       Low lung volumes with vascular crowding. No acute cardiopulmonary abnormality.   Signed by: Dao Garay 3/5/2025 12:53 PM Dictation workstation:   [id number]    XR pelvis 1-2 views    Result Date: 3/5/2025  Interpreted By:  Dao Garay, STUDY: XR  PELVIS 1-2 VIEWS; ;  3/5/2025 12:42 pm   INDICATION: Signs/Symptoms:Bilateral hip pain after fall.   COMPARISON: 04/01/2024   ACCESSION NUMBER(S): [id number]   ORDERING CLINICIAN: RHEA WILDER   FINDINGS: No acute fracture or dislocation. Total right hip arthroplasty. Chronic fracture of the lesser trochanter. No evidence of hardware fracture or abnormal perihardware lucency. Atherosclerosis. Degenerative changes in the lumbar spine.       Intact total right knee arthroplasty.   Signed by: Dao Garay 3/5/2025 12:52 PM Dictation workstation:   [id number]    CT cervical spine wo IV contrast    Result Date: 3/5/2025  Interpreted By:  Juan Meyer, STUDY: CT CERVICAL SPINE WO IV CONTRAST; 3/5/2025 12:06 pm   INDICATION: CLINICAL DATA: Signs/Symptoms:Fall on Eliquis, left forehead hematoma.   COMPARISON: 01/27/2020   ACCESSION NUMBER(S): [id number]   ORDERING CLINICIAN: RHEA WILDER   TECHNIQUE: A helical acquisition of data was obtained with multiplanar reconstructions performed.   One or more of the following dose reduction techniques were used: Automated exposure control Adjustment of the mA and/or kV according to patient size, and/or use of iterative reconstruction technique.   FINDINGS: No fractures or destructive lesions are identified. There is disc space narrowing with anterior and posterior marginal spurring from C2 through C7. There is dense ligamentum flavum calcification at C7, more marked to the left of midline.   Foraminal stenosis from uncovertebral spurring is present bilaterally at C3-4 and C4-5. Extensive vascular calcification is present.       No evidence for a fracture on this exam. Cervical spondylosis as described.   MACRO: none   Signed by: Juan Meyer 3/5/2025 12:19 PM Dictation workstation:   [id number]    CT head W O contrast trauma protocol    Result Date: 3/5/2025  Interpreted By:  Juan Meyer, STUDY: CT HEAD W/O CONTRAST TRAUMA PROTOCOL; 3/5/2025 12:06 pm    INDICATION: Signs/Symptoms:Fall on Eliquis, left forehead hematoma.   COMPARISON: 03/30/2024   ACCESSION NUMBER(S): [id number]   ORDERING CLINICIAN: RHEA WILDER   TECHNIQUE: A helical acquisition data was obtained.   One or more of the following dose reduction techniques were used: Automated exposure control Adjustment of the mA and/or kV according to patient size, and/or use of iterative reconstruction technique.   FINDINGS: Intracranial findings: There is no evidence for intracranial hemorrhage or mass effect. Age-related atrophy is present. Periventricular white matter hypodensity is present, most consistent with age-related change and/or white matter ischemic disease. Bilateral basal ganglia calcifications are present. Extensive scalp calcification is noted.   Paranasal sinuses and temporal bone findings: There is a focal masslike thickening of the posterior aspect of the left maxillary sinus with central calcification along with thickening of the adjacent sinus walls, likely secondary to chronic sinusitis., unchanged from 03/30/2024. There is mucosal thickening or fluid within scattered mastoid air cells bilaterally inferiorly. There is opacification of the right middle ear cavity which could be secondary to otitis media. There is fluid within the sphenoid sinus on the right. The remainder of the visualized portions of the paranasal sinuses, mastoid air cells, and middle ear cavities are clear.   Orbital findings: The visualized portions of the orbits are unremarkable.       No acute intracranial pathologic findings are identified. Paranasal sinusitis as above. Opacification right middle ear cavity possibly secondary to otitis media. Please correlate with clinical evaluation. Small amount of fluid or mucosal thickening mastoid air cells bilaterally as well.   MACRO: none   Signed by: Juan Meyer 3/5/2025 12:15 PM Dictation workstation:   [id number]                  Assessment/Plan   Assessment &  Plan  Abscess of hip    Hip dislocation, left, initial encounter (Multi)    POD #2 s/p left hip hemiarthroplasty reduction done under anesthesia  Continue PT/OT, WBAT left  LE, hip abductor wedge when in bed. Posterior left hip precautions  Using incentive spirometer   Reviewed am labs  Multimodal pain regimen  Surgical hip dressing to be removed on post op day #7  VTE prophylaxis: eliquis 2.5mg BID  Dispo: to be determined  Follow up with Dr. Dawson in 2 weeks         I spent 25 minutes in the professional and overall care of this patient.      Mel Yancey PA-C

## 2025-04-02 NOTE — POST-PROCEDURE NOTE
Report to Receiving RN:    Report To: Robert Casper RN  Time Report Called: 2250  Hand-Off Communication: Treatment ended, 0.5L removed.  Complications During Treatment: No  Ultrafiltration Treatment: Yes  Medications Administered During Dialysis: No  Blood Products Administered During Dialysis: No  Labs Sent During Dialysis: No  Heparin Drip Rate Changes: No  Dialysis Catheter Dressing: Clean, dry, and intact.  Last Dressing Change: 4/1/25

## 2025-04-02 NOTE — PROGRESS NOTES
Vancomycin Dosing by Pharmacy- Cessation of Therapy    Consult to pharmacy for vancomycin dosing has been discontinued by the prescriber, pharmacy will sign off at this time.    Please call pharmacy if there are further questions or re-enter a consult if vancomycin is resumed.     Chris Yuan, MingD

## 2025-04-02 NOTE — PROGRESS NOTES
Patient's wife still deciding on move to [facility] and plans to tour facility in the next few days. Palliative will follow up with wife once she makes decision regarding facility an make outpatient palliative referral if she is agreeable.    1233  Spoke with patient's wife, Pamela, on the phone. She remembers palliative conversation from yesterday. Reviewed and asked if she would like a palliative referral to follow him at facility and she said she does not at this time. Palliative to sign off as wife is not interested at this time.

## 2025-04-02 NOTE — PROGRESS NOTES
Tyshawn Coles is a 84 y.o. male on day 2 of admission presenting with Abscess of hip.      Subjective   Patient resting comfortably.  Does not appear to be in any distress.  White count is up again.  No fevers reported overnight       Objective     Last Recorded Vitals  /56   Pulse 70   Temp 37.6 °C (99.7 °F) (Temporal)   Resp 18   Wt 67.6 kg (149 lb)   SpO2 98%   Intake/Output last 3 Shifts:    Intake/Output Summary (Last 24 hours) at 4/2/2025 1044  Last data filed at 4/2/2025 0317  Gross per 24 hour   Intake 650 ml   Output --   Net 650 ml       Admission Weight  Weight: 67.6 kg (149 lb) (03/31/25 1659)    Daily Weight  03/31/25 : 67.6 kg (149 lb)      Physical Exam:  General: Resting comfortably  HEENT: PERRLA, head intact and normocephalic  Neck: Normal to inspection  Lungs: Clear to auscultation, work of breathing within normal limit  Cardiac: Regular rate and rhythm  Abdomen: Soft nontender, positive bowel sounds  : Exam deferred  Skin: Multiple superficial decubitus ulcer noted with skin breakdown and erythematous base with some foul drainage  Hematology: No petechia or excessive ecchymosis  Musculoskeletal: Left hip Steri-Strip is noted with some hardness.  Neurological: Alert awake oriented x 1, unable to assess the rest.  Does not seem to have focal deficit  Psych: Calm and cooperative    Relevant Results  Scheduled medications  apixaban, 2.5 mg, oral, BID  cholecalciferol, 1,000 Units, oral, Daily  docusate sodium, 100 mg, oral, Daily  donepezil, 22.5 mg, oral, Daily  finasteride, 5 mg, oral, Daily  insulin glargine, 5 Units, subcutaneous, Nightly  insulin lispro, 0-10 Units, subcutaneous, TID AC  levETIRAcetam, 500 mg, oral, BID  melatonin, 10 mg, oral, Nightly  metoprolol tartrate, 50 mg, oral, BID  piperacillin-tazobactam, 3.375 g, intravenous, q12h  sevelamer carbonate, 1.6 g, oral, BID  vitamin B complex-vitamin C-folic acid, 1 capsule, oral, Daily      Continuous medications     PRN  medications  PRN medications: acetaminophen **OR** acetaminophen **OR** acetaminophen, alteplase, benzocaine-menthol, guaiFENesin, ipratropium-albuteroL, ondansetron **OR** ondansetron, oxyCODONE, oxyCODONE, polyethylene glycol, prochlorperazine **OR** prochlorperazine **OR** prochlorperazine, vancomycin   Labs  Results from last 7 days   Lab Units 04/02/25  0515 04/01/25  0514 03/31/25  1032   WBC AUTO x10*3/uL 28.3* 27.5* 28.7*   HEMOGLOBIN g/dL 7.1* 7.4* 7.8*   HEMATOCRIT % 20.4* 21.1* 22.2*   PLATELETS AUTO x10*3/uL 236 227 252     Results from last 7 days   Lab Units 04/02/25 0515 04/01/25 0514 03/31/25  1032   SODIUM mmol/L 137 137 136   POTASSIUM mmol/L 4.2 4.8 4.6   CHLORIDE mmol/L 99 98 95*   CO2 mmol/L 28 26 30   BUN mg/dL 34* 62* 48*   CREATININE mg/dL 4.23* 7.20* 6.25*   CALCIUM mg/dL 8.2* 8.6 9.0   GLUCOSE mg/dL 146* 144* 116*       Imaging  XR hip left with pelvis when performed 2 or 3 views    Result Date: 3/31/2025  IMPRESSION: Dislocated left hip hemiarthroplasty.  Please see concurrent CT report for additional information. : [facility]   Transcribe Date/Time: Mar 31 2025  2:42A Dictated by : JORJE DICK MD This examination was interpreted and the report reviewed and electronically signed by: JORJE DICK MD on Mar 31 2025  2:46AM  EST    CT abdomen pelvis w IV contrast    Result Date: 3/31/2025  IMPRESSION: 1.  Posterior superior dislocation left hip hemiarthroplasty.  Large left hip and surrounding soft tissue air-fluid collection can represent abscess. 2.  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 : [facility]    Transcribe Date/Time: Mar 30 2025 11:22P Dictated by : GINNY TREVINO MD This examination was interpreted and the report reviewed and electronically signed by: GINNY TREVINO MD on Mar 30 2025 11:23PM  EST     Cardiology, Vascular, and Other Imaging  FL fluoro images no charge    Result Date: 3/31/2025  These images are not reportable by radiology and will not be interpreted by  Radiologists.    XR hip left with pelvis when performed 2 or 3 views    Result Date: 3/31/2025  * * *Final Report* * * DATE OF EXAM: Mar 31 2025  2:15AM   X   5351  -  XR HIP 3V PELV+ AP/LAT LT  / ACCESSION #  [id number] PROCEDURE REASON: Hip pain, acute, fx suspected, initial exam      * * * * Physician Interpretation * * * *  HISTORY: abnormal finding on CT Hip pain, acute, fx suspected, initial exam Hip pain, acute, fx suspected, initial exam RESULT: Frontal view of the pelvis and frontal and crosstable lateral views of the left hip, compared to CT on the same day and conventional radiographs on 08/14/2024. Post bilateral hemiarthroplasty of the hips.  The left acetabular cup and femoral prosthesis are dislocated posteriorly and superiorly with respect to the acetabulum.  There is generalized osteopenia.  Significant vascular calcifications are seen in the soft tissues.    IMPRESSION: Dislocated left hip hemiarthroplasty.  Please see concurrent CT report for additional information. : [facility]   Transcribe Date/Time: Mar 31 2025  2:42A Dictated by : JORJE DICK MD This examination was interpreted and the report reviewed and electronically signed by: JORJE DICK MD on Mar 31 2025  2:46AM  EST    CT abdomen pelvis w IV contrast    Result Date: 3/31/2025  * * *Final Report* * * DATE OF EXAM: Mar 31 2025 12:18AM   [facility]   0530  -  CT ABD/PEL W IVCON  / ACCESSION #  [id number] PROCEDURE REASON: Abdominal pain, acute, nonlocalized      * * * * Physician Interpretation * * * *  EXAMINATION:  CT ABDOMEN AND PELVIS WITH IV CONTRAST  CLINICAL HISTORY: Abdominal pain TECHNIQUE: CT of the abdomen and pelvis was performed using standard technique, scanning from just above the dome of the diaphragm to the symphysis pubis. MQ:  CTAP_3 Contrast: IV:  100 ml of Omnipaque 350 : ml of CT Radiation dose: Integrated Dose-length product (DLP) for this visit =   623 mGy*cm. CT Dose Reduction Employed: Automated exposure control(AEC) and iterative recon COMPARISON: 08/14/2024. RESULT: Coronary artery calcifications.  Suprarenal IVC filter.  Colonic diverticulosis.  Mild rectal stool burden.  Aortoiliac calcifications.   Punctate right nephrolithiasis.  No hydronephrosis.  Mild urinary bladder wall thickening.  Posterior dislocated left hip hemiarthroplasty.  Within the left hip joint, and posterior to the arthroplasty there is large air-fluid collection 11 x 6 x 13 cm.  Right hip hemiarthroplasty present.  Sacral decubitus ulceration with large open defect extending to the sacrum, coccyx no evidence of osteolysis or periosteal reaction.  There is mild sclerosis of the coccyx.    IMPRESSION: 1.  Posterior superior dislocation left hip hemiarthroplasty.  Large left hip and surrounding soft tissue air-fluid collection can represent abscess. 2.  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   Transcribe Date/Time: Mar 31 2025  1:24A Dictated by : BRENT PLOK MD This examination was interpreted and the report reviewed and electronically signed by: BRENT POLK MD on Mar 31 2025  1:37AM  EST    XR chest 1 view    Result Date: 3/30/2025  * * *Final Report* * * DATE OF EXAM: Mar 30 2025 10:23PM   VHX   5376  -  XR CHEST 1V FRONTAL PORT  / ACCESSION #  [id number] PROCEDURE REASON: Cough      * * * * Physician Interpretation * * * *  EXAMINATION:  CHEST RADIOGRAPH (PORTABLE SINGLE VIEW AP) Exam Date/Time:  3/30/2025 10:23 PM CLINICAL HISTORY: Cough MQ:  XCPR_5 Comparison:  10/20/2024  RESULT: Lines, tubes, and devices:  RIGHT tunneled dialysis catheter tip projects at the superior cavoatrial junction. Lungs and pleura:  No focal airspace opacity, pleural effusion, or pneumothorax. Cardiomediastinal silhouette:  Nonenlarged cardiac silhouette.  Unchanged calcified aortopulmonary and LEFT hilar lymph nodes indicative of prior granulomatous disease. Other:  No acute osseous findings.    IMPRESSION: No acute findings. : [facility]SC[facility]AC[facility]   Transcribe Date/Time: Mar 30 2025 11:22P Dictated by : GINNY TREVINO MD This examination was interpreted and the report reviewed and electronically signed by: GINNY TREVINO MD on Mar 30 2025 11:23PM  EST    XR hip left with pelvis when performed 2 or 3 views    Result Date: 3/28/2025  Interpreted By:  Jesenia Dawson, STUDY: XR HIP LEFT WITH PELVIS WHEN PERFORMED 2 OR 3 VIEWS; 3/28/2025 1:52 pm   INDICATION: Signs/Symptoms:left hip fracture.   ACCESSION NUMBER(S): [id number]   ORDERING CLINICIAN: JESENIA DAWSON   FINDINGS: AP lateral x-rays of the left hip show a hemiarthroplasty. Cement mantle and stem are in good position without any evidence of failure. Hip is nicely located within the acetabulum. There is significant calcification of the vasculature. There is surgical staples present laterally. There is degenerative changes of the pubic symphysis and SI joint on the left. No fractures are visualized.     Signed by: Jesenia Dawson 3/28/2025 2:39 PM Dictation workstation:   [id number]                     Assessment/Plan   Tyshawn Coles is a 84 y.o. male with past medical history of ESRD on hemodialysis Tuesday Thursday Saturday, hypertension, hyperlipidemia, type 2 diabetes mellitus, dementia who is minimally responsive at baseline and just groans and moans presented from outside hospital for concern for posteriorly dislocated hip along with concern for possible abscess.  Patient was started on vancomycin and Zosyn due to fever and concern for  possible infection.  Hip does not seem to be primary source of infection as per orthopedic surgery but waiting on trying to localize a source.  He went for hip reduction with Dr. Dumont and tolerated the procedure well.  Patient still has persistent leukocytosis but no longer febrile and and on antibiotics.  Assessment & Plan  Abscess of hip    Hip dislocation, left, initial encounter (Multi)      Recent left hip replacement now with posterior superior dislocation of the left hip hemiarthroplasty with surrounding fluid collection concerning for abscess versus reactive  Continue with empiric coverage of vancomycin and Zosyn  Orthopedic surgery reduced hip under general anesthesia  Weightbearing as tolerated for transfers related purposes only  Will hold off on aspiration for now  WBC count is up again  Follow-up blood culture results  Palliative care consulted.  Wife is not ready to stop dialysis     Leukocytosis-up again  ID consult is noted  Could be from the dialysis catheter or decubitus ulcer  Follow-up final culture results  Follow-up blood culture results and trend WBC count  Continue with vancomycin and Zosyn  Urine culture is negative  Blood culture no growth for 1 day    Decubitus ulcer  Continue with localized wound care  Continue with supportive care and frequent turning  Follow-up culture results     ESRD on hemodialysis Tuesday Thursday Saturday  Nephrology consulted  Renally adjust medication using pharmacy's help  Continue with Renvela, Nephrocaps     Hypertension  Continue metoprolol     Dementia  Continue with the Aricept  Continue with Keppra  Continue with melatonin     Type 2 diabetes mellitus  Accu-Chek with sliding scale insulin     BPH  Continue with finasteride  Urine culture negative     No family member present at bedside.    High level of MDM based on above issue and discussing plan    This note is created using voice recognition software. All efforts are made to minimize errors, if there  are errors there due to transcription.    Paul Guevara  Hospitalist    Addendum: Nurse got a fax from facility that patient had blood cultures done and it is growing gram-negative organism.  Will discontinue vancomycin and continue with Zosyn.  Will inform ID attending.

## 2025-04-03 ENCOUNTER — APPOINTMENT (OUTPATIENT)
Dept: DIALYSIS | Facility: HOSPITAL | Age: 85
End: 2025-04-03
Payer: MEDICARE

## 2025-04-03 LAB
ANION GAP SERPL CALC-SCNC: 16 MMOL/L (ref 10–20)
BASOPHILS # BLD AUTO: 0.05 X10*3/UL (ref 0–0.1)
BASOPHILS NFR BLD AUTO: 0.2 %
BUN SERPL-MCNC: 58 MG/DL (ref 6–23)
CALCIUM SERPL-MCNC: 8.8 MG/DL (ref 8.6–10.3)
CHLORIDE SERPL-SCNC: 95 MMOL/L (ref 98–107)
CO2 SERPL-SCNC: 27 MMOL/L (ref 21–32)
CREAT SERPL-MCNC: 5.44 MG/DL (ref 0.5–1.3)
EGFRCR SERPLBLD CKD-EPI 2021: 10 ML/MIN/1.73M*2
EOSINOPHIL # BLD AUTO: 0.23 X10*3/UL (ref 0–0.4)
EOSINOPHIL NFR BLD AUTO: 1 %
ERYTHROCYTE [DISTWIDTH] IN BLOOD BY AUTOMATED COUNT: 18.6 % (ref 11.5–14.5)
GLUCOSE BLD MANUAL STRIP-MCNC: 124 MG/DL (ref 74–99)
GLUCOSE BLD MANUAL STRIP-MCNC: 139 MG/DL (ref 74–99)
GLUCOSE BLD MANUAL STRIP-MCNC: 273 MG/DL (ref 74–99)
GLUCOSE BLD MANUAL STRIP-MCNC: 331 MG/DL (ref 74–99)
GLUCOSE SERPL-MCNC: 318 MG/DL (ref 74–99)
HCT VFR BLD AUTO: 21.4 % (ref 41–52)
HGB BLD-MCNC: 7.3 G/DL (ref 13.5–17.5)
IMM GRANULOCYTES # BLD AUTO: 0.67 X10*3/UL (ref 0–0.5)
IMM GRANULOCYTES NFR BLD AUTO: 2.8 % (ref 0–0.9)
LYMPHOCYTES # BLD AUTO: 1.74 X10*3/UL (ref 0.8–3)
LYMPHOCYTES NFR BLD AUTO: 7.4 %
MAGNESIUM SERPL-MCNC: 2.22 MG/DL (ref 1.6–2.4)
MCH RBC QN AUTO: 28.9 PG (ref 26–34)
MCHC RBC AUTO-ENTMCNC: 34.1 G/DL (ref 32–36)
MCV RBC AUTO: 85 FL (ref 80–100)
MONOCYTES # BLD AUTO: 1.33 X10*3/UL (ref 0.05–0.8)
MONOCYTES NFR BLD AUTO: 5.6 %
NEUTROPHILS # BLD AUTO: 19.65 X10*3/UL (ref 1.6–5.5)
NEUTROPHILS NFR BLD AUTO: 83 %
NRBC BLD-RTO: 0 /100 WBCS (ref 0–0)
PHOSPHATE SERPL-MCNC: 3.4 MG/DL (ref 2.5–4.9)
PLATELET # BLD AUTO: 265 X10*3/UL (ref 150–450)
POTASSIUM SERPL-SCNC: 5.2 MMOL/L (ref 3.5–5.3)
RBC # BLD AUTO: 2.53 X10*6/UL (ref 4.5–5.9)
SODIUM SERPL-SCNC: 133 MMOL/L (ref 136–145)
WBC # BLD AUTO: 23.7 X10*3/UL (ref 4.4–11.3)

## 2025-04-03 PROCEDURE — 8010000001 HC DIALYSIS - HEMODIALYSIS PER DAY

## 2025-04-03 PROCEDURE — 2500000001 HC RX 250 WO HCPCS SELF ADMINISTERED DRUGS (ALT 637 FOR MEDICARE OP): Performed by: ORTHOPAEDIC SURGERY

## 2025-04-03 PROCEDURE — 2500000004 HC RX 250 GENERAL PHARMACY W/ HCPCS (ALT 636 FOR OP/ED): Performed by: ORTHOPAEDIC SURGERY

## 2025-04-03 PROCEDURE — 85025 COMPLETE CBC W/AUTO DIFF WBC: CPT | Performed by: INTERNAL MEDICINE

## 2025-04-03 PROCEDURE — 36415 COLL VENOUS BLD VENIPUNCTURE: CPT | Performed by: INTERNAL MEDICINE

## 2025-04-03 PROCEDURE — 92526 ORAL FUNCTION THERAPY: CPT | Mod: GN | Performed by: SPEECH-LANGUAGE PATHOLOGIST

## 2025-04-03 PROCEDURE — 97530 THERAPEUTIC ACTIVITIES: CPT | Mod: GO,CO

## 2025-04-03 PROCEDURE — 92610 EVALUATE SWALLOWING FUNCTION: CPT | Mod: GN | Performed by: SPEECH-LANGUAGE PATHOLOGIST

## 2025-04-03 PROCEDURE — 83735 ASSAY OF MAGNESIUM: CPT | Performed by: INTERNAL MEDICINE

## 2025-04-03 PROCEDURE — 99233 SBSQ HOSP IP/OBS HIGH 50: CPT | Performed by: INTERNAL MEDICINE

## 2025-04-03 PROCEDURE — 2500000004 HC RX 250 GENERAL PHARMACY W/ HCPCS (ALT 636 FOR OP/ED): Performed by: INTERNAL MEDICINE

## 2025-04-03 PROCEDURE — 82947 ASSAY GLUCOSE BLOOD QUANT: CPT

## 2025-04-03 PROCEDURE — 97530 THERAPEUTIC ACTIVITIES: CPT | Mod: GP,CQ

## 2025-04-03 PROCEDURE — 1100000001 HC PRIVATE ROOM DAILY

## 2025-04-03 PROCEDURE — 2500000001 HC RX 250 WO HCPCS SELF ADMINISTERED DRUGS (ALT 637 FOR MEDICARE OP): Performed by: INTERNAL MEDICINE

## 2025-04-03 PROCEDURE — 84100 ASSAY OF PHOSPHORUS: CPT | Performed by: INTERNAL MEDICINE

## 2025-04-03 PROCEDURE — 2500000002 HC RX 250 W HCPCS SELF ADMINISTERED DRUGS (ALT 637 FOR MEDICARE OP, ALT 636 FOR OP/ED): Performed by: ORTHOPAEDIC SURGERY

## 2025-04-03 PROCEDURE — 87081 CULTURE SCREEN ONLY: CPT | Mod: STJLAB | Performed by: INTERNAL MEDICINE

## 2025-04-03 PROCEDURE — 97110 THERAPEUTIC EXERCISES: CPT | Mod: GP,CQ

## 2025-04-03 PROCEDURE — 80048 BASIC METABOLIC PNL TOTAL CA: CPT | Performed by: INTERNAL MEDICINE

## 2025-04-03 RX ORDER — HEPARIN SODIUM 1000 [USP'U]/ML
2000 INJECTION, SOLUTION INTRAVENOUS; SUBCUTANEOUS
Status: DISPENSED | OUTPATIENT
Start: 2025-04-03 | End: 2025-04-13

## 2025-04-03 RX ORDER — SEVELAMER CARBONATE 800 MG/1
1600 TABLET, FILM COATED ORAL 2 TIMES DAILY
Status: DISPENSED | OUTPATIENT
Start: 2025-04-03

## 2025-04-03 RX ORDER — SEVELAMER CARBONATE 0.8 G/1
0.8 POWDER, FOR SUSPENSION ORAL 2 TIMES DAILY
Status: DISCONTINUED | OUTPATIENT
Start: 2025-04-03 | End: 2025-04-03

## 2025-04-03 RX ORDER — HEPARIN SODIUM 1000 [USP'U]/ML
2000 INJECTION, SOLUTION INTRAVENOUS; SUBCUTANEOUS
OUTPATIENT
Start: 2025-04-03 | End: 2025-04-13

## 2025-04-03 RX ADMIN — Medication 1000 UNITS: at 08:43

## 2025-04-03 RX ADMIN — PIPERACILLIN SODIUM AND TAZOBACTAM SODIUM 3.38 G: 3; .375 INJECTION, SOLUTION INTRAVENOUS at 13:21

## 2025-04-03 RX ADMIN — METOPROLOL TARTRATE 50 MG: 50 TABLET, FILM COATED ORAL at 21:31

## 2025-04-03 RX ADMIN — APIXABAN 2.5 MG: 2.5 TABLET, FILM COATED ORAL at 21:31

## 2025-04-03 RX ADMIN — DONEPEZIL HYDROCHLORIDE 22.5 MG: 10 TABLET ORAL at 08:53

## 2025-04-03 RX ADMIN — INSULIN GLARGINE 5 UNITS: 100 INJECTION, SOLUTION SUBCUTANEOUS at 21:28

## 2025-04-03 RX ADMIN — SEVELAMER CARBONATE 1.6 G: 800 POWDER, FOR SUSPENSION ORAL at 08:43

## 2025-04-03 RX ADMIN — Medication 10 MG: at 21:31

## 2025-04-03 RX ADMIN — PIPERACILLIN SODIUM AND TAZOBACTAM SODIUM 3.38 G: 3; .375 INJECTION, SOLUTION INTRAVENOUS at 23:06

## 2025-04-03 RX ADMIN — SEVELAMER CARBONATE 1600 MG: 800 TABLET, FILM COATED ORAL at 21:31

## 2025-04-03 RX ADMIN — LEVETIRACETAM 500 MG: 500 TABLET, FILM COATED ORAL at 08:43

## 2025-04-03 RX ADMIN — IRON SUCROSE 200 MG: 20 INJECTION, SOLUTION INTRAVENOUS at 18:41

## 2025-04-03 RX ADMIN — HEPARIN SODIUM 2000 UNITS: 1000 INJECTION, SOLUTION INTRAVENOUS; SUBCUTANEOUS at 12:36

## 2025-04-03 RX ADMIN — FINASTERIDE 5 MG: 5 TABLET, FILM COATED ORAL at 08:43

## 2025-04-03 RX ADMIN — INSULIN LISPRO 8 UNITS: 100 INJECTION, SOLUTION INTRAVENOUS; SUBCUTANEOUS at 08:43

## 2025-04-03 RX ADMIN — ACETAMINOPHEN 650 MG: 325 TABLET ORAL at 08:43

## 2025-04-03 RX ADMIN — DOCUSATE SODIUM 100 MG: 100 CAPSULE, LIQUID FILLED ORAL at 08:43

## 2025-04-03 RX ADMIN — Medication 1 CAPSULE: at 08:53

## 2025-04-03 RX ADMIN — APIXABAN 2.5 MG: 2.5 TABLET, FILM COATED ORAL at 08:43

## 2025-04-03 RX ADMIN — LEVETIRACETAM 500 MG: 500 TABLET, FILM COATED ORAL at 21:31

## 2025-04-03 ASSESSMENT — PAIN SCALES - PAIN ASSESSMENT IN ADVANCED DEMENTIA (PAINAD)
BODYLANGUAGE: RELAXED
BODYLANGUAGE: RELAXED
TOTALSCORE: 1
FACIALEXPRESSION: SMILING OR INEXPRESSIVE
NEGVOCALIZATION: OCCASIONAL MOAN/GROAN, LOW SPEECH, NEGATIVE/DISAPPROVING QUALITY
BREATHING: NORMAL
FACIALEXPRESSION: SMILING OR INEXPRESSIVE
FACIALEXPRESSION: SMILING OR INEXPRESSIVE
TOTALSCORE: 0
BREATHING: NORMAL
TOTALSCORE: 0
CONSOLABILITY: NO NEED TO CONSOLE
CONSOLABILITY: NO NEED TO CONSOLE
BREATHING: NORMAL
CONSOLABILITY: NO NEED TO CONSOLE
BODYLANGUAGE: RELAXED

## 2025-04-03 ASSESSMENT — COGNITIVE AND FUNCTIONAL STATUS - GENERAL
TURNING FROM BACK TO SIDE WHILE IN FLAT BAD: TOTAL
CLIMB 3 TO 5 STEPS WITH RAILING: TOTAL
MOVING TO AND FROM BED TO CHAIR: TOTAL
PERSONAL GROOMING: TOTAL
MOVING FROM LYING ON BACK TO SITTING ON SIDE OF FLAT BED WITH BEDRAILS: A LOT
CLIMB 3 TO 5 STEPS WITH RAILING: TOTAL
MOVING FROM LYING ON BACK TO SITTING ON SIDE OF FLAT BED WITH BEDRAILS: TOTAL
MOBILITY SCORE: 6
EATING MEALS: A LOT
STANDING UP FROM CHAIR USING ARMS: TOTAL
TOILETING: TOTAL
TURNING FROM BACK TO SIDE WHILE IN FLAT BAD: A LOT
HELP NEEDED FOR BATHING: TOTAL
MOBILITY SCORE: 10
WALKING IN HOSPITAL ROOM: TOTAL
MOVING TO AND FROM BED TO CHAIR: A LOT
DRESSING REGULAR UPPER BODY CLOTHING: TOTAL
DAILY ACTIVITIY SCORE: 7
WALKING IN HOSPITAL ROOM: TOTAL
STANDING UP FROM CHAIR USING ARMS: A LOT
DRESSING REGULAR LOWER BODY CLOTHING: TOTAL

## 2025-04-03 ASSESSMENT — PAIN - FUNCTIONAL ASSESSMENT
PAIN_FUNCTIONAL_ASSESSMENT: 0-10
PAIN_FUNCTIONAL_ASSESSMENT: PAINAD (PAIN ASSESSMENT IN ADVANCED DEMENTIA SCALE)

## 2025-04-03 ASSESSMENT — PAIN SCALES - WONG BAKER: WONGBAKER_NUMERICALRESPONSE: HURTS LITTLE BIT

## 2025-04-03 NOTE — PROGRESS NOTES
"Occupational Therapy    OT Treatment    Patient Name: Tyshawn Coles  MRN: [id number]  Today's Date: 4/3/2025  Time Calculation  Start Time: 1347  Stop Time: 1415  Time Calculation (min): 28 min       4105/4105-A    Assessment:  OT Assessment: Patient demonstrated decreased independence in ADL tasks and functional mobility and could be at baseline.  End of Session Communication: Bedside nurse  End of Session Patient Position: Bed, 4 rail up, Alarm on       Plan:  OT Frequency: 2 times per week     Subjective     Current Problem:  Patient Active Problem List   Diagnosis    ESRD (end stage renal disease) on dialysis (Multi)    Closed fracture of neck of left femur, initial encounter    Falls, initial encounter    Closed head injury    Elevated troponin    Abscess of hip    Hip dislocation, left, initial encounter (Multi)       General:  OT Received On: 04/03/25  Reason for Referral: impaired self care  Referred By: Paul Guevara MD  Co-Treatment: PT  Co-Treatment Reason: for safety  Prior to Session Communication: Bedside nurse  Patient Position Received: Bed, 4 rail up, Alarm on  General Comment: Patient mostly non verbal but did respond \"yes\" when asked if he would like to work with therapy.    Vital Signs:       Pain:  Pain Assessment  Pain Interventions:  (No c/o pain or indication that patient was in pain at any time.)  Objective      Activities of Daily Living:                        Functional Standing Tolerance:       Bed Mobility/Transfers: Bed Mobility  Bed Mobility: Yes  Bed Mobility 1  Bed Mobility 1: Supine to sitting  Level of Assistance 1: Dependent, +2  Bed Mobility Comments 1: Patient not following instructions when transferring patient from supine to sitting edge of bed.  Patient required minimal to moderate assist to maintain his sitting balance edge of bed. (Patient sat approximately 5 minutes before pushing backward while seated.  Patient returned to supine.  Assist needed to move up toward head of " bed.)  Bed Mobility 2  Bed Mobility  2: Sitting to supine  Level of Assistance 2: Dependent, +2  Transfers  Transfer: No                Therapy/Activity:               Strength:       Other Activity:       Outcome Measures:[facility] Daily Activity  Putting on and taking off regular lower body clothing: Total  Bathing (including washing, rinsing, drying): Total  Putting on and taking off regular upper body clothing: Total  Toileting, which includes using toilet, bedpan or urinal: Total  Taking care of personal grooming such as brushing teeth: Total  Eating Meals: A lot  Daily Activity - Total Score: 7  Education Documentation  No documentation found.  Education Comments  No comments found.           EDUCATION:  Education  Individual(s) Educated: Patient  Education Provided: Risk and benefits of OT discussed with patient or other, Fall precautons  Plan of Care Discussed and Agreed Upon: yes  Patient Response to Education: Patient/Caregiver Verbalized Understanding of Information    Goals:  Encounter Problems       Encounter Problems (Active)       OT Goals       Patient will complete bed mobility with mod A. (Progressing)       Start:  04/01/25    Expected End:  04/15/25            Patient will complete functional transfers with max A. (Progressing)       Start:  04/01/25    Expected End:  04/15/25            Patient will complete grooming seated with min A. (Progressing)       Start:  04/01/25    Expected End:  04/15/25

## 2025-04-03 NOTE — PROCEDURES
Report from Sending RN:    Report From: Esha Cabrera  Recent Surgery of Procedure: Yes 4/1/25  Baseline Level of Consciousness (LOC): A&Ox1  Oxygen Use: No  Type: Room Air  Diabetic: Yes; 331  Last BP Med Given Day of Dialysis: See MAR   Last Pain Med Given: See MAR  Lab Tests to be Obtained with Dialysis: No  Blood Transfusion to be Given During Dialysis: No  Available IV Access: Yes  Medications to be Administered During Dialysis: No  Continuous IV Infusion Running: No  Restraints on Currently or in the Last 24 Hours: No  Hand-Off Communication: Pt stable for HD; DNR; DNI

## 2025-04-03 NOTE — PROGRESS NOTES
For follow-up of:  Hip abscess    Subjective   Interval History:  He is awake and comfortable.  He is breathing adequately on room air.  He is still having low-grade fevers.         Objective     Current Facility-Administered Medications   Medication Dose Route Frequency Provider Last Rate Last Admin    acetaminophen (Tylenol) tablet 650 mg  650 mg oral q6h PRN Eliezer Dumont MD   650 mg at 04/03/25 0843    Or    acetaminophen (Tylenol) oral liquid 650 mg  650 mg oral q6h PRN Eliezer Dumont MD        Or    acetaminophen (Tylenol) suppository 650 mg  650 mg rectal q6h PRN Eliezer Dumont MD        alteplase (Cathflo Activase) injection 2 mg  2 mg intra-catheter PRN Paul Guevara MD        apixaban (Eliquis) tablet 2.5 mg  2.5 mg oral BID Paul Guevara MD   2.5 mg at 04/03/25 0843    benzocaine-menthol (Cepastat Sore Throat) lozenge 1 lozenge  1 lozenge Mouth/Throat q2h PRN Eliezer Dumont MD        cholecalciferol (Vitamin D-3) tablet 1,000 Units  1,000 Units oral Daily Eliezer Dumont MD   1,000 Units at 04/03/25 0843    docusate sodium (Colace) capsule 100 mg  100 mg oral Daily Paul Guevara MD   100 mg at 04/03/25 0843    donepezil (Aricept) tablet 22.5 mg  22.5 mg oral Daily Eliezer Dumont MD   22.5 mg at 04/03/25 0853    [START ON 4/4/2025] epoetin sheila-epbx (Retacrit) injection 10,000 Units  10,000 Units subcutaneous Once per day on Monday Wednesday Friday Carlene Norman MD        finasteride (Proscar) tablet 5 mg  5 mg oral Daily Eliezer Dumont MD   5 mg at 04/03/25 0843    guaiFENesin (Mucinex) 12 hr tablet 600 mg  600 mg oral q12h PRN Eliezer Dumont MD        heparin 1,000 unit/mL injection 2,000 Units  2,000 Units intra-catheter After Dialysis Carlene Norman MD   2,000 Units at 04/03/25 1236    insulin glargine (Lantus) injection 5 Units  5 Units subcutaneous Nightly Eliezer Dumont MD   5 Units at 04/02/25 7947    insulin lispro injection 0-10 Units  0-10 Units subcutaneous TID ARELI Martins  RICHI Dumont MD   8 Units at 04/03/25 0843    ipratropium-albuteroL (Duo-Neb) 0.5-2.5 mg/3 mL nebulizer solution 3 mL  3 mL nebulization q2h PRN Paul Guevara MD        iron sucrose (Venofer) injection 200 mg  200 mg intravenous Daily Carlene Norman MD   200 mg at 04/03/25 1841    levETIRAcetam (Keppra) tablet 500 mg  500 mg oral BID Eliezer Dumont MD   500 mg at 04/03/25 0843    melatonin tablet 10 mg  10 mg oral Nightly Eliezer Dumont MD   10 mg at 04/02/25 2246    metoprolol tartrate (Lopressor) tablet 50 mg  50 mg oral BID Eliezer Dumont MD   50 mg at 04/02/25 0854    ondansetron (Zofran) tablet 4 mg  4 mg oral q8h PRN Eliezer Dumont MD        Or    ondansetron (Zofran) injection 4 mg  4 mg intravenous q8h PRN Eliezer Dumont MD   4 mg at 03/31/25 1403    oxyCODONE (Roxicodone) immediate release tablet 2.5 mg  2.5 mg oral q6h PRN Eliezer Dumont MD        oxyCODONE (Roxicodone) immediate release tablet 5 mg  5 mg oral q6h PRN Eliezer Dumont MD        piperacillin-tazobactam (Zosyn) 3.375 g in dextrose (iso) IV 50 mL  3.375 g intravenous q12h Eliezer Dumont MD   Stopped at 04/03/25 1841    polyethylene glycol (Glycolax, Miralax) packet 17 g  17 g oral Daily PRN Eliezer Dumont MD        prochlorperazine (Compazine) tablet 10 mg  10 mg oral q6h PRN Eliezer Dumont MD        Or    prochlorperazine (Compazine) injection 10 mg  10 mg intravenous q6h PRN Eliezer Dumont MD        Or    prochlorperazine (Compazine) suppository 25 mg  25 mg rectal q12h PRN Eliezer Dumont MD        sevelamer carbonate (Renvela) tablet 1,600 mg  1,600 mg oral BID Paul Guevara MD        vitamin B complex-vitamin C-folic acid (Nephrocaps) capsule 1 capsule  1 capsule oral Daily Eliezer Dumont MD   1 capsule at 04/03/25 0853        Last Recorded Vitals  /64 (BP Location: Right arm, Patient Position: Lying)   Pulse 65   Temp 37.6 °C (99.7 °F) (Temporal)   Resp 16   Wt 67.6 kg (149 lb)   SpO2 98%   General: no  acute distress, lying in bed, awake, comfortable  HEENT: pharynx not examined  CVS: RRR  Resp: decreased breath sounds in bases  ABD: soft, NT, ND  EXT: no edema  Skin: no rash     Relevant Results:    Labs:   Results from last 72 hours   Lab Units 04/03/25 0623 04/02/25 0515 04/01/25 0514   SODIUM mmol/L 133* 137 137   POTASSIUM mmol/L 5.2 4.2 4.8   CHLORIDE mmol/L 95* 99 98   BUN mg/dL 58* 34* 62*   CREATININE mg/dL 5.44* 4.23* 7.20*   MAGNESIUM mg/dL 2.22 2.05 2.36   PHOSPHORUS mg/dL 3.4  --   --      Results from last 72 hours   Lab Units 04/03/25 0623 04/02/25 0515 04/01/25 0514   WBC AUTO x10*3/uL 23.7* 28.3* 27.5*   HEMOGLOBIN g/dL 7.3* 7.1* 7.4*   HEMATOCRIT % 21.4* 20.4* 21.1*   PLATELETS AUTO x10*3/uL 265 236 227       Microbiology data: I have personally and independently reviewed and intrepreted the lab results  3/31/2025 blood cultures show no growth  3/31/2025 urine culture shows no growth  3/31/2025 decubitus wound culture shows mixed growth    Imaging data: I have personally and independently reviewed and interpreted the imaging studies  3/28/2025 left hip x-ray shows no acute issues  3/28/2025 chest x-ray shows no acute issues  3/31/2025 CT abdomen and pelvis shows dislocation of left hip arthroplasty; there is a large fluid collection; chronic osteomyelitis of the coccyx  3/31/2025 x-ray of the left hip shows dislocation    Assessment/Plan     MY IMPRESSION & RECOMMENDATIONS:  Bacteroides fragilis sepsis, being treated with IV antibiotics.  He has persistent low-grade fevers and a persistent leukocytosis.  His vancomycin has been stopped.  I will arrange for a white blood cell tagged scan to help clarify where the source of the problem is.     -Continu Zosyn for now   -Obtain chest x-ray  -Obtain white blood cell tagged scan  -CBC tomorrow  -Monitor temperatures     Other issues:  #Left prosthetic hip dislocation status post closed reduction  #Hypertension  #Hyperlipidemia  #End-stage renal  disease on dialysis, which can affect the dosing of antimicrobials  #Type 2 diabetes, which is chronic condition that increases his risk for infection  #Dementia     ~I have personally and independently reviewed and interpreted the laboratory tests, imaging studies, and the documentation from other healthcare providers.  I am monitoring for side effects from Zosyn as outlined in a previous note.  His prognosis remains uncertain due to his multiple comorbidities.       Juan Warren MD

## 2025-04-03 NOTE — NURSING NOTE
Family brought clothes in for pt beige pants, white shirt navy theramal shirt, and gray knit hat request foam wedge to be sent to nursing home also when discharged

## 2025-04-03 NOTE — PROGRESS NOTES
"Tyshawn Coles is a 84 y.o. male on day 3 of admission presenting with Abscess of hip.    Subjective   Lying in bed after finishing dialysis upon entering room.  Noncommunicative at this time.       Objective     Physical Exam  Constitutional:       Appearance: He is ill-appearing.   HENT:      Head: Normocephalic and atraumatic.      Nose: Nose normal.      Mouth/Throat:      Mouth: Mucous membranes are moist.   Cardiovascular:      Rate and Rhythm: Normal rate.   Pulmonary:      Effort: Pulmonary effort is normal.   Abdominal:      General: Abdomen is flat.      Palpations: Abdomen is soft.   Musculoskeletal:      Cervical back: Neck supple.      Comments: Hip abductor wedge pillow between legs, surgical incision appears well-healing with Steri-Strips in place, sensation appears intact in left lower extremity, no spontaneous plantar dorsiflexion and left foot   Skin:     General: Skin is warm and dry.   Neurological:      General: No focal deficit present.      Mental Status: He is alert.   Psychiatric:         Mood and Affect: Mood normal.         Last Recorded Vitals  Blood pressure 129/62, pulse 82, temperature 37.1 °C (98.8 °F), resp. rate 18, height 1.828 m (5' 11.97\"), weight 67.6 kg (149 lb), SpO2 95%.  Intake/Output last 3 Shifts:  I/O last 3 completed shifts:  In: 1855 (27.4 mL/kg) [P.O.:1405; I.V.:200 (3 mL/kg); IV Piggyback:250]  Out: - (0 mL/kg)   Weight: 67.6 kg     Relevant Results  Results for orders placed or performed during the hospital encounter of 03/31/25 (from the past 24 hours)   POCT GLUCOSE   Result Value Ref Range    POCT Glucose 173 (H) 74 - 99 mg/dL   POCT GLUCOSE   Result Value Ref Range    POCT Glucose 303 (H) 74 - 99 mg/dL   Basic Metabolic Panel   Result Value Ref Range    Glucose 318 (H) 74 - 99 mg/dL    Sodium 133 (L) 136 - 145 mmol/L    Potassium 5.2 3.5 - 5.3 mmol/L    Chloride 95 (L) 98 - 107 mmol/L    Bicarbonate 27 21 - 32 mmol/L    Anion Gap 16 10 - 20 mmol/L    Urea Nitrogen " 58 (H) 6 - 23 mg/dL    Creatinine 5.44 (H) 0.50 - 1.30 mg/dL    eGFR 10 (L) >60 mL/min/1.73m*2    Calcium 8.8 8.6 - 10.3 mg/dL   CBC and Auto Differential   Result Value Ref Range    WBC 23.7 (H) 4.4 - 11.3 x10*3/uL    nRBC 0.0 0.0 - 0.0 /100 WBCs    RBC 2.53 (L) 4.50 - 5.90 x10*6/uL    Hemoglobin 7.3 (L) 13.5 - 17.5 g/dL    Hematocrit 21.4 (L) 41.0 - 52.0 %    MCV 85 80 - 100 fL    MCH 28.9 26.0 - 34.0 pg    MCHC 34.1 32.0 - 36.0 g/dL    RDW 18.6 (H) 11.5 - 14.5 %    Platelets 265 150 - 450 x10*3/uL    Neutrophils % 83.0 40.0 - 80.0 %    Immature Granulocytes %, Automated 2.8 (H) 0.0 - 0.9 %    Lymphocytes % 7.4 13.0 - 44.0 %    Monocytes % 5.6 2.0 - 10.0 %    Eosinophils % 1.0 0.0 - 6.0 %    Basophils % 0.2 0.0 - 2.0 %    Neutrophils Absolute 19.65 (H) 1.60 - 5.50 x10*3/uL    Immature Granulocytes Absolute, Automated 0.67 (H) 0.00 - 0.50 x10*3/uL    Lymphocytes Absolute 1.74 0.80 - 3.00 x10*3/uL    Monocytes Absolute 1.33 (H) 0.05 - 0.80 x10*3/uL    Eosinophils Absolute 0.23 0.00 - 0.40 x10*3/uL    Basophils Absolute 0.05 0.00 - 0.10 x10*3/uL   Magnesium   Result Value Ref Range    Magnesium 2.22 1.60 - 2.40 mg/dL   Phosphorus   Result Value Ref Range    Phosphorus 3.4 2.5 - 4.9 mg/dL   POCT GLUCOSE   Result Value Ref Range    POCT Glucose 331 (H) 74 - 99 mg/dL   POCT GLUCOSE   Result Value Ref Range    POCT Glucose 124 (H) 74 - 99 mg/dL     Scheduled medications  apixaban, 2.5 mg, oral, BID  cholecalciferol, 1,000 Units, oral, Daily  docusate sodium, 100 mg, oral, Daily  donepezil, 22.5 mg, oral, Daily  finasteride, 5 mg, oral, Daily  heparin, 2,000 Units, intra-catheter, After Dialysis  insulin glargine, 5 Units, subcutaneous, Nightly  insulin lispro, 0-10 Units, subcutaneous, TID AC  levETIRAcetam, 500 mg, oral, BID  melatonin, 10 mg, oral, Nightly  metoprolol tartrate, 50 mg, oral, BID  piperacillin-tazobactam, 3.375 g, intravenous, q12h  sevelamer carbonate, 1.6 g, oral, BID  vitamin B complex-vitamin C-folic  acid, 1 capsule, oral, Daily      Continuous medications     PRN medications  PRN medications: acetaminophen **OR** acetaminophen **OR** acetaminophen, alteplase, benzocaine-menthol, guaiFENesin, ipratropium-albuteroL, ondansetron **OR** ondansetron, oxyCODONE, oxyCODONE, polyethylene glycol, prochlorperazine **OR** prochlorperazine **OR** prochlorperazine    Assessment/Plan   Assessment & Plan  Abscess of hip    Hip dislocation, left, initial encounter (Multi)    ESRD (end stage renal disease) on dialysis (Multi)    Postop day 3 of reduction of left hip hemiarthroplasty  Physical and Occupational Therapy are on consult, weightbearing as tolerated for transfers, hip abductor pillow while in bed to prevent further dislocations  Continues on home Eliquis for VTE prophylaxis  Labs reviewed, blood cultures no growth to date  Multimodal pain regime  Bowel regime  Encourage incentive spirometry  Discharged to be determined  Follow-up with Dr. Dawson in 2 weeks as directed       I spent 10 minutes in the professional and overall care of this patient.      Violet Wilkins PA-C

## 2025-04-03 NOTE — PROGRESS NOTES
Speech-Language Pathology    SLP Adult Inpatient Clinical Swallow Evaluation    Patient Name: Tyshawn Coles  MRN: [id number]  Today's Date: 4/3/2025   Time Calculation  Start Time: 1716  Stop Time: 1737  Time Calculation (min): 21 min         Current Problem:   1. Abscess of hip        2. Hip dislocation, left, initial encounter (Multi)  CANCELED: Case Request Operating Room: CLOSED REDUCTION, DISLOCATION, TOTAL ARTHROPLASTY HARDWARE, HIP    CANCELED: Case Request Operating Room: CLOSED REDUCTION, DISLOCATION, TOTAL ARTHROPLASTY HARDWARE, HIP      3. Hypovolemia due to dehydration        4. History of anemia due to chronic kidney disease        5. ESRD (end stage renal disease) (Multi)        6. Hypotension due to hypovolemia          Recommendations:  Diet:   - Regular (IDDSI Level 7)  - Thin liquids (IDDSI Level 0)  Medication Administration: Whole in pureed carrier or as best tolerated  Strategies:   - Small bites  - Small, single sips  - Alternate consistencies  - Upright for all PO intake  - No straws  - Limit distractions  - Complete oral care frequently throughout the day  - Full supervision with meals; One to one assist with meals  Instrumental Evaluation: Modified Barium Swallow Study (MBSS) to assess swallow physiology    Assessment:  Consistencies Trialed:   - Regular (IDDSI Level 7)  - Pureed (IDDSI Level 4)  - Thin liquids (IDDSI Level 0)  Upon completion of clinical swallow evaluation at bedside this date, patient presents with no concern for oral dysphagia and suspected pharyngeal dysphagia, characterized by immediate coughing following consecutive sips of thin liquid via straw. Therefore, patient did not pass the Gravette Swallow Protocol (3 oz water challenge). It should also be noted that due to patient's ongoing hiccups during PO this date, he needed to stop, then start consecutive boluses thin liquid via straw. Full conclusion of oral motor strength/ROM was precluded as patient unable to understand  (versus could not execute) oral directives. What could be derived from patient's oral motor examination was significantly decreased labial strength/lingual protrusion and moderately decreased lingual lateralization. Patient an upper denture and missing lower dentition. Volitional swallow was WFL, whereas volitional cough was slightly weak.   It should be noted that immediately following oral care, patient began to hiccup, continuing to do so during his evaluation and treatment session this date. Patient able to tolerate PO trials despite continuing with hiccups.   Patient is considered to be at risk for aspiration.   Per the results of this assessment, recommend Regular with thin liquids. Given concern for dysphagia, recommend ongoing assessment of PO tolerance.    ST to continue to follow during inpatient stay, and will update recommendations as indicated per continued therapy sessions.       Plan:   Skilled speech therapy for dysphagia treatment is warranted for ongoing assessment of oropharyngeal swallow function, to ensure tolerance of safest and least restrictive consistencies, to provide training and instruction regarding the use of compensatory swallow strategies and patient/caregiver education in order to reduce risk of aspiration, dehydration and malnutrition.  Frequency: 2x/week  Duration: 2 weeks  SLP Discharge Recommendations: To be determined pending progress in therapy     Goals (established 4/3/25):  1. Patient will tolerate PO with no overt s/s of aspiration in 90% of observed therapeutic trials.  Progressing. Given minimal to moderate verbal/visual cues re: above strategies, patient consumed 2 oz thin liquids via cup with no apparent S/S pharyngeal dysphagia.  2. Patient/caregiver will implement safe swallowing strategies to reduce risk of aspiration in 90% of trials given caregiver assistance/cueing as needed.  Progressing. Patient unable to indicate safe swallow strategies (compensatory swallow  "techniques) per Clinical Swallow Evaluation this date. Verbal models were provided for same.   3. Complete a modified barium swallow study (MBSS) for objective assessment of swallowing function, to assess for aspiration, and to guide further recommendations and treatment plan.  MBSS is indicated at this time and has been ordered.      Subjective:  Patient was seen at bedside for clinical swallow evaluation. Patient was assisted into an upright position for PO trials. Although patient with waxing/waning alertness, he was able to rouse adequately for evaluation. Patient with an opened mouth posture at rest. Vocal intensity varied from WFL to hypophonia to aphonia, likely due to above alertness levels. Congested spontaneous coughing and throat clearing noted prior to PO presentation.     Baseline Assessment:  O2 use: Room air     General Visit Information:  Clinical Swallow Evaluation was ordered due to concern for dysphagia. Current diet level is Regular with thin liquids, which is patient's baseline.  FERNIE Kahn reports patient has been swallowing okay, definitely needs small bites. He has been coughing after eating. which is why RN asked the doctor to consult speech. RN currently presents pills whole in applesauce with no observed swallowing difficulty.    History of Present Illness:  Per EMR, \"84 y.o. male with PMH history of ESRD on hemodialysis Tuesday Thursday Saturday, Autonomic dysfunction, Chronic anemia, Coronary artery disease, Dyslipidemia, Frequent falls, Heart failure with preserved ejection fraction, History of anaphylaxis, closed head injury, Clostridioides difficile colitis, non-ST elevation myocardial infarction (NSTEMI), sepsis, Hypoxic ischemic encephalopathy, Multilevel degenerative disc disease, Paroxysmal atrial fibrillation, Peripheral vascular disease, Secondary hyperparathyroidism of renal origin, Seizure disorder, Thrombectomy, Tonsillectomy, Adenoidectomy, hypertension, type 2 diabetes " "mellitus, dementia who is minimally responsive at baseline and just jennifer and yazan presented from outside hospital for concern for posteriorly dislocated hip along with concern for possible abscess.  He was found to have leukocytosis along with fever. Most of the history is obtained from wife at bedside who reports that after being discharged from here with his hip replacement earlier in the month he had a fall about a week later.  Since then he has not been able to stand or walk.  It is unclear if he had dislocation of the hip at that point or with a Edison lift.                                         Imaging:   XR chest 1 view  Result Date: 3/30/2025  Impression: No acute findings.\"     Pain:  Pain Assessment: PAINAD (Pain Assessment in Advanced Dementia Scale)  Breathin     Negative Vocalization: 0       Facial Expression: 0     Body Language: 0     Consolability: 0      PAINAD Score: 0     Treatment: Yes. Treatment was provided following clinical swallow evaluation. Reviewed results and impressions in detail with patient. Discussed recommended diet textures and strategies for balancing aspiration/choking risk with quality of life, as well as rationale behind MBSS and procedure. Further discussed risk for aspiration, and need for strict adherence to aspiration precautions.   Patient unable to indicate safe swallow strategies (compensatory swallow techniques) per Clinical Swallow Evaluation this date. Verbal models were provided for same.   Given minimal to moderate verbal/visual cues re: above strategies, patient consumed 2 oz thin liquids via cup with no apparent S/S pharyngeal dysphagia.     Education:   Learner patient; RN  Barriers to Learning Acuity of illness; cognitive communication limitations  Method demonstration; verbal; visual  Education-Topic SLP provided patient education regarding role of SLP, purpose of assessment and clinical impressions/recommendations. Patient verbalized questionable full " comprehension, consistent with cognitive status. SLP further coordinated with RN regarding recommendations and precautions per this assessment, with RN verbalizing understanding.  Outcome: Needs review and reinforcement

## 2025-04-03 NOTE — PROGRESS NOTES
Physical Therapy    Physical Therapy    Physical Therapy Treatment    Patient Name: Tyshawn Coles  MRN: [id number]  Today's Date: 4/3/2025  Time Calculation  Start Time: 1348  Stop Time: 1411  Time Calculation (min): 23 min     4105/4105-A    Assessment/Plan   PT Assessment  PT Assessment Results: Decreased strength, Decreased range of motion, Decreased endurance, Impaired balance, Decreased mobility, Pain, Orthopedic restrictions, Decreased safety awareness, Impaired judgement, Decreased cognition  Rehab Prognosis: Fair  Barriers to Discharge Home: Cognition needs, Physical needs  Cognition Needs: 24hr supervision for safety awareness needed, Recollection or understanding of precautions/restrictions limited, Insight of patient limited regarding functional ability/needs, Cognition-related high falls risk  End of Session Communication: Bedside nurse, Physician  End of Session Patient Position: Alarm on, Bed, 4 rail up  PT Plan  Inpatient/Swing Bed or Outpatient: Inpatient  PT Plan  Treatment/Interventions: Bed mobility, Transfer training, Balance training, Neuromuscular re-education, Strengthening, Endurance training, Range of motion, Therapeutic exercise, Therapeutic activity, Home exercise program, Positioning  PT Plan: Ongoing PT  PT Frequency: 2 times per week  PT Discharge Recommendations: Moderate intensity level of continued care  Equipment Recommended upon Discharge: Wheeled walker, Wheelchair  PT Recommended Transfer Status: Total assist  PT - OK to Discharge: Yes (     04/03/25 1348   PT  Visit   PT Received On 04/03/25   General   Reason for Referral Impaired Mobility   Referred By Paul Guevara MD   Past Medical History Relevant to Rehab To hospital with left hip dislocation following fall. Pt underwent CLOSED REDUCTION, DISLOCATION, TOTAL ARTHROPLASTY HARDWARE, HIP (L) 3/31. PMH: dementia, anemia, CAD   Co-Treatment OT   Prior to Session Communication Bedside nurse   Patient Position Received Alarm on;Bed, 4  rail up   General Comment Patient agreebale to PT session   Precautions   LE Weight Bearing Status Weight Bearing as Tolerated  (L LE as it relates to Transfers only)   Precautions Comment Patient should remain at bedrest at all times with the use of abduction pillow at all times with the exception of weightbearing as tolerate activity for transfer related purposes only   Pain Assessment   Pain Assessment 0-10   0-10 (Numeric) Pain Score   (Unable to rate)   Pain Type Surgical pain   Cognition   Overall Cognitive Status Impaired   Coordination   Movements are Fluid and Coordinated No   Static Sitting Balance   Static Sitting-Comment/Number of Minutes x 8 min Poor  (Once patient was rited in midline with feet on the floor patient was Mod A x 1 to maintain static sitting balance)   Therapeutic Exercise   Therapeutic Exercise Performed Yes   Therapeutic Exercise Activity 1 AP,LAQ, Hip Abduction x 8 reps each assist with  L LE  (Patient required redirect to remian on task very fatigued this pm)   Bed Mobility   Bed Mobility Yes   Bed Mobility 1   Bed Mobility 1 Supine to sitting;Sitting to supine   Level of Assistance 1 Dependent   Bed Mobility Comments 1 x2   Ambulation/Gait Training   Ambulation/Gait Training Performed No   Transfers   Transfer No   PT Assessment   PT Assessment Results Decreased strength;Decreased range of motion;Decreased endurance;Impaired balance;Decreased mobility;Pain;Orthopedic restrictions;Decreased safety awareness;Impaired judgement;Decreased cognition   Rehab Prognosis Fair   Barriers to Discharge Home Cognition needs;Physical needs   Cognition Needs 24hr supervision for safety awareness needed;Recollection or understanding of precautions/restrictions limited;Insight of patient limited regarding functional ability/needs;Cognition-related high falls risk   End of Session Communication Bedside nurse;Physician   End of Session Patient Position Alarm on;Bed, 4 rail up   PT Plan    Inpatient/Swing Bed or Outpatient Inpatient     Outcome Measures:  [facility] Basic Mobility  Turning from your back to your side while in a flat bed without using bedrails: Total  Moving from lying on your back to sitting on the side of a flat bed without using bedrails: Total  Moving to and from bed to chair (including a wheelchair): Total  Standing up from a chair using your arms (e.g. wheelchair or bedside chair): Total  To walk in hospital room: Total  Climbing 3-5 steps with railing: Total  Basic Mobility - Total Score: 6                             EDUCATION:     Education Documentation  No documentation found.  Education Comments  No comments found.        GOALS:  Encounter Problems       Encounter Problems (Active)       PT Problem       Pt will perform bed mobility with max A.  (Progressing)       Start:  04/01/25    Expected End:  04/15/25            Pt will complete sit <> stand and bed <> chair transfers with max A.  (Not Progressing)       Start:  04/01/25    Expected End:  04/15/25            Pt will progress to completing 3 x 10 supine/seated exercises in order to increase strength and improve gait mechanics.   (Not Progressing)       Start:  04/01/25    Expected End:  04/15/25            Pt will tolerate at least 5 minutes of sitting at EOB with max A.  (Progressing)       Start:  04/01/25    Expected End:  04/15/25                   Pain - Adult

## 2025-04-03 NOTE — PROGRESS NOTES
INPATIENT NEPHROLOGY CONSULT PROGRESS NOTE      Patient Name: Tyshawn Coles MRN: [id number]  DATE of SERVICE: April 3, 2025  TIME of SERVICE: 02:38 PM  CONSULTING SERVICE: Nephrology    REASON for CONSULT: ESRD    SUBJECTIVE:  Seen and evaluated at bedside.  Patient developed hiccups.  Receiving hemodialysis today  Ultrafiltration adjusted to half a liter due to hypotension.    SUMMARY OF STAY:  Mr. Coles is a 84 y.o. male who presented to [facility] March 31, 2025 for evaluation of left hip dislocation after remote fall.  Status post left hip hemiarthroplasty reduction April 1, 2025     Patient with past medical history significant for end-stage renal disease on hemodialysis per TTS schedule, hypertension, hyperlipidemia, diabetes mellitus, dementia, seizure disorder.   Patient seen and evaluated at bedside he is with advanced dementia unable to provide additional history.  Patient wife at bedside who was able to provide the majority of the history.  Recently patient had clotted AV graft and currently is receiving dialysis via right tunneled dialysis catheter.  He resides at [facility] and being transported to his dialysis unit.  He has been [facility] lifted.  This admission patient presented to [facility] for evaluation of left hip pain and diagnosed with dislocated left hip hemiarthroplasty and scheduled to go to surgery this afternoon.    Dialysis prescription:  ESRD on hemodialysis  Tuesday Thursday Saturday  Primary nephrologist: Dr. Wise  Estimated dry weight: 67 kg  Duration: 3-1/2 hours  Intradialytic Midodrine  Right tunneled dialysis catheter in place        ASSESSMENT AND PLAN:  ESRD: iHD per Tuesday Thursday Saturday schedule  Volume status:  Hypovolemia  Electrolytes: Within acceptable range  Acid-base: Metabolic acidosis from renal failure (controlled with HD)  Bone mineral disease (Ca/P/PTH): controlled  Anemia from renal  failure, hemoglobin 7.3   Dislocated left hip hemiarthroplasty status postsurgery April 1  Chronic osteomyelitis of the coccyx  Left hip abscess  Hypotension: Fluid responsive  T2DM     PLAN:  End-stage renal disease patient with complex acute/subacute dislocated left hip hemiarthroplasty with left hip abscess, chronic osteomyelitis of the coccyx:     Receiving hemodialysis today April 3, 2025, ultrafiltration minimal only half liter.  Anemia to start IV iron loading dose, Epogen 2000 units  Malnutrition to continue protein supplements 3 times daily  Strict input and output to guide volume management, can have intermittent IV boluses to maintain hemodynamic stability  phosphorus level at 3.3, to hold renvela   For hemodialysis per TTS schedule   Hiccups to give Thorazine status post surgery     Will follow, thank you!      Medications:    Current Facility-Administered Medications:     acetaminophen (Tylenol) tablet 650 mg, 650 mg, oral, q6h PRN, 650 mg at 04/03/25 0843 **OR** acetaminophen (Tylenol) oral liquid 650 mg, 650 mg, oral, q6h PRN **OR** acetaminophen (Tylenol) suppository 650 mg, 650 mg, rectal, q6h PRN, Eliezer Dumont MD    alteplase (Cathflo Activase) injection 2 mg, 2 mg, intra-catheter, PRN, Paul Guevara MD    apixaban (Eliquis) tablet 2.5 mg, 2.5 mg, oral, BID, Paul Guevara MD, 2.5 mg at 04/03/25 0843    benzocaine-menthol (Cepastat Sore Throat) lozenge 1 lozenge, 1 lozenge, Mouth/Throat, q2h PRN, Eliezer Dumont MD    cholecalciferol (Vitamin D-3) tablet 1,000 Units, 1,000 Units, oral, Daily, Eliezer Dumont MD, 1,000 Units at 04/03/25 0843    docusate sodium (Colace) capsule 100 mg, 100 mg, oral, Daily, Paul Guevara MD, 100 mg at 04/03/25 0843    donepezil (Aricept) tablet 22.5 mg, 22.5 mg, oral, Daily, Eliezer Dumont MD, 22.5 mg at 04/03/25 0853    finasteride (Proscar) tablet 5 mg, 5 mg, oral, Daily, Eliezer Dumont MD, 5 mg at 04/03/25 0843    guaiFENesin (Mucinex) 12 hr tablet 600 mg,  600 mg, oral, q12h PRN, Eliezer Dumont MD    heparin 1,000 unit/mL injection 2,000 Units, 2,000 Units, intra-catheter, After Dialysis, Carlene Norman MD, 2,000 Units at 04/03/25 1236    insulin glargine (Lantus) injection 5 Units, 5 Units, subcutaneous, Nightly, Eliezer Dumont MD, 5 Units at 04/02/25 2144    insulin lispro injection 0-10 Units, 0-10 Units, subcutaneous, TID AC, Eliezer Dumont MD, 8 Units at 04/03/25 0843    ipratropium-albuteroL (Duo-Neb) 0.5-2.5 mg/3 mL nebulizer solution 3 mL, 3 mL, nebulization, q2h PRN, Paul Guevara MD    levETIRAcetam (Keppra) tablet 500 mg, 500 mg, oral, BID, Eliezer Dumont MD, 500 mg at 04/03/25 0843    melatonin tablet 10 mg, 10 mg, oral, Nightly, Eliezer Dumont MD, 10 mg at 04/02/25 2246    metoprolol tartrate (Lopressor) tablet 50 mg, 50 mg, oral, BID, Eliezer Dumont MD, 50 mg at 04/02/25 0854    ondansetron (Zofran) tablet 4 mg, 4 mg, oral, q8h PRN **OR** ondansetron (Zofran) injection 4 mg, 4 mg, intravenous, q8h PRN, Eliezer Dumont MD, 4 mg at 03/31/25 1403    oxyCODONE (Roxicodone) immediate release tablet 2.5 mg, 2.5 mg, oral, q6h PRN, Eliezer Dumont MD    oxyCODONE (Roxicodone) immediate release tablet 5 mg, 5 mg, oral, q6h PRN, Eliezer Dumont MD    piperacillin-tazobactam (Zosyn) 3.375 g in dextrose (iso) IV 50 mL, 3.375 g, intravenous, q12h, Eliezer Dumont MD, Last Rate: 0 mL/hr at 04/03/25 0326, 3.375 g at 04/03/25 1321    polyethylene glycol (Glycolax, Miralax) packet 17 g, 17 g, oral, Daily PRN, Eliezer Dumont MD    prochlorperazine (Compazine) tablet 10 mg, 10 mg, oral, q6h PRN **OR** prochlorperazine (Compazine) injection 10 mg, 10 mg, intravenous, q6h PRN **OR** prochlorperazine (Compazine) suppository 25 mg, 25 mg, rectal, q12h PRN, Eliezer Dumont MD    sevelamer carbonate (Renvela) packet 1.6 g, 1.6 g, oral, BID, Eliezer Dumont MD, 1.6 g at 04/03/25 0873    vitamin B complex-vitamin C-folic acid (Nephrocaps) capsule 1 capsule,  1 capsule, oral, Daily, Eliezer Dumont MD, 1 capsule at 04/03/25 0853    PERTINENT ROS:  GENERAL:  positive for fatigue, poor appetite.  No fever/chills  RESPIRATORY:  positive for shortness of breath.  Negative for cough, wheezing.  CARDIOVASCULAR:   Negative for chest pain or palpitation.  GI:  Negative for abdominal pain, diarrhea, heartburn, nausea, vomiting  : External catheter in place with dark concentrated urine    Physical Exam:  Vital signs in last 24 hours:  Temp:  [37.1 °C (98.8 °F)-37.6 °C (99.7 °F)] 37.6 °C (99.7 °F)  Heart Rate:  [76-96] 81  Resp:  [16-21] 16  BP: (101-182)/(50-85) 182/85    General: Awake, oriented to self, pain better controlled  HEENT:  NCAT,  mucous membranes moist and pink  NECK: no JVD, no carotid bruit, supple, no cervical mass or thyromegaly  LUNGS;  Diminished breath sounds, no Rales  CV:  Distant, regular rate and rhythm, no murmurs  ABDOMEN:  abdomen soft, nontender, BS normal, no masses or organomegaly  EDEMA:  no lower extremity edema/dependent edema  SKIN: Left hip with a dressing in place      Intake/Output last 3 shifts:  I/O last 3 completed shifts:  In: 1855 (27.4 mL/kg) [P.O.:1405; I.V.:200 (3 mL/kg); IV Piggyback:250]  Out: - (0 mL/kg)   Weight: 67.6 kg     DATA:  Diagnotic tests reviewed for Todays visit:  Results from last 7 days   Lab Units 04/03/25  0623   WBC AUTO x10*3/uL 23.7*   RBC AUTO x10*6/uL 2.53*   HEMOGLOBIN g/dL 7.3*   HEMATOCRIT % 21.4*     Results from last 7 days   Lab Units 04/03/25  0623   SODIUM mmol/L 133*   POTASSIUM mmol/L 5.2   CHLORIDE mmol/L 95*   CO2 mmol/L 27   BUN mg/dL 58*   CREATININE mg/dL 5.44*   CALCIUM mg/dL 8.8   PHOSPHORUS mg/dL 3.4   MAGNESIUM mg/dL 2.22     Results from last 7 days   Lab Units 03/31/25  1746   COLOR U  Yellow   APPEARANCE U  Clear   PH U  7.0   SPEC GRAV UR  1.015   PROTEIN U mg/dL 100 (2+)*   BLOOD UR mg/dL 0.06 (1+)*   NITRITE U  NEGATIVE   WBC UR /HPF 1-5     IMAGING: CXR reviewed in [facility]  images      SIGNATURE: Carlene Norman MD  Nephrology and Hypertension  [address] [facility] [place]  Office phone: [phone]  FAX: [phone]    This note was partially generated using the Dragon voice recognition system, and there may be some incorrect words, spelling's and punctuation that were not noted in checking the note before saving.

## 2025-04-03 NOTE — PROGRESS NOTES
For follow-up of:  Hip abscess    Subjective   Interval History:  He is sleeping.  He is still having low-grade fevers.       Objective     Current Facility-Administered Medications   Medication Dose Route Frequency Provider Last Rate Last Admin    acetaminophen (Tylenol) tablet 650 mg  650 mg oral q6h PRN Eliezer Dumont MD   650 mg at 04/01/25 0927    Or    acetaminophen (Tylenol) oral liquid 650 mg  650 mg oral q6h PRN Eliezer Dumont MD        Or    acetaminophen (Tylenol) suppository 650 mg  650 mg rectal q6h PRN Eliezer Dumont MD        alteplase (Cathflo Activase) injection 2 mg  2 mg intra-catheter PRN Paul uGevara MD        apixaban (Eliquis) tablet 2.5 mg  2.5 mg oral BID Paul Guevara MD   2.5 mg at 04/02/25 2204    benzocaine-menthol (Cepastat Sore Throat) lozenge 1 lozenge  1 lozenge Mouth/Throat q2h PRN Eliezer Dumont MD        cholecalciferol (Vitamin D-3) tablet 1,000 Units  1,000 Units oral Daily Eliezer Dumont MD   1,000 Units at 04/02/25 0854    docusate sodium (Colace) capsule 100 mg  100 mg oral Daily Paul Guevara MD   100 mg at 04/01/25 0927    donepezil (Aricept) tablet 22.5 mg  22.5 mg oral Daily Eliezer Dumont MD   22.5 mg at 04/02/25 0853    finasteride (Proscar) tablet 5 mg  5 mg oral Daily Eliezer Dumont MD   5 mg at 04/02/25 0857    guaiFENesin (Mucinex) 12 hr tablet 600 mg  600 mg oral q12h PRN Eliezer Dumont MD        insulin glargine (Lantus) injection 5 Units  5 Units subcutaneous Nightly Eliezer Dumont MD   5 Units at 04/02/25 2144    insulin lispro injection 0-10 Units  0-10 Units subcutaneous TID AC Eliezer Dumont MD   4 Units at 04/01/25 1602    ipratropium-albuteroL (Duo-Neb) 0.5-2.5 mg/3 mL nebulizer solution 3 mL  3 mL nebulization q2h PRN Paul Guevara MD        levETIRAcetam (Keppra) tablet 500 mg  500 mg oral BID Eliezer Dumont MD   500 mg at 04/02/25 2142    melatonin tablet 10 mg  10 mg oral Nightly Eliezer Dumont MD   10 mg at 04/01/25 9565     metoprolol tartrate (Lopressor) tablet 50 mg  50 mg oral BID Eliezer Dumont MD   50 mg at 04/02/25 0854    ondansetron (Zofran) tablet 4 mg  4 mg oral q8h PRN Eliezer Dumont MD        Or    ondansetron (Zofran) injection 4 mg  4 mg intravenous q8h PRN Eliezer Dumont MD   4 mg at 03/31/25 1403    oxyCODONE (Roxicodone) immediate release tablet 2.5 mg  2.5 mg oral q6h PRN Eliezer Dumont MD        oxyCODONE (Roxicodone) immediate release tablet 5 mg  5 mg oral q6h PRN Eliezer Dumont MD        piperacillin-tazobactam (Zosyn) 3.375 g in dextrose (iso) IV 50 mL  3.375 g intravenous q12h Eliezer Dumont MD   Stopped at 04/02/25 1651    polyethylene glycol (Glycolax, Miralax) packet 17 g  17 g oral Daily PRN Eliezer Dumont MD        prochlorperazine (Compazine) tablet 10 mg  10 mg oral q6h PRN Eliezer Dumont MD        Or    prochlorperazine (Compazine) injection 10 mg  10 mg intravenous q6h PRN Eliezer Dumont MD        Or    prochlorperazine (Compazine) suppository 25 mg  25 mg rectal q12h PRN Eliezer Dumont MD        sevelamer carbonate (Renvela) packet 1.6 g  1.6 g oral BID Eliezer Dumont MD   1.6 g at 04/01/25 2317    vitamin B complex-vitamin C-folic acid (Nephrocaps) capsule 1 capsule  1 capsule oral Daily Eliezer Dumont MD   1 capsule at 04/02/25 0853        Last Recorded Vitals  /56 (BP Location: Right arm, Patient Position: Lying)   Pulse 78   Temp 37.1 °C (98.8 °F) (Temporal)   Resp 18   Wt 67.6 kg (149 lb)   SpO2 94%   General: no acute distress, lying in bed, sleeping  HEENT: pharynx not examined  CVS: RRR  Resp: decreased breath sounds in bases  ABD: soft, NT, ND  EXT: no edema  Skin: no rash     Relevant Results:    Labs:   Results from last 72 hours   Lab Units 04/02/25  0515 04/01/25  0514 03/31/25  1032   SODIUM mmol/L 137 137 136   POTASSIUM mmol/L 4.2 4.8 4.6   CHLORIDE mmol/L 99 98 95*   BUN mg/dL 34* 62* 48*   CREATININE mg/dL 4.23* 7.20* 6.25*   MAGNESIUM mg/dL 2.05  2.36 2.19   PHOSPHORUS mg/dL  --   --  3.3     Results from last 72 hours   Lab Units 04/02/25  0515 04/01/25  0514 03/31/25  1032   WBC AUTO x10*3/uL 28.3* 27.5* 28.7*   HEMOGLOBIN g/dL 7.1* 7.4* 7.8*   HEMATOCRIT % 20.4* 21.1* 22.2*   PLATELETS AUTO x10*3/uL 236 227 252       Microbiology data: I have personally and independently reviewed and intrepreted the lab results  3/31/2025 blood cultures show no growth  3/31/2025 urine culture shows no growth  3/31/2025 decubitus wound culture shows mixed growth    Imaging data: I have personally and independently reviewed and interpreted the imaging studies  3/28/2025 left hip x-ray shows no acute issues  3/28/2025 chest x-ray shows no acute issues  3/31/2025 CT abdomen and pelvis shows dislocation of left hip arthroplasty; there is a large fluid collection; chronic osteomyelitis of the coccyx  3/31/2025 x-ray of the left hip shows dislocation    Assessment/Plan     MY IMPRESSION & RECOMMENDATIONS:  Persistent fevers, which are low-grade.  He still has a leukocytosis which is concerning.  I will keep him on vancomycin and Zosyn for now.  We will need to continue to consider the potential sources, which at this point include the fluid collection at the hip versus the decubitus ulcer.  His negative blood cultures make a dialysis catheter-related infection less likely.      -Continue vancomycin and Zosyn for now   -Await further wound culture results  -CBC tomorrow  -Monitor temperatures     Other issues:  #Left prosthetic hip dislocation status post closed reduction  #Hypertension  #Hyperlipidemia  #End-stage renal disease on dialysis, which can affect the dosing of antimicrobials  #Type 2 diabetes, which is chronic condition that increases his risk for infection  #Dementia     ~I have personally and independently reviewed and interpreted the laboratory tests, imaging studies, and the documentation from other healthcare providers.  I am monitoring for side effects from  vancomycin and Zosyn as outlined in a previous note.  His prognosis remains uncertain due to his multiple comorbidities.       Juan Warren MD

## 2025-04-03 NOTE — CARE PLAN
The patient's goals for the shift include Pt to not fall all shift    The clinical goals for the shift include Pt will participate in Q2 turns for proper positioning and pressure relief throughout  this shift      Problem: Skin  Goal: Prevent/manage excess moisture  Flowsheets (Taken 4/3/2025 3725)  Prevent/manage excess moisture:   Cleanse incontinence/protect with barrier cream   Use wicking fabric (obtain order)

## 2025-04-03 NOTE — PROGRESS NOTES
INPATIENT NEPHROLOGY CONSULT PROGRESS NOTE      Patient Name: Tyshawn Coles MRN: [id number]  DATE of SERVICE: April 2, 2025  TIME of SERVICE: 02:14 PM  CONSULTING SERVICE: Nephrology    REASON for CONSULT: ESRD    SUBJECTIVE:  Seen and evaluated at bedside.  Left Hip pain controlled today postop day 2 status post closed reduction of left hip hemiarthroplasty dislocation..   No urgent indication for renal placement therapy today    SUMMARY OF STAY:  Mr. Coles is a 84 y.o. male who presented to [facility] March 31, 2025 for evaluation of left hip dislocation after remote fall.      Patient with past medical history significant for end-stage renal disease on hemodialysis per TTS schedule, hypertension, hyperlipidemia, diabetes mellitus, dementia, seizure disorder.   Patient seen and evaluated at bedside he is with advanced dementia unable to provide additional history.  Patient wife at bedside who was able to provide the majority of the history.  Recently patient had clotted AV graft and currently is receiving dialysis via right tunneled dialysis catheter.  He resides at [facility] and being transported to his dialysis unit.  He has been [facility] lifted.  This admission patient presented to [facility] for evaluation of left hip pain and diagnosed with dislocated left hip hemiarthroplasty and scheduled to go to surgery this afternoon.    Dialysis prescription:  ESRD on hemodialysis  Tuesday Thursday Saturday  Primary nephrologist: Dr. Wise  Estimated dry weight: To be determined  Duration: 3-1/2 hours  Intradialytic Midodrine  Right tunneled dialysis catheter in place        ASSESSMENT AND PLAN:  ESRD: iHD per Tuesday Thursday Saturday schedule  --Last hemodialysis was Saturday  Volume status:  Hypovolemia receiving IV normal saline at 100 mL/h  Electrolytes: Within acceptable range  Acid-base: Metabolic acidosis from renal failure (controlled  with HD)  Bone mineral disease (Ca/P/PTH): controlled  Anemia from renal failure, hemoglobin below target at 7.8 mg/g  Dislocated left hip hemiarthroplasty scheduled for surgery today  Chronic osteomyelitis of the coccyx  Left hip abscess  Hypotension: Fluid responsive  T2DM     PLAN:  End-stage renal disease patient with complex acute/subacute dislocated left hip hemiarthroplasty with left hip abscess, chronic osteomyelitis of the coccyx:     Electrolytes and volume status within acceptable range no urgent indication for any replacement therapy today.  For hemodialysis tomorrow   IV albumin as needed during dialysis to maintain hemodynamic stability  Anemia multifactorial to continue IV iron and Epogen with dialysis  Malnutrition to continue protein supplements 3 times daily  Strict input and output to guide volume management, can have intermittent IV boluses to maintain hemodynamic stability  Will follow pending cultures.  phosphorus level at 3.3, to hold renvela   For hemodialysis per TTS schedule      Will follow, thank you!      Medications:    Current Facility-Administered Medications:     acetaminophen (Tylenol) tablet 650 mg, 650 mg, oral, q6h PRN, 650 mg at 04/01/25 0927 **OR** acetaminophen (Tylenol) oral liquid 650 mg, 650 mg, oral, q6h PRN **OR** acetaminophen (Tylenol) suppository 650 mg, 650 mg, rectal, q6h PRN, Eliezer Dumont MD    alteplase (Cathflo Activase) injection 2 mg, 2 mg, intra-catheter, PRN, Paul Guevara MD    apixaban (Eliquis) tablet 2.5 mg, 2.5 mg, oral, BID, Paul Guevara MD, 2.5 mg at 04/02/25 0857    benzocaine-menthol (Cepastat Sore Throat) lozenge 1 lozenge, 1 lozenge, Mouth/Throat, q2h PRN, Eliezer Dumont MD    cholecalciferol (Vitamin D-3) tablet 1,000 Units, 1,000 Units, oral, Daily, Eliezer Dumont MD, 1,000 Units at 04/02/25 0854    docusate sodium (Colace) capsule 100 mg, 100 mg, oral, Daily, Paul Guevara MD, 100 mg at 04/01/25 0927    donepezil (Aricept) tablet 22.5 mg,  22.5 mg, oral, Daily, Eliezer Dumont MD, 22.5 mg at 04/02/25 0853    finasteride (Proscar) tablet 5 mg, 5 mg, oral, Daily, Eliezer Dumont MD, 5 mg at 04/02/25 0857    guaiFENesin (Mucinex) 12 hr tablet 600 mg, 600 mg, oral, q12h PRN, Eliezer Dumont MD    insulin glargine (Lantus) injection 5 Units, 5 Units, subcutaneous, Nightly, Eliezer Dumont MD, 5 Units at 04/01/25 2317    insulin lispro injection 0-10 Units, 0-10 Units, subcutaneous, TID AC, Eliezer Dumont MD, 4 Units at 04/01/25 1602    ipratropium-albuteroL (Duo-Neb) 0.5-2.5 mg/3 mL nebulizer solution 3 mL, 3 mL, nebulization, q2h PRN, Paul Guevara MD    levETIRAcetam (Keppra) tablet 500 mg, 500 mg, oral, BID, Eliezer Dumont MD, 500 mg at 04/02/25 0857    melatonin tablet 10 mg, 10 mg, oral, Nightly, Eliezer Dumont MD, 10 mg at 04/01/25 2317    metoprolol tartrate (Lopressor) tablet 50 mg, 50 mg, oral, BID, Eliezer Dumont MD, 50 mg at 04/02/25 0854    ondansetron (Zofran) tablet 4 mg, 4 mg, oral, q8h PRN **OR** ondansetron (Zofran) injection 4 mg, 4 mg, intravenous, q8h PRN, Eliezer Dumont MD, 4 mg at 03/31/25 1403    oxyCODONE (Roxicodone) immediate release tablet 2.5 mg, 2.5 mg, oral, q6h PRN, Eliezer Dumont MD    oxyCODONE (Roxicodone) immediate release tablet 5 mg, 5 mg, oral, q6h PRN, Eliezer Dumont MD    piperacillin-tazobactam (Zosyn) 3.375 g in dextrose (iso) IV 50 mL, 3.375 g, intravenous, q12h, Eliezer Dumont MD, Stopped at 04/02/25 1651    polyethylene glycol (Glycolax, Miralax) packet 17 g, 17 g, oral, Daily PRN, Eliezer Dumont MD    prochlorperazine (Compazine) tablet 10 mg, 10 mg, oral, q6h PRN **OR** prochlorperazine (Compazine) injection 10 mg, 10 mg, intravenous, q6h PRN **OR** prochlorperazine (Compazine) suppository 25 mg, 25 mg, rectal, q12h PRN, Eliezer Dumont MD    sevelamer carbonate (Renvela) packet 1.6 g, 1.6 g, oral, BID, Eliezer Dumont MD, 1.6 g at 04/01/25 1307    vitamin B complex-vitamin C-folic acid  (Nephrocaps) capsule 1 capsule, 1 capsule, oral, Daily, Eliezer Dumont MD, 1 capsule at 04/02/25 0853    PERTINENT ROS:  GENERAL:  positive for fatigue, poor appetite.  No fever/chills  RESPIRATORY:  positive for shortness of breath.  Negative for cough, wheezing.  CARDIOVASCULAR:   Negative for chest pain or palpitation.  GI:  Negative for abdominal pain, diarrhea, heartburn, nausea, vomiting  : External catheter in place with dark concentrated urine    Physical Exam:  Vital signs in last 24 hours:  Temp:  [36.8 °C (98.2 °F)-37.6 °C (99.7 °F)] 37.1 °C (98.8 °F)  Heart Rate:  [65-90] 78  Resp:  [18-19] 18  BP: (101-137)/(50-63) 101/56    General: Awake, oriented to self, pain better controlled  HEENT:  NCAT,  mucous membranes moist and pink  NECK: no JVD, no carotid bruit, supple, no cervical mass or thyromegaly  LUNGS;  Diminished breath sounds, no Rales  CV:  Distant, regular rate and rhythm, no murmurs  ABDOMEN:  abdomen soft, nontender, BS normal, no masses or organomegaly  EDEMA:  no lower extremity edema/dependent edema  SKIN: Left hip with a dressing in place      Intake/Output last 3 shifts:  I/O last 3 completed shifts:  In: 650 (9.6 mL/kg) [P.O.:100; I.V.:400 (5.9 mL/kg); IV Piggyback:150]  Out: - (0 mL/kg)   Weight: 67.6 kg     DATA:  Diagnotic tests reviewed for Todays visit:  Results from last 7 days   Lab Units 04/02/25  0515   WBC AUTO x10*3/uL 28.3*   RBC AUTO x10*6/uL 2.47*   HEMOGLOBIN g/dL 7.1*   HEMATOCRIT % 20.4*     Results from last 7 days   Lab Units 04/02/25  0515 04/01/25  0514 03/31/25  1032   SODIUM mmol/L 137   < > 136   POTASSIUM mmol/L 4.2   < > 4.6   CHLORIDE mmol/L 99   < > 95*   CO2 mmol/L 28   < > 30   BUN mg/dL 34*   < > 48*   CREATININE mg/dL 4.23*   < > 6.25*   CALCIUM mg/dL 8.2*   < > 9.0   PHOSPHORUS mg/dL  --   --  3.3   MAGNESIUM mg/dL 2.05   < > 2.19    < > = values in this interval not displayed.     Results from last 7 days   Lab Units 03/31/25  1746   COLOR U  Yellow    APPEARANCE U  Clear   PH U  7.0   SPEC GRAV UR  1.015   PROTEIN U mg/dL 100 (2+)*   BLOOD UR mg/dL 0.06 (1+)*   NITRITE U  NEGATIVE   WBC UR /HPF 1-5     IMAGING: CXR reviewed in  images      SIGNATURE: Carlene Norman MD  Nephrology and Hypertension  [address] [place]  Office phone: [phone]  FAX: [phone]    This note was partially generated using the Dragon voice recognition system, and there may be some incorrect words, spelling's and punctuation that were not noted in checking the note before saving.

## 2025-04-03 NOTE — PROCEDURES
Report to Receiving RN:    Report To: Esha Cabrera  Time Report Called: 1300  Hand-Off Communication: pt stable, vss, 0.5L removed due to low bp, Dr. Eemrson john  Complications During Treatment: Yes; low bp  Ultrafiltration Treatment: No  Medications Administered During Dialysis: No  Blood Products Administered During Dialysis: No  Labs Sent During Dialysis: No  Heparin Drip Rate Changes: N/A  Dialysis Catheter Dressing: Clean Dry Intact  Last Dressing Change: 4/1/25    Electronic Signatures:   (Ronn Villalobos )   Authored:    (Ronn Villalobos )   Authored:     Last Updated: 2:50 PM by RONN VILLALOBOS

## 2025-04-03 NOTE — PROGRESS NOTES
Tyshawn Coles is a 84 y.o. male on day 3 of admission presenting with Abscess of hip.      Subjective   Patient is calm and cooperative.  Wife is not at bedside.  ID attending made aware of positive blood culture at the nursing facility with Bacteroides fragilis and multiple site.  Will try to get the final susceptibility.  Cultures have been negative here.  Nursing made aware to get the final susceptibility from the facility       Objective     Last Recorded Vitals  /62 (BP Location: Right arm, Patient Position: Lying)   Pulse 81   Temp 37.1 °C (98.8 °F)   Resp 18   Wt 67.6 kg (149 lb)   SpO2 95%   Intake/Output last 3 Shifts:    Intake/Output Summary (Last 24 hours) at 4/3/2025 1003  Last data filed at 4/3/2025 0918  Gross per 24 hour   Intake 1805 ml   Output --   Net 1805 ml       Admission Weight  Weight: 67.6 kg (149 lb) (03/31/25 1659)    Daily Weight  03/31/25 : 67.6 kg (149 lb)      Physical Exam:  General: Resting comfortably and calm and cooperative  HEENT: PERRLA, head intact and normocephalic  Neck: Normal to inspection  Lungs: Clear to auscultation, work of breathing within normal limit  Cardiac: Regular rate and rhythm  Abdomen: Soft nontender, positive bowel sounds  : Exam deferred  Skin: Multiple superficial decubitus ulcer noted with skin breakdown and erythematous base with some foul drainage  Hematology: No petechia or excessive ecchymosis  Musculoskeletal: Left hip Steri-Strip is noted with some hardness.  Neurological: Alert awake oriented x 1, unable to assess the rest.  Does not seem to have focal deficit  Psych: Calm and cooperative    Relevant Results  Scheduled medications  apixaban, 2.5 mg, oral, BID  cholecalciferol, 1,000 Units, oral, Daily  docusate sodium, 100 mg, oral, Daily  donepezil, 22.5 mg, oral, Daily  finasteride, 5 mg, oral, Daily  insulin glargine, 5 Units, subcutaneous, Nightly  insulin lispro, 0-10 Units, subcutaneous, TID AC  levETIRAcetam, 500 mg, oral,  BID  melatonin, 10 mg, oral, Nightly  metoprolol tartrate, 50 mg, oral, BID  piperacillin-tazobactam, 3.375 g, intravenous, q12h  sevelamer carbonate, 1.6 g, oral, BID  vitamin B complex-vitamin C-folic acid, 1 capsule, oral, Daily      Continuous medications     PRN medications  PRN medications: acetaminophen **OR** acetaminophen **OR** acetaminophen, alteplase, benzocaine-menthol, guaiFENesin, ipratropium-albuteroL, ondansetron **OR** ondansetron, oxyCODONE, oxyCODONE, polyethylene glycol, prochlorperazine **OR** prochlorperazine **OR** prochlorperazine   Labs  Results from last 7 days   Lab Units 04/03/25  0623 04/02/25  0515 04/01/25  0514   WBC AUTO x10*3/uL 23.7* 28.3* 27.5*   HEMOGLOBIN g/dL 7.3* 7.1* 7.4*   HEMATOCRIT % 21.4* 20.4* 21.1*   PLATELETS AUTO x10*3/uL 265 236 227     Results from last 7 days   Lab Units 04/03/25  0623 04/02/25  0515 04/01/25  0514   SODIUM mmol/L 133* 137 137   POTASSIUM mmol/L 5.2 4.2 4.8   CHLORIDE mmol/L 95* 99 98   CO2 mmol/L 27 28 26   BUN mg/dL 58* 34* 62*   CREATININE mg/dL 5.44* 4.23* 7.20*   CALCIUM mg/dL 8.8 8.2* 8.6   GLUCOSE mg/dL 318* 146* 144*       Imaging  XR hip left with pelvis when performed 2 or 3 views    Result Date: 3/31/2025  IMPRESSION: Dislocated left hip hemiarthroplasty.  Please see concurrent CT report for additional information. : PSCB   Transcribe Date/Time: Mar 31 2025  2:42A Dictated by : JORJE DICK MD This examination was interpreted and the report reviewed and electronically signed by: JORJE DICK MD on Mar 31 2025  2:46AM  EST    CT abdomen pelvis w IV contrast    Result Date: 3/31/2025  IMPRESSION: 1.  Posterior superior dislocation left hip hemiarthroplasty.  Large left hip and surrounding soft tissue air-fluid collection can represent abscess. 2.  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 : SMITH   Transcrijarod Date/Time: Mar 31 2025  1:24A Dictated by :  BRENT POLK MD This examination was interpreted and the report reviewed and electronically signed by: BRENT POLK MD on Mar 31 2025  1:37AM  EST    XR chest 1 view    Result Date: 3/30/2025  IMPRESSION: No acute findings. : SMITH   Transcribe Date/Time: Mar 30 2025 11:22P Dictated by : GINNY TREVINO MD This examination was interpreted and the report reviewed and electronically signed by: GINNY TREVINO MD on Mar 30 2025 11:23PM  EST     Cardiology, Vascular, and Other Imaging  FL fluoro images no charge    Result Date: 3/31/2025  These images are not reportable by radiology and will not be interpreted by  Radiologists.    XR hip left with pelvis when performed 2 or 3 views    Result Date: 3/31/2025  * * *Final Report* * * DATE OF EXAM: Mar 31 2025  2:15AM   VHX   5351  -  XR HIP 3V PELV+ AP/LAT LT  / ACCESSION #  [id number] PROCEDURE REASON: Hip pain, acute, fx suspected, initial exam      * * * * Physician Interpretation * * * *  HISTORY: abnormal finding on CT Hip pain, acute, fx suspected, initial exam Hip pain, acute, fx suspected, initial exam RESULT: Frontal view of the pelvis and frontal and crosstable lateral views of the left hip, compared to CT on the same day and conventional radiographs on 08/14/2024. Post bilateral hemiarthroplasty of the hips.  The left acetabular cup and femoral prosthesis are dislocated posteriorly and superiorly with respect to the acetabulum.  There is generalized osteopenia.  Significant vascular calcifications are seen in the soft tissues.    IMPRESSION: Dislocated left hip hemiarthroplasty.  Please see concurrent CT report for additional information. : SMITH   Transcribe Date/Time: Mar 31 2025  2:42A Dictated by : JORJE DICK MD This examination was interpreted and the report reviewed and electronically signed by: JORJE DICK MD on Mar 31 2025  2:46AM  EST    CT abdomen pelvis w IV contrast    Result Date: 3/31/2025  * * *Final  Report* * * DATE OF EXAM: Mar 31 2025 12:18AM   [facility]   0530  -  CT ABD/PEL W IVCON  / ACCESSION #  [id number] PROCEDURE REASON: Abdominal pain, acute, nonlocalized      * * * * Physician Interpretation * * * *  EXAMINATION:  CT ABDOMEN AND PELVIS WITH IV CONTRAST CLINICAL HISTORY: Abdominal pain TECHNIQUE: CT of the abdomen and pelvis was performed using standard technique, scanning from just above the dome of the diaphragm to the symphysis pubis. MQ:  CTAP_3 Contrast: IV:  100 ml of Omnipaque 350 : ml of CT Radiation dose: Integrated Dose-length product (DLP) for this visit =   623 mGy*cm. CT Dose Reduction Employed: Automated exposure control(AEC) and iterative recon COMPARISON: 08/14/2024. RESULT: Coronary artery calcifications.  Suprarenal IVC filter.  Colonic diverticulosis.  Mild rectal stool burden.  Aortoiliac calcifications.   Punctate right nephrolithiasis.  No hydronephrosis.  Mild urinary bladder wall thickening.  Posterior dislocated left hip hemiarthroplasty.  Within the left hip joint, and posterior to the arthroplasty there is large air-fluid collection 11 x 6 x 13 cm.  Right hip hemiarthroplasty present.  Sacral decubitus ulceration with large open defect extending to the sacrum, coccyx no evidence of osteolysis or periosteal reaction.  There is mild sclerosis of the coccyx.    IMPRESSION: 1.  Posterior superior dislocation left hip hemiarthroplasty.  Large left hip and surrounding soft tissue air-fluid collection can represent abscess. 2.  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 : [facility]   Transcribe Date/Time: Mar 31 2025  1:24A Dictated by : BRENT POLK MD This examination was interpreted and the report reviewed and electronically signed by: BRENT POLK MD on Mar 31 2025  1:37AM  EST    XR chest 1 view    Result Date: 3/30/2025  * * *Final Report* * * DATE OF EXAM: Mar 30 2025 10:23PM   VHX   5376  -  XR CHEST 1V FRONTAL  PORT  / ACCESSION #  [id number] PROCEDURE REASON: Cough      * * * * Physician Interpretation * * * *  EXAMINATION:  CHEST RADIOGRAPH (PORTABLE SINGLE VIEW AP) Exam Date/Time:  3/30/2025 10:23 PM CLINICAL HISTORY: Cough MQ:  XCPR_5 Comparison:  10/20/2024 RESULT: Lines, tubes, and devices:  RIGHT tunneled dialysis catheter tip projects at the superior cavoatrial junction. Lungs and pleura:  No focal airspace opacity, pleural effusion, or pneumothorax. Cardiomediastinal silhouette:  Nonenlarged cardiac silhouette.  Unchanged calcified aortopulmonary and LEFT hilar lymph nodes indicative of prior granulomatous disease. Other:  No acute osseous findings.    IMPRESSION: No acute findings. : PSCB   Transcribe Date/Time: Mar 30 2025 11:22P Dictated by : GINNY TREVINO MD This examination was interpreted and the report reviewed and electronically signed by: GINNY TREVINO MD on Mar 30 2025 11:23PM  EST    XR hip left with pelvis when performed 2 or 3 views    Result Date: 3/28/2025  Interpreted By:  Marcel Dawson, STUDY: XR HIP LEFT WITH PELVIS WHEN PERFORMED 2 OR 3 VIEWS; 3/28/2025 1:52 pm   INDICATION: Signs/Symptoms:left hip fracture.   ACCESSION NUMBER(S): [id number]   ORDERING CLINICIAN: MARCEL DAWSON   FINDINGS: AP lateral x-rays of the left hip show a hemiarthroplasty. Cement mantle and stem are in good position without any evidence of failure. Hip is nicely located within the acetabulum. There is significant calcification of the vasculature. There is surgical staples present laterally. There is degenerative changes of the pubic symphysis and SI joint on the left. No fractures are visualized.     Signed by: Marcel Dawson 3/28/2025 2:39 PM Dictation workstation:   [id number]                     Assessment/Plan   Tyhsawn Coles is a 84 y.o. male with past medical history of ESRD on hemodialysis Tuesday Thursday Saturday, hypertension, hyperlipidemia, type 2 diabetes mellitus, dementia who is  minimally responsive at baseline and just gronikia and yazan presented from outside hospital for concern for posteriorly dislocated hip along with concern for possible abscess.  Patient was started on vancomycin and Zosyn due to fever and concern for possible infection.  Hip does not seem to be primary source of infection as per orthopedic surgery but waiting on trying to localize a source.  He went for hip reduction with Dr. Dumont and tolerated the procedure well.  Patient still has persistent leukocytosis but no longer febrile and and on antibiotics.  His cultures from outside facility for positive for Bacteroides fragilis which is typically sensitive to metronidazole, beta-lactam combination antibiotics or Carbapenem.  Will maintain Zosyn and discontinue vancomycin.  ID has been made aware as well.  Assessment & Plan  Abscess of hip    Hip dislocation, left, initial encounter (Multi)      Recent left hip replacement now with posterior superior dislocation of the left hip hemiarthroplasty with surrounding fluid collection concerning for abscess versus reactive  Continue with empiric coverage of Zosyn with positive blood culture at outside facility  Vancomycin has been discontinued  Orthopedic surgery reduced hip under general anesthesia  Weightbearing as tolerated for transfers related purposes only  Will hold off on aspiration for now  WBC count is improved  Follow-up final culture results and susceptibilities from outside hospital  Blood cultures have been negative here  Palliative care consulted.  Wife is not ready to stop dialysis     Bacteroides fragilis bacteremia prior to admission  Leukocytosis has improved  ID has been updated of Bacteroides bacteremia from outside of the hospital  Nursing aware to get the final susceptibility  WBC count has been improved  Cultures here have been negative  Urine culture is negative  Discontinue vancomycin and maintain Zosyn    Decubitus ulcer  Continue with localized wound  care  Continue with supportive care and frequent turning  Follow-up culture results     ESRD on hemodialysis Tuesday Thursday Saturday  Nephrology consulted  Renally adjust medication using pharmacy's help  Continue with Renvela, Nephrocaps     Hypertension  Continue metoprolol     Dementia  Continue with the Aricept  Continue with Keppra  Continue with melatonin     Type 2 diabetes mellitus  Accu-Chek with sliding scale insulin     BPH  Continue with finasteride  Urine culture negative     Plan discussed with primary nursing and also tried explaining to patient at bedside.  Wife is not present today    High level of MDM based on above issue and discussing plan    This note is created using voice recognition software. All efforts are made to minimize errors, if there are errors there due to transcription.    Paul Guevara  Hospitalist    Addendum: Nurse got a fax from facility that patient had blood cultures done and it is growing gram-negative organism.  Will discontinue vancomycin and continue with Zosyn.  Will inform ID attending.

## 2025-04-03 NOTE — CARE PLAN
The patient's goals for the shift include Pt to not fall all shift    The clinical goals for the shift include will participate in q2hour turns throughout this shift    Over the shift, the patient did make progress toward the following goals.   Pt is alert to name onlypt has not moan or shown discomfort this shift, pt has allowed staff to repositon/turn him throughout this shift , pt has been able to sleep in short intervals, throughout this shift, pt has remained safe bed alarm on,

## 2025-04-04 ENCOUNTER — APPOINTMENT (OUTPATIENT)
Dept: RADIOLOGY | Facility: HOSPITAL | Age: 85
DRG: 559 | End: 2025-04-04
Payer: MEDICARE

## 2025-04-04 LAB
ANION GAP SERPL CALC-SCNC: 14 MMOL/L (ref 10–20)
B-LACTAMASE ORGANISM ISLT: NEGATIVE
BACTERIA BLD CULT: NORMAL
BACTERIA BLD CULT: NORMAL
BACTERIA SPEC CULT: ABNORMAL
BACTERIA SPEC CULT: ABNORMAL
BASOPHILS # BLD AUTO: 0.07 X10*3/UL (ref 0–0.1)
BASOPHILS NFR BLD AUTO: 0.3 %
BUN SERPL-MCNC: 36 MG/DL (ref 6–23)
CALCIUM SERPL-MCNC: 8.3 MG/DL (ref 8.6–10.3)
CHLORIDE SERPL-SCNC: 93 MMOL/L (ref 98–107)
CO2 SERPL-SCNC: 28 MMOL/L (ref 21–32)
CREAT SERPL-MCNC: 3.69 MG/DL (ref 0.5–1.3)
EGFRCR SERPLBLD CKD-EPI 2021: 15 ML/MIN/1.73M*2
EOSINOPHIL # BLD AUTO: 0.33 X10*3/UL (ref 0–0.4)
EOSINOPHIL NFR BLD AUTO: 1.3 %
ERYTHROCYTE [DISTWIDTH] IN BLOOD BY AUTOMATED COUNT: 18.5 % (ref 11.5–14.5)
GLUCOSE BLD MANUAL STRIP-MCNC: 230 MG/DL (ref 74–99)
GLUCOSE BLD MANUAL STRIP-MCNC: 231 MG/DL (ref 74–99)
GLUCOSE BLD MANUAL STRIP-MCNC: 248 MG/DL (ref 74–99)
GLUCOSE BLD MANUAL STRIP-MCNC: 293 MG/DL (ref 74–99)
GLUCOSE SERPL-MCNC: 243 MG/DL (ref 74–99)
GRAM STN SPEC: ABNORMAL
GRAM STN SPEC: ABNORMAL
HCT VFR BLD AUTO: 21.6 % (ref 41–52)
HGB BLD-MCNC: 7.3 G/DL (ref 13.5–17.5)
IMM GRANULOCYTES # BLD AUTO: 0.91 X10*3/UL (ref 0–0.5)
IMM GRANULOCYTES NFR BLD AUTO: 3.6 % (ref 0–0.9)
LYMPHOCYTES # BLD AUTO: 2.05 X10*3/UL (ref 0.8–3)
LYMPHOCYTES NFR BLD AUTO: 8.2 %
MAGNESIUM SERPL-MCNC: 1.98 MG/DL (ref 1.6–2.4)
MCH RBC QN AUTO: 28.3 PG (ref 26–34)
MCHC RBC AUTO-ENTMCNC: 33.8 G/DL (ref 32–36)
MCV RBC AUTO: 84 FL (ref 80–100)
MONOCYTES # BLD AUTO: 1.39 X10*3/UL (ref 0.05–0.8)
MONOCYTES NFR BLD AUTO: 5.6 %
NEUTROPHILS # BLD AUTO: 20.22 X10*3/UL (ref 1.6–5.5)
NEUTROPHILS NFR BLD AUTO: 81 %
NRBC BLD-RTO: 0 /100 WBCS (ref 0–0)
PLATELET # BLD AUTO: 259 X10*3/UL (ref 150–450)
POTASSIUM SERPL-SCNC: 4.5 MMOL/L (ref 3.5–5.3)
RBC # BLD AUTO: 2.58 X10*6/UL (ref 4.5–5.9)
SODIUM SERPL-SCNC: 130 MMOL/L (ref 136–145)
STAPHYLOCOCCUS SPEC CULT: NORMAL
WBC # BLD AUTO: 25 X10*3/UL (ref 4.4–11.3)

## 2025-04-04 PROCEDURE — 1200000002 HC GENERAL ROOM WITH TELEMETRY DAILY

## 2025-04-04 PROCEDURE — 2500000002 HC RX 250 W HCPCS SELF ADMINISTERED DRUGS (ALT 637 FOR MEDICARE OP, ALT 636 FOR OP/ED): Performed by: STUDENT IN AN ORGANIZED HEALTH CARE EDUCATION/TRAINING PROGRAM

## 2025-04-04 PROCEDURE — 2500000004 HC RX 250 GENERAL PHARMACY W/ HCPCS (ALT 636 FOR OP/ED): Performed by: ORTHOPAEDIC SURGERY

## 2025-04-04 PROCEDURE — 2500000001 HC RX 250 WO HCPCS SELF ADMINISTERED DRUGS (ALT 637 FOR MEDICARE OP): Performed by: ORTHOPAEDIC SURGERY

## 2025-04-04 PROCEDURE — 2500000004 HC RX 250 GENERAL PHARMACY W/ HCPCS (ALT 636 FOR OP/ED): Performed by: INTERNAL MEDICINE

## 2025-04-04 PROCEDURE — 83036 HEMOGLOBIN GLYCOSYLATED A1C: CPT | Mod: STJLAB | Performed by: STUDENT IN AN ORGANIZED HEALTH CARE EDUCATION/TRAINING PROGRAM

## 2025-04-04 PROCEDURE — 85025 COMPLETE CBC W/AUTO DIFF WBC: CPT | Performed by: INTERNAL MEDICINE

## 2025-04-04 PROCEDURE — 82374 ASSAY BLOOD CARBON DIOXIDE: CPT | Performed by: INTERNAL MEDICINE

## 2025-04-04 PROCEDURE — 83735 ASSAY OF MAGNESIUM: CPT | Performed by: INTERNAL MEDICINE

## 2025-04-04 PROCEDURE — 71046 X-RAY EXAM CHEST 2 VIEWS: CPT

## 2025-04-04 PROCEDURE — 6350000001 HC RX 635 EPOETIN >10,000 UNITS: Mod: JZ | Performed by: INTERNAL MEDICINE

## 2025-04-04 PROCEDURE — 82947 ASSAY GLUCOSE BLOOD QUANT: CPT

## 2025-04-04 PROCEDURE — 2500000004 HC RX 250 GENERAL PHARMACY W/ HCPCS (ALT 636 FOR OP/ED): Performed by: STUDENT IN AN ORGANIZED HEALTH CARE EDUCATION/TRAINING PROGRAM

## 2025-04-04 PROCEDURE — 2500000002 HC RX 250 W HCPCS SELF ADMINISTERED DRUGS (ALT 637 FOR MEDICARE OP, ALT 636 FOR OP/ED): Performed by: ORTHOPAEDIC SURGERY

## 2025-04-04 PROCEDURE — 99233 SBSQ HOSP IP/OBS HIGH 50: CPT | Performed by: STUDENT IN AN ORGANIZED HEALTH CARE EDUCATION/TRAINING PROGRAM

## 2025-04-04 PROCEDURE — 2500000001 HC RX 250 WO HCPCS SELF ADMINISTERED DRUGS (ALT 637 FOR MEDICARE OP): Performed by: STUDENT IN AN ORGANIZED HEALTH CARE EDUCATION/TRAINING PROGRAM

## 2025-04-04 PROCEDURE — 71046 X-RAY EXAM CHEST 2 VIEWS: CPT | Performed by: RADIOLOGY

## 2025-04-04 PROCEDURE — 2500000001 HC RX 250 WO HCPCS SELF ADMINISTERED DRUGS (ALT 637 FOR MEDICARE OP): Performed by: INTERNAL MEDICINE

## 2025-04-04 PROCEDURE — 36415 COLL VENOUS BLD VENIPUNCTURE: CPT | Performed by: INTERNAL MEDICINE

## 2025-04-04 RX ORDER — INSULIN GLARGINE 100 [IU]/ML
8 INJECTION, SOLUTION SUBCUTANEOUS NIGHTLY
Status: DISPENSED | OUTPATIENT
Start: 2025-04-04

## 2025-04-04 RX ORDER — CHLORPROMAZINE HYDROCHLORIDE 10 MG/1
25 TABLET, FILM COATED ORAL ONCE
Status: COMPLETED | OUTPATIENT
Start: 2025-04-04 | End: 2025-04-04

## 2025-04-04 RX ORDER — METOCLOPRAMIDE 10 MG/1
10 TABLET ORAL ONCE
Status: COMPLETED | OUTPATIENT
Start: 2025-04-04 | End: 2025-04-04

## 2025-04-04 RX ADMIN — DOCUSATE SODIUM 100 MG: 100 CAPSULE, LIQUID FILLED ORAL at 08:53

## 2025-04-04 RX ADMIN — LEVETIRACETAM 500 MG: 500 TABLET, FILM COATED ORAL at 21:22

## 2025-04-04 RX ADMIN — SEVELAMER CARBONATE 1600 MG: 800 TABLET, FILM COATED ORAL at 08:52

## 2025-04-04 RX ADMIN — SEVELAMER CARBONATE 1600 MG: 800 TABLET, FILM COATED ORAL at 21:22

## 2025-04-04 RX ADMIN — LEVETIRACETAM 500 MG: 500 TABLET, FILM COATED ORAL at 08:53

## 2025-04-04 RX ADMIN — FINASTERIDE 5 MG: 5 TABLET, FILM COATED ORAL at 08:54

## 2025-04-04 RX ADMIN — PIPERACILLIN SODIUM AND TAZOBACTAM SODIUM 3.38 G: 3; .375 INJECTION, SOLUTION INTRAVENOUS at 22:28

## 2025-04-04 RX ADMIN — APIXABAN 2.5 MG: 2.5 TABLET, FILM COATED ORAL at 08:53

## 2025-04-04 RX ADMIN — Medication 1 CAPSULE: at 08:54

## 2025-04-04 RX ADMIN — ACETAMINOPHEN 650 MG: 650 SUSPENSION ORAL at 23:34

## 2025-04-04 RX ADMIN — INSULIN LISPRO 4 UNITS: 100 INJECTION, SOLUTION INTRAVENOUS; SUBCUTANEOUS at 08:54

## 2025-04-04 RX ADMIN — METOPROLOL TARTRATE 50 MG: 50 TABLET, FILM COATED ORAL at 08:54

## 2025-04-04 RX ADMIN — METOCLOPRAMIDE 10 MG: 10 TABLET ORAL at 17:48

## 2025-04-04 RX ADMIN — INSULIN LISPRO 4 UNITS: 100 INJECTION, SOLUTION INTRAVENOUS; SUBCUTANEOUS at 12:40

## 2025-04-04 RX ADMIN — EPOETIN ALFA-EPBX 10000 UNITS: 10000 INJECTION, SOLUTION INTRAVENOUS; SUBCUTANEOUS at 11:01

## 2025-04-04 RX ADMIN — DONEPEZIL HYDROCHLORIDE 22.5 MG: 10 TABLET ORAL at 08:53

## 2025-04-04 RX ADMIN — ACETAMINOPHEN 650 MG: 325 TABLET ORAL at 04:04

## 2025-04-04 RX ADMIN — CHLORPROMAZINE HYDROCHLORIDE 25 MG: 10 TABLET, FILM COATED ORAL at 23:34

## 2025-04-04 RX ADMIN — ACETAMINOPHEN 650 MG: 650 SUSPENSION ORAL at 17:12

## 2025-04-04 RX ADMIN — INSULIN LISPRO 4 UNITS: 100 INJECTION, SOLUTION INTRAVENOUS; SUBCUTANEOUS at 17:11

## 2025-04-04 RX ADMIN — PIPERACILLIN SODIUM AND TAZOBACTAM SODIUM 3.38 G: 3; .375 INJECTION, SOLUTION INTRAVENOUS at 11:04

## 2025-04-04 RX ADMIN — APIXABAN 2.5 MG: 2.5 TABLET, FILM COATED ORAL at 21:25

## 2025-04-04 RX ADMIN — INSULIN GLARGINE 8 UNITS: 100 INJECTION, SOLUTION SUBCUTANEOUS at 21:23

## 2025-04-04 RX ADMIN — Medication 1000 UNITS: at 08:53

## 2025-04-04 RX ADMIN — Medication 10 MG: at 21:22

## 2025-04-04 ASSESSMENT — PAIN SCALES - PAIN ASSESSMENT IN ADVANCED DEMENTIA (PAINAD)
CONSOLABILITY: DISTRACTED OR REASSURED BY VOICE/TOUCH
FACIALEXPRESSION: SMILING OR INEXPRESSIVE
FACIALEXPRESSION: SAD, FRIGHTENED, FROWN
TOTALSCORE: 0
BODYLANGUAGE: RELAXED
NEGVOCALIZATION: OCCASIONAL MOAN/GROAN, LOW SPEECH, NEGATIVE/DISAPPROVING QUALITY
BODYLANGUAGE: RELAXED
BREATHING: NORMAL
TOTALSCORE: 3
BREATHING: NORMAL
CONSOLABILITY: NO NEED TO CONSOLE

## 2025-04-04 ASSESSMENT — PAIN - FUNCTIONAL ASSESSMENT
PAIN_FUNCTIONAL_ASSESSMENT: PAINAD (PAIN ASSESSMENT IN ADVANCED DEMENTIA SCALE)
PAIN_FUNCTIONAL_ASSESSMENT: PAINAD (PAIN ASSESSMENT IN ADVANCED DEMENTIA SCALE)
PAIN_FUNCTIONAL_ASSESSMENT: 0-10

## 2025-04-04 ASSESSMENT — PAIN SCALES - WONG BAKER
WONGBAKER_NUMERICALRESPONSE: HURTS LITTLE MORE
WONGBAKER_NUMERICALRESPONSE: HURTS LITTLE BIT
WONGBAKER_NUMERICALRESPONSE: HURTS LITTLE BIT

## 2025-04-04 ASSESSMENT — PAIN SCALES - GENERAL: PAINLEVEL_OUTOF10: 0 - NO PAIN

## 2025-04-04 ASSESSMENT — PAIN DESCRIPTION - LOCATION: LOCATION: HIP

## 2025-04-04 ASSESSMENT — PAIN DESCRIPTION - ORIENTATION: ORIENTATION: LEFT

## 2025-04-04 NOTE — PROGRESS NOTES
04/04/25 1548   Discharge Planning   Home or Post Acute Services Post acute facilities (Rehab/SNF/etc)   Type of Post Acute Facility Services Skilled nursing   Expected Discharge Disposition SNF  ([facility])     Pre-cert received for [facility]. Pre-cert is good from 4/04/2025 - 4/09/2025. Discharge to be determined.

## 2025-04-04 NOTE — PROGRESS NOTES
Tyshawn Coles is a 84 y.o. male on day 4 of admission presenting with Abscess of hip.      Subjective     Plan for WBC scan. WBC increasing. Has been afebrile. Glucose trends remain elevated. Will need to increase lantus. Remains confused on exam, which has been neurologic baseline.      Objective     Last Recorded Vitals  /62 (BP Location: Right arm, Patient Position: Lying)   Pulse 72   Temp 37.3 °C (99.1 °F) (Temporal)   Resp 17   Wt 67.6 kg (149 lb)   SpO2 96%   Intake/Output last 3 Shifts:    Intake/Output Summary (Last 24 hours) at 4/4/2025 1051  Last data filed at 4/4/2025 1000  Gross per 24 hour   Intake 1900 ml   Output 900 ml   Net 1000 ml       Admission Weight  Weight: 67.6 kg (149 lb) (03/31/25 1659)    Daily Weight  03/31/25 : 67.6 kg (149 lb)      Physical Exam:  General: Not in distress, confused  HEENT: PERRLA, head intact and normocephalic  Neck: Normal to inspection  Lungs: Clear to auscultation, work of breathing within normal limit  Cardiac: Regular rate and rhythm  Abdomen: Soft nontender, positive bowel sounds  : Exam deferred  Skin: Multiple superficial decubitus ulcer noted with skin breakdown and erythematous base with some foul drainage  Hematology: No petechia or excessive ecchymosis  Musculoskeletal: Left hip Steri-Strip is noted with some hardness.  Neurological: Alert awake oriented x 1  Psych: Calm and cooperative    Relevant Results  Scheduled medications  apixaban, 2.5 mg, oral, BID  cholecalciferol, 1,000 Units, oral, Daily  docusate sodium, 100 mg, oral, Daily  donepezil, 22.5 mg, oral, Daily  epoetin sheila or biosimilar, 10,000 Units, subcutaneous, Once per day on Monday Wednesday Friday  finasteride, 5 mg, oral, Daily  heparin, 2,000 Units, intra-catheter, After Dialysis  insulin glargine, 5 Units, subcutaneous, Nightly  insulin lispro, 0-10 Units, subcutaneous, TID AC  iron sucrose, 200 mg, intravenous, Daily  levETIRAcetam, 500 mg, oral, BID  melatonin, 10 mg, oral,  Nightly  metoprolol tartrate, 50 mg, oral, BID  piperacillin-tazobactam, 3.375 g, intravenous, q12h  sevelamer carbonate, 1,600 mg, oral, BID  vitamin B complex-vitamin C-folic acid, 1 capsule, oral, Daily      Continuous medications     PRN medications  PRN medications: acetaminophen **OR** acetaminophen **OR** acetaminophen, alteplase, benzocaine-menthol, guaiFENesin, ipratropium-albuteroL, ondansetron **OR** ondansetron, oxyCODONE, oxyCODONE, polyethylene glycol, prochlorperazine **OR** prochlorperazine **OR** prochlorperazine   Labs  Results from last 7 days   Lab Units 04/04/25  0555 04/03/25  0623 04/02/25  0515   WBC AUTO x10*3/uL 25.0* 23.7* 28.3*   HEMOGLOBIN g/dL 7.3* 7.3* 7.1*   HEMATOCRIT % 21.6* 21.4* 20.4*   PLATELETS AUTO x10*3/uL 259 265 236     Results from last 7 days   Lab Units 04/04/25  0555 04/03/25  0623 04/02/25  0515   SODIUM mmol/L 130* 133* 137   POTASSIUM mmol/L 4.5 5.2 4.2   CHLORIDE mmol/L 93* 95* 99   CO2 mmol/L 28 27 28   BUN mg/dL 36* 58* 34*   CREATININE mg/dL 3.69* 5.44* 4.23*   CALCIUM mg/dL 8.3* 8.8 8.2*   GLUCOSE mg/dL 243* 318* 146*       Imaging  XR chest 2 views    Result Date: 4/4/2025  Increased bibasilar atelectasis or infiltrates.   MACRO: None   Signed by: Raheel Powell 4/4/2025 9:31 AM Dictation workstation:   [id number]    XR hip left with pelvis when performed 2 or 3 views    Result Date: 3/31/2025  IMPRESSION: Dislocated left hip hemiarthroplasty.  Please see concurrent CT report for additional information. : [facility]   Transcribe Date/Time: Mar 31 2025  2:42A Dictated by : JORJE DCIK MD This examination was interpreted and the report reviewed and electronically signed by: JORJE DICK MD on Mar 31 2025  2:46AM  EST    CT abdomen pelvis w IV contrast    Result Date: 3/31/2025  IMPRESSION: 1.  Posterior superior dislocation left hip hemiarthroplasty.  Large left hip and surrounding soft tissue air-fluid collection can represent abscess. 2.   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 : [facility]   Transcribe Date/Time: Mar 30 2025 11:22P Dictated by : GINNY TREVINO MD This examination was interpreted and the report reviewed and electronically signed by: GINNY TREVINO MD on Mar 30 2025 11:23PM  EST     Cardiology, Vascular, and Other Imaging  FL fluoro images no charge    Result Date: 3/31/2025  These images are not reportable by radiology and will not be interpreted by  Radiologists.    XR hip left with pelvis when performed 2 or 3 views    Result Date: 3/31/2025  * * *Final Report* * * DATE OF EXAM: Mar 31 2025  2:15AM   VHX   5351  -  XR HIP 3V PELV+ AP/LAT LT  / ACCESSION #  [id number] PROCEDURE REASON: Hip pain, acute, fx suspected, initial exam      * * * * Physician Interpretation * * * *  HISTORY: abnormal finding on CT Hip pain, acute, fx suspected, initial exam Hip pain, acute, fx suspected, initial exam RESULT: Frontal view of the pelvis and frontal and crosstable lateral views of the left hip, compared to CT on the same day and conventional radiographs on 08/14/2024. Post bilateral hemiarthroplasty of the hips.  The left acetabular cup and femoral prosthesis are dislocated posteriorly and superiorly with respect to the acetabulum.  There is generalized osteopenia.  Significant vascular calcifications are seen in the soft tissues.    IMPRESSION: Dislocated left hip hemiarthroplasty.  Please see concurrent CT report for additional information. : [facility]   Transcribe Date/Time: Mar 31 2025  2:42A Dictated by : JORJE DICK MD This examination was interpreted  and the report reviewed and electronically signed by: JORJE DICK MD on Mar 31 2025  2:46AM  EST    CT abdomen pelvis w IV contrast    Result Date: 3/31/2025  * * *Final Report* * * DATE OF EXAM: Mar 31 2025 12:18AM   [facility]   0530  -  CT ABD/PEL W IVCON  / ACCESSION #  [id number] PROCEDURE REASON: Abdominal pain, acute, nonlocalized      * * * * Physician Interpretation * * * *  EXAMINATION:  CT ABDOMEN AND PELVIS WITH IV CONTRAST CLINICAL HISTORY: Abdominal pain TECHNIQUE: CT of the abdomen and pelvis was performed using standard technique, scanning from just above the dome of the diaphragm to the symphysis pubis. MQ:  CTAP_3 Contrast: IV:  100 ml of Omnipaque 350 : ml of CT Radiation dose: Integrated Dose-length product (DLP) for this visit =   623 mGy*cm. CT Dose Reduction Employed: Automated exposure control(AEC) and iterative recon COMPARISON: 08/14/2024. RESULT: Coronary artery calcifications.  Suprarenal IVC filter.  Colonic diverticulosis.  Mild rectal stool burden.  Aortoiliac calcifications.   Punctate right nephrolithiasis.  No hydronephrosis.  Mild urinary bladder wall thickening.  Posterior dislocated left hip hemiarthroplasty.  Within the left hip joint, and posterior to the arthroplasty there is large air-fluid collection 11 x 6 x 13 cm.  Right hip hemiarthroplasty present.  Sacral decubitus ulceration with large open defect extending to the sacrum, coccyx no evidence of osteolysis or periosteal reaction.  There is mild sclerosis of the coccyx.    IMPRESSION: 1.  Posterior superior dislocation left hip hemiarthroplasty.  Large left hip and surrounding soft tissue air-fluid collection can represent abscess. 2.  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 : [email or web address]   Transcribe Date/Time: Mar 31 2025  1:24A Dictated by : BRENT POLK MD This examination was interpreted and the report reviewed and electronically signed by: BRENT  MD FELICITAS on Mar 31 2025  1:37AM  EST    XR chest 1 view    Result Date: 3/30/2025  * * *Final Report* * * DATE OF EXAM: Mar 30 2025 10:23PM   VHX   5376  -  XR CHEST 1V FRONTAL PORT  / ACCESSION #  [id number] PROCEDURE REASON: Cough      * * * * Physician Interpretation * * * *  EXAMINATION:  CHEST RADIOGRAPH (PORTABLE SINGLE VIEW AP) Exam Date/Time:  3/30/2025 10:23 PM CLINICAL HISTORY: Cough MQ:  XCPR_5 Comparison:  10/20/2024 RESULT: Lines, tubes, and devices:  RIGHT tunneled dialysis catheter tip projects at the superior cavoatrial junction. Lungs and pleura:  No focal airspace opacity, pleural effusion, or pneumothorax. Cardiomediastinal silhouette:  Nonenlarged cardiac silhouette.  Unchanged calcified aortopulmonary and LEFT hilar lymph nodes indicative of prior granulomatous disease. Other:  No acute osseous findings.    IMPRESSION: No acute findings. : [facility]   Transcribe Date/Time: Mar 30 2025 11:22P Dictated by : GINNY TREVINO MD This examination was interpreted and the report reviewed and electronically signed by: GINNY TREVINO MD on Mar 30 2025 11:23PM  EST    XR hip left with pelvis when performed 2 or 3 views    Result Date: 3/28/2025  Interpreted By:  Marcel Dawson, STUDY: XR HIP LEFT WITH PELVIS WHEN PERFORMED 2 OR 3 VIEWS; 3/28/2025 1:52 pm   INDICATION: Signs/Symptoms:left hip fracture.   ACCESSION NUMBER(S): [id number]   ORDERING CLINICIAN: MARCEL DAWSON   FINDINGS: AP lateral x-rays of the left hip show a hemiarthroplasty. Cement mantle and stem are in good position without any evidence of failure. Hip is nicely located within the acetabulum. There is significant calcification of the vasculature. There is surgical staples present laterally. There is degenerative changes of the pubic symphysis and SI joint on the left. No fractures are visualized.     Signed by: Marcel Dawson 3/28/2025 2:39 PM Dictation workstation:   [id number]        Assessment & Plan      Recent  left hip replacement now with posterior superior dislocation of the left hip hemiarthroplasty with surrounding fluid collection  Orthopedic surgery reduced hip under general anesthesia  CT imaging with soft tissue air-fluid collection around L hip, ortho feels it is likely post operative changes and hematoma, felt no indication for aspiration  Continue with empiric coverage of Zosyn  Weightbearing as tolerated for transfers related purposes only  Palliative care followed.  Wife is not ready to stop dialysis     Bacteroides fragilis bacteremia prior to admission  Leukocytosis  OLH with bacteroides, follow up susceptibilities   Repeat cultures 3/31 negative to date  Urine culture 3/31 negative  ID planing WBC tagged scan  Continue Zosyn  Was unable to reach [facility] microbiology lab 4/4    Encephalopathy   Currently oriented x1, at baseline    Decubitus ulcer  Continue with localized wound care  Continue with supportive care and frequent turning  Follow-up culture results     ESRD on hemodialysis Tuesday Thursday Saturday  Nephrology following  Renally adjust medication using pharmacy's help  Continue with Renvela, Nephrocaps     Hypertension  Continue metoprolol     Dementia  Continue with the Aricept  Continue with Keppra  Continue with melatonin     Type 2 diabetes mellitus  A1c 8.7 in Oct 2024, repeat ordered  Lantus dose increased 4/4, continue SSI     BPH  Continue with finasteride  Urine culture negative 3/31    DVT ppx: Eliquis  Code status: DNR/DNI

## 2025-04-04 NOTE — CARE PLAN
The patient's goals for the shift include Pt to not fall all shift    The clinical goals for the shift include PT WILL PARTICIPATE IN Q2HOUR TURNS THROUGHOUT THIS SHIFT    Over the shift, the patient did make progress toward the following goals.  PT HAS ALLOWED STAFF TO TURN Q 2 HOURS  PT HAS REMAINED SAFE AND BED ALARM ON,

## 2025-04-05 ENCOUNTER — APPOINTMENT (OUTPATIENT)
Dept: DIALYSIS | Facility: HOSPITAL | Age: 85
End: 2025-04-05
Payer: MEDICARE

## 2025-04-05 LAB
ANION GAP SERPL CALC-SCNC: 13 MMOL/L (ref 10–20)
BUN SERPL-MCNC: 51 MG/DL (ref 6–23)
CALCIUM SERPL-MCNC: 8.5 MG/DL (ref 8.6–10.3)
CHLORIDE SERPL-SCNC: 92 MMOL/L (ref 98–107)
CO2 SERPL-SCNC: 28 MMOL/L (ref 21–32)
CREAT SERPL-MCNC: 4.94 MG/DL (ref 0.5–1.3)
EGFRCR SERPLBLD CKD-EPI 2021: 11 ML/MIN/1.73M*2
ERYTHROCYTE [DISTWIDTH] IN BLOOD BY AUTOMATED COUNT: 18.6 % (ref 11.5–14.5)
EST. AVERAGE GLUCOSE BLD GHB EST-MCNC: 151 MG/DL
GLUCOSE BLD MANUAL STRIP-MCNC: 129 MG/DL (ref 74–99)
GLUCOSE BLD MANUAL STRIP-MCNC: 146 MG/DL (ref 74–99)
GLUCOSE BLD MANUAL STRIP-MCNC: 165 MG/DL (ref 74–99)
GLUCOSE BLD MANUAL STRIP-MCNC: 233 MG/DL (ref 74–99)
GLUCOSE SERPL-MCNC: 172 MG/DL (ref 74–99)
HBA1C MFR BLD: 6.9 %
HCT VFR BLD AUTO: 21.8 % (ref 41–52)
HGB BLD-MCNC: 7.4 G/DL (ref 13.5–17.5)
MCH RBC QN AUTO: 28.5 PG (ref 26–34)
MCHC RBC AUTO-ENTMCNC: 33.9 G/DL (ref 32–36)
MCV RBC AUTO: 84 FL (ref 80–100)
NRBC BLD-RTO: 0 /100 WBCS (ref 0–0)
PLATELET # BLD AUTO: 305 X10*3/UL (ref 150–450)
POTASSIUM SERPL-SCNC: 4.9 MMOL/L (ref 3.5–5.3)
RBC # BLD AUTO: 2.6 X10*6/UL (ref 4.5–5.9)
SODIUM SERPL-SCNC: 128 MMOL/L (ref 136–145)
WBC # BLD AUTO: 27.6 X10*3/UL (ref 4.4–11.3)

## 2025-04-05 PROCEDURE — 2500000004 HC RX 250 GENERAL PHARMACY W/ HCPCS (ALT 636 FOR OP/ED): Performed by: ORTHOPAEDIC SURGERY

## 2025-04-05 PROCEDURE — 2500000001 HC RX 250 WO HCPCS SELF ADMINISTERED DRUGS (ALT 637 FOR MEDICARE OP): Performed by: ORTHOPAEDIC SURGERY

## 2025-04-05 PROCEDURE — 2500000002 HC RX 250 W HCPCS SELF ADMINISTERED DRUGS (ALT 637 FOR MEDICARE OP, ALT 636 FOR OP/ED): Performed by: STUDENT IN AN ORGANIZED HEALTH CARE EDUCATION/TRAINING PROGRAM

## 2025-04-05 PROCEDURE — 36415 COLL VENOUS BLD VENIPUNCTURE: CPT | Performed by: STUDENT IN AN ORGANIZED HEALTH CARE EDUCATION/TRAINING PROGRAM

## 2025-04-05 PROCEDURE — 80048 BASIC METABOLIC PNL TOTAL CA: CPT | Performed by: STUDENT IN AN ORGANIZED HEALTH CARE EDUCATION/TRAINING PROGRAM

## 2025-04-05 PROCEDURE — 99233 SBSQ HOSP IP/OBS HIGH 50: CPT | Performed by: STUDENT IN AN ORGANIZED HEALTH CARE EDUCATION/TRAINING PROGRAM

## 2025-04-05 PROCEDURE — 2500000002 HC RX 250 W HCPCS SELF ADMINISTERED DRUGS (ALT 637 FOR MEDICARE OP, ALT 636 FOR OP/ED): Performed by: ORTHOPAEDIC SURGERY

## 2025-04-05 PROCEDURE — 8010000001 HC DIALYSIS - HEMODIALYSIS PER DAY

## 2025-04-05 PROCEDURE — 1200000002 HC GENERAL ROOM WITH TELEMETRY DAILY

## 2025-04-05 PROCEDURE — 2500000001 HC RX 250 WO HCPCS SELF ADMINISTERED DRUGS (ALT 637 FOR MEDICARE OP): Performed by: INTERNAL MEDICINE

## 2025-04-05 PROCEDURE — 85027 COMPLETE CBC AUTOMATED: CPT | Performed by: STUDENT IN AN ORGANIZED HEALTH CARE EDUCATION/TRAINING PROGRAM

## 2025-04-05 PROCEDURE — 82947 ASSAY GLUCOSE BLOOD QUANT: CPT

## 2025-04-05 PROCEDURE — 2500000004 HC RX 250 GENERAL PHARMACY W/ HCPCS (ALT 636 FOR OP/ED): Performed by: INTERNAL MEDICINE

## 2025-04-05 RX ADMIN — FINASTERIDE 5 MG: 5 TABLET, FILM COATED ORAL at 08:46

## 2025-04-05 RX ADMIN — Medication 10 MG: at 21:13

## 2025-04-05 RX ADMIN — LEVETIRACETAM 500 MG: 500 TABLET, FILM COATED ORAL at 08:46

## 2025-04-05 RX ADMIN — METOPROLOL TARTRATE 50 MG: 50 TABLET, FILM COATED ORAL at 21:13

## 2025-04-05 RX ADMIN — IRON SUCROSE 200 MG: 20 INJECTION, SOLUTION INTRAVENOUS at 06:18

## 2025-04-05 RX ADMIN — PIPERACILLIN SODIUM AND TAZOBACTAM SODIUM 3.38 G: 3; .375 INJECTION, SOLUTION INTRAVENOUS at 13:52

## 2025-04-05 RX ADMIN — ACETAMINOPHEN 650 MG: 325 TABLET ORAL at 21:12

## 2025-04-05 RX ADMIN — SEVELAMER CARBONATE 1600 MG: 800 TABLET, FILM COATED ORAL at 08:46

## 2025-04-05 RX ADMIN — INSULIN LISPRO 2 UNITS: 100 INJECTION, SOLUTION INTRAVENOUS; SUBCUTANEOUS at 09:09

## 2025-04-05 RX ADMIN — LEVETIRACETAM 500 MG: 500 TABLET, FILM COATED ORAL at 21:13

## 2025-04-05 RX ADMIN — APIXABAN 2.5 MG: 2.5 TABLET, FILM COATED ORAL at 08:46

## 2025-04-05 RX ADMIN — ACETAMINOPHEN 650 MG: 325 TABLET ORAL at 12:08

## 2025-04-05 RX ADMIN — DOCUSATE SODIUM 100 MG: 100 CAPSULE, LIQUID FILLED ORAL at 08:46

## 2025-04-05 RX ADMIN — Medication 1000 UNITS: at 08:46

## 2025-04-05 RX ADMIN — HEPARIN SODIUM 2000 UNITS: 1000 INJECTION, SOLUTION INTRAVENOUS; SUBCUTANEOUS at 13:30

## 2025-04-05 RX ADMIN — INSULIN GLARGINE 8 UNITS: 100 INJECTION, SOLUTION SUBCUTANEOUS at 21:13

## 2025-04-05 RX ADMIN — DONEPEZIL HYDROCHLORIDE 22.5 MG: 10 TABLET ORAL at 08:46

## 2025-04-05 RX ADMIN — APIXABAN 2.5 MG: 2.5 TABLET, FILM COATED ORAL at 21:13

## 2025-04-05 RX ADMIN — Medication 1 CAPSULE: at 08:46

## 2025-04-05 RX ADMIN — SEVELAMER CARBONATE 1600 MG: 800 TABLET, FILM COATED ORAL at 21:12

## 2025-04-05 ASSESSMENT — PAIN - FUNCTIONAL ASSESSMENT
PAIN_FUNCTIONAL_ASSESSMENT: 0-10

## 2025-04-05 ASSESSMENT — PAIN DESCRIPTION - LOCATION
LOCATION: HIP
LOCATION: HIP

## 2025-04-05 ASSESSMENT — PAIN SCALES - GENERAL
PAINLEVEL_OUTOF10: 3
PAINLEVEL_OUTOF10: 0 - NO PAIN

## 2025-04-05 ASSESSMENT — PAIN DESCRIPTION - ORIENTATION: ORIENTATION: LEFT

## 2025-04-05 ASSESSMENT — PAIN SCALES - WONG BAKER: WONGBAKER_NUMERICALRESPONSE: HURTS LITTLE MORE

## 2025-04-05 NOTE — PROGRESS NOTES
Tyshawn Coles is a 84 y.o. male on day 5 of admission presenting with Abscess of hip.      Subjective     Leukocytosis persists. Called wife over phone to discuss care. Explained that tagged WBC scan has been ordered. Wife is hesitant for tagged WBC scan but explained importance of finding infection source. Remains confused on exam.    Called [facility] Microbiology lab and blood culture from 3/30 was negative.      Objective     Last Recorded Vitals  /58 (BP Location: Right arm, Patient Position: Lying)   Pulse 92   Temp 37.1 °C (98.8 °F) (Temporal)   Resp 16   Wt 67.6 kg (149 lb)   SpO2 99%   Intake/Output last 3 Shifts:    Intake/Output Summary (Last 24 hours) at 4/5/2025 1129  Last data filed at 4/5/2025 1119  Gross per 24 hour   Intake 920 ml   Output --   Net 920 ml       Admission Weight  Weight: 67.6 kg (149 lb) (03/31/25 1659)    Daily Weight  03/31/25 : 67.6 kg (149 lb)      Physical Exam:  General: Not in distress, confused  HEENT: PERRLA, head intact and normocephalic  Neck: Normal to inspection  Lungs: Clear to auscultation, work of breathing within normal limit  Cardiac: Regular rate and rhythm  Abdomen: Soft nontender, positive bowel sounds  : Exam deferred  Skin: Multiple superficial decubitus ulcer noted with skin breakdown and erythematous base with some foul drainage  Hematology: No petechia or excessive ecchymosis  Musculoskeletal: Left hip Steri-Strip is noted with some hardness.  Neurological: Alert awake oriented x 1  Psych: Calm and cooperative    Relevant Results  Scheduled medications  apixaban, 2.5 mg, oral, BID  cholecalciferol, 1,000 Units, oral, Daily  docusate sodium, 100 mg, oral, Daily  donepezil, 22.5 mg, oral, Daily  epoetin sheila or biosimilar, 10,000 Units, subcutaneous, Once per day on Monday Wednesday Friday  finasteride, 5 mg, oral, Daily  heparin, 2,000 Units, intra-catheter, After Dialysis  insulin glargine, 8 Units, subcutaneous, Nightly  insulin lispro, 0-10 Units,  subcutaneous, TID AC  iron sucrose, 200 mg, intravenous, Daily  levETIRAcetam, 500 mg, oral, BID  melatonin, 10 mg, oral, Nightly  metoprolol tartrate, 50 mg, oral, BID  piperacillin-tazobactam, 3.375 g, intravenous, q12h  sevelamer carbonate, 1,600 mg, oral, BID  vitamin B complex-vitamin C-folic acid, 1 capsule, oral, Daily      Continuous medications     PRN medications  PRN medications: acetaminophen **OR** acetaminophen **OR** acetaminophen, alteplase, benzocaine-menthol, guaiFENesin, ipratropium-albuteroL, ondansetron **OR** ondansetron, oxyCODONE, oxyCODONE, polyethylene glycol, prochlorperazine **OR** prochlorperazine **OR** prochlorperazine   Labs  Results from last 7 days   Lab Units 04/05/25  0620 04/04/25  0555 04/03/25  0623   WBC AUTO x10*3/uL 27.6* 25.0* 23.7*   HEMOGLOBIN g/dL 7.4* 7.3* 7.3*   HEMATOCRIT % 21.8* 21.6* 21.4*   PLATELETS AUTO x10*3/uL 305 259 265     Results from last 7 days   Lab Units 04/05/25  0620 04/04/25  0555 04/03/25  0623   SODIUM mmol/L 128* 130* 133*   POTASSIUM mmol/L 4.9 4.5 5.2   CHLORIDE mmol/L 92* 93* 95*   CO2 mmol/L 28 28 27   BUN mg/dL 51* 36* 58*   CREATININE mg/dL 4.94* 3.69* 5.44*   CALCIUM mg/dL 8.5* 8.3* 8.8   GLUCOSE mg/dL 172* 243* 318*       Imaging  XR chest 2 views    Result Date: 4/4/2025  Increased bibasilar atelectasis or infiltrates.   MACRO: None   Signed by: Raheel Powell 4/4/2025 9:31 AM Dictation workstation:   [id number]    XR hip left with pelvis when performed 2 or 3 views    Result Date: 3/31/2025  IMPRESSION: Dislocated left hip hemiarthroplasty.  Please see concurrent CT report for additional information. : [facility]   Transcribe Date/Time: Mar 31 2025  2:42A Dictated by : JORJE DICK MD This examination was interpreted and the report reviewed and electronically signed by: JORJE DICK MD on Mar 31 2025  2:46AM  EST    CT abdomen pelvis w IV contrast    Result Date: 3/31/2025  IMPRESSION: 1.  Posterior superior dislocation left  hip hemiarthroplasty.  Large left hip and surrounding soft tissue air-fluid collection can represent abscess. 2.  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 : [facility]   Transcribe Date/Time: Mar 30 2025 11:22P Dictated by : GINNY TREVINO MD This examination was interpreted and the report reviewed and electronically signed by: GINNY TREVINO MD on Mar 30 2025 11:23PM  EST     Cardiology, Vascular, and Other Imaging  [place] fluoro images no charge    Result Date: 3/31/2025  These images are not reportable by radiology and will not be interpreted by  Radiologists.    XR hip left with pelvis when performed 2 or 3 views    Result Date: 3/31/2025  * * *Final Report* * * DATE OF EXAM: Mar 31 2025  2:15AM   VHX   5351  -  XR HIP 3V PELV+ AP/LAT LT  / ACCESSION #  [id number] PROCEDURE REASON: Hip pain, acute, fx suspected, initial exam      * * * * Physician Interpretation * * * *  HISTORY: abnormal finding on CT Hip pain, acute, fx suspected, initial exam Hip pain, acute, fx suspected, initial exam RESULT: Frontal view of the pelvis and frontal and crosstable lateral views of the left hip, compared to CT on the same day and conventional radiographs on 08/14/2024. Post bilateral hemiarthroplasty of the hips.  The left acetabular cup and femoral prosthesis are dislocated posteriorly and superiorly with respect to the acetabulum.  There is generalized osteopenia.  Significant vascular calcifications are seen in the soft tissues.    IMPRESSION: Dislocated left hip hemiarthroplasty.  Please see concurrent CT report for additional information. :  [facility]   Transcribe Date/Time: Mar 31 2025  2:42A Dictated by : JORJE DICK MD This examination was interpreted and the report reviewed and electronically signed by: JORJE DICK MD on Mar 31 2025  2:46AM  EST    CT abdomen pelvis w IV contrast    Result Date: 3/31/2025  * * *Final Report* * * DATE OF EXAM: Mar 31 2025 12:18AM   [facility]   0530  -  CT ABD/PEL W IVCON  / ACCESSION #  [id number] PROCEDURE REASON: Abdominal pain, acute, nonlocalized      * * * * Physician Interpretation * * * *  EXAMINATION:  CT ABDOMEN AND PELVIS WITH IV CONTRAST CLINICAL HISTORY: Abdominal pain TECHNIQUE: CT of the abdomen and pelvis was performed using standard technique, scanning from just above the dome of the diaphragm to the symphysis pubis. MQ:  CTAP_3 Contrast: IV:  100 ml of Omnipaque 350 : ml of CT Radiation dose: Integrated Dose-length product (DLP) for this visit =   623 mGy*cm. CT Dose Reduction Employed: Automated exposure control(AEC) and iterative recon COMPARISON: 08/14/2024. RESULT: Coronary artery calcifications.  Suprarenal IVC filter.  Colonic diverticulosis.  Mild rectal stool burden.  Aortoiliac calcifications.   Punctate right nephrolithiasis.  No hydronephrosis.  Mild urinary bladder wall thickening.  Posterior dislocated left hip hemiarthroplasty.  Within the left hip joint, and posterior to the arthroplasty there is large air-fluid collection 11 x 6 x 13 cm.  Right hip hemiarthroplasty present.  Sacral decubitus ulceration with large open defect extending to the sacrum, coccyx no evidence of osteolysis or periosteal reaction.  There is mild sclerosis of the coccyx.    IMPRESSION: 1.  Posterior superior dislocation left hip hemiarthroplasty.  Large left hip and surrounding soft tissue air-fluid collection can represent abscess. 2.  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 : [facility]   Transcribe Date/Time: Mar 31 2025  1:24A Dictated by : BRENT  MD FELICITAS This examination was interpreted and the report reviewed and electronically signed by: BRENT POLK MD on Mar 31 2025  1:37AM  EST    XR chest 1 view    Result Date: 3/30/2025  * * *Final Report* * * DATE OF EXAM: Mar 30 2025 10:23PM   VHX   5376  -  XR CHEST 1V FRONTAL PORT  / ACCESSION #  [id number] PROCEDURE REASON: Cough      * * * * Physician Interpretation * * * *  EXAMINATION:  CHEST RADIOGRAPH (PORTABLE SINGLE VIEW AP) Exam Date/Time:  3/30/2025 10:23 PM CLINICAL HISTORY: Cough MQ:  XCPR_5 Comparison:  10/20/2024 RESULT: Lines, tubes, and devices:  RIGHT tunneled dialysis catheter tip projects at the superior cavoatrial junction. Lungs and pleura:  No focal airspace opacity, pleural effusion, or pneumothorax. Cardiomediastinal silhouette:  Nonenlarged cardiac silhouette.  Unchanged calcified aortopulmonary and LEFT hilar lymph nodes indicative of prior granulomatous disease. Other:  No acute osseous findings.    IMPRESSION: No acute findings. : [facility]   Transcribe Date/Time: Mar 30 2025 11:22P Dictated by : GINNY TREVINO MD This examination was interpreted and the report reviewed and electronically signed by: GINNY TREVINO MD on Mar 30 2025 11:23PM  EST        Assessment & Plan      84 y.o. male with past medical history of ESRD on hemodialysis, hypertension, hyperlipidemia, type 2 diabetes mellitus, dementia who is minimally responsive  presented from outside hospital for concern for posteriorly dislocated hip along with concern for possible abscess.     Recent left hip replacement now with posterior superior dislocation of the left hip hemiarthroplasty with surrounding fluid collection  S/p L hip hemiarthroplasty 3/10/25  Orthopedic surgery reduced hip under general anesthesia  CT imaging with soft tissue air-fluid collection around L hip, ortho feels it is likely post operative changes and hematoma, felt no indication for aspiration  Continue with empiric coverage of  Zosyn  Weightbearing as tolerated for transfers related purposes only  Palliative care followed.  Wife is not ready to stop dialysis     Bacteroides caccae bacteremia prior to admission  Leukocytosis  Blood cultures 3/29 from dialysis positive for Bacteroides caccae   CT AP on admit with chronic sacral osteomyelitis, ortho felt fluid collection around hip was non infectious  Blood culture from [facility] 3/30 negative to date (confirmed 4/5)  Repeat blood cultures 3/31 negative to date  Urine culture 3/31 negative  ID planing WBC tagged scan  Continue Zosyn        Encephalopathy   Currently oriented x1, close to baseline    Decubitus ulcer  Continue with localized wound care  Continue with supportive care and frequent turning  Follow-up culture results     ESRD on hemodialysis Tuesday Thursday Saturday  Nephrology following  Renally adjust medication using pharmacy's help  Continue with Renvela, Nephrocaps     Hypertension  Continue metoprolol     Dementia  Continue with the Aricept  Continue with Keppra  Continue with melatonin     Type 2 diabetes mellitus  A1c 8.7 in Oct 2024, repeat ordered  Lantus dose increased 4/4, continue SSI     BPH  Continue with finasteride  Urine culture negative 3/31    Addendum 1250: Was able to contact lab that collected blood cultures from 3/29. They did not run susceptibility testing. Also called [facility] where patient was transferred from on 3/30, blood culture also negative at 4 days.    DVT ppx: Eliquis  Code status: DNR/DNI

## 2025-04-05 NOTE — POST-PROCEDURE NOTE
Report to Receiving RN:    Report To: Rani  Time Report Called: [id number]  Hand-Off Communication: Patient removed 500ml, patient post b/p is 123/62 85, no complications during dialysis tx. Pt remain stable, catheter dressing was changed  Complications During Treatment: No  Ultrafiltration Treatment: No  Medications Administered During Dialysis: No  Blood Products Administered During Dialysis: No  Labs Sent During Dialysis: No  Heparin Drip Rate Changes: N/A  Dialysis Catheter Dressing: intact/dry  Last Dressing Change: 4/5/2025    Electronic Signatures:  Annette (Signed LENNY Clark)   Authored: LENNY Clark    Last Updated: 11:58 AM by ANTONIA GARCIA

## 2025-04-05 NOTE — PROGRESS NOTES
INPATIENT NEPHROLOGY CONSULT PROGRESS NOTE      Patient Name: Tyshawn Coles MRN: [id number]  DATE of SERVICE: April 4, 2025  TIME of SERVICE: 10:14 AM  CONSULTING SERVICE: Nephrology    REASON for CONSULT: ESRD    SUBJECTIVE:  Seen and evaluated at bedside. hiccups persist.     SUMMARY OF STAY:  Mr. Coles is a 84 y.o. male who [place] [facility] [place] for evaluation of left hip dislocation after remote fall.  Status post left hip hemiarthroplasty reduction April 1, 2025     Patient with past medical history significant for end-stage renal disease on hemodialysis per TTS schedule, hypertension, hyperlipidemia, diabetes mellitus, dementia, seizure disorder.   Patient seen and evaluated at bedside he is with advanced dementia unable to provide additional history.  Patient wife at bedside who was able to provide the majority of the history.  Recently patient had clotted AV graft and currently is receiving dialysis via right tunneled dialysis catheter.  He resides at [facility] and being transported to his dialysis unit.  He has been Edison lifted.  This admission patient [place] [facility] [place] pain and diagnosed with dislocated left hip hemiarthroplasty and scheduled to go to surgery this afternoon.    Dialysis prescription:  ESRD on hemodialysis  Tuesday Thursday Saturday  Primary nephrologist: Dr. Wise  Estimated dry weight: 67 kg  Duration: 3-1/2 hours  Intradialytic Midodrine  Right tunneled dialysis catheter in place        ASSESSMENT AND PLAN:  ESRD: iHD per Tuesday Thursday Saturday schedule  Volume status:  Hypovolemia, resolved   Electrolytes: Within acceptable range  Acid-base: Metabolic acidosis from renal failure (controlled with HD)  Bone mineral disease (Ca/P/PTH): controlled  Anemia from renal failure, hemoglobin stable   Dislocated left hip hemiarthroplasty status postsurgery April  1  Chronic osteomyelitis of the coccyx  Left hip abscess  Hypotension: Fluid responsive  T2DM     PLAN:  End-stage renal disease patient with complex acute/subacute dislocated left hip hemiarthroplasty with left hip abscess, chronic osteomyelitis of the coccyx:     Electrolytes and volume status within acceptable range no urgent indication for renal placement therapy today tentative plan for hemodialysis tomorrow.  Anemia to continue IV iron loading dose, Epogen 2000 units  Malnutrition to continue protein supplements 3 times daily  Strict input and output to guide volume management, can have intermittent IV boluses to maintain hemodynamic stability  phosphorus level at 3.3, to hold renvela   For hemodialysis per TTS schedule   Hiccups to give Thorazine      Will follow, thank you!      Medications:    Current Facility-Administered Medications:     acetaminophen (Tylenol) tablet 650 mg, 650 mg, oral, q6h PRN, 650 mg at 04/04/25 0404 **OR** acetaminophen (Tylenol) oral liquid 650 mg, 650 mg, oral, q6h PRN, 650 mg at 04/04/25 1712 **OR** acetaminophen (Tylenol) suppository 650 mg, 650 mg, rectal, q6h PRN, Eliezer Dumont MD    alteplase (Cathflo Activase) injection 2 mg, 2 mg, intra-catheter, PRN, Paul Guevara MD    apixaban (Eliquis) tablet 2.5 mg, 2.5 mg, oral, BID, Paul Guevara MD, 2.5 mg at 04/04/25 0853    benzocaine-menthol (Cepastat Sore Throat) lozenge 1 lozenge, 1 lozenge, Mouth/Throat, q2h PRN, Eliezer Dumont MD    cholecalciferol (Vitamin D-3) tablet 1,000 Units, 1,000 Units, oral, Daily, Eliezer Dumont MD, 1,000 Units at 04/04/25 0853    docusate sodium (Colace) capsule 100 mg, 100 mg, oral, Daily, Paul Guevara MD, 100 mg at 04/04/25 0853    donepezil (Aricept) tablet 22.5 mg, 22.5 mg, oral, Daily, Eliezer Dumont MD, 22.5 mg at 04/04/25 0853    epoetin sheila-epbx (Retacrit) injection 10,000 Units, 10,000 Units, subcutaneous, Once per day on Monday Wednesday Friday, Carlene Norman MD, 10,000 Units  at 04/04/25 1101    finasteride (Proscar) tablet 5 mg, 5 mg, oral, Daily, Eliezer Dumont MD, 5 mg at 04/04/25 0854    guaiFENesin (Mucinex) 12 hr tablet 600 mg, 600 mg, oral, q12h PRN, Eliezer Dumont MD    heparin 1,000 unit/mL injection 2,000 Units, 2,000 Units, intra-catheter, After Dialysis, Carlene Norman MD, 2,000 Units at 04/03/25 1236    insulin glargine (Lantus) injection 8 Units, 8 Units, subcutaneous, Nightly, Jesse Morgan DO    insulin lispro injection 0-10 Units, 0-10 Units, subcutaneous, TID AC, Eliezer Dumont MD, 4 Units at 04/04/25 1711    ipratropium-albuteroL (Duo-Neb) 0.5-2.5 mg/3 mL nebulizer solution 3 mL, 3 mL, nebulization, q2h PRN, Paul Guevara MD    iron sucrose (Venofer) injection 200 mg, 200 mg, intravenous, Daily, Carlene Norman MD, 200 mg at 04/03/25 1841    levETIRAcetam (Keppra) tablet 500 mg, 500 mg, oral, BID, Eliezer Dumont MD, 500 mg at 04/04/25 0853    melatonin tablet 10 mg, 10 mg, oral, Nightly, Eliezer Dumont MD, 10 mg at 04/03/25 2131    metoprolol tartrate (Lopressor) tablet 50 mg, 50 mg, oral, BID, Eliezer Dumont MD, 50 mg at 04/04/25 0854    ondansetron (Zofran) tablet 4 mg, 4 mg, oral, q8h PRN **OR** ondansetron (Zofran) injection 4 mg, 4 mg, intravenous, q8h PRN, Eliezer Dumont MD, 4 mg at 03/31/25 1403    oxyCODONE (Roxicodone) immediate release tablet 2.5 mg, 2.5 mg, oral, q6h PRN, Eliezer Dumont MD    oxyCODONE (Roxicodone) immediate release tablet 5 mg, 5 mg, oral, q6h PRN, Eliezer Dumont MD    piperacillin-tazobactam (Zosyn) 3.375 g in dextrose (iso) IV 50 mL, 3.375 g, intravenous, q12h, Eliezer Dumont MD, Stopped at 04/04/25 1502    polyethylene glycol (Glycolax, Miralax) packet 17 g, 17 g, oral, Daily PRN, Eliezer Dumont MD    prochlorperazine (Compazine) tablet 10 mg, 10 mg, oral, q6h PRN **OR** prochlorperazine (Compazine) injection 10 mg, 10 mg, intravenous, q6h PRN **OR** prochlorperazine (Compazine) suppository 25 mg, 25 mg,  rectal, q12h PRN, Eliezer Dumont MD    sevelamer carbonate (Renvela) tablet 1,600 mg, 1,600 mg, oral, BID, Paul Guevara MD, 1,600 mg at 04/04/25 0852    vitamin B complex-vitamin C-folic acid (Nephrocaps) capsule 1 capsule, 1 capsule, oral, Daily, Eliezer Dumont MD, 1 capsule at 04/04/25 0854    PERTINENT ROS:  GENERAL:  positive for fatigue, poor appetite.  No fever/chills  RESPIRATORY:  positive for shortness of breath.  Negative for cough, wheezing.  CARDIOVASCULAR:   Negative for chest pain or palpitation.  GI:  Negative for abdominal pain, diarrhea, heartburn, nausea, vomiting  : External catheter in place with dark concentrated urine    Physical Exam:  Vital signs in last 24 hours:  Temp:  [36.5 °C (97.7 °F)-37.5 °C (99.5 °F)] 37.1 °C (98.8 °F)  Heart Rate:  [67-87] 87  Resp:  [16-18] 16  BP: (100-161)/(51-83) 100/66    General: Awake, oriented to self, pain better controlled  HEENT:  NCAT,  mucous membranes moist and pink  NECK: no JVD, no carotid bruit, supple, no cervical mass or thyromegaly  LUNGS;  Diminished breath sounds, no Rales  CV:  Distant, regular rate and rhythm, no murmurs  ABDOMEN:  abdomen soft, nontender, BS normal, no masses or organomegaly  EDEMA:  no lower extremity edema/dependent edema  SKIN: Left hip with a dressing in place      Intake/Output last 3 shifts:  I/O last 3 completed shifts:  In: 2300 (34 mL/kg) [P.O.:1200; I.V.:600 (8.9 mL/kg); Other:400; IV Piggyback:100]  Out: 900 (13.3 mL/kg) [Other:900]  Weight: 67.6 kg     DATA:  Diagnotic tests reviewed for Todays visit:  Results from last 7 days   Lab Units 04/04/25  0555   WBC AUTO x10*3/uL 25.0*   RBC AUTO x10*6/uL 2.58*   HEMOGLOBIN g/dL 7.3*   HEMATOCRIT % 21.6*     Results from last 7 days   Lab Units 04/04/25  0555 04/03/25  0623   SODIUM mmol/L 130* 133*   POTASSIUM mmol/L 4.5 5.2   CHLORIDE mmol/L 93* 95*   CO2 mmol/L 28 27   BUN mg/dL 36* 58*   CREATININE mg/dL 3.69* 5.44*   CALCIUM mg/dL 8.3* 8.8   PHOSPHORUS mg/dL   --  3.4   MAGNESIUM mg/dL 1.98 2.22     Results from last 7 days   Lab Units 03/31/25  1746   COLOR U  Yellow   APPEARANCE U  Clear   PH U  7.0   SPEC GRAV UR  1.015   PROTEIN U mg/dL 100 (2+)*   BLOOD UR mg/dL 0.06 (1+)*   NITRITE U  NEGATIVE   WBC UR /HPF 1-5     IMAGING: CXR reviewed in  images      SIGNATURE: Carlene Norman MD  Nephrology and Hypertension  [address] [facility] [address] [place]  Office phone: [phone]  FAX: [phone]    This note was partially generated using the Dragon voice recognition system, and there may be some incorrect words, spelling's and punctuation that were not noted in checking the note before saving.

## 2025-04-05 NOTE — PROGRESS NOTES
"Tyshawn Coles is a 84 y.o. male on day 5 of admission presenting with Abscess of hip.    Subjective   Sleeping in bed upon entering room.  Response to verbal stimuli.       Objective     Physical Exam  Constitutional:       Appearance: Normal appearance.   HENT:      Head: Normocephalic and atraumatic.      Nose: Nose normal.      Mouth/Throat:      Mouth: Mucous membranes are moist.   Cardiovascular:      Rate and Rhythm: Normal rate.   Pulmonary:      Effort: Pulmonary effort is normal.   Abdominal:      General: Abdomen is flat.      Palpations: Abdomen is soft.   Musculoskeletal:      Cervical back: Neck supple.      Comments: Left hip with surgical incision, well-healing Steri-Strips peeling up, no erythema or drainage, appears sensation is present throughout right lower extremity but difficult to accurately determine as patient does not answer to questions   Skin:     General: Skin is warm and dry.   Neurological:      General: No focal deficit present.      Mental Status: He is alert.   Psychiatric:         Mood and Affect: Mood normal.         Last Recorded Vitals  Blood pressure 124/66, pulse 85, temperature 36.8 °C (98.2 °F), temperature source Temporal, resp. rate 16, height 1.828 m (5' 11.97\"), weight 67.6 kg (149 lb), SpO2 99%.  Intake/Output last 3 Shifts:  I/O last 3 completed shifts:  In: 1620 (24 mL/kg) [P.O.:1420; IV Piggyback:200]  Out: - (0 mL/kg)   Weight: 67.6 kg     Relevant Results  Results for orders placed or performed during the hospital encounter of 03/31/25 (from the past 24 hours)   POCT GLUCOSE   Result Value Ref Range    POCT Glucose 248 (H) 74 - 99 mg/dL   POCT GLUCOSE   Result Value Ref Range    POCT Glucose 293 (H) 74 - 99 mg/dL   CBC   Result Value Ref Range    WBC 27.6 (H) 4.4 - 11.3 x10*3/uL    nRBC 0.0 0.0 - 0.0 /100 WBCs    RBC 2.60 (L) 4.50 - 5.90 x10*6/uL    Hemoglobin 7.4 (L) 13.5 - 17.5 g/dL    Hematocrit 21.8 (L) 41.0 - 52.0 %    MCV 84 80 - 100 fL    MCH 28.5 26.0 - 34.0 " pg    MCHC 33.9 32.0 - 36.0 g/dL    RDW 18.6 (H) 11.5 - 14.5 %    Platelets 305 150 - 450 x10*3/uL   Basic metabolic panel   Result Value Ref Range    Glucose 172 (H) 74 - 99 mg/dL    Sodium 128 (L) 136 - 145 mmol/L    Potassium 4.9 3.5 - 5.3 mmol/L    Chloride 92 (L) 98 - 107 mmol/L    Bicarbonate 28 21 - 32 mmol/L    Anion Gap 13 10 - 20 mmol/L    Urea Nitrogen 51 (H) 6 - 23 mg/dL    Creatinine 4.94 (H) 0.50 - 1.30 mg/dL    eGFR 11 (L) >60 mL/min/1.73m*2    Calcium 8.5 (L) 8.6 - 10.3 mg/dL   POCT GLUCOSE   Result Value Ref Range    POCT Glucose 165 (H) 74 - 99 mg/dL   POCT GLUCOSE   Result Value Ref Range    POCT Glucose 129 (H) 74 - 99 mg/dL     Scheduled medications  apixaban, 2.5 mg, oral, BID  cholecalciferol, 1,000 Units, oral, Daily  docusate sodium, 100 mg, oral, Daily  donepezil, 22.5 mg, oral, Daily  epoetin sheila or biosimilar, 10,000 Units, subcutaneous, Once per day on Monday Wednesday Friday  finasteride, 5 mg, oral, Daily  heparin, 2,000 Units, intra-catheter, After Dialysis  insulin glargine, 8 Units, subcutaneous, Nightly  insulin lispro, 0-10 Units, subcutaneous, TID AC  iron sucrose, 200 mg, intravenous, Daily  levETIRAcetam, 500 mg, oral, BID  melatonin, 10 mg, oral, Nightly  metoprolol tartrate, 50 mg, oral, BID  piperacillin-tazobactam, 3.375 g, intravenous, q12h  sevelamer carbonate, 1,600 mg, oral, BID  vitamin B complex-vitamin C-folic acid, 1 capsule, oral, Daily      Continuous medications     PRN medications  PRN medications: acetaminophen **OR** acetaminophen **OR** acetaminophen, alteplase, benzocaine-menthol, guaiFENesin, ipratropium-albuteroL, ondansetron **OR** ondansetron, oxyCODONE, oxyCODONE, polyethylene glycol, prochlorperazine **OR** prochlorperazine **OR** prochlorperazine    Assessment/Plan   Assessment & Plan  Abscess of hip    Hip dislocation, left, initial encounter (Multi)    ESRD (end stage renal disease) on dialysis (Multi)    Postop day 5 of reduction of dislocated  left hemiarthroplasty  Physical and Occupational Therapy are on consult  Weightbearing as tolerated for transfers  On Eliquis which will cover for VTE prophylaxis  Encourage cough and deep breathing  Plans on discharge to SNF, okay from orthopedic standpoint when medically able please reach out for any questions or concerns, follow-up with Dr. Dawson in 2 weeks         I spent 10 minutes in the professional and overall care of this patient.      Violet Wilkins PA-C

## 2025-04-05 NOTE — CARE PLAN
The patient's goals for the shift include Pt to not fall all shift    The clinical goals for the shift include patient will remain HDS      Problem: Skin  Goal: Participates in plan/prevention/treatment measures  Outcome: Progressing  Flowsheets (Taken 4/5/2025 0619)  Participates in plan/prevention/treatment measures: Elevate heels     Problem: Skin  Goal: Prevent/manage excess moisture  Outcome: Progressing  Flowsheets (Taken 4/5/2025 0619)  Prevent/manage excess moisture: Cleanse incontinence/protect with barrier cream     Problem: Skin  Goal: Prevent/minimize sheer/friction injuries  Outcome: Progressing  Flowsheets (Taken 4/5/2025 0619)  Prevent/minimize sheer/friction injuries:   HOB 30 degrees or less   Turn/reposition every 2 hours/use positioning/transfer devices     Problem: Skin  Goal: Promote/optimize nutrition  Outcome: Progressing  Flowsheets (Taken 4/5/2025 0619)  Promote/optimize nutrition:   Consume > 50% meals/supplements   Offer water/supplements/favorite foods     Patient continues to cough intermittently. Able to swallow pills in applesauce and drink water without coughing.    Hiccups reduced after taking thorazine.

## 2025-04-05 NOTE — PROGRESS NOTES
INPATIENT NEPHROLOGY CONSULT PROGRESS NOTE      Patient Name: Tyshawn Coles MRN: [id number]  DATE of SERVICE: April 5, 2025  TIME of SERVICE: 12:51 PM  CONSULTING SERVICE: Nephrology    REASON for CONSULT: ESRD    SUBJECTIVE:  Seen and evaluated at bedside.  Receiving hemodialysis tolerating it well.  Hiccups improving however still persists.    SUMMARY OF STAY:  Mr. Coles is a 84 y.o. male who presented to [facility] March 31, 2025 for evaluation of left hip dislocation after remote fall.  Status post left hip hemiarthroplasty reduction April 1, 2025     Patient with past medical history significant for end-stage renal disease on hemodialysis per TTS schedule, hypertension, hyperlipidemia, diabetes mellitus, dementia, seizure disorder.   Patient seen and evaluated at bedside he is with advanced dementia unable to provide additional history.  Patient wife at bedside who was able to provide the majority of the history.  Recently patient had clotted AV graft and currently is receiving dialysis via right tunneled dialysis catheter.  He resides at [facility] and being transported to his dialysis unit.  He has been Edison lifted.  This admission patient presented to [facility] for evaluation of left hip pain and diagnosed with dislocated left hip hemiarthroplasty and scheduled to go to surgery this afternoon.    Dialysis prescription:  ESRD on hemodialysis  Tuesday Thursday Saturday  Primary nephrologist: Dr. Wise  Estimated dry weight: 67 kg  Duration: 3-1/2 hours  Intradialytic Midodrine  Right tunneled dialysis catheter in place        ASSESSMENT AND PLAN:  ESRD: iHD per Tuesday Thursday Saturday schedule  Volume status:  Hypovolemia, resolved   Electrolytes: Within acceptable range  Acid-base: Metabolic acidosis from renal failure (controlled with HD)  Bone mineral disease (Ca/P/PTH): controlled  Anemia from renal failure, hemoglobin  stable   Dislocated left hip hemiarthroplasty status postsurgery April 1  Chronic osteomyelitis of the coccyx  Left hip abscess  Hypotension: Fluid responsive  T2DM     PLAN:  End-stage renal disease patient with complex acute/subacute dislocated left hip hemiarthroplasty with left hip abscess, chronic osteomyelitis of the coccyx:     Receiving hemodialysis today April 5, ultrafiltration 1 L well-tolerated.  Single evaluation dialysis flowsheet reviewed and confirmed  Anemia to continue IV iron loading dose, Epogen 2000 units  Malnutrition to continue protein supplements 3 times daily  Strict input and output to guide volume management, can have intermittent IV boluses to maintain hemodynamic stability  phosphorus level at 3.3, to hold renvela   For hemodialysis per TTS schedule   Hiccups to give 1 extra dose of Thorazine after dialysis     Will follow, thank you!      Medications:    Current Facility-Administered Medications:     acetaminophen (Tylenol) tablet 650 mg, 650 mg, oral, q6h PRN, 650 mg at 04/05/25 1208 **OR** acetaminophen (Tylenol) oral liquid 650 mg, 650 mg, oral, q6h PRN, 650 mg at 04/04/25 2334 **OR** acetaminophen (Tylenol) suppository 650 mg, 650 mg, rectal, q6h PRN, Eliezer Dumont MD    alteplase (Cathflo Activase) injection 2 mg, 2 mg, intra-catheter, PRN, Paul Guevara MD    apixaban (Eliquis) tablet 2.5 mg, 2.5 mg, oral, BID, Paul Guevara MD, 2.5 mg at 04/05/25 0846    benzocaine-menthol (Cepastat Sore Throat) lozenge 1 lozenge, 1 lozenge, Mouth/Throat, q2h PRN, Eliezer Dumont MD    cholecalciferol (Vitamin D-3) tablet 1,000 Units, 1,000 Units, oral, Daily, Eliezer Dumont MD, 1,000 Units at 04/05/25 0846    docusate sodium (Colace) capsule 100 mg, 100 mg, oral, Daily, Paul Guevara MD, 100 mg at 04/05/25 0846    donepezil (Aricept) tablet 22.5 mg, 22.5 mg, oral, Daily, Eliezer Dumont MD, 22.5 mg at 04/05/25 0846    epoetin sheila-epbx (Retacrit) injection 10,000 Units, 10,000 Units,  subcutaneous, Once per day on Monday Wednesday Friday, Carlene Norman MD, 10,000 Units at 04/04/25 1101    finasteride (Proscar) tablet 5 mg, 5 mg, oral, Daily, Eliezer Dumont MD, 5 mg at 04/05/25 0846    guaiFENesin (Mucinex) 12 hr tablet 600 mg, 600 mg, oral, q12h PRN, Eliezer Dumont MD    heparin 1,000 unit/mL injection 2,000 Units, 2,000 Units, intra-catheter, After Dialysis, Carlene Norman MD, 2,000 Units at 04/03/25 1236    insulin glargine (Lantus) injection 8 Units, 8 Units, subcutaneous, Nightly, Jesse Morgan DO, 8 Units at 04/04/25 2123    insulin lispro injection 0-10 Units, 0-10 Units, subcutaneous, TID AC, Eliezer Dumont MD, 2 Units at 04/05/25 0909    ipratropium-albuteroL (Duo-Neb) 0.5-2.5 mg/3 mL nebulizer solution 3 mL, 3 mL, nebulization, q2h PRN, Paul Guevara MD    iron sucrose (Venofer) injection 200 mg, 200 mg, intravenous, Daily, Carlene Norman MD, 200 mg at 04/05/25 0618    levETIRAcetam (Keppra) tablet 500 mg, 500 mg, oral, BID, Eliezer Dumont MD, 500 mg at 04/05/25 0846    melatonin tablet 10 mg, 10 mg, oral, Nightly, Eliezer Dumont MD, 10 mg at 04/04/25 2122    metoprolol tartrate (Lopressor) tablet 50 mg, 50 mg, oral, BID, Eliezer Dumont MD, 50 mg at 04/04/25 0854    ondansetron (Zofran) tablet 4 mg, 4 mg, oral, q8h PRN **OR** ondansetron (Zofran) injection 4 mg, 4 mg, intravenous, q8h PRN, Eliezer Dumont MD, 4 mg at 03/31/25 1403    oxyCODONE (Roxicodone) immediate release tablet 2.5 mg, 2.5 mg, oral, q6h PRN, Eliezer Dumont MD    oxyCODONE (Roxicodone) immediate release tablet 5 mg, 5 mg, oral, q6h PRN, Eliezer Dumont MD    piperacillin-tazobactam (Zosyn) 3.375 g in dextrose (iso) IV 50 mL, 3.375 g, intravenous, q12h, Eliezer Dumont MD, Stopped at 04/05/25 0205    polyethylene glycol (Glycolax, Miralax) packet 17 g, 17 g, oral, Daily PRN, Eliezer Dumont MD    prochlorperazine (Compazine) tablet 10 mg, 10 mg, oral, q6h PRN **OR** prochlorperazine  (Compazine) injection 10 mg, 10 mg, intravenous, q6h PRN **OR** prochlorperazine (Compazine) suppository 25 mg, 25 mg, rectal, q12h PRN, Eliezer Dumont MD    sevelamer carbonate (Renvela) tablet 1,600 mg, 1,600 mg, oral, BID, Paul Guevara MD, 1,600 mg at 04/05/25 0846    vitamin B complex-vitamin C-folic acid (Nephrocaps) capsule 1 capsule, 1 capsule, oral, Daily, Eliezer Dumont MD, 1 capsule at 04/05/25 0846    PERTINENT ROS:  GENERAL:  positive for fatigue, poor appetite.  No fever/chills  RESPIRATORY:  positive for shortness of breath.  Negative for cough, wheezing.  CARDIOVASCULAR:   Negative for chest pain or palpitation.  GI:  Negative for abdominal pain, diarrhea, heartburn, nausea, vomiting  : External catheter in place with dark concentrated urine    Physical Exam:  Vital signs in last 24 hours:  Temp:  [36.5 °C (97.7 °F)-37.5 °C (99.5 °F)] 36.7 °C (98.1 °F)  Heart Rate:  [78-92] 92  Resp:  [16-18] 16  BP: (100-161)/(58-83) 124/66    General: Awake, oriented to self, pain better controlled  HEENT:  NCAT,  mucous membranes moist and pink  NECK: no JVD, no carotid bruit, supple, no cervical mass or thyromegaly  LUNGS;  Diminished breath sounds, no Rales  CV:  Distant, regular rate and rhythm, no murmurs  ABDOMEN:  abdomen soft, nontender, BS normal, no masses or organomegaly  EDEMA:  no lower extremity edema/dependent edema  SKIN: Left hip with a dressing in place      Intake/Output last 3 shifts:  I/O last 3 completed shifts:  In: 1620 (24 mL/kg) [P.O.:1420; IV Piggyback:200]  Out: - (0 mL/kg)   Weight: 67.6 kg     DATA:  Diagnotic tests reviewed for Todays visit:  Results from last 7 days   Lab Units 04/05/25  0620   WBC AUTO x10*3/uL 27.6*   RBC AUTO x10*6/uL 2.60*   HEMOGLOBIN g/dL 7.4*   HEMATOCRIT % 21.8*     Results from last 7 days   Lab Units 04/05/25  0620 04/04/25  0555 04/03/25  0623   SODIUM mmol/L 128* 130* 133*   POTASSIUM mmol/L 4.9 4.5 5.2   CHLORIDE mmol/L 92* 93* 95*   CO2 mmol/L 28  28 27   BUN mg/dL 51* 36* 58*   CREATININE mg/dL 4.94* 3.69* 5.44*   CALCIUM mg/dL 8.5* 8.3* 8.8   PHOSPHORUS mg/dL  --   --  3.4   MAGNESIUM mg/dL  --  1.98 2.22     Results from last 7 days   Lab Units 03/31/25  1746   COLOR U  Yellow   APPEARANCE U  Clear   PH U  7.0   SPEC GRAV UR  1.015   PROTEIN U mg/dL 100 (2+)*   BLOOD UR mg/dL 0.06 (1+)*   NITRITE U  NEGATIVE   WBC UR /HPF 1-5     IMAGING: CXR reviewed in  images      SIGNATURE: Carlene Norman MD  Nephrology and Hypertension  [address] [place]  Office phone: [phone]  FAX: [phone]    This note was partially generated using the Dragon voice recognition system, and there may be some incorrect words, spelling's and punctuation that were not noted in checking the note before saving.

## 2025-04-05 NOTE — PROGRESS NOTES
" INPATIENT PROGRESS NOTES    PATIENT NAME: Tyshawn Coles    MRN: [id number]  SERVICE DATE:  4/5/2025   SERVICE TIME:  2:12 PM    SIGNATURE: Elmer Du MD      ASSESSMENT :   -Rule out left prosthetic hip abscess  -Fever and leukocytosis      #Left prosthetic hip dislocation status post closed reduction  #Hypertension  #Hyperlipidemia  #End-stage renal disease on dialysis, which can affect the dosing of antimicrobials  #Type 2 diabetes, which is chronic condition that increases his risk for infection  #Dementia       PLAN:  -White blood cell tagged scan was ordered  -Continue Zosyn  -If cannot do the scan then consider IR aspiration of the fluid  -Closely monitor for antibiotics side effects including rash, Diarrhea/CDI, thrombocytopenia, ALCIDES, etc.        SUBJECTIVE  Afebrile  No events overnight       OBJECTIVE  PHYSICAL EXAM:   Patient Vitals for the past 24 hrs:   BP Temp Temp src Pulse Resp SpO2   04/05/25 1319 -- 36.8 °C (98.2 °F) Temporal 85 -- --   04/05/25 1141 124/66 36.7 °C (98.1 °F) -- 92 16 --   04/05/25 0938 -- 37.1 °C (98.8 °F) Temporal 92 -- --   04/05/25 0800 103/58 36.5 °C (97.7 °F) -- 79 16 99 %   04/05/25 0400 138/69 36.6 °C (97.9 °F) Temporal 82 18 97 %   04/05/25 0000 158/68 37.2 °C (99 °F) Temporal 78 16 96 %   04/04/25 2000 100/66 37.1 °C (98.8 °F) Temporal 87 16 95 %   04/04/25 1600 161/83 37.5 °C (99.5 °F) Temporal 80 18 100 %         Gen: No fever  Respiratory: No distress     Labs:  Lab Results   Component Value Date    WBC 27.6 (H) 04/05/2025    HGB 7.4 (L) 04/05/2025    HCT 21.8 (L) 04/05/2025    MCV 84 04/05/2025     04/05/2025     Lab Results   Component Value Date    GLUCOSE 172 (H) 04/05/2025    CALCIUM 8.5 (L) 04/05/2025     (L) 04/05/2025    K 4.9 04/05/2025    CO2 28 04/05/2025    CL 92 (L) 04/05/2025    BUN 51 (H) 04/05/2025    CREATININE 4.94 (H) 04/05/2025   ESR: --No results found for: \"SEDRATE\"  Lab Results   Component Value Date    CRP 3.63 (A) 06/12/2021 " "    Lab Results   Component Value Date    ALT 14 03/09/2025    AST 24 03/09/2025    ALKPHOS 62 03/09/2025    BILITOT 0.5 03/09/2025         DATA:   Diagnostic tests reviewed for today's visit:    Labs this admission reviewed  Imagings this admission reviewed  Cultures: Reviewed        Elmer Du MD.   Infectious Disease Attending            This note was partially created using voice recognition software and is inherently subject to errors including those of syntax and \"sound-alike\" substitutions which may escape proofreading. In such instances, original meaning may be extrapolated by contextual derivation      "

## 2025-04-05 NOTE — PRE-PROCEDURE NOTE
Report from Sending RN:    Report From: Rani Rucker  Recent Surgery of Procedure: Yes, dislocated hip put back in place   Baseline Level of Consciousness (LOC): AOx1  Oxygen Use: No  Type: n/a  Diabetic: Yes  Last BP Med Given Day of Dialysis: none  Last Pain Med Given: none  Lab Tests to be Obtained with Dialysis: No  Blood Transfusion to be Given During Dialysis: No  Available IV Access: Yes  Medications to be Administered During Dialysis: Yes  Continuous IV Infusion Running: No  Restraints on Currently or in the Last 24 Hours: No  Hand-Off Communication: Stable for dialysis  Dialysis Catheter Dressing: dry/intacted  Last Dressing Change: 4/1/25

## 2025-04-06 ENCOUNTER — APPOINTMENT (OUTPATIENT)
Dept: CARDIOLOGY | Facility: HOSPITAL | Age: 85
DRG: 559 | End: 2025-04-06
Payer: MEDICARE

## 2025-04-06 VITALS
TEMPERATURE: 98.6 F | RESPIRATION RATE: 19 BRPM | DIASTOLIC BLOOD PRESSURE: 63 MMHG | BODY MASS INDEX: 20.18 KG/M2 | OXYGEN SATURATION: 97 % | SYSTOLIC BLOOD PRESSURE: 116 MMHG | HEART RATE: 79 BPM | HEIGHT: 72 IN | WEIGHT: 149 LBS

## 2025-04-06 LAB
ALBUMIN SERPL BCP-MCNC: 2.3 G/DL (ref 3.4–5)
ANION GAP SERPL CALC-SCNC: 14 MMOL/L (ref 10–20)
BUN SERPL-MCNC: 32 MG/DL (ref 6–23)
CALCIUM SERPL-MCNC: 8.1 MG/DL (ref 8.6–10.3)
CHLORIDE SERPL-SCNC: 93 MMOL/L (ref 98–107)
CO2 SERPL-SCNC: 29 MMOL/L (ref 21–32)
CREAT SERPL-MCNC: 3.55 MG/DL (ref 0.5–1.3)
EGFRCR SERPLBLD CKD-EPI 2021: 16 ML/MIN/1.73M*2
ERYTHROCYTE [DISTWIDTH] IN BLOOD BY AUTOMATED COUNT: 18.7 % (ref 11.5–14.5)
GLUCOSE BLD MANUAL STRIP-MCNC: 168 MG/DL (ref 74–99)
GLUCOSE BLD MANUAL STRIP-MCNC: 171 MG/DL (ref 74–99)
GLUCOSE BLD MANUAL STRIP-MCNC: 208 MG/DL (ref 74–99)
GLUCOSE BLD MANUAL STRIP-MCNC: 251 MG/DL (ref 74–99)
GLUCOSE SERPL-MCNC: 178 MG/DL (ref 74–99)
HCT VFR BLD AUTO: 21.7 % (ref 41–52)
HGB BLD-MCNC: 7.3 G/DL (ref 13.5–17.5)
MCH RBC QN AUTO: 28.4 PG (ref 26–34)
MCHC RBC AUTO-ENTMCNC: 33.6 G/DL (ref 32–36)
MCV RBC AUTO: 84 FL (ref 80–100)
NRBC BLD-RTO: 0.1 /100 WBCS (ref 0–0)
PHOSPHATE SERPL-MCNC: 3.7 MG/DL (ref 2.5–4.9)
PLATELET # BLD AUTO: 317 X10*3/UL (ref 150–450)
POTASSIUM SERPL-SCNC: 4.5 MMOL/L (ref 3.5–5.3)
RBC # BLD AUTO: 2.57 X10*6/UL (ref 4.5–5.9)
SODIUM SERPL-SCNC: 131 MMOL/L (ref 136–145)
WBC # BLD AUTO: 31.9 X10*3/UL (ref 4.4–11.3)

## 2025-04-06 PROCEDURE — 2500000004 HC RX 250 GENERAL PHARMACY W/ HCPCS (ALT 636 FOR OP/ED): Performed by: INTERNAL MEDICINE

## 2025-04-06 PROCEDURE — 1200000002 HC GENERAL ROOM WITH TELEMETRY DAILY

## 2025-04-06 PROCEDURE — 2500000001 HC RX 250 WO HCPCS SELF ADMINISTERED DRUGS (ALT 637 FOR MEDICARE OP): Performed by: ORTHOPAEDIC SURGERY

## 2025-04-06 PROCEDURE — 2500000002 HC RX 250 W HCPCS SELF ADMINISTERED DRUGS (ALT 637 FOR MEDICARE OP, ALT 636 FOR OP/ED): Performed by: ORTHOPAEDIC SURGERY

## 2025-04-06 PROCEDURE — 82947 ASSAY GLUCOSE BLOOD QUANT: CPT

## 2025-04-06 PROCEDURE — 85027 COMPLETE CBC AUTOMATED: CPT | Performed by: STUDENT IN AN ORGANIZED HEALTH CARE EDUCATION/TRAINING PROGRAM

## 2025-04-06 PROCEDURE — 36415 COLL VENOUS BLD VENIPUNCTURE: CPT | Performed by: STUDENT IN AN ORGANIZED HEALTH CARE EDUCATION/TRAINING PROGRAM

## 2025-04-06 PROCEDURE — 2500000002 HC RX 250 W HCPCS SELF ADMINISTERED DRUGS (ALT 637 FOR MEDICARE OP, ALT 636 FOR OP/ED): Performed by: STUDENT IN AN ORGANIZED HEALTH CARE EDUCATION/TRAINING PROGRAM

## 2025-04-06 PROCEDURE — 93005 ELECTROCARDIOGRAM TRACING: CPT

## 2025-04-06 PROCEDURE — 2500000004 HC RX 250 GENERAL PHARMACY W/ HCPCS (ALT 636 FOR OP/ED): Performed by: ORTHOPAEDIC SURGERY

## 2025-04-06 PROCEDURE — 2500000002 HC RX 250 W HCPCS SELF ADMINISTERED DRUGS (ALT 637 FOR MEDICARE OP, ALT 636 FOR OP/ED): Performed by: INTERNAL MEDICINE

## 2025-04-06 PROCEDURE — 2500000001 HC RX 250 WO HCPCS SELF ADMINISTERED DRUGS (ALT 637 FOR MEDICARE OP): Performed by: INTERNAL MEDICINE

## 2025-04-06 PROCEDURE — 2500000004 HC RX 250 GENERAL PHARMACY W/ HCPCS (ALT 636 FOR OP/ED): Performed by: STUDENT IN AN ORGANIZED HEALTH CARE EDUCATION/TRAINING PROGRAM

## 2025-04-06 PROCEDURE — 80069 RENAL FUNCTION PANEL: CPT | Performed by: STUDENT IN AN ORGANIZED HEALTH CARE EDUCATION/TRAINING PROGRAM

## 2025-04-06 PROCEDURE — 99233 SBSQ HOSP IP/OBS HIGH 50: CPT | Performed by: STUDENT IN AN ORGANIZED HEALTH CARE EDUCATION/TRAINING PROGRAM

## 2025-04-06 RX ORDER — VANCOMYCIN HYDROCHLORIDE 125 MG/1
125 CAPSULE ORAL 4 TIMES DAILY
Status: DISPENSED | OUTPATIENT
Start: 2025-04-06 | End: 2025-04-20

## 2025-04-06 RX ORDER — CHLORPROMAZINE HYDROCHLORIDE 10 MG/1
25 TABLET, FILM COATED ORAL ONCE
Status: COMPLETED | OUTPATIENT
Start: 2025-04-06 | End: 2025-04-06

## 2025-04-06 RX ORDER — GABAPENTIN 100 MG/1
100 CAPSULE ORAL 3 TIMES WEEKLY
Status: ACTIVE | OUTPATIENT
Start: 2025-04-07

## 2025-04-06 RX ORDER — ACETAMINOPHEN 500 MG
10 TABLET ORAL NIGHTLY PRN
Status: ACTIVE | OUTPATIENT
Start: 2025-04-06

## 2025-04-06 RX ORDER — CHLORPROMAZINE HYDROCHLORIDE 25 MG/1
25 TABLET, FILM COATED ORAL ONCE
Status: DISCONTINUED | OUTPATIENT
Start: 2025-04-06 | End: 2025-04-06

## 2025-04-06 RX ADMIN — VANCOMYCIN HYDROCHLORIDE 125 MG: 125 CAPSULE ORAL at 18:45

## 2025-04-06 RX ADMIN — PIPERACILLIN SODIUM AND TAZOBACTAM SODIUM 3.38 G: 3; .375 INJECTION, SOLUTION INTRAVENOUS at 01:57

## 2025-04-06 RX ADMIN — FINASTERIDE 5 MG: 5 TABLET, FILM COATED ORAL at 09:45

## 2025-04-06 RX ADMIN — LEVETIRACETAM 500 MG: 500 TABLET, FILM COATED ORAL at 09:45

## 2025-04-06 RX ADMIN — METOPROLOL TARTRATE 50 MG: 50 TABLET, FILM COATED ORAL at 09:45

## 2025-04-06 RX ADMIN — ACETAMINOPHEN 650 MG: 650 SUSPENSION ORAL at 06:39

## 2025-04-06 RX ADMIN — Medication 1 CAPSULE: at 09:45

## 2025-04-06 RX ADMIN — INSULIN LISPRO 4 UNITS: 100 INJECTION, SOLUTION INTRAVENOUS; SUBCUTANEOUS at 18:19

## 2025-04-06 RX ADMIN — METOPROLOL TARTRATE 50 MG: 50 TABLET, FILM COATED ORAL at 23:09

## 2025-04-06 RX ADMIN — SEVELAMER CARBONATE 1600 MG: 800 TABLET, FILM COATED ORAL at 23:09

## 2025-04-06 RX ADMIN — CHLORPROMAZINE HYDROCHLORIDE 25 MG: 10 TABLET, FILM COATED ORAL at 18:45

## 2025-04-06 RX ADMIN — INSULIN GLARGINE 8 UNITS: 100 INJECTION, SOLUTION SUBCUTANEOUS at 23:18

## 2025-04-06 RX ADMIN — DOCUSATE SODIUM 100 MG: 100 CAPSULE, LIQUID FILLED ORAL at 09:45

## 2025-04-06 RX ADMIN — DONEPEZIL HYDROCHLORIDE 22.5 MG: 10 TABLET ORAL at 09:44

## 2025-04-06 RX ADMIN — VANCOMYCIN HYDROCHLORIDE 125 MG: 125 CAPSULE ORAL at 23:10

## 2025-04-06 RX ADMIN — PIPERACILLIN SODIUM AND TAZOBACTAM SODIUM 3.38 G: 3; .375 INJECTION, SOLUTION INTRAVENOUS at 14:41

## 2025-04-06 RX ADMIN — IRON SUCROSE 200 MG: 20 INJECTION, SOLUTION INTRAVENOUS at 06:34

## 2025-04-06 RX ADMIN — INSULIN LISPRO 2 UNITS: 100 INJECTION, SOLUTION INTRAVENOUS; SUBCUTANEOUS at 10:03

## 2025-04-06 RX ADMIN — Medication 1000 UNITS: at 09:45

## 2025-04-06 RX ADMIN — SEVELAMER CARBONATE 1600 MG: 800 TABLET, FILM COATED ORAL at 09:45

## 2025-04-06 RX ADMIN — LEVETIRACETAM 500 MG: 500 TABLET, FILM COATED ORAL at 23:09

## 2025-04-06 ASSESSMENT — COGNITIVE AND FUNCTIONAL STATUS - GENERAL
MOVING TO AND FROM BED TO CHAIR: TOTAL
STANDING UP FROM CHAIR USING ARMS: TOTAL
CLIMB 3 TO 5 STEPS WITH RAILING: TOTAL
DAILY ACTIVITIY SCORE: 11
DRESSING REGULAR UPPER BODY CLOTHING: A LOT
TOILETING: TOTAL
DRESSING REGULAR LOWER BODY CLOTHING: A LOT
HELP NEEDED FOR BATHING: A LOT
TURNING FROM BACK TO SIDE WHILE IN FLAT BAD: A LOT
WALKING IN HOSPITAL ROOM: TOTAL
MOVING FROM LYING ON BACK TO SITTING ON SIDE OF FLAT BED WITH BEDRAILS: A LOT
MOBILITY SCORE: 8
PERSONAL GROOMING: A LOT
EATING MEALS: A LOT

## 2025-04-06 ASSESSMENT — PAIN - FUNCTIONAL ASSESSMENT
PAIN_FUNCTIONAL_ASSESSMENT: 0-10

## 2025-04-06 ASSESSMENT — PAIN SCALES - WONG BAKER: WONGBAKER_NUMERICALRESPONSE: HURTS LITTLE MORE

## 2025-04-06 ASSESSMENT — PAIN SCALES - GENERAL
PAINLEVEL_OUTOF10: 3
PAINLEVEL_OUTOF10: 4
PAINLEVEL_OUTOF10: 2

## 2025-04-06 NOTE — PROGRESS NOTES
Tyshawn Coles is a 84 y.o. male on day 6 of admission presenting with Abscess of hip.      Subjective     Leukocytosis persisting. Called down to radiology and they believe WBC scan can be accommodated tomorrow. Clinically, patient seems to be very close to baseline.      Objective     Last Recorded Vitals  /58 (BP Location: Right arm, Patient Position: Lying)   Pulse 75   Temp 36.7 °C (98.1 °F) (Temporal)   Resp 17   Wt 67.6 kg (149 lb)   SpO2 99%   Intake/Output last 3 Shifts:    Intake/Output Summary (Last 24 hours) at 4/6/2025 1149  Last data filed at 4/6/2025 1121  Gross per 24 hour   Intake 1010 ml   Output --   Net 1010 ml       Admission Weight  Weight: 67.6 kg (149 lb) (03/31/25 1659)    Daily Weight  03/31/25 : 67.6 kg (149 lb)      Physical Exam:  General: Not in distress, confused  HEENT: PERRLA, head intact and normocephalic  Neck: Normal to inspection  Lungs: Clear to auscultation, work of breathing within normal limit  Cardiac: Regular rate and rhythm  Abdomen: Soft nontender, positive bowel sounds  : Exam deferred  Skin: Multiple superficial decubitus ulcer noted with skin breakdown and erythematous base with some foul drainage  Hematology: No petechia or excessive ecchymosis  Musculoskeletal: Left hip Steri-Strip is noted with some hardness.  Neurological: Alert awake oriented x 1  Psych: Calm and cooperative    Relevant Results  Scheduled medications  [Held by provider] apixaban, 2.5 mg, oral, BID  cholecalciferol, 1,000 Units, oral, Daily  docusate sodium, 100 mg, oral, Daily  donepezil, 22.5 mg, oral, Daily  epoetin sheila or biosimilar, 10,000 Units, subcutaneous, Once per day on Monday Wednesday Friday  finasteride, 5 mg, oral, Daily  heparin, 2,000 Units, intra-catheter, After Dialysis  insulin glargine, 8 Units, subcutaneous, Nightly  insulin lispro, 0-10 Units, subcutaneous, TID AC  iron sucrose, 200 mg, intravenous, Daily  levETIRAcetam, 500 mg, oral, BID  melatonin, 10 mg, oral,  Nightly  metoprolol tartrate, 50 mg, oral, BID  piperacillin-tazobactam, 3.375 g, intravenous, q12h  sevelamer carbonate, 1,600 mg, oral, BID  vitamin B complex-vitamin C-folic acid, 1 capsule, oral, Daily      Continuous medications     PRN medications  PRN medications: acetaminophen **OR** acetaminophen **OR** acetaminophen, alteplase, benzocaine-menthol, guaiFENesin, ipratropium-albuteroL, ondansetron **OR** ondansetron, oxyCODONE, oxyCODONE, polyethylene glycol, prochlorperazine **OR** prochlorperazine **OR** prochlorperazine   Labs  Results from last 7 days   Lab Units 04/06/25  0538 04/05/25  0620 04/04/25  0555   WBC AUTO x10*3/uL 31.9* 27.6* 25.0*   HEMOGLOBIN g/dL 7.3* 7.4* 7.3*   HEMATOCRIT % 21.7* 21.8* 21.6*   PLATELETS AUTO x10*3/uL 317 305 259     Results from last 7 days   Lab Units 04/06/25  0538 04/05/25  0620 04/04/25  0555   SODIUM mmol/L 131* 128* 130*   POTASSIUM mmol/L 4.5 4.9 4.5   CHLORIDE mmol/L 93* 92* 93*   CO2 mmol/L 29 28 28   BUN mg/dL 32* 51* 36*   CREATININE mg/dL 3.55* 4.94* 3.69*   CALCIUM mg/dL 8.1* 8.5* 8.3*   GLUCOSE mg/dL 178* 172* 243*       Imaging  XR chest 2 views    Result Date: 4/4/2025  Increased bibasilar atelectasis or infiltrates.   MACRO: None   Signed by: Raheel Powell 4/4/2025 9:31 AM Dictation workstation:   [id number]    XR hip left with pelvis when performed 2 or 3 views    Result Date: 3/31/2025  IMPRESSION: Dislocated left hip hemiarthroplasty.  Please see concurrent CT report for additional information. : [facility]   Transcribe Date/Time: Mar 31 2025  2:42A Dictated by : JORJE DICK MD This examination was interpreted and the report reviewed and electronically signed by: JORJE DICK MD on Mar 31 2025  2:46AM  EST    CT abdomen pelvis w IV contrast    Result Date: 3/31/2025  IMPRESSION: 1.  Posterior superior dislocation left hip hemiarthroplasty.  Large left hip and surrounding soft tissue air-fluid collection can represent abscess. 2.   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 : [facility]   Transcribe Date/Time: Mar 30 2025 11:22P Dictated by : GINNY TREVINO MD This examination was interpreted and the report reviewed and electronically signed by: GINNY TREVINO MD on Mar 30 2025 11:23PM  EST     Cardiology, Vascular, and Other Imaging  FL fluoro images no charge    Result Date: 3/31/2025  These images are not reportable by radiology and will not be interpreted by  Radiologists.    XR hip left with pelvis when performed 2 or 3 views    Result Date: 3/31/2025  * * *Final Report* * * DATE OF EXAM: Mar 31 2025  2:15AM   VHX   5351  -  XR HIP 3V PELV+ AP/LAT LT  / ACCESSION #  [id number] PROCEDURE REASON: Hip pain, acute, fx suspected, initial exam      * * * * Physician Interpretation * * * *  HISTORY: abnormal finding on CT Hip pain, acute, fx suspected, initial exam Hip pain, acute, fx suspected, initial exam RESULT: Frontal view of the pelvis and frontal and crosstable lateral views of the left hip, compared to CT on the same day and conventional radiographs on 08/14/2024. Post bilateral hemiarthroplasty of the hips.  The left acetabular cup and femoral prosthesis are dislocated posteriorly and superiorly with respect to the acetabulum.  There is generalized osteopenia.  Significant vascular calcifications are seen in the soft tissues.    IMPRESSION: Dislocated left hip hemiarthroplasty.  Please see concurrent CT report for additional information. : [facility]   Transcribe Date/Time: Mar 31 2025  2:42A Dictated by : JORJE DICK MD This examination was interpreted  and the report reviewed and electronically signed by: JORJE DICK MD on Mar 31 2025  2:46AM  EST    CT abdomen pelvis w IV contrast    Result Date: 3/31/2025  * * *Final Report* * * DATE OF EXAM: Mar 31 2025 12:18AM   [facility]   0530  -  CT ABD/PEL W IVCON  / ACCESSION #  [id number] PROCEDURE REASON: Abdominal pain, acute, nonlocalized      * * * * Physician Interpretation * * * *  EXAMINATION:  CT ABDOMEN AND PELVIS WITH IV CONTRAST CLINICAL HISTORY: Abdominal pain TECHNIQUE: CT of the abdomen and pelvis was performed using standard technique, scanning from just above the dome of the diaphragm to the symphysis pubis. MQ:  CTAP_3 Contrast: IV:  100 ml of Omnipaque 350 : ml of CT Radiation dose: Integrated Dose-length product (DLP) for this visit =   623 mGy*cm. CT Dose Reduction Employed: Automated exposure control(AEC) and iterative recon COMPARISON: 08/14/2024. RESULT: Coronary artery calcifications.  Suprarenal IVC filter.  Colonic diverticulosis.  Mild rectal stool burden.  Aortoiliac calcifications.   Punctate right nephrolithiasis.  No hydronephrosis.  Mild urinary bladder wall thickening.  Posterior dislocated left hip hemiarthroplasty.  Within the left hip joint, and posterior to the arthroplasty there is large air-fluid collection 11 x 6 x 13 cm.  Right hip hemiarthroplasty present.  Sacral decubitus ulceration with large open defect extending to the sacrum, coccyx no evidence of osteolysis or periosteal reaction.  There is mild sclerosis of the coccyx.    IMPRESSION: 1.  Posterior superior dislocation left hip hemiarthroplasty.  Large left hip and surrounding soft tissue air-fluid collection can represent abscess. 2.  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 : [facility]   Transcribe Date/Time: Mar 31 2025  1:24A Dictated by : BRENT POLK MD This examination was interpreted and the report reviewed and electronically signed by: BRENT  MD FELICITAS on Mar 31 2025  1:37AM  EST    XR chest 1 view    Result Date: 3/30/2025  * * *Final Report* * * DATE OF EXAM: Mar 30 2025 10:23PM   VHX   5376  -  XR CHEST 1V FRONTAL PORT  / ACCESSION #  [id number] PROCEDURE REASON: Cough      * * * * Physician Interpretation * * * *  EXAMINATION:  CHEST RADIOGRAPH (PORTABLE SINGLE VIEW AP) Exam Date/Time:  3/30/2025 10:23 PM CLINICAL HISTORY: Cough MQ:  XCPR_5 Comparison:  10/20/2024 RESULT: Lines, tubes, and devices:  RIGHT tunneled dialysis catheter tip projects at the superior cavoatrial junction. Lungs and pleura:  No focal airspace opacity, pleural effusion, or pneumothorax. Cardiomediastinal silhouette:  Nonenlarged cardiac silhouette.  Unchanged calcified aortopulmonary and LEFT hilar lymph nodes indicative of prior granulomatous disease. Other:  No acute osseous findings.    IMPRESSION: No acute findings. : [facility]   Transcribe Date/Time: Mar 30 2025 11:22P Dictated by : GINNY TREVINO MD This examination was interpreted and the report reviewed and electronically signed by: GINNY TREVINO MD on Mar 30 2025 11:23PM  EST        Assessment & Plan      84 y.o. male with past medical history of ESRD on hemodialysis, hypertension, hyperlipidemia, type 2 diabetes mellitus, dementia who is minimally responsive  presented from outside hospital for concern for posteriorly dislocated hip along with concern for possible abscess.     Recent left hip replacement now with posterior superior dislocation of the left hip hemiarthroplasty with surrounding fluid collection  S/p L hip hemiarthroplasty 3/10/25  Orthopedic surgery reduced hip under general anesthesia  CT imaging with soft tissue air-fluid collection around L hip, ortho feels it is likely post operative changes and hematoma, felt no indication for aspiration  Continue with empiric coverage of Zosyn  Weightbearing as tolerated for transfers related purposes only  Palliative care followed.  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, ortho felt fluid collection around hip was non infectious  Blood culture from [facility] 3/30 negative to date (confirmed 4/5)  Repeat blood cultures 3/31 negative to date  Urine culture 3/31 negative  ID planning WBC tagged scan, hopeful to get done 4/7  If unable to get done, may need IR aspiration of fluid collection  Continue Zosyn      Encephalopathy   Currently oriented x1, close to baseline    Decubitus ulcer  Continue with localized wound care  Continue with supportive care and frequent turning    Atrial Fibrillation  -Holding Eliquis starting 4/6 pending WBC scan vs possible procedure    ESRD on hemodialysis Tuesday Thursday Saturday  Nephrology following  Renally adjust medication using pharmacy's help  Continue with Renvela, Nephrocaps     Hypertension  Continue metoprolol     Dementia  Continue with the Aricept  Continue with Keppra  Continue with melatonin     Type 2 diabetes mellitus  A1c 8.7 in Oct 2024, repeat ordered  Lantus dose increased 4/4, continue SSI     BPH  Continue with finasteride  Urine culture negative 3/31      DVT ppx: Eliquis (Held 4/6 in case IR aspiration needed)  Code status: DNR/DNI

## 2025-04-06 NOTE — PROGRESS NOTES
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        SUBJECTIVE  Afebrile  No events overnight       OBJECTIVE  PHYSICAL EXAM:   Patient Vitals for the past 24 hrs:   BP Temp Temp src Pulse Resp SpO2   04/06/25 1613 150/90 -- -- -- -- --   04/06/25 1200 (!) 163/96 36.9 °C (98.4 °F) Temporal 70 18 98 %   04/06/25 0800 114/58 36.7 °C (98.1 °F) Temporal 75 17 99 %   04/06/25 0400 150/83 36.8 °C (98.2 °F) Temporal 67 17 98 %   04/06/25 0000 160/70 36.9 °C (98.4 °F) Temporal 72 17 98 %   04/05/25 2000 144/74 37 °C (98.6 °F) Temporal 91 17 97 %         Gen: No fever  Respiratory: No distress     Labs:  Lab Results   Component Value Date    WBC 31.9 (H) 04/06/2025    HGB 7.3 (L) 04/06/2025    HCT 21.7 (L) 04/06/2025    MCV 84 04/06/2025     04/06/2025     Lab Results   Component Value Date    GLUCOSE 178 (H) 04/06/2025    CALCIUM 8.1 (L) 04/06/2025     (L) 04/06/2025    K 4.5 04/06/2025    CO2 29 04/06/2025    CL 93 (L) 04/06/2025    BUN 32 (H) 04/06/2025    CREATININE 3.55 (H) 04/06/2025   ESR: --No results found for: \"SEDRATE\"  Lab Results   Component Value Date    CRP 3.63 (A) 06/12/2021     Lab Results   Component Value Date    ALT 14 03/09/2025    AST 24 03/09/2025    ALKPHOS 62 03/09/2025 " "   BILITOT 0.5 03/09/2025         DATA:   Diagnostic tests reviewed for today's visit:    Labs this admission reviewed  Imagings this admission reviewed  Cultures: Reviewed        Elmer Du MD.   Infectious Disease Attending            This note was partially created using voice recognition software and is inherently subject to errors including those of syntax and \"sound-alike\" substitutions which may escape proofreading. In such instances, original meaning may be extrapolated by contextual derivation      "

## 2025-04-06 NOTE — PROGRESS NOTES
Spiritual Care Visit  Spiritual Care Request    Reason for Visit:        Request Received From:       Focus of Care:  Visited With: Patient not available         Refer to :          Spiritual Care Assessment    Spiritual Assessment:                      Care Provided:       Sense of Community and or Restorationist Affiliation:  [employer]         Addressed Needs/Concerns and/or Neal Through:          Outcome:        Advance Directives:         Spiritual Care Annotation    Annotation:  The patient was asleep, so I'll try again.

## 2025-04-06 NOTE — CARE PLAN
The patient's goals for the shift include Pt to not fall all shift    The clinical goals for the shift include patient will remain HDS    Medicated with Tylenol for discomfort.     Turned and repositioned q 2hrs.    IV ATB's continue.    Problem: Skin  Goal: Prevent/manage excess moisture  Outcome: Progressing  Flowsheets (Taken 4/6/2025 0758)  Prevent/manage excess moisture:   Cleanse incontinence/protect with barrier cream   Monitor for/manage infection if present     Problem: Skin  Goal: Promote/optimize nutrition  Outcome: Progressing  Flowsheets (Taken 4/6/2025 0758)  Promote/optimize nutrition: Consume > 50% meals/supplements

## 2025-04-06 NOTE — PROGRESS NOTES
INPATIENT NEPHROLOGY CONSULT PROGRESS NOTE      Patient Name: Tyshawn Coles MRN: [id number]  DATE of SERVICE: April 6, 2025  TIME of SERVICE: 02:33 PM  CONSULTING SERVICE: Nephrology    REASON for CONSULT: ESRD    SUBJECTIVE:  Seen and evaluated at bedside.  More awake and alert today.  Tolerating oral intake pain well-controlled.    SUMMARY OF STAY:  Mr. Coles is a 84 y.o. male who presented to [facility] March 31, 2025 for evaluation of left hip dislocation after remote fall.  Status post left hip hemiarthroplasty reduction April 1, 2025     Patient with past medical history significant for end-stage renal disease on hemodialysis per TTS schedule, hypertension, hyperlipidemia, diabetes mellitus, dementia, seizure disorder.   Patient seen and evaluated at bedside he is with advanced dementia unable to provide additional history.  Patient wife at bedside who was able to provide the majority of the history.  Recently patient had clotted AV graft and currently is receiving dialysis via right tunneled dialysis catheter.  He resides at [facility] and being transported to his dialysis unit.  He has been Edison lifted.  This admission patient presented to [facility] for evaluation of left hip pain and diagnosed with dislocated left hip hemiarthroplasty and scheduled to go to surgery this afternoon.    Dialysis prescription:  ESRD on hemodialysis  Tuesday Thursday Saturday  Primary nephrologist: Dr. Wise  Estimated dry weight: 67 kg  Duration: 3-1/2 hours  Intradialytic Midodrine  Right tunneled dialysis catheter in place        ASSESSMENT AND PLAN:  ESRD: iHD per Tuesday Thursday Saturday schedule  Volume status:  Hypovolemia, resolved   Electrolytes: Within acceptable range  Acid-base: Metabolic acidosis from renal failure (controlled with HD)  Bone mineral disease (Ca/P/PTH): controlled  Anemia from renal failure, hemoglobin stable    Dislocated left hip hemiarthroplasty status postsurgery April 1  Chronic osteomyelitis of the coccyx  Left hip abscess  Hypotension: Fluid responsive  T2DM     PLAN:  End-stage renal disease patient with complex acute/subacute dislocated left hip hemiarthroplasty with left hip abscess, chronic osteomyelitis of the coccyx:     Electrolytes and volume status within acceptable range no urgent indication for renal placement therapy today  Volume status within acceptable range  Anemia to continue IV iron loading dose, Epogen 2000 units  Malnutrition to continue protein supplements 3 times daily  Strict input and output to guide volume management, can have intermittent IV boluses to maintain hemodynamic stability  phosphorus level at 3.3, to hold renvela   For hemodialysis per TTS schedule     Strict input and output to guide volume management     Will follow, thank you!      Medications:    Current Facility-Administered Medications:     acetaminophen (Tylenol) tablet 650 mg, 650 mg, oral, q6h PRN, 650 mg at 04/05/25 2112 **OR** acetaminophen (Tylenol) oral liquid 650 mg, 650 mg, oral, q6h PRN, 650 mg at 04/06/25 0639 **OR** acetaminophen (Tylenol) suppository 650 mg, 650 mg, rectal, q6h PRN, Eliezer Dumont MD    alteplase (Cathflo Activase) injection 2 mg, 2 mg, intra-catheter, PRN, Paul Guevara MD    [Held by provider] apixaban (Eliquis) tablet 2.5 mg, 2.5 mg, oral, BID, Paul Guevara MD, 2.5 mg at 04/05/25 2113    benzocaine-menthol (Cepastat Sore Throat) lozenge 1 lozenge, 1 lozenge, Mouth/Throat, q2h PRN, Eliezer Dumont MD    chlorproMAZINE (Thorazine) tablet 25 mg, 25 mg, oral, Once, Jesse Morgan DO    cholecalciferol (Vitamin D-3) tablet 1,000 Units, 1,000 Units, oral, Daily, Eliezer Dumont MD, 1,000 Units at 04/06/25 0945    docusate sodium (Colace) capsule 100 mg, 100 mg, oral, Daily, Paul Guevara MD, 100 mg at 04/06/25 0945    donepezil (Aricept) tablet 22.5 mg, 22.5 mg, oral, Daily, Eliezer Dumont  MD, 22.5 mg at 04/06/25 0944    epoetin sheila-epbx (Retacrit) injection 10,000 Units, 10,000 Units, subcutaneous, Once per day on Monday Wednesday Friday, Carlene Norman MD, 10,000 Units at 04/04/25 1101    finasteride (Proscar) tablet 5 mg, 5 mg, oral, Daily, Eliezer Dumont MD, 5 mg at 04/06/25 0945    [START ON 4/7/2025] gabapentin (Neurontin) capsule 100 mg, 100 mg, oral, Once per day on Monday Wednesday Friday, Jesse Morgan DO    guaiFENesin (Mucinex) 12 hr tablet 600 mg, 600 mg, oral, q12h PRN, Eliezer Dumont MD    heparin 1,000 unit/mL injection 2,000 Units, 2,000 Units, intra-catheter, After Dialysis, Carlene Norman MD, 2,000 Units at 04/05/25 1330    insulin glargine (Lantus) injection 8 Units, 8 Units, subcutaneous, Nightly, Jesse Morgan DO, 8 Units at 04/05/25 2113    insulin lispro injection 0-10 Units, 0-10 Units, subcutaneous, TID AC, Eliezer Dumont MD, 4 Units at 04/06/25 1819    ipratropium-albuteroL (Duo-Neb) 0.5-2.5 mg/3 mL nebulizer solution 3 mL, 3 mL, nebulization, q2h PRN, Paul Guevara MD    iron sucrose (Venofer) injection 200 mg, 200 mg, intravenous, Daily, Carlene Norman MD, 200 mg at 04/06/25 0634    levETIRAcetam (Keppra) tablet 500 mg, 500 mg, oral, BID, Eliezer Dumont MD, 500 mg at 04/06/25 0945    melatonin tablet 10 mg, 10 mg, oral, Nightly PRN, Jesse Morgan DO    metoprolol tartrate (Lopressor) tablet 50 mg, 50 mg, oral, BID, Eliezer Dumont MD, 50 mg at 04/06/25 0945    ondansetron (Zofran) tablet 4 mg, 4 mg, oral, q8h PRN **OR** ondansetron (Zofran) injection 4 mg, 4 mg, intravenous, q8h PRN, Eliezer Dumont MD, 4 mg at 03/31/25 1403    oxyCODONE (Roxicodone) immediate release tablet 2.5 mg, 2.5 mg, oral, q6h PRN, Eliezer Dumont MD    oxyCODONE (Roxicodone) immediate release tablet 5 mg, 5 mg, oral, q6h PRN, Eliezer Dumont MD    piperacillin-tazobactam (Zosyn) 3.375 g in dextrose (iso) IV 50 mL, 3.375 g, intravenous, q12h, Eliezer Dumont MD, Last  Rate: 0 mL/hr at 04/06/25 0632, 3.375 g at 04/06/25 1441    polyethylene glycol (Glycolax, Miralax) packet 17 g, 17 g, oral, Daily PRN, Eliezer Dumont MD    prochlorperazine (Compazine) tablet 10 mg, 10 mg, oral, q6h PRN **OR** prochlorperazine (Compazine) injection 10 mg, 10 mg, intravenous, q6h PRN **OR** prochlorperazine (Compazine) suppository 25 mg, 25 mg, rectal, q12h PRN, Eliezer Dumont MD    sevelamer carbonate (Renvela) tablet 1,600 mg, 1,600 mg, oral, BID, Paul Guevara MD, 1,600 mg at 04/06/25 0945    vancomycin (Vancocin) capsule 125 mg, 125 mg, oral, 4x daily, Elmer Du MD    vitamin B complex-vitamin C-folic acid (Nephrocaps) capsule 1 capsule, 1 capsule, oral, Daily, Eliezer Dumont MD, 1 capsule at 04/06/25 0945    PERTINENT ROS:  GENERAL:  positive for fatigue, poor appetite.  No fever/chills  RESPIRATORY:  positive for shortness of breath.  Negative for cough, wheezing.  CARDIOVASCULAR:   Negative for chest pain or palpitation.  GI:  Negative for abdominal pain, diarrhea, heartburn, nausea, vomiting  : External catheter in place with dark concentrated urine    Physical Exam:  Vital signs in last 24 hours:  Temp:  [36.6 °C (97.9 °F)-37 °C (98.6 °F)] 36.6 °C (97.9 °F)  Heart Rate:  [67-91] 71  Resp:  [16-18] 16  BP: (114-177)/() 150/90    General: Awake, oriented to self, pain better controlled  HEENT:  NCAT,  mucous membranes moist and pink  NECK: no JVD, no carotid bruit, supple, no cervical mass or thyromegaly  LUNGS;  Diminished breath sounds, no Rales  CV:  Distant, regular rate and rhythm, no murmurs  ABDOMEN:  abdomen soft, nontender, BS normal, no masses or organomegaly  EDEMA:  no lower extremity edema/dependent edema  SKIN: Left hip with a dressing in place      Intake/Output last 3 shifts:  I/O last 3 completed shifts:  In: 1690 (25 mL/kg) [P.O.:940; I.V.:600 (8.9 mL/kg); IV Piggyback:150]  Out: - (0 mL/kg)   Weight: 67.6 kg     DATA:  Diagnotic tests reviewed for Todays  visit:  Results from last 7 days   Lab Units 04/06/25  0538   WBC AUTO x10*3/uL 31.9*   RBC AUTO x10*6/uL 2.57*   HEMOGLOBIN g/dL 7.3*   HEMATOCRIT % 21.7*     Results from last 7 days   Lab Units 04/06/25  0538 04/05/25  0620 04/04/25  0555   SODIUM mmol/L 131*   < > 130*   POTASSIUM mmol/L 4.5   < > 4.5   CHLORIDE mmol/L 93*   < > 93*   CO2 mmol/L 29   < > 28   BUN mg/dL 32*   < > 36*   CREATININE mg/dL 3.55*   < > 3.69*   CALCIUM mg/dL 8.1*   < > 8.3*   PHOSPHORUS mg/dL 3.7  --   --    MAGNESIUM mg/dL  --   --  1.98    < > = values in this interval not displayed.     Results from last 7 days   Lab Units 03/31/25  1746   COLOR U  Yellow   APPEARANCE U  Clear   PH U  7.0   SPEC GRAV UR  1.015   PROTEIN U mg/dL 100 (2+)*   BLOOD UR mg/dL 0.06 (1+)*   NITRITE U  NEGATIVE   WBC UR /HPF 1-5     IMAGING: CXR reviewed in  images      SIGNATURE: Carlene Norman MD  Nephrology and Hypertension  [address] [place]  Office phone: [phone]  FAX: [phone]    This note was partially generated using the Dragon voice recognition system, and there may be some incorrect words, spelling's and punctuation that were not noted in checking the note before saving.

## 2025-04-07 ENCOUNTER — APPOINTMENT (OUTPATIENT)
Dept: RADIOLOGY | Facility: HOSPITAL | Age: 85
DRG: 559 | End: 2025-04-07
Payer: MEDICARE

## 2025-04-07 ENCOUNTER — APPOINTMENT (OUTPATIENT)
Dept: RADIOLOGY | Facility: HOSPITAL | Age: 85
End: 2025-04-07
Payer: MEDICARE

## 2025-04-07 LAB
ALBUMIN SERPL BCP-MCNC: 2.4 G/DL (ref 3.4–5)
ALP SERPL-CCNC: 75 U/L (ref 33–136)
ALT SERPL W P-5'-P-CCNC: 9 U/L (ref 10–52)
ANION GAP SERPL CALC-SCNC: 18 MMOL/L (ref 10–20)
AST SERPL W P-5'-P-CCNC: 21 U/L (ref 9–39)
BASOPHILS # BLD MANUAL: 0 X10*3/UL (ref 0–0.1)
BASOPHILS NFR BLD MANUAL: 0 %
BILIRUB SERPL-MCNC: 0.5 MG/DL (ref 0–1.2)
BUN SERPL-MCNC: 48 MG/DL (ref 6–23)
CALCIUM SERPL-MCNC: 7.9 MG/DL (ref 8.6–10.3)
CHLORIDE SERPL-SCNC: 90 MMOL/L (ref 98–107)
CO2 SERPL-SCNC: 23 MMOL/L (ref 21–32)
CREAT SERPL-MCNC: 4.97 MG/DL (ref 0.5–1.3)
EGFRCR SERPLBLD CKD-EPI 2021: 11 ML/MIN/1.73M*2
EOSINOPHIL # BLD MANUAL: 0 X10*3/UL (ref 0–0.4)
EOSINOPHIL NFR BLD MANUAL: 0 %
ERYTHROCYTE [DISTWIDTH] IN BLOOD BY AUTOMATED COUNT: 18.7 % (ref 11.5–14.5)
GLUCOSE BLD MANUAL STRIP-MCNC: 163 MG/DL (ref 74–99)
GLUCOSE BLD MANUAL STRIP-MCNC: 174 MG/DL (ref 74–99)
GLUCOSE BLD MANUAL STRIP-MCNC: 177 MG/DL (ref 74–99)
GLUCOSE BLD MANUAL STRIP-MCNC: 180 MG/DL (ref 74–99)
GLUCOSE BLD MANUAL STRIP-MCNC: 294 MG/DL (ref 74–99)
GLUCOSE SERPL-MCNC: 176 MG/DL (ref 74–99)
HCT VFR BLD AUTO: 23.5 % (ref 41–52)
HGB BLD-MCNC: 8 G/DL (ref 13.5–17.5)
IMM GRANULOCYTES # BLD AUTO: 2.4 X10*3/UL (ref 0–0.5)
IMM GRANULOCYTES NFR BLD AUTO: 7.4 % (ref 0–0.9)
LYMPHOCYTES # BLD MANUAL: 2.26 X10*3/UL (ref 0.8–3)
LYMPHOCYTES NFR BLD MANUAL: 7 %
MCH RBC QN AUTO: 28.3 PG (ref 26–34)
MCHC RBC AUTO-ENTMCNC: 34 G/DL (ref 32–36)
MCV RBC AUTO: 83 FL (ref 80–100)
METAMYELOCYTES # BLD MANUAL: 0.65 X10*3/UL
METAMYELOCYTES NFR BLD MANUAL: 2 %
MONOCYTES # BLD MANUAL: 1.62 X10*3/UL (ref 0.05–0.8)
MONOCYTES NFR BLD MANUAL: 5 %
MYELOCYTES # BLD MANUAL: 0.97 X10*3/UL
MYELOCYTES NFR BLD MANUAL: 3 %
NEUTS SEG # BLD MANUAL: 25.84 X10*3/UL (ref 1.6–5)
NEUTS SEG NFR BLD MANUAL: 80 %
NRBC BLD-RTO: 0.2 /100 WBCS (ref 0–0)
PLATELET # BLD AUTO: 353 X10*3/UL (ref 150–450)
POLYCHROMASIA BLD QL SMEAR: ABNORMAL
POTASSIUM SERPL-SCNC: 5.3 MMOL/L (ref 3.5–5.3)
POTASSIUM SERPL-SCNC: 6 MMOL/L (ref 3.5–5.3)
PROT SERPL-MCNC: 5.9 G/DL (ref 6.4–8.2)
RBC # BLD AUTO: 2.83 X10*6/UL (ref 4.5–5.9)
RBC MORPH BLD: ABNORMAL
SODIUM SERPL-SCNC: 125 MMOL/L (ref 136–145)
TARGETS BLD QL SMEAR: ABNORMAL
TOTAL CELLS COUNTED BLD: 100
VARIANT LYMPHS # BLD MANUAL: 0.97 X10*3/UL (ref 0–0.3)
VARIANT LYMPHS NFR BLD: 3 %
WBC # BLD AUTO: 32.3 X10*3/UL (ref 4.4–11.3)

## 2025-04-07 PROCEDURE — 36415 COLL VENOUS BLD VENIPUNCTURE: CPT | Performed by: STUDENT IN AN ORGANIZED HEALTH CARE EDUCATION/TRAINING PROGRAM

## 2025-04-07 PROCEDURE — 85027 COMPLETE CBC AUTOMATED: CPT | Performed by: STUDENT IN AN ORGANIZED HEALTH CARE EDUCATION/TRAINING PROGRAM

## 2025-04-07 PROCEDURE — 2500000001 HC RX 250 WO HCPCS SELF ADMINISTERED DRUGS (ALT 637 FOR MEDICARE OP): Performed by: STUDENT IN AN ORGANIZED HEALTH CARE EDUCATION/TRAINING PROGRAM

## 2025-04-07 PROCEDURE — 2500000001 HC RX 250 WO HCPCS SELF ADMINISTERED DRUGS (ALT 637 FOR MEDICARE OP): Performed by: INTERNAL MEDICINE

## 2025-04-07 PROCEDURE — 85007 BL SMEAR W/DIFF WBC COUNT: CPT | Performed by: STUDENT IN AN ORGANIZED HEALTH CARE EDUCATION/TRAINING PROGRAM

## 2025-04-07 PROCEDURE — 2500000002 HC RX 250 W HCPCS SELF ADMINISTERED DRUGS (ALT 637 FOR MEDICARE OP, ALT 636 FOR OP/ED): Performed by: STUDENT IN AN ORGANIZED HEALTH CARE EDUCATION/TRAINING PROGRAM

## 2025-04-07 PROCEDURE — 84075 ASSAY ALKALINE PHOSPHATASE: CPT | Performed by: STUDENT IN AN ORGANIZED HEALTH CARE EDUCATION/TRAINING PROGRAM

## 2025-04-07 PROCEDURE — 3430000001 HC RX 343 DIAGNOSTIC RADIOPHARMACEUTICALS: Performed by: STUDENT IN AN ORGANIZED HEALTH CARE EDUCATION/TRAINING PROGRAM

## 2025-04-07 PROCEDURE — 2500000001 HC RX 250 WO HCPCS SELF ADMINISTERED DRUGS (ALT 637 FOR MEDICARE OP): Performed by: ORTHOPAEDIC SURGERY

## 2025-04-07 PROCEDURE — 2500000004 HC RX 250 GENERAL PHARMACY W/ HCPCS (ALT 636 FOR OP/ED): Performed by: INTERNAL MEDICINE

## 2025-04-07 PROCEDURE — 1100000001 HC PRIVATE ROOM DAILY

## 2025-04-07 PROCEDURE — 2500000002 HC RX 250 W HCPCS SELF ADMINISTERED DRUGS (ALT 637 FOR MEDICARE OP, ALT 636 FOR OP/ED): Performed by: INTERNAL MEDICINE

## 2025-04-07 PROCEDURE — 2500000002 HC RX 250 W HCPCS SELF ADMINISTERED DRUGS (ALT 637 FOR MEDICARE OP, ALT 636 FOR OP/ED): Performed by: ORTHOPAEDIC SURGERY

## 2025-04-07 PROCEDURE — 82947 ASSAY GLUCOSE BLOOD QUANT: CPT

## 2025-04-07 PROCEDURE — A9547 IN111 OXYQUINOLINE: HCPCS | Performed by: STUDENT IN AN ORGANIZED HEALTH CARE EDUCATION/TRAINING PROGRAM

## 2025-04-07 PROCEDURE — 2500000004 HC RX 250 GENERAL PHARMACY W/ HCPCS (ALT 636 FOR OP/ED): Performed by: ORTHOPAEDIC SURGERY

## 2025-04-07 PROCEDURE — 99233 SBSQ HOSP IP/OBS HIGH 50: CPT | Performed by: STUDENT IN AN ORGANIZED HEALTH CARE EDUCATION/TRAINING PROGRAM

## 2025-04-07 PROCEDURE — 84132 ASSAY OF SERUM POTASSIUM: CPT | Performed by: STUDENT IN AN ORGANIZED HEALTH CARE EDUCATION/TRAINING PROGRAM

## 2025-04-07 PROCEDURE — 78804 RP LOCLZJ TUM WHBDY 2+D IMG: CPT

## 2025-04-07 PROCEDURE — 6350000001 HC RX 635 EPOETIN >10,000 UNITS: Mod: JZ | Performed by: INTERNAL MEDICINE

## 2025-04-07 RX ORDER — CHLORPROMAZINE HYDROCHLORIDE 10 MG/1
25 TABLET, FILM COATED ORAL ONCE
Status: COMPLETED | OUTPATIENT
Start: 2025-04-07 | End: 2025-04-07

## 2025-04-07 RX ORDER — INDIUM IN-111 OXYQUINOLINE 1 UG/ML
510 SOLUTION INTRAVENOUS
Status: COMPLETED | OUTPATIENT
Start: 2025-04-07 | End: 2025-04-07

## 2025-04-07 RX ADMIN — SEVELAMER CARBONATE 1600 MG: 800 TABLET, FILM COATED ORAL at 08:59

## 2025-04-07 RX ADMIN — METOPROLOL TARTRATE 50 MG: 50 TABLET, FILM COATED ORAL at 08:59

## 2025-04-07 RX ADMIN — PIPERACILLIN SODIUM AND TAZOBACTAM SODIUM 3.38 G: 3; .375 INJECTION, SOLUTION INTRAVENOUS at 13:40

## 2025-04-07 RX ADMIN — VANCOMYCIN HYDROCHLORIDE 125 MG: 125 CAPSULE ORAL at 07:06

## 2025-04-07 RX ADMIN — FINASTERIDE 5 MG: 5 TABLET, FILM COATED ORAL at 08:59

## 2025-04-07 RX ADMIN — INSULIN LISPRO 2 UNITS: 100 INJECTION, SOLUTION INTRAVENOUS; SUBCUTANEOUS at 09:00

## 2025-04-07 RX ADMIN — Medication 1000 UNITS: at 08:59

## 2025-04-07 RX ADMIN — INSULIN LISPRO 2 UNITS: 100 INJECTION, SOLUTION INTRAVENOUS; SUBCUTANEOUS at 16:41

## 2025-04-07 RX ADMIN — SEVELAMER CARBONATE 1600 MG: 800 TABLET, FILM COATED ORAL at 21:10

## 2025-04-07 RX ADMIN — INSULIN LISPRO 2 UNITS: 100 INJECTION, SOLUTION INTRAVENOUS; SUBCUTANEOUS at 12:23

## 2025-04-07 RX ADMIN — DOCUSATE SODIUM 100 MG: 100 CAPSULE, LIQUID FILLED ORAL at 08:59

## 2025-04-07 RX ADMIN — INSULIN GLARGINE 8 UNITS: 100 INJECTION, SOLUTION SUBCUTANEOUS at 21:11

## 2025-04-07 RX ADMIN — DONEPEZIL HYDROCHLORIDE 22.5 MG: 10 TABLET ORAL at 08:59

## 2025-04-07 RX ADMIN — VANCOMYCIN HYDROCHLORIDE 125 MG: 125 CAPSULE ORAL at 16:49

## 2025-04-07 RX ADMIN — INDIUM IN-111 OXYQUINOLINE 0.51 MILLICURIE: 1 SOLUTION INTRAVENOUS at 12:50

## 2025-04-07 RX ADMIN — GABAPENTIN 100 MG: 100 CAPSULE ORAL at 18:14

## 2025-04-07 RX ADMIN — Medication 1 CAPSULE: at 08:59

## 2025-04-07 RX ADMIN — LEVETIRACETAM 500 MG: 500 TABLET, FILM COATED ORAL at 21:10

## 2025-04-07 RX ADMIN — METOPROLOL TARTRATE 50 MG: 50 TABLET, FILM COATED ORAL at 21:10

## 2025-04-07 RX ADMIN — PIPERACILLIN SODIUM AND TAZOBACTAM SODIUM 3.38 G: 3; .375 INJECTION, SOLUTION INTRAVENOUS at 02:45

## 2025-04-07 RX ADMIN — VANCOMYCIN HYDROCHLORIDE 125 MG: 125 CAPSULE ORAL at 21:10

## 2025-04-07 RX ADMIN — CHLORPROMAZINE HYDROCHLORIDE 25 MG: 10 TABLET, FILM COATED ORAL at 18:32

## 2025-04-07 RX ADMIN — LEVETIRACETAM 500 MG: 500 TABLET, FILM COATED ORAL at 08:59

## 2025-04-07 RX ADMIN — VANCOMYCIN HYDROCHLORIDE 125 MG: 125 CAPSULE ORAL at 13:40

## 2025-04-07 RX ADMIN — EPOETIN ALFA-EPBX 10000 UNITS: 10000 INJECTION, SOLUTION INTRAVENOUS; SUBCUTANEOUS at 10:37

## 2025-04-07 ASSESSMENT — PAIN SCALES - GENERAL
PAINLEVEL_OUTOF10: 0 - NO PAIN
PAINLEVEL_OUTOF10: 0 - NO PAIN

## 2025-04-07 NOTE — PROGRESS NOTES
Speech-Language Pathology                 Therapy Communication Note    Patient Name: Tyshawn Coles  MRN: [id number]  Department: [facility] 4 S  Room: [facility]/4105-A  Today's Date: 4/7/2025     Discipline: Speech Language Pathology      Missed Visit Reason:  MBSS canceled    Missed Time: Attempt     Comment: Patient had been scheduled for MBSS this morning; however, family refusing MBSS for patient. Will discontinued order.

## 2025-04-07 NOTE — PROGRESS NOTES
" INPATIENT PROGRESS NOTES    PATIENT NAME: Tyshawn Coles    MRN: [id number]  SERVICE DATE:  4/7/2025   SERVICE TIME:  12:58 PM    SIGNATURE: Elmer Du MD      ASSESSMENT :   -Rule out left prosthetic hip abscess  -Fever and leukocytosis  -Acute anemia  -Diarrhea r/o CDI    #Left prosthetic hip dislocation status post closed reduction  #Hypertension  #Hyperlipidemia  #End-stage renal disease on dialysis, which can affect the dosing of antimicrobials  #Type 2 diabetes, which is chronic condition that increases his risk for infection  #Dementia       PLAN:  -Follow up C.diff PCR  -Follow up White blood cell tagged scan   -Continue Zosyn and PO Vancomycin   -Closely monitor for antibiotics side effects including rash, Diarrhea/CDI, thrombocytopenia, ALCIDES, etc.        SUBJECTIVE  Afebrile  No events overnight       OBJECTIVE  PHYSICAL EXAM:   Patient Vitals for the past 24 hrs:   BP Temp Temp src Pulse Resp SpO2   04/07/25 1200 128/56 36.8 °C (98.2 °F) Temporal 66 19 100 %   04/07/25 0800 114/81 36.8 °C (98.2 °F) Temporal 72 20 100 %   04/07/25 0400 105/65 36.6 °C (97.9 °F) Temporal 69 20 98 %   04/07/25 0000 115/69 36.9 °C (98.4 °F) Temporal 68 19 98 %   04/06/25 2000 116/63 37 °C (98.6 °F) Temporal 79 19 97 %   04/06/25 1613 150/90 -- -- -- -- --   04/06/25 1600 (!) 177/134 36.6 °C (97.9 °F) Temporal 71 16 98 %         Gen: No fever  Respiratory: No distress     Labs:  Lab Results   Component Value Date    WBC 32.3 (H) 04/07/2025    HGB 8.0 (L) 04/07/2025    HCT 23.5 (L) 04/07/2025    MCV 83 04/07/2025     04/07/2025     Lab Results   Component Value Date    GLUCOSE 176 (H) 04/07/2025    CALCIUM 7.9 (L) 04/07/2025     (L) 04/07/2025    K 6.0 (H) 04/07/2025    CO2 23 04/07/2025    CL 90 (L) 04/07/2025    BUN 48 (H) 04/07/2025    CREATININE 4.97 (H) 04/07/2025   ESR: --No results found for: \"SEDRATE\"  Lab Results   Component Value Date    CRP 3.63 (A) 06/12/2021     Lab Results   Component Value Date    " "ALT 9 (L) 04/07/2025    AST 21 04/07/2025    ALKPHOS 75 04/07/2025    BILITOT 0.5 04/07/2025         DATA:   Diagnostic tests reviewed for today's visit:    Labs this admission reviewed  Imagings this admission reviewed  Cultures: Reviewed        Elmer Du MD.   Infectious Disease Attending            This note was partially created using voice recognition software and is inherently subject to errors including those of syntax and \"sound-alike\" substitutions which may escape proofreading. In such instances, original meaning may be extrapolated by contextual derivation      "

## 2025-04-07 NOTE — CARE PLAN
The patient's goals for the shift include Pt to not fall all shift    The clinical goals for the shift include Pt will participate in Q2 turns to prevent skin integrity issues throughout shift      Problem: Skin  Goal: Prevent/manage excess moisture  Flowsheets (Taken 4/7/2025 1156)  Prevent/manage excess moisture:   Cleanse incontinence/protect with barrier cream   Use wicking fabric (obtain order)

## 2025-04-07 NOTE — PROGRESS NOTES
Tyshawn Coles is a 84 y.o. male on day 7 of admission presenting with Abscess of hip.      Subjective     Underwent tagged WBC scan this AM. Family refused MBS, will need to speak with them about this. Lab was unable to collect blood work, will reattempt. No diarrhea per RN.      Objective     Last Recorded Vitals  /81 (BP Location: Right arm, Patient Position: Lying)   Pulse 72   Temp 36.8 °C (98.2 °F) (Temporal)   Resp 20   Wt 67.6 kg (149 lb)   SpO2 100%   Intake/Output last 3 Shifts:    Intake/Output Summary (Last 24 hours) at 4/7/2025 1129  Last data filed at 4/6/2025 1851  Gross per 24 hour   Intake 480 ml   Output --   Net 480 ml       Admission Weight  Weight: 67.6 kg (149 lb) (03/31/25 1659)    Daily Weight  03/31/25 : 67.6 kg (149 lb)      Physical Exam:  General: Not in distress, confused  HEENT: PERRLA, head intact and normocephalic  Neck: Normal to inspection  Lungs: Clear to auscultation, work of breathing within normal limit  Cardiac: Regular rate and rhythm  Abdomen: Soft nontender, positive bowel sounds  : Exam deferred  Skin: Multiple superficial decubitus ulcer noted with skin breakdown and erythematous base with some foul drainage  Hematology: No petechia or excessive ecchymosis  Musculoskeletal: Left hip Steri-Strip is noted with some hardness.  Neurological: Alert awake oriented x 1  Psych: Calm and cooperative    Relevant Results  Scheduled medications  [Held by provider] apixaban, 2.5 mg, oral, BID  chlorproMAZINE, 25 mg, oral, Once  cholecalciferol, 1,000 Units, oral, Daily  docusate sodium, 100 mg, oral, Daily  donepezil, 22.5 mg, oral, Daily  epoetin sheila or biosimilar, 10,000 Units, subcutaneous, Once per day on Monday Wednesday Friday  finasteride, 5 mg, oral, Daily  gabapentin, 100 mg, oral, Once per day on Monday Wednesday Friday  heparin, 2,000 Units, intra-catheter, After Dialysis  insulin glargine, 8 Units, subcutaneous, Nightly  insulin lispro, 0-10 Units, subcutaneous,  TID AC  iron sucrose, 200 mg, intravenous, Daily  levETIRAcetam, 500 mg, oral, BID  metoprolol tartrate, 50 mg, oral, BID  piperacillin-tazobactam, 3.375 g, intravenous, q12h  sevelamer carbonate, 1,600 mg, oral, BID  vancomycin, 125 mg, oral, 4x daily  vitamin B complex-vitamin C-folic acid, 1 capsule, oral, Daily      Continuous medications     PRN medications  PRN medications: acetaminophen **OR** acetaminophen **OR** acetaminophen, alteplase, benzocaine-menthol, guaiFENesin, ipratropium-albuteroL, melatonin, ondansetron **OR** ondansetron, oxyCODONE, oxyCODONE, polyethylene glycol, prochlorperazine **OR** prochlorperazine **OR** prochlorperazine   Labs  Results from last 7 days   Lab Units 04/06/25  0538 04/05/25  0620 04/04/25  0555   WBC AUTO x10*3/uL 31.9* 27.6* 25.0*   HEMOGLOBIN g/dL 7.3* 7.4* 7.3*   HEMATOCRIT % 21.7* 21.8* 21.6*   PLATELETS AUTO x10*3/uL 317 305 259     Results from last 7 days   Lab Units 04/06/25 0538 04/05/25  0620 04/04/25  0555   SODIUM mmol/L 131* 128* 130*   POTASSIUM mmol/L 4.5 4.9 4.5   CHLORIDE mmol/L 93* 92* 93*   CO2 mmol/L 29 28 28   BUN mg/dL 32* 51* 36*   CREATININE mg/dL 3.55* 4.94* 3.69*   CALCIUM mg/dL 8.1* 8.5* 8.3*   GLUCOSE mg/dL 178* 172* 243*       Imaging  XR chest 2 views    Result Date: 4/4/2025  Increased bibasilar atelectasis or infiltrates.   MACRO: None   Signed by: Raheel Powell 4/4/2025 9:31 AM Dictation workstation:   [id number]     Cardiology, Vascular, and Other Imaging  ECG 12 Lead    Result Date: 4/7/2025  Normal sinus rhythm Normal ECG When compared with ECG of 09-MAR-2025 00:15, No significant change was found    FL fluoro images no charge    Result Date: 3/31/2025  These images are not reportable by radiology and will not be interpreted by  Radiologists.        Assessment & Plan      84 y.o. male with past medical history of ESRD on hemodialysis, hypertension, hyperlipidemia, type 2 diabetes mellitus, dementia who is minimally responsive   presented from outside hospital for concern for posteriorly dislocated hip along with concern for possible abscess.     Recent left hip replacement now with posterior superior dislocation of the left hip hemiarthroplasty with surrounding fluid collection  S/p L hip hemiarthroplasty 3/10/25  Orthopedic surgery reduced hip under general anesthesia  CT imaging with soft tissue air-fluid collection around L hip, ortho feels it is likely post operative changes and hematoma, felt no indication for aspiration  Continue with empiric coverage of Zosyn  Weightbearing as tolerated for transfers related purposes only  Palliative care followed.  Wife is not ready to stop dialysis     Bacteroides caccae bacteremia prior to admission  Leukocytosis  Blood cultures 3/29 from dialysis positive for Bacteroides caccae   CT AP on admit with chronic sacral osteomyelitis, ortho felt fluid collection around hip was non infectious  Blood culture from [facility] 3/30 negative to date (confirmed 4/5)  Repeat blood cultures 3/31 negative to date  Urine culture 3/31 negative  ID planning WBC tagged scan, hopeful to get done 4/7  If unable to get done, may need IR aspiration of fluid collection  Continue Zosyn  C diff PCR pending, continue PO Vancmycin in interim      Encephalopathy   Currently oriented x1, close to baseline    Decubitus ulcer  Continue with localized wound care  Continue with supportive care and frequent turning    Atrial Fibrillation  -Holding Eliquis starting 4/6 pending WBC scan     ESRD on hemodialysis Tuesday Thursday Saturday  Nephrology following  Renally adjust medication using pharmacy's help  Continue with Renvela, Nephrocaps     Hypertension  Continue metoprolol     Dementia  Continue with the Aricept  Continue with Keppra  Continue with melatonin     Type 2 diabetes mellitus  A1c 8.7 in Oct 2024, repeat ordered  Lantus dose increased 4/4, continue SSI     BPH  Continue with finasteride  Urine culture negative  3/31      DVT ppx:  Eliquis on  hold pending WBC scan   Code status: DNR/DNI

## 2025-04-07 NOTE — PROGRESS NOTES
Spiritual Care Visit  Spiritual Care Request    Reason for Visit:  Routine Visit: Introduction  Continue Visiting: No     Request Received From:       Focus of Care:  Visited With: Patient, Health care provider         Refer to :          Spiritual Care Assessment    Spiritual Assessment:                      Care Provided:  Intended Effects: Establish rapport and connectedness, Umm affirmation, Build relationship of care and support, Demonstrate caring and concern, Helping someone feel comforted  Methods: Offer spiritual/Mandaeism support  Interventions: [place]    Sense of Community and or Muslim Affiliation:  Zoroastrianism         Addressed Needs/Concerns and/or Neal Through:  Muslim Encounters  Muslim Needs: Prayer, Muslim articles       Outcome:  Outcome of Spiritual Care Visit: Unknown     Advance Directives:         Spiritual Care Annotation    Annotation:

## 2025-04-07 NOTE — CARE PLAN
Problem: Discharge Planning  Goal: Discharge to home or other facility with appropriate resources  Outcome: Progressing     Problem: Chronic Conditions and Co-morbidities  Goal: Patient's chronic conditions and co-morbidity symptoms are monitored and maintained or improved  Outcome: Progressing     Problem: Nutrition  Goal: Nutrient intake appropriate for maintaining nutritional needs  Outcome: Progressing     Problem: Skin  Goal: Participates in plan/prevention/treatment measures  Outcome: Progressing  Flowsheets (Taken 4/7/2025 0404)  Participates in plan/prevention/treatment measures: Elevate heels  Goal: Decreased wound size/increased tissue granulation at next dressing change  Outcome: Progressing  Flowsheets (Taken 4/7/2025 0404)  Decreased wound size/increased tissue granulation at next dressing change: Promote sleep for wound healing  Goal: Prevent/manage excess moisture  Outcome: Progressing  Flowsheets (Taken 4/7/2025 0404)  Prevent/manage excess moisture: Moisturize dry skin  Goal: Prevent/minimize sheer/friction injuries  Outcome: Progressing  Flowsheets (Taken 4/7/2025 0404)  Prevent/minimize sheer/friction injuries: Use pull sheet  Goal: Promote/optimize nutrition  Outcome: Progressing  Flowsheets (Taken 4/7/2025 0404)  Promote/optimize nutrition: Assist with feeding  Goal: Promote skin healing  Outcome: Progressing  Flowsheets (Taken 4/7/2025 0404)  Promote skin healing: Protective dressings over bony prominences     Problem: Diabetes  Goal: Maintain glucose levels >70mg/dl to <250mg/dl throughout shift  Outcome: Progressing   The patient's goals for the shift include Pt to not fall all shift    The clinical goals for the shift include Pt will have minimal pain this shift and receive adequate rest.    Over the shift, the patient did receive rest and had minimal pain.

## 2025-04-07 NOTE — PROGRESS NOTES
INPATIENT NEPHROLOGY CONSULT PROGRESS NOTE      Patient Name: Tyshawn Coles MRN: [id number]  DATE of SERVICE: April 7, 2025  TIME of SERVICE: 02:33 PM  CONSULTING SERVICE: Nephrology    REASON for CONSULT: ESRD    SUBJECTIVE:  Seen and evaluated at bedside.  Appetite marginal, hiccups initially improved however recur.  Potassium level at 5    SUMMARY OF STAY:  Mr. Coles is a 84 y.o. male who presented to [facility] March 31, 2025 for evaluation of left hip dislocation after remote fall.  Status post left hip hemiarthroplasty reduction April 1, 2025     Patient with past medical history significant for end-stage renal disease on hemodialysis per TTS schedule, hypertension, hyperlipidemia, diabetes mellitus, dementia, seizure disorder.   Patient seen and evaluated at bedside he is with advanced dementia unable to provide additional history.  Patient wife at bedside who was able to provide the majority of the history.  Recently patient had clotted AV graft and currently is receiving dialysis via right tunneled dialysis catheter.  He resides at [facility] and being transported to his dialysis unit.  He has been Edison lifted.  This admission patient presented to [facility] for evaluation of left hip pain and diagnosed with dislocated left hip hemiarthroplasty and scheduled to go to surgery this afternoon.    Dialysis prescription:  ESRD on hemodialysis  Tuesday Thursday Saturday  Primary nephrologist: Dr. Wise  Estimated dry weight: 67 kg  Duration: 3-1/2 hours  Intradialytic Midodrine  Right tunneled dialysis catheter in place        ASSESSMENT AND PLAN:  ESRD: iHD per Tuesday Thursday Saturday schedule  Volume status:  Hypovolemia, resolved   Electrolytes: Within acceptable range  Acid-base: Metabolic acidosis from renal failure (controlled with HD)  Bone mineral disease (Ca/P/PTH): controlled  Anemia from renal failure, hemoglobin stable    Dislocated left hip hemiarthroplasty status postsurgery April 1  Chronic osteomyelitis of the coccyx  Left hip abscess  Hypotension: Fluid responsive  T2DM     PLAN:  End-stage renal disease patient with complex acute/subacute dislocated left hip hemiarthroplasty with left hip abscess, chronic osteomyelitis of the coccyx:     Mild hemolysis noted on initial morning labs, repeat potassium around 5.  No urgent indication for renal placement therapy today for hemodialysis tomorrow.  He To give an extra dose of Thorazine.   Volume status within acceptable range  Anemia to continue IV iron loading dose, Epogen 2000 units  Malnutrition to continue protein supplements 3 times daily  Strict input and output to guide volume management, can have intermittent IV boluses to maintain hemodynamic stability  phosphorus level at 3.3, to hold renvela   For hemodialysis per TTS schedule     Strict input and output to guide volume management     Will follow, thank you!      Medications:    Current Facility-Administered Medications:     acetaminophen (Tylenol) tablet 650 mg, 650 mg, oral, q6h PRN, 650 mg at 04/05/25 2112 **OR** acetaminophen (Tylenol) oral liquid 650 mg, 650 mg, oral, q6h PRN, 650 mg at 04/06/25 0639 **OR** acetaminophen (Tylenol) suppository 650 mg, 650 mg, rectal, q6h PRN, Eliezer Dumont MD    alteplase (Cathflo Activase) injection 2 mg, 2 mg, intra-catheter, PRN, Paul Guevara MD    [Held by provider] apixaban (Eliquis) tablet 2.5 mg, 2.5 mg, oral, BID, Paul Guevara MD, 2.5 mg at 04/05/25 2113    benzocaine-menthol (Cepastat Sore Throat) lozenge 1 lozenge, 1 lozenge, Mouth/Throat, q2h PRN, Eliezer Dumont MD    cholecalciferol (Vitamin D-3) tablet 1,000 Units, 1,000 Units, oral, Daily, Eliezer Dumont MD, 1,000 Units at 04/07/25 0859    docusate sodium (Colace) capsule 100 mg, 100 mg, oral, Daily, Paul Guevara MD, 100 mg at 04/07/25 0859    donepezil (Aricept) tablet 22.5 mg, 22.5 mg, oral, Daily, Eliezer STODDARD  MD Tim, 22.5 mg at 04/07/25 0859    epoetin sheila-epbx (Retacrit) injection 10,000 Units, 10,000 Units, subcutaneous, Once per day on Monday Wednesday Friday, Carlene Norman MD, 10,000 Units at 04/07/25 1037    finasteride (Proscar) tablet 5 mg, 5 mg, oral, Daily, Eliezer Dumont MD, 5 mg at 04/07/25 0859    gabapentin (Neurontin) capsule 100 mg, 100 mg, oral, Once per day on Monday Wednesday Friday, Jesse Morgan DO, 100 mg at 04/07/25 1814    guaiFENesin (Mucinex) 12 hr tablet 600 mg, 600 mg, oral, q12h PRN, Eliezer Dumont MD    heparin 1,000 unit/mL injection 2,000 Units, 2,000 Units, intra-catheter, After Dialysis, Carlene Norman MD, 2,000 Units at 04/05/25 1330    insulin glargine (Lantus) injection 8 Units, 8 Units, subcutaneous, Nightly, Jesse Morgan DO, 8 Units at 04/06/25 2318    insulin lispro injection 0-10 Units, 0-10 Units, subcutaneous, TID AC, Eliezer Dumont MD, 2 Units at 04/07/25 1641    ipratropium-albuteroL (Duo-Neb) 0.5-2.5 mg/3 mL nebulizer solution 3 mL, 3 mL, nebulization, q2h PRN, Paul Guevara MD    iron sucrose (Venofer) injection 200 mg, 200 mg, intravenous, Daily, Carlene Norman MD, 200 mg at 04/06/25 0634    levETIRAcetam (Keppra) tablet 500 mg, 500 mg, oral, BID, Eliezer Dumont MD, 500 mg at 04/07/25 0859    melatonin tablet 10 mg, 10 mg, oral, Nightly PRN, Jesse Morgan DO    metoprolol tartrate (Lopressor) tablet 50 mg, 50 mg, oral, BID, Eliezer Dumont MD, 50 mg at 04/07/25 0859    ondansetron (Zofran) tablet 4 mg, 4 mg, oral, q8h PRN **OR** ondansetron (Zofran) injection 4 mg, 4 mg, intravenous, q8h PRN, Eliezer Dumont MD, 4 mg at 03/31/25 1403    oxyCODONE (Roxicodone) immediate release tablet 2.5 mg, 2.5 mg, oral, q6h PRN, Eliezer Dumont MD    oxyCODONE (Roxicodone) immediate release tablet 5 mg, 5 mg, oral, q6h PRN, Eliezer Dumont MD    piperacillin-tazobactam (Zosyn) 3.375 g in dextrose (iso) IV 50 mL, 3.375 g, intravenous, q12h, Eliezer Dumont,  MD, Stopped at 04/07/25 1809    polyethylene glycol (Glycolax, Miralax) packet 17 g, 17 g, oral, Daily PRN, Eliezer Dumont MD    prochlorperazine (Compazine) tablet 10 mg, 10 mg, oral, q6h PRN **OR** prochlorperazine (Compazine) injection 10 mg, 10 mg, intravenous, q6h PRN **OR** prochlorperazine (Compazine) suppository 25 mg, 25 mg, rectal, q12h PRN, Eliezer Dumont MD    sevelamer carbonate (Renvela) tablet 1,600 mg, 1,600 mg, oral, BID, Paul Guevara MD, 1,600 mg at 04/07/25 0859    vancomycin (Vancocin) capsule 125 mg, 125 mg, oral, 4x daily, Elmer Du MD, 125 mg at 04/07/25 1649    vitamin B complex-vitamin C-folic acid (Nephrocaps) capsule 1 capsule, 1 capsule, oral, Daily, Eliezer Dumont MD, 1 capsule at 04/07/25 0859    PERTINENT ROS:  GENERAL:  positive for fatigue, poor appetite.  No fever/chills  RESPIRATORY:  positive for shortness of breath.  Negative for cough, wheezing.  CARDIOVASCULAR:   Negative for chest pain or palpitation.  GI:  Negative for abdominal pain, diarrhea, heartburn, nausea, vomiting  : External catheter in place with dark concentrated urine    Physical Exam:  Vital signs in last 24 hours:  Temp:  [36.6 °C (97.9 °F)-37 °C (98.6 °F)] 36.8 °C (98.2 °F)  Heart Rate:  [66-79] 74  Resp:  [19-20] 20  BP: (105-196)/(56-81) 196/75    General: Awake, oriented to self, pain better controlled  HEENT:  NCAT,  mucous membranes moist and pink  NECK: no JVD, no carotid bruit, supple, no cervical mass or thyromegaly  LUNGS;  Diminished breath sounds, no Rales  CV:  Distant, regular rate and rhythm, no murmurs  ABDOMEN:  abdomen soft, nontender, BS normal, no masses or organomegaly  EDEMA:  no lower extremity edema/dependent edema  SKIN: Left hip with a dressing in place      Intake/Output last 3 shifts:  I/O last 3 completed shifts:  In: 960 (14.2 mL/kg) [P.O.:960]  Out: - (0 mL/kg)   Weight: 67.6 kg     DATA:  Diagnotic tests reviewed for Todays visit:  Results from last 7 days   Lab  Units 04/07/25  1128   WBC AUTO x10*3/uL 32.3*   RBC AUTO x10*6/uL 2.83*   HEMOGLOBIN g/dL 8.0*   HEMATOCRIT % 23.5*     Results from last 7 days   Lab Units 04/07/25  1504 04/07/25  1026 04/06/25  0538 04/05/25  0620 04/04/25  0555   SODIUM mmol/L  --  125* 131*   < > 130*   POTASSIUM mmol/L 5.3 6.0* 4.5   < > 4.5   CHLORIDE mmol/L  --  90* 93*   < > 93*   CO2 mmol/L  --  23 29   < > 28   BUN mg/dL  --  48* 32*   < > 36*   CREATININE mg/dL  --  4.97* 3.55*   < > 3.69*   CALCIUM mg/dL  --  7.9* 8.1*   < > 8.3*   PHOSPHORUS mg/dL  --   --  3.7  --   --    MAGNESIUM mg/dL  --   --   --   --  1.98   BILIRUBIN TOTAL mg/dL  --  0.5  --   --   --    ALT U/L  --  9*  --   --   --    AST U/L  --  21  --   --   --     < > = values in this interval not displayed.           IMAGING: CXR reviewed in  images      SIGNATURE: Carlene Norman MD  Nephrology and Hypertension  [address] [place] [address]  Office phone: [phone]  FAX: [phone]    This note was partially generated using the Dragon voice recognition system, and there may be some incorrect words, spelling's and punctuation that were not noted in checking the note before saving.

## 2025-04-08 ENCOUNTER — APPOINTMENT (OUTPATIENT)
Dept: DIALYSIS | Facility: HOSPITAL | Age: 85
End: 2025-04-08
Payer: MEDICARE

## 2025-04-08 ENCOUNTER — APPOINTMENT (OUTPATIENT)
Dept: RADIOLOGY | Facility: HOSPITAL | Age: 85
DRG: 559 | End: 2025-04-08
Payer: MEDICARE

## 2025-04-08 LAB
ANION GAP SERPL CALC-SCNC: 14 MMOL/L (ref 10–20)
BUN SERPL-MCNC: 38 MG/DL (ref 6–23)
CALCIUM SERPL-MCNC: 7.9 MG/DL (ref 8.6–10.3)
CHLORIDE SERPL-SCNC: 93 MMOL/L (ref 98–107)
CO2 SERPL-SCNC: 28 MMOL/L (ref 21–32)
CREAT SERPL-MCNC: 4.07 MG/DL (ref 0.5–1.3)
EGFRCR SERPLBLD CKD-EPI 2021: 14 ML/MIN/1.73M*2
ERYTHROCYTE [DISTWIDTH] IN BLOOD BY AUTOMATED COUNT: 18.8 % (ref 11.5–14.5)
GLUCOSE BLD MANUAL STRIP-MCNC: 117 MG/DL (ref 74–99)
GLUCOSE BLD MANUAL STRIP-MCNC: 128 MG/DL (ref 74–99)
GLUCOSE BLD MANUAL STRIP-MCNC: 219 MG/DL (ref 74–99)
GLUCOSE BLD MANUAL STRIP-MCNC: 93 MG/DL (ref 74–99)
GLUCOSE SERPL-MCNC: 89 MG/DL (ref 74–99)
HCT VFR BLD AUTO: 22.8 % (ref 41–52)
HGB BLD-MCNC: 7.8 G/DL (ref 13.5–17.5)
MCH RBC QN AUTO: 28.4 PG (ref 26–34)
MCHC RBC AUTO-ENTMCNC: 34.2 G/DL (ref 32–36)
MCV RBC AUTO: 83 FL (ref 80–100)
NRBC BLD-RTO: 0 /100 WBCS (ref 0–0)
PLATELET # BLD AUTO: 357 X10*3/UL (ref 150–450)
POTASSIUM SERPL-SCNC: 4.3 MMOL/L (ref 3.5–5.3)
RBC # BLD AUTO: 2.75 X10*6/UL (ref 4.5–5.9)
SODIUM SERPL-SCNC: 131 MMOL/L (ref 136–145)
TSH SERPL-ACNC: 3.44 MIU/L (ref 0.44–3.98)
WBC # BLD AUTO: 42.1 X10*3/UL (ref 4.4–11.3)

## 2025-04-08 PROCEDURE — 78831 RP LOCLZJ TUM SPECT 2 AREAS: CPT | Performed by: STUDENT IN AN ORGANIZED HEALTH CARE EDUCATION/TRAINING PROGRAM

## 2025-04-08 PROCEDURE — 2500000001 HC RX 250 WO HCPCS SELF ADMINISTERED DRUGS (ALT 637 FOR MEDICARE OP): Performed by: INTERNAL MEDICINE

## 2025-04-08 PROCEDURE — 2500000002 HC RX 250 W HCPCS SELF ADMINISTERED DRUGS (ALT 637 FOR MEDICARE OP, ALT 636 FOR OP/ED): Performed by: STUDENT IN AN ORGANIZED HEALTH CARE EDUCATION/TRAINING PROGRAM

## 2025-04-08 PROCEDURE — 85027 COMPLETE CBC AUTOMATED: CPT | Performed by: STUDENT IN AN ORGANIZED HEALTH CARE EDUCATION/TRAINING PROGRAM

## 2025-04-08 PROCEDURE — 2500000001 HC RX 250 WO HCPCS SELF ADMINISTERED DRUGS (ALT 637 FOR MEDICARE OP): Performed by: ORTHOPAEDIC SURGERY

## 2025-04-08 PROCEDURE — 80048 BASIC METABOLIC PNL TOTAL CA: CPT | Performed by: STUDENT IN AN ORGANIZED HEALTH CARE EDUCATION/TRAINING PROGRAM

## 2025-04-08 PROCEDURE — 1100000001 HC PRIVATE ROOM DAILY

## 2025-04-08 PROCEDURE — 2500000004 HC RX 250 GENERAL PHARMACY W/ HCPCS (ALT 636 FOR OP/ED): Performed by: INTERNAL MEDICINE

## 2025-04-08 PROCEDURE — 8010000001 HC DIALYSIS - HEMODIALYSIS PER DAY

## 2025-04-08 PROCEDURE — 82947 ASSAY GLUCOSE BLOOD QUANT: CPT

## 2025-04-08 PROCEDURE — 84443 ASSAY THYROID STIM HORMONE: CPT | Performed by: STUDENT IN AN ORGANIZED HEALTH CARE EDUCATION/TRAINING PROGRAM

## 2025-04-08 PROCEDURE — 78802 RP LOCLZJ TUM WHBDY 1 D IMG: CPT | Performed by: STUDENT IN AN ORGANIZED HEALTH CARE EDUCATION/TRAINING PROGRAM

## 2025-04-08 PROCEDURE — 2500000002 HC RX 250 W HCPCS SELF ADMINISTERED DRUGS (ALT 637 FOR MEDICARE OP, ALT 636 FOR OP/ED): Performed by: INTERNAL MEDICINE

## 2025-04-08 PROCEDURE — 2500000004 HC RX 250 GENERAL PHARMACY W/ HCPCS (ALT 636 FOR OP/ED): Performed by: ORTHOPAEDIC SURGERY

## 2025-04-08 PROCEDURE — 36415 COLL VENOUS BLD VENIPUNCTURE: CPT | Performed by: STUDENT IN AN ORGANIZED HEALTH CARE EDUCATION/TRAINING PROGRAM

## 2025-04-08 PROCEDURE — 99233 SBSQ HOSP IP/OBS HIGH 50: CPT | Performed by: STUDENT IN AN ORGANIZED HEALTH CARE EDUCATION/TRAINING PROGRAM

## 2025-04-08 RX ADMIN — METOPROLOL TARTRATE 50 MG: 50 TABLET, FILM COATED ORAL at 17:46

## 2025-04-08 RX ADMIN — VANCOMYCIN HYDROCHLORIDE 125 MG: 125 CAPSULE ORAL at 06:45

## 2025-04-08 RX ADMIN — PIPERACILLIN SODIUM AND TAZOBACTAM SODIUM 3.38 G: 3; .375 INJECTION, SOLUTION INTRAVENOUS at 17:49

## 2025-04-08 RX ADMIN — PIPERACILLIN SODIUM AND TAZOBACTAM SODIUM 3.38 G: 3; .375 INJECTION, SOLUTION INTRAVENOUS at 02:15

## 2025-04-08 RX ADMIN — INSULIN GLARGINE 8 UNITS: 100 INJECTION, SOLUTION SUBCUTANEOUS at 21:36

## 2025-04-08 RX ADMIN — SEVELAMER CARBONATE 1600 MG: 800 TABLET, FILM COATED ORAL at 17:48

## 2025-04-08 RX ADMIN — HEPARIN SODIUM 2000 UNITS: 1000 INJECTION INTRAVENOUS; SUBCUTANEOUS at 13:30

## 2025-04-08 RX ADMIN — DONEPEZIL HYDROCHLORIDE 22.5 MG: 10 TABLET ORAL at 17:47

## 2025-04-08 RX ADMIN — Medication 1 CAPSULE: at 17:47

## 2025-04-08 RX ADMIN — VANCOMYCIN HYDROCHLORIDE 125 MG: 125 CAPSULE ORAL at 21:38

## 2025-04-08 RX ADMIN — LEVETIRACETAM 500 MG: 500 TABLET, FILM COATED ORAL at 17:47

## 2025-04-08 RX ADMIN — VANCOMYCIN HYDROCHLORIDE 125 MG: 125 CAPSULE ORAL at 17:46

## 2025-04-08 RX ADMIN — HEPARIN SODIUM 2000 UNITS: 1000 INJECTION INTRAVENOUS; SUBCUTANEOUS at 13:31

## 2025-04-08 RX ADMIN — FINASTERIDE 5 MG: 5 TABLET, FILM COATED ORAL at 17:47

## 2025-04-08 RX ADMIN — DOCUSATE SODIUM 100 MG: 100 CAPSULE, LIQUID FILLED ORAL at 17:47

## 2025-04-08 RX ADMIN — Medication 1000 UNITS: at 17:46

## 2025-04-08 NOTE — PROGRESS NOTES
"Nutrition Follow Up Assessment:   Nutrition Assessment       Patient is a 84 y.o. male presenting from [facility] with recent history of L hip hemiarthroplasty on 3/10/25.  ED notation indicates imaging showing dislocated left hip hemiarthroplasty and possible abscess.  Pt now s/p closed reduction on 3/31.  Wound care on consult for multiple abrasions and sacral pressure injury.  Per case management notation pt's spouse would like him to go to a new SNF that has on site HD.    4/8/25: Follow up note. Nephrology, ID, wound care and SLP on consult. Pt w/HD today and currently off the floor for testing. Pt in precautions for cdiff r/o.     Per meal documentation: 4/6 75%B and D, 4/5 0%B and 100% D, 4/4 25%B, 50%L, 4/3 100%D, 25%L.     PMH: ESRD on hemodialysis Tuesday Thursday Saturday, hypertension, hyperlipidemia, type 2 diabetes mellitus, dementia     Nutrition History:  Energy Intake: Fair 50-75 %  Food and Nutrient History: Pt states that his appetite has been good.  Denies recent weight loss.  Agreeable to oral supplement to consume additional protein.  Pt soft spoken but able to answer simple questions.  Per nursing he has eaten his meals well but pocketing meds with applesauce at times.     Anthropometrics:  Height: 182.8 cm (5' 11.97\")   Weight: 67.6 kg (149 lb)   BMI (Calculated): 20.23         Weight History:    03/07/25 10:22 03/08/25 23:47 03/11/25 06:00   Weight 68.4 kg (150 lb 12.7 oz) [1] 70.3 kg (155 lb) 69.9 kg (154 lb)   Weight Change %:  Significant Weight Loss: No    Nutrition Focused Physical Exam Findings:  Subcutaneous Fat Loss:   Orbital Fat Pads: Mild-Moderate (slight dark circles and slight hollowing)  Buccal Fat Pads: Mild-Moderate (flat cheeks, minimal bounce)  Triceps: Defer  Ribs: Defer  Muscle Wasting:  Temporalis: Mild-Moderate (slight depression)  Pectoralis (Clavicular Region): Mild-Moderate (some protrusion of clavicle)  Deltoid/Trapezius: Mild-Moderate (slight protrusion of acromion " process)  Interosseous: Defer  Trapezius/Infraspinatus/Supraspinatus (Scapular Region): Mild-Moderate (slight protrusion of scapula)  Quadriceps: Defer  Gastrocnemius: Defer  Edema:  Edema: +1 trace, +2 mild  Edema Location: 1+ LLE, 2+ generalized, RLE, and BUE  Physical Findings:  Skin: Positive (L hip surgical site, abrasions including on left knee, sacral open area)  Mouth Findings: Other (Comment) (nursing noting that pt pockets meds with applesauce at times but has eaten meals without difficulty)    Nutrition Significant Labs:  BMP Trend:   Results from last 7 days   Lab Units 04/08/25  1027 04/07/25  1504 04/07/25  1026 04/06/25  0538 04/05/25  0620   GLUCOSE mg/dL 89  --  176* 178* 172*   CALCIUM mg/dL 7.9*  --  7.9* 8.1* 8.5*   SODIUM mmol/L 131*  --  125* 131* 128*   POTASSIUM mmol/L 4.3   < > 6.0* 4.5 4.9   CO2 mmol/L 28  --  23 29 28   CHLORIDE mmol/L 93*  --  90* 93* 92*   BUN mg/dL 38*  --  48* 32* 51*   CREATININE mg/dL 4.07*  --  4.97* 3.55* 4.94*    < > = values in this interval not displayed.    , A1C:  Lab Results   Component Value Date    HGBA1C 6.9 (H) 04/04/2025     Nutrition Specific Medications:  Scheduled medications  [Held by provider] apixaban, 2.5 mg, oral, BID  cholecalciferol, 1,000 Units, oral, Daily  docusate sodium, 100 mg, oral, Daily  donepezil, 22.5 mg, oral, Daily  epoetin sheila or biosimilar, 10,000 Units, subcutaneous, Once per day on Monday Wednesday Friday  finasteride, 5 mg, oral, Daily  gabapentin, 100 mg, oral, Once per day on Monday Wednesday Friday  heparin, 2,000 Units, intra-catheter, After Dialysis  heparin, 2,000 Units, intra-catheter, After Dialysis  insulin glargine, 8 Units, subcutaneous, Nightly  insulin lispro, 0-10 Units, subcutaneous, TID AC  levETIRAcetam, 500 mg, oral, BID  metoprolol tartrate, 50 mg, oral, BID  piperacillin-tazobactam, 3.375 g, intravenous, q12h  sevelamer carbonate, 1,600 mg, oral, BID  vancomycin, 125 mg, oral, 4x daily  vitamin B  complex-vitamin C-folic acid, 1 capsule, oral, Daily      I/O:   Last BM Date: 04/04/25; Stool Appearance: Soft, Formed (04/08/25 0600)    Dietary Orders (From admission, onward)       Start     Ordered    04/01/25 1542  Oral nutritional supplements  Until discontinued        Question Answer Comment   Deliver with Lunch    Select supplement: Gelatein Sugar Free        04/01/25 1541    04/01/25 1542  Adult diet Consistent Carb; CCD 60 gm/meal  Diet effective now        Question Answer Comment   Diet type Consistent Carb    Carb diet selection: CCD 60 gm/meal        04/01/25 1541    04/01/25 1541  Oral nutritional supplements  Until discontinued        Comments: chocolate   Question Answer Comment   Deliver with Breakfast    Deliver with Dinner    Select supplement: Glucerna Shake        04/01/25 1541    03/31/25 1716  May Not Participate in Room Service  ( ROOM SERVICE MAY NOT PARTICIPATE)  Once        Question:  .  Answer:  Yes    03/31/25 1715                Estimated Needs:     Method for Estimating Needs: 1890-2230kcal or 28-33kcal/kg CBW  Method for Estimating 24 Hour Protein Needs: 81-101g or 1.2-1.5g/kg pro  Method for Estimating 24 Hour Fluid Needs: 1ml/kcal or per MD recs      Nutrition Diagnosis   Malnutrition Diagnosis  Patient has Malnutrition Diagnosis: No    Nutrition Diagnosis  Patient has Nutrition Diagnosis: Yes  Diagnosis Status (1): Active  Nutrition Diagnosis 1: Increased nutrient needs  Related to (1): protein/calories  As Evidenced by (1): recent left hip fx s/p surgery with post op complications with increased protein needs at baseline from ESRD on HD.     Nutrition Interventions/Recommendations   Nutrition prescription for oral nutrition    Nutrition Recommendations:  Individualized Nutrition Prescription Provided for : Diet: continue w/oral diet as ordered - adult diet consistent carb CCD 60gm/meal + Glucerna Shakes 2x/day and Gelatein Sugar Free 1x/day    Nutrition Interventions/Goals:    Meals and Snacks: Carbohydrate-modified diet  Goal: offer food/drink every 2-3 hours while awake. Will consume 75% of meals.  Medical Food Supplement: Commercial beverage medical food supplement therapy  Goal: Recommend Glucerna (chocolate)(220kcal, 10g pro each) BID with breakfast and dinner, recommend Gelatein SF once daily with lunch for additional 80kcal, 20g pro  Coordination of Care with Providers: Nursing (FERNIE Lazo)     Nutrition Monitoring and Evaluation   Food/Nutrient Related History Monitoring  Monitoring and Evaluation Plan: Intake / amount of food  Estimated Energy Intake: Energy intake greater or equal to 75% of estimated energy needs  Intake / Amount of food: Consumes > or equal to 70% of supplement    Anthropometric Measurements  Monitoring and Evaluation Plan: Body weight  Body Weight: Body weight - Maintain stable weight    Biochemical Data, Medical Tests and Procedures  Monitoring and Evaluation Plan: Glucose/endocrine profile, Electrolyte/renal panel  Electrolyte and Renal Panel: Electrolytes within normal limits  Glucose/Endocrine Profile: Glucose within normal limits ( mg/dL)    Physical Exam Findings  Monitoring and Evaluation Plan: Skin  Skin Finding: Impaired wound healing - Improved wound healing, Impaired wound healing - Skin to heal    Goal Status: Some progress toward goal(s)    Time Spent (min): 30 minutes  Follow up 3-8 days  Last RD note 4/8/25

## 2025-04-08 NOTE — PROGRESS NOTES
Occupational Therapy                 Therapy Communication Note    Patient Name: Tyshawn Coles  MRN: [id number]  Department: [facility]  Room: [facility]5/4105-A  Today's Date: 4/8/2025     Discipline: Occupational Therapy    OT Missed Visit: Yes     Missed Visit Reason: Missed Visit Reason: Other (Comment)    Missed Time: Attempt    Comment: Attempted x2 to see pt this date- pt receiving dialysis this am; this afternoon pt prepping to leave unit for testing. Will continue to follow and attempt at next opportunity.

## 2025-04-08 NOTE — PROGRESS NOTES
Physical Therapy                 Therapy Communication Note    Patient Name: Tyshawn Coles  MRN: [id number]  Department: [facility]  Room: [facility]54105-A  Today's Date: 4/8/2025     Discipline: Physical Therapy          Missed Visit Reason:      Missed Time: Attempt    Comment: Attempted x 2 to see patient this day however patient was in dialysis and then gong down for testing will continue to follow and see as it is appropriate

## 2025-04-08 NOTE — POST-PROCEDURE NOTE
Report to Receiving RN:    Report To: Violet   Time Report Called: [id number]  Hand-Off Communication: Pt RMVD 1.3 Liters  Complications During Treatment: No  Ultrafiltration Treatment: HD Tx  Medications Administered During Dialysis: No  Blood Products Administered During Dialysis: No  Labs Sent During Dialysis: Yes  Heparin Drip Rate Changes: N/A  Dialysis Catheter Dressing: clean dry occlusive  Last Dressing Change: 4-5-25          Last Updated: 1:15 PM by PRATER, APRIL

## 2025-04-08 NOTE — PRE-PROCEDURE NOTE
Report from Sending RN:    Report From: Violet  Recent Surgery of Procedure: No  Baseline Level of Consciousness (LOC): Aware of surroundings  Oxygen Use: No  Type: N/C  Diabetic: Yes  Last BP Med Given Day of Dialysis: see MAR  Last Pain Med Given: see MAR  Lab Tests to be Obtained with Dialysis: No  Blood Transfusion to be Given During Dialysis: No  Available IV Access: Yes  Medications to be Administered During Dialysis: No  Continuous IV Infusion Running: No  Restraints on Currently or in the Last 24 Hours: No  Hand-Off Communication: Pt stable for HD  Dialysis Catheter Dressing: clean dry occlusive  Last Dressing Change: 4-5-25

## 2025-04-08 NOTE — CARE PLAN
The patient's goals for the shift include Pt to not fall all shift    The clinical goals for the shift include Patient will remain Hemodynamically stable this shift. SBP will remain greater than 90. MAP greater than 65.      Problem: Discharge Planning  Goal: Discharge to home or other facility with appropriate resources  Outcome: Progressing     Problem: Chronic Conditions and Co-morbidities  Goal: Patient's chronic conditions and co-morbidity symptoms are monitored and maintained or improved  Outcome: Not Progressing     Problem: Nutrition  Goal: Nutrient intake appropriate for maintaining nutritional needs  Outcome: Progressing     Problem: Skin  Goal: Decreased wound size/increased tissue granulation at next dressing change  Outcome: Progressing  Goal: Prevent/manage excess moisture  Outcome: Progressing  Goal: Prevent/minimize sheer/friction injuries  Outcome: Progressing

## 2025-04-08 NOTE — PROGRESS NOTES
" INPATIENT PROGRESS NOTES    PATIENT NAME: Tyshawn Coles    MRN: [id number]  SERVICE DATE:  4/8/2025   SERVICE TIME:  12:29 PM    SIGNATURE: Elmer Du MD      ASSESSMENT :   -Rule out left prosthetic hip abscess  -Fever   -Leukocytosis worsening leukocytosis  -Acute anemia  -Diarrhea r/o CDI    #Left prosthetic hip dislocation status post closed reduction  #Hypertension  #Hyperlipidemia  #End-stage renal disease on dialysis, which can affect the dosing of antimicrobials  #Type 2 diabetes, which is chronic condition that increases his risk for infection  #Dementia       PLAN:  -Follow up White blood cell tagged scan   -C. difficile PCR was not sent, patient with no more diarrhea  -Continue Zosyn and PO Vancomycin   -Closely monitor for antibiotics side effects including rash, Diarrhea/CDI, thrombocytopenia, ALCIDES, etc.        SUBJECTIVE  Afebrile  No events overnight       OBJECTIVE  PHYSICAL EXAM:   Patient Vitals for the past 24 hrs:   BP Temp Temp src Pulse Resp SpO2   04/08/25 1200 128/83 37 °C (98.6 °F) Temporal 74 -- --   04/08/25 0947 -- 37 °C (98.6 °F) Temporal 77 -- --   04/08/25 0800 129/66 36.1 °C (97 °F) Temporal 73 17 99 %   04/08/25 0400 133/62 36.5 °C (97.7 °F) Temporal 72 20 98 %   04/08/25 0000 149/77 36.8 °C (98.2 °F) Temporal 74 20 97 %   04/07/25 2000 132/66 37.4 °C (99.3 °F) Temporal 88 20 99 %   04/07/25 1600 (!) 196/75 36.8 °C (98.2 °F) Temporal 74 20 100 %         Gen: No fever  Respiratory: No distress     Labs:  Lab Results   Component Value Date    WBC 42.1 (H) 04/08/2025    HGB 7.8 (L) 04/08/2025    HCT 22.8 (L) 04/08/2025    MCV 83 04/08/2025     04/08/2025     Lab Results   Component Value Date    GLUCOSE 89 04/08/2025    CALCIUM 7.9 (L) 04/08/2025     (L) 04/08/2025    K 4.3 04/08/2025    CO2 28 04/08/2025    CL 93 (L) 04/08/2025    BUN 38 (H) 04/08/2025    CREATININE 4.07 (H) 04/08/2025   ESR: --No results found for: \"SEDRATE\"  Lab Results   Component Value Date    CRP " "3.63 (A) 06/12/2021     Lab Results   Component Value Date    ALT 9 (L) 04/07/2025    AST 21 04/07/2025    ALKPHOS 75 04/07/2025    BILITOT 0.5 04/07/2025         DATA:   Diagnostic tests reviewed for today's visit:    Labs this admission reviewed  Imagings this admission reviewed  Cultures: Reviewed        Elmer Du MD.   Infectious Disease Attending            This note was partially created using voice recognition software and is inherently subject to errors including those of syntax and \"sound-alike\" substitutions which may escape proofreading. In such instances, original meaning may be extrapolated by contextual derivation      "

## 2025-04-08 NOTE — PROGRESS NOTES
Tyshawn Coles is a 84 y.o. male on day 8 of admission presenting with Abscess of hip.      Subjective     WBC remains elevated. No fevers. Confused on exam, which has been baseline. Attempted to call both wife and daughter to discuss care but was unsuccessful.     Objective     Last Recorded Vitals  /83   Pulse 93   Temp 37 °C (98.6 °F) (Temporal)   Resp 17   Wt 67.6 kg (149 lb)   SpO2 99%   Intake/Output last 3 Shifts:    Intake/Output Summary (Last 24 hours) at 4/8/2025 1426  Last data filed at 4/8/2025 1333  Gross per 24 hour   Intake 1610 ml   Output 1300 ml   Net 310 ml       Admission Weight  Weight: 67.6 kg (149 lb) (03/31/25 1659)    Daily Weight  03/31/25 : 67.6 kg (149 lb)      Physical Exam:  General: Not in distress, confused  HEENT: PERRLA, head intact and normocephalic  Neck: Normal to inspection  Lungs: Clear to auscultation, work of breathing within normal limit  Cardiac: Regular rate and rhythm  Abdomen: Soft nontender, positive bowel sounds  : Exam deferred  Skin: Multiple superficial decubitus ulcer noted with skin breakdown and erythematous base with some foul drainage  Hematology: No petechia or excessive ecchymosis  Neurological: Alert awake oriented x 1  Psych: Calm and cooperative    Relevant Results  Scheduled medications  [Held by provider] apixaban, 2.5 mg, oral, BID  cholecalciferol, 1,000 Units, oral, Daily  docusate sodium, 100 mg, oral, Daily  donepezil, 22.5 mg, oral, Daily  epoetin sheila or biosimilar, 10,000 Units, subcutaneous, Once per day on Monday Wednesday Friday  finasteride, 5 mg, oral, Daily  gabapentin, 100 mg, oral, Once per day on Monday Wednesday Friday  heparin, 2,000 Units, intra-catheter, After Dialysis  heparin, 2,000 Units, intra-catheter, After Dialysis  insulin glargine, 8 Units, subcutaneous, Nightly  insulin lispro, 0-10 Units, subcutaneous, TID AC  levETIRAcetam, 500 mg, oral, BID  metoprolol tartrate, 50 mg, oral, BID  piperacillin-tazobactam, 3.375  g, intravenous, q12h  sevelamer carbonate, 1,600 mg, oral, BID  vancomycin, 125 mg, oral, 4x daily  vitamin B complex-vitamin C-folic acid, 1 capsule, oral, Daily      Continuous medications     PRN medications  PRN medications: acetaminophen **OR** acetaminophen **OR** acetaminophen, alteplase, benzocaine-menthol, guaiFENesin, ipratropium-albuteroL, melatonin, ondansetron **OR** ondansetron, oxyCODONE, oxyCODONE, polyethylene glycol, prochlorperazine **OR** prochlorperazine **OR** prochlorperazine   Labs  Results from last 7 days   Lab Units 04/08/25  1027 04/07/25  1128 04/06/25  0538   WBC AUTO x10*3/uL 42.1* 32.3* 31.9*   HEMOGLOBIN g/dL 7.8* 8.0* 7.3*   HEMATOCRIT % 22.8* 23.5* 21.7*   PLATELETS AUTO x10*3/uL 357 353 317     Results from last 7 days   Lab Units 04/08/25  1027 04/07/25  1504 04/07/25  1026 04/06/25  0538   SODIUM mmol/L 131*  --  125* 131*   POTASSIUM mmol/L 4.3 5.3 6.0* 4.5   CHLORIDE mmol/L 93*  --  90* 93*   CO2 mmol/L 28  --  23 29   BUN mg/dL 38*  --  48* 32*   CREATININE mg/dL 4.07*  --  4.97* 3.55*   CALCIUM mg/dL 7.9*  --  7.9* 8.1*   PROTEIN TOTAL g/dL  --   --  5.9*  --    BILIRUBIN TOTAL mg/dL  --   --  0.5  --    ALK PHOS U/L  --   --  75  --    ALT U/L  --   --  9*  --    AST U/L  --   --  21  --    GLUCOSE mg/dL 89  --  176* 178*       Imaging  XR chest 2 views    Result Date: 4/4/2025  Increased bibasilar atelectasis or infiltrates.   MACRO: None   Signed by: Raheel Powell 4/4/2025 9:31 AM Dictation workstation:   [id number]     Cardiology, Vascular, and Other Imaging  ECG 12 Lead    Result Date: 4/7/2025  Normal sinus rhythm Normal ECG When compared with ECG of 09-MAR-2025 00:15, No significant change was found        Assessment & Plan      84 y.o. male with past medical history of ESRD on hemodialysis, hypertension, hyperlipidemia, type 2 diabetes mellitus, dementia who is minimally responsive  presented from outside hospital for  posteriorly dislocated hip. Hospital course  complicated by persistent leukocytosis.      Recent left hip replacement now with posterior superior dislocation of the left hip hemiarthroplasty with surrounding fluid collection  S/p L hip hemiarthroplasty 3/10/25  Orthopedic surgery reduced hip under general anesthesia  CT imaging with soft tissue air-fluid collection around L hip, ortho feels it is likely post operative changes and hematoma, felt no indication for aspiration  Weightbearing as tolerated for transfers related purposes only  Palliative care followed.   Discussed with wife 4/7 over phone that palliative/hospice approach is reasonable. Family to consider this and not ready to commit to decision. Unable to reach family 4/8      Bacteroides caccae bacteremia prior to admission  Leukocytosis  Blood cultures 3/29 from dialysis positive for Bacteroides caccae   CT AP on admit with chronic sacral osteomyelitis, ortho felt fluid collection around hip was non infectious  Blood culture from [facility] 3/30 negative  Repeat blood cultures 3/31 negative to date  Urine culture 3/31 negative  WBC scan initiated 4/7/25, official read pending   May need to consider repeat CT imaging  Continue Zosyn  C diff PCR not sent, lower suspicion, continued PO Vanc      Encephalopathy   Currently oriented x0-1, close to baseline  Check TSH, NH3    Decubitus ulcer  Continue with localized wound care  Continue with supportive care and frequent turning    Anemia   -In setting of ESRD, baseline Hbg ~7-9 range  -Received IV Iron, Epogen per nephrology     Atrial Fibrillation  -Holding Eliquis starting 4/6 pending WBC scan     ESRD on hemodialysis Tuesday Thursday Saturday  Nephrology following  Renally adjust medication using pharmacy's help  Continue with Renvela, Nephrocaps     Hypertension  Continue metoprolol     Dementia  Continue with the Aricept  Continue with Keppra     Type 2 diabetes mellitus  A1c 8.7 in Oct 2024, repeat ordered  Lantus dose increased 4/4, continue SSI      BPH  Continue with finasteride  Urine culture negative 3/31      DVT ppx:  Eliquis on  hold pending WBC scan   Code status: DNR/DNI

## 2025-04-09 LAB
ALBUMIN SERPL BCP-MCNC: 2.3 G/DL (ref 3.4–5)
ALP SERPL-CCNC: 59 U/L (ref 33–136)
ALT SERPL W P-5'-P-CCNC: 12 U/L (ref 10–52)
AMMONIA PLAS-SCNC: 22 UMOL/L (ref 16–53)
ANION GAP SERPL CALC-SCNC: 13 MMOL/L (ref 10–20)
AST SERPL W P-5'-P-CCNC: 16 U/L (ref 9–39)
BASOPHILS # BLD AUTO: 0.14 X10*3/UL (ref 0–0.1)
BASOPHILS NFR BLD AUTO: 0.4 %
BILIRUB SERPL-MCNC: 0.4 MG/DL (ref 0–1.2)
BUN SERPL-MCNC: 39 MG/DL (ref 6–23)
CALCIUM SERPL-MCNC: 8 MG/DL (ref 8.6–10.3)
CHLORIDE SERPL-SCNC: 95 MMOL/L (ref 98–107)
CO2 SERPL-SCNC: 30 MMOL/L (ref 21–32)
CREAT SERPL-MCNC: 4.11 MG/DL (ref 0.5–1.3)
EGFRCR SERPLBLD CKD-EPI 2021: 13 ML/MIN/1.73M*2
EOSINOPHIL # BLD AUTO: 0.16 X10*3/UL (ref 0–0.4)
EOSINOPHIL NFR BLD AUTO: 0.4 %
ERYTHROCYTE [DISTWIDTH] IN BLOOD BY AUTOMATED COUNT: 19 % (ref 11.5–14.5)
ERYTHROCYTE [DISTWIDTH] IN BLOOD BY AUTOMATED COUNT: 19 % (ref 11.5–14.5)
FERRITIN SERPL-MCNC: 2089 NG/ML (ref 20–300)
GLUCOSE BLD MANUAL STRIP-MCNC: 157 MG/DL (ref 74–99)
GLUCOSE BLD MANUAL STRIP-MCNC: 224 MG/DL (ref 74–99)
GLUCOSE BLD MANUAL STRIP-MCNC: 276 MG/DL (ref 74–99)
GLUCOSE BLD MANUAL STRIP-MCNC: 289 MG/DL (ref 74–99)
GLUCOSE SERPL-MCNC: 151 MG/DL (ref 74–99)
HCT VFR BLD AUTO: 22.9 % (ref 41–52)
HCT VFR BLD AUTO: 22.9 % (ref 41–52)
HGB BLD-MCNC: 7.8 G/DL (ref 13.5–17.5)
HGB BLD-MCNC: 7.8 G/DL (ref 13.5–17.5)
HOLD SPECIMEN: NORMAL
IMM GRANULOCYTES # BLD AUTO: 1.1 X10*3/UL (ref 0–0.5)
IMM GRANULOCYTES NFR BLD AUTO: 2.8 % (ref 0–0.9)
LYMPHOCYTES # BLD AUTO: 1.85 X10*3/UL (ref 0.8–3)
LYMPHOCYTES NFR BLD AUTO: 4.7 %
MCH RBC QN AUTO: 28.7 PG (ref 26–34)
MCH RBC QN AUTO: 28.7 PG (ref 26–34)
MCHC RBC AUTO-ENTMCNC: 34.1 G/DL (ref 32–36)
MCHC RBC AUTO-ENTMCNC: 34.1 G/DL (ref 32–36)
MCV RBC AUTO: 84 FL (ref 80–100)
MCV RBC AUTO: 84 FL (ref 80–100)
MONOCYTES # BLD AUTO: 2.81 X10*3/UL (ref 0.05–0.8)
MONOCYTES NFR BLD AUTO: 7.1 %
NEUTROPHILS # BLD AUTO: 33.42 X10*3/UL (ref 1.6–5.5)
NEUTROPHILS NFR BLD AUTO: 84.6 %
NRBC BLD-RTO: 0.1 /100 WBCS (ref 0–0)
NRBC BLD-RTO: 0.1 /100 WBCS (ref 0–0)
PLATELET # BLD AUTO: 345 X10*3/UL (ref 150–450)
PLATELET # BLD AUTO: 345 X10*3/UL (ref 150–450)
POTASSIUM SERPL-SCNC: 4.8 MMOL/L (ref 3.5–5.3)
PROT SERPL-MCNC: 5.8 G/DL (ref 6.4–8.2)
RBC # BLD AUTO: 2.72 X10*6/UL (ref 4.5–5.9)
RBC # BLD AUTO: 2.72 X10*6/UL (ref 4.5–5.9)
SODIUM SERPL-SCNC: 133 MMOL/L (ref 136–145)
WBC # BLD AUTO: 38.7 X10*3/UL (ref 4.4–11.3)
WBC # BLD AUTO: 38.7 X10*3/UL (ref 4.4–11.3)

## 2025-04-09 PROCEDURE — 82728 ASSAY OF FERRITIN: CPT | Performed by: STUDENT IN AN ORGANIZED HEALTH CARE EDUCATION/TRAINING PROGRAM

## 2025-04-09 PROCEDURE — 99233 SBSQ HOSP IP/OBS HIGH 50: CPT | Performed by: STUDENT IN AN ORGANIZED HEALTH CARE EDUCATION/TRAINING PROGRAM

## 2025-04-09 PROCEDURE — 2500000001 HC RX 250 WO HCPCS SELF ADMINISTERED DRUGS (ALT 637 FOR MEDICARE OP): Performed by: STUDENT IN AN ORGANIZED HEALTH CARE EDUCATION/TRAINING PROGRAM

## 2025-04-09 PROCEDURE — 2500000001 HC RX 250 WO HCPCS SELF ADMINISTERED DRUGS (ALT 637 FOR MEDICARE OP): Performed by: ORTHOPAEDIC SURGERY

## 2025-04-09 PROCEDURE — 87493 C DIFF AMPLIFIED PROBE: CPT | Mod: STJLAB | Performed by: INTERNAL MEDICINE

## 2025-04-09 PROCEDURE — 2500000002 HC RX 250 W HCPCS SELF ADMINISTERED DRUGS (ALT 637 FOR MEDICARE OP, ALT 636 FOR OP/ED): Performed by: INTERNAL MEDICINE

## 2025-04-09 PROCEDURE — 2500000004 HC RX 250 GENERAL PHARMACY W/ HCPCS (ALT 636 FOR OP/ED): Performed by: INTERNAL MEDICINE

## 2025-04-09 PROCEDURE — 84075 ASSAY ALKALINE PHOSPHATASE: CPT | Performed by: STUDENT IN AN ORGANIZED HEALTH CARE EDUCATION/TRAINING PROGRAM

## 2025-04-09 PROCEDURE — 36415 COLL VENOUS BLD VENIPUNCTURE: CPT | Performed by: STUDENT IN AN ORGANIZED HEALTH CARE EDUCATION/TRAINING PROGRAM

## 2025-04-09 PROCEDURE — 6350000001 HC RX 635 EPOETIN >10,000 UNITS: Mod: JZ | Performed by: INTERNAL MEDICINE

## 2025-04-09 PROCEDURE — 85027 COMPLETE CBC AUTOMATED: CPT | Performed by: STUDENT IN AN ORGANIZED HEALTH CARE EDUCATION/TRAINING PROGRAM

## 2025-04-09 PROCEDURE — 82140 ASSAY OF AMMONIA: CPT | Performed by: STUDENT IN AN ORGANIZED HEALTH CARE EDUCATION/TRAINING PROGRAM

## 2025-04-09 PROCEDURE — 2500000002 HC RX 250 W HCPCS SELF ADMINISTERED DRUGS (ALT 637 FOR MEDICARE OP, ALT 636 FOR OP/ED): Performed by: STUDENT IN AN ORGANIZED HEALTH CARE EDUCATION/TRAINING PROGRAM

## 2025-04-09 PROCEDURE — 2500000004 HC RX 250 GENERAL PHARMACY W/ HCPCS (ALT 636 FOR OP/ED): Performed by: ORTHOPAEDIC SURGERY

## 2025-04-09 PROCEDURE — 92526 ORAL FUNCTION THERAPY: CPT | Mod: GN | Performed by: SPEECH-LANGUAGE PATHOLOGIST

## 2025-04-09 PROCEDURE — 1100000001 HC PRIVATE ROOM DAILY

## 2025-04-09 PROCEDURE — 82947 ASSAY GLUCOSE BLOOD QUANT: CPT

## 2025-04-09 PROCEDURE — 2500000002 HC RX 250 W HCPCS SELF ADMINISTERED DRUGS (ALT 637 FOR MEDICARE OP, ALT 636 FOR OP/ED): Performed by: ORTHOPAEDIC SURGERY

## 2025-04-09 RX ADMIN — EPOETIN ALFA-EPBX 10000 UNITS: 10000 INJECTION, SOLUTION INTRAVENOUS; SUBCUTANEOUS at 12:33

## 2025-04-09 RX ADMIN — VANCOMYCIN HYDROCHLORIDE 125 MG: 125 CAPSULE ORAL at 12:32

## 2025-04-09 RX ADMIN — Medication 1 CAPSULE: at 09:03

## 2025-04-09 RX ADMIN — METOPROLOL TARTRATE 50 MG: 50 TABLET, FILM COATED ORAL at 01:53

## 2025-04-09 RX ADMIN — LEVETIRACETAM 500 MG: 500 TABLET, FILM COATED ORAL at 01:53

## 2025-04-09 RX ADMIN — Medication 1000 UNITS: at 09:02

## 2025-04-09 RX ADMIN — VANCOMYCIN HYDROCHLORIDE 125 MG: 125 CAPSULE ORAL at 21:13

## 2025-04-09 RX ADMIN — DONEPEZIL HYDROCHLORIDE 22.5 MG: 10 TABLET ORAL at 09:02

## 2025-04-09 RX ADMIN — LEVETIRACETAM 500 MG: 500 TABLET, FILM COATED ORAL at 09:02

## 2025-04-09 RX ADMIN — METOPROLOL TARTRATE 50 MG: 50 TABLET, FILM COATED ORAL at 21:13

## 2025-04-09 RX ADMIN — GUAIFENESIN 600 MG: 600 TABLET ORAL at 01:53

## 2025-04-09 RX ADMIN — VANCOMYCIN HYDROCHLORIDE 125 MG: 125 CAPSULE ORAL at 16:31

## 2025-04-09 RX ADMIN — METOPROLOL TARTRATE 50 MG: 50 TABLET, FILM COATED ORAL at 09:02

## 2025-04-09 RX ADMIN — PIPERACILLIN SODIUM AND TAZOBACTAM SODIUM 3.38 G: 3; .375 INJECTION, SOLUTION INTRAVENOUS at 05:14

## 2025-04-09 RX ADMIN — SEVELAMER CARBONATE 1600 MG: 800 TABLET, FILM COATED ORAL at 21:13

## 2025-04-09 RX ADMIN — APIXABAN 2.5 MG: 2.5 TABLET, FILM COATED ORAL at 21:13

## 2025-04-09 RX ADMIN — INSULIN LISPRO 4 UNITS: 100 INJECTION, SOLUTION INTRAVENOUS; SUBCUTANEOUS at 12:32

## 2025-04-09 RX ADMIN — SEVELAMER CARBONATE 1600 MG: 800 TABLET, FILM COATED ORAL at 09:01

## 2025-04-09 RX ADMIN — LEVETIRACETAM 500 MG: 500 TABLET, FILM COATED ORAL at 21:13

## 2025-04-09 RX ADMIN — PIPERACILLIN SODIUM AND TAZOBACTAM SODIUM 3.38 G: 3; .375 INJECTION, SOLUTION INTRAVENOUS at 16:31

## 2025-04-09 RX ADMIN — INSULIN LISPRO 6 UNITS: 100 INJECTION, SOLUTION INTRAVENOUS; SUBCUTANEOUS at 16:31

## 2025-04-09 RX ADMIN — FINASTERIDE 5 MG: 5 TABLET, FILM COATED ORAL at 09:02

## 2025-04-09 RX ADMIN — VANCOMYCIN HYDROCHLORIDE 125 MG: 125 CAPSULE ORAL at 06:28

## 2025-04-09 RX ADMIN — ACETAMINOPHEN 650 MG: 325 TABLET, FILM COATED ORAL at 01:57

## 2025-04-09 RX ADMIN — INSULIN GLARGINE 8 UNITS: 100 INJECTION, SOLUTION SUBCUTANEOUS at 21:13

## 2025-04-09 ASSESSMENT — PAIN SCALES - PAIN ASSESSMENT IN ADVANCED DEMENTIA (PAINAD)
BREATHING: NORMAL
TOTALSCORE: 0
FACIALEXPRESSION: SMILING OR INEXPRESSIVE
CONSOLABILITY: NO NEED TO CONSOLE
BODYLANGUAGE: RELAXED

## 2025-04-09 ASSESSMENT — PAIN SCALES - WONG BAKER: WONGBAKER_NUMERICALRESPONSE: HURTS LITTLE BIT

## 2025-04-09 ASSESSMENT — PAIN - FUNCTIONAL ASSESSMENT
PAIN_FUNCTIONAL_ASSESSMENT: PAINAD (PAIN ASSESSMENT IN ADVANCED DEMENTIA SCALE)
PAIN_FUNCTIONAL_ASSESSMENT: 0-10

## 2025-04-09 ASSESSMENT — PAIN SCALES - GENERAL
PAINLEVEL_OUTOF10: 0 - NO PAIN
PAINLEVEL_OUTOF10: 0 - NO PAIN
PAINLEVEL_OUTOF10: 3

## 2025-04-09 NOTE — PROGRESS NOTES
Physical Therapy                 Therapy Communication Note    Patient Name: Tyshawn Coles  MRN: [id number]  Department: [facility]  Room: [facility]  Today's Date: 4/9/2025     Discipline: Physical Therapy          Missed Visit Reason:  Patient unable to participate this am Attempted to arouse patient with no success. B UE edemitis,L Leg Length discrepancy with edema noted when cover were pulled back. Repositioned patient returned Hip abductor pillow and notified nursing.    Missed Time: Attempt    Comment:

## 2025-04-09 NOTE — PROGRESS NOTES
04/09/25 0810   Discharge Planning   Living Arrangements Other (Comment)   Support Systems Spouse/significant other   Type of Residence Nursing home/residential care   Home or Post Acute Services Post acute facilities (Rehab/SNF/etc)   Type of Post Acute Facility Services Skilled nursing   Expected Discharge Disposition SNF     Per MD pt is not ready for discharge today. Precert for [facility] expires today. Pt may be ready in 2 days. Requested direct auth team extend precert.   0836 Auth team is unable to extend precert. Will have to restart precert tomorrow.

## 2025-04-09 NOTE — PROGRESS NOTES
"Occupational Therapy                 Therapy Communication Note    Patient Name: Tyshawn Coles  MRN: [id number]  Department: [address]  Room: [facility]-A  Today's Date: 4/9/2025     Discipline: Occupational Therapy    OT Missed Visit: Yes     Missed Visit Reason: Missed Visit Reason: Other (Comment)    Missed Time: Attempt    Comment: Attempted OT tx, however pt not appropriate for participation. Pt lethargic, unable to stay awake. When pt is awake repeats \"god help me.\" Pt with edematous B UE- provided with extra pillows and positioning to promote comfort and decreased edema. Pt also appears to have leg length discrepancy, nursing informed, assessing.   "

## 2025-04-09 NOTE — PROGRESS NOTES
25 1539   Discharge Planning   Home or Post Acute Services Post acute facilities (Rehab/SNF/etc)   Type of Post Acute Facility Services Long term care;Skilled nursing     Reviewed chart. Hospice consult was discussed with pt's wife. She will discuss this option with her family. Pre-cert has  for return to [facility]. Will await wife's decision regarding hospice prior to re-submitting pre-cert.

## 2025-04-09 NOTE — PROGRESS NOTES
Speech-Language Pathology    SLP Adult Inpatient  Speech-Language Pathology Treatment     Patient Name: Tyshawn Coles  MRN: [id number]  Today's Date: 4/9/2025  Time Calculation  Start Time: 1352  Stop Time: 1415  Time Calculation (min): 23 min         Current Problem:   1. Abscess of hip        2. Hip dislocation, left, initial encounter (Multi)  CANCELED: Case Request Operating Room: CLOSED REDUCTION, DISLOCATION, TOTAL ARTHROPLASTY HARDWARE, HIP    CANCELED: Case Request Operating Room: CLOSED REDUCTION, DISLOCATION, TOTAL ARTHROPLASTY HARDWARE, HIP      3. Hypovolemia due to dehydration        4. History of anemia due to chronic kidney disease        5. ESRD (end stage renal disease) (Multi)        6. Hypotension due to hypovolemia              SLP Assessment:  SLP TX Intervention Outcome: Making Progress Towards Goals  SLP Assessment Results: Other (Comment) (Dysphagia)  Prognosis: Good  Treatment Tolerance: Patient tolerated treatment well    Patient participated in skilled speech therapy intervention session targeting dysphagia goals this date. Oral care was completed prior to PO trials of thin liquid via straw and regular solids. Patient demonstrated a cough following the first trial of thin liquid and regular solid. Patient noted that he felt it go down the wrong way. Patient was provided additional PO trials and demonstrated an inconsistent delayed cough. Patient further reported globus sensation with regular solid. Patient was provided straw sips of thin liquid, but noted that the solid was still stuck in his throat.     MBSS was previously recommended, however was deferred by family. Per discussion with RN, patient with multiple medical concerns and goals of care are being determined at this time.    Recommend patient to continue current diet pending determination of goals of care. No further skilled ST services are warranted, unless status changes or upon patient family request. ST remains available upon  re-consultation should new concerns arise.    Medical Staff Made Aware: Yes  Barriers: Cognition, Comorbidities  Education Provided: Yes       Plan:  Inpatient/Swing Bed or Outpatient: Inpatient  Treatment/Interventions: Dysphagia treatment, Patient/family education, Other (Comment)  SLP TX Plan: Discharge from Speech Therapy  SLP Plan: No skilled SLP  No Skilled SLP: No Functional Gain Expected  SLP Discharge Recommendations: Other (Comment) (per goals of care)  Discussed POC: Patient, Nursing  Patient/Caregiver Agreeable: Yes  SLP - OK to Discharge: Yes      Subjective   Patient alert in bed. Patient reports denies coughing or choking when eating/drinking, but notes globus sensation.    Most Recent Visit:  SLP Received On: 04/09/25    General Visit Information:   Reason for Referral: Suspected oral or pharyngeal dysphagia, rule out aspiration, determine if MBS is needed  Referred By: Paul Guevara MD  Past Medical History Relevant to Rehab: 84 y.o. male with PMH history of ESRD on hemodialysis Tuesday Thursday Saturday, Autonomic dysfunction, Chronic anemia, Coronary artery disease, Dyslipidemia, Frequent falls, Heart failure with preserved ejection fraction, History of anaphylaxis, closed head injury, Clostridioides difficile colitis, non-ST elevation myocardial infarction (NSTEMI), sepsis, Hypoxic ischemic encephalopathy, Multilevel degenerative disc disease, Paroxysmal atrial fibrillation, Peripheral vascular disease, Secondary hyperparathyroidism of renal origin, Seizure disorder, Thrombectomy, Tonsillectomy, Adenoidectomy, hypertension, type 2 diabetes mellitus, dementia who is minimally responsive at baseline and just groans and moans  Patient Seen During This Visit: Yes  Caregiver Feedback: RN reports patient has beeing eating good and had no difficulties with pills.  Total Number of Visits : 2  Prior to Session Communication: Bedside nurse    Baseline Assessment:  Respiratory Status: Room  air  Behavior/Cognition: Alert, Cooperative, Requires cueing  Patient Positioning: Upright in Bed     Pain Assessment:  Pain Assessment  Pain Assessment: PAINAD (Pain Assessment in Advanced Dementia Scale)  PAINAD (Pain Assessment in Advanced Dementia)  Breathing: Normal  Negative Vocalization: None  Facial Expression: Smiling or inexpressive  Body Language: Relaxed  Consolability: No need to console  PAINAD Score: 0      Objective     1. Patient will tolerate PO with no overt s/s of aspiration in 90% of observed therapeutic trials.  Progress:  Pt demonstrated coughing throughout PO trials of thin liquids and regular solids.   2. Patient/caregiver will implement safe swallowing strategies to reduce risk of aspiration in 90% of trials given caregiver assistance/cueing as needed.  Progress:  Pt remained upright during all PO intake and was provided small bites and single sips.  3. Complete a modified barium swallow study (MBSS) for objective assessment of swallowing function, to assess for aspiration, and to guide further recommendations and treatment plan.  Progress: Patient/family declined MBSS.       Therapeutic Swallow:  Therapeutic Swallow Intervention : PO Trials, Compensatory Strategies  Solid Diet Recommendations: Regular (IDDSI Level 7)  Liquid Diet Recommendations: Thin (IDDSI Level 0)  Swallow Comments: MBSS deferred by family        Education:  Learner patient   Barriers to Learning cognitive limitations barrier   Method verbal   Education - Topic ST provided patient education regarding role of ST, clinical impressions, and plan of care. Patient verbalized comprehension, consistent with cognitive status. Education will be reinforced. ST further coordinated with RN regarding recommendations and precautions per this assessment, with RN verbalizing understanding.     Outcome    Verbalized understanding and agreement; needs review/reinforcement

## 2025-04-09 NOTE — PROGRESS NOTES
INPATIENT PROGRESS NOTES    PATIENT NAME: Tyshawn Coles    MRN: [id number]  SERVICE DATE:  4/9/2025   SERVICE TIME:  12:41 PM    SIGNATURE: Elmer Du MD      ASSESSMENT :   -Rule out left prosthetic hip abscess  -Fever   -Leukocytosis worsening leukocytosis  -Acute anemia  -Diarrhea r/o CDI    #Left prosthetic hip dislocation status post closed reduction  #Hypertension  #Hyperlipidemia  #End-stage renal disease on dialysis, which can affect the dosing of antimicrobials  #Type 2 diabetes, which is chronic condition that increases his risk for infection  #Dementia       PLAN:  -White blood cell tagged scan negative for infectious process  -C. difficile PCR was sent and will follow-up the result  -WBC is slowly trending down  -Consider CT chest abdomen and pelvis if no improvement in the leukocytosis  -Continue Zosyn and PO Vancomycin   -Closely monitor for antibiotics side effects including rash, Diarrhea/CDI, thrombocytopenia, ALCIDES, etc.        SUBJECTIVE  Afebrile  No events overnight       OBJECTIVE  PHYSICAL EXAM:   Patient Vitals for the past 24 hrs:   BP Temp Temp src Pulse Resp SpO2   04/09/25 1101 (!) 120/48 36.6 °C (97.9 °F) Temporal 67 18 98 %   04/09/25 0800 112/56 36.6 °C (97.9 °F) Temporal 69 18 96 %   04/09/25 0400 108/64 37.1 °C (98.8 °F) Temporal 68 20 96 %   04/09/25 0000 121/70 36.9 °C (98.4 °F) Temporal 81 20 100 %   04/08/25 2000 126/71 37.4 °C (99.3 °F) Temporal 99 20 98 %   04/08/25 1715 112/59 36.7 °C (98.1 °F) Temporal 98 16 98 %   04/08/25 1333 -- 37 °C (98.6 °F) Temporal 93 -- --         Gen: No fever  Respiratory: No distress     Labs:  Lab Results   Component Value Date    WBC 38.7 (H) 04/09/2025    WBC 38.7 (H) 04/09/2025    HGB 7.8 (L) 04/09/2025    HGB 7.8 (L) 04/09/2025    HCT 22.9 (L) 04/09/2025    HCT 22.9 (L) 04/09/2025    MCV 84 04/09/2025    MCV 84 04/09/2025     04/09/2025     04/09/2025     Lab Results   Component Value Date    GLUCOSE 151 (H) 04/09/2025     "CALCIUM 8.0 (L) 04/09/2025     (L) 04/09/2025    K 4.8 04/09/2025    CO2 30 04/09/2025    CL 95 (L) 04/09/2025    BUN 39 (H) 04/09/2025    CREATININE 4.11 (H) 04/09/2025   ESR: --No results found for: \"SEDRATE\"  Lab Results   Component Value Date    CRP 3.63 (A) 06/12/2021     Lab Results   Component Value Date    ALT 12 04/09/2025    AST 16 04/09/2025    ALKPHOS 59 04/09/2025    BILITOT 0.4 04/09/2025         DATA:   Diagnostic tests reviewed for today's visit:    Labs this admission reviewed  Imagings this admission reviewed  Cultures: Reviewed        Elmer Du MD.   Infectious Disease Attending            This note was partially created using voice recognition software and is inherently subject to errors including those of syntax and \"sound-alike\" substitutions which may escape proofreading. In such instances, original meaning may be extrapolated by contextual derivation      "

## 2025-04-09 NOTE — PROGRESS NOTES
INPATIENT NEPHROLOGY CONSULT PROGRESS NOTE      Patient Name: Tyshawn Coles MRN: [id number]  DATE of SERVICE: April 9, 2025  TIME of SERVICE: 02:59 PM  CONSULTING SERVICE: Nephrology    REASON for CONSULT: ESRD    SUBJECTIVE:  Seen and evaluated at bedside.  Resting in bed comfortably, denies any new complaint.     SUMMARY OF STAY:  Mr. Coles is a 84 y.o. male who presented to [facility] March 31, 2025 for evaluation of left hip dislocation after remote fall.  Status post left hip hemiarthroplasty reduction April 1, 2025     Patient with past medical history significant for end-stage renal disease on hemodialysis per TTS schedule, hypertension, hyperlipidemia, diabetes mellitus, dementia, seizure disorder.   Patient seen and evaluated at bedside he is with advanced dementia unable to provide additional history.  Patient wife at bedside who was able to provide the majority of the history.  Recently patient had clotted AV graft and currently is receiving dialysis via right tunneled dialysis catheter.  He resides at [facility] and being transported to his dialysis unit.  He has been [facility] lifted.  This admission patient presented to [facility] for evaluation of left hip pain and diagnosed with dislocated left hip hemiarthroplasty and scheduled to go to surgery this afternoon.    Dialysis prescription:  ESRD on hemodialysis  Tuesday Thursday Saturday  Primary nephrologist: Dr. Wise  Estimated dry weight: 67 kg  Duration: 3-1/2 hours  Intradialytic Midodrine  Right tunneled dialysis catheter in place        ASSESSMENT AND PLAN:  ESRD: iHD per Tuesday Thursday Saturday schedule  Volume status:  Hypovolemia, resolved   Electrolytes: Within acceptable range  Acid-base: Metabolic acidosis from renal failure (controlled with HD)  Bone mineral disease (Ca/P/PTH): controlled  Anemia from renal failure, hemoglobin stable   Dislocated left hip  hemiarthroplasty status postsurgery April 1  Chronic osteomyelitis of the coccyx  Left hip abscess  Hypotension: Fluid responsive  T2DM     PLAN:  End-stage renal disease patient with complex acute/subacute dislocated left hip hemiarthroplasty with left hip abscess, chronic osteomyelitis of the coccyx:      Electrolytes and volume status within acceptable range, no urgent indication for antiplatelet therapy today tentative plan for hemodialysis tomorrow  Volume status within acceptable range  Anemia multifactorial  Malnutrition to continue protein supplements 3 times daily  Strict input and output to guide volume management, can have intermittent IV boluses to maintain hemodynamic stability  phosphorus level at 3.3, to hold renvela   7.  to encourage oral intake     Goals of care discussed with primary care team, may benefit from palliative care evaluation    Strict input and output to guide volume management     Will follow, thank you!      Medications:    Current Facility-Administered Medications:     acetaminophen (Tylenol) tablet 650 mg, 650 mg, oral, q6h PRN, 650 mg at 04/09/25 0157 **OR** acetaminophen (Tylenol) oral liquid 650 mg, 650 mg, oral, q6h PRN, 650 mg at 04/06/25 0639 **OR** acetaminophen (Tylenol) suppository 650 mg, 650 mg, rectal, q6h PRN, Eliezer Dumont MD    alteplase (Cathflo Activase) injection 2 mg, 2 mg, intra-catheter, PRN, Paul Guevara MD    apixaban (Eliquis) tablet 2.5 mg, 2.5 mg, oral, BID, Jesse Morgan DO, 2.5 mg at 04/05/25 2113    benzocaine-menthol (Cepastat Sore Throat) lozenge 1 lozenge, 1 lozenge, Mouth/Throat, q2h PRN, Eliezer Dumont MD    cholecalciferol (Vitamin D-3) tablet 1,000 Units, 1,000 Units, oral, Daily, Eliezer Dumont MD, 1,000 Units at 04/09/25 0902    docusate sodium (Colace) capsule 100 mg, 100 mg, oral, Daily, Paul Guevara MD, 100 mg at 04/08/25 1747    donepezil (Aricept) tablet 22.5 mg, 22.5 mg, oral, Daily, Eliezer Dumont MD, 22.5 mg at 04/09/25  0902    epoetin sheila-epbx (Retacrit) injection 10,000 Units, 10,000 Units, subcutaneous, Once per day on Monday Wednesday Friday, Carlene Norman MD, 10,000 Units at 04/09/25 1233    finasteride (Proscar) tablet 5 mg, 5 mg, oral, Daily, Eliezer Dumont MD, 5 mg at 04/09/25 0902    gabapentin (Neurontin) capsule 100 mg, 100 mg, oral, Once per day on Monday Wednesday Friday, Jesse Morgan DO, 100 mg at 04/07/25 1814    guaiFENesin (Mucinex) 12 hr tablet 600 mg, 600 mg, oral, q12h PRN, Eliezer Dumont MD, 600 mg at 04/09/25 0153    heparin 1,000 unit/mL injection 2,000 Units, 2,000 Units, intra-catheter, After Dialysis, Carlene Norman MD, 2,000 Units at 04/08/25 1331    heparin 1,000 unit/mL injection 2,000 Units, 2,000 Units, intra-catheter, After Dialysis, Carlene Norman MD, 2,000 Units at 04/08/25 1330    insulin glargine (Lantus) injection 8 Units, 8 Units, subcutaneous, Nightly, Jesse Morgan DO, 8 Units at 04/08/25 2136    insulin lispro injection 0-10 Units, 0-10 Units, subcutaneous, TID AC, Eliezer Dumont MD, 6 Units at 04/09/25 1631    ipratropium-albuteroL (Duo-Neb) 0.5-2.5 mg/3 mL nebulizer solution 3 mL, 3 mL, nebulization, q2h PRN, Paul Guevara MD    levETIRAcetam (Keppra) tablet 500 mg, 500 mg, oral, BID, Eliezer Dumont MD, 500 mg at 04/09/25 0902    melatonin tablet 10 mg, 10 mg, oral, Nightly PRN, Jesse Morgan DO    metoprolol tartrate (Lopressor) tablet 50 mg, 50 mg, oral, BID, Eliezer Dumont MD, 50 mg at 04/09/25 0902    ondansetron (Zofran) tablet 4 mg, 4 mg, oral, q8h PRN **OR** ondansetron (Zofran) injection 4 mg, 4 mg, intravenous, q8h PRN, Eliezer Dumont MD, 4 mg at 03/31/25 1403    piperacillin-tazobactam (Zosyn) 3.375 g in dextrose (iso) IV 50 mL, 3.375 g, intravenous, q12h, Eliezer Dumont MD, Last Rate: 0 mL/hr at 04/09/25 0914, 3.375 g at 04/09/25 1631    polyethylene glycol (Glycolax, Miralax) packet 17 g, 17 g, oral, Daily PRN, Eliezer Dumont MD     prochlorperazine (Compazine) tablet 10 mg, 10 mg, oral, q6h PRN **OR** prochlorperazine (Compazine) injection 10 mg, 10 mg, intravenous, q6h PRN **OR** prochlorperazine (Compazine) suppository 25 mg, 25 mg, rectal, q12h PRN, Eliezer Dumont MD    sevelamer carbonate (Renvela) tablet 1,600 mg, 1,600 mg, oral, BID, Paul Guevara MD, 1,600 mg at 04/09/25 0901    vancomycin (Vancocin) capsule 125 mg, 125 mg, oral, 4x daily, Elmer Du MD, 125 mg at 04/09/25 1631    vitamin B complex-vitamin C-folic acid (Nephrocaps) capsule 1 capsule, 1 capsule, oral, Daily, Eliezer Dumont MD, 1 capsule at 04/09/25 0903    PERTINENT ROS:  GENERAL:  positive for fatigue, poor appetite.  No fever/chills  RESPIRATORY:  positive for shortness of breath.  Negative for cough, wheezing.  CARDIOVASCULAR:   Negative for chest pain or palpitation.  GI:  Negative for abdominal pain, diarrhea, heartburn, nausea, vomiting  : External catheter in place with dark concentrated urine    Physical Exam:  Vital signs in last 24 hours:  Temp:  [36.6 °C (97.9 °F)-37.4 °C (99.3 °F)] 36.6 °C (97.9 °F)  Heart Rate:  [67-99] 68  Resp:  [18-20] 18  BP: (108-126)/(48-71) 109/61    General: Awake, oriented to self, pain better controlled  HEENT:  NCAT,  mucous membranes moist and pink  NECK: no JVD, no carotid bruit, supple, no cervical mass or thyromegaly  LUNGS;  Diminished breath sounds, no Rales  CV:  Distant, regular rate and rhythm, no murmurs  ABDOMEN:  abdomen soft, nontender, BS normal, no masses or organomegaly  EDEMA:  no lower extremity edema/dependent edema  SKIN: Left hip with a dressing in place      Intake/Output last 3 shifts:  I/O last 3 completed shifts:  In: 2160 (32 mL/kg) [P.O.:960; I.V.:1200 (17.8 mL/kg)]  Out: 1300 (19.2 mL/kg) [Other:1300]  Weight: 67.6 kg     DATA:  Diagnotic tests reviewed for Todays visit:  Results from last 7 days   Lab Units 04/09/25  0708   WBC AUTO x10*3/uL 38.7*  38.7*   RBC AUTO x10*6/uL 2.72*  2.72*    HEMOGLOBIN g/dL 7.8*  7.8*   HEMATOCRIT % 22.9*  22.9*     Results from last 7 days   Lab Units 04/09/25  0708 04/07/25  1026 04/06/25  0538 04/05/25  0620 04/04/25  0555   SODIUM mmol/L 133*   < > 131*   < > 130*   POTASSIUM mmol/L 4.8   < > 4.5   < > 4.5   CHLORIDE mmol/L 95*   < > 93*   < > 93*   CO2 mmol/L 30   < > 29   < > 28   BUN mg/dL 39*   < > 32*   < > 36*   CREATININE mg/dL 4.11*   < > 3.55*   < > 3.69*   CALCIUM mg/dL 8.0*   < > 8.1*   < > 8.3*   PHOSPHORUS mg/dL  --   --  3.7  --   --    MAGNESIUM mg/dL  --   --   --   --  1.98   BILIRUBIN TOTAL mg/dL 0.4   < >  --   --   --    ALT U/L 12   < >  --   --   --    AST U/L 16   < >  --   --   --     < > = values in this interval not displayed.           IMAGING: CXR reviewed in  images      SIGNATURE: Carlene Norman MD  Nephrology and Hypertension  [address] [place]  Office phone: [phone]  FAX: [phone]    This note was partially generated using the Dragon voice recognition system, and there may be some incorrect words, spelling's and punctuation that were not noted in checking the note before saving.

## 2025-04-09 NOTE — PROGRESS NOTES
Tyshawn Coles is a 84 y.o. male on day 9 of admission presenting with Abscess of hip.      Subjective     No issues reported overnight. WBC scan came back negative. Discussed with wife over phone that hospice palliative/hospice approach is reasonable. She will talk to her family regarding this.    Objective     Last Recorded Vitals  BP (!) 120/48 (BP Location: Right arm, Patient Position: Lying)   Pulse 67   Temp 36.6 °C (97.9 °F) (Temporal)   Resp 18   Wt 67.6 kg (149 lb)   SpO2 98%   Intake/Output last 3 Shifts:    Intake/Output Summary (Last 24 hours) at 4/9/2025 1122  Last data filed at 4/9/2025 0200  Gross per 24 hour   Intake 600 ml   Output 1300 ml   Net -700 ml       Admission Weight  Weight: 67.6 kg (149 lb) (03/31/25 1659)    Daily Weight  03/31/25 : 67.6 kg (149 lb)      Physical Exam:  General: Not in distress, confused  HEENT: PERRLA, head intact and normocephalic  Neck: Normal to inspection  Lungs: Clear to auscultation, work of breathing within normal limit  Cardiac: Regular rate and rhythm  Abdomen: Soft nontender, positive bowel sounds  : Exam deferred  Skin: Multiple superficial decubitus ulcer noted with skin breakdown and erythematous base with some foul drainage  Hematology: No petechia or excessive ecchymosis  Neurological: Alert awake oriented x 1  Psych: Calm and cooperative    Relevant Results  Scheduled medications  apixaban, 2.5 mg, oral, BID  cholecalciferol, 1,000 Units, oral, Daily  docusate sodium, 100 mg, oral, Daily  donepezil, 22.5 mg, oral, Daily  epoetin sheila or biosimilar, 10,000 Units, subcutaneous, Once per day on Monday Wednesday Friday  finasteride, 5 mg, oral, Daily  gabapentin, 100 mg, oral, Once per day on Monday Wednesday Friday  heparin, 2,000 Units, intra-catheter, After Dialysis  heparin, 2,000 Units, intra-catheter, After Dialysis  insulin glargine, 8 Units, subcutaneous, Nightly  insulin lispro, 0-10 Units, subcutaneous, TID AC  levETIRAcetam, 500 mg, oral,  BID  metoprolol tartrate, 50 mg, oral, BID  piperacillin-tazobactam, 3.375 g, intravenous, q12h  sevelamer carbonate, 1,600 mg, oral, BID  vancomycin, 125 mg, oral, 4x daily  vitamin B complex-vitamin C-folic acid, 1 capsule, oral, Daily      Continuous medications     PRN medications  PRN medications: acetaminophen **OR** acetaminophen **OR** acetaminophen, alteplase, benzocaine-menthol, guaiFENesin, ipratropium-albuteroL, melatonin, ondansetron **OR** ondansetron, polyethylene glycol, prochlorperazine **OR** prochlorperazine **OR** prochlorperazine   Labs  Results from last 7 days   Lab Units 04/09/25  0708 04/08/25  1027 04/07/25  1128   WBC AUTO x10*3/uL 38.7*  38.7* 42.1* 32.3*   HEMOGLOBIN g/dL 7.8*  7.8* 7.8* 8.0*   HEMATOCRIT % 22.9*  22.9* 22.8* 23.5*   PLATELETS AUTO x10*3/uL 345  345 357 353     Results from last 7 days   Lab Units 04/09/25  0708 04/08/25  1027 04/07/25  1504 04/07/25  1026   SODIUM mmol/L 133* 131*  --  125*   POTASSIUM mmol/L 4.8 4.3 5.3 6.0*   CHLORIDE mmol/L 95* 93*  --  90*   CO2 mmol/L 30 28  --  23   BUN mg/dL 39* 38*  --  48*   CREATININE mg/dL 4.11* 4.07*  --  4.97*   CALCIUM mg/dL 8.0* 7.9*  --  7.9*   PROTEIN TOTAL g/dL 5.8*  --   --  5.9*   BILIRUBIN TOTAL mg/dL 0.4  --   --  0.5   ALK PHOS U/L 59  --   --  75   ALT U/L 12  --   --  9*   AST U/L 16  --   --  21   GLUCOSE mg/dL 151* 89  --  176*       Imaging  NM INFLAMMATION whole body w indium 111    Result Date: 4/9/2025  1. No abnormal In-111 WBC accumulation adjacent to postsurgical bed of bilateral hip arthroplasties or anywhere else to suggest active infection/abscess.   I personally reviewed the images/study and I agree with the findings as stated.  This study was interpreted at [facility], [place], [place].   MACRO: None   Signed by: Terrie Mcfadden 4/9/2025 10:12 AM Dictation workstation:   [id number]    XR chest 2 views    Result Date: 4/4/2025  Increased bibasilar atelectasis or  infiltrates.   MACRO: None   Signed by: Raheel Powell 4/4/2025 9:31 AM Dictation workstation:   [id number]     Cardiology, Vascular, and Other Imaging  ECG 12 Lead    Result Date: 4/7/2025  Normal sinus rhythm Normal ECG When compared with ECG of 09-MAR-2025 00:15, No significant change was found        Assessment & Plan      84 y.o. male with past medical history of ESRD on hemodialysis, hypertension, hyperlipidemia, type 2 diabetes mellitus, dementia who is minimally responsive  presented from outside hospital for  posteriorly dislocated hip. Hospital course complicated by persistent leukocytosis.      Recent left hip replacement now with posterior superior dislocation of the left hip hemiarthroplasty with surrounding fluid collection  S/p L hip hemiarthroplasty 3/10/25  Orthopedic surgery reduced hip under general anesthesia  CT imaging with soft tissue air-fluid collection around L hip, ortho feels it is likely post operative changes and hematoma, felt no indication for aspiration  Weightbearing as tolerated for transfers related purposes only  Palliative care followed.   Discussed with wife 4/9 that palliative/hospice approach is reasonable. Wife to speak to family and discuss. Did offer palliative reconsult, but prefers to talk to family first        Bacteroides caccae bacteremia prior to admission  Leukocytosis  Blood cultures 3/29 from dialysis positive for Bacteroides caccae   CT AP on admit with chronic sacral osteomyelitis, ortho felt fluid collection around hip was non infectious  Blood culture from [facility] 3/30 negative  Repeat blood cultures 3/31 negative to date  Urine culture 3/31 negative  WBC scan 4/7/25 negative for acute infectious process  Continue Zosyn  C diff PCR pending, continued PO Vanc  Will consider repeat CT CAP imaging pending WBC curve    Encephalopathy   Currently oriented x0-1, close to baseline  TSH and NH3 wnl  Goals of care as above    Decubitus ulcer  Continue with localized  wound care  Continue with supportive care and frequent turning    Anemia   -In setting of ESRD, baseline Hbg ~7-9 range  -Received IV Iron, Epogen per nephrology     Atrial Fibrillation  -Continue Eliquis    ESRD on hemodialysis Tuesday Thursday Saturday  Nephrology following  Renally adjust medication using pharmacy's help  Continue with Renvela, Nephrocaps     Hypertension  Continue metoprolol     Dementia  Continue with the Aricept  Continue with Keppra     Type 2 diabetes mellitus  A1c 8.7 in Oct 2024  Lantus dose increased 4/4, continue SSI     BPH  Continue with finasteride  Urine culture negative 3/31      DVT ppx:  Eliquis  Code status: DNR/DNI

## 2025-04-09 NOTE — PROGRESS NOTES
Speech-Language Pathology                 Therapy Communication Note    Patient Name: Tyshawn Coles  MRN: [id number]  Department: [facility] 4 S  Room: [facility]  Today's Date: 4/9/2025     Discipline: Speech Language Pathology    Missed Visit Reason: Attempted to see patient for dysphagia therapy. Patient unable to adequately participate at this time 2/2 lethargy. RN indicates patient had both dialysis and testing this date. ST services, then, were not delivered. The plan is to see patient for therapy pending his ability to participate. RN aware.       Missed Time: 8645

## 2025-04-09 NOTE — PROGRESS NOTES
INPATIENT NEPHROLOGY CONSULT PROGRESS NOTE      Patient Name: Tyshawn Coles MRN: [id number]  DATE of SERVICE: April 8, 2025  TIME of SERVICE: 02:15 PM  CONSULTING SERVICE: Nephrology    REASON for CONSULT: ESRD    SUBJECTIVE:  Seen and evaluated at bedside.  Receiving hemodialysis, tolerating the procedure well.      SUMMARY OF STAY:  Mr. Coles is a 84 y.o. male who presented to [facility] March 31, 2025 for evaluation of left hip dislocation after remote fall.  Status post left hip hemiarthroplasty reduction April 1, 2025     Patient with past medical history significant for end-stage renal disease on hemodialysis per TTS schedule, hypertension, hyperlipidemia, diabetes mellitus, dementia, seizure disorder.   Patient seen and evaluated at bedside he is with advanced dementia unable to provide additional history.  Patient wife at bedside who was able to provide the majority of the history.  Recently patient had clotted AV graft and currently is receiving dialysis via right tunneled dialysis catheter.  He resides at [facility] and being transported to his dialysis unit.  He has been Edison lifted.  This admission patient presented to [facility] for evaluation of left hip pain and diagnosed with dislocated left hip hemiarthroplasty and scheduled to go to surgery this afternoon.    Dialysis prescription:  ESRD on hemodialysis  Tuesday Thursday Saturday  Primary nephrologist: Dr. Wise  Estimated dry weight: 67 kg  Duration: 3-1/2 hours  Intradialytic Midodrine  Right tunneled dialysis catheter in place        ASSESSMENT AND PLAN:  ESRD: iHD per Tuesday Thursday Saturday schedule  Volume status:  Hypovolemia, resolved   Electrolytes: Within acceptable range  Acid-base: Metabolic acidosis from renal failure (controlled with HD)  Bone mineral disease (Ca/P/PTH): controlled  Anemia from renal failure, hemoglobin stable   Dislocated left hip  hemiarthroplasty status postsurgery April 1  Chronic osteomyelitis of the coccyx  Left hip abscess  Hypotension: Fluid responsive  T2DM     PLAN:  End-stage renal disease patient with complex acute/subacute dislocated left hip hemiarthroplasty with left hip abscess, chronic osteomyelitis of the coccyx:     Receiving hemodialysis today April 8, ultrafiltration 1.3 L, single evaluation, dialysis flowsheet reviewed and confirmed    Volume status within acceptable range  Anemia to continue IV iron loading dose, Epogen 2000 units  Malnutrition to continue protein supplements 3 times daily  Strict input and output to guide volume management, can have intermittent IV boluses to maintain hemodynamic stability  phosphorus level at 3.3, to hold renvela   7.  to encourage oral intake     Strict input and output to guide volume management     Will follow, thank you!      Medications:    Current Facility-Administered Medications:     acetaminophen (Tylenol) tablet 650 mg, 650 mg, oral, q6h PRN, 650 mg at 04/05/25 2112 **OR** acetaminophen (Tylenol) oral liquid 650 mg, 650 mg, oral, q6h PRN, 650 mg at 04/06/25 0639 **OR** acetaminophen (Tylenol) suppository 650 mg, 650 mg, rectal, q6h PRN, Eliezer Dumont MD    alteplase (Cathflo Activase) injection 2 mg, 2 mg, intra-catheter, PRN, Paul Guevara MD    [Held by provider] apixaban (Eliquis) tablet 2.5 mg, 2.5 mg, oral, BID, Paul Guevara MD, 2.5 mg at 04/05/25 2113    benzocaine-menthol (Cepastat Sore Throat) lozenge 1 lozenge, 1 lozenge, Mouth/Throat, q2h PRN, Eliezer Dumont MD    cholecalciferol (Vitamin D-3) tablet 1,000 Units, 1,000 Units, oral, Daily, Eliezer Dumont MD, 1,000 Units at 04/08/25 1746    docusate sodium (Colace) capsule 100 mg, 100 mg, oral, Daily, Paul Guevara MD, 100 mg at 04/08/25 1747    donepezil (Aricept) tablet 22.5 mg, 22.5 mg, oral, Daily, Eliezer Dumont MD, 22.5 mg at 04/08/25 1747    epoetin sheila-epbx (Retacrit) injection 10,000 Units, 10,000  Units, subcutaneous, Once per day on Monday Wednesday Friday, Carlene Norman MD, 10,000 Units at 04/07/25 1037    finasteride (Proscar) tablet 5 mg, 5 mg, oral, Daily, Eliezer Dumont MD, 5 mg at 04/08/25 1747    gabapentin (Neurontin) capsule 100 mg, 100 mg, oral, Once per day on Monday Wednesday Friday, Jesse Morgan DO, 100 mg at 04/07/25 1814    guaiFENesin (Mucinex) 12 hr tablet 600 mg, 600 mg, oral, q12h PRN, Eliezer Dumont MD    heparin 1,000 unit/mL injection 2,000 Units, 2,000 Units, intra-catheter, After Dialysis, Carlene Norman MD, 2,000 Units at 04/08/25 1331    heparin 1,000 unit/mL injection 2,000 Units, 2,000 Units, intra-catheter, After Dialysis, Carlene Norman MD, 2,000 Units at 04/08/25 1330    insulin glargine (Lantus) injection 8 Units, 8 Units, subcutaneous, Nightly, Jesse Morgan DO, 8 Units at 04/07/25 2111    insulin lispro injection 0-10 Units, 0-10 Units, subcutaneous, TID AC, Eliezer Dumont MD, 2 Units at 04/07/25 1641    ipratropium-albuteroL (Duo-Neb) 0.5-2.5 mg/3 mL nebulizer solution 3 mL, 3 mL, nebulization, q2h PRN, Paul Guevara MD    levETIRAcetam (Keppra) tablet 500 mg, 500 mg, oral, BID, Eliezer Dumont MD, 500 mg at 04/08/25 1747    melatonin tablet 10 mg, 10 mg, oral, Nightly PRN, Jesse Morgan DO    metoprolol tartrate (Lopressor) tablet 50 mg, 50 mg, oral, BID, Eliezer Dumont MD, 50 mg at 04/08/25 1746    ondansetron (Zofran) tablet 4 mg, 4 mg, oral, q8h PRN **OR** ondansetron (Zofran) injection 4 mg, 4 mg, intravenous, q8h PRN, Eliezer Dumont MD, 4 mg at 03/31/25 1403    piperacillin-tazobactam (Zosyn) 3.375 g in dextrose (iso) IV 50 mL, 3.375 g, intravenous, q12h, Eliezer Dumont MD, Last Rate: 0 mL/hr at 04/08/25 0615, 3.375 g at 04/08/25 1749    polyethylene glycol (Glycolax, Miralax) packet 17 g, 17 g, oral, Daily PRN, Eliezer Dumont MD    prochlorperazine (Compazine) tablet 10 mg, 10 mg, oral, q6h PRN **OR** prochlorperazine (Compazine)  injection 10 mg, 10 mg, intravenous, q6h PRN **OR** prochlorperazine (Compazine) suppository 25 mg, 25 mg, rectal, q12h PRN, Eliezer Dumont MD    sevelamer carbonate (Renvela) tablet 1,600 mg, 1,600 mg, oral, BID, Paul Guevara MD, 1,600 mg at 04/08/25 1748    vancomycin (Vancocin) capsule 125 mg, 125 mg, oral, 4x daily, Elmer Du MD, 125 mg at 04/08/25 1746    vitamin B complex-vitamin C-folic acid (Nephrocaps) capsule 1 capsule, 1 capsule, oral, Daily, Eliezer Dumont MD, 1 capsule at 04/08/25 1747    PERTINENT ROS:  GENERAL:  positive for fatigue, poor appetite.  No fever/chills  RESPIRATORY:  positive for shortness of breath.  Negative for cough, wheezing.  CARDIOVASCULAR:   Negative for chest pain or palpitation.  GI:  Negative for abdominal pain, diarrhea, heartburn, nausea, vomiting  : External catheter in place with dark concentrated urine    Physical Exam:  Vital signs in last 24 hours:  Temp:  [36.1 °C (97 °F)-37.4 °C (99.3 °F)] 37.4 °C (99.3 °F)  Heart Rate:  [72-99] 99  Resp:  [16-20] 20  BP: (112-149)/(59-83) 126/71    General: Awake, oriented to self, pain better controlled  HEENT:  NCAT,  mucous membranes moist and pink  NECK: no JVD, no carotid bruit, supple, no cervical mass or thyromegaly  LUNGS;  Diminished breath sounds, no Rales  CV:  Distant, regular rate and rhythm, no murmurs  ABDOMEN:  abdomen soft, nontender, BS normal, no masses or organomegaly  EDEMA:  no lower extremity edema/dependent edema  SKIN: Left hip with a dressing in place      Intake/Output last 3 shifts:  I/O last 3 completed shifts:  In: 1610 (23.8 mL/kg) [P.O.:360; I.V.:1000 (14.8 mL/kg); IV Piggyback:250]  Out: 1300 (19.2 mL/kg) [Other:1300]  Weight: 67.6 kg     DATA:  Diagnotic tests reviewed for Todays visit:  Results from last 7 days   Lab Units 04/08/25  1027   WBC AUTO x10*3/uL 42.1*   RBC AUTO x10*6/uL 2.75*   HEMOGLOBIN g/dL 7.8*   HEMATOCRIT % 22.8*     Results from last 7 days   Lab Units 04/08/25  1027  04/07/25  1504 04/07/25  1026 04/06/25  0538 04/05/25  0620 04/04/25  0555   SODIUM mmol/L 131*  --  125* 131*   < > 130*   POTASSIUM mmol/L 4.3   < > 6.0* 4.5   < > 4.5   CHLORIDE mmol/L 93*  --  90* 93*   < > 93*   CO2 mmol/L 28  --  23 29   < > 28   BUN mg/dL 38*  --  48* 32*   < > 36*   CREATININE mg/dL 4.07*  --  4.97* 3.55*   < > 3.69*   CALCIUM mg/dL 7.9*  --  7.9* 8.1*   < > 8.3*   PHOSPHORUS mg/dL  --   --   --  3.7  --   --    MAGNESIUM mg/dL  --   --   --   --   --  1.98   BILIRUBIN TOTAL mg/dL  --   --  0.5  --   --   --    ALT U/L  --   --  9*  --   --   --    AST U/L  --   --  21  --   --   --     < > = values in this interval not displayed.           IMAGING: CXR reviewed in  images      SIGNATURE: Carlene Norman MD  Nephrology and Hypertension  [address] [place]  Office phone: [phone]  FAX: [phone]    This note was partially generated using the Dragon voice recognition system, and there may be some incorrect words, spelling's and punctuation that were not noted in checking the note before saving.

## 2025-04-10 ENCOUNTER — APPOINTMENT (OUTPATIENT)
Dept: DIALYSIS | Facility: HOSPITAL | Age: 85
End: 2025-04-10
Payer: MEDICARE

## 2025-04-10 LAB
ANION GAP SERPL CALC-SCNC: 15 MMOL/L (ref 10–20)
BUN SERPL-MCNC: 54 MG/DL (ref 6–23)
C DIF TOX TCDA+TCDB STL QL NAA+PROBE: NOT DETECTED
CALCIUM SERPL-MCNC: 7.9 MG/DL (ref 8.6–10.3)
CHLORIDE SERPL-SCNC: 93 MMOL/L (ref 98–107)
CO2 SERPL-SCNC: 27 MMOL/L (ref 21–32)
CREAT SERPL-MCNC: 5.29 MG/DL (ref 0.5–1.3)
EGFRCR SERPLBLD CKD-EPI 2021: 10 ML/MIN/1.73M*2
ERYTHROCYTE [DISTWIDTH] IN BLOOD BY AUTOMATED COUNT: 18.7 % (ref 11.5–14.5)
GLUCOSE BLD MANUAL STRIP-MCNC: 142 MG/DL (ref 74–99)
GLUCOSE BLD MANUAL STRIP-MCNC: 198 MG/DL (ref 74–99)
GLUCOSE BLD MANUAL STRIP-MCNC: 212 MG/DL (ref 74–99)
GLUCOSE SERPL-MCNC: 177 MG/DL (ref 74–99)
HCT VFR BLD AUTO: 23.8 % (ref 41–52)
HGB BLD-MCNC: 8 G/DL (ref 13.5–17.5)
MCH RBC QN AUTO: 28.5 PG (ref 26–34)
MCHC RBC AUTO-ENTMCNC: 33.6 G/DL (ref 32–36)
MCV RBC AUTO: 85 FL (ref 80–100)
NRBC BLD-RTO: 0.1 /100 WBCS (ref 0–0)
PLATELET # BLD AUTO: 332 X10*3/UL (ref 150–450)
POTASSIUM SERPL-SCNC: 5.1 MMOL/L (ref 3.5–5.3)
RBC # BLD AUTO: 2.81 X10*6/UL (ref 4.5–5.9)
SODIUM SERPL-SCNC: 130 MMOL/L (ref 136–145)
WBC # BLD AUTO: 28.1 X10*3/UL (ref 4.4–11.3)

## 2025-04-10 PROCEDURE — 2500000001 HC RX 250 WO HCPCS SELF ADMINISTERED DRUGS (ALT 637 FOR MEDICARE OP): Performed by: ORTHOPAEDIC SURGERY

## 2025-04-10 PROCEDURE — 2500000002 HC RX 250 W HCPCS SELF ADMINISTERED DRUGS (ALT 637 FOR MEDICARE OP, ALT 636 FOR OP/ED): Performed by: INTERNAL MEDICINE

## 2025-04-10 PROCEDURE — 80048 BASIC METABOLIC PNL TOTAL CA: CPT | Performed by: STUDENT IN AN ORGANIZED HEALTH CARE EDUCATION/TRAINING PROGRAM

## 2025-04-10 PROCEDURE — 2500000001 HC RX 250 WO HCPCS SELF ADMINISTERED DRUGS (ALT 637 FOR MEDICARE OP): Performed by: INTERNAL MEDICINE

## 2025-04-10 PROCEDURE — 36415 COLL VENOUS BLD VENIPUNCTURE: CPT | Performed by: STUDENT IN AN ORGANIZED HEALTH CARE EDUCATION/TRAINING PROGRAM

## 2025-04-10 PROCEDURE — 85027 COMPLETE CBC AUTOMATED: CPT | Performed by: STUDENT IN AN ORGANIZED HEALTH CARE EDUCATION/TRAINING PROGRAM

## 2025-04-10 PROCEDURE — 2500000004 HC RX 250 GENERAL PHARMACY W/ HCPCS (ALT 636 FOR OP/ED): Performed by: INTERNAL MEDICINE

## 2025-04-10 PROCEDURE — 99233 SBSQ HOSP IP/OBS HIGH 50: CPT | Performed by: STUDENT IN AN ORGANIZED HEALTH CARE EDUCATION/TRAINING PROGRAM

## 2025-04-10 PROCEDURE — 2500000004 HC RX 250 GENERAL PHARMACY W/ HCPCS (ALT 636 FOR OP/ED): Performed by: ORTHOPAEDIC SURGERY

## 2025-04-10 PROCEDURE — 2500000002 HC RX 250 W HCPCS SELF ADMINISTERED DRUGS (ALT 637 FOR MEDICARE OP, ALT 636 FOR OP/ED): Performed by: ORTHOPAEDIC SURGERY

## 2025-04-10 PROCEDURE — 2500000001 HC RX 250 WO HCPCS SELF ADMINISTERED DRUGS (ALT 637 FOR MEDICARE OP): Performed by: STUDENT IN AN ORGANIZED HEALTH CARE EDUCATION/TRAINING PROGRAM

## 2025-04-10 PROCEDURE — 82947 ASSAY GLUCOSE BLOOD QUANT: CPT

## 2025-04-10 PROCEDURE — 1100000001 HC PRIVATE ROOM DAILY

## 2025-04-10 PROCEDURE — 8010000001 HC DIALYSIS - HEMODIALYSIS PER DAY

## 2025-04-10 RX ADMIN — Medication 1000 UNITS: at 09:30

## 2025-04-10 RX ADMIN — DONEPEZIL HYDROCHLORIDE 22.5 MG: 10 TABLET ORAL at 09:29

## 2025-04-10 RX ADMIN — ACETAMINOPHEN 650 MG: 325 TABLET, FILM COATED ORAL at 18:48

## 2025-04-10 RX ADMIN — DOCUSATE SODIUM 100 MG: 100 CAPSULE, LIQUID FILLED ORAL at 09:30

## 2025-04-10 RX ADMIN — Medication 1 CAPSULE: at 09:30

## 2025-04-10 RX ADMIN — HEPARIN SODIUM 2000 UNITS: 1000 INJECTION INTRAVENOUS; SUBCUTANEOUS at 17:42

## 2025-04-10 RX ADMIN — INSULIN LISPRO 4 UNITS: 100 INJECTION, SOLUTION INTRAVENOUS; SUBCUTANEOUS at 08:12

## 2025-04-10 RX ADMIN — FINASTERIDE 5 MG: 5 TABLET, FILM COATED ORAL at 09:31

## 2025-04-10 RX ADMIN — LEVETIRACETAM 500 MG: 500 TABLET, FILM COATED ORAL at 09:30

## 2025-04-10 RX ADMIN — METOPROLOL TARTRATE 50 MG: 50 TABLET, FILM COATED ORAL at 09:29

## 2025-04-10 RX ADMIN — PIPERACILLIN SODIUM AND TAZOBACTAM SODIUM 3.38 G: 3; .375 INJECTION, SOLUTION INTRAVENOUS at 02:30

## 2025-04-10 RX ADMIN — SEVELAMER CARBONATE 1600 MG: 800 TABLET, FILM COATED ORAL at 09:30

## 2025-04-10 RX ADMIN — PIPERACILLIN SODIUM AND TAZOBACTAM SODIUM 3.38 G: 3; .375 INJECTION, SOLUTION INTRAVENOUS at 18:48

## 2025-04-10 RX ADMIN — INSULIN LISPRO 2 UNITS: 100 INJECTION, SOLUTION INTRAVENOUS; SUBCUTANEOUS at 12:06

## 2025-04-10 RX ADMIN — APIXABAN 2.5 MG: 2.5 TABLET, FILM COATED ORAL at 09:30

## 2025-04-10 RX ADMIN — VANCOMYCIN HYDROCHLORIDE 125 MG: 125 CAPSULE ORAL at 06:16

## 2025-04-10 ASSESSMENT — COGNITIVE AND FUNCTIONAL STATUS - GENERAL
DRESSING REGULAR LOWER BODY CLOTHING: TOTAL
DAILY ACTIVITIY SCORE: 8
MOVING TO AND FROM BED TO CHAIR: TOTAL
HELP NEEDED FOR BATHING: TOTAL
MOBILITY SCORE: 8
WALKING IN HOSPITAL ROOM: TOTAL
DRESSING REGULAR UPPER BODY CLOTHING: TOTAL
TURNING FROM BACK TO SIDE WHILE IN FLAT BAD: A LOT
MOVING FROM LYING ON BACK TO SITTING ON SIDE OF FLAT BED WITH BEDRAILS: A LOT
TOILETING: TOTAL
CLIMB 3 TO 5 STEPS WITH RAILING: TOTAL
EATING MEALS: A LOT
STANDING UP FROM CHAIR USING ARMS: TOTAL
PERSONAL GROOMING: A LOT

## 2025-04-10 ASSESSMENT — PAIN - FUNCTIONAL ASSESSMENT
PAIN_FUNCTIONAL_ASSESSMENT: 0-10
PAIN_FUNCTIONAL_ASSESSMENT: 0-10

## 2025-04-10 ASSESSMENT — PAIN SCALES - GENERAL
PAINLEVEL_OUTOF10: 0 - NO PAIN
PAINLEVEL_OUTOF10: 0 - NO PAIN

## 2025-04-10 NOTE — PROGRESS NOTES
04/10/25 1546   Discharge Planning   Home or Post Acute Services Post acute facilities (Rehab/SNF/etc)   Type of Post Acute Facility Services Long term care   Expected Discharge Disposition Inter  ([facility])   Intensity of Service   Intensity of Service 0-30 min     Call placed to pt's wife to offer support. Answered questions about the differences between Palliative care and Hospice. She is still considering her options, but seems to be leaning toward palliative care. She still plans for pt to return to [facility] once he is cleared for discharge. Facility is aware. SW to follow and will assist pt and family as needed.

## 2025-04-10 NOTE — PROGRESS NOTES
INPATIENT PROGRESS NOTES    PATIENT NAME: Tyshawn Coles    MRN: [id number]  SERVICE DATE:  4/10/2025   SERVICE TIME:  3:19 PM    SIGNATURE: Elmer Du MD      ASSESSMENT :   -Ruled out left prosthetic hip abscess  -Fever resolved   -Leukocytosis improving  -Acute anemia  -Diarrhea r/o CDI    #Left prosthetic hip dislocation status post closed reduction  #Hypertension  #Hyperlipidemia  #End-stage renal disease on dialysis, which can affect the dosing of antimicrobials  #Type 2 diabetes, which is chronic condition that increases his risk for infection  #Dementia       PLAN:  -White blood cell tagged scan negative for infectious process  -C. difficile PCR negative  -WBC is trending down  -Discontinue p.o. vancomycin  -Continue Zosyn  -Closely monitor for antibiotics side effects including rash, Diarrhea/CDI, thrombocytopenia, ALCIDES, etc.        SUBJECTIVE  Afebrile  No events overnight       OBJECTIVE  PHYSICAL EXAM:   Patient Vitals for the past 24 hrs:   BP Temp Temp src Pulse Resp SpO2   04/10/25 1400 -- -- Temporal 67 -- --   04/10/25 1130 128/63 36.8 °C (98.2 °F) Temporal 68 20 100 %   04/10/25 0800 173/76 36.7 °C (98.1 °F) Temporal 70 18 95 %   04/10/25 0400 144/81 36.4 °C (97.5 °F) Temporal 70 18 100 %   04/10/25 0051 134/59 -- -- -- -- --   04/10/25 0000 (!) 186/71 36.5 °C (97.7 °F) Temporal 67 16 94 %   04/09/25 2000 120/63 36.1 °C (97 °F) Temporal 74 16 100 %   04/09/25 1600 109/61 36.6 °C (97.9 °F) Temporal 68 18 99 %         Gen: No fever  Respiratory: No distress     Labs:  Lab Results   Component Value Date    WBC 28.1 (H) 04/10/2025    HGB 8.0 (L) 04/10/2025    HCT 23.8 (L) 04/10/2025    MCV 85 04/10/2025     04/10/2025     Lab Results   Component Value Date    GLUCOSE 177 (H) 04/10/2025    CALCIUM 7.9 (L) 04/10/2025     (L) 04/10/2025    K 5.1 04/10/2025    CO2 27 04/10/2025    CL 93 (L) 04/10/2025    BUN 54 (H) 04/10/2025    CREATININE 5.29 (H) 04/10/2025   ESR: --No results found for:  "\"SEDRATE\"  Lab Results   Component Value Date    CRP 3.63 (A) 06/12/2021     Lab Results   Component Value Date    ALT 12 04/09/2025    AST 16 04/09/2025    ALKPHOS 59 04/09/2025    BILITOT 0.4 04/09/2025         DATA:   Diagnostic tests reviewed for today's visit:    Labs this admission reviewed  Imagings this admission reviewed  Cultures: Reviewed        Elmer Du MD.   Infectious Disease Attending            This note was partially created using voice recognition software and is inherently subject to errors including those of syntax and \"sound-alike\" substitutions which may escape proofreading. In such instances, original meaning may be extrapolated by contextual derivation      "

## 2025-04-10 NOTE — PROCEDURES
Report from Sending RN:    Report From: Marisa Lowe  Recent Surgery of Procedure: Yes 4/1/25  Baseline Level of Consciousness (LOC): A&Ox0  Oxygen Use: No  Type: Room air  Diabetic: Yes; 212  Last BP Med Given Day of Dialysis: See MAR  Last Pain Med Given: See MAR  Lab Tests to be Obtained with Dialysis: No  Blood Transfusion to be Given During Dialysis: No  Available IV Access: Yes  Medications to be Administered During Dialysis: No  Continuous IV Infusion Running: No  Restraints on Currently or in the Last 24 Hours: No  Hand-Off Communication: Pt stable for HD; DNR DNI  Dialysis Catheter Dressing: will assess bedside

## 2025-04-10 NOTE — PROCEDURES
Report to Receiving RN:    Report To: Opal Lowe  Time Report Called: 1810  Hand-Off Communication: pt stable, vss, 2L removed, high bp issues throughout hd, Dr. Norman and nurse aware  Complications During Treatment: Yes; High BP  Ultrafiltration Treatment: No  Medications Administered During Dialysis: No  Blood Products Administered During Dialysis: No  Labs Sent During Dialysis: No  Heparin Drip Rate Changes: No      Electronic Signatures:   (Ronn Villalobos )   Authored:    (Ronn Villalobos )   Authored:     Last Updated: 6:18 PM by RONN VILLALOBOS

## 2025-04-10 NOTE — PROGRESS NOTES
Tyshawn Coles is a 84 y.o. male on day 10 of admission presenting with Abscess of hip.      Subjective     WBC downtrending. Mentation remains about same. C diff resulted negative. Wife updated, she prefers to hold off on hospice care for now.     Objective     Last Recorded Vitals  /63 (BP Location: Right arm, Patient Position: Lying) Comment (BP Location): Forearm  Pulse 68   Temp 36.8 °C (98.2 °F) (Temporal)   Resp 20   Wt 67.6 kg (149 lb)   SpO2 100%   Intake/Output last 3 Shifts:    Intake/Output Summary (Last 24 hours) at 4/10/2025 1230  Last data filed at 4/10/2025 0919  Gross per 24 hour   Intake 1200 ml   Output --   Net 1200 ml       Admission Weight  Weight: 67.6 kg (149 lb) (03/31/25 1659)    Daily Weight  03/31/25 : 67.6 kg (149 lb)      Physical Exam:  General: Not in distress, confused  HEENT: PERRLA, head intact and normocephalic  Neck: Normal to inspection  Lungs: Clear to auscultation, work of breathing within normal limit  Cardiac: Regular rate and rhythm  Abdomen: Soft nontender, positive bowel sounds  : Exam deferred  Skin: Multiple superficial decubitus ulcer noted with skin breakdown and erythematous base with some foul drainage  Hematology: No petechia or excessive ecchymosis  Neurological: Alert awake oriented x 1  Psych: Calm and cooperative    Relevant Results  Scheduled medications  apixaban, 2.5 mg, oral, BID  cholecalciferol, 1,000 Units, oral, Daily  docusate sodium, 100 mg, oral, Daily  donepezil, 22.5 mg, oral, Daily  epoetin sheila or biosimilar, 10,000 Units, subcutaneous, Once per day on Monday Wednesday Friday  finasteride, 5 mg, oral, Daily  gabapentin, 100 mg, oral, Once per day on Monday Wednesday Friday  heparin, 2,000 Units, intra-catheter, After Dialysis  heparin, 2,000 Units, intra-catheter, After Dialysis  insulin glargine, 8 Units, subcutaneous, Nightly  insulin lispro, 0-10 Units, subcutaneous, TID AC  levETIRAcetam, 500 mg, oral, BID  metoprolol tartrate, 50  mg, oral, BID  piperacillin-tazobactam, 3.375 g, intravenous, q12h  sevelamer carbonate, 1,600 mg, oral, BID  vancomycin, 125 mg, oral, 4x daily  vitamin B complex-vitamin C-folic acid, 1 capsule, oral, Daily      Continuous medications     PRN medications  PRN medications: acetaminophen **OR** acetaminophen **OR** acetaminophen, alteplase, benzocaine-menthol, guaiFENesin, ipratropium-albuteroL, melatonin, ondansetron **OR** ondansetron, polyethylene glycol, prochlorperazine **OR** prochlorperazine **OR** prochlorperazine   Labs  Results from last 7 days   Lab Units 04/10/25  0837 04/09/25  0708 04/08/25  1027   WBC AUTO x10*3/uL 28.1* 38.7*  38.7* 42.1*   HEMOGLOBIN g/dL 8.0* 7.8*  7.8* 7.8*   HEMATOCRIT % 23.8* 22.9*  22.9* 22.8*   PLATELETS AUTO x10*3/uL 332 345  345 357     Results from last 7 days   Lab Units 04/10/25  0837 04/09/25  0708 04/08/25  1027 04/07/25  1504 04/07/25  1026   SODIUM mmol/L 130* 133* 131*  --  125*   POTASSIUM mmol/L 5.1 4.8 4.3   < > 6.0*   CHLORIDE mmol/L 93* 95* 93*  --  90*   CO2 mmol/L 27 30 28  --  23   BUN mg/dL 54* 39* 38*  --  48*   CREATININE mg/dL 5.29* 4.11* 4.07*  --  4.97*   CALCIUM mg/dL 7.9* 8.0* 7.9*  --  7.9*   PROTEIN TOTAL g/dL  --  5.8*  --   --  5.9*   BILIRUBIN TOTAL mg/dL  --  0.4  --   --  0.5   ALK PHOS U/L  --  59  --   --  75   ALT U/L  --  12  --   --  9*   AST U/L  --  16  --   --  21   GLUCOSE mg/dL 177* 151* 89  --  176*    < > = values in this interval not displayed.       Imaging  NM INFLAMMATION whole body w indium 111    Result Date: 4/9/2025  1. No abnormal In-111 WBC accumulation adjacent to postsurgical bed of bilateral hip arthroplasties or anywhere else to suggest active infection/abscess.   I personally reviewed the images/study and I agree with the findings as stated.  This study was interpreted at [facility], [place], [place].   MACRO: None   Signed by: Terrie Mcfadden 4/9/2025 10:12 AM Dictation workstation:    [id number]    XR chest 2 views    Result Date: 4/4/2025  Increased bibasilar atelectasis or infiltrates.   MACRO: None   Signed by: Raheel Powell 4/4/2025 9:31 AM Dictation workstation:   [id number]     Cardiology, Vascular, and Other Imaging  ECG 12 Lead    Result Date: 4/7/2025  Normal sinus rhythm Normal ECG When compared with ECG of 09-MAR-2025 00:15, No significant change was found        Assessment & Plan      84 y.o. male with past medical history of ESRD on hemodialysis, hypertension, hyperlipidemia, type 2 diabetes mellitus, dementia with chronic encephalopathy presented from outside hospital for  posteriorly dislocated hip. Hospital course complicated by persistent leukocytosis.      Recent left hip replacement now with posterior superior dislocation of the left hip hemiarthroplasty with surrounding fluid collection  S/p L hip hemiarthroplasty 3/10/25  Orthopedic surgery reduced hip under general anesthesia  CT imaging with soft tissue air-fluid collection around L hip, ortho feels it is likely post operative changes and hematoma, felt no indication for aspiration  Weightbearing as tolerated for transfers related purposes only  Palliative care followed.   Discussed with wife 4/9 that palliative/hospice approach is reasonable. She elected against hospice 4/10.      Bacteroides caccae bacteremia prior to admission  Leukocytosis  Blood cultures 3/29 from dialysis positive for Bacteroides caccae   CT AP on admit with chronic sacral osteomyelitis, ortho felt fluid collection around hip was non infectious  Blood culture from [facility] 3/30 negative  Repeat blood cultures 3/31 negative to date  Urine culture 3/31 negative  WBC scan 4/7/25 negative for acute infectious process  Continue Zosyn  C diff PCR negative, PO vanc stopped 4/10  WBC downtrending 4/10     Encephalopathy   Currently oriented x0-1, close to baseline  TSH and NH3 wnl  Goals of care as above    Decubitus ulcer  Continue with localized wound  care  Continue with supportive care and frequent turning    Anemia   -In setting of ESRD, baseline Hbg ~7-9 range  -Received IV Iron, Epogen per nephrology     Atrial Fibrillation  -Continue Eliquis    ESRD on hemodialysis Tuesday Thursday Saturday  Nephrology following  Continue with Renvela, Nephrocaps     Hypertension  Continue metoprolol     Dementia  Continue with the Aricept  Continue with Keppra     Type 2 diabetes mellitus  A1c 6.9 on 4/4/25  Continue Lantus and SSI       BPH  Continue with finasteride  Urine culture negative 3/31      DVT ppx:  Eliquis  Code status: DNR/DNI  Dispo: Back to long term care

## 2025-04-11 ENCOUNTER — APPOINTMENT (OUTPATIENT)
Dept: RADIOLOGY | Facility: HOSPITAL | Age: 85
DRG: 559 | End: 2025-04-11
Payer: MEDICARE

## 2025-04-11 LAB
ANION GAP SERPL CALC-SCNC: 15 MMOL/L (ref 10–20)
BUN SERPL-MCNC: 34 MG/DL (ref 6–23)
CALCIUM SERPL-MCNC: 8 MG/DL (ref 8.6–10.3)
CHLORIDE SERPL-SCNC: 95 MMOL/L (ref 98–107)
CO2 SERPL-SCNC: 26 MMOL/L (ref 21–32)
CREAT SERPL-MCNC: 3.56 MG/DL (ref 0.5–1.3)
EGFRCR SERPLBLD CKD-EPI 2021: 16 ML/MIN/1.73M*2
ERYTHROCYTE [DISTWIDTH] IN BLOOD BY AUTOMATED COUNT: 19.4 % (ref 11.5–14.5)
FLUAV RNA RESP QL NAA+PROBE: NOT DETECTED
FLUBV RNA RESP QL NAA+PROBE: NOT DETECTED
GLUCOSE BLD MANUAL STRIP-MCNC: 130 MG/DL (ref 74–99)
GLUCOSE BLD MANUAL STRIP-MCNC: 132 MG/DL (ref 74–99)
GLUCOSE BLD MANUAL STRIP-MCNC: 161 MG/DL (ref 74–99)
GLUCOSE BLD MANUAL STRIP-MCNC: 95 MG/DL (ref 74–99)
GLUCOSE SERPL-MCNC: 159 MG/DL (ref 74–99)
HCT VFR BLD AUTO: 25.7 % (ref 41–52)
HGB BLD-MCNC: 8.5 G/DL (ref 13.5–17.5)
LACTATE SERPL-SCNC: 1.6 MMOL/L (ref 0.4–2)
MCH RBC QN AUTO: 29 PG (ref 26–34)
MCHC RBC AUTO-ENTMCNC: 33.1 G/DL (ref 32–36)
MCV RBC AUTO: 88 FL (ref 80–100)
NRBC BLD-RTO: 0 /100 WBCS (ref 0–0)
PLATELET # BLD AUTO: 383 X10*3/UL (ref 150–450)
POTASSIUM SERPL-SCNC: 4.9 MMOL/L (ref 3.5–5.3)
RBC # BLD AUTO: 2.93 X10*6/UL (ref 4.5–5.9)
SARS-COV-2 RNA RESP QL NAA+PROBE: NOT DETECTED
SODIUM SERPL-SCNC: 131 MMOL/L (ref 136–145)
WBC # BLD AUTO: 30.5 X10*3/UL (ref 4.4–11.3)

## 2025-04-11 PROCEDURE — 99233 SBSQ HOSP IP/OBS HIGH 50: CPT | Performed by: STUDENT IN AN ORGANIZED HEALTH CARE EDUCATION/TRAINING PROGRAM

## 2025-04-11 PROCEDURE — 2500000001 HC RX 250 WO HCPCS SELF ADMINISTERED DRUGS (ALT 637 FOR MEDICARE OP): Performed by: ORTHOPAEDIC SURGERY

## 2025-04-11 PROCEDURE — 83605 ASSAY OF LACTIC ACID: CPT | Performed by: STUDENT IN AN ORGANIZED HEALTH CARE EDUCATION/TRAINING PROGRAM

## 2025-04-11 PROCEDURE — 2500000001 HC RX 250 WO HCPCS SELF ADMINISTERED DRUGS (ALT 637 FOR MEDICARE OP): Performed by: STUDENT IN AN ORGANIZED HEALTH CARE EDUCATION/TRAINING PROGRAM

## 2025-04-11 PROCEDURE — 73501 X-RAY EXAM HIP UNI 1 VIEW: CPT | Mod: LT

## 2025-04-11 PROCEDURE — 85027 COMPLETE CBC AUTOMATED: CPT | Performed by: STUDENT IN AN ORGANIZED HEALTH CARE EDUCATION/TRAINING PROGRAM

## 2025-04-11 PROCEDURE — 1200000002 HC GENERAL ROOM WITH TELEMETRY DAILY

## 2025-04-11 PROCEDURE — 87636 SARSCOV2 & INF A&B AMP PRB: CPT | Performed by: INTERNAL MEDICINE

## 2025-04-11 PROCEDURE — 87040 BLOOD CULTURE FOR BACTERIA: CPT | Mod: STJLAB | Performed by: STUDENT IN AN ORGANIZED HEALTH CARE EDUCATION/TRAINING PROGRAM

## 2025-04-11 PROCEDURE — 82374 ASSAY BLOOD CARBON DIOXIDE: CPT | Performed by: STUDENT IN AN ORGANIZED HEALTH CARE EDUCATION/TRAINING PROGRAM

## 2025-04-11 PROCEDURE — 72170 X-RAY EXAM OF PELVIS: CPT

## 2025-04-11 PROCEDURE — 6350000001 HC RX 635 EPOETIN >10,000 UNITS: Mod: JZ | Performed by: INTERNAL MEDICINE

## 2025-04-11 PROCEDURE — 72170 X-RAY EXAM OF PELVIS: CPT | Performed by: RADIOLOGY

## 2025-04-11 PROCEDURE — 73501 X-RAY EXAM HIP UNI 1 VIEW: CPT | Mod: LEFT SIDE | Performed by: RADIOLOGY

## 2025-04-11 PROCEDURE — 73700 CT LOWER EXTREMITY W/O DYE: CPT | Mod: LEFT SIDE | Performed by: STUDENT IN AN ORGANIZED HEALTH CARE EDUCATION/TRAINING PROGRAM

## 2025-04-11 PROCEDURE — 2500000004 HC RX 250 GENERAL PHARMACY W/ HCPCS (ALT 636 FOR OP/ED)

## 2025-04-11 PROCEDURE — 73700 CT LOWER EXTREMITY W/O DYE: CPT | Mod: LT

## 2025-04-11 PROCEDURE — 2500000004 HC RX 250 GENERAL PHARMACY W/ HCPCS (ALT 636 FOR OP/ED): Performed by: ORTHOPAEDIC SURGERY

## 2025-04-11 PROCEDURE — 82947 ASSAY GLUCOSE BLOOD QUANT: CPT

## 2025-04-11 PROCEDURE — 36415 COLL VENOUS BLD VENIPUNCTURE: CPT | Performed by: STUDENT IN AN ORGANIZED HEALTH CARE EDUCATION/TRAINING PROGRAM

## 2025-04-11 PROCEDURE — 2500000002 HC RX 250 W HCPCS SELF ADMINISTERED DRUGS (ALT 637 FOR MEDICARE OP, ALT 636 FOR OP/ED): Performed by: STUDENT IN AN ORGANIZED HEALTH CARE EDUCATION/TRAINING PROGRAM

## 2025-04-11 PROCEDURE — 2500000004 HC RX 250 GENERAL PHARMACY W/ HCPCS (ALT 636 FOR OP/ED): Performed by: INTERNAL MEDICINE

## 2025-04-11 PROCEDURE — 99221 1ST HOSP IP/OBS SF/LOW 40: CPT | Performed by: STUDENT IN AN ORGANIZED HEALTH CARE EDUCATION/TRAINING PROGRAM

## 2025-04-11 RX ORDER — VANCOMYCIN 1.75 GRAM/500 ML IN 0.9 % SODIUM CHLORIDE INTRAVENOUS
1750 ONCE
Status: COMPLETED | OUTPATIENT
Start: 2025-04-11 | End: 2025-04-11

## 2025-04-11 RX ORDER — ACETAMINOPHEN 160 MG/5ML
650 SOLUTION ORAL EVERY 4 HOURS PRN
Status: DISCONTINUED | OUTPATIENT
Start: 2025-04-11 | End: 2025-04-16 | Stop reason: HOSPADM

## 2025-04-11 RX ORDER — ACETAMINOPHEN 325 MG/1
650 TABLET ORAL EVERY 4 HOURS PRN
Status: DISCONTINUED | OUTPATIENT
Start: 2025-04-11 | End: 2025-04-16 | Stop reason: HOSPADM

## 2025-04-11 RX ORDER — VANCOMYCIN HYDROCHLORIDE 1 G/20ML
INJECTION, POWDER, LYOPHILIZED, FOR SOLUTION INTRAVENOUS DAILY PRN
Status: DISCONTINUED | OUTPATIENT
Start: 2025-04-11 | End: 2025-04-14

## 2025-04-11 RX ADMIN — INSULIN GLARGINE 8 UNITS: 100 INJECTION, SOLUTION SUBCUTANEOUS at 21:43

## 2025-04-11 RX ADMIN — FINASTERIDE 5 MG: 5 TABLET, FILM COATED ORAL at 08:17

## 2025-04-11 RX ADMIN — DONEPEZIL HYDROCHLORIDE 22.5 MG: 10 TABLET ORAL at 08:15

## 2025-04-11 RX ADMIN — APIXABAN 2.5 MG: 2.5 TABLET, FILM COATED ORAL at 00:13

## 2025-04-11 RX ADMIN — METOPROLOL TARTRATE 50 MG: 50 TABLET, FILM COATED ORAL at 08:14

## 2025-04-11 RX ADMIN — INSULIN GLARGINE 8 UNITS: 100 INJECTION, SOLUTION SUBCUTANEOUS at 00:20

## 2025-04-11 RX ADMIN — Medication 1750 MG: at 11:04

## 2025-04-11 RX ADMIN — LEVETIRACETAM 500 MG: 500 TABLET, FILM COATED ORAL at 21:43

## 2025-04-11 RX ADMIN — PIPERACILLIN SODIUM AND TAZOBACTAM SODIUM 3.38 G: 3; .375 INJECTION, SOLUTION INTRAVENOUS at 18:32

## 2025-04-11 RX ADMIN — ACETAMINOPHEN 650 MG: 325 TABLET, FILM COATED ORAL at 23:11

## 2025-04-11 RX ADMIN — EPOETIN ALFA-EPBX 10000 UNITS: 10000 INJECTION, SOLUTION INTRAVENOUS; SUBCUTANEOUS at 08:27

## 2025-04-11 RX ADMIN — METOPROLOL TARTRATE 50 MG: 50 TABLET, FILM COATED ORAL at 00:13

## 2025-04-11 RX ADMIN — SEVELAMER CARBONATE 1600 MG: 800 TABLET, FILM COATED ORAL at 00:13

## 2025-04-11 RX ADMIN — Medication 1000 UNITS: at 08:27

## 2025-04-11 RX ADMIN — SEVELAMER CARBONATE 1600 MG: 800 TABLET, FILM COATED ORAL at 08:15

## 2025-04-11 RX ADMIN — HEPARIN SODIUM 2000 UNITS: 1000 INJECTION INTRAVENOUS; SUBCUTANEOUS at 11:30

## 2025-04-11 RX ADMIN — Medication 1 CAPSULE: at 08:27

## 2025-04-11 RX ADMIN — PIPERACILLIN SODIUM AND TAZOBACTAM SODIUM 3.38 G: 3; .375 INJECTION, SOLUTION INTRAVENOUS at 06:52

## 2025-04-11 RX ADMIN — METOPROLOL TARTRATE 50 MG: 50 TABLET, FILM COATED ORAL at 21:43

## 2025-04-11 RX ADMIN — ACETAMINOPHEN 650 MG: 650 SUSPENSION ORAL at 15:18

## 2025-04-11 RX ADMIN — ACETAMINOPHEN 650 MG: 650 SUSPENSION ORAL at 08:14

## 2025-04-11 RX ADMIN — LEVETIRACETAM 500 MG: 500 TABLET, FILM COATED ORAL at 00:13

## 2025-04-11 RX ADMIN — LEVETIRACETAM 500 MG: 500 TABLET, FILM COATED ORAL at 08:14

## 2025-04-11 RX ADMIN — SEVELAMER CARBONATE 1600 MG: 800 TABLET, FILM COATED ORAL at 21:43

## 2025-04-11 ASSESSMENT — PAIN DESCRIPTION - LOCATION: LOCATION: HIP

## 2025-04-11 ASSESSMENT — PAIN SCALES - WONG BAKER: WONGBAKER_NUMERICALRESPONSE: HURTS LITTLE BIT

## 2025-04-11 ASSESSMENT — PAIN SCALES - GENERAL
PAINLEVEL_OUTOF10: 0 - NO PAIN
PAINLEVEL_OUTOF10: 0 - NO PAIN

## 2025-04-11 NOTE — CONSULTS
Vancomycin Dosing by Pharmacy- INITIAL    Tyshawn Coles is a 84 y.o. year old male who Pharmacy has been consulted for vancomycin dosing for osteomyelitis/septic arthritis. Based on the patient's indication and renal status this patient will be dosed based on a goal trough/random level of 15-20.     Renal function is currently declining.    Visit Vitals  /53 (BP Location: Right arm, Patient Position: Lying)   Pulse 103   Temp (!) 38 °C (100.4 °F)   Resp 20        Lab Results   Component Value Date    CREATININE 3.56 (H) 2025    CREATININE 5.29 (H) 04/10/2025    CREATININE 4.11 (H) 2025    CREATININE 4.07 (H) 2025        Patient weight is as follows:   Vitals:    25 1659   Weight: 67.6 kg (149 lb)       Cultures:  Susceptibility data for the encounter in last 14 days.  Collected Specimen Info Organism   25 Tissue/Biopsy from DECUBITUS ULCER Mixed Anaerobic Bacteria     Mixed Gram-Positive and Gram-Negative Bacteria         I/O last 3 completed shifts:  In: 1480 (21.9 mL/kg) [P.O.:480; I.V.:600 (8.9 mL/kg); Other:400]  Out: 2400 (35.5 mL/kg) [Other:2400]  Weight: 67.6 kg   I/O during current shift:  No intake/output data recorded.    Temp (24hrs), Av.1 °C (98.7 °F), Min:36.1 °C (97 °F), Max:38.7 °C (101.7 °F)         Assessment/Plan     Patient will be given a loading dose of 1750 mg.  This dosing regimen is predicted by [facility] to result in the following pharmacokinetic parameters:    Follow-up level will be ordered on  at 0600 unless clinically indicated sooner.  Will continue to monitor renal function daily while on vancomycin and order serum creatinine at least every 48 hours if not already ordered.  Follow for continued vancomycin needs, clinical response, and signs/symptoms of toxicity.       Ej Garcia, PharmD

## 2025-04-11 NOTE — CARE PLAN
"Telephone wife to update her on overall plan of care and performed goals of care discussion.  Orthopedic attending had already contacted wife earlier today and discussed with her that patient is a poor surgical candidate and that despite bedside attempt at closed reduction, even with routine care such as hygiene care, patient will continue to have recurrent dislocation.  Orthopedic team discussed hospice services with wife.    This writer updated wife that patient has also been checked for COVID and flu today which are both negative.  Reviewed that blood cultures and tunneled HD line cultures obtained and will await result.    This writer reviewed the concept of hospice and Ortho's recommendation for hospice.  Note that hospice has been discussed with wife daily by prior attending.  She continues to decline hospice at this time stating that she does not want patient to discontinue dialysis.  She is concerned as to the discomfort associated with increased edema and pain.  Reviewed in depth that hospice services can continue to address edema with IV Lasix and pain medications.  She voices understanding however at this time she states \"I am not ready for him to enroll in hospice.  I want him to go to a nursing home and see how he does.\"    Reviewed that case management will continue to look into placement.  Reviewed that renal ideally would like patient to discharge to a facility that has dialysis onsite to minimize patient transfers which will reduce and minimizing recurrent dislocations.  Wife reports she would prefer patient to discharge to a facility closer to her home as she does not drive much and [facility] is about 30 minutes dry for her.    Discussed with wife that we will continue to update her on any clinical changes.  Patient will continue current plan of care with antibiotics and consulting teams.  This writer did also address palliative care with wife.  It will require further discussion but likely " she would be agreeable to outpatient palliative care.    Hospice services not consulted at this time.

## 2025-04-11 NOTE — CARE PLAN
Problem: Discharge Planning  Goal: Discharge to home or other facility with appropriate resources  Outcome: Progressing     Problem: Chronic Conditions and Co-morbidities  Goal: Patient's chronic conditions and co-morbidity symptoms are monitored and maintained or improved  Outcome: Progressing     Problem: Nutrition  Goal: Nutrient intake appropriate for maintaining nutritional needs  Outcome: Progressing     Problem: Skin  Goal: Participates in plan/prevention/treatment measures  Outcome: Progressing  Flowsheets (Taken 4/11/2025 0245)  Participates in plan/prevention/treatment measures: Elevate heels  Goal: Decreased wound size/increased tissue granulation at next dressing change  Outcome: Progressing  Flowsheets (Taken 4/11/2025 0245)  Decreased wound size/increased tissue granulation at next dressing change: Protective dressings over bony prominences  Goal: Prevent/manage excess moisture  Outcome: Progressing  Flowsheets (Taken 4/11/2025 0245)  Prevent/manage excess moisture: Monitor for/manage infection if present  Goal: Prevent/minimize sheer/friction injuries  Outcome: Progressing  Flowsheets (Taken 4/11/2025 0245)  Prevent/minimize sheer/friction injuries: Use pull sheet  Goal: Promote/optimize nutrition  Outcome: Progressing  Flowsheets (Taken 4/11/2025 0245)  Promote/optimize nutrition: Offer water/supplements/favorite foods  Goal: Promote skin healing  Outcome: Progressing  Flowsheets (Taken 4/11/2025 0245)  Promote skin healing: Turn/reposition every 2 hours/use positioning/transfer devices     Problem: Diabetes  Goal: Maintain glucose levels >70mg/dl to <250mg/dl throughout shift  Outcome: Progressing   The patient's goals for the shift include Pt to not fall all shift    The clinical goals for the shift include Pt will have minimal pain this shift and receive adequate rest.    Over the shift, the patient did not have receive rest.

## 2025-04-11 NOTE — CONSULTS
Orthopedics Consult Note  Patient: Tyshawn Coles  Unit/Bed: 4105/4105-A  YOB: 1940  MRN: [id number]  Acct: [id number]   Admitting Diagnosis: Abscess of hip [L02.419]  Date:  3/31/2025  Hospital Day: 11  Attending: Krystin Epps MD         Complaint:  No chief complaint on file.       History of Present Illness:  84-year male history of left femoral neck fracture treated with left hip hemiarthroplasty by my partner Dr. Dawson 3/10/2025.  Has had postoperative complicated course given his extensive past medical history as well as postoperative course consistent with recurrent dislocations about the left hip.  Orthopedic surgery was consulted for evaluation of this.  Patient underwent closed reduction under anesthesia by Dr. Dumont 3/31/2025.  Patient noted to have persistent leukocytosis despite antibiotics.  Currently on hemodialysis history of end-stage renal disease.  Resides at [facility].  Of note patient with known history of sacral decubitus ulcer chronic osteomyelitis    Allergies:  No Known Allergies     PMHx:  Past Medical History:   Diagnosis Date    Autonomic dysfunction     Benign prostatic hyperplasia     Chronic anemia     Coronary artery disease     Current use of long term anticoagulation     Apixaban    Dementia     Dyslipidemia     End-stage renal disease on hemodialysis (Multi)     Frequent falls     Gout     Heart failure with preserved ejection fraction     History of anaphylaxis     History of closed head injury     History of Clostridioides difficile colitis     History of deep vein thrombosis     History of non-ST elevation myocardial infarction (NSTEMI)     History of sepsis     Hypertension     Hypoxic ischemic encephalopathy (Multi)     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)     Kidney stones     Multilevel degenerative disc disease     Osteoarthritis     Paroxysmal atrial fibrillation (Multi)     Peripheral neuropathy     Peripheral vascular disease  (CMS-HCC)     Secondary hyperparathyroidism of renal origin (Multi)     Seizure disorder (Multi)        PSHx:  Past Surgical History:   Procedure Laterality Date    ADENOIDECTOMY      AV FISTULA PLACEMENT      CHOLECYSTECTOMY      COLONOSCOPY      IR CVC TUNNELED  08/20/2021    IR CVC TUNNELED 8/20/2021 [facility]    IR TUNNELED DIALYSIS CATHETER      PACEMAKER PLACEMENT      THROMBECTOMY      TONSILLECTOMY      TOTAL HIP ARTHROPLASTY Right        Social Hx:  Social History     Socioeconomic History    Marital status:    Tobacco Use    Smoking status: Former     Types: Cigarettes    Smokeless tobacco: Never   Substance and Sexual Activity    Alcohol use: Never    Drug use: Never     Social Drivers of Health     Financial Resource Strain: Low Risk  (3/31/2025)    Overall Financial Resource Strain (CARDIA)     Difficulty of Paying Living Expenses: Not hard at all   Food Insecurity: No Food Insecurity (3/31/2025)    Hunger Vital Sign     Worried About Running Out of Food in the Last Year: Never true     Ran Out of Food in the Last Year: Never true   Transportation Needs: No Transportation Needs (3/31/2025)    PRAPARE - Transportation     Lack of Transportation (Medical): No     Lack of Transportation (Non-Medical): No   Physical Activity: Inactive (3/31/2025)    Exercise Vital Sign     Days of Exercise per Week: 0 days     Minutes of Exercise per Session: 0 min   Stress: Patient Unable To Answer (3/31/2025)    Citizen of [place] of Occupational Health - Occupational Stress Questionnaire     Feeling of Stress : Patient unable to answer   Social Connections: Unknown (3/31/2025)    Social Connection and Isolation Panel [NHANES]     Frequency of Communication with Friends and Family: More than three times a week     Frequency of Social Gatherings with Friends and Family: More than three times a week     Attends Restoration Services: Never     Active Member of Clubs or Organizations: Patient unable to answer      Attends Club or Organization Meetings: Patient unable to answer     Marital Status:    Intimate Partner Violence: Not At Risk (3/31/2025)    Humiliation, Afraid, Rape, and Kick questionnaire     Fear of Current or Ex-Partner: No     Emotionally Abused: No     Physically Abused: No     Sexually Abused: No   Housing Stability: Low Risk  (3/31/2025)    Housing Stability Vital Sign     Unable to Pay for Housing in the Last Year: No     Number of Times Moved in the Last Year: 0     Homeless in the Last Year: No       Family Hx:  Family History   Problem Relation Name Age of Onset    Diabetes Mother      Cancer Father      Lupus Daughter      Kidney failure Daughter      Asthma Son      Hypertension Other      Sickle cell trait Other         Review of Systems:   Review of Systems      Physical Examination:    Visit Vitals  /53 (BP Location: Right arm, Patient Position: Lying)   Pulse 103   Temp (!) 38 °C (100.4 °F)   Resp 20      Physical Exam  General: No acute distress comfortable in bed  Focused Exam of left hip: Incision is clean dry intact.  Hip is shortened and internally rotated.  Nontender palpation about the knee leg foot and ankle.  Mild tenderness outpatient of the hip.  Able to gently wiggle the toes.  Extremity is warm and well-perfused.  No active drainage from incision.  No surrounding erythema.    LABS:  LABS:  CBC:   Results from last 7 days   Lab Units 04/11/25  0514 04/10/25  0837 04/09/25  0708   WBC AUTO x10*3/uL 30.5* 28.1* 38.7*  38.7*   RBC AUTO x10*6/uL 2.93* 2.81* 2.72*  2.72*   HEMOGLOBIN g/dL 8.5* 8.0* 7.8*  7.8*   HEMATOCRIT % 25.7* 23.8* 22.9*  22.9*   MCV fL 88 85 84  84   MCH pg 29.0 28.5 28.7  28.7   MCHC g/dL 33.1 33.6 34.1  34.1   RDW % 19.4* 18.7* 19.0*  19.0*   PLATELETS AUTO x10*3/uL 383 332 345  345     CMP:    Results from last 7 days   Lab Units 04/11/25  0514 04/10/25  0837 04/09/25  0708 04/07/25  1504 04/07/25  1026   SODIUM mmol/L 131* 130* 133*   < >  125*   POTASSIUM mmol/L 4.9 5.1 4.8   < > 6.0*   CHLORIDE mmol/L 95* 93* 95*   < > 90*   CO2 mmol/L 26 27 30   < > 23   BUN mg/dL 34* 54* 39*   < > 48*   CREATININE mg/dL 3.56* 5.29* 4.11*   < > 4.97*   GLUCOSE mg/dL 159* 177* 151*   < > 176*   PROTEIN TOTAL g/dL  --   --  5.8*  --  5.9*   CALCIUM mg/dL 8.0* 7.9* 8.0*   < > 7.9*   BILIRUBIN TOTAL mg/dL  --   --  0.4  --  0.5   ALK PHOS U/L  --   --  59  --  75   AST U/L  --   --  16  --  21   ALT U/L  --   --  12  --  9*    < > = values in this interval not displayed.       BMP:    Results from last 7 days   Lab Units 04/11/25  0514 04/10/25  0837 04/09/25  0708   SODIUM mmol/L 131* 130* 133*   POTASSIUM mmol/L 4.9 5.1 4.8   CHLORIDE mmol/L 95* 93* 95*   CO2 mmol/L 26 27 30   BUN mg/dL 34* 54* 39*   CREATININE mg/dL 3.56* 5.29* 4.11*   CALCIUM mg/dL 8.0* 7.9* 8.0*   GLUCOSE mg/dL 159* 177* 151*     Magnesium:    Troponin:      BNP:     Lipid Panel:       Current Medications:    Current Facility-Administered Medications:     acetaminophen (Tylenol) oral liquid 650 mg, 650 mg, oral, q4h PRN, 650 mg at 04/11/25 0814 **OR** acetaminophen (Tylenol) tablet 650 mg, 650 mg, oral, q4h PRN, Krystin Epps MD    alteplase (Cathflo Activase) injection 2 mg, 2 mg, intra-catheter, PRN, Paul Guevara MD    [Held by provider] apixaban (Eliquis) tablet 2.5 mg, 2.5 mg, oral, BID, Jesse Morgan, , 2.5 mg at 04/11/25 0013    benzocaine-menthol (Cepastat Sore Throat) lozenge 1 lozenge, 1 lozenge, Mouth/Throat, q2h PRN, Eliezer Dumont MD    cholecalciferol (Vitamin D-3) tablet 1,000 Units, 1,000 Units, oral, Daily, Eliezer Dumont MD, 1,000 Units at 04/11/25 0827    docusate sodium (Colace) capsule 100 mg, 100 mg, oral, Daily, Paul Guevara MD, 100 mg at 04/10/25 0930    donepezil (Aricept) tablet 22.5 mg, 22.5 mg, oral, Daily, Eliezer Dumont MD, 22.5 mg at 04/11/25 0815    epoetin sheila-epbx (Retacrit) injection 10,000 Units, 10,000 Units, subcutaneous, Once per day on Monday  Wednesday Friday, Carlene Norman MD, 10,000 Units at 04/11/25 0827    finasteride (Proscar) tablet 5 mg, 5 mg, oral, Daily, Eliezer Dumont MD, 5 mg at 04/11/25 0817    gabapentin (Neurontin) capsule 100 mg, 100 mg, oral, Once per day on Monday Wednesday Friday, Jesse Morgan DO, 100 mg at 04/07/25 1814    guaiFENesin (Mucinex) 12 hr tablet 600 mg, 600 mg, oral, q12h PRN, Eliezer Dumont MD, 600 mg at 04/09/25 0153    heparin 1,000 unit/mL injection 2,000 Units, 2,000 Units, intra-catheter, After Dialysis, Carlene Norman MD, 2,000 Units at 04/10/25 1742    heparin 1,000 unit/mL injection 2,000 Units, 2,000 Units, intra-catheter, After Dialysis, Carlene Norman MD, 2,000 Units at 04/10/25 1742    insulin glargine (Lantus) injection 8 Units, 8 Units, subcutaneous, Nightly, Jesse Morgan DO, 8 Units at 04/11/25 0020    insulin lispro injection 0-10 Units, 0-10 Units, subcutaneous, TID AC, Eliezer Dumont MD, 2 Units at 04/10/25 1206    ipratropium-albuteroL (Duo-Neb) 0.5-2.5 mg/3 mL nebulizer solution 3 mL, 3 mL, nebulization, q2h PRN, Paul Guevara MD    levETIRAcetam (Keppra) tablet 500 mg, 500 mg, oral, BID, Eliezer Dumont MD, 500 mg at 04/11/25 0814    melatonin tablet 10 mg, 10 mg, oral, Nightly PRN, Jesse Morgan DO    metoprolol tartrate (Lopressor) tablet 50 mg, 50 mg, oral, BID, Eliezer Dumont MD, 50 mg at 04/11/25 0814    ondansetron (Zofran) tablet 4 mg, 4 mg, oral, q8h PRN **OR** ondansetron (Zofran) injection 4 mg, 4 mg, intravenous, q8h PRN, Eliezer Dumont MD, 4 mg at 03/31/25 1403    piperacillin-tazobactam (Zosyn) 3.375 g in dextrose (iso) IV 50 mL, 3.375 g, intravenous, q12h, Eliezer Dumont MD, Last Rate: 0 mL/hr at 04/10/25 2248, 3.375 g at 04/11/25 0652    polyethylene glycol (Glycolax, Miralax) packet 17 g, 17 g, oral, Daily PRN, Eliezer Dumont MD    prochlorperazine (Compazine) tablet 10 mg, 10 mg, oral, q6h PRN **OR** prochlorperazine (Compazine) injection 10 mg, 10 mg,  intravenous, q6h PRN **OR** prochlorperazine (Compazine) suppository 25 mg, 25 mg, rectal, q12h PRN, Eliezer Dumont MD    sevelamer carbonate (Renvela) tablet 1,600 mg, 1,600 mg, oral, BID, Paul Guevara MD, 1,600 mg at 04/11/25 0815    vancomycin (Vancocin) in 0.9 % sodium chloride 500 mL IVPB 1,750 mg, 1,750 mg, intravenous, Once, Ej Garcia, PharmD, Last Rate: 285.7 mL/hr at 04/11/25 1104, 1,750 mg at 04/11/25 1104    vancomycin (Vancocin) pharmacy to dose - pharmacy monitoring, , miscellaneous, Daily PRN, Krystin Epps MD    vitamin B complex-vitamin C-folic acid (Nephrocaps) capsule 1 capsule, 1 capsule, oral, Daily, Eliezer Dumont MD, 1 capsule at 04/11/25 0827    FL fluoro images no charge    Result Date: 3/31/2025  These images are not reportable by radiology and will not be interpreted by  Radiologists.    XR hip left with pelvis when performed 2 or 3 views    Result Date: 3/31/2025  * * *Final Report* * * DATE OF EXAM: Mar 31 2025  2:15AM   VHX   5351  -  XR HIP 3V PELV+ AP/LAT LT  / ACCESSION #  [id number] PROCEDURE REASON: Hip pain, acute, fx suspected, initial exam      * * * * Physician Interpretation * * * *  HISTORY: abnormal finding on CT Hip pain, acute, fx suspected, initial exam Hip pain, acute, fx suspected, initial exam RESULT: Frontal view of the pelvis and frontal and crosstable lateral views of the left hip, compared to CT on the same day and conventional radiographs on 08/14/2024. Post bilateral hemiarthroplasty of the hips.  The left acetabular cup and femoral prosthesis are dislocated posteriorly and superiorly with respect to the acetabulum.  There is generalized osteopenia.  Significant vascular calcifications are seen in the soft tissues.    IMPRESSION: Dislocated left hip hemiarthroplasty.  Please see concurrent CT report for additional information. : SMITH   Transcribe Date/Time: Mar 31 2025  2:42A Dictated by : JORJE DICK MD This examination was  interpreted and the report reviewed and electronically signed by: JORJE DICK MD on Mar 31 2025  2:46AM  EST    CT abdomen pelvis w IV contrast    Result Date: 3/31/2025  * * *Final Report* * * DATE OF EXAM: Mar 31 2025 12:18AM   [facility]   0530  -  CT ABD/PEL W IVCON  / ACCESSION #  [id number] PROCEDURE REASON: Abdominal pain, acute, nonlocalized      * * * * Physician Interpretation * * * *  EXAMINATION:  CT ABDOMEN AND PELVIS WITH IV CONTRAST CLINICAL HISTORY: Abdominal pain TECHNIQUE: CT of the abdomen and pelvis was performed using standard technique, scanning from just above the dome of the diaphragm to the symphysis pubis. MQ:  CTAP_3 Contrast: IV:  100 ml of Omnipaque 350 : ml of CT Radiation dose: Integrated Dose-length product (DLP) for this visit =   623 mGy*cm. CT Dose Reduction Employed: Automated exposure control(AEC) and iterative recon COMPARISON: 08/14/2024. RESULT: Coronary artery calcifications.  Suprarenal IVC filter.  Colonic diverticulosis.  Mild rectal stool burden.  Aortoiliac calcifications.   Punctate right nephrolithiasis.  No hydronephrosis.  Mild urinary bladder wall thickening.  Posterior dislocated left hip hemiarthroplasty.  Within the left hip joint, and posterior to the arthroplasty there is large air-fluid collection 11 x 6 x 13 cm.  Right hip hemiarthroplasty present.  Sacral decubitus ulceration with large open defect extending to the sacrum, coccyx no evidence of osteolysis or periosteal reaction.  There is mild sclerosis of the coccyx.    IMPRESSION: 1.  Posterior superior dislocation left hip hemiarthroplasty.  Large left hip and surrounding soft tissue air-fluid collection can represent abscess. 2.  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 : PSCB   Transcribe Date/Time: Mar 31 2025  1:24A Dictated by : BRENT POLK MD This examination was interpreted and the report reviewed and electronically signed by:  BRENT POLK MD on Mar 31 2025  1:37AM  EST       No results found for this or any previous visit from the past 1095 days.                       Assessment: 84-year-old male with complex problem recurrent left hemiarthroplasty dislocation  Patient Active Problem List   Diagnosis    ESRD on hemodialysis (Multi)    Closed fracture of neck of left femur, initial encounter    Falls, initial encounter    Closed head injury    Elevated troponin    Abscess of hip    Hip dislocation, left, initial encounter (Multi)          Plan:  Consultation findings discussed with wife in detail.  Discussed complexity of issue.  Prior notes reviewed by Dr. Dumont.  I do agree with his findings and assessment.  Patient did undergo close reduction by him however has recurrent dislocation.  Discussed that revision surgery would be extremely high risk high risk for infection, recurrent instability, mortality.  Does have elevated white count over also chronic decubitus ulcer with chronic osteomyelitis.  Given that he does not really have any erythema about his hip at all or any drainage I have lower concern for infectious etiology about the hip joint.  Wife is not interested in any type of surgical treatment.  Discussed this case with the medical team.  Recommend hospice  We will discuss findings with the infectious disease team  No acute surgical intervention from orthopedic standpoint.   Weight-bear as tolerated for transfers,hygiene.  I did discuss the case with Dr. Du.  Please reconsult with any other questions or concerns  Patient has severe impairment related to his presenting condition.  It is significantly impairing bodily function.  We did discuss surgical and nonsurgical options  Recommend de-escalation regarding treatment of his hip.  Hospice management.              Electronically signed by Santi Edwards MD on 4/11/2025 at 11:06 AM

## 2025-04-11 NOTE — PROGRESS NOTES
INPATIENT NEPHROLOGY CONSULT PROGRESS NOTE      Patient Name: Tyshawn Coles MRN: [id number]  DATE of SERVICE: April 11, 2025  TIME of SERVICE: 02:37 PM  CONSULTING SERVICE: Nephrology    REASON for CONSULT: ESRD    SUBJECTIVE:  Seen and evaluated at bedside.  Awake oriented to self.  Had fever this morning with concomitant worsening leukocytosis.  Evaluated by both orthopedic and infectious disease team.   Labile hemodynamics.   Blood culture collected from dialysis catheter.    SUMMARY OF STAY:  Mr. Coles is a 84 y.o. male who presented to  March 31, 2025 for evaluation of left hip dislocation after remote fall.  Status post left hip hemiarthroplasty reduction April 1, 2025     Patient with past medical history significant for end-stage renal disease on hemodialysis per TTS schedule, hypertension, hyperlipidemia, diabetes mellitus, dementia, seizure disorder.   Patient seen and evaluated at bedside he is with advanced dementia unable to provide additional history.  Patient wife at bedside who was able to provide the majority of the history.  Recently patient had clotted AV graft and currently is receiving dialysis via right tunneled dialysis catheter.  He resides at [facility] and being transported to his dialysis unit.  He has been Edison lifted.  This admission patient presented to  for evaluation of left hip pain and diagnosed with dislocated left hip hemiarthroplasty and scheduled to go to surgery this afternoon.    Dialysis prescription:  ESRD on hemodialysis  Tuesday Thursday Saturday  Primary nephrologist: Dr. Wise  Estimated dry weight: 67 kg  Duration: 3-1/2 hours  Intradialytic Midodrine  Right tunneled dialysis catheter in place        ASSESSMENT AND PLAN:  ESRD: iHD per Tuesday Thursday Saturday schedule  Volume status:  Hypovolemia, resolved   Electrolytes: Within acceptable range  Acid-base: Metabolic  acidosis from renal failure (controlled with HD)  Bone mineral disease (Ca/P/PTH): controlled  Anemia from renal failure, hemoglobin stable   Dislocated left hip hemiarthroplasty status postsurgery April 1  Chronic osteomyelitis of the coccyx  Left hip prosthesis status post closed reduction  Sacral decub ulcer with osteomyelitis  Hypotension: Labile blood pressure, massive third spacing  T2DM     PLAN:  End-stage renal disease patient with complex acute/subacute dislocated left hip hemiarthroplasty with left hip abscess, chronic osteomyelitis of the coccyx:     Febrile illness, blood culture collected from dialysis catheter to rule out catheter related bacteremia.  For hemodialysis tomorrow per TTS schedule,   Peripheral edema, dependent edema and third spacing, due to frailty and extra ultrafiltration session for today rescheduled to be performed with his dialysis tomorrow.  Anemia multifactorial iron held due to elevated ferritin  Malnutrition to continue protein supplements 3 times daily  Strict input and output to guide volume management  phosphorus level at 3.3, to hold renvela   8.  to encourage oral intake   9.   Prognosis guarded    Strict input and output to guide volume management     Will follow, thank you!      Medications:    Current Facility-Administered Medications:     acetaminophen (Tylenol) oral liquid 650 mg, 650 mg, oral, q4h PRN, 650 mg at 04/11/25 0814 **OR** acetaminophen (Tylenol) tablet 650 mg, 650 mg, oral, q4h PRN, Krystin Epps MD    alteplase (Cathflo Activase) injection 2 mg, 2 mg, intra-catheter, PRN, Paul Guevara MD    [Held by provider] apixaban (Eliquis) tablet 2.5 mg, 2.5 mg, oral, BID, Jesse Morgan DO, 2.5 mg at 04/11/25 0013    benzocaine-menthol (Cepastat Sore Throat) lozenge 1 lozenge, 1 lozenge, Mouth/Throat, q2h PRN, Eliezer Dumont MD    cholecalciferol (Vitamin D-3) tablet 1,000 Units, 1,000 Units, oral, Daily, Eliezer Dumont MD, 1,000 Units at 04/11/25 0827     docusate sodium (Colace) capsule 100 mg, 100 mg, oral, Daily, Paul Guevara MD, 100 mg at 04/10/25 0930    donepezil (Aricept) tablet 22.5 mg, 22.5 mg, oral, Daily, Eliezer Dumont MD, 22.5 mg at 04/11/25 0815    epoetin sheila-epbx (Retacrit) injection 10,000 Units, 10,000 Units, subcutaneous, Once per day on Monday Wednesday Friday, Carlene Norman MD, 10,000 Units at 04/11/25 0827    finasteride (Proscar) tablet 5 mg, 5 mg, oral, Daily, Eliezer Dumont MD, 5 mg at 04/11/25 0817    gabapentin (Neurontin) capsule 100 mg, 100 mg, oral, Once per day on Monday Wednesday Friday, Jesse Morgan DO, 100 mg at 04/07/25 1814    guaiFENesin (Mucinex) 12 hr tablet 600 mg, 600 mg, oral, q12h PRN, Eliezer Dumont MD, 600 mg at 04/09/25 0153    heparin 1,000 unit/mL injection 2,000 Units, 2,000 Units, intra-catheter, After Dialysis, Carlene Norman MD, 2,000 Units at 04/11/25 1130    heparin 1,000 unit/mL injection 2,000 Units, 2,000 Units, intra-catheter, After Dialysis, Carlene Norman MD, 2,000 Units at 04/10/25 1742    insulin glargine (Lantus) injection 8 Units, 8 Units, subcutaneous, Nightly, Jesse Morgan DO, 8 Units at 04/11/25 0020    insulin lispro injection 0-10 Units, 0-10 Units, subcutaneous, TID AC, Eliezer Dumont MD, 2 Units at 04/10/25 1206    ipratropium-albuteroL (Duo-Neb) 0.5-2.5 mg/3 mL nebulizer solution 3 mL, 3 mL, nebulization, q2h PRN, Paul Guevara MD    levETIRAcetam (Keppra) tablet 500 mg, 500 mg, oral, BID, Eliezer Dumont MD, 500 mg at 04/11/25 0814    melatonin tablet 10 mg, 10 mg, oral, Nightly PRN, Jesse Morgan DO    metoprolol tartrate (Lopressor) tablet 50 mg, 50 mg, oral, BID, Eilezer Dumont MD, 50 mg at 04/11/25 0814    ondansetron (Zofran) tablet 4 mg, 4 mg, oral, q8h PRN **OR** ondansetron (Zofran) injection 4 mg, 4 mg, intravenous, q8h PRN, Eliezer Dumont MD, 4 mg at 03/31/25 1403    piperacillin-tazobactam (Zosyn) 3.375 g in dextrose (iso) IV 50 mL, 3.375 g, intravenous,  q12h, Eliezer Dumont MD, Stopped at 04/11/25 1112    polyethylene glycol (Glycolax, Miralax) packet 17 g, 17 g, oral, Daily PRN, Eliezer Dumont MD    prochlorperazine (Compazine) tablet 10 mg, 10 mg, oral, q6h PRN **OR** prochlorperazine (Compazine) injection 10 mg, 10 mg, intravenous, q6h PRN **OR** prochlorperazine (Compazine) suppository 25 mg, 25 mg, rectal, q12h PRN, Eliezer Dumont MD    sevelamer carbonate (Renvela) tablet 1,600 mg, 1,600 mg, oral, BID, Paul Guevara MD, 1,600 mg at 04/11/25 0815    vancomycin (Vancocin) pharmacy to dose - pharmacy monitoring, , miscellaneous, Daily PRN, Krystin Epps MD    vitamin B complex-vitamin C-folic acid (Nephrocaps) capsule 1 capsule, 1 capsule, oral, Daily, Eliezer Dumont MD, 1 capsule at 04/11/25 0827    PERTINENT ROS:  GENERAL:  positive for fatigue, poor appetite.  Positive for fever  RESPIRATORY:  positive for shortness of breath.  Negative for cough, wheezing.  CARDIOVASCULAR:   Negative for chest pain or palpitation.  GI:  Negative for abdominal pain, diarrhea, heartburn, nausea, vomiting  : External catheter in place with dark concentrated urine    Physical Exam:  Vital signs in last 24 hours:  Temp:  [36.1 °C (97 °F)-38.7 °C (101.7 °F)] 36.6 °C (97.9 °F)  Heart Rate:  [] 68  Resp:  [18-28] 21  BP: (107-185)/(47-95) 122/74    General: Awake, oriented to self, pain better controlled  HEENT:  NCAT,  mucous membranes moist and pink  NECK: no JVD, no carotid bruit, supple, no cervical mass or thyromegaly  LUNGS;  Diminished breath sounds, no Rales  CV:  Distant, regular rate and rhythm, no murmurs  ABDOMEN:  abdomen soft, nontender, BS normal, no masses or organomegaly  EDEMA:  no lower extremity edema/dependent edema  SKIN: Left hip with a dressing in place      Intake/Output last 3 shifts:  I/O last 3 completed shifts:  In: 1480 (21.9 mL/kg) [P.O.:480; I.V.:600 (8.9 mL/kg); Other:400]  Out: 2400 (35.5 mL/kg) [Other:2400]  Weight: 67.6 kg      DATA:  Diagnotic tests reviewed for Todays visit:  Results from last 7 days   Lab Units 04/11/25  0514   WBC AUTO x10*3/uL 30.5*   RBC AUTO x10*6/uL 2.93*   HEMOGLOBIN g/dL 8.5*   HEMATOCRIT % 25.7*     Results from last 7 days   Lab Units 04/11/25  0514 04/10/25  0837 04/09/25  0708 04/07/25  1026 04/06/25  0538   SODIUM mmol/L 131*   < > 133*   < > 131*   POTASSIUM mmol/L 4.9   < > 4.8   < > 4.5   CHLORIDE mmol/L 95*   < > 95*   < > 93*   CO2 mmol/L 26   < > 30   < > 29   BUN mg/dL 34*   < > 39*   < > 32*   CREATININE mg/dL 3.56*   < > 4.11*   < > 3.55*   CALCIUM mg/dL 8.0*   < > 8.0*   < > 8.1*   PHOSPHORUS mg/dL  --   --   --   --  3.7   BILIRUBIN TOTAL mg/dL  --   --  0.4   < >  --    ALT U/L  --   --  12   < >  --    AST U/L  --   --  16   < >  --     < > = values in this interval not displayed.           IMAGING: CXR reviewed in  images      SIGNATURE: Cralene Norman MD  Nephrology and Hypertension  [address] [place]  Office phone: [phone]  FAX: [phone]    This note was partially generated using the Dragon voice recognition system, and there may be some incorrect words, spelling's and punctuation that were not noted in checking the note before saving.

## 2025-04-11 NOTE — CARE PLAN
The patient's goals for the shift include Pt to not fall all shift    The clinical goals for the shift include Pt will have minimal pain this shift and receive adequate rest.    Turned and repostiioned. Heel boots on. Mepilex to sacrum,  elbow. Fed meal.

## 2025-04-11 NOTE — PROGRESS NOTES
INPATIENT PROGRESS NOTES    PATIENT NAME: Tyshawn Coles    MRN: [id number]  SERVICE DATE:  4/11/2025   SERVICE TIME:  12:00 PM    SIGNATURE: Elmer Du MD      ASSESSMENT :   -Ruled out left prosthetic hip abscess  -White blood cell tagged scan negative for infectious process  -Fever   -Leukocytosis improving  -C. difficile PCR negative  -Acute anemia  -Diarrhea r/o CDI    #Left prosthetic hip dislocation status post closed reduction  #Hypertension  #Hyperlipidemia  #End-stage renal disease on dialysis, which can affect the dosing of antimicrobials  #Type 2 diabetes, which is chronic condition that increases his risk for infection  #Dementia       PLAN:  -Follow-up repeat blood culture  -Check COVID and flu PCR  -Continue Zosyn  -Closely monitor for antibiotics side effects including rash, Diarrhea/CDI, thrombocytopenia, ALCIDES, etc.  -Poor prognosis overall    Discussed with Ortho and primary teams      SUBJECTIVE  Tmax 101.7      OBJECTIVE  PHYSICAL EXAM:   Patient Vitals for the past 24 hrs:   BP Temp Temp src Pulse Resp SpO2   04/11/25 0917 117/53 (!) 38 °C (100.4 °F) -- 103 -- 98 %   04/11/25 0815 (!) 185/95 (!) 38.7 °C (101.7 °F) Temporal 109 20 98 %   04/11/25 0400 153/73 36.1 °C (97 °F) Temporal 74 18 94 %   04/11/25 0000 (!) 145/95 36.6 °C (97.9 °F) Temporal 61 18 100 %   04/10/25 2223 -- -- -- -- 20 --   04/10/25 1900 (!) 107/47 36.7 °C (98.1 °F) Temporal 74 (!) 28 100 %   04/10/25 1751 -- 36.6 °C (97.9 °F) Temporal 65 -- --   04/10/25 1400 -- -- Temporal 67 -- --         Gen: No fever  Respiratory: No distress     Labs:  Lab Results   Component Value Date    WBC 30.5 (H) 04/11/2025    HGB 8.5 (L) 04/11/2025    HCT 25.7 (L) 04/11/2025    MCV 88 04/11/2025     04/11/2025     Lab Results   Component Value Date    GLUCOSE 159 (H) 04/11/2025    CALCIUM 8.0 (L) 04/11/2025     (L) 04/11/2025    K 4.9 04/11/2025    CO2 26 04/11/2025    CL 95 (L) 04/11/2025    BUN 34 (H) 04/11/2025    CREATININE 3.56  "(H) 04/11/2025   ESR: --No results found for: \"SEDRATE\"  Lab Results   Component Value Date    CRP 3.63 (A) 06/12/2021     Lab Results   Component Value Date    ALT 12 04/09/2025    AST 16 04/09/2025    ALKPHOS 59 04/09/2025    BILITOT 0.4 04/09/2025         DATA:   Diagnostic tests reviewed for today's visit:    Labs this admission reviewed  Imagings this admission reviewed  Cultures: Reviewed        Elmer Du MD.   Infectious Disease Attending            This note was partially created using voice recognition software and is inherently subject to errors including those of syntax and \"sound-alike\" substitutions which may escape proofreading. In such instances, original meaning may be extrapolated by contextual derivation      "

## 2025-04-11 NOTE — NURSING NOTE
Dr Edwards and Ashlyn CUMMINGS at bedside. Attempted to reduce hip. Unsuccessful. Hip dislocated while doing care. Wife notified by Dr. Barrios. Options discussed.

## 2025-04-11 NOTE — PROGRESS NOTES
INPATIENT NEPHROLOGY CONSULT PROGRESS NOTE      Patient Name: Tyshawn Coles MRN: [id number]  DATE of SERVICE: April 10, 2025  TIME of SERVICE: 03:40 PM  CONSULTING SERVICE: Nephrology    REASON for CONSULT: ESRD    SUBJECTIVE:  Seen and evaluated at bedside.  More awake today.  Receiving hemodialysis ultrafiltration 2 L.  Had an episode of bradycardia during hemodialysis.  However recovered heart rate spontaneously up to 60.  Blood pressure very during dialysis from 200 down to 120.  Remains with peripheral edema for ultrafiltration again tomorrow    SUMMARY OF STAY:  Mr. Coles is a 84 y.o. male who presented to [facility] March 31, 2025 for evaluation of left hip dislocation after remote fall.  Status post left hip hemiarthroplasty reduction April 1, 2025     Patient with past medical history significant for end-stage renal disease on hemodialysis per TTS schedule, hypertension, hyperlipidemia, diabetes mellitus, dementia, seizure disorder.   Patient seen and evaluated at bedside he is with advanced dementia unable to provide additional history.  Patient wife at bedside who was able to provide the majority of the history.  Recently patient had clotted AV graft and currently is receiving dialysis via right tunneled dialysis catheter.  He resides at [facility] and being transported to his dialysis unit.  He has been Edison lifted.  This admission patient presented to [facility] for evaluation of left hip pain and diagnosed with dislocated left hip hemiarthroplasty and scheduled to go to surgery this afternoon.    Dialysis prescription:  ESRD on hemodialysis  Tuesday Thursday Saturday  Primary nephrologist: Dr. Wise  Estimated dry weight: 67 kg  Duration: 3-1/2 hours  Intradialytic Midodrine  Right tunneled dialysis catheter in place        ASSESSMENT AND PLAN:  ESRD: iHD per Tuesday Thursday Saturday schedule  Volume status:  Hypovolemia,  resolved   Electrolytes: Within acceptable range  Acid-base: Metabolic acidosis from renal failure (controlled with HD)  Bone mineral disease (Ca/P/PTH): controlled  Anemia from renal failure, hemoglobin stable   Dislocated left hip hemiarthroplasty status postsurgery April 1  Chronic osteomyelitis of the coccyx  Left hip abscess  Hypotension: Fluid responsive  T2DM     PLAN:  End-stage renal disease patient with complex acute/subacute dislocated left hip hemiarthroplasty with left hip abscess, chronic osteomyelitis of the coccyx:     Receiving hemodialysis today April 10, had fluctuation in blood pressure and heart rate during dialysis.  Remains with peripheral edema to add an extra ultrafiltration session tomorrow  Peripheral edema, dependent edema and third spacing to add an extra ultrafiltration session  Anemia multifactorial  Malnutrition to continue protein supplements 3 times daily  Strict input and output to guide volume management  phosphorus level at 3.3, to hold renvela   7.  to encourage oral intake       Strict input and output to guide volume management     Will follow, thank you!      Medications:    Current Facility-Administered Medications:     acetaminophen (Tylenol) tablet 650 mg, 650 mg, oral, q6h PRN, 650 mg at 04/10/25 1848 **OR** acetaminophen (Tylenol) oral liquid 650 mg, 650 mg, oral, q6h PRN, 650 mg at 04/06/25 0639 **OR** acetaminophen (Tylenol) suppository 650 mg, 650 mg, rectal, q6h PRN, Eliezer Dumont MD    alteplase (Cathflo Activase) injection 2 mg, 2 mg, intra-catheter, PRN, Paul Guevara MD    apixaban (Eliquis) tablet 2.5 mg, 2.5 mg, oral, BID, Jesse Morgan DO, 2.5 mg at 04/10/25 0930    benzocaine-menthol (Cepastat Sore Throat) lozenge 1 lozenge, 1 lozenge, Mouth/Throat, q2h PRN, Eliezer Dumont MD    cholecalciferol (Vitamin D-3) tablet 1,000 Units, 1,000 Units, oral, Daily, Eliezer Dumont MD, 1,000 Units at 04/10/25 0930    docusate sodium (Colace) capsule 100 mg, 100 mg,  oral, Daily, Paul Guevara MD, 100 mg at 04/10/25 0930    donepezil (Aricept) tablet 22.5 mg, 22.5 mg, oral, Daily, Eliezer Dumont MD, 22.5 mg at 04/10/25 0929    epoetin sheila-epbx (Retacrit) injection 10,000 Units, 10,000 Units, subcutaneous, Once per day on Monday Wednesday Friday, Carlene Norman MD, 10,000 Units at 04/09/25 1233    finasteride (Proscar) tablet 5 mg, 5 mg, oral, Daily, Eliezer Dumont MD, 5 mg at 04/10/25 0931    gabapentin (Neurontin) capsule 100 mg, 100 mg, oral, Once per day on Monday Wednesday Friday, Jesse Morgan DO, 100 mg at 04/07/25 1814    guaiFENesin (Mucinex) 12 hr tablet 600 mg, 600 mg, oral, q12h PRN, Eliezer Dumont MD, 600 mg at 04/09/25 0153    heparin 1,000 unit/mL injection 2,000 Units, 2,000 Units, intra-catheter, After Dialysis, Carlene Norman MD, 2,000 Units at 04/10/25 1742    heparin 1,000 unit/mL injection 2,000 Units, 2,000 Units, intra-catheter, After Dialysis, Carlene Norman MD, 2,000 Units at 04/10/25 1742    insulin glargine (Lantus) injection 8 Units, 8 Units, subcutaneous, Nightly, Jesse Morgan DO, 8 Units at 04/09/25 2113    insulin lispro injection 0-10 Units, 0-10 Units, subcutaneous, TID AC, Eliezer Dumont MD, 2 Units at 04/10/25 1206    ipratropium-albuteroL (Duo-Neb) 0.5-2.5 mg/3 mL nebulizer solution 3 mL, 3 mL, nebulization, q2h PRN, Paul Guevara MD    levETIRAcetam (Keppra) tablet 500 mg, 500 mg, oral, BID, Eliezer Dumont MD, 500 mg at 04/10/25 0930    melatonin tablet 10 mg, 10 mg, oral, Nightly PRN, Jesse Morgan DO    metoprolol tartrate (Lopressor) tablet 50 mg, 50 mg, oral, BID, Eliezer Dumont MD, 50 mg at 04/10/25 0929    ondansetron (Zofran) tablet 4 mg, 4 mg, oral, q8h PRN **OR** ondansetron (Zofran) injection 4 mg, 4 mg, intravenous, q8h PRN, Eliezer Dumont MD, 4 mg at 03/31/25 1403    piperacillin-tazobactam (Zosyn) 3.375 g in dextrose (iso) IV 50 mL, 3.375 g, intravenous, q12h, Eliezer Dumont MD, Last Rate: 0 mL/hr at  04/10/25 0613, 3.375 g at 04/10/25 1848    polyethylene glycol (Glycolax, Miralax) packet 17 g, 17 g, oral, Daily PRN, Eliezer Dumont MD    prochlorperazine (Compazine) tablet 10 mg, 10 mg, oral, q6h PRN **OR** prochlorperazine (Compazine) injection 10 mg, 10 mg, intravenous, q6h PRN **OR** prochlorperazine (Compazine) suppository 25 mg, 25 mg, rectal, q12h PRN, Eliezer Dumont MD    sevelamer carbonate (Renvela) tablet 1,600 mg, 1,600 mg, oral, BID, Paul Guevara MD, 1,600 mg at 04/10/25 0930    vitamin B complex-vitamin C-folic acid (Nephrocaps) capsule 1 capsule, 1 capsule, oral, Daily, Eliezer Dumont MD, 1 capsule at 04/10/25 0930    PERTINENT ROS:  GENERAL:  positive for fatigue, poor appetite.  No fever/chills  RESPIRATORY:  positive for shortness of breath.  Negative for cough, wheezing.  CARDIOVASCULAR:   Negative for chest pain or palpitation.  GI:  Negative for abdominal pain, diarrhea, heartburn, nausea, vomiting  : External catheter in place with dark concentrated urine    Physical Exam:  Vital signs in last 24 hours:  Temp:  [36.4 °C (97.5 °F)-36.8 °C (98.2 °F)] 36.7 °C (98.1 °F)  Heart Rate:  [65-74] 74  Resp:  [16-28] 28  BP: (107-186)/(47-81) 107/47    General: Awake, oriented to self, pain better controlled  HEENT:  NCAT,  mucous membranes moist and pink  NECK: no JVD, no carotid bruit, supple, no cervical mass or thyromegaly  LUNGS;  Diminished breath sounds, no Rales  CV:  Distant, regular rate and rhythm, no murmurs  ABDOMEN:  abdomen soft, nontender, BS normal, no masses or organomegaly  EDEMA:  no lower extremity edema/dependent edema  SKIN: Left hip with a dressing in place      Intake/Output last 3 shifts:  I/O last 3 completed shifts:  In: 2440 (36.1 mL/kg) [P.O.:1440; I.V.:600 (8.9 mL/kg); Other:400]  Out: 2400 (35.5 mL/kg) [Other:2400]  Weight: 67.6 kg     DATA:  Diagnotic tests reviewed for Todays visit:  Results from last 7 days   Lab Units 04/10/25  0837   WBC AUTO x10*3/uL 28.1*    RBC AUTO x10*6/uL 2.81*   HEMOGLOBIN g/dL 8.0*   HEMATOCRIT % 23.8*     Results from last 7 days   Lab Units 04/10/25  0837 04/09/25  0708 04/07/25  1026 04/06/25  0538 04/05/25  0620 04/04/25  0555   SODIUM mmol/L 130* 133*   < > 131*   < > 130*   POTASSIUM mmol/L 5.1 4.8   < > 4.5   < > 4.5   CHLORIDE mmol/L 93* 95*   < > 93*   < > 93*   CO2 mmol/L 27 30   < > 29   < > 28   BUN mg/dL 54* 39*   < > 32*   < > 36*   CREATININE mg/dL 5.29* 4.11*   < > 3.55*   < > 3.69*   CALCIUM mg/dL 7.9* 8.0*   < > 8.1*   < > 8.3*   PHOSPHORUS mg/dL  --   --   --  3.7  --   --    MAGNESIUM mg/dL  --   --   --   --   --  1.98   BILIRUBIN TOTAL mg/dL  --  0.4   < >  --   --   --    ALT U/L  --  12   < >  --   --   --    AST U/L  --  16   < >  --   --   --     < > = values in this interval not displayed.           IMAGING: CXR reviewed in  images      SIGNATURE: Carlene Norman MD  Nephrology and Hypertension  [address] [place]  Office phone: [phone]  FAX: [phone]    This note was partially generated using the Dragon voice recognition system, and there may be some incorrect words, spelling's and punctuation that were not noted in checking the note before saving.

## 2025-04-12 ENCOUNTER — APPOINTMENT (OUTPATIENT)
Dept: DIALYSIS | Facility: HOSPITAL | Age: 85
End: 2025-04-12
Payer: MEDICARE

## 2025-04-12 LAB
ANION GAP SERPL CALC-SCNC: 14 MMOL/L (ref 10–20)
BUN SERPL-MCNC: 44 MG/DL (ref 6–23)
CALCIUM SERPL-MCNC: 7.6 MG/DL (ref 8.6–10.3)
CHLORIDE SERPL-SCNC: 95 MMOL/L (ref 98–107)
CO2 SERPL-SCNC: 26 MMOL/L (ref 21–32)
CREAT SERPL-MCNC: 4.61 MG/DL (ref 0.5–1.3)
EGFRCR SERPLBLD CKD-EPI 2021: 12 ML/MIN/1.73M*2
ERYTHROCYTE [DISTWIDTH] IN BLOOD BY AUTOMATED COUNT: 19.1 % (ref 11.5–14.5)
GLUCOSE BLD MANUAL STRIP-MCNC: 149 MG/DL (ref 74–99)
GLUCOSE BLD MANUAL STRIP-MCNC: 177 MG/DL (ref 74–99)
GLUCOSE BLD MANUAL STRIP-MCNC: 203 MG/DL (ref 74–99)
GLUCOSE BLD MANUAL STRIP-MCNC: 232 MG/DL (ref 74–99)
GLUCOSE SERPL-MCNC: 182 MG/DL (ref 74–99)
HCT VFR BLD AUTO: 22.6 % (ref 41–52)
HGB BLD-MCNC: 7.4 G/DL (ref 13.5–17.5)
MAGNESIUM SERPL-MCNC: 2.29 MG/DL (ref 1.6–2.4)
MCH RBC QN AUTO: 28.4 PG (ref 26–34)
MCHC RBC AUTO-ENTMCNC: 32.7 G/DL (ref 32–36)
MCV RBC AUTO: 87 FL (ref 80–100)
NRBC BLD-RTO: 0 /100 WBCS (ref 0–0)
PLATELET # BLD AUTO: 332 X10*3/UL (ref 150–450)
POTASSIUM SERPL-SCNC: 5.3 MMOL/L (ref 3.5–5.3)
RBC # BLD AUTO: 2.61 X10*6/UL (ref 4.5–5.9)
SODIUM SERPL-SCNC: 130 MMOL/L (ref 136–145)
VANCOMYCIN SERPL-MCNC: 24.8 UG/ML (ref 5–20)
WBC # BLD AUTO: 37.4 X10*3/UL (ref 4.4–11.3)

## 2025-04-12 PROCEDURE — 2500000004 HC RX 250 GENERAL PHARMACY W/ HCPCS (ALT 636 FOR OP/ED): Performed by: STUDENT IN AN ORGANIZED HEALTH CARE EDUCATION/TRAINING PROGRAM

## 2025-04-12 PROCEDURE — 2500000001 HC RX 250 WO HCPCS SELF ADMINISTERED DRUGS (ALT 637 FOR MEDICARE OP): Performed by: INTERNAL MEDICINE

## 2025-04-12 PROCEDURE — 2500000001 HC RX 250 WO HCPCS SELF ADMINISTERED DRUGS (ALT 637 FOR MEDICARE OP): Performed by: ORTHOPAEDIC SURGERY

## 2025-04-12 PROCEDURE — 36415 COLL VENOUS BLD VENIPUNCTURE: CPT | Performed by: STUDENT IN AN ORGANIZED HEALTH CARE EDUCATION/TRAINING PROGRAM

## 2025-04-12 PROCEDURE — 99233 SBSQ HOSP IP/OBS HIGH 50: CPT | Performed by: STUDENT IN AN ORGANIZED HEALTH CARE EDUCATION/TRAINING PROGRAM

## 2025-04-12 PROCEDURE — 8010000001 HC DIALYSIS - HEMODIALYSIS PER DAY

## 2025-04-12 PROCEDURE — 2500000001 HC RX 250 WO HCPCS SELF ADMINISTERED DRUGS (ALT 637 FOR MEDICARE OP): Performed by: STUDENT IN AN ORGANIZED HEALTH CARE EDUCATION/TRAINING PROGRAM

## 2025-04-12 PROCEDURE — 2500000002 HC RX 250 W HCPCS SELF ADMINISTERED DRUGS (ALT 637 FOR MEDICARE OP, ALT 636 FOR OP/ED): Performed by: STUDENT IN AN ORGANIZED HEALTH CARE EDUCATION/TRAINING PROGRAM

## 2025-04-12 PROCEDURE — 2500000004 HC RX 250 GENERAL PHARMACY W/ HCPCS (ALT 636 FOR OP/ED): Performed by: INTERNAL MEDICINE

## 2025-04-12 PROCEDURE — 83735 ASSAY OF MAGNESIUM: CPT | Performed by: STUDENT IN AN ORGANIZED HEALTH CARE EDUCATION/TRAINING PROGRAM

## 2025-04-12 PROCEDURE — 82310 ASSAY OF CALCIUM: CPT | Performed by: STUDENT IN AN ORGANIZED HEALTH CARE EDUCATION/TRAINING PROGRAM

## 2025-04-12 PROCEDURE — 85027 COMPLETE CBC AUTOMATED: CPT | Performed by: STUDENT IN AN ORGANIZED HEALTH CARE EDUCATION/TRAINING PROGRAM

## 2025-04-12 PROCEDURE — 2500000004 HC RX 250 GENERAL PHARMACY W/ HCPCS (ALT 636 FOR OP/ED): Performed by: ORTHOPAEDIC SURGERY

## 2025-04-12 PROCEDURE — 80202 ASSAY OF VANCOMYCIN: CPT | Performed by: STUDENT IN AN ORGANIZED HEALTH CARE EDUCATION/TRAINING PROGRAM

## 2025-04-12 PROCEDURE — 1100000001 HC PRIVATE ROOM DAILY

## 2025-04-12 PROCEDURE — 2500000002 HC RX 250 W HCPCS SELF ADMINISTERED DRUGS (ALT 637 FOR MEDICARE OP, ALT 636 FOR OP/ED): Performed by: ORTHOPAEDIC SURGERY

## 2025-04-12 PROCEDURE — 82947 ASSAY GLUCOSE BLOOD QUANT: CPT

## 2025-04-12 RX ORDER — VANCOMYCIN HYDROCHLORIDE 500 MG/100ML
500 INJECTION, SOLUTION INTRAVENOUS ONCE
Status: COMPLETED | OUTPATIENT
Start: 2025-04-12 | End: 2025-04-12

## 2025-04-12 RX ADMIN — LEVETIRACETAM 500 MG: 500 TABLET, FILM COATED ORAL at 20:00

## 2025-04-12 RX ADMIN — PIPERACILLIN SODIUM AND TAZOBACTAM SODIUM 3.38 G: 3; .375 INJECTION, SOLUTION INTRAVENOUS at 06:23

## 2025-04-12 RX ADMIN — INSULIN GLARGINE 8 UNITS: 100 INJECTION, SOLUTION SUBCUTANEOUS at 20:00

## 2025-04-12 RX ADMIN — ACETAMINOPHEN 650 MG: 325 TABLET, FILM COATED ORAL at 06:23

## 2025-04-12 RX ADMIN — DONEPEZIL HYDROCHLORIDE 22.5 MG: 10 TABLET ORAL at 09:12

## 2025-04-12 RX ADMIN — SEVELAMER CARBONATE 1600 MG: 800 TABLET, FILM COATED ORAL at 09:12

## 2025-04-12 RX ADMIN — INSULIN LISPRO 4 UNITS: 100 INJECTION, SOLUTION INTRAVENOUS; SUBCUTANEOUS at 18:17

## 2025-04-12 RX ADMIN — METOPROLOL TARTRATE 50 MG: 50 TABLET, FILM COATED ORAL at 20:00

## 2025-04-12 RX ADMIN — HEPARIN SODIUM 2000 UNITS: 1000 INJECTION INTRAVENOUS; SUBCUTANEOUS at 13:49

## 2025-04-12 RX ADMIN — PIPERACILLIN SODIUM AND TAZOBACTAM SODIUM 3.38 G: 3; .375 INJECTION, SOLUTION INTRAVENOUS at 19:55

## 2025-04-12 RX ADMIN — VANCOMYCIN HYDROCHLORIDE 500 MG: 500 INJECTION, SOLUTION INTRAVENOUS at 14:53

## 2025-04-12 RX ADMIN — HEPARIN SODIUM 2000 UNITS: 1000 INJECTION INTRAVENOUS; SUBCUTANEOUS at 13:48

## 2025-04-12 RX ADMIN — Medication 1000 UNITS: at 09:12

## 2025-04-12 RX ADMIN — DOCUSATE SODIUM 100 MG: 100 CAPSULE, LIQUID FILLED ORAL at 09:12

## 2025-04-12 RX ADMIN — FINASTERIDE 5 MG: 5 TABLET, FILM COATED ORAL at 09:12

## 2025-04-12 RX ADMIN — LEVETIRACETAM 500 MG: 500 TABLET, FILM COATED ORAL at 09:12

## 2025-04-12 RX ADMIN — APIXABAN 2.5 MG: 2.5 TABLET, FILM COATED ORAL at 09:12

## 2025-04-12 RX ADMIN — Medication 1 CAPSULE: at 09:11

## 2025-04-12 RX ADMIN — SEVELAMER CARBONATE 1600 MG: 800 TABLET, FILM COATED ORAL at 20:00

## 2025-04-12 RX ADMIN — APIXABAN 2.5 MG: 2.5 TABLET, FILM COATED ORAL at 20:00

## 2025-04-12 RX ADMIN — INSULIN LISPRO 2 UNITS: 100 INJECTION, SOLUTION INTRAVENOUS; SUBCUTANEOUS at 15:07

## 2025-04-12 RX ADMIN — METOPROLOL TARTRATE 50 MG: 50 TABLET, FILM COATED ORAL at 14:53

## 2025-04-12 ASSESSMENT — PAIN - FUNCTIONAL ASSESSMENT: PAIN_FUNCTIONAL_ASSESSMENT: 0-10

## 2025-04-12 ASSESSMENT — PAIN SCALES - GENERAL
PAINLEVEL_OUTOF10: 0 - NO PAIN

## 2025-04-12 ASSESSMENT — PAIN DESCRIPTION - LOCATION: LOCATION: HIP

## 2025-04-12 ASSESSMENT — PAIN SCALES - WONG BAKER: WONGBAKER_NUMERICALRESPONSE: HURTS LITTLE BIT

## 2025-04-12 NOTE — PRE-PROCEDURE NOTE
Report from Sending RN:    Report From: Zoraida  Recent Surgery of Procedure: No  Baseline Level of Consciousness (LOC): Alert  Oxygen Use: No  Type: N/A  Diabetic: Yes  Last BP Med Given Day of Dialysis: see MAR  Last Pain Med Given: see MAR  Lab Tests to be Obtained with Dialysis: No  Blood Transfusion to be Given During Dialysis: No  Available IV Access: Yes  Medications to be Administered During Dialysis: No  Continuous IV Infusion Running: No  Restraints on Currently or in the Last 24 Hours: No  Hand-Off Communication: Pt stable for HD  Dialysis Catheter Dressing: will assess bedside

## 2025-04-12 NOTE — CARE PLAN
The patient's goals for the shift include Pt to not fall all shift    The clinical goals for the shift include patient will remain HDS      Problem: Skin  Goal: Prevent/manage excess moisture  Flowsheets (Taken 4/12/2025 1103)  Prevent/manage excess moisture:   Use wicking fabric (obtain order)   Cleanse incontinence/protect with barrier cream

## 2025-04-12 NOTE — POST-PROCEDURE NOTE
Report to Receiving RN:    Report To: Zoraida  Time Report Called: [id number]  Hand-Off Communication: Pt  RMVD 2.5  Liters  Complications During Treatment: No  Ultrafiltration Treatment: No, Complete HD Tx  Medications Administered During Dialysis: No  Blood Products Administered During Dialysis: No  Labs Sent During Dialysis: No  Heparin Drip Rate Changes: N/A  Dialysis Catheter Dressing: clean dry intact  Last Dressing Change: 4-12-25         Last Updated: 1:36 PM by ENEDINA APRIL

## 2025-04-12 NOTE — PROGRESS NOTES
Vancomycin Dosing by Pharmacy- FOLLOW-UP (HEMODIALYSIS)    Tyshawn Coles is a 84 y.o. year old male who Pharmacy has been consulted for vancomycin dosing for Vancomycin Indications: Bone & Joint. Based on the patient's indication and renal status this patient will be dosed based on a pre-HD level of 15-25 mcg/mL.     Patient is currently on hemodialysis Tues/Thurs/Sat.    Current vancomycin regimen or maintenance dose: 1750 mg x 1 on 4/11 at 1104     Vancomycin pre-HD level 24.8 mcg/mL     Lab Results   Component Value Date    VANCORANDOM 24.8 (H) 04/12/2025       Visit Vitals  /69 (BP Location: Right arm, Patient Position: Lying)   Pulse 64   Temp 35.9 °C (96.6 °F) (Temporal)   Resp 16           Lab Results   Component Value Date    CREATININE 4.61 (H) 04/12/2025    CREATININE 3.56 (H) 04/11/2025    CREATININE 5.29 (H) 04/10/2025    CREATININE 4.11 (H) 04/09/2025       I/O last 3 completed shifts:  In: 1010 (14.9 mL/kg) [P.O.:960; IV Piggyback:50]  Out: 1 (0 mL/kg) [Stool:1]  Weight: 67.6 kg     Assessment/Plan     Level is within target trough goal  One dose of 500 mg to be administered following the next HD session  Next pre-HD level will be obtained on 4/15 at 0500. May be obtained sooner if clinically indicated.   Will continue to monitor renal function and hemodialysis orders daily while on vancomycin and order serum creatinine at least every 48 hours if not already ordered.  Follow for continued vancomycin needs, clinical response, and signs/symptoms of toxicity.     Bebeto Bhardwaj, PharmD

## 2025-04-12 NOTE — PROGRESS NOTES
INPATIENT NEPHROLOGY CONSULT PROGRESS NOTE      Patient Name: Tyshawn Coles MRN: [id number]  DATE of SERVICE: April 12, 2025  TIME of SERVICE: 11:34 AM  CONSULTING SERVICE: Nephrology    REASON for CONSULT: ESRD    SUBJECTIVE:  Seen and evaluated at bedside.  Awake oriented to self.  Labile hemodynamics.   Blood culture no growth.   Receiving hemodialysis today April 12, ultrafiltration 2 to 3 L well-tolerated    SUMMARY OF STAY:  Mr. Coles is a 84 y.o. male who presented to [facility] March 31, 2025 for evaluation of left hip dislocation after remote fall.  Status post left hip hemiarthroplasty reduction April 1, 2025     Patient with past medical history significant for end-stage renal disease on hemodialysis per TTS schedule, hypertension, hyperlipidemia, diabetes mellitus, dementia, seizure disorder.   Patient seen and evaluated at bedside he is with advanced dementia unable to provide additional history.  Patient wife at bedside who was able to provide the majority of the history.  Recently patient had clotted AV graft and currently is receiving dialysis via right tunneled dialysis catheter.  He resides at [facility] and being transported to his dialysis unit.  He has been [facility] lifted.  This admission patient presented to [facility] for evaluation of left hip pain and diagnosed with dislocated left hip hemiarthroplasty and scheduled to go to surgery this afternoon.    Dialysis prescription:  ESRD on hemodialysis  Tuesday Thursday Saturday  Primary nephrologist: Dr. Wise  Estimated dry weight: 67 kg  Duration: 3-1/2 hours  Intradialytic Midodrine  Right tunneled dialysis catheter in place        ASSESSMENT AND PLAN:  ESRD: iHD per Tuesday Thursday Saturday schedule  Volume status:  Hypovolemia, resolved   Electrolytes: Within acceptable range  Acid-base: Metabolic acidosis from renal failure (controlled with HD)  Bone mineral  disease (Ca/P/PTH): controlled  Anemia from renal failure, hemoglobin stable   Dislocated left hip hemiarthroplasty status postsurgery April 1  Chronic osteomyelitis of the coccyx  Left hip prosthesis status post closed reduction  Sacral decub ulcer with osteomyelitis  Hypotension: Labile blood pressure, massive third spacing  T2DM     PLAN:  End-stage renal disease patient with complex acute/subacute dislocated left hip hemiarthroplasty with left hip abscess, chronic osteomyelitis of the coccyx:     Blood culture, No catheter related bacteremia.  Receiving hemodialysis today 04/12/2025, UF 3L.  Dialysis via 180 dialyzer to optimize solute clearance, patient so far has been achieving excellent solute clearance without issue with dialyzer.  Patient daughter states that he needs hypoallergenic filter.  We will order NIPROs with next dialysis session however most likely his clearance will be affected.  Education provided.  Anemia multifactorial iron held due to elevated ferritin  Malnutrition to continue protein supplements 3 times daily  Strict input and output to guide volume management  phosphorus level at 3.3, to hold renvela   8.  to encourage oral intake   9.   Prognosis guarded    Strict input and output to guide volume management     Will follow, thank you!      Medications:    Current Facility-Administered Medications:     acetaminophen (Tylenol) oral liquid 650 mg, 650 mg, oral, q4h PRN, 650 mg at 04/11/25 1518 **OR** acetaminophen (Tylenol) tablet 650 mg, 650 mg, oral, q4h PRN, Krystin Epps MD, 650 mg at 04/12/25 0623    alteplase (Cathflo Activase) injection 2 mg, 2 mg, intra-catheter, PRN, Paul Guevara MD    apixaban (Eliquis) tablet 2.5 mg, 2.5 mg, oral, BID, Krystin Epps MD, 2.5 mg at 04/12/25 0912    benzocaine-menthol (Cepastat Sore Throat) lozenge 1 lozenge, 1 lozenge, Mouth/Throat, q2h PRN, Eliezer Dumont MD    cholecalciferol (Vitamin D-3) tablet 1,000 Units, 1,000 Units, oral,  Daily, Eliezer Dumont MD, 1,000 Units at 04/12/25 0912    docusate sodium (Colace) capsule 100 mg, 100 mg, oral, Daily, Paul Guevara MD, 100 mg at 04/12/25 0912    donepezil (Aricept) tablet 22.5 mg, 22.5 mg, oral, Daily, Eliezer Dumont MD, 22.5 mg at 04/12/25 0912    epoetin sheila-epbx (Retacrit) injection 10,000 Units, 10,000 Units, subcutaneous, Once per day on Monday Wednesday Friday, Carlene Norman MD, 10,000 Units at 04/11/25 0827    finasteride (Proscar) tablet 5 mg, 5 mg, oral, Daily, Eliezer Dumont MD, 5 mg at 04/12/25 0912    gabapentin (Neurontin) capsule 100 mg, 100 mg, oral, Once per day on Monday Wednesday Friday, Jesse Morgna DO, 100 mg at 04/07/25 1814    guaiFENesin (Mucinex) 12 hr tablet 600 mg, 600 mg, oral, q12h PRN, Eliezer Dumont MD, 600 mg at 04/09/25 0153    heparin 1,000 unit/mL injection 2,000 Units, 2,000 Units, intra-catheter, After Dialysis, Carlene Norman MD, 2,000 Units at 04/11/25 1130    heparin 1,000 unit/mL injection 2,000 Units, 2,000 Units, intra-catheter, After Dialysis, Carlene Norman MD, 2,000 Units at 04/10/25 1742    insulin glargine (Lantus) injection 8 Units, 8 Units, subcutaneous, Nightly, Jesse Morgan DO, 8 Units at 04/11/25 2143    insulin lispro injection 0-10 Units, 0-10 Units, subcutaneous, TID AC, Eliezer Dumont MD, 2 Units at 04/10/25 1206    ipratropium-albuteroL (Duo-Neb) 0.5-2.5 mg/3 mL nebulizer solution 3 mL, 3 mL, nebulization, q2h PRN, Paul Guevara MD    levETIRAcetam (Keppra) tablet 500 mg, 500 mg, oral, BID, Eliezer Dumont MD, 500 mg at 04/12/25 0912    melatonin tablet 10 mg, 10 mg, oral, Nightly PRN, Jesse Morgan DO    metoprolol tartrate (Lopressor) tablet 50 mg, 50 mg, oral, BID, Eliezer Dumont MD, 50 mg at 04/11/25 2143    ondansetron (Zofran) tablet 4 mg, 4 mg, oral, q8h PRN **OR** ondansetron (Zofran) injection 4 mg, 4 mg, intravenous, q8h PRN, Eliezer Dumont MD, 4 mg at 03/31/25 1403    piperacillin-tazobactam  (Zosyn) 3.375 g in dextrose (iso) IV 50 mL, 3.375 g, intravenous, q12h, Eliezer Dumont MD, Last Rate: 0 mL/hr at 04/11/25 2309, 3.375 g at 04/12/25 0623    polyethylene glycol (Glycolax, Miralax) packet 17 g, 17 g, oral, Daily PRN, Eliezer Dumont MD    prochlorperazine (Compazine) tablet 10 mg, 10 mg, oral, q6h PRN **OR** prochlorperazine (Compazine) injection 10 mg, 10 mg, intravenous, q6h PRN **OR** prochlorperazine (Compazine) suppository 25 mg, 25 mg, rectal, q12h PRN, Eliezer Dumont MD    sevelamer carbonate (Renvela) tablet 1,600 mg, 1,600 mg, oral, BID, Paul Guevara MD, 1,600 mg at 04/12/25 0912    vancomycin (Vancocin) 500 mg in dextrose 5%  mL, 500 mg, intravenous, Once, Krystin Epps MD    vancomycin (Vancocin) pharmacy to dose - pharmacy monitoring, , miscellaneous, Daily PRN, Krystin Epps MD    vitamin B complex-vitamin C-folic acid (Nephrocaps) capsule 1 capsule, 1 capsule, oral, Daily, Eliezer Dumont MD, 1 capsule at 04/12/25 0911    PERTINENT ROS:  GENERAL:  positive for fatigue, poor appetite.  Positive for fever  RESPIRATORY:  positive for shortness of breath.  Negative for cough, wheezing.  CARDIOVASCULAR:   Negative for chest pain or palpitation.  GI:  Negative for abdominal pain, diarrhea, heartburn, nausea, vomiting  : External catheter in place with dark concentrated urine    Physical Exam:  Vital signs in last 24 hours:  Temp:  [35.9 °C (96.6 °F)-36.5 °C (97.7 °F)] 36.4 °C (97.5 °F)  Heart Rate:  [63-71] 71  Resp:  [16-18] 16  BP: (106-132)/(63-74) 132/69    General: Awake, oriented to self, pain better controlled  HEENT:  NCAT,  mucous membranes moist and pink  NECK: no JVD, no carotid bruit, supple, no cervical mass or thyromegaly  LUNGS;  Diminished breath sounds, no Rales  CV:  Distant, regular rate and rhythm, no murmurs  ABDOMEN:  abdomen soft, nontender, BS normal, no masses or organomegaly  EDEMA:  no lower extremity edema/dependent edema  SKIN: Left hip  with a dressing in place      Intake/Output last 3 shifts:  I/O last 3 completed shifts:  In: 1010 (14.9 mL/kg) [P.O.:960; IV Piggyback:50]  Out: 1 (0 mL/kg) [Stool:1]  Weight: 67.6 kg     DATA:  Diagnotic tests reviewed for Todays visit:  Results from last 7 days   Lab Units 04/12/25  0442   WBC AUTO x10*3/uL 37.4*   RBC AUTO x10*6/uL 2.61*   HEMOGLOBIN g/dL 7.4*   HEMATOCRIT % 22.6*     Results from last 7 days   Lab Units 04/12/25  0442 04/10/25  0837 04/09/25  0708 04/07/25  1026 04/06/25  0538   SODIUM mmol/L 130*   < > 133*   < > 131*   POTASSIUM mmol/L 5.3   < > 4.8   < > 4.5   CHLORIDE mmol/L 95*   < > 95*   < > 93*   CO2 mmol/L 26   < > 30   < > 29   BUN mg/dL 44*   < > 39*   < > 32*   CREATININE mg/dL 4.61*   < > 4.11*   < > 3.55*   CALCIUM mg/dL 7.6*   < > 8.0*   < > 8.1*   PHOSPHORUS mg/dL  --   --   --   --  3.7   MAGNESIUM mg/dL 2.29  --   --   --   --    BILIRUBIN TOTAL mg/dL  --   --  0.4   < >  --    ALT U/L  --   --  12   < >  --    AST U/L  --   --  16   < >  --     < > = values in this interval not displayed.           IMAGING: CXR reviewed in  images      SIGNATURE: Carlene Norman MD  Nephrology and Hypertension  [address] [place]  Office phone: [phone]  FAX: [phone]    This note was partially generated using the Dragon voice recognition system, and there may be some incorrect words, spelling's and punctuation that were not noted in checking the note before saving.

## 2025-04-12 NOTE — CARE PLAN
The patient's goals for the shift include Pt to not fall all shift    The clinical goals for the shift include patient will remain HDS    Over the shift, the patient did  make progress toward the following goals.       Problem: Nutrition  Goal: Nutrient intake appropriate for maintaining nutritional needs  Outcome: Progressing     Problem: Skin  Goal: Decreased wound size/increased tissue granulation at next dressing change  Outcome: Progressing  Flowsheets (Taken 4/12/2025 0509)  Decreased wound size/increased tissue granulation at next dressing change: Protective dressings over bony prominences     Problem: Skin  Goal: Prevent/manage excess moisture  Outcome: Progressing  Flowsheets (Taken 4/12/2025 0509)  Prevent/manage excess moisture: Cleanse incontinence/protect with barrier cream     Problem: Skin  Goal: Promote/optimize nutrition  Outcome: Progressing  Flowsheets (Taken 4/12/2025 0509)  Promote/optimize nutrition: Assist with feeding     Problem: Skin  Goal: Promote skin healing  Outcome: Progressing     Problem: Diabetes  Goal: Maintain glucose levels >70mg/dl to <250mg/dl throughout shift  Outcome: Progressing     Wife at bedside for dinner to assist patient with feeding.    Incontinent of a large amt of stool. Hygiene provided.     Sacral dressing changed. Turned and repositioned.

## 2025-04-12 NOTE — PROGRESS NOTES
"Tyshawn Coles is a 84 y.o. male on day 12 of admission presenting with Abscess of hip.    Subjective   No overnight events.  Patient is undergoing ultrafiltration.  He is more alert and interactive than he was yesterday.  Patient evaluated with dialysis tech and nephrology attending at bedside.  RN reports patient ate all of his breakfast without issues.  Patient denies pain, chest pain, abdominal pain.    Contacted patient's wife and updated her via telephone.  All questions answered.  Also reviewed with wife to ensure that other family members do have permission to receive medical updates.  Patient's wife expresses that her son and daughter are listed in the EMR and have full permission to receive medical updates from medical team.         Objective     Physical Exam    Constitutional: sitting in bed, alert/interactive, nonverbal, nods yes/no with slight movements, chronically ill appearing, more alert today than yesterday  HEENT: anicteric sclera, eomi  Cardiovascular: Regular, rate and rhythm, no murmurs, 2+ equal pulses of the extremities, normal S1 and S2  Respiratory/Thorax: Patent airways, CTAB, normal breath sounds with good chest expansion, thorax symmetric, RA, nonlabored  Gastrointestinal: +BS, Nondistended, soft, non-tender, no guarding nor rebound  Musculoskeletal: LLE less internally rotated than yesterday, nontender, no erythema noted, prevnar boots in place   Extremities: generalized anasarca, 1+ LE edema b/l  Skin: warm and dry. No rashes nor lesions noted  Neurological: alert and oriented to self only, nonverbal, mild intermittent RUE hand tremor noted, follows commands to squeeze hand on R. , chronically unable to move LUE per RN, severely limited LLE ROM sensation intact    Last Recorded Vitals  Blood pressure 167/89, pulse 77, temperature 36.3 °C (97.3 °F), temperature source Temporal, resp. rate 18, height 1.828 m (5' 11.97\"), weight 67.6 kg (149 lb), SpO2 99%.  Intake/Output last 3 " Shifts:  I/O last 3 completed shifts:  In: 1010 (14.9 mL/kg) [P.O.:960; IV Piggyback:50]  Out: 1 (0 mL/kg) [Stool:1]  Weight: 67.6 kg     Relevant Results  Scheduled medications  apixaban, 2.5 mg, oral, BID  cholecalciferol, 1,000 Units, oral, Daily  docusate sodium, 100 mg, oral, Daily  donepezil, 22.5 mg, oral, Daily  epoetin sheila or biosimilar, 10,000 Units, subcutaneous, Once per day on Monday Wednesday Friday  finasteride, 5 mg, oral, Daily  gabapentin, 100 mg, oral, Once per day on Monday Wednesday Friday  heparin, 2,000 Units, intra-catheter, After Dialysis  heparin, 2,000 Units, intra-catheter, After Dialysis  insulin glargine, 8 Units, subcutaneous, Nightly  insulin lispro, 0-10 Units, subcutaneous, TID AC  levETIRAcetam, 500 mg, oral, BID  metoprolol tartrate, 50 mg, oral, BID  piperacillin-tazobactam, 3.375 g, intravenous, q12h  sevelamer carbonate, 1,600 mg, oral, BID  vitamin B complex-vitamin C-folic acid, 1 capsule, oral, Daily      Continuous medications     PRN medications  PRN medications: acetaminophen **OR** acetaminophen, alteplase, benzocaine-menthol, guaiFENesin, ipratropium-albuteroL, melatonin, ondansetron **OR** ondansetron, polyethylene glycol, prochlorperazine **OR** prochlorperazine **OR** prochlorperazine, vancomycin  Results from last 7 days   Lab Units 04/12/25  0442 04/11/25  0514 04/10/25  0837   WBC AUTO x10*3/uL 37.4* 30.5* 28.1*   RBC AUTO x10*6/uL 2.61* 2.93* 2.81*   HEMOGLOBIN g/dL 7.4* 8.5* 8.0*     Results from last 7 days   Lab Units 04/12/25  0442 04/11/25  0514 04/10/25  0837 04/09/25  0708 04/07/25  1504 04/07/25  1026 04/06/25  0538   SODIUM mmol/L 130* 131* 130* 133*   < > 125* 131*   POTASSIUM mmol/L 5.3 4.9 5.1 4.8   < > 6.0* 4.5   CHLORIDE mmol/L 95* 95* 93* 95*   < > 90* 93*   CO2 mmol/L 26 26 27 30   < > 23 29   BUN mg/dL 44* 34* 54* 39*   < > 48* 32*   CREATININE mg/dL 4.61* 3.56* 5.29* 4.11*   < > 4.97* 3.55*   CALCIUM mg/dL 7.6* 8.0* 7.9* 8.0*   < > 7.9* 8.1*    PHOSPHORUS mg/dL  --   --   --   --   --   --  3.7   MAGNESIUM mg/dL 2.29  --   --   --   --   --   --    BILIRUBIN TOTAL mg/dL  --   --   --  0.4  --  0.5  --    ALT U/L  --   --   --  12  --  9*  --    AST U/L  --   --   --  16  --  21  --     < > = values in this interval not displayed.       Imaging  XR pelvis 1-2 views    Result Date: 4/11/2025  Continued dislocation of the left hip hemiarthroplasty.   No definite fracture seen.   Signed by: Harpal Quiroz 4/11/2025 11:12 AM Dictation workstation:   [id number]    XR pelvis 1-2 views    Result Date: 4/11/2025    Continued dislocation of the left hip hemiarthroplasty.   No definite fracture seen.   Signed by: Harpal Quiroz 4/11/2025 11:12 AM Dictation workstation:   [id number]    XR hip left with pelvis when performed 1 view    Result Date: 4/11/2025  Posterior dislocation of the bipolar left hip prosthesis. No periprosthetic fracture is seen.   Signed by: Magi Storey 4/11/2025 10:37 AM Dictation workstation:   [id number]    CT hip left wo IV contrast    Result Date: 4/11/2025  Posterior dislocation of the hip hemiarthroplasty with intra-articular gas and periarticular soft tissue gas with question of a greater trochanteric periarticular fluid collection of indeterminate sterility. Further evaluation is difficult due to artifact from patient's hardware.   MACRO: None   Signed by: Kraig Trent 4/11/2025 10:10 AM Dictation workstation:   [id number]     Cardiology, Vascular, and Other Imaging  No other imaging results found for the past 2 days       Assessment/Plan   Assessment & Plan        A/P:  Pt is an 84 y.o. male with past medical history of ESRD on hemodialysis via tunneled HD line, hypertension, hyperlipidemia, type 2 diabetes mellitus, dementia with chronic encephalopathy presented from outside hospital on 3/31 for  posteriorly dislocated hip. Ortho consulted and rec closed reduction of L. Hip. Hospital course complicated by persistent leukocytosis  "despite abx.     4/11: with c/f new fever, worsened leukocytosis and recurrent L. Hip dislocation. Updated xray obtained this AM. Added vanc to abx regimen. CT hip ordered stat by ortho team. All consulting team members and wife updated. Will carefully monitor closely     4/12: More interactive today. Leukocytosis worsening. UF today.        Recent left hip replacement now with posterior superior dislocation of the left hip hemiarthroplasty with surrounding fluid collection  S/p L hip hemiarthroplasty 3/10/25  Orthopedic surgery reduced hip under general anesthesia on 3/31  CT imaging with soft tissue air-fluid collection around L hip, ortho feels it is likely post operative changes and hematoma, felt no indication for aspiration  Xray results concerning on 4/11  -CT hip stat showed posterior dislocation of hip  -ortho spoke to wife at length and attempted bedside reduction on 4/11. Pt is a high risk surgical candidate due to comorbidities. Per discussion with ortho, the hip will continue to dislocate even with routine movements such as nursing staff hygiene attempts  -both this writer and ortho discussed hospice with wife. At this time wife declines hospice services  -will continue pain control and plan for SNF placement  Weightbearing as tolerated for transfers related purposes only  Palliative care followed.   Per prior attending: \"Discussed with wife 4/9 that palliative/hospice approach is reasonable. She elected against hospice 4/10.\" GO discussions completed again on 4/11-family continues to decline hospice at this time        Bacteroides caccae bacteremia prior to admission  Leukocytosis-worsening today  Blood cultures 3/29 from dialysis positive for Bacteroides caccae   CT AP on admit with chronic sacral osteomyelitis, ortho felt fluid collection around hip was non infectious  Blood culture from [facility] 3/30 negative  Repeat blood cultures 3/31 negative to date  Urine culture 3/31 negative  WBC scan 4/7/25 " negative for acute infectious process  Continue Zosyn  C diff PCR negative, PO vanc stopped 4/10  -leukocytosis worsening again today to 37  - febrile in AM on 4/11 to 102.3  -repeat blood cultures obtained  -lactate obtained, wnl (1.6)  -added IV vanc to regimen  -continue zosyn  -ID/renal updated. Discussed whether to to culture HD line as this is a possible source of infection. In addition pt has chronic sacral wound, sacral OM  -HD line cultured on 4/11  -ortho does not feel fluid collection on CT scan is a culprit of infection and in addition tagged WBC scan was negative  -follow up cultures     Encephalopathy   Currently oriented x0-1, close to baseline  TSH and NH3 wnl  Goals of care as above     Decubitus ulcer  Continue with localized wound care  Continue with supportive care and frequent turning     Anemia   -In setting of ESRD, baseline Hbg ~7-9 range  -Received IV Iron, Epogen per nephrology      Atrial Fibrillation  -Continue Eliquis     ESRD on hemodialysis Tuesday Thursday Saturday  Nephrology following  Continue with Renvela, Nephrocaps     Hypertension  Continue metoprolol     Dementia  Continue with the Aricept  Continue with Keppra     Type 2 diabetes mellitus  A1c 6.9 on 4/4/25  Continue Lantus and SSI     BPH  Continue with finasteride  Urine culture negative 3/31        DVT ppx:  Eliquis  Code status: DNR/DNI  Dispo: Back to long term care      Certification (inpatient): I certify that this individual will require an anticipated TWO midnight hospital stay in order to effectively diagnose, treat, and provide a safe discharge disposition for the above acute complaints as well as exacerbation of known chronic illnesses.               Krystin Epps MD

## 2025-04-13 VITALS
BODY MASS INDEX: 20.18 KG/M2 | DIASTOLIC BLOOD PRESSURE: 72 MMHG | HEART RATE: 73 BPM | TEMPERATURE: 98.8 F | SYSTOLIC BLOOD PRESSURE: 176 MMHG | WEIGHT: 149 LBS | OXYGEN SATURATION: 100 % | RESPIRATION RATE: 18 BRPM | HEIGHT: 72 IN

## 2025-04-13 LAB
ABO GROUP (TYPE) IN BLOOD: NORMAL
ANION GAP SERPL CALC-SCNC: 12 MMOL/L (ref 10–20)
ANTIBODY SCREEN: NORMAL
ATRIAL RATE: 71 BPM
BACTERIA BLD CULT: NORMAL
BASOPHILS # BLD AUTO: 0.07 X10*3/UL (ref 0–0.1)
BASOPHILS NFR BLD AUTO: 0.3 %
BUN SERPL-MCNC: 30 MG/DL (ref 6–23)
CALCIUM SERPL-MCNC: 7.4 MG/DL (ref 8.6–10.3)
CHLORIDE SERPL-SCNC: 94 MMOL/L (ref 98–107)
CO2 SERPL-SCNC: 27 MMOL/L (ref 21–32)
CREAT SERPL-MCNC: 3.24 MG/DL (ref 0.5–1.3)
EGFRCR SERPLBLD CKD-EPI 2021: 18 ML/MIN/1.73M*2
EOSINOPHIL # BLD AUTO: 0.24 X10*3/UL (ref 0–0.4)
EOSINOPHIL NFR BLD AUTO: 1 %
ERYTHROCYTE [DISTWIDTH] IN BLOOD BY AUTOMATED COUNT: 18.6 % (ref 11.5–14.5)
ERYTHROCYTE [DISTWIDTH] IN BLOOD BY AUTOMATED COUNT: 18.6 % (ref 11.5–14.5)
GLUCOSE BLD MANUAL STRIP-MCNC: 193 MG/DL (ref 74–99)
GLUCOSE BLD MANUAL STRIP-MCNC: 203 MG/DL (ref 74–99)
GLUCOSE BLD MANUAL STRIP-MCNC: 210 MG/DL (ref 74–99)
GLUCOSE BLD MANUAL STRIP-MCNC: 74 MG/DL (ref 74–99)
GLUCOSE SERPL-MCNC: 114 MG/DL (ref 74–99)
HCT VFR BLD AUTO: 21.6 % (ref 41–52)
HCT VFR BLD AUTO: 25.6 % (ref 41–52)
HGB BLD-MCNC: 7 G/DL (ref 13.5–17.5)
HGB BLD-MCNC: 8.6 G/DL (ref 13.5–17.5)
IMM GRANULOCYTES # BLD AUTO: 0.25 X10*3/UL (ref 0–0.5)
IMM GRANULOCYTES NFR BLD AUTO: 1 % (ref 0–0.9)
LYMPHOCYTES # BLD AUTO: 1.48 X10*3/UL (ref 0.8–3)
LYMPHOCYTES NFR BLD AUTO: 6.2 %
MAGNESIUM SERPL-MCNC: 2.09 MG/DL (ref 1.6–2.4)
MCH RBC QN AUTO: 28.1 PG (ref 26–34)
MCH RBC QN AUTO: 28.6 PG (ref 26–34)
MCHC RBC AUTO-ENTMCNC: 32.4 G/DL (ref 32–36)
MCHC RBC AUTO-ENTMCNC: 33.6 G/DL (ref 32–36)
MCV RBC AUTO: 85 FL (ref 80–100)
MCV RBC AUTO: 87 FL (ref 80–100)
MONOCYTES # BLD AUTO: 2.13 X10*3/UL (ref 0.05–0.8)
MONOCYTES NFR BLD AUTO: 8.9 %
NEUTROPHILS # BLD AUTO: 19.81 X10*3/UL (ref 1.6–5.5)
NEUTROPHILS NFR BLD AUTO: 82.6 %
NRBC BLD-RTO: 0 /100 WBCS (ref 0–0)
NRBC BLD-RTO: 0 /100 WBCS (ref 0–0)
P AXIS: 15 DEGREES
P OFFSET: 195 MS
P ONSET: 144 MS
PLATELET # BLD AUTO: 313 X10*3/UL (ref 150–450)
PLATELET # BLD AUTO: 330 X10*3/UL (ref 150–450)
POTASSIUM SERPL-SCNC: 4.5 MMOL/L (ref 3.5–5.3)
PR INTERVAL: 154 MS
Q ONSET: 221 MS
QRS COUNT: 11 BEATS
QRS DURATION: 80 MS
QT INTERVAL: 410 MS
QTC CALCULATION(BAZETT): 445 MS
QTC FREDERICIA: 433 MS
R AXIS: 45 DEGREES
RBC # BLD AUTO: 2.49 X10*6/UL (ref 4.5–5.9)
RBC # BLD AUTO: 3.01 X10*6/UL (ref 4.5–5.9)
RH FACTOR (ANTIGEN D): NORMAL
SODIUM SERPL-SCNC: 128 MMOL/L (ref 136–145)
T AXIS: 74 DEGREES
T OFFSET: 426 MS
VENTRICULAR RATE: 71 BPM
WBC # BLD AUTO: 23 X10*3/UL (ref 4.4–11.3)
WBC # BLD AUTO: 24 X10*3/UL (ref 4.4–11.3)

## 2025-04-13 PROCEDURE — 2500000001 HC RX 250 WO HCPCS SELF ADMINISTERED DRUGS (ALT 637 FOR MEDICARE OP): Performed by: ORTHOPAEDIC SURGERY

## 2025-04-13 PROCEDURE — 85025 COMPLETE CBC W/AUTO DIFF WBC: CPT | Performed by: STUDENT IN AN ORGANIZED HEALTH CARE EDUCATION/TRAINING PROGRAM

## 2025-04-13 PROCEDURE — 2500000002 HC RX 250 W HCPCS SELF ADMINISTERED DRUGS (ALT 637 FOR MEDICARE OP, ALT 636 FOR OP/ED): Performed by: STUDENT IN AN ORGANIZED HEALTH CARE EDUCATION/TRAINING PROGRAM

## 2025-04-13 PROCEDURE — 2500000002 HC RX 250 W HCPCS SELF ADMINISTERED DRUGS (ALT 637 FOR MEDICARE OP, ALT 636 FOR OP/ED): Performed by: ORTHOPAEDIC SURGERY

## 2025-04-13 PROCEDURE — 36415 COLL VENOUS BLD VENIPUNCTURE: CPT | Performed by: STUDENT IN AN ORGANIZED HEALTH CARE EDUCATION/TRAINING PROGRAM

## 2025-04-13 PROCEDURE — 1100000001 HC PRIVATE ROOM DAILY

## 2025-04-13 PROCEDURE — 86901 BLOOD TYPING SEROLOGIC RH(D): CPT | Performed by: STUDENT IN AN ORGANIZED HEALTH CARE EDUCATION/TRAINING PROGRAM

## 2025-04-13 PROCEDURE — 80048 BASIC METABOLIC PNL TOTAL CA: CPT | Performed by: STUDENT IN AN ORGANIZED HEALTH CARE EDUCATION/TRAINING PROGRAM

## 2025-04-13 PROCEDURE — 85027 COMPLETE CBC AUTOMATED: CPT | Performed by: STUDENT IN AN ORGANIZED HEALTH CARE EDUCATION/TRAINING PROGRAM

## 2025-04-13 PROCEDURE — 83735 ASSAY OF MAGNESIUM: CPT | Performed by: STUDENT IN AN ORGANIZED HEALTH CARE EDUCATION/TRAINING PROGRAM

## 2025-04-13 PROCEDURE — 2500000001 HC RX 250 WO HCPCS SELF ADMINISTERED DRUGS (ALT 637 FOR MEDICARE OP): Performed by: INTERNAL MEDICINE

## 2025-04-13 PROCEDURE — 2500000001 HC RX 250 WO HCPCS SELF ADMINISTERED DRUGS (ALT 637 FOR MEDICARE OP): Performed by: STUDENT IN AN ORGANIZED HEALTH CARE EDUCATION/TRAINING PROGRAM

## 2025-04-13 PROCEDURE — 99233 SBSQ HOSP IP/OBS HIGH 50: CPT | Performed by: STUDENT IN AN ORGANIZED HEALTH CARE EDUCATION/TRAINING PROGRAM

## 2025-04-13 PROCEDURE — 2500000004 HC RX 250 GENERAL PHARMACY W/ HCPCS (ALT 636 FOR OP/ED): Performed by: ORTHOPAEDIC SURGERY

## 2025-04-13 PROCEDURE — 82947 ASSAY GLUCOSE BLOOD QUANT: CPT

## 2025-04-13 PROCEDURE — 2500000004 HC RX 250 GENERAL PHARMACY W/ HCPCS (ALT 636 FOR OP/ED): Performed by: INTERNAL MEDICINE

## 2025-04-13 RX ADMIN — SEVELAMER CARBONATE 1600 MG: 800 TABLET, FILM COATED ORAL at 08:42

## 2025-04-13 RX ADMIN — APIXABAN 2.5 MG: 2.5 TABLET, FILM COATED ORAL at 08:41

## 2025-04-13 RX ADMIN — LEVETIRACETAM 500 MG: 500 TABLET, FILM COATED ORAL at 08:42

## 2025-04-13 RX ADMIN — Medication 1 CAPSULE: at 08:42

## 2025-04-13 RX ADMIN — DOCUSATE SODIUM 100 MG: 100 CAPSULE, LIQUID FILLED ORAL at 08:41

## 2025-04-13 RX ADMIN — INSULIN GLARGINE 8 UNITS: 100 INJECTION, SOLUTION SUBCUTANEOUS at 21:41

## 2025-04-13 RX ADMIN — APIXABAN 2.5 MG: 2.5 TABLET, FILM COATED ORAL at 22:19

## 2025-04-13 RX ADMIN — METOPROLOL TARTRATE 50 MG: 50 TABLET, FILM COATED ORAL at 08:42

## 2025-04-13 RX ADMIN — PIPERACILLIN SODIUM AND TAZOBACTAM SODIUM 3.38 G: 3; .375 INJECTION, SOLUTION INTRAVENOUS at 18:36

## 2025-04-13 RX ADMIN — INSULIN LISPRO 4 UNITS: 100 INJECTION, SOLUTION INTRAVENOUS; SUBCUTANEOUS at 17:39

## 2025-04-13 RX ADMIN — SEVELAMER CARBONATE 1600 MG: 800 TABLET, FILM COATED ORAL at 22:19

## 2025-04-13 RX ADMIN — ACETAMINOPHEN 650 MG: 650 SUSPENSION ORAL at 17:44

## 2025-04-13 RX ADMIN — METOPROLOL TARTRATE 50 MG: 50 TABLET, FILM COATED ORAL at 22:19

## 2025-04-13 RX ADMIN — PIPERACILLIN SODIUM AND TAZOBACTAM SODIUM 3.38 G: 3; .375 INJECTION, SOLUTION INTRAVENOUS at 06:07

## 2025-04-13 RX ADMIN — INSULIN LISPRO 2 UNITS: 100 INJECTION, SOLUTION INTRAVENOUS; SUBCUTANEOUS at 12:55

## 2025-04-13 RX ADMIN — DONEPEZIL HYDROCHLORIDE 22.5 MG: 10 TABLET ORAL at 08:42

## 2025-04-13 RX ADMIN — LEVETIRACETAM 500 MG: 500 TABLET, FILM COATED ORAL at 22:19

## 2025-04-13 RX ADMIN — FINASTERIDE 5 MG: 5 TABLET, FILM COATED ORAL at 08:42

## 2025-04-13 RX ADMIN — Medication 1000 UNITS: at 08:41

## 2025-04-13 ASSESSMENT — PAIN - FUNCTIONAL ASSESSMENT
PAIN_FUNCTIONAL_ASSESSMENT: WONG-BAKER FACES
PAIN_FUNCTIONAL_ASSESSMENT: 0-10

## 2025-04-13 ASSESSMENT — COGNITIVE AND FUNCTIONAL STATUS - GENERAL
WALKING IN HOSPITAL ROOM: TOTAL
PERSONAL GROOMING: TOTAL
MOVING TO AND FROM BED TO CHAIR: A LOT
DRESSING REGULAR UPPER BODY CLOTHING: TOTAL
TOILETING: TOTAL
CLIMB 3 TO 5 STEPS WITH RAILING: TOTAL
DAILY ACTIVITIY SCORE: 7
DRESSING REGULAR LOWER BODY CLOTHING: TOTAL
STANDING UP FROM CHAIR USING ARMS: TOTAL
TURNING FROM BACK TO SIDE WHILE IN FLAT BAD: A LOT
HELP NEEDED FOR BATHING: TOTAL
MOVING FROM LYING ON BACK TO SITTING ON SIDE OF FLAT BED WITH BEDRAILS: A LOT
MOBILITY SCORE: 9
EATING MEALS: A LOT

## 2025-04-13 ASSESSMENT — PAIN SCALES - GENERAL
PAINLEVEL_OUTOF10: 6
PAINLEVEL_OUTOF10: 0 - NO PAIN

## 2025-04-13 ASSESSMENT — PAIN SCALES - WONG BAKER: WONGBAKER_NUMERICALRESPONSE: HURTS LITTLE BIT

## 2025-04-13 NOTE — PROGRESS NOTES
"Tyshawn Coles is a 84 y.o. male on day 13 of admission presenting with Abscess of hip.    Subjective   No overnight events.  Patient is similar to yesterday, alert and interactive.  He denies pain.  Nods yes to the question of whether he thinks his generalized edema has improved.  Denies chest pain, abdominal pain.    Telephoned wife and all questions answered.  We reviewed that hemoglobin is borderline at 7.0 and if it is below 7 I would recommend a blood transfusion.  She expresses hesitation regarding transfusion but we stated that we would repeat the lab and decide from there.    Repeat hemoglobin is 8.6.  Telephone wife and updated her.  This writer also contacted patient's daughter today to provide clinical update.  All questions answered.         Objective     Physical Exam  Constitutional: sitting in bed, alert/interactive, nonverbal, nods yes/no to questions, chronically ill appearing, more alert still  HEENT: anicteric sclera, eomi  Cardiovascular: Regular, rate and rhythm, no murmurs, 2+ equal pulses of the extremities, normal S1 and S2  Respiratory/Thorax: Patent airways, CTAB, normal breath sounds with good chest expansion, thorax symmetric, RA, nonlabored  Gastrointestinal: +BS, Nondistended, soft, non-tender, no guarding nor rebound  Musculoskeletal: LLE less internally rotated than yesterday, nontender, no erythema noted, prevnar boots in place   Extremities: generalized anasarca-improving, 1+ LE edema b/l  Skin: warm and dry. No rashes nor lesions noted  Neurological: alert and oriented to self only, nonverbal, mild intermittent RUE hand tremor noted, follows commands to squeeze hand on R. , chronically unable to move LUE per RN, severely limited LLE ROM sensation intact    Last Recorded Vitals  Blood pressure 145/78, pulse 72, temperature 36.3 °C (97.3 °F), temperature source Temporal, resp. rate 18, height 1.828 m (5' 11.97\"), weight 67.6 kg (149 lb), SpO2 98%.  Intake/Output last 3 Shifts:  I/O " last 3 completed shifts:  In: 1650 (24.4 mL/kg) [P.O.:600; I.V.:1000 (14.8 mL/kg); IV Piggyback:50]  Out: 2501 (37 mL/kg) [Other:2500; Stool:1]  Weight: 67.6 kg     Relevant Results  Scheduled medications  apixaban, 2.5 mg, oral, BID  cholecalciferol, 1,000 Units, oral, Daily  docusate sodium, 100 mg, oral, Daily  donepezil, 22.5 mg, oral, Daily  epoetin sheila or biosimilar, 10,000 Units, subcutaneous, Once per day on Monday Wednesday Friday  finasteride, 5 mg, oral, Daily  gabapentin, 100 mg, oral, Once per day on Monday Wednesday Friday  heparin, 2,000 Units, intra-catheter, After Dialysis  insulin glargine, 8 Units, subcutaneous, Nightly  insulin lispro, 0-10 Units, subcutaneous, TID AC  levETIRAcetam, 500 mg, oral, BID  metoprolol tartrate, 50 mg, oral, BID  piperacillin-tazobactam, 3.375 g, intravenous, q12h  sevelamer carbonate, 1,600 mg, oral, BID  vitamin B complex-vitamin C-folic acid, 1 capsule, oral, Daily      Continuous medications     PRN medications  PRN medications: acetaminophen **OR** acetaminophen, alteplase, benzocaine-menthol, guaiFENesin, ipratropium-albuteroL, melatonin, ondansetron **OR** ondansetron, polyethylene glycol, prochlorperazine **OR** prochlorperazine **OR** prochlorperazine, vancomycin  Results from last 7 days   Lab Units 04/13/25  1517 04/13/25  0440 04/12/25  0442   WBC AUTO x10*3/uL 24.0* 23.0* 37.4*   RBC AUTO x10*6/uL 3.01* 2.49* 2.61*   HEMOGLOBIN g/dL 8.6* 7.0* 7.4*     Results from last 7 days   Lab Units 04/13/25  0440 04/12/25  0442 04/11/25  0514 04/10/25  0837 04/09/25  0708 04/07/25  1504 04/07/25  1026   SODIUM mmol/L 128* 130* 131*   < > 133*   < > 125*   POTASSIUM mmol/L 4.5 5.3 4.9   < > 4.8   < > 6.0*   CHLORIDE mmol/L 94* 95* 95*   < > 95*   < > 90*   CO2 mmol/L 27 26 26   < > 30   < > 23   BUN mg/dL 30* 44* 34*   < > 39*   < > 48*   CREATININE mg/dL 3.24* 4.61* 3.56*   < > 4.11*   < > 4.97*   CALCIUM mg/dL 7.4* 7.6* 8.0*   < > 8.0*   < > 7.9*   MAGNESIUM mg/dL  2.09 2.29  --   --   --   --   --    BILIRUBIN TOTAL mg/dL  --   --   --   --  0.4  --  0.5   ALT U/L  --   --   --   --  12  --  9*   AST U/L  --   --   --   --  16  --  21    < > = values in this interval not displayed.       Imaging  No results found.    Cardiology, Vascular, and Other Imaging  No other imaging results found for the past 2 days       Assessment/Plan   Assessment & Plan        A/P:  Pt is an 84 y.o. male with past medical history of ESRD on hemodialysis via tunneled HD line, hypertension, hyperlipidemia, type 2 diabetes mellitus, dementia with chronic encephalopathy presented from outside hospital on 3/31 for  posteriorly dislocated hip. Ortho consulted and rec closed reduction of L. Hip. Hospital course complicated by persistent leukocytosis despite abx.     4/11: with c/f new fever, worsened leukocytosis and recurrent L. Hip dislocation. Updated xray obtained this AM. Added vanc to abx regimen. CT hip ordered stat by ortho team. All consulting team members and wife updated. Will carefully monitor closely     4/12: More interactive today. Leukocytosis worsening. UF completed    Today: no new clinical changes, leukocytosis improving. Repeat Hb stable, no indication for blood transfusion.         Recent left hip replacement now with posterior superior dislocation of the left hip hemiarthroplasty with surrounding fluid collection  S/p L hip hemiarthroplasty 3/10/25  Orthopedic surgery reduced hip under general anesthesia on 3/31  CT imaging with soft tissue air-fluid collection around L hip, ortho feels it is likely post operative changes and hematoma, felt no indication for aspiration  Xray results concerning on 4/11  -CT hip stat showed posterior dislocation of hip  -ortho spoke to wife at length and attempted bedside reduction on 4/11. Pt is a high risk surgical candidate due to comorbidities. Per discussion with ortho, the hip will continue to dislocate even with routine movements such as  "nursing staff hygiene attempts  -both this writer and ortho discussed hospice with wife. At this time wife declines hospice services  -will continue pain control and plan for SNF placement  Weightbearing as tolerated for transfers related purposes only  Palliative care followed.   Per prior attending: \"Discussed with wife 4/9 that palliative/hospice approach is reasonable. She elected against hospice 4/10.\" GOC discussions completed again on 4/11-family continues to decline hospice at this time     Bacteroides caccae bacteremia prior to admission  Leukocytosis-now improving  Blood cultures 3/29 from dialysis positive for Bacteroides caccae   CT AP on admit with chronic sacral osteomyelitis, ortho felt fluid collection around hip was non infectious  Blood culture from [facility] 3/30 negative  Repeat blood cultures 3/31 negative to date  Urine culture 3/31 negative  WBC scan 4/7/25 negative for acute infectious process  Continue Zosyn  C diff PCR negative, PO vanc stopped 4/10  -leukocytosis worsening again on 4/12 to 37  - febrile in AM on 4/11 to 102.3  -repeat blood cultures obtained  -lactate obtained, wnl (1.6)  -added IV vanc to regimen on 4/11  -continue zosyn  -ID/renal updated. Discussed whether to to culture HD line as this is a possible source of infection. In addition pt has chronic sacral wound, sacral OM  -HD line cultured on 4/11  -ortho does not feel fluid collection on CT scan is a culprit of infection and in addition tagged WBC scan was negative  -follow up cultures-NGTD     Encephalopathy   Currently oriented x0-1, close to baseline  TSH and NH3 wnl  Goals of care as above     Decubitus ulcer  Continue with localized wound care  Continue with supportive care and frequent turning     Anemia   -In setting of ESRD, baseline Hbg ~7-9 range  -Received IV Iron, Epogen per nephrology   -Hb 7.0 this AM. Repeat 8.6, monitor      Atrial Fibrillation  -Continue Eliquis     ESRD on hemodialysis Tuesday Thursday " Saturday  Nephrology following  Continue with Renvela, Nephrocaps     Hypertension  Continue metoprolol     Dementia  Continue with the Aricept  Continue with Keppra     Type 2 diabetes mellitus  A1c 6.9 on 4/4/25  Continue Lantus and SSI     BPH  Continue with finasteride  Urine culture negative 3/31        DVT ppx:  Eliquis  Code status: DNR/DNI  Dispo: Back to long term care      Certification (inpatient): I certify that this individual will require an anticipated TWO midnight hospital stay in order to effectively diagnose, treat, and provide a safe discharge disposition for the above acute complaints as well as exacerbation of known chronic illnesses.               Krystin Epps MD

## 2025-04-13 NOTE — CARE PLAN
The patient's goals for the shift include Pt to not fall all shift    The clinical goals for the shift include remain HDI      Problem: Skin  Goal: Promote/optimize nutrition  Flowsheets (Taken 4/13/2025 1148)  Promote/optimize nutrition:   Assist with feeding   Monitor/record intake including meals   Consume > 50% meals/supplements

## 2025-04-13 NOTE — PROGRESS NOTES
INPATIENT NEPHROLOGY CONSULT PROGRESS NOTE      Patient Name: Tyshawn Coles MRN: [id number]  DATE of SERVICE: April 13, 2025  TIME of SERVICE: 12:04 PM  CONSULTING SERVICE: Nephrology    REASON for CONSULT: ESRD    SUBJECTIVE:  Seen and evaluated at bedside.  Awake oriented to self.  Voiced no new complain  Afebrile, hemodynamically stable.     SUMMARY OF STAY:  Mr. Coles is a 84 y.o. male who presented to [facility] March 31, 2025 for evaluation of left hip dislocation after remote fall.  Status post left hip hemiarthroplasty reduction April 1, 2025     Patient with past medical history significant for end-stage renal disease on hemodialysis per TTS schedule, hypertension, hyperlipidemia, diabetes mellitus, dementia, seizure disorder.   Patient seen and evaluated at bedside he is with advanced dementia unable to provide additional history.  Patient wife at bedside who was able to provide the majority of the history.  Recently patient had clotted AV graft and currently is receiving dialysis via right tunneled dialysis catheter.  He resides at [facility] and being transported to his dialysis unit.  He has been [facility] lifted.  This admission patient presented to [facility] for evaluation of left hip pain and diagnosed with dislocated left hip hemiarthroplasty and scheduled to go to surgery this afternoon.    Dialysis prescription:  ESRD on hemodialysis  Tuesday Thursday Saturday  Primary nephrologist: Dr. Wise  Estimated dry weight: 67 kg  Duration: 3-1/2 hours  Intradialytic Midodrine  Right tunneled dialysis catheter in place        ASSESSMENT AND PLAN:  ESRD: iHD per Tuesday Thursday Saturday schedule  Volume status:  Hypovolemia, resolved   Electrolytes: Within acceptable range  Acid-base: Metabolic acidosis from renal failure (controlled with HD)  Bone mineral disease (Ca/P/PTH): controlled  Anemia from renal failure, hemoglobin stable    Dislocated left hip hemiarthroplasty status postsurgery April 1  Chronic osteomyelitis of the coccyx  Left hip prosthesis status post closed reduction  Sacral decub ulcer with osteomyelitis  Hypotension: Labile blood pressure, massive third spacing  T2DM     PLAN:  End-stage renal disease patient with complex acute/subacute dislocated left hip hemiarthroplasty with left hip abscess, chronic osteomyelitis of the coccyx:     Blood culture with no growth, No catheter related bacteremia.  Electrolytes and volume status within acceptable range, no urgent indication for replacement therapy today  Anemia multifactorial iron held due to elevated ferritin  Malnutrition to continue protein supplements 3 times daily  Strict input and output to guide volume management  Hypophosphatemia likely due to malnutrition to continue to encourage oral intake continue to hold Renvela in the acute setting.   Dislocated hip prosthetic joint  8.   Prognosis guarded    Strict input and output to guide volume management     Will follow, thank you!      Medications:    Current Facility-Administered Medications:     acetaminophen (Tylenol) oral liquid 650 mg, 650 mg, oral, q4h PRN, 650 mg at 04/11/25 1518 **OR** acetaminophen (Tylenol) tablet 650 mg, 650 mg, oral, q4h PRN, Krysitn Epps MD, 650 mg at 04/12/25 0623    alteplase (Cathflo Activase) injection 2 mg, 2 mg, intra-catheter, PRN, Paul Guevara MD    apixaban (Eliquis) tablet 2.5 mg, 2.5 mg, oral, BID, Krystin Epps MD, 2.5 mg at 04/13/25 0841    benzocaine-menthol (Cepastat Sore Throat) lozenge 1 lozenge, 1 lozenge, Mouth/Throat, q2h PRN, Eliezer Dumont MD    cholecalciferol (Vitamin D-3) tablet 1,000 Units, 1,000 Units, oral, Daily, Eliezer Dumont MD, 1,000 Units at 04/13/25 0841    docusate sodium (Colace) capsule 100 mg, 100 mg, oral, Daily, Paul Guevara MD, 100 mg at 04/13/25 0841    donepezil (Aricept) tablet 22.5 mg, 22.5 mg, oral, Daily, Eliezer Dumont MD, 22.5  mg at 04/13/25 0842    epoetin sheila-epbx (Retacrit) injection 10,000 Units, 10,000 Units, subcutaneous, Once per day on Monday Wednesday Friday, Carlene Norman MD, 10,000 Units at 04/11/25 0827    finasteride (Proscar) tablet 5 mg, 5 mg, oral, Daily, Eliezer Dumont MD, 5 mg at 04/13/25 0842    gabapentin (Neurontin) capsule 100 mg, 100 mg, oral, Once per day on Monday Wednesday Friday, Jesse Morgan DO, 100 mg at 04/07/25 1814    guaiFENesin (Mucinex) 12 hr tablet 600 mg, 600 mg, oral, q12h PRN, Eliezer Dumont MD, 600 mg at 04/09/25 0153    heparin 1,000 unit/mL injection 2,000 Units, 2,000 Units, intra-catheter, After Dialysis, Carlene Norman MD, 2,000 Units at 04/12/25 1349    insulin glargine (Lantus) injection 8 Units, 8 Units, subcutaneous, Nightly, Jesse Morgan DO, 8 Units at 04/12/25 2000    insulin lispro injection 0-10 Units, 0-10 Units, subcutaneous, TID AC, Eliezer Dumont MD, 4 Units at 04/12/25 1817    ipratropium-albuteroL (Duo-Neb) 0.5-2.5 mg/3 mL nebulizer solution 3 mL, 3 mL, nebulization, q2h PRN, Paul Guevara MD    levETIRAcetam (Keppra) tablet 500 mg, 500 mg, oral, BID, Eliezer Dumont MD, 500 mg at 04/13/25 0842    melatonin tablet 10 mg, 10 mg, oral, Nightly PRN, Jesse Morgan DO    metoprolol tartrate (Lopressor) tablet 50 mg, 50 mg, oral, BID, Eliezer Dumont MD, 50 mg at 04/13/25 0842    ondansetron (Zofran) tablet 4 mg, 4 mg, oral, q8h PRN **OR** ondansetron (Zofran) injection 4 mg, 4 mg, intravenous, q8h PRN, Eliezer Dumont MD, 4 mg at 03/31/25 1403    piperacillin-tazobactam (Zosyn) 3.375 g in dextrose (iso) IV 50 mL, 3.375 g, intravenous, q12h, Eliezer Dumont MD, Stopped at 04/13/25 1105    polyethylene glycol (Glycolax, Miralax) packet 17 g, 17 g, oral, Daily PRN, Eliezer Dumont MD    prochlorperazine (Compazine) tablet 10 mg, 10 mg, oral, q6h PRN **OR** prochlorperazine (Compazine) injection 10 mg, 10 mg, intravenous, q6h PRN **OR** prochlorperazine (Compazine)  suppository 25 mg, 25 mg, rectal, q12h PRN, Eliezer Dumont MD    sevelamer carbonate (Renvela) tablet 1,600 mg, 1,600 mg, oral, BID, Paul Guevara MD, 1,600 mg at 04/13/25 0842    vancomycin (Vancocin) pharmacy to dose - pharmacy monitoring, , miscellaneous, Daily PRN, Krystin Epps MD    vitamin B complex-vitamin C-folic acid (Nephrocaps) capsule 1 capsule, 1 capsule, oral, Daily, Eliezer Dumont MD, 1 capsule at 04/13/25 0842    PERTINENT ROS:  GENERAL:  positive for fatigue, poor appetite.  Positive for fever  RESPIRATORY:  positive for shortness of breath.  Negative for cough, wheezing.  CARDIOVASCULAR:   Negative for chest pain or palpitation.  GI:  Negative for abdominal pain, diarrhea, heartburn, nausea, vomiting  : External catheter in place with dark concentrated urine    Physical Exam:  Vital signs in last 24 hours:  Temp:  [36.3 °C (97.3 °F)-37 °C (98.6 °F)] 36.5 °C (97.7 °F)  Heart Rate:  [71-85] 73  Resp:  [16-18] 18  BP: (126-167)/(67-96) 137/75    General: Awake, oriented to self, pain better controlled  HEENT:  NCAT,  mucous membranes moist and pink  NECK: no JVD, no carotid bruit, supple, no cervical mass or thyromegaly  LUNGS;  Diminished breath sounds, no Rales  CV:  Distant, regular rate and rhythm, no murmurs  ABDOMEN:  abdomen soft, nontender, BS normal, no masses or organomegaly  EDEMA:  no lower extremity edema/dependent edema  SKIN: Left hip with a dressing in place      Intake/Output last 3 shifts:  I/O last 3 completed shifts:  In: 1650 (24.4 mL/kg) [P.O.:600; I.V.:1000 (14.8 mL/kg); IV Piggyback:50]  Out: 2501 (37 mL/kg) [Other:2500; Stool:1]  Weight: 67.6 kg     DATA:  Diagnotic tests reviewed for Todays visit:  Results from last 7 days   Lab Units 04/13/25  0440   WBC AUTO x10*3/uL 23.0*   RBC AUTO x10*6/uL 2.49*   HEMOGLOBIN g/dL 7.0*   HEMATOCRIT % 21.6*     Results from last 7 days   Lab Units 04/13/25  0440 04/10/25  0837 04/09/25  0708   SODIUM mmol/L 128*   < > 133*    POTASSIUM mmol/L 4.5   < > 4.8   CHLORIDE mmol/L 94*   < > 95*   CO2 mmol/L 27   < > 30   BUN mg/dL 30*   < > 39*   CREATININE mg/dL 3.24*   < > 4.11*   CALCIUM mg/dL 7.4*   < > 8.0*   MAGNESIUM mg/dL 2.09   < >  --    BILIRUBIN TOTAL mg/dL  --   --  0.4   ALT U/L  --   --  12   AST U/L  --   --  16    < > = values in this interval not displayed.           IMAGING: CXR reviewed in  images      SIGNATURE: Carlene Norman MD  Nephrology and Hypertension  [address] [place]  Office phone: [phone]  FAX: [phone]    This note was partially generated using the Dragon voice recognition system, and there may be some incorrect words, spelling's and punctuation that were not noted in checking the note before saving.

## 2025-04-13 NOTE — CARE PLAN
The patient's goals for the shift include Pt to not fall all shift    The clinical goals for the shift include remain HDI    Problem: Diabetes  Goal: Maintain glucose levels >70mg/dl to <250mg/dl throughout shift  Flowsheets (Taken 4/13/2025 1141)  Maintain glucose levels >70mg/dl to <250mg/dl throughout shift: Med administration/monitoring of effect

## 2025-04-14 ENCOUNTER — PHARMACY VISIT (OUTPATIENT)
Dept: PHARMACY | Facility: CLINIC | Age: 85
End: 2025-04-14
Payer: MEDICARE

## 2025-04-14 LAB
ANION GAP SERPL CALC-SCNC: 12 MMOL/L (ref 10–20)
BUN SERPL-MCNC: 41 MG/DL (ref 6–23)
CALCIUM SERPL-MCNC: 7.6 MG/DL (ref 8.6–10.3)
CHLORIDE SERPL-SCNC: 90 MMOL/L (ref 98–107)
CO2 SERPL-SCNC: 27 MMOL/L (ref 21–32)
CREAT SERPL-MCNC: 4.21 MG/DL (ref 0.5–1.3)
EGFRCR SERPLBLD CKD-EPI 2021: 13 ML/MIN/1.73M*2
ERYTHROCYTE [DISTWIDTH] IN BLOOD BY AUTOMATED COUNT: 18.3 % (ref 11.5–14.5)
GLUCOSE BLD MANUAL STRIP-MCNC: 105 MG/DL (ref 74–99)
GLUCOSE BLD MANUAL STRIP-MCNC: 162 MG/DL (ref 74–99)
GLUCOSE BLD MANUAL STRIP-MCNC: 163 MG/DL (ref 74–99)
GLUCOSE BLD MANUAL STRIP-MCNC: 169 MG/DL (ref 74–99)
GLUCOSE SERPL-MCNC: 142 MG/DL (ref 74–99)
HCT VFR BLD AUTO: 25 % (ref 41–52)
HGB BLD-MCNC: 8.3 G/DL (ref 13.5–17.5)
MAGNESIUM SERPL-MCNC: 2.18 MG/DL (ref 1.6–2.4)
MCH RBC QN AUTO: 28.5 PG (ref 26–34)
MCHC RBC AUTO-ENTMCNC: 33.2 G/DL (ref 32–36)
MCV RBC AUTO: 86 FL (ref 80–100)
NRBC BLD-RTO: 0 /100 WBCS (ref 0–0)
PLATELET # BLD AUTO: 309 X10*3/UL (ref 150–450)
POTASSIUM SERPL-SCNC: 5.1 MMOL/L (ref 3.5–5.3)
RBC # BLD AUTO: 2.91 X10*6/UL (ref 4.5–5.9)
SODIUM SERPL-SCNC: 124 MMOL/L (ref 136–145)
WBC # BLD AUTO: 21.1 X10*3/UL (ref 4.4–11.3)

## 2025-04-14 PROCEDURE — 2500000002 HC RX 250 W HCPCS SELF ADMINISTERED DRUGS (ALT 637 FOR MEDICARE OP, ALT 636 FOR OP/ED): Performed by: ORTHOPAEDIC SURGERY

## 2025-04-14 PROCEDURE — 80048 BASIC METABOLIC PNL TOTAL CA: CPT | Performed by: STUDENT IN AN ORGANIZED HEALTH CARE EDUCATION/TRAINING PROGRAM

## 2025-04-14 PROCEDURE — RXMED WILLOW AMBULATORY MEDICATION CHARGE

## 2025-04-14 PROCEDURE — 6350000001 HC RX 635 EPOETIN >10,000 UNITS: Mod: JZ | Performed by: INTERNAL MEDICINE

## 2025-04-14 PROCEDURE — 2500000001 HC RX 250 WO HCPCS SELF ADMINISTERED DRUGS (ALT 637 FOR MEDICARE OP): Performed by: STUDENT IN AN ORGANIZED HEALTH CARE EDUCATION/TRAINING PROGRAM

## 2025-04-14 PROCEDURE — 2500000001 HC RX 250 WO HCPCS SELF ADMINISTERED DRUGS (ALT 637 FOR MEDICARE OP): Performed by: ORTHOPAEDIC SURGERY

## 2025-04-14 PROCEDURE — 1100000001 HC PRIVATE ROOM DAILY

## 2025-04-14 PROCEDURE — 2500000004 HC RX 250 GENERAL PHARMACY W/ HCPCS (ALT 636 FOR OP/ED): Performed by: INTERNAL MEDICINE

## 2025-04-14 PROCEDURE — 83735 ASSAY OF MAGNESIUM: CPT | Performed by: STUDENT IN AN ORGANIZED HEALTH CARE EDUCATION/TRAINING PROGRAM

## 2025-04-14 PROCEDURE — 2500000002 HC RX 250 W HCPCS SELF ADMINISTERED DRUGS (ALT 637 FOR MEDICARE OP, ALT 636 FOR OP/ED): Performed by: STUDENT IN AN ORGANIZED HEALTH CARE EDUCATION/TRAINING PROGRAM

## 2025-04-14 PROCEDURE — 85027 COMPLETE CBC AUTOMATED: CPT | Performed by: STUDENT IN AN ORGANIZED HEALTH CARE EDUCATION/TRAINING PROGRAM

## 2025-04-14 PROCEDURE — 36415 COLL VENOUS BLD VENIPUNCTURE: CPT | Performed by: STUDENT IN AN ORGANIZED HEALTH CARE EDUCATION/TRAINING PROGRAM

## 2025-04-14 PROCEDURE — 99233 SBSQ HOSP IP/OBS HIGH 50: CPT

## 2025-04-14 PROCEDURE — 82947 ASSAY GLUCOSE BLOOD QUANT: CPT

## 2025-04-14 PROCEDURE — 2500000004 HC RX 250 GENERAL PHARMACY W/ HCPCS (ALT 636 FOR OP/ED): Performed by: ORTHOPAEDIC SURGERY

## 2025-04-14 RX ORDER — COLLAGENASE SANTYL 250 [ARB'U]/G
OINTMENT TOPICAL DAILY
Qty: 30 G | Refills: 0 | Status: SHIPPED | OUTPATIENT
Start: 2025-04-14 | End: 2025-05-14

## 2025-04-14 RX ADMIN — INSULIN LISPRO 2 UNITS: 100 INJECTION, SOLUTION INTRAVENOUS; SUBCUTANEOUS at 15:47

## 2025-04-14 RX ADMIN — Medication 1000 UNITS: at 09:54

## 2025-04-14 RX ADMIN — LEVETIRACETAM 500 MG: 500 TABLET, FILM COATED ORAL at 21:10

## 2025-04-14 RX ADMIN — EPOETIN ALFA-EPBX 10000 UNITS: 10000 INJECTION, SOLUTION INTRAVENOUS; SUBCUTANEOUS at 09:57

## 2025-04-14 RX ADMIN — LEVETIRACETAM 500 MG: 500 TABLET, FILM COATED ORAL at 09:54

## 2025-04-14 RX ADMIN — INSULIN GLARGINE 8 UNITS: 100 INJECTION, SOLUTION SUBCUTANEOUS at 21:10

## 2025-04-14 RX ADMIN — APIXABAN 2.5 MG: 2.5 TABLET, FILM COATED ORAL at 09:54

## 2025-04-14 RX ADMIN — APIXABAN 2.5 MG: 2.5 TABLET, FILM COATED ORAL at 21:09

## 2025-04-14 RX ADMIN — DONEPEZIL HYDROCHLORIDE 22.5 MG: 10 TABLET ORAL at 09:56

## 2025-04-14 RX ADMIN — INSULIN LISPRO 2 UNITS: 100 INJECTION, SOLUTION INTRAVENOUS; SUBCUTANEOUS at 11:56

## 2025-04-14 RX ADMIN — SEVELAMER CARBONATE 1600 MG: 800 TABLET, FILM COATED ORAL at 21:09

## 2025-04-14 RX ADMIN — METOPROLOL TARTRATE 50 MG: 50 TABLET, FILM COATED ORAL at 21:10

## 2025-04-14 RX ADMIN — METOPROLOL TARTRATE 50 MG: 50 TABLET, FILM COATED ORAL at 09:55

## 2025-04-14 RX ADMIN — PIPERACILLIN SODIUM AND TAZOBACTAM SODIUM 3.38 G: 3; .375 INJECTION, SOLUTION INTRAVENOUS at 06:15

## 2025-04-14 RX ADMIN — GABAPENTIN 100 MG: 100 CAPSULE ORAL at 15:47

## 2025-04-14 RX ADMIN — ACETAMINOPHEN 650 MG: 325 TABLET, FILM COATED ORAL at 04:20

## 2025-04-14 RX ADMIN — SEVELAMER CARBONATE 1600 MG: 800 TABLET, FILM COATED ORAL at 09:55

## 2025-04-14 RX ADMIN — Medication 1 CAPSULE: at 09:55

## 2025-04-14 RX ADMIN — FINASTERIDE 5 MG: 5 TABLET, FILM COATED ORAL at 09:55

## 2025-04-14 ASSESSMENT — PAIN SCALES - GENERAL
PAINLEVEL_OUTOF10: 3
PAINLEVEL_OUTOF10: 0 - NO PAIN
PAINLEVEL_OUTOF10: 0 - NO PAIN

## 2025-04-14 ASSESSMENT — PAIN - FUNCTIONAL ASSESSMENT
PAIN_FUNCTIONAL_ASSESSMENT: 0-10
PAIN_FUNCTIONAL_ASSESSMENT: 0-10

## 2025-04-14 NOTE — PROGRESS NOTES
Vancomycin Dosing by Pharmacy- Cessation of Therapy    Consult to pharmacy for vancomycin dosing has been discontinued by the prescriber, pharmacy will sign off at this time.    Please call pharmacy if there are further questions or re-enter a consult if vancomycin is resumed.     Sharla Myers, PharmD

## 2025-04-14 NOTE — CARE PLAN
Problem: Skin  Goal: Participates in plan/prevention/treatment measures  Outcome: Progressing     The clinical goals for the shift include remain free from injury throughout shift

## 2025-04-14 NOTE — PROGRESS NOTES
"ID:  Tyshawn Coles is a 84 y.o. male on hospital day 14 of admission presenting with Abscess of hip.    Subjective   The patient seen and examined this morning at bedside. No overnight events. The patient denied fever, chest pain, shortness of breath, nausea/vomiting or abdominal pain.        Objective     Visit Vitals  BP (!) 166/97 (BP Location: Right arm, Patient Position: Lying)   Pulse 72   Temp 36.5 °C (97.7 °F) (Temporal)   Resp 16   Ht 1.828 m (5' 11.97\")   Wt 67.6 kg (149 lb)   SpO2 99%   BMI 20.23 kg/m²   Smoking Status Former   BSA 1.85 m²        Physical Examination:  General: Appears stated age, well nourished, A&Ox2, mildly verbal, lying comfortably in bed, not in pain or distress. On RA.  HEENT: Mucous membranes moist.  Head/Neck: Atraumatic, Normocephalic. Neck supple.   Respiratory: equal air entry bilaterally no crackles or wheezing were appreciated.  Cardiovascular: regular rhythm, normal S1 and S2. No added sounds or murmurs,  Gastrointestinal: soft, non-tender abdomen, bowel sounds present, no guarding or rebound.   Extremities: No edema in lower extremities.  Psychological: Appropriate mood, normal affect.      Laboratory Data:    Results from last 7 days   Lab Units 04/14/25  0416 04/13/25  1517 04/13/25  0440   WBC AUTO x10*3/uL 21.1* 24.0* 23.0*   RBC AUTO x10*6/uL 2.91* 3.01* 2.49*   HEMOGLOBIN g/dL 8.3* 8.6* 7.0*     Results from last 7 days   Lab Units 04/14/25  0416 04/13/25  0440 04/12/25  0442 04/10/25  0837 04/09/25  0708   SODIUM mmol/L 124* 128* 130*   < > 133*   POTASSIUM mmol/L 5.1 4.5 5.3   < > 4.8   CHLORIDE mmol/L 90* 94* 95*   < > 95*   CO2 mmol/L 27 27 26   < > 30   BUN mg/dL 41* 30* 44*   < > 39*   CREATININE mg/dL 4.21* 3.24* 4.61*   < > 4.11*   CALCIUM mg/dL 7.6* 7.4* 7.6*   < > 8.0*   MAGNESIUM mg/dL 2.18 2.09 2.29  --   --    BILIRUBIN TOTAL mg/dL  --   --   --   --  0.4   ALT U/L  --   --   --   --  12   AST U/L  --   --   --   --  16    < > = values in this interval not " displayed.       Imaging:  Imaging  No results found.    Cardiology, Vascular, and Other Imaging  No other imaging results found for the past 2 days       Medications:  Scheduled medications  apixaban, 2.5 mg, oral, BID  cholecalciferol, 1,000 Units, oral, Daily  collagenase, , Topical, Daily  docusate sodium, 100 mg, oral, Daily  donepezil, 22.5 mg, oral, Daily  epoetin sheila or biosimilar, 10,000 Units, subcutaneous, Once per day on Monday Wednesday Friday  finasteride, 5 mg, oral, Daily  gabapentin, 100 mg, oral, Once per day on Monday Wednesday Friday  heparin, 2,000 Units, intra-catheter, After Dialysis  insulin glargine, 8 Units, subcutaneous, Nightly  insulin lispro, 0-10 Units, subcutaneous, TID AC  levETIRAcetam, 500 mg, oral, BID  metoprolol tartrate, 50 mg, oral, BID  sevelamer carbonate, 1,600 mg, oral, BID  vitamin B complex-vitamin C-folic acid, 1 capsule, oral, Daily      Continuous medications     PRN medications  PRN medications: acetaminophen **OR** acetaminophen, alteplase, benzocaine-menthol, guaiFENesin, ipratropium-albuteroL, melatonin, ondansetron **OR** ondansetron, polyethylene glycol, prochlorperazine **OR** prochlorperazine **OR** prochlorperazine    Assessment    Assessment & Plan   Mr. Tyshawn Coles is a 84 y.o. male who presents with PMH ESRD on HD brought from outside hospital for posterior dislocated hip.  Detailed plan as below.  Assessment & Plan       Updates 04/14/25  - DC Vanco and Zosyn per ID recommendations.  - For wound care, switch from [facility] to [facility].    # Recent left hip replacement now with posterior superior dislocation of the left hip hemiarthroplasty with surrounding fluid collection  -S/p L hip hemiarthroplasty 3/10/25  -Orthopedic surgery reduced hip under general anesthesia on 3/31  -CT imaging with soft tissue air-fluid collection around L hip, ortho feels it is likely post operative changes and hematoma, felt no indication for aspiration  -CT hip stat showed  "posterior dislocation of hip  -ortho spoke to wife at length and attempted bedside reduction on 4/11.   -Pt is a high risk surgical candidate due to comorbidities. Per discussion with ortho, the hip will continue to dislocate even with routine movements such as nursing staff hygiene attempts  -Will continue pain control and plan for SNF placement  Weightbearing as tolerated for transfers related purposes only  Palliative care followed.   ---Per prior attending: \"Discussed with wife 4/9 that palliative/hospice approach is reasonable. She elected against hospice 4/10.\" GOC discussions completed again on 4/11-family continues to decline hospice at this time     # Bacteroides caccae bacteremia prior to admission  # Leukocytosis-downtrending  -WBC today 21.1 down from 24.0 yesterday.  -Blood culture from [facility] 3/30 negative  -Blood cultures from the HD line 4/11 negative after 3 days  -C diff PCR negative, PO vanc stopped 4/10  -ID/renal updated. Discussed whether to to culture HD line as this is a possible source of infection. In addition pt has chronic sacral wound, sacral OM  -ortho does not feel fluid collection on CT scan is a culprit of infection and in addition tagged WBC scan was negative  -follow up cultures-NGTD     # Encephalopathy   Currently oriented x1/2, close to baseline  TSH and NH3 wnl  Goals of care as above     # Decubitus ulcer  Continue with localized wound care  Continue with supportive care and frequent turning  Switch from [facility] to [facility]     # Anemia   -In setting of ESRD, baseline Hbg ~7-9 range  -Received IV Iron, Epogen per nephrology   -Hb 8.3 this AM.  Will continue monitor     # Atrial Fibrillation  -Continue Eliquis     # ESRD on hemodialysis Tuesday Thursday Saturday  Nephrology following  Continue with Maricruz Nephrocaps     # Hypertension  Continue metoprolol     #Dementia  Continue with the Aricept  Continue with Keppra     #  Type 2 diabetes mellitus  A1c 6.9 on " 4/4/25  Continue Lantus and SSI     # BPH  Continue with finasteride  Urine culture negative 3/31        Global Plan of Care  Fluid: PRN  Electrolytes: PRN  Nutrition: Renal diet  DVT Prophylaxis: Eliquis  GI Prophylaxis: None  Disposition: Anticipate discharge tomorrow  Code Status: DNR and No Intubation     Emergency Contact: Extended Emergency Contact Information  Primary Emergency Contact: Unique Coles  Mobile Phone: [phone]  Relation: Spouse   needed? No  Secondary Emergency Contact: ZORAIDA COLES  Mobile Phone: [phone]  Relation: Son  Preferred language: English   needed? No     Patient seen and discussed with the attending, Dr. Epps.  Signature: Mary Avendano MD, PGY III Internal medicine  Date: April 14, 2025   Please excuse any misspellings or unintended errors related to the Dragon speech recognition software used to dictate this note.

## 2025-04-14 NOTE — PROGRESS NOTES
INPATIENT NEPHROLOGY CONSULT PROGRESS NOTE      Patient Name: Tyshawn Coles MRN: [id number]  DATE of SERVICE: April 14, 2025  TIME of SERVICE: 11:11 AM  CONSULTING SERVICE: Nephrology    REASON for CONSULT: ESRD    SUBJECTIVE:  Seen and evaluated at bedside.  Awake oriented to self.  Hiccups resolved.  Voiced no new complain. Afebrile, hemodynamically stable.  No erythema noted dialysis catheter exit site.  Able to consume his breakfast this morning    SUMMARY OF STAY:  Mr. Coles is a 84 y.o. male who presented to [facility] March 31, 2025 for evaluation of left hip dislocation after remote fall.  Status post left hip hemiarthroplasty reduction April 1, 2025     Patient with past medical history significant for end-stage renal disease on hemodialysis per TTS schedule, hypertension, hyperlipidemia, diabetes mellitus, dementia, seizure disorder.   Patient seen and evaluated at bedside he is with advanced dementia unable to provide additional history.  Patient wife at bedside who was able to provide the majority of the history.  Recently patient had clotted AV graft and currently is receiving dialysis via right tunneled dialysis catheter.  He resides at [facility] and being transported to his dialysis unit.  He has been [facility] lifted.  This admission patient presented to [facility] for evaluation of left hip pain and diagnosed with dislocated left hip hemiarthroplasty and scheduled to go to surgery this afternoon.    Dialysis prescription:  ESRD on hemodialysis  Tuesday Thursday Saturday  Primary nephrologist: Dr. Wise  Estimated dry weight: 67 kg  Duration: 3-1/2 hours  Intradialytic Midodrine  Right tunneled dialysis catheter in place        ASSESSMENT AND PLAN:  ESRD: iHD per Tuesday Thursday Saturday schedule  Volume status:  Hypovolemia, resolved   Electrolytes: Within acceptable range  Acid-base: Metabolic acidosis from renal failure  (controlled with HD)  Bone mineral disease (Ca/P/PTH): controlled  Anemia from renal failure, hemoglobin stable   Dislocated left hip hemiarthroplasty status postsurgery April 1  Chronic osteomyelitis of the coccyx  Left hip prosthesis status post closed reduction  Sacral decub ulcer with osteomyelitis  Hypotension: Labile blood pressure, massive third spacing  T2DM     PLAN:  End-stage renal disease patient with complex acute/subacute dislocated left hip hemiarthroplasty, chronic osteomyelitis of the coccyx:     Remains afebrile with no signs of catheter related bacteremia.   Electrolytes and volume status within acceptable range, no urgent indication for replacement therapy today.  For hemodialysis tomorrow per TTS schedule.  Ultrafiltration to estimated dry weight as tolerated  Anemia multifactorial continue to hold IV iron  Malnutrition to continue protein supplements 3 times daily  Strict input and output to guide volume management  Hypophosphatemia likely due to malnutrition to continue to encourage oral intake continue to hold Renvela in the acute setting.   Dislocated hip prosthetic joint  8.   Prognosis guarded    Strict input and output to guide volume management     Will follow, thank you!      Medications:    Current Facility-Administered Medications:     acetaminophen (Tylenol) oral liquid 650 mg, 650 mg, oral, q4h PRN, 650 mg at 04/13/25 1744 **OR** acetaminophen (Tylenol) tablet 650 mg, 650 mg, oral, q4h PRN, Krystin Epps MD, 650 mg at 04/14/25 0420    alteplase (Cathflo Activase) injection 2 mg, 2 mg, intra-catheter, PRN, Paul Guevara MD    apixaban (Eliquis) tablet 2.5 mg, 2.5 mg, oral, BID, Krystin Epps MD, 2.5 mg at 04/14/25 0954    benzocaine-menthol (Cepastat Sore Throat) lozenge 1 lozenge, 1 lozenge, Mouth/Throat, q2h PRN, Eliezer Dumont MD    cholecalciferol (Vitamin D-3) tablet 1,000 Units, 1,000 Units, oral, Daily, Eliezer Dumont MD, 1,000 Units at 04/14/25 0954     collagenase 250 unit/gram ointment, , Topical, Daily, Mary Avendano MD    docusate sodium (Colace) capsule 100 mg, 100 mg, oral, Daily, Paul Guevara MD, 100 mg at 04/13/25 0841    donepezil (Aricept) tablet 22.5 mg, 22.5 mg, oral, Daily, Eliezer Dumont MD, 22.5 mg at 04/14/25 0956    epoetin sheila-epbx (Retacrit) injection 10,000 Units, 10,000 Units, subcutaneous, Once per day on Monday Wednesday Friday, Carlene Norman MD, 10,000 Units at 04/14/25 0957    finasteride (Proscar) tablet 5 mg, 5 mg, oral, Daily, Eliezer Dumont MD, 5 mg at 04/14/25 0955    gabapentin (Neurontin) capsule 100 mg, 100 mg, oral, Once per day on Monday Wednesday Friday, Jesse Morgan DO, 100 mg at 04/07/25 1814    guaiFENesin (Mucinex) 12 hr tablet 600 mg, 600 mg, oral, q12h PRN, Eliezer Dumont MD, 600 mg at 04/09/25 0153    heparin 1,000 unit/mL injection 2,000 Units, 2,000 Units, intra-catheter, After Dialysis, Carlene Norman MD, 2,000 Units at 04/12/25 1349    insulin glargine (Lantus) injection 8 Units, 8 Units, subcutaneous, Nightly, Jesse Morgan DO, 8 Units at 04/13/25 2141    insulin lispro injection 0-10 Units, 0-10 Units, subcutaneous, TID AC, Eliezer Dumont MD, 4 Units at 04/13/25 1739    ipratropium-albuteroL (Duo-Neb) 0.5-2.5 mg/3 mL nebulizer solution 3 mL, 3 mL, nebulization, q2h PRN, Paul Guevara MD    levETIRAcetam (Keppra) tablet 500 mg, 500 mg, oral, BID, Eliezer Dumont MD, 500 mg at 04/14/25 0954    melatonin tablet 10 mg, 10 mg, oral, Nightly PRN, Jesse Morgna DO    metoprolol tartrate (Lopressor) tablet 50 mg, 50 mg, oral, BID, Eliezer Dumont MD, 50 mg at 04/14/25 0955    ondansetron (Zofran) tablet 4 mg, 4 mg, oral, q8h PRN **OR** ondansetron (Zofran) injection 4 mg, 4 mg, intravenous, q8h PRN, Eliezer Dumont MD, 4 mg at 03/31/25 1403    piperacillin-tazobactam (Zosyn) 3.375 g in dextrose (iso) IV 50 mL, 3.375 g, intravenous, q12h, Eliezer Dumont MD, Stopped at 04/14/25 1008     polyethylene glycol (Glycolax, Miralax) packet 17 g, 17 g, oral, Daily PRN, Eliezer Dumont MD    prochlorperazine (Compazine) tablet 10 mg, 10 mg, oral, q6h PRN **OR** prochlorperazine (Compazine) injection 10 mg, 10 mg, intravenous, q6h PRN **OR** prochlorperazine (Compazine) suppository 25 mg, 25 mg, rectal, q12h PRN, Eliezer Dumont MD    sevelamer carbonate (Renvela) tablet 1,600 mg, 1,600 mg, oral, BID, Paul Guevara MD, 1,600 mg at 04/14/25 0955    vancomycin (Vancocin) pharmacy to dose - pharmacy monitoring, , miscellaneous, Daily PRN, Krystin Epps MD    vitamin B complex-vitamin C-folic acid (Nephrocaps) capsule 1 capsule, 1 capsule, oral, Daily, Eliezer Dumont MD, 1 capsule at 04/14/25 0955    PERTINENT ROS:  GENERAL:  positive for fatigue, poor appetite.  Afebrile  RESPIRATORY:  positive for shortness of breath.  Negative for cough, wheezing.  CARDIOVASCULAR:   Negative for chest pain or palpitation.  GI:  Negative for abdominal pain, diarrhea, heartburn, nausea, vomiting  : External catheter in place with dark concentrated urine    Physical Exam:  Vital signs in last 24 hours:  Temp:  [36.3 °C (97.3 °F)-37.1 °C (98.8 °F)] 36.5 °C (97.7 °F)  Heart Rate:  [66-73] 69  Resp:  [16-18] 16  BP: (122-176)/(66-87) 156/87    General: Awake, oriented x 2 today  HEENT:  NCAT,  mucous membranes moist and pink  NECK: no JVD, no carotid bruit, supple, no cervical mass or thyromegaly  LUNGS;  Diminished breath sounds, no Rales  CV:  Distant, regular rate and rhythm, no murmurs  ABDOMEN:  abdomen soft, nontender, BS normal, no masses or organomegaly  EDEMA:  no lower extremity edema/dependent edema  SKIN: Left hip with a dressing in place      Intake/Output last 3 shifts:  No intake/output data recorded.    DATA:  Diagnotic tests reviewed for Todays visit:  Results from last 7 days   Lab Units 04/14/25  0416   WBC AUTO x10*3/uL 21.1*   RBC AUTO x10*6/uL 2.91*   HEMOGLOBIN g/dL 8.3*   HEMATOCRIT % 25.0*      Results from last 7 days   Lab Units 04/14/25  0416 04/10/25  0837 04/09/25  0708   SODIUM mmol/L 124*   < > 133*   POTASSIUM mmol/L 5.1   < > 4.8   CHLORIDE mmol/L 90*   < > 95*   CO2 mmol/L 27   < > 30   BUN mg/dL 41*   < > 39*   CREATININE mg/dL 4.21*   < > 4.11*   CALCIUM mg/dL 7.6*   < > 8.0*   MAGNESIUM mg/dL 2.18   < >  --    BILIRUBIN TOTAL mg/dL  --   --  0.4   ALT U/L  --   --  12   AST U/L  --   --  16    < > = values in this interval not displayed.           IMAGING: CXR reviewed in  images      SIGNATURE: Carlene Norman MD  Nephrology and Hypertension  [address] [place] [address]  Office phone: [phone]  FAX: [phone]    This note was partially generated using the Dragon voice recognition system, and there may be some incorrect words, spelling's and punctuation that were not noted in checking the note before saving.

## 2025-04-14 NOTE — CARE PLAN
Problem: Skin  Goal: Participates in plan/prevention/treatment measures  Flowsheets (Taken 4/14/2025 0541)  Participates in plan/prevention/treatment measures: Elevate heels  Goal: Decreased wound size/increased tissue granulation at next dressing change  Flowsheets (Taken 4/14/2025 0541)  Decreased wound size/increased tissue granulation at next dressing change: Promote sleep for wound healing  Goal: Prevent/manage excess moisture  Flowsheets (Taken 4/14/2025 0541)  Prevent/manage excess moisture: Moisturize dry skin  Goal: Prevent/minimize sheer/friction injuries  Flowsheets (Taken 4/14/2025 0541)  Prevent/minimize sheer/friction injuries: Use pull sheet  Goal: Promote/optimize nutrition  Flowsheets (Taken 4/14/2025 0541)  Promote/optimize nutrition: Offer water/supplements/favorite foods  Goal: Promote skin healing  Flowsheets (Taken 4/14/2025 0541)  Promote skin healing: Assess skin/pad under line(s)/device(s)   The patient's goals for the shift include Pt to not fall all shift    The clinical goals for the shift include Pt will have minimal pain this shift and received adequate rest.    Over the shift, the patient did not have minimal pain and received rest.

## 2025-04-14 NOTE — PROGRESS NOTES
"Nutrition Follow Up Assessment:   Nutrition Assessment         Patient is a 84 y.o. male presenting from [facility] with recent history of L hip hemiarthroplasty on 3/10/25.  ED notation indicates imaging showing dislocated left hip hemiarthroplasty and possible abscess.  Pt now s/p closed reduction on 3/31.  Wound care on consult for multiple abrasions and sacral pressure injury.  Per case management notation pt's spouse would like him to go to a new SNF that has on site HD.     4/8/25: Follow up note. Nephrology, ID, wound care and SLP on consult. Pt w/HD today and currently off the floor for testing. Pt in precautions for cdiff r/o.      Per meal documentation: 4/6 75%B and D, 4/5 0%B and 100% D, 4/4 25%B, 50%L, 4/3 100%D, 25%L.     4/14/25: Follow up note. Since last review pt has continued on regular textures.  MBSS had been recommended by ST however family deferred per SLP notation.  Pharmacy consulted on 4/11 for osteomyelitis/septic arthritis.  Hospice recommended for pt however family declined after discussion with continued plan for SNF placement.  Intakes since last review have been 50-75% of meals.     PMH: ESRD on hemodialysis Tuesday Thursday Saturday, hypertension, hyperlipidemia, type 2 diabetes mellitus, dementia       Nutrition History:  Energy Intake: Fair 50-75 %  Food and Nutrient History: 4/8  Pt states that his appetite has been good.  Denies recent weight loss.  Agreeable to oral supplement to consume additional protein.  Pt soft spoken but able to answer simple questions.  Per nursing he has eaten his meals well but pocketing meds with applesauce at times.       Anthropometrics:  Height: 182.8 cm (5' 11.97\")   Weight: 67.6 kg (149 lb)   BMI (Calculated): 20.23                            Weight History:    03/07/25 10:22 03/08/25 23:47 03/11/25 06:00 03/31/25 16:59   Weight 68.4 kg (150 lb 12.7 oz) [1] 70.3 kg (155 lb) 69.9 kg (154 lb) 67.6 kg (149 lb)   Weight Change %:  Significant Weight " Loss: No    Nutrition Focused Physical Exam Findings:  Completed on previous assessment  Physical Findings:  Skin: Positive (L hip surgical site, abrasions including on left knee, sacral open area)  Mouth Findings:  (nursing noting that pt pockets meds with applesauce at times but has eaten meals without difficulty)    Nutrition Significant Labs:  BMP Trend:   Results from last 7 days   Lab Units 04/14/25  0416 04/13/25  0440 04/12/25  0442 04/11/25  0514   GLUCOSE mg/dL 142* 114* 182* 159*   CALCIUM mg/dL 7.6* 7.4* 7.6* 8.0*   SODIUM mmol/L 124* 128* 130* 131*   POTASSIUM mmol/L 5.1 4.5 5.3 4.9   CO2 mmol/L 27 27 26 26   CHLORIDE mmol/L 90* 94* 95* 95*   BUN mg/dL 41* 30* 44* 34*   CREATININE mg/dL 4.21* 3.24* 4.61* 3.56*    , A1C:  Lab Results   Component Value Date    HGBA1C 6.9 (H) 04/04/2025       Nutrition Specific Medications:  Scheduled medications  apixaban, 2.5 mg, oral, BID  cholecalciferol, 1,000 Units, oral, Daily  collagenase, , Topical, Daily  docusate sodium, 100 mg, oral, Daily  donepezil, 22.5 mg, oral, Daily  epoetin sheila or biosimilar, 10,000 Units, subcutaneous, Once per day on Monday Wednesday Friday  finasteride, 5 mg, oral, Daily  gabapentin, 100 mg, oral, Once per day on Monday Wednesday Friday  heparin, 2,000 Units, intra-catheter, After Dialysis  insulin glargine, 8 Units, subcutaneous, Nightly  insulin lispro, 0-10 Units, subcutaneous, TID AC  levETIRAcetam, 500 mg, oral, BID  metoprolol tartrate, 50 mg, oral, BID  sevelamer carbonate, 1,600 mg, oral, BID  vitamin B complex-vitamin C-folic acid, 1 capsule, oral, Daily      I/O:   Last BM Date: 04/14/25; Stool Appearance: Loose, Soft (04/14/25 0422)    Dietary Orders (From admission, onward)       Start     Ordered    04/11/25 1243  Adult diet Renal; Potassium Restricted 2 gm (50mEq); 2 - 3 grams Sodium  Diet effective now        Question Answer Comment   Diet type Renal    Potassium restriction: Potassium Restricted 2 gm (50mEq)    Sodium  restriction: 2 - 3 grams Sodium        04/11/25 1242    04/01/25 1542  Oral nutritional supplements  Until discontinued        Question Answer Comment   Deliver with Lunch    Select supplement: Gelatein Sugar Free        04/01/25 1541    04/01/25 1541  Oral nutritional supplements  Until discontinued        Comments: chocolate   Question Answer Comment   Deliver with Breakfast    Deliver with Dinner    Select supplement: Glucerna Shake        04/01/25 1541    03/31/25 1716  May Not Participate in Room Service  ( ROOM SERVICE MAY NOT PARTICIPATE)  Once        Question:  .  Answer:  Yes    03/31/25 1715                     Estimated Needs:   Method for Estimating Needs: 1890-2230kcal or 28-33kcal/kg CBW  Method for Estimating 24 Hour Protein Needs: 81-101g or 1.2-1.5g/kg pro  Method for Estimating 24 Hour Fluid Needs: 1ml/kcal or per MD marquez        Nutrition Diagnosis   Malnutrition Diagnosis  Patient has Malnutrition Diagnosis: Yes  Diagnosis Status: New  Malnutrition Diagnosis: Moderate malnutrition related to acute disease or injury  Related to: acute on chronic nutritional stress in the setting of ESRD and sacral pressure injury  As Evidenced by: intakes meeting <75% of protein/calorie needs for > 7 days and NFPE showing signs of moderate muscle wasting/fat loss.    Nutrition Diagnosis  Patient has Nutrition Diagnosis: Yes  Diagnosis Status (1): Active  Nutrition Diagnosis 1: Increased nutrient needs  Related to (1): protein/calories  As Evidenced by (1): recent left hip fx s/p surgery with post op complications with increased protein needs at baseline from ESRD on HD.       Nutrition Interventions/Recommendations   Nutrition prescription for oral nutrition    Nutrition Recommendations:  Individualized Nutrition Prescription Provided for : Continue renal dietary modifiers at this time, suggest change to Nepro w/ Carbsteady BID with breakfast and dinner meal trays (420kcal, 19g pro)    Nutrition  Interventions/Goals:   Meals and Snacks: Carbohydrate-modified diet  Goal: offer food/drink every 2-3 hours while awake. Will consume 75% of meals  Goal: Nepro w/ Carbsteady BID with breakfast and dinner meal trays (420kcal, 19g pro), recommend Gelatein SF once daily with lunch for additional 80kcal, 20g pro  Coordination of Care with Providers: Nursing         Nutrition Monitoring and Evaluation   Food/Nutrient Related History Monitoring  Monitoring and Evaluation Plan: Intake / amount of food  Estimated Energy Intake: Energy intake 50 -75% of estimated energy needs  Intake / Amount of food: Consumes > or equal to 70% of supplement    Anthropometric Measurements  Monitoring and Evaluation Plan: Body weight  Body Weight: Body weight - Maintain stable weight    Biochemical Data, Medical Tests and Procedures  Monitoring and Evaluation Plan: Electrolyte/renal panel  Electrolyte and Renal Panel: Electrolytes within normal limits, BUN, Potassium, Phosphorus, GFR  Criteria: maintain wnl  Glucose/Endocrine Profile: Glucose within normal limits ( mg/dL)    Physical Exam Findings  Monitoring and Evaluation Plan: Skin  Skin Finding: Impaired wound healing - Improved wound healing    Goal Status: Some progress toward goal(s)    Time Spent (min): 45 minutes

## 2025-04-14 NOTE — CONSULTS
Wound Care Consult     Visit Date: 4/14/2025      Patient Name: Tyshawn Coles         MRN: [id number]           YOB: 1940     Reason for Consult: Sacral Wound         Wound History: Unstageable pressure injury     Pertinent Labs:   Albumin   Date Value Ref Range Status   04/09/2025 2.3 (L) 3.4 - 5.0 g/dL Final       Wound Assessment:  Wound 03/30/24 Elbow Dorsal;Right (Active)   State of Healing Closed wound edges 04/10/25 1600   Margins Attached edges 04/10/25 1600   Drainage Description None 04/10/25 2223   Dressing Open to air 04/13/25 2146       Wound 03/05/25 Abrasion Knee Left (Active)   Site Assessment Clean;Dry 04/06/25 2315   Karen-Wound Assessment Clean;Dry 04/06/25 2315   State of Healing Closed wound edges 04/10/25 1600   Margins Attached edges 04/10/25 1600   Drainage Description None 04/10/25 2223   Drainage Amount None 04/10/25 2223   Dressing Open to air 04/13/25 2146   Dressing Status Removed 04/06/25 0800       Wound 03/09/25 Buttock (Active)   Wound Image     04/09/25 0000   State of Healing Closed wound edges 04/10/25 1600   Margins Attached edges 04/10/25 1600   Drainage Description None 04/13/25 2146   Drainage Amount None 04/13/25 2146   Dressing Foam 04/13/25 2345   Dressing Changed Changed 04/13/25 2345   Dressing Status Clean;Dry 04/11/25 2015       Wound 03/10/25 Incision Hip Left;Proximal;Upper;Ventral (Active)   Site Assessment Clean;Dry 04/13/25 2146   Karen-Wound Assessment Clean;Dry 04/13/25 2146   State of Healing Closed wound edges 04/10/25 1600   Margins Attached edges 04/10/25 1600   Closure Open to air 04/09/25 2142   Sutures/Staple Line Approximated 04/09/25 2142   Drainage Description None 04/13/25 2146   Drainage Amount None 04/13/25 2146   Dressing Open to air 04/13/25 2146   Dressing Status Other (Comment) 04/11/25 2015       Wound  Leg Left;Upper (Active)   State of Healing Closed wound edges 04/10/25 1600   Margins Attached edges 04/10/25 1600   Drainage  Description None 04/10/25 1600   Drainage Amount None 04/07/25 0800   Dressing Silver dressing 04/09/25 0900   Dressing Status Removed 04/11/25 0815       Wound 04/07/25 Pressure Injury Scrotum Lower (Active)   Site Assessment Clean 04/13/25 2146   Karen-Wound Assessment Clean 04/13/25 2146   Pressure Injury Stage 2 04/08/25 2000   State of Healing Closed wound edges 04/10/25 1600   Margins Attached edges 04/10/25 1600   Drainage Description None 04/10/25 2223   Drainage Amount None 04/13/25 2146   Dressing Open to air 04/13/25 2146   Dressing Status Clean;Dry 04/11/25 2015       Wound Team Summary Assessment: Patient being seen for reassessment of sacral wound. Small amount of yellow drainage noted on dressing. Wound bed with yellow to brown slough tissue, wound edges well-defined and surrounding tissue intact. Right heel intact, left heel with dry stable eschar, no drainage noted, firm tissue. Current treatment of Medi honey dressing changes, with little or no improvement of wound bed.      Wound Team Plan: Recommendation for wound care -cleanse with NS, apply Santyl to wound bed, cover with NS moistened gauze and cover with foam dressing daily. Will follow.      Princess Easley RN  4/14/2025  11:22 AM

## 2025-04-14 NOTE — PROGRESS NOTES
Spiritual Care Visit  Spiritual Care Request    Reason for Visit:        Request Received From:       Focus of Care:  Visited With: Patient not available         Refer to :          Spiritual Care Assessment    Spiritual Assessment:                      Care Provided:       Sense of Community and or Gnosticist Affiliation:  Moravian         Addressed Needs/Concerns and/or Neal Through:          Outcome:        Advance Directives:         Spiritual Care Annotation    Annotation:  The patient was busy, so I'll try again.

## 2025-04-14 NOTE — PROGRESS NOTES
" INPATIENT PROGRESS NOTES    PATIENT NAME: Tyshawn Coles    MRN: [id number]  SERVICE DATE:  4/14/2025   SERVICE TIME:  12:26 PM    SIGNATURE: Elmer Du MD      ASSESSMENT :   -Fever resolved  -Leukocytosis resolving   -Ruled out left prosthetic hip abscess  -White blood cell tagged scan negative for infectious process  -C. difficile PCR negative  -Acute anemia  -Diarrhea r/o CDI    #Left prosthetic hip dislocation status post closed reduction  #Hypertension  #Hyperlipidemia  #End-stage renal disease on dialysis, which can affect the dosing of antimicrobials  #Type 2 diabetes, which is chronic condition that increases his risk for infection  #Dementia       PLAN:  -Negative repeat blood culture  -Negative COVID and flu PCR  -Completed antibiotics course, discontinue vancomycin and Zosyn  -Poor prognosis overall      SUBJECTIVE  Afebrile       OBJECTIVE  PHYSICAL EXAM:   Patient Vitals for the past 24 hrs:   BP Temp Temp src Pulse Resp SpO2   04/14/25 1200 (!) 166/97 36.5 °C (97.7 °F) Temporal 72 16 99 %   04/14/25 0800 156/87 36.5 °C (97.7 °F) Temporal 69 16 98 %   04/14/25 0400 122/70 36.7 °C (98.1 °F) Temporal 70 16 100 %   04/14/25 0100 123/66 36.3 °C (97.3 °F) Temporal 66 16 100 %   04/13/25 2000 176/72 37.1 °C (98.8 °F) Temporal 73 18 100 %   04/13/25 1600 145/78 36.3 °C (97.3 °F) Temporal 72 18 98 %         Gen: No fever  Respiratory: No distress     Labs:  Lab Results   Component Value Date    WBC 21.1 (H) 04/14/2025    HGB 8.3 (L) 04/14/2025    HCT 25.0 (L) 04/14/2025    MCV 86 04/14/2025     04/14/2025     Lab Results   Component Value Date    GLUCOSE 142 (H) 04/14/2025    CALCIUM 7.6 (L) 04/14/2025     (L) 04/14/2025    K 5.1 04/14/2025    CO2 27 04/14/2025    CL 90 (L) 04/14/2025    BUN 41 (H) 04/14/2025    CREATININE 4.21 (H) 04/14/2025   ESR: --No results found for: \"SEDRATE\"  Lab Results   Component Value Date    CRP 3.63 (A) 06/12/2021     Lab Results   Component Value Date    ALT 12 " "04/09/2025    AST 16 04/09/2025    ALKPHOS 59 04/09/2025    BILITOT 0.4 04/09/2025         DATA:   Diagnostic tests reviewed for today's visit:    Labs this admission reviewed  Imagings this admission reviewed  Cultures: Reviewed        Elmer Du MD.   Infectious Disease Attending            This note was partially created using voice recognition software and is inherently subject to errors including those of syntax and \"sound-alike\" substitutions which may escape proofreading. In such instances, original meaning may be extrapolated by contextual derivation      "

## 2025-04-15 ENCOUNTER — APPOINTMENT (OUTPATIENT)
Dept: DIALYSIS | Facility: HOSPITAL | Age: 85
End: 2025-04-15
Payer: MEDICARE

## 2025-04-15 LAB
ANION GAP SERPL CALC-SCNC: 15 MMOL/L (ref 10–20)
BACTERIA BLD CULT: NORMAL
BASOPHILS # BLD AUTO: 0.08 X10*3/UL (ref 0–0.1)
BASOPHILS NFR BLD AUTO: 0.3 %
BUN SERPL-MCNC: 48 MG/DL (ref 6–23)
CALCIUM SERPL-MCNC: 7.7 MG/DL (ref 8.6–10.3)
CHLORIDE SERPL-SCNC: 87 MMOL/L (ref 98–107)
CO2 SERPL-SCNC: 25 MMOL/L (ref 21–32)
CREAT SERPL-MCNC: 5.09 MG/DL (ref 0.5–1.3)
EGFRCR SERPLBLD CKD-EPI 2021: 10 ML/MIN/1.73M*2
EOSINOPHIL # BLD AUTO: 0.26 X10*3/UL (ref 0–0.4)
EOSINOPHIL NFR BLD AUTO: 1.1 %
ERYTHROCYTE [DISTWIDTH] IN BLOOD BY AUTOMATED COUNT: 17.7 % (ref 11.5–14.5)
GLUCOSE BLD MANUAL STRIP-MCNC: 134 MG/DL (ref 74–99)
GLUCOSE BLD MANUAL STRIP-MCNC: 179 MG/DL (ref 74–99)
GLUCOSE BLD MANUAL STRIP-MCNC: 184 MG/DL (ref 74–99)
GLUCOSE BLD MANUAL STRIP-MCNC: 74 MG/DL (ref 74–99)
GLUCOSE SERPL-MCNC: 66 MG/DL (ref 74–99)
HCT VFR BLD AUTO: 24.4 % (ref 41–52)
HGB BLD-MCNC: 8.4 G/DL (ref 13.5–17.5)
IMM GRANULOCYTES # BLD AUTO: 0.25 X10*3/UL (ref 0–0.5)
IMM GRANULOCYTES NFR BLD AUTO: 1.1 % (ref 0–0.9)
LYMPHOCYTES # BLD AUTO: 2.66 X10*3/UL (ref 0.8–3)
LYMPHOCYTES NFR BLD AUTO: 11.5 %
MAGNESIUM SERPL-MCNC: 2.43 MG/DL (ref 1.6–2.4)
MCH RBC QN AUTO: 28.1 PG (ref 26–34)
MCHC RBC AUTO-ENTMCNC: 34.4 G/DL (ref 32–36)
MCV RBC AUTO: 82 FL (ref 80–100)
MONOCYTES # BLD AUTO: 1.9 X10*3/UL (ref 0.05–0.8)
MONOCYTES NFR BLD AUTO: 8.2 %
NEUTROPHILS # BLD AUTO: 18.07 X10*3/UL (ref 1.6–5.5)
NEUTROPHILS NFR BLD AUTO: 77.8 %
NRBC BLD-RTO: 0.1 /100 WBCS (ref 0–0)
PLATELET # BLD AUTO: 353 X10*3/UL (ref 150–450)
POTASSIUM SERPL-SCNC: 5.5 MMOL/L (ref 3.5–5.3)
RBC # BLD AUTO: 2.99 X10*6/UL (ref 4.5–5.9)
SODIUM SERPL-SCNC: 121 MMOL/L (ref 136–145)
WBC # BLD AUTO: 23.2 X10*3/UL (ref 4.4–11.3)

## 2025-04-15 PROCEDURE — 8010000001 HC DIALYSIS - HEMODIALYSIS PER DAY

## 2025-04-15 PROCEDURE — 2500000001 HC RX 250 WO HCPCS SELF ADMINISTERED DRUGS (ALT 637 FOR MEDICARE OP): Performed by: STUDENT IN AN ORGANIZED HEALTH CARE EDUCATION/TRAINING PROGRAM

## 2025-04-15 PROCEDURE — 36415 COLL VENOUS BLD VENIPUNCTURE: CPT | Performed by: STUDENT IN AN ORGANIZED HEALTH CARE EDUCATION/TRAINING PROGRAM

## 2025-04-15 PROCEDURE — 1100000001 HC PRIVATE ROOM DAILY

## 2025-04-15 PROCEDURE — 82947 ASSAY GLUCOSE BLOOD QUANT: CPT

## 2025-04-15 PROCEDURE — 2500000001 HC RX 250 WO HCPCS SELF ADMINISTERED DRUGS (ALT 637 FOR MEDICARE OP): Performed by: ORTHOPAEDIC SURGERY

## 2025-04-15 PROCEDURE — 2500000004 HC RX 250 GENERAL PHARMACY W/ HCPCS (ALT 636 FOR OP/ED): Performed by: INTERNAL MEDICINE

## 2025-04-15 PROCEDURE — 99233 SBSQ HOSP IP/OBS HIGH 50: CPT

## 2025-04-15 PROCEDURE — 2500000001 HC RX 250 WO HCPCS SELF ADMINISTERED DRUGS (ALT 637 FOR MEDICARE OP)

## 2025-04-15 PROCEDURE — 85025 COMPLETE CBC W/AUTO DIFF WBC: CPT | Performed by: STUDENT IN AN ORGANIZED HEALTH CARE EDUCATION/TRAINING PROGRAM

## 2025-04-15 PROCEDURE — 83735 ASSAY OF MAGNESIUM: CPT | Performed by: STUDENT IN AN ORGANIZED HEALTH CARE EDUCATION/TRAINING PROGRAM

## 2025-04-15 PROCEDURE — 80048 BASIC METABOLIC PNL TOTAL CA: CPT | Performed by: STUDENT IN AN ORGANIZED HEALTH CARE EDUCATION/TRAINING PROGRAM

## 2025-04-15 RX ORDER — DEXTROSE 50 % IN WATER (D50W) INTRAVENOUS SYRINGE
25
Status: DISCONTINUED | OUTPATIENT
Start: 2025-04-15 | End: 2025-04-16 | Stop reason: HOSPADM

## 2025-04-15 RX ORDER — DEXTROSE 50 % IN WATER (D50W) INTRAVENOUS SYRINGE
12.5
Status: DISCONTINUED | OUTPATIENT
Start: 2025-04-15 | End: 2025-04-16 | Stop reason: HOSPADM

## 2025-04-15 RX ADMIN — APIXABAN 2.5 MG: 2.5 TABLET, FILM COATED ORAL at 08:56

## 2025-04-15 RX ADMIN — ACETAMINOPHEN 650 MG: 325 TABLET, FILM COATED ORAL at 21:29

## 2025-04-15 RX ADMIN — LEVETIRACETAM 500 MG: 500 TABLET, FILM COATED ORAL at 21:29

## 2025-04-15 RX ADMIN — FINASTERIDE 5 MG: 5 TABLET, FILM COATED ORAL at 08:56

## 2025-04-15 RX ADMIN — METOPROLOL TARTRATE 50 MG: 50 TABLET, FILM COATED ORAL at 21:29

## 2025-04-15 RX ADMIN — LEVETIRACETAM 500 MG: 500 TABLET, FILM COATED ORAL at 08:56

## 2025-04-15 RX ADMIN — Medication 1000 UNITS: at 08:56

## 2025-04-15 RX ADMIN — APIXABAN 2.5 MG: 2.5 TABLET, FILM COATED ORAL at 21:29

## 2025-04-15 RX ADMIN — COLLAGENASE SANTYL: 250 OINTMENT TOPICAL at 08:56

## 2025-04-15 RX ADMIN — METOPROLOL TARTRATE 50 MG: 50 TABLET, FILM COATED ORAL at 08:56

## 2025-04-15 RX ADMIN — HEPARIN SODIUM 2000 UNITS: 1000 INJECTION INTRAVENOUS; SUBCUTANEOUS at 13:41

## 2025-04-15 RX ADMIN — SEVELAMER CARBONATE 1600 MG: 800 TABLET, FILM COATED ORAL at 08:56

## 2025-04-15 RX ADMIN — Medication 1 CAPSULE: at 08:55

## 2025-04-15 RX ADMIN — SEVELAMER CARBONATE 1600 MG: 800 TABLET, FILM COATED ORAL at 21:28

## 2025-04-15 RX ADMIN — DONEPEZIL HYDROCHLORIDE 22.5 MG: 10 TABLET ORAL at 08:55

## 2025-04-15 ASSESSMENT — PAIN SCALES - WONG BAKER
WONGBAKER_NUMERICALRESPONSE: HURTS LITTLE BIT
WONGBAKER_NUMERICALRESPONSE: NO HURT

## 2025-04-15 ASSESSMENT — COGNITIVE AND FUNCTIONAL STATUS - GENERAL
MOBILITY SCORE: 6
TURNING FROM BACK TO SIDE WHILE IN FLAT BAD: TOTAL
WALKING IN HOSPITAL ROOM: TOTAL
STANDING UP FROM CHAIR USING ARMS: TOTAL
HELP NEEDED FOR BATHING: TOTAL
TOILETING: TOTAL
DRESSING REGULAR LOWER BODY CLOTHING: TOTAL
WALKING IN HOSPITAL ROOM: TOTAL
DAILY ACTIVITIY SCORE: 6
TOILETING: TOTAL
PERSONAL GROOMING: TOTAL
DAILY ACTIVITIY SCORE: 6
MOVING TO AND FROM BED TO CHAIR: TOTAL
MOBILITY SCORE: 6
PERSONAL GROOMING: TOTAL
STANDING UP FROM CHAIR USING ARMS: TOTAL
MOVING TO AND FROM BED TO CHAIR: TOTAL
CLIMB 3 TO 5 STEPS WITH RAILING: TOTAL
EATING MEALS: TOTAL
CLIMB 3 TO 5 STEPS WITH RAILING: TOTAL
EATING MEALS: TOTAL
MOVING FROM LYING ON BACK TO SITTING ON SIDE OF FLAT BED WITH BEDRAILS: TOTAL
MOVING FROM LYING ON BACK TO SITTING ON SIDE OF FLAT BED WITH BEDRAILS: TOTAL
TURNING FROM BACK TO SIDE WHILE IN FLAT BAD: TOTAL
DRESSING REGULAR LOWER BODY CLOTHING: TOTAL
HELP NEEDED FOR BATHING: TOTAL
DRESSING REGULAR UPPER BODY CLOTHING: TOTAL
DRESSING REGULAR UPPER BODY CLOTHING: TOTAL

## 2025-04-15 ASSESSMENT — PAIN SCALES - GENERAL
PAINLEVEL_OUTOF10: 0 - NO PAIN
PAINLEVEL_OUTOF10: 0 - NO PAIN

## 2025-04-15 ASSESSMENT — PAIN - FUNCTIONAL ASSESSMENT
PAIN_FUNCTIONAL_ASSESSMENT: 0-10
PAIN_FUNCTIONAL_ASSESSMENT: 0-10

## 2025-04-15 NOTE — CARE PLAN
The patient's goals for the shift include Pt to not fall all shift    The clinical goals for the shift include Patient will get adequate rest this shift. Patient will remain free from falls/injury. Patient will remain HD stable this shift.      Problem: Discharge Planning  Goal: Discharge to home or other facility with appropriate resources  Outcome: Progressing     Problem: Chronic Conditions and Co-morbidities  Goal: Patient's chronic conditions and co-morbidity symptoms are monitored and maintained or improved  Outcome: Progressing     Problem: Nutrition  Goal: Nutrient intake appropriate for maintaining nutritional needs  Outcome: Met     Problem: Skin  Goal: Participates in plan/prevention/treatment measures  Outcome: Progressing  Flowsheets (Taken 4/14/2025 0541 by Lizet Chou RN)  Participates in plan/prevention/treatment measures: Elevate heels  Goal: Decreased wound size/increased tissue granulation at next dressing change  Outcome: Progressing  Flowsheets (Taken 4/14/2025 0541 by Lizet Chou RN)  Decreased wound size/increased tissue granulation at next dressing change: Promote sleep for wound healing  Goal: Prevent/manage excess moisture  Outcome: Progressing  Flowsheets (Taken 4/14/2025 0541 by Lizet Chou RN)  Prevent/manage excess moisture: Moisturize dry skin  Goal: Prevent/minimize sheer/friction injuries  Outcome: Progressing  Flowsheets (Taken 4/14/2025 1349 by Starr Horta RN)  Prevent/minimize sheer/friction injuries: Use pull sheet       Problem: Diabetes  Goal: Maintain glucose levels >70mg/dl to <250mg/dl throughout shift  Outcome: Met

## 2025-04-15 NOTE — PROGRESS NOTES
04/15/25 1110   Discharge Planning   Home or Post Acute Services Post acute facilities (Rehab/SNF/etc)   Type of Post Acute Facility Services Long term care   Expected Discharge Disposition Inter  ([facility])     Message left with pt's wife to review discharge discharge options. [facility] can accept back, but pt would need to transfer back forth to HD. SW wanted to review the option of transfer to [facility] for onsite HD. Likely discharge tomorrow. Awaiting return call.    4:42 Spoke with pt's spouse and reviewed discharge options. She would still like pt to return to [facility]. [facility] is aware he would not be able to transport to HD in w/c and will order a stretcher to his HD treatments.

## 2025-04-15 NOTE — CARE PLAN
Problem: Skin  Goal: Participates in plan/prevention/treatment measures  Outcome: Progressing   The clinical goals for the shift include patient will remain HD throughout shift

## 2025-04-15 NOTE — PROGRESS NOTES
INPATIENT NEPHROLOGY CONSULT PROGRESS NOTE      Patient Name: Tyshawn Coles MRN: [id number]  DATE of SERVICE: April 15, 2025  TIME of SERVICE: 01:57 PM  CONSULTING SERVICE: Nephrology    REASON for CONSULT: ESRD    SUBJECTIVE:  Seen and evaluated at bedside.  Awake oriented to self.  Tolerating hemodialysis no issue with the procedure.  3 L ultrafiltration    SUMMARY OF STAY:  Mr. Coles is a 84 y.o. male who presented to [facility] March 31, 2025 for evaluation of left hip dislocation after remote fall.  Status post left hip hemiarthroplasty reduction April 1, 2025     Patient with past medical history significant for end-stage renal disease on hemodialysis per TTS schedule, hypertension, hyperlipidemia, diabetes mellitus, dementia, seizure disorder.   Patient seen and evaluated at bedside he is with advanced dementia unable to provide additional history.  Patient wife at bedside who was able to provide the majority of the history.  Recently patient had clotted AV graft and currently is receiving dialysis via right tunneled dialysis catheter.  He resides at [facility] and being transported to his dialysis unit.  He has been Edison lifted.  This admission patient presented to [facility] for evaluation of left hip pain and diagnosed with dislocated left hip hemiarthroplasty and scheduled to go to surgery this afternoon.    Dialysis prescription:  ESRD on hemodialysis  Tuesday Thursday Saturday  Primary nephrologist: Dr. Wise  Estimated dry weight: 67 kg  Duration: 3-1/2 hours  Intradialytic Midodrine  Right tunneled dialysis catheter in place        ASSESSMENT AND PLAN:  ESRD: iHD per Tuesday Thursday Saturday schedule  Volume status:  Hypovolemia, resolved   Electrolytes: Within acceptable range  Acid-base: Metabolic acidosis from renal failure (controlled with HD)  Bone mineral disease (Ca/P/PTH): controlled  Anemia from renal failure,  hemoglobin stable   Dislocated left hip hemiarthroplasty status postsurgery April 1  Chronic osteomyelitis of the coccyx  Left hip prosthesis status post closed reduction  Sacral decub ulcer with osteomyelitis  Hypotension: Labile blood pressure, massive third spacing  T2DM     PLAN:  End-stage renal disease patient with complex acute/subacute dislocated left hip hemiarthroplasty, chronic osteomyelitis of the coccyx:     Receiving hemodialysis today February 15, ultrafiltration 3 L well-tolerated no issue.   Hemodialysis per TTS schedule  Anemia multifactorial to continue Epogen  Malnutrition to continue protein supplements 3 times daily    Hypophosphatemia likely due to malnutrition to continue to encourage oral intake continue to hold Renvela in the acute setting.   Dislocated hip prosthetic joint  8.   Prognosis guarded    Strict input and output to guide volume management     Will follow, thank you!      Medications:    Current Facility-Administered Medications:     acetaminophen (Tylenol) oral liquid 650 mg, 650 mg, oral, q4h PRN, 650 mg at 04/13/25 1744 **OR** acetaminophen (Tylenol) tablet 650 mg, 650 mg, oral, q4h PRN, Krystin Epps MD, 650 mg at 04/14/25 0420    alteplase (Cathflo Activase) injection 2 mg, 2 mg, intra-catheter, PRN, Paul Guevara MD    apixaban (Eliquis) tablet 2.5 mg, 2.5 mg, oral, BID, Krystin Epps MD, 2.5 mg at 04/15/25 0856    benzocaine-menthol (Cepastat Sore Throat) lozenge 1 lozenge, 1 lozenge, Mouth/Throat, q2h PRN, Eliezer Dumont MD    cholecalciferol (Vitamin D-3) tablet 1,000 Units, 1,000 Units, oral, Daily, Eliezer Dumont MD, 1,000 Units at 04/15/25 0856    collagenase 250 unit/gram ointment, , Topical, Daily, Mary Avendano MD, Given at 04/15/25 0856    dextrose 50 % injection 12.5 g, 12.5 g, intravenous, q15 min PRN, Mary Avendano MD    dextrose 50 % injection 25 g, 25 g, intravenous, q15 min PRN, Mary Avendano MD    docusate sodium (Colace) capsule  100 mg, 100 mg, oral, Daily, Paul Guevara MD, 100 mg at 04/13/25 0841    donepezil (Aricept) tablet 22.5 mg, 22.5 mg, oral, Daily, Eliezer Dumont MD, 22.5 mg at 04/15/25 0855    epoetin hseila-epbx (Retacrit) injection 10,000 Units, 10,000 Units, subcutaneous, Once per day on Monday Wednesday Friday, Carlene Norman MD, 10,000 Units at 04/14/25 0957    finasteride (Proscar) tablet 5 mg, 5 mg, oral, Daily, Eliezer Dumont MD, 5 mg at 04/15/25 0856    gabapentin (Neurontin) capsule 100 mg, 100 mg, oral, Once per day on Monday Wednesday Friday, Jesse Morgan DO, 100 mg at 04/14/25 1547    glucagon (Glucagen) injection 1 mg, 1 mg, intramuscular, q15 min PRN, Mary Avendano MD    glucagon (Glucagen) injection 1 mg, 1 mg, intramuscular, q15 min PRN, Mary Avendano MD    guaiFENesin (Mucinex) 12 hr tablet 600 mg, 600 mg, oral, q12h PRN, Eliezer Dumont MD, 600 mg at 04/09/25 0153    heparin 1,000 unit/mL injection 2,000 Units, 2,000 Units, intra-catheter, After Dialysis, Carlene Norman MD, 2,000 Units at 04/15/25 1341    [Held by provider] insulin glargine (Lantus) injection 8 Units, 8 Units, subcutaneous, Nightly, Jesse Morgan DO, 8 Units at 04/14/25 2110    [Held by provider] insulin lispro injection 0-10 Units, 0-10 Units, subcutaneous, TID AC, Eliezer Dumont MD, 2 Units at 04/14/25 1547    ipratropium-albuteroL (Duo-Neb) 0.5-2.5 mg/3 mL nebulizer solution 3 mL, 3 mL, nebulization, q2h PRN, Paul Guevara MD    levETIRAcetam (Keppra) tablet 500 mg, 500 mg, oral, BID, Eliezer Dumont MD, 500 mg at 04/15/25 0856    melatonin tablet 10 mg, 10 mg, oral, Nightly PRN, Jesse Morgan DO    metoprolol tartrate (Lopressor) tablet 50 mg, 50 mg, oral, BID, Eliezer Dumont MD, 50 mg at 04/15/25 0856    ondansetron (Zofran) tablet 4 mg, 4 mg, oral, q8h PRN **OR** ondansetron (Zofran) injection 4 mg, 4 mg, intravenous, q8h PRN, Eliezer Dumont MD, 4 mg at 03/31/25 1403    polyethylene glycol (Glycolax, Miralax)  packet 17 g, 17 g, oral, Daily PRN, Eliezer Dumont MD    prochlorperazine (Compazine) tablet 10 mg, 10 mg, oral, q6h PRN **OR** prochlorperazine (Compazine) injection 10 mg, 10 mg, intravenous, q6h PRN **OR** prochlorperazine (Compazine) suppository 25 mg, 25 mg, rectal, q12h PRN, Eliezer Dumont MD    sevelamer carbonate (Renvela) tablet 1,600 mg, 1,600 mg, oral, BID, Paul Guevara MD, 1,600 mg at 04/15/25 0856    vitamin B complex-vitamin C-folic acid (Nephrocaps) capsule 1 capsule, 1 capsule, oral, Daily, Eliezer Dumont MD, 1 capsule at 04/15/25 0855    PERTINENT ROS:  GENERAL:  positive for fatigue, poor appetite.  Afebrile  RESPIRATORY:  positive for shortness of breath.  Negative for cough, wheezing.  CARDIOVASCULAR:   Negative for chest pain or palpitation.  GI:  Negative for abdominal pain, diarrhea, heartburn, nausea, vomiting  : External catheter in place with dark concentrated urine    Physical Exam:  Vital signs in last 24 hours:  Temp:  [36.2 °C (97.2 °F)-37.6 °C (99.7 °F)] 37.6 °C (99.7 °F)  Heart Rate:  [68-89] 88  Resp:  [16-22] 22  BP: (124-185)/() 129/65    General: Awake, oriented x 2 today  HEENT:  NCAT,  mucous membranes moist and pink  NECK: no JVD, no carotid bruit, supple, no cervical mass or thyromegaly  LUNGS;  Diminished breath sounds, no Rales  CV:  Distant, regular rate and rhythm, no murmurs  ABDOMEN:  abdomen soft, nontender, BS normal, no masses or organomegaly  EDEMA:  no lower extremity edema/dependent edema  SKIN: Left hip with a dressing in place      Intake/Output last 3 shifts:  I/O last 3 completed shifts:  In: 2420 (35.8 mL/kg) [P.O.:1620; I.V.:400 (5.9 mL/kg); Other:400]  Out: 3400 (50.3 mL/kg) [Other:3400]  Weight: 67.6 kg     DATA:  Diagnotic tests reviewed for Todays visit:  Results from last 7 days   Lab Units 04/15/25  0625   WBC AUTO x10*3/uL 23.2*   RBC AUTO x10*6/uL 2.99*   HEMOGLOBIN g/dL 8.4*   HEMATOCRIT % 24.4*     Results from last 7 days   Lab Units  04/15/25  0625 04/10/25  0837 04/09/25  0708   SODIUM mmol/L 121*   < > 133*   POTASSIUM mmol/L 5.5*   < > 4.8   CHLORIDE mmol/L 87*   < > 95*   CO2 mmol/L 25   < > 30   BUN mg/dL 48*   < > 39*   CREATININE mg/dL 5.09*   < > 4.11*   CALCIUM mg/dL 7.7*   < > 8.0*   MAGNESIUM mg/dL 2.43*   < >  --    BILIRUBIN TOTAL mg/dL  --   --  0.4   ALT U/L  --   --  12   AST U/L  --   --  16    < > = values in this interval not displayed.           IMAGING: CXR reviewed in  images      SIGNATURE: Carlene Norman MD  Nephrology and Hypertension  [address] [place] [address]  Office phone: [phone]  FAX: [phone]    This note was partially generated using the Dragon voice recognition system, and there may be some incorrect words, spelling's and punctuation that were not noted in checking the note before saving.

## 2025-04-15 NOTE — POST-PROCEDURE NOTE
Report to Receiving RN:    Report To: Starr   Time Report Called: [id number]  Hand-Off Communication: removed 3 Liters, ate 90% of his lunch. Post /77 HR 89 and weight 81kg  Complications During Treatment: No  Medications Administered During Dialysis: No  Blood Products Administered During Dialysis: No  Labs Sent During Dialysis: No  Heparin Drip Rate Changes: No  Dialysis Catheter Dressing: clean dry intact   Last Dressing Change: 4/12/2025    Electronic Signatures:  AE (Signed LENNY Brady)       Last Updated: 2:19 PM by KIMMY NEGRO

## 2025-04-15 NOTE — PROGRESS NOTES
"ID:  Tyshawn Coles is a 84 y.o. male on hospital day 15 of admission presenting with Abscess of hip.    Subjective   The patient seen and examined this morning at bedside. No overnight events.  He was eating his breakfast with the help of staff.  He denied any chest pain, shortness of breath, fever, nausea or vomiting.        Objective     Visit Vitals  /90 (BP Location: Right arm)   Pulse 77   Temp 36.3 °C (97.3 °F) (Temporal)   Resp 18   Ht 1.828 m (5' 11.97\")   Wt 67.6 kg (149 lb)   SpO2 93%   BMI 20.23 kg/m²   Smoking Status Former   BSA 1.85 m²        Physical Examination:  General: Appears stated age, well nourished, A&Ox2, mildly verbal, lying comfortably in bed, not in pain or distress. On RA.  HEENT: Mucous membranes moist.  Head/Neck: Atraumatic, Normocephalic. Neck supple.   Respiratory: equal air entry bilaterally no crackles or wheezing were appreciated.  Cardiovascular: regular rhythm, normal S1 and S2. No added sounds or murmurs,  Gastrointestinal: soft, non-tender abdomen, bowel sounds present, no guarding or rebound.   Extremities: No edema in lower extremities.  Psychological: Appropriate mood, normal affect.      Laboratory Data:    Results from last 7 days   Lab Units 04/15/25  0625 04/14/25  0416 04/13/25  1517   WBC AUTO x10*3/uL 23.2* 21.1* 24.0*   RBC AUTO x10*6/uL 2.99* 2.91* 3.01*   HEMOGLOBIN g/dL 8.4* 8.3* 8.6*     Results from last 7 days   Lab Units 04/15/25  0625 04/14/25  0416 04/13/25  0440 04/10/25  0837 04/09/25  0708   SODIUM mmol/L 121* 124* 128*   < > 133*   POTASSIUM mmol/L 5.5* 5.1 4.5   < > 4.8   CHLORIDE mmol/L 87* 90* 94*   < > 95*   CO2 mmol/L 25 27 27   < > 30   BUN mg/dL 48* 41* 30*   < > 39*   CREATININE mg/dL 5.09* 4.21* 3.24*   < > 4.11*   CALCIUM mg/dL 7.7* 7.6* 7.4*   < > 8.0*   MAGNESIUM mg/dL 2.43* 2.18 2.09   < >  --    BILIRUBIN TOTAL mg/dL  --   --   --   --  0.4   ALT U/L  --   --   --   --  12   AST U/L  --   --   --   --  16    < > = values in this " interval not displayed.       Imaging:  Imaging  No results found.    Cardiology, Vascular, and Other Imaging  No other imaging results found for the past 2 days       Medications:  Scheduled medications  apixaban, 2.5 mg, oral, BID  cholecalciferol, 1,000 Units, oral, Daily  collagenase, , Topical, Daily  docusate sodium, 100 mg, oral, Daily  donepezil, 22.5 mg, oral, Daily  epoetin sheila or biosimilar, 10,000 Units, subcutaneous, Once per day on Monday Wednesday Friday  finasteride, 5 mg, oral, Daily  gabapentin, 100 mg, oral, Once per day on Monday Wednesday Friday  heparin, 2,000 Units, intra-catheter, After Dialysis  [Held by provider] insulin glargine, 8 Units, subcutaneous, Nightly  [Held by provider] insulin lispro, 0-10 Units, subcutaneous, TID AC  levETIRAcetam, 500 mg, oral, BID  metoprolol tartrate, 50 mg, oral, BID  sevelamer carbonate, 1,600 mg, oral, BID  vitamin B complex-vitamin C-folic acid, 1 capsule, oral, Daily      Continuous medications     PRN medications  PRN medications: acetaminophen **OR** acetaminophen, alteplase, benzocaine-menthol, dextrose, dextrose, glucagon, glucagon, guaiFENesin, ipratropium-albuteroL, melatonin, ondansetron **OR** ondansetron, polyethylene glycol, prochlorperazine **OR** prochlorperazine **OR** prochlorperazine    Assessment    Assessment & Plan   Mr. Tyshawn Coles is a 84 y.o. male who presents with PMH ESRD on HD brought from outside hospital for posterior dislocated hip.  Patient is medically stable for discharge.  Detailed plan as below.  Assessment & Plan       Updates 04/15/25  -He is planned to have HD session today.    # Recent left hip replacement now with posterior superior dislocation of the left hip hemiarthroplasty with surrounding fluid collection  -S/p L hip hemiarthroplasty 3/10/25  -Orthopedic surgery reduced hip under general anesthesia on 3/31  -CT imaging with soft tissue air-fluid collection around L hip, ortho feels it is likely post operative  "changes and hematoma, felt no indication for aspiration  -CT hip stat showed posterior dislocation of hip  -ortho spoke to wife at length and attempted bedside reduction on 4/11.   -Pt is a high risk surgical candidate due to comorbidities. Per discussion with ortho, the hip will continue to dislocate even with routine movements such as nursing staff hygiene attempts  -Will continue pain control and plan for SNF placement  Weightbearing as tolerated for transfers related purposes only  Palliative care followed.   ---Per prior attending: \"Discussed with wife 4/9 that palliative/hospice approach is reasonable. She elected against hospice 4/10.\" GOC discussions completed again on 4/11-family continues to decline hospice at this time     # Bacteroides caccae bacteremia prior to admission  # Leukocytosis-downtrending  -WBC today 21.1 down from 24.0 yesterday.  -Blood culture from [facility] 3/30 negative  -Blood cultures from the HD line 4/11 negative after 3 days  -C diff PCR negative, PO vanc stopped 4/10  -ID/renal updated. Discussed whether to to culture HD line as this is a possible source of infection. In addition pt has chronic sacral wound, sacral OM  -ortho does not feel fluid collection on CT scan is a culprit of infection and in addition tagged WBC scan was negative  -follow up cultures-NGTD     # Encephalopathy   Currently oriented x1/2, close to baseline  TSH and NH3 wnl  Goals of care as above     # Decubitus ulcer  Continue with localized wound care  Continue with supportive care and frequent turning  [facility]     # Anemia   -In setting of ESRD, baseline Hbg ~7-9 range  -Received IV Iron, Epogen per nephrology   -Hb 8.3 this AM.  Will continue monitor     # Atrial Fibrillation  -Continue Eliquis     # ESRD on hemodialysis Tuesday Thursday Saturday  Nephrology following  Continue with Renleida Nephrocaps     # Hypertension  Continue metoprolol     #Dementia  Continue with the " Aricept  Continue with Keppra     #  Type 2 diabetes mellitus  A1c 6.9 on 4/4/25  Continue Lantus and SSI     # BPH  Continue with finasteride  Urine culture negative 3/31        Global Plan of Care  Fluid: PRN  Electrolytes: PRN  Nutrition: Renal diet  DVT Prophylaxis: Eliquis  GI Prophylaxis: None  Disposition: Anticipate discharge tomorrow  Code Status: DNR and No Intubation     Emergency Contact: Extended Emergency Contact Information  Primary Emergency Contact: Unique Coles  Mobile Phone: [phone]  Relation: Spouse   needed? No  Secondary Emergency Contact: ZORAIDA COLES  Mobile Phone: [phone]  Relation: Son  Preferred language: English   needed? No     Patient seen and discussed with the attending, Dr. Epps.  Signature: Mary Avendano MD, PGY III Internal medicine  Date: April 15, 2025   Please excuse any misspellings or unintended errors related to the Dragon speech recognition software used to dictate this note.

## 2025-04-15 NOTE — PROGRESS NOTES
" INPATIENT PROGRESS NOTES    PATIENT NAME: Tyshawn Coles    MRN: [id number]  SERVICE DATE:  4/15/2025   SERVICE TIME:  12:51 PM    SIGNATURE: Elmer Du MD      ASSESSMENT :   -Fever resolved  -Leukocytosis  -Ruled out left prosthetic hip abscess  -White blood cell tagged scan negative for infectious process  -C. difficile PCR negative  -Acute anemia  -Diarrhea r/o CDI    #Left prosthetic hip dislocation status post closed reduction  #Hypertension  #Hyperlipidemia  #End-stage renal disease on dialysis, which can affect the dosing of antimicrobials  #Type 2 diabetes, which is chronic condition that increases his risk for infection  #Dementia       PLAN:  - Remains stable 24 hours of antibiotics  - ID team will sign off please call back for any questions    SUBJECTIVE  Afebrile       OBJECTIVE  PHYSICAL EXAM:   Patient Vitals for the past 24 hrs:   BP Temp Temp src Pulse Resp SpO2   04/15/25 1230 124/90 -- -- 77 -- --   04/15/25 1200 (!) 185/109 36.3 °C (97.3 °F) Temporal 68 18 --   04/15/25 0959 -- 36.3 °C (97.3 °F) Temporal 78 -- --   04/15/25 0800 (!) 126/49 36.9 °C (98.4 °F) Temporal 78 18 93 %   04/15/25 0400 (!) 180/94 36.6 °C (97.9 °F) Temporal 72 20 100 %   04/15/25 0000 175/74 36.8 °C (98.2 °F) Temporal 69 16 99 %   04/14/25 2000 128/81 36.5 °C (97.7 °F) Temporal 75 16 98 %   04/14/25 1600 (!) 143/98 36.5 °C (97.7 °F) Temporal 68 16 98 %         Gen: No fever  Respiratory: No distress     Labs:  Lab Results   Component Value Date    WBC 23.2 (H) 04/15/2025    HGB 8.4 (L) 04/15/2025    HCT 24.4 (L) 04/15/2025    MCV 82 04/15/2025     04/15/2025     Lab Results   Component Value Date    GLUCOSE 66 (L) 04/15/2025    CALCIUM 7.7 (L) 04/15/2025     (L) 04/15/2025    K 5.5 (H) 04/15/2025    CO2 25 04/15/2025    CL 87 (L) 04/15/2025    BUN 48 (H) 04/15/2025    CREATININE 5.09 (H) 04/15/2025   ESR: --No results found for: \"SEDRATE\"  Lab Results   Component Value Date    CRP 3.63 (A) 06/12/2021     Lab " "Results   Component Value Date    ALT 12 04/09/2025    AST 16 04/09/2025    ALKPHOS 59 04/09/2025    BILITOT 0.4 04/09/2025         DATA:   Diagnostic tests reviewed for today's visit:    Labs this admission reviewed  Imagings this admission reviewed  Cultures: Reviewed        Elmer Du MD.   Infectious Disease Attending            This note was partially created using voice recognition software and is inherently subject to errors including those of syntax and \"sound-alike\" substitutions which may escape proofreading. In such instances, original meaning may be extrapolated by contextual derivation      "

## 2025-04-16 VITALS
RESPIRATION RATE: 18 BRPM | HEART RATE: 70 BPM | DIASTOLIC BLOOD PRESSURE: 82 MMHG | OXYGEN SATURATION: 98 % | WEIGHT: 149 LBS | BODY MASS INDEX: 20.18 KG/M2 | SYSTOLIC BLOOD PRESSURE: 157 MMHG | TEMPERATURE: 97.2 F | HEIGHT: 72 IN

## 2025-04-16 LAB
ALBUMIN SERPL BCP-MCNC: 2.2 G/DL (ref 3.4–5)
ANION GAP SERPL CALC-SCNC: 12 MMOL/L
BASOPHILS # BLD AUTO: 0.09 X10*3/UL (ref 0–0.1)
BASOPHILS NFR BLD AUTO: 0.4 %
BUN SERPL-MCNC: 35 MG/DL (ref 6–23)
CALCIUM SERPL-MCNC: 7.4 MG/DL (ref 8.6–10.3)
CHLORIDE SERPL-SCNC: 95 MMOL/L (ref 98–107)
CO2 SERPL-SCNC: 26 MMOL/L (ref 21–32)
CREAT SERPL-MCNC: 3.9 MG/DL (ref 0.5–1.3)
EGFRCR SERPLBLD CKD-EPI 2021: 14 ML/MIN/1.73M*2
EOSINOPHIL # BLD AUTO: 0.19 X10*3/UL (ref 0–0.4)
EOSINOPHIL NFR BLD AUTO: 0.9 %
ERYTHROCYTE [DISTWIDTH] IN BLOOD BY AUTOMATED COUNT: 18.1 % (ref 11.5–14.5)
GLUCOSE BLD MANUAL STRIP-MCNC: 185 MG/DL (ref 74–99)
GLUCOSE BLD MANUAL STRIP-MCNC: 204 MG/DL (ref 74–99)
GLUCOSE BLD MANUAL STRIP-MCNC: 270 MG/DL (ref 74–99)
GLUCOSE SERPL-MCNC: 192 MG/DL (ref 74–99)
HCT VFR BLD AUTO: 22 % (ref 41–52)
HGB BLD-MCNC: 7.4 G/DL (ref 13.5–17.5)
IMM GRANULOCYTES # BLD AUTO: 0.21 X10*3/UL (ref 0–0.5)
IMM GRANULOCYTES NFR BLD AUTO: 1 % (ref 0–0.9)
LYMPHOCYTES # BLD AUTO: 1.36 X10*3/UL (ref 0.8–3)
LYMPHOCYTES NFR BLD AUTO: 6.6 %
MAGNESIUM SERPL-MCNC: 2.22 MG/DL (ref 1.6–2.4)
MCH RBC QN AUTO: 28.7 PG (ref 26–34)
MCHC RBC AUTO-ENTMCNC: 33.6 G/DL (ref 32–36)
MCV RBC AUTO: 85 FL (ref 80–100)
MONOCYTES # BLD AUTO: 1.92 X10*3/UL (ref 0.05–0.8)
MONOCYTES NFR BLD AUTO: 9.3 %
NEUTROPHILS # BLD AUTO: 16.8 X10*3/UL (ref 1.6–5.5)
NEUTROPHILS NFR BLD AUTO: 81.8 %
NRBC BLD-RTO: 0 /100 WBCS (ref 0–0)
PHOSPHATE SERPL-MCNC: 4.6 MG/DL (ref 2.5–4.9)
PLATELET # BLD AUTO: 275 X10*3/UL (ref 150–450)
POTASSIUM SERPL-SCNC: 4.5 MMOL/L (ref 3.5–5.3)
RBC # BLD AUTO: 2.58 X10*6/UL (ref 4.5–5.9)
SODIUM SERPL-SCNC: 128 MMOL/L (ref 136–145)
WBC # BLD AUTO: 20.6 X10*3/UL (ref 4.4–11.3)

## 2025-04-16 PROCEDURE — 2500000002 HC RX 250 W HCPCS SELF ADMINISTERED DRUGS (ALT 637 FOR MEDICARE OP, ALT 636 FOR OP/ED)

## 2025-04-16 PROCEDURE — 2500000004 HC RX 250 GENERAL PHARMACY W/ HCPCS (ALT 636 FOR OP/ED): Performed by: INTERNAL MEDICINE

## 2025-04-16 PROCEDURE — 99239 HOSP IP/OBS DSCHRG MGMT >30: CPT

## 2025-04-16 PROCEDURE — 2500000001 HC RX 250 WO HCPCS SELF ADMINISTERED DRUGS (ALT 637 FOR MEDICARE OP): Performed by: ORTHOPAEDIC SURGERY

## 2025-04-16 PROCEDURE — 85025 COMPLETE CBC W/AUTO DIFF WBC: CPT | Performed by: STUDENT IN AN ORGANIZED HEALTH CARE EDUCATION/TRAINING PROGRAM

## 2025-04-16 PROCEDURE — 36415 COLL VENOUS BLD VENIPUNCTURE: CPT | Performed by: STUDENT IN AN ORGANIZED HEALTH CARE EDUCATION/TRAINING PROGRAM

## 2025-04-16 PROCEDURE — 83735 ASSAY OF MAGNESIUM: CPT | Performed by: STUDENT IN AN ORGANIZED HEALTH CARE EDUCATION/TRAINING PROGRAM

## 2025-04-16 PROCEDURE — 82947 ASSAY GLUCOSE BLOOD QUANT: CPT

## 2025-04-16 PROCEDURE — 80069 RENAL FUNCTION PANEL: CPT

## 2025-04-16 PROCEDURE — 2500000001 HC RX 250 WO HCPCS SELF ADMINISTERED DRUGS (ALT 637 FOR MEDICARE OP): Performed by: STUDENT IN AN ORGANIZED HEALTH CARE EDUCATION/TRAINING PROGRAM

## 2025-04-16 RX ADMIN — Medication 1 CAPSULE: at 10:12

## 2025-04-16 RX ADMIN — LEVETIRACETAM 500 MG: 500 TABLET, FILM COATED ORAL at 10:08

## 2025-04-16 RX ADMIN — METOPROLOL TARTRATE 50 MG: 50 TABLET, FILM COATED ORAL at 10:09

## 2025-04-16 RX ADMIN — FINASTERIDE 5 MG: 5 TABLET, FILM COATED ORAL at 10:11

## 2025-04-16 RX ADMIN — APIXABAN 2.5 MG: 2.5 TABLET, FILM COATED ORAL at 10:11

## 2025-04-16 RX ADMIN — DONEPEZIL HYDROCHLORIDE 22.5 MG: 10 TABLET ORAL at 10:08

## 2025-04-16 RX ADMIN — SEVELAMER CARBONATE 1600 MG: 800 TABLET, FILM COATED ORAL at 10:12

## 2025-04-16 RX ADMIN — Medication 1000 UNITS: at 10:12

## 2025-04-16 RX ADMIN — INSULIN LISPRO 2 UNITS: 100 INJECTION, SOLUTION INTRAVENOUS; SUBCUTANEOUS at 10:12

## 2025-04-16 ASSESSMENT — COGNITIVE AND FUNCTIONAL STATUS - GENERAL
MOVING FROM LYING ON BACK TO SITTING ON SIDE OF FLAT BED WITH BEDRAILS: TOTAL
EATING MEALS: TOTAL
PERSONAL GROOMING: TOTAL
DRESSING REGULAR UPPER BODY CLOTHING: TOTAL
CLIMB 3 TO 5 STEPS WITH RAILING: TOTAL
MOBILITY SCORE: 6
TOILETING: TOTAL
DRESSING REGULAR LOWER BODY CLOTHING: TOTAL
HELP NEEDED FOR BATHING: TOTAL
STANDING UP FROM CHAIR USING ARMS: TOTAL
MOVING TO AND FROM BED TO CHAIR: TOTAL
DAILY ACTIVITIY SCORE: 6
TURNING FROM BACK TO SIDE WHILE IN FLAT BAD: TOTAL
WALKING IN HOSPITAL ROOM: TOTAL

## 2025-04-16 ASSESSMENT — PAIN SCALES - GENERAL: PAINLEVEL_OUTOF10: 0 - NO PAIN

## 2025-04-16 ASSESSMENT — PAIN - FUNCTIONAL ASSESSMENT: PAIN_FUNCTIONAL_ASSESSMENT: 0-10

## 2025-04-16 NOTE — CARE PLAN
Problem: Discharge Planning  Goal: Discharge to home or other facility with appropriate resources  4/16/2025 1355 by Nereyda Palma RN  Outcome: Adequate for Discharge  4/16/2025 1031 by Nereyda Palma RN  Outcome: Progressing     Problem: Chronic Conditions and Co-morbidities  Goal: Patient's chronic conditions and co-morbidity symptoms are monitored and maintained or improved  4/16/2025 1355 by Nereyda Palma RN  Outcome: Adequate for Discharge  4/16/2025 1031 by Nereyda Palma RN  Outcome: Progressing     Problem: Skin  Goal: Participates in plan/prevention/treatment measures  4/16/2025 1355 by Nereyda Palma RN  Outcome: Adequate for Discharge  4/16/2025 1031 by Nreeyda Palma RN  Outcome: Progressing  Goal: Decreased wound size/increased tissue granulation at next dressing change  4/16/2025 1355 by Nereyda Palma RN  Outcome: Adequate for Discharge  4/16/2025 1031 by Nereyda Palma RN  Outcome: Progressing  Goal: Prevent/manage excess moisture  4/16/2025 1355 by Nereyda Palma RN  Outcome: Adequate for Discharge  4/16/2025 1031 by Nereyda Palma RN  Outcome: Progressing  Goal: Prevent/minimize sheer/friction injuries  4/16/2025 1355 by Nereyda Palma RN  Outcome: Adequate for Discharge  4/16/2025 1031 by Nereyda Palma RN  Outcome: Progressing  Goal: Promote/optimize nutrition  4/16/2025 1355 by Nereyda Palma RN  Outcome: Adequate for Discharge  4/16/2025 1031 by Nereyda Palma RN  Outcome: Progressing  Goal: Promote skin healing  4/16/2025 1355 by Nereyda Palma RN  Outcome: Adequate for Discharge  4/16/2025 1031 by Nereyda Palma RN  Outcome: Progressing

## 2025-04-16 NOTE — PROGRESS NOTES
INPATIENT NEPHROLOGY CONSULT PROGRESS NOTE      Patient Name: Tyshawn Coles MRN: [id number]  DATE of SERVICE: April 16, 2025  TIME of SERVICE: 10:23 AM  CONSULTING SERVICE: Nephrology    REASON for CONSULT: ESRD    SUBJECTIVE:  Seen and evaluated at bedside.  Awake and alert today. No acute complaints.     SUMMARY OF STAY:  Mr. Coles is a 84 y.o. male who presented to [facility] March 31, 2025 for evaluation of left hip dislocation after remote fall.  Status post left hip hemiarthroplasty reduction April 1, 2025     Patient with past medical history significant for end-stage renal disease on hemodialysis per TTS schedule, hypertension, hyperlipidemia, diabetes mellitus, dementia, seizure disorder.   Patient seen and evaluated at bedside he is with advanced dementia unable to provide additional history.  Patient wife at bedside who was able to provide the majority of the history.  Recently patient had clotted AV graft and currently is receiving dialysis via right tunneled dialysis catheter.  He resides at [facility] and being transported to his dialysis unit.  He has been [facility] lifted.  This admission patient presented to [facility] for evaluation of left hip pain and diagnosed with dislocated left hip hemiarthroplasty and scheduled to go to surgery this afternoon.    Dialysis prescription:  ESRD on hemodialysis  Tuesday Thursday Saturday  Primary nephrologist: Dr. Wise  Estimated dry weight: 67 kg  Duration: 3-1/2 hours  Intradialytic Midodrine  Right tunneled dialysis catheter in place.      ASSESSMENT AND PLAN:  ESRD: iHD per Tuesday Thursday Saturday schedule  Volume status:  Hypovolemia, resolved   Electrolytes: Within acceptable range  Acid-base: Metabolic acidosis from renal failure (controlled with HD)  Bone mineral disease (Ca/P/PTH): controlled  Anemia from renal failure, hemoglobin stable   Dislocated left hip hemiarthroplasty  status postsurgery April 1  Chronic osteomyelitis of the coccyx  Left hip prosthesis status post closed reduction  Sacral decub ulcer with osteomyelitis  BP stable   T2DM     PLAN:  End-stage renal disease patient with complex acute/subacute dislocated left hip hemiarthroplasty, chronic osteomyelitis of the coccyx:     Electrolytes and volume status within acceptable range, no urgent indication for replacement therapy today.  For hemodialysis tomorrow per TTS schedule.    Hemodialysis per TTS schedule.   Anemia multifactorial to continue Epogen.   Malnutrition to continue protein supplements 3 times daily  Hypophosphatemia likely due to malnutrition to continue to encourage oral intake continue to hold Renvela in the acute setting.   Dislocated hip prosthetic joint stable.     Clear for discharge from renal standpoint.    Medications:  No current facility-administered medications for this encounter.    Current Outpatient Medications:     acetaminophen (Tylenol) 325 mg tablet, Take 2 tablets (650 mg) by mouth every 4 hours., Disp: , Rfl:     albuterol 1.25 mg/3 mL nebulizer solution, Take 3 mL (1.25 mg) by nebulization every 6 hours if needed for wheezing., Disp: , Rfl:     apixaban (Eliquis) 2.5 mg tablet, Take 1 tablet (2.5 mg) by mouth 2 times a day., Disp: , Rfl:     bisacodyl (Dulcolax) 10 mg suppository, Insert 1 suppository (10 mg) into the rectum every 24 (twenty four) hours if needed for constipation., Disp: , Rfl:     cholecalciferol (Vitamin D-3) 25 MCG (1000 UT) capsule, Take 1 capsule (25 mcg) by mouth once daily., Disp: , Rfl:     docusate sodium (Colace) 100 mg capsule, Take 1 capsule (100 mg) by mouth once daily., Disp: , Rfl:     donepezil (Aricept) 23 mg tablet, Take 1 tablet (23 mg) by mouth once daily., Disp: , Rfl:     finasteride (Proscar) 5 mg tablet, Take 1 tablet (5 mg) by mouth once daily., Disp: , Rfl:     glucose (Glutose) 40 % gel oral gel, Take 15 g by mouth 1 time if needed for low  blood sugar - see comments. Give 1 tube (15g) po if BG<70 and symptomatic or BG= 60 or without any symptoms. May repeat with 1 tube in 15 min if needed, Disp: , Rfl:     insulin glargine (Lantus U-100 Insulin) 100 unit/mL injection, Inject 5 Units under the skin once daily at bedtime. Take as directed per insulin instructions., Disp: , Rfl:     insulin lispro 100 unit/mL injection, Inject under the skin 3 times daily (morning, midday, late afternoon). Per sliding scale <150 = 0 units 151-200 = 2 units 201-250 = 4 units 251-300 = 6 units 301-350 = 8 units 351-400 = 10 units >400 = call MD, Disp: , Rfl:     levETIRAcetam (Keppra) 500 mg tablet, Take 1 tablet (500 mg) by mouth 2 times a day., Disp: , Rfl:     loperamide (Imodium A-D) 2 mg tablet, Take 1 tablet (2 mg) by mouth every 12 hours if needed for diarrhea., Disp: , Rfl:     magnesium hydroxide (Milk of Magnesia) 400 mg/5 mL suspension, Take 30 mL by mouth every 24 (twenty four) hours if needed for constipation., Disp: , Rfl:     melatonin 10 mg tablet, Take 1 tablet (10 mg) by mouth once daily at bedtime., Disp: , Rfl:     metoprolol tartrate (Lopressor) 50 mg tablet, Take 1 tablet by mouth 2 times a day., Disp: , Rfl:     ondansetron (Zofran) 4 mg tablet, Take 1 tablet (4 mg) by mouth every 6 hours if needed for nausea or vomiting., Disp: , Rfl:     oxyCODONE (Roxicodone) 5 mg immediate release tablet, Take 1 tablet (5 mg) by mouth every 6 hours if needed for severe pain (7 - 10)., Disp: , Rfl:     oxyCODONE (Roxicodone) 5 mg immediate release tablet, Take 0.5 tablets (2.5 mg) by mouth every 6 hours if needed for moderate pain (4 - 6)., Disp: , Rfl:     polyethylene glycol (Glycolax, Miralax) 17 gram packet, Take 17 g by mouth once daily as needed (for constipation)., Disp: , Rfl:     sevelamer carbonate (Renvela) 0.8 gram packet, Take 2 packets (1.6 g) by mouth 2 times a day., Disp: , Rfl:     Virt-Caps 1 mg capsule, Take 1 capsule by mouth once daily., Disp:  , Rfl:     wound dressing (Triad Wound Dressing) paste, Apply 1 Application topically once daily., Disp: , Rfl:     acetaminophen (Tylenol) 650 mg suppository, Insert 1 suppository (650 mg) into the rectum if needed for mild pain (1 - 3), moderate pain (4 - 6) or fever (temp greater than 38.0 C)., Disp: , Rfl:     collagenase (SantyL) 250 unit/gram ointment, Apply topically once daily as directed., Disp: 30 g, Rfl: 0    glucagon HCL (Glucagon, HCl, Emergency Kit) 1 mg recon soln, Inject 1 mg as directed if needed (hypoglycemic emergency)., Disp: , Rfl:     sodium phosphates (Fleet, Pediatric,) 9.5-3.5 gram/59 mL enema, Insert 1 enema into the rectum once every 24 hours., Disp: , Rfl:     PERTINENT ROS:  GENERAL:  positive for fatigue, poor appetite.  Afebrile  RESPIRATORY:  positive for shortness of breath.  Negative for cough, wheezing.  CARDIOVASCULAR:   Negative for chest pain or palpitation.  GI:  Negative for abdominal pain, diarrhea, heartburn, nausea, vomiting  : External catheter in place with dark concentrated urine    Physical Exam:  Vital signs in last 24 hours:  Temp:  [36 °C (96.8 °F)-37.7 °C (99.9 °F)] 36.2 °C (97.2 °F)  Heart Rate:  [68-85] 70  Resp:  [16-24] 18  BP: (108-176)/(45-82) 157/82    General: Awake, oriented x 2  HEENT:  NCAT,  mucous membranes moist and pink  NECK: no JVD, no carotid bruit, supple, no cervical mass or thyromegaly  LUNGS;  Diminished breath sounds, no Rales  CV:  Distant, regular rate and rhythm, no murmurs  ABDOMEN:  abdomen soft, nontender, BS normal, no masses or organomegaly  EDEMA:  no lower extremity edema/dependent edema  AVF patent.         Intake/Output last 3 shifts:  I/O last 3 completed shifts:  In: 2320 (34.3 mL/kg) [P.O.:1520; I.V.:400 (5.9 mL/kg); Other:400]  Out: 3400 (50.3 mL/kg) [Other:3400]  Weight: 67.6 kg     DATA:  Diagnotic tests reviewed for Todays visit:  Results from last 7 days   Lab Units 04/16/25  0536   WBC AUTO x10*3/uL 20.6*   RBC AUTO  x10*6/uL 2.58*   HEMOGLOBIN g/dL 7.4*   HEMATOCRIT % 22.0*     Results from last 7 days   Lab Units 04/16/25  0536   SODIUM mmol/L 128*   POTASSIUM mmol/L 4.5   CHLORIDE mmol/L 95*   CO2 mmol/L 26   BUN mg/dL 35*   CREATININE mg/dL 3.90*   CALCIUM mg/dL 7.4*   PHOSPHORUS mg/dL 4.6   MAGNESIUM mg/dL 2.22           IMAGING: CXR reviewed in  images      SIGNATURE: Carlene Norman MD  Nephrology and Hypertension  [address] [place]  Office phone: [phone]  FAX: [phone]    This note was partially generated using the Dragon voice recognition system, and there may be some incorrect words, spelling's and punctuation that were not noted in checking the note before saving.

## 2025-04-16 NOTE — DISCHARGE INSTRUCTIONS
You were admitted to the hospital because of dislocation of the left hip surrounding fluid collection. You were treated with antibiotics.        New Medications:  None    Please continue taking your other home medications as prescribed.    For medication refills, please contact your primary care doctor.     Please call your primary care doctor in 2-3 days to schedule follow up appointment for this hospitalization and further management of your care.     If you get worse, or develop any concerning or worrisome symptoms call your Primary Care Doctor or return to the Emergency Department.     -[facility] -[facility] medicine

## 2025-04-16 NOTE — PROGRESS NOTES
04/16/25 1316   Discharge Planning   Home or Post Acute Services Post acute facilities (Rehab/SNF/etc)   Type of Post Acute Facility Services Long term care   Expected Discharge Disposition Inter  ([facility])   What day is the transport expected? 04/16/25   What time is the transport expected? 0245   Intensity of Service   Intensity of Service >30 min     Pt cleared for return to [facility] today at 2:45. Pt and wife have been updated. Facility is aware and discharge orders have been sent. Nursing will call report prior to pt .

## 2025-04-16 NOTE — CARE PLAN
Problem: Discharge Planning  Goal: Discharge to home or other facility with appropriate resources  Outcome: Progressing     Problem: Chronic Conditions and Co-morbidities  Goal: Patient's chronic conditions and co-morbidity symptoms are monitored and maintained or improved  Outcome: Progressing     Problem: Skin  Goal: Participates in plan/prevention/treatment measures  Outcome: Progressing

## 2025-04-16 NOTE — HOSPITAL COURSE
Mr. Coles is an 84-year-old with PMH ESRD (on HD via tunneled HD line on Tuesday Thursday and Saturday), hypertension, hyperlipidemia, diabetes mellitus and dementia who presented from outside hospital on 3/31st/25 for posteriorly displaced hip (confirmed by CT imaging).  Orthopedic surgery deemed him high surgical risk due to multiple comorbidities.  His hospital stay complicated by leukocytosis (found to have Bacteroides bacteremia on blood culture was collected from dialysis prior to admission).  Patient started on Zosyn and ID was following.  He completed 8-day course of Zosyn.  CT findings were not consistent with infection, and tagged WBC scan was negative. Encephalopathy is present but near baseline, and decubitus ulcer care is ongoing with a switch to Santyl for topical treatment. Palliative care has been involved, and although hospice was discussed, the family declined this option.  During his stay, he was clinically and vitally stable.  Patient discharge stable condition to his long-term facility with lab check in 1 week.

## 2025-04-16 NOTE — DISCHARGE SUMMARY
Discharge Diagnosis  Abscess of hip    Issues Requiring Follow-Up  - Follow-up with PCP for posthospitalization care.  -Follow-up with CBC and RFP within 1 week.    Discharge Meds     Medication List      START taking these medications     SantyL 250 unit/gram ointment; Generic drug: collagenase; Apply   topically once daily as directed.     CONTINUE taking these medications     * acetaminophen 650 mg suppository; Commonly known as: Tylenol   * acetaminophen 325 mg tablet; Commonly known as: Tylenol   albuterol 1.25 mg/3 mL nebulizer solution   apixaban 2.5 mg tablet; Commonly known as: Eliquis   bisacodyl 10 mg suppository; Commonly known as: Dulcolax   cholecalciferol 25 mcg (1,000 units) capsule; Commonly known as: Vitamin   D-3   docusate sodium 100 mg capsule; Commonly known as: Colace   donepezil 23 mg tablet; Commonly known as: Aricept   finasteride 5 mg tablet; Commonly known as: Proscar   Glucagon (HCl) Emergency Kit 1 mg recon soln; Generic drug: glucagon HCL   glucose 40 % gel oral gel; Commonly known as: Glutose   insulin lispro 100 unit/mL injection   Lantus U-100 Insulin 100 unit/mL injection; Generic drug: insulin   glargine   levETIRAcetam 500 mg tablet; Commonly known as: Keppra   loperamide 2 mg tablet; Commonly known as: Imodium A-D   magnesium hydroxide 400 mg/5 mL suspension; Commonly known as: Milk of   Magnesia   melatonin 10 mg tablet   metoprolol tartrate 50 mg tablet; Commonly known as: Lopressor   ondansetron 4 mg tablet; Commonly known as: Zofran   * oxyCODONE 5 mg immediate release tablet; Commonly known as:   Roxicodone; Take 1 tablet (5 mg) by mouth every 6 hours if needed for   severe pain (7 - 10).   * oxyCODONE 5 mg immediate release tablet; Commonly known as:   Roxicodone; Take 0.5 tablets (2.5 mg) by mouth every 6 hours if needed for   moderate pain (4 - 6).   polyethylene glycol 17 gram packet; Commonly known as: Glycolax, Miralax   sevelamer carbonate 0.8 gram packet; Commonly  known as: Renvela   sodium phosphates 9.5-3.5 gram/59 mL enema; Commonly known as: Fleet   (Pediatric)   Triad Wound Dressing paste; Generic drug: wound dressing   Virt-Caps 1 mg capsule; Generic drug: vitamin B complex-vitamin C-folic   acid  * This list has 4 medication(s) that are the same as other medications   prescribed for you. Read the directions carefully, and ask your doctor or   other care provider to review them with you.       Test Results Pending At Discharge  Pending Labs       No current pending labs.            Hospital Course  Mr. Coles is an 84-year-old with PMH ESRD (on HD via tunneled HD line on Tuesday Thursday and Saturday), hypertension, hyperlipidemia, diabetes mellitus and dementia who presented from outside hospital on 3/31st/25 for posteriorly displaced hip (confirmed by CT imaging).  Orthopedic surgery deemed him high surgical risk due to multiple comorbidities.  His hospital stay complicated by leukocytosis (found to have Bacteroides bacteremia on blood culture was collected from dialysis prior to admission).  Patient started on Zosyn and ID was following.  He completed 8-day course of Zosyn.  CT findings were not consistent with infection, and tagged WBC scan was negative. Encephalopathy is present but near baseline, and decubitus ulcer care is ongoing with a switch to Santyl for topical treatment. Palliative care has been involved, and although hospice was discussed, the family declined this option.  During his stay, he was clinically and vitally stable.  Patient discharge stable condition to his long-term facility with lab check in 1 week.    Pertinent Physical Exam At Time of Discharge  Physical Exam  General: Appears stated age, well nourished, A&Ox2, mildly verbal, lying comfortably in bed, not in pain or distress. On RA.  HEENT: Mucous membranes moist.  Head/Neck: Atraumatic, Normocephalic. Neck supple.   Respiratory: equal air entry bilaterally no crackles or wheezing were  appreciated.  Cardiovascular: regular rhythm, normal S1 and S2. No added sounds or murmurs,  Gastrointestinal: soft, non-tender abdomen, bowel sounds present, no guarding or rebound.   Extremities: No edema in lower extremities.  Lower extremities are in boot.  Psychological: Appropriate mood, normal affect.      Outpatient Follow-Up  Future Appointments   Date Time Provider Department Center   6/27/2025  1:30 PM Marcel Dawson MD [id number] [place]   10/17/2025  3:40 PM Sona Mckeon MD [id number] [place]         Mary Avendano MD

## 2025-04-23 DIAGNOSIS — N18.6 ESRD (END STAGE RENAL DISEASE) (MULTI): ICD-10-CM

## 2025-04-23 DIAGNOSIS — L02.419 ABSCESS OF HIP: ICD-10-CM

## 2025-04-29 ENCOUNTER — APPOINTMENT (OUTPATIENT)
Dept: RADIOLOGY | Facility: HOSPITAL | Age: 85
End: 2025-04-29
Payer: MEDICARE

## 2025-04-29 ENCOUNTER — HOSPITAL ENCOUNTER (OUTPATIENT)
Facility: HOSPITAL | Age: 85
Setting detail: OBSERVATION
Discharge: NURSING, REHAB OR LONG-TERM CARE | End: 2025-04-30
Attending: STUDENT IN AN ORGANIZED HEALTH CARE EDUCATION/TRAINING PROGRAM | Admitting: INTERNAL MEDICINE
Payer: MEDICARE

## 2025-04-29 ENCOUNTER — APPOINTMENT (OUTPATIENT)
Dept: CARDIOLOGY | Facility: HOSPITAL | Age: 85
End: 2025-04-29
Payer: MEDICARE

## 2025-04-29 DIAGNOSIS — R41.82 ALTERED MENTAL STATUS, UNSPECIFIED ALTERED MENTAL STATUS TYPE: Primary | ICD-10-CM

## 2025-04-29 DIAGNOSIS — G93.40 ACUTE ENCEPHALOPATHY: ICD-10-CM

## 2025-04-29 LAB
ALBUMIN SERPL BCP-MCNC: 2.4 G/DL (ref 3.4–5)
ALP SERPL-CCNC: 78 U/L (ref 33–136)
ALT SERPL W P-5'-P-CCNC: 11 U/L (ref 10–52)
ANION GAP SERPL CALC-SCNC: 14 MMOL/L (ref 10–20)
APTT PPP: 33 SECONDS (ref 26–36)
AST SERPL W P-5'-P-CCNC: 16 U/L (ref 9–39)
BASOPHILS # BLD AUTO: 0.08 X10*3/UL (ref 0–0.1)
BASOPHILS NFR BLD AUTO: 0.3 %
BILIRUB SERPL-MCNC: 0.4 MG/DL (ref 0–1.2)
BUN SERPL-MCNC: 30 MG/DL (ref 6–23)
CALCIUM SERPL-MCNC: 8.9 MG/DL (ref 8.6–10.3)
CARDIAC TROPONIN I PNL SERPL HS: 27 NG/L (ref 0–20)
CARDIAC TROPONIN I PNL SERPL HS: 27 NG/L (ref 0–20)
CHLORIDE SERPL-SCNC: 95 MMOL/L (ref 98–107)
CO2 SERPL-SCNC: 31 MMOL/L (ref 21–32)
CREAT SERPL-MCNC: 3.15 MG/DL (ref 0.5–1.3)
EGFRCR SERPLBLD CKD-EPI 2021: 18 ML/MIN/1.73M*2
EOSINOPHIL # BLD AUTO: 0.34 X10*3/UL (ref 0–0.4)
EOSINOPHIL NFR BLD AUTO: 1.2 %
ERYTHROCYTE [DISTWIDTH] IN BLOOD BY AUTOMATED COUNT: 18.7 % (ref 11.5–14.5)
FLUAV RNA RESP QL NAA+PROBE: NOT DETECTED
FLUBV RNA RESP QL NAA+PROBE: NOT DETECTED
GLUCOSE BLD MANUAL STRIP-MCNC: 197 MG/DL (ref 74–99)
GLUCOSE BLD MANUAL STRIP-MCNC: 248 MG/DL (ref 74–99)
GLUCOSE SERPL-MCNC: 199 MG/DL (ref 74–99)
HCT VFR BLD AUTO: 26.1 % (ref 41–52)
HGB BLD-MCNC: 8.6 G/DL (ref 13.5–17.5)
HOLD SPECIMEN: NORMAL
IMM GRANULOCYTES # BLD AUTO: 0.37 X10*3/UL (ref 0–0.5)
IMM GRANULOCYTES NFR BLD AUTO: 1.3 % (ref 0–0.9)
INR PPP: 1.3 (ref 0.9–1.1)
LYMPHOCYTES # BLD AUTO: 3.51 X10*3/UL (ref 0.8–3)
LYMPHOCYTES NFR BLD AUTO: 12.6 %
MCH RBC QN AUTO: 28.3 PG (ref 26–34)
MCHC RBC AUTO-ENTMCNC: 33 G/DL (ref 32–36)
MCV RBC AUTO: 86 FL (ref 80–100)
MONOCYTES # BLD AUTO: 2.32 X10*3/UL (ref 0.05–0.8)
MONOCYTES NFR BLD AUTO: 8.3 %
NEUTROPHILS # BLD AUTO: 21.31 X10*3/UL (ref 1.6–5.5)
NEUTROPHILS NFR BLD AUTO: 76.3 %
NRBC BLD-RTO: 0.1 /100 WBCS (ref 0–0)
PLATELET # BLD AUTO: 295 X10*3/UL (ref 150–450)
POTASSIUM SERPL-SCNC: 3.5 MMOL/L (ref 3.5–5.3)
PROT SERPL-MCNC: 6.9 G/DL (ref 6.4–8.2)
PROTHROMBIN TIME: 13.9 SECONDS (ref 9.8–12.4)
RBC # BLD AUTO: 3.04 X10*6/UL (ref 4.5–5.9)
SARS-COV-2 RNA RESP QL NAA+PROBE: NOT DETECTED
SODIUM SERPL-SCNC: 136 MMOL/L (ref 136–145)
WBC # BLD AUTO: 27.9 X10*3/UL (ref 4.4–11.3)

## 2025-04-29 PROCEDURE — 99223 1ST HOSP IP/OBS HIGH 75: CPT | Performed by: NURSE PRACTITIONER

## 2025-04-29 PROCEDURE — G0378 HOSPITAL OBSERVATION PER HR: HCPCS

## 2025-04-29 PROCEDURE — 99291 CRITICAL CARE FIRST HOUR: CPT | Performed by: STUDENT IN AN ORGANIZED HEALTH CARE EDUCATION/TRAINING PROGRAM

## 2025-04-29 PROCEDURE — 2550000001 HC RX 255 CONTRASTS: Performed by: STUDENT IN AN ORGANIZED HEALTH CARE EDUCATION/TRAINING PROGRAM

## 2025-04-29 PROCEDURE — 99223 1ST HOSP IP/OBS HIGH 75: CPT | Performed by: INTERNAL MEDICINE

## 2025-04-29 PROCEDURE — 85025 COMPLETE CBC W/AUTO DIFF WBC: CPT

## 2025-04-29 PROCEDURE — 82947 ASSAY GLUCOSE BLOOD QUANT: CPT

## 2025-04-29 PROCEDURE — 84484 ASSAY OF TROPONIN QUANT: CPT | Performed by: STUDENT IN AN ORGANIZED HEALTH CARE EDUCATION/TRAINING PROGRAM

## 2025-04-29 PROCEDURE — 99291 CRITICAL CARE FIRST HOUR: CPT | Mod: 25 | Performed by: STUDENT IN AN ORGANIZED HEALTH CARE EDUCATION/TRAINING PROGRAM

## 2025-04-29 PROCEDURE — 70450 CT HEAD/BRAIN W/O DYE: CPT

## 2025-04-29 PROCEDURE — 71045 X-RAY EXAM CHEST 1 VIEW: CPT | Performed by: RADIOLOGY

## 2025-04-29 PROCEDURE — 36415 COLL VENOUS BLD VENIPUNCTURE: CPT

## 2025-04-29 PROCEDURE — 2500000004 HC RX 250 GENERAL PHARMACY W/ HCPCS (ALT 636 FOR OP/ED): Performed by: NURSE PRACTITIONER

## 2025-04-29 PROCEDURE — 93005 ELECTROCARDIOGRAM TRACING: CPT

## 2025-04-29 PROCEDURE — 70496 CT ANGIOGRAPHY HEAD: CPT | Performed by: RADIOLOGY

## 2025-04-29 PROCEDURE — 84075 ASSAY ALKALINE PHOSPHATASE: CPT

## 2025-04-29 PROCEDURE — 70498 CT ANGIOGRAPHY NECK: CPT | Performed by: RADIOLOGY

## 2025-04-29 PROCEDURE — 99285 EMERGENCY DEPT VISIT HI MDM: CPT | Mod: 25 | Performed by: STUDENT IN AN ORGANIZED HEALTH CARE EDUCATION/TRAINING PROGRAM

## 2025-04-29 PROCEDURE — 2500000002 HC RX 250 W HCPCS SELF ADMINISTERED DRUGS (ALT 637 FOR MEDICARE OP, ALT 636 FOR OP/ED): Performed by: NURSE PRACTITIONER

## 2025-04-29 PROCEDURE — 70498 CT ANGIOGRAPHY NECK: CPT

## 2025-04-29 PROCEDURE — 70450 CT HEAD/BRAIN W/O DYE: CPT | Performed by: RADIOLOGY

## 2025-04-29 PROCEDURE — G0427 INPT/ED TELECONSULT70: HCPCS | Performed by: STUDENT IN AN ORGANIZED HEALTH CARE EDUCATION/TRAINING PROGRAM

## 2025-04-29 PROCEDURE — 71045 X-RAY EXAM CHEST 1 VIEW: CPT

## 2025-04-29 PROCEDURE — 36415 COLL VENOUS BLD VENIPUNCTURE: CPT | Performed by: STUDENT IN AN ORGANIZED HEALTH CARE EDUCATION/TRAINING PROGRAM

## 2025-04-29 PROCEDURE — 85730 THROMBOPLASTIN TIME PARTIAL: CPT

## 2025-04-29 PROCEDURE — 85610 PROTHROMBIN TIME: CPT

## 2025-04-29 PROCEDURE — 87636 SARSCOV2 & INF A&B AMP PRB: CPT | Performed by: STUDENT IN AN ORGANIZED HEALTH CARE EDUCATION/TRAINING PROGRAM

## 2025-04-29 PROCEDURE — 84484 ASSAY OF TROPONIN QUANT: CPT

## 2025-04-29 PROCEDURE — 2500000001 HC RX 250 WO HCPCS SELF ADMINISTERED DRUGS (ALT 637 FOR MEDICARE OP): Performed by: NURSE PRACTITIONER

## 2025-04-29 RX ORDER — LEVETIRACETAM 500 MG/1
500 TABLET ORAL 2 TIMES DAILY
Status: DISCONTINUED | OUTPATIENT
Start: 2025-04-29 | End: 2025-04-30 | Stop reason: HOSPADM

## 2025-04-29 RX ORDER — OXYCODONE HYDROCHLORIDE 5 MG/1
2.5 TABLET ORAL EVERY 6 HOURS PRN
Status: DISCONTINUED | OUTPATIENT
Start: 2025-04-29 | End: 2025-04-30 | Stop reason: HOSPADM

## 2025-04-29 RX ORDER — ALBUTEROL SULFATE 0.83 MG/ML
1.25 SOLUTION RESPIRATORY (INHALATION) EVERY 6 HOURS PRN
Status: DISCONTINUED | OUTPATIENT
Start: 2025-04-29 | End: 2025-04-30 | Stop reason: HOSPADM

## 2025-04-29 RX ORDER — BISACODYL 10 MG/1
10 SUPPOSITORY RECTAL
Status: DISCONTINUED | OUTPATIENT
Start: 2025-04-29 | End: 2025-04-30 | Stop reason: HOSPADM

## 2025-04-29 RX ORDER — FERROUS SULFATE 325(65) MG
65 TABLET ORAL
Status: DISCONTINUED | OUTPATIENT
Start: 2025-04-30 | End: 2025-04-30 | Stop reason: HOSPADM

## 2025-04-29 RX ORDER — ONDANSETRON HYDROCHLORIDE 2 MG/ML
4 INJECTION, SOLUTION INTRAVENOUS EVERY 8 HOURS PRN
Status: DISCONTINUED | OUTPATIENT
Start: 2025-04-29 | End: 2025-04-30 | Stop reason: HOSPADM

## 2025-04-29 RX ORDER — INSULIN GLARGINE 100 [IU]/ML
5 INJECTION, SOLUTION SUBCUTANEOUS NIGHTLY
Status: DISCONTINUED | OUTPATIENT
Start: 2025-04-29 | End: 2025-04-30 | Stop reason: HOSPADM

## 2025-04-29 RX ORDER — FINASTERIDE 5 MG/1
5 TABLET, FILM COATED ORAL DAILY
Status: DISCONTINUED | OUTPATIENT
Start: 2025-04-30 | End: 2025-04-30 | Stop reason: HOSPADM

## 2025-04-29 RX ORDER — ONDANSETRON 4 MG/1
4 TABLET, FILM COATED ORAL EVERY 8 HOURS PRN
Status: DISCONTINUED | OUTPATIENT
Start: 2025-04-29 | End: 2025-04-30 | Stop reason: HOSPADM

## 2025-04-29 RX ORDER — FERROUS SULFATE 325(65) MG
325 TABLET ORAL
COMMUNITY

## 2025-04-29 RX ORDER — POLYETHYLENE GLYCOL 3350 17 G/17G
17 POWDER, FOR SOLUTION ORAL DAILY PRN
Status: DISCONTINUED | OUTPATIENT
Start: 2025-04-29 | End: 2025-04-30 | Stop reason: HOSPADM

## 2025-04-29 RX ORDER — ACETAMINOPHEN 325 MG/1
650 TABLET ORAL EVERY 4 HOURS PRN
Status: DISCONTINUED | OUTPATIENT
Start: 2025-04-29 | End: 2025-04-30 | Stop reason: HOSPADM

## 2025-04-29 RX ORDER — FOLIC ACID 1 MG/1
1 TABLET ORAL DAILY
COMMUNITY

## 2025-04-29 RX ORDER — METOPROLOL TARTRATE 50 MG/1
50 TABLET ORAL 2 TIMES DAILY
Status: DISCONTINUED | OUTPATIENT
Start: 2025-04-29 | End: 2025-04-30 | Stop reason: HOSPADM

## 2025-04-29 RX ORDER — DOCUSATE SODIUM 100 MG/1
100 CAPSULE, LIQUID FILLED ORAL DAILY
Status: DISCONTINUED | OUTPATIENT
Start: 2025-04-30 | End: 2025-04-30 | Stop reason: HOSPADM

## 2025-04-29 RX ORDER — FOLIC ACID 1 MG/1
1 TABLET ORAL DAILY
Status: DISCONTINUED | OUTPATIENT
Start: 2025-04-30 | End: 2025-04-30 | Stop reason: HOSPADM

## 2025-04-29 RX ORDER — SEVELAMER CARBONATE 800 MG/1
1600 TABLET, FILM COATED ORAL 2 TIMES DAILY
Status: DISCONTINUED | OUTPATIENT
Start: 2025-04-29 | End: 2025-04-30 | Stop reason: HOSPADM

## 2025-04-29 RX ORDER — CHOLECALCIFEROL (VITAMIN D3) 25 MCG
25 TABLET ORAL DAILY
Status: DISCONTINUED | OUTPATIENT
Start: 2025-04-30 | End: 2025-04-30 | Stop reason: HOSPADM

## 2025-04-29 RX ADMIN — METOPROLOL TARTRATE 50 MG: 50 TABLET, FILM COATED ORAL at 20:23

## 2025-04-29 RX ADMIN — IOHEXOL 70 ML: 350 INJECTION, SOLUTION INTRAVENOUS at 16:50

## 2025-04-29 RX ADMIN — ACETAMINOPHEN 650 MG: 325 TABLET ORAL at 20:24

## 2025-04-29 RX ADMIN — SEVELAMER CARBONATE 1600 MG: 800 TABLET, FILM COATED ORAL at 20:23

## 2025-04-29 RX ADMIN — LEVETIRACETAM 500 MG: 500 TABLET, FILM COATED ORAL at 20:23

## 2025-04-29 RX ADMIN — APIXABAN 2.5 MG: 2.5 TABLET, FILM COATED ORAL at 20:23

## 2025-04-29 RX ADMIN — INSULIN GLARGINE 5 UNITS: 100 INJECTION, SOLUTION SUBCUTANEOUS at 20:31

## 2025-04-29 SDOH — ECONOMIC STABILITY: INCOME INSECURITY: IN THE PAST 12 MONTHS HAS THE ELECTRIC, GAS, OIL, OR WATER COMPANY THREATENED TO SHUT OFF SERVICES IN YOUR HOME?: NO

## 2025-04-29 SDOH — SOCIAL STABILITY: SOCIAL INSECURITY: WERE YOU ABLE TO COMPLETE ALL THE BEHAVIORAL HEALTH SCREENINGS?: YES

## 2025-04-29 SDOH — ECONOMIC STABILITY: HOUSING INSECURITY: IN THE LAST 12 MONTHS, WAS THERE A TIME WHEN YOU WERE NOT ABLE TO PAY THE MORTGAGE OR RENT ON TIME?: NO

## 2025-04-29 SDOH — HEALTH STABILITY: MENTAL HEALTH: HOW MANY DRINKS CONTAINING ALCOHOL DO YOU HAVE ON A TYPICAL DAY WHEN YOU ARE DRINKING?: PATIENT DOES NOT DRINK

## 2025-04-29 SDOH — ECONOMIC STABILITY: FOOD INSECURITY: WITHIN THE PAST 12 MONTHS, YOU WORRIED THAT YOUR FOOD WOULD RUN OUT BEFORE YOU GOT THE MONEY TO BUY MORE.: NEVER TRUE

## 2025-04-29 SDOH — ECONOMIC STABILITY: FOOD INSECURITY: WITHIN THE PAST 12 MONTHS, THE FOOD YOU BOUGHT JUST DIDN'T LAST AND YOU DIDN'T HAVE MONEY TO GET MORE.: NEVER TRUE

## 2025-04-29 SDOH — SOCIAL STABILITY: SOCIAL INSECURITY: WITHIN THE LAST YEAR, HAVE YOU BEEN HUMILIATED OR EMOTIONALLY ABUSED IN OTHER WAYS BY YOUR PARTNER OR EX-PARTNER?: NO

## 2025-04-29 SDOH — ECONOMIC STABILITY: HOUSING INSECURITY: AT ANY TIME IN THE PAST 12 MONTHS, WERE YOU HOMELESS OR LIVING IN A SHELTER (INCLUDING NOW)?: NO

## 2025-04-29 SDOH — HEALTH STABILITY: MENTAL HEALTH: HOW OFTEN DO YOU HAVE A DRINK CONTAINING ALCOHOL?: NEVER

## 2025-04-29 SDOH — SOCIAL STABILITY: SOCIAL INSECURITY: HAVE YOU HAD THOUGHTS OF HARMING ANYONE ELSE?: NO

## 2025-04-29 SDOH — SOCIAL STABILITY: SOCIAL INSECURITY: ARE THERE ANY APPARENT SIGNS OF INJURIES/BEHAVIORS THAT COULD BE RELATED TO ABUSE/NEGLECT?: NO

## 2025-04-29 SDOH — SOCIAL STABILITY: SOCIAL INSECURITY: WITHIN THE LAST YEAR, HAVE YOU BEEN AFRAID OF YOUR PARTNER OR EX-PARTNER?: NO

## 2025-04-29 SDOH — ECONOMIC STABILITY: FOOD INSECURITY: HOW HARD IS IT FOR YOU TO PAY FOR THE VERY BASICS LIKE FOOD, HOUSING, MEDICAL CARE, AND HEATING?: NOT VERY HARD

## 2025-04-29 SDOH — HEALTH STABILITY: MENTAL HEALTH: HOW OFTEN DO YOU HAVE SIX OR MORE DRINKS ON ONE OCCASION?: NEVER

## 2025-04-29 SDOH — SOCIAL STABILITY: SOCIAL INSECURITY: ABUSE: ADULT

## 2025-04-29 SDOH — SOCIAL STABILITY: SOCIAL INSECURITY: DO YOU FEEL UNSAFE GOING BACK TO THE PLACE WHERE YOU ARE LIVING?: NO

## 2025-04-29 SDOH — SOCIAL STABILITY: SOCIAL INSECURITY: DOES ANYONE TRY TO KEEP YOU FROM HAVING/CONTACTING OTHER FRIENDS OR DOING THINGS OUTSIDE YOUR HOME?: NO

## 2025-04-29 SDOH — SOCIAL STABILITY: SOCIAL INSECURITY: ARE YOU OR HAVE YOU BEEN THREATENED OR ABUSED PHYSICALLY, EMOTIONALLY, OR SEXUALLY BY ANYONE?: NO

## 2025-04-29 SDOH — ECONOMIC STABILITY: HOUSING INSECURITY: IN THE PAST 12 MONTHS, HOW MANY TIMES HAVE YOU MOVED WHERE YOU WERE LIVING?: 0

## 2025-04-29 SDOH — SOCIAL STABILITY: SOCIAL INSECURITY: HAVE YOU HAD ANY THOUGHTS OF HARMING ANYONE ELSE?: NO

## 2025-04-29 SDOH — SOCIAL STABILITY: SOCIAL INSECURITY: DO YOU FEEL ANYONE HAS EXPLOITED OR TAKEN ADVANTAGE OF YOU FINANCIALLY OR OF YOUR PERSONAL PROPERTY?: NO

## 2025-04-29 SDOH — SOCIAL STABILITY: SOCIAL INSECURITY: HAS ANYONE EVER THREATENED TO HURT YOUR FAMILY OR YOUR PETS?: NO

## 2025-04-29 SDOH — ECONOMIC STABILITY: TRANSPORTATION INSECURITY: IN THE PAST 12 MONTHS, HAS LACK OF TRANSPORTATION KEPT YOU FROM MEDICAL APPOINTMENTS OR FROM GETTING MEDICATIONS?: NO

## 2025-04-29 ASSESSMENT — COGNITIVE AND FUNCTIONAL STATUS - GENERAL
CLIMB 3 TO 5 STEPS WITH RAILING: TOTAL
DRESSING REGULAR LOWER BODY CLOTHING: TOTAL
TURNING FROM BACK TO SIDE WHILE IN FLAT BAD: A LOT
PERSONAL GROOMING: TOTAL
WALKING IN HOSPITAL ROOM: TOTAL
MOBILITY SCORE: 9
PATIENT BASELINE BEDBOUND: YES
DAILY ACTIVITIY SCORE: 6
DRESSING REGULAR UPPER BODY CLOTHING: TOTAL
MOVING TO AND FROM BED TO CHAIR: A LOT
STANDING UP FROM CHAIR USING ARMS: TOTAL
TOILETING: TOTAL
HELP NEEDED FOR BATHING: TOTAL
EATING MEALS: TOTAL
MOVING FROM LYING ON BACK TO SITTING ON SIDE OF FLAT BED WITH BEDRAILS: A LOT

## 2025-04-29 ASSESSMENT — ACTIVITIES OF DAILY LIVING (ADL)
DRESSING YOURSELF: DEPENDENT
BATHING: DEPENDENT
WALKS IN HOME: DEPENDENT
WALKS IN HOME: DEPENDENT
ADEQUATE_TO_COMPLETE_ADL: YES
HEARING - LEFT EAR: FUNCTIONAL
HEARING - RIGHT EAR: FUNCTIONAL
PATIENT'S MEMORY ADEQUATE TO SAFELY COMPLETE DAILY ACTIVITIES?: NO
FEEDING YOURSELF: DEPENDENT
FEEDING YOURSELF: DEPENDENT
GROOMING: DEPENDENT
HEARING - LEFT EAR: FUNCTIONAL
DRESSING YOURSELF: DEPENDENT
PATIENT'S MEMORY ADEQUATE TO SAFELY COMPLETE DAILY ACTIVITIES?: NO
JUDGMENT_ADEQUATE_SAFELY_COMPLETE_DAILY_ACTIVITIES: NO
GROOMING: DEPENDENT
HEARING - RIGHT EAR: FUNCTIONAL
LACK_OF_TRANSPORTATION: NO
TOILETING: DEPENDENT
TOILETING: DEPENDENT
ADEQUATE_TO_COMPLETE_ADL: NO
BATHING: DEPENDENT
JUDGMENT_ADEQUATE_SAFELY_COMPLETE_DAILY_ACTIVITIES: NO

## 2025-04-29 ASSESSMENT — PAIN DESCRIPTION - DESCRIPTORS: DESCRIPTORS: ACHING;DISCOMFORT

## 2025-04-29 ASSESSMENT — LIFESTYLE VARIABLES
TOTAL SCORE: 0
SKIP TO QUESTIONS 9-10: 1
AUDIT-C TOTAL SCORE: 0
HAVE YOU EVER FELT YOU SHOULD CUT DOWN ON YOUR DRINKING: NO
EVER HAD A DRINK FIRST THING IN THE MORNING TO STEADY YOUR NERVES TO GET RID OF A HANGOVER: NO
HAVE PEOPLE ANNOYED YOU BY CRITICIZING YOUR DRINKING: NO
EVER FELT BAD OR GUILTY ABOUT YOUR DRINKING: NO

## 2025-04-29 ASSESSMENT — PAIN DESCRIPTION - LOCATION: LOCATION: GENERALIZED

## 2025-04-29 ASSESSMENT — PAIN - FUNCTIONAL ASSESSMENT
PAIN_FUNCTIONAL_ASSESSMENT: UNABLE TO SELF-REPORT
PAIN_FUNCTIONAL_ASSESSMENT: 0-10
PAIN_FUNCTIONAL_ASSESSMENT: 0-10

## 2025-04-29 ASSESSMENT — PAIN SCALES - GENERAL
PAINLEVEL_OUTOF10: 4
PAINLEVEL_OUTOF10: 2

## 2025-04-29 NOTE — ED PROVIDER NOTES
History/Exam limitations: mental status.   Additional history was obtained from relative(s), EMS personnel, and nursing home.    HPI:       Tyshawn Coles is a 84 y.o. with a history of ESRD on dialysis TTS, type 2 diabetes mellitus, atrial fibrillation on Eliquis, anoxic brain injury with resultant dementia awake alert and oriented x 1 at baseline, hyperlipidemia, hypertension presenting to the emergency department by BLS transport from his dialysis unit after transport team noted patient had garbled speech and they do concern for possible stroke.       Last Known Well Time: Unclear last known well        ------------------------------------------------------------------------------------------------------------------------------------------     Physical Exam:    ED Triage Vitals [04/29/25 1636]   Temperature Heart Rate Respirations BP   37.3 °C (99.1 °F) 82 16 150/77      Pulse Ox Temp Source Heart Rate Source Patient Position   99 % Temporal Monitor Sitting      BP Location FiO2 (%)     Right arm --          Gen: Not in acute distress  Head/Neck: NCAT  Eyes: Anicteric sclerae, noninjected conjunctivae  Nose: No rhinorrhea  Mouth:  MMM  Heart: Regular rate and rhythm w/ no murmurs, rubs, gallops  Lungs: CTAB w/o wheezes, rales, rhonchi, no increased work of breathing  Abdomen: Soft, NT/ND  Musculoskeletal: No deformities  Extremities: No edema.  Neurologic: Please see below for NIHSS  Skin: No rashes noted  Psychological: Calm        Interval: Baseline  Time: 9:13 PM  Person Administering Scale: Pedro Hanson DO     1a  Level of consciousness: 1=not alert but arousable by minor stimulation to obey, answer or respond   1b. LOC questions:  1=Performs one task correctly   1c. LOC commands: 1=Performs one task correctly   2.  Best Gaze: 0=normal   3. Visual: 0=No visual loss   4. Facial Palsy: 0=Normal symmetric movement   5a. Motor left arm: 3=No effort against gravity, limb falls   5b.  Motor right arm: 3=No  effort against gravity, limb falls   6a. motor left leg: 3=No effort against gravity, limb falls   6b  Motor right leg:  3=No effort against gravity, limb falls   7. Limb Ataxia: 0=Absent   8.  Sensory: 0=Normal; no sensory loss   9. Best Language:  2=Severe Aphasia: fragmentary expression, inference needed, cannot identify materials   10. Dysarthria: 2=Severe; patient speech is so slurred as to be unintelligible in the absence of or our of proportion to any dysphagia, or is mute/anarthric   11. Extinction and Inattention: 0=No abnormality     Total:   19         VAN: VAN: Negative     ------------------------------------------------------------------------------------------------------------------------------------------     Medical Decision Making:     ED Course as of 04/30/25 2113 Tue Apr 29, 2025   1645 Patient activated as brain attack.  On my evaluation patient has chronic left leg deformity.  Does have normal facial expression for this provider but is generally not following commands.  I am able to hold his get him to hold his right arm up.  Otherwise he has significant weakness in the left side.  Resident physician called to nursing home and reported he is nearly nonverbal and barely follows commands at baseline.  Sounds like this is generally the patient's baseline.  Pending telestroke evaluation and CT imaging.  Point-of-care glucose 197. [TL]   1700 Resident give report to telestroke team.  Patient back from CT. [TL]   1708 I do not see any sign of acute hemorrhage on my interpretation of patient's CT noncontrasted study. [TL]   1709 [No evidence of acute cortical infarct or intracranial hemorrhage.]   [TL]   1714 EKG performed at 17: 13 and independently reviewed by provider: Reveals NSR with a rate of 81 bpm, normal axis, normal intervals, no ST changes, no T wave abnormalities, supraventricular and ventricular ectopy appreciated. No STEMI. [TL]   1715 Pending telestroke neurology evaluation and  recommendations.  Resident physician talking to emergency contact at this time. [TL]   1721 Call placed to transfer center to request telestroke evaluation. [TL]   1751 After speaking with Dr. Fuller I am in agreement with the telestroke neurologist that the patient would not benefit from thrombolysis.  Reportedly on a Xa inhibitor.  Exam is more consistent with likely encephalopathy. [TL]   1752 IMPRESSION:  1. No intracranial major vascular occlusion.  2. No flow-limiting stenosis or significant plaque irregularity in  the neck.   [TL]   1901 Patient admitted to the internal medicine service for continued evaluation of patient's acute encephalopathy.  Appears that this has been acute on chronic based on chart review and discussion with family and nursing facility. [TL]      ED Course User Index  [TL] Ezra Ho DO         Diagnoses as of 04/30/25 2113   Altered mental status, unspecified altered mental status type        84-year-old male with past medical history of ESRD on dialysis TTS, type 2 diabetes mellitus, atrial fibrillation on Eliquis, anoxic brain injury with resultant dementia awake alert and oriented x 1 at baseline, hyperlipidemia, hypertension presenting to the emergency department for evaluation of altered mental status and garbled speech.  On arrival patient was hemodynamically stable, no acute distress, nontoxic-appearing, afebrile.  Per nursing staff life care ambulance staff were unable to provide much information regarding patient's reported baseline or much otherwise regarding patient's history.  Brain attack was called given unclear baseline and patient on Eliquis to evaluate for possible ischemic or hemorrhagic stroke however patient is not a TNK candidate given he is on Eliquis.  Patient's initial blood sugar on arrival was 197.  CBC, CMP, coags, troponin, respiratory swabs and urinalysis ordered.  While patient was at CT I called and spoke with nursing staff at his facility who  advised this is more or less patient's baseline and he is essentially bedbound does not say much other than his name and following some simple commands at baseline.  Does reportedly get less responsive after dialysis sessions which is not atypical for him.  Patient's nurse at the facility also states the patient was awake alert and oriented x 2 and more conversive however over the past several months has had a progressive decline.  I called and spoke with patient's son Rocael over the phone advise he lives in [place] and has not spoken to the patient in person recently however advises the life care staff transport him to dialysis frequently and if they are concerned patient is off his baseline he would take the concerns seriously and is agreeable with patient's current workup with plan for admission for possible encephalopathy if patient's CTs are not consistent with stroke.  I informed patient's son I would call and update him if his CT scans were concerning for possible LVO to discuss possible transfer downtown for thrombectomy if he was a candidate however given patient's poor functional status at baseline neurology is unlikely to recommend thrombectomy even if patient does have an LVO.  Patient's troponin slightly elevated at 27 however EKG shows no evidence of acute ST changes to suggest ACS.  Patient has a leukocytosis with white blood cell count of 27.9 however patient has an elevated white blood cell count at baseline in the 20s possibly suggestive of undiagnosed CLL or other MDS.  Nursing staff advised they have been unable to obtain a urine sample at this time and given patient is ESRD he may be oliguric at baseline.  No evidence of acute pathology on x-ray or CT imaging to explain patient's current presentation.  Patient case discussed with internal medicine who agreed admit the patient to their service for further evaluation and treatment of acute on chronic encephalopathy.      IV Thrombolysis IV  Thrombolysis Checklist        IV Thrombolysis Given: No; Thrombolysis contraindication reason: Patient receiving direct thrombin inhibitors or direct Factor Xa inhibitors          Procedure  Critical Care    Performed by: Ezra Ho DO  Authorized by: Ezra Ho DO    Critical care provider statement:     Critical care time (minutes):  35    Critical care time was exclusive of:  Separately billable procedures and treating other patients and teaching time    Critical care was necessary to treat or prevent imminent or life-threatening deterioration of the following conditions:  CNS failure or compromise    Critical care was time spent personally by me on the following activities:  Ordering and performing treatments and interventions, ordering and review of laboratory studies, ordering and review of radiographic studies, pulse oximetry, re-evaluation of patient's condition, review of old charts, obtaining history from patient or surrogate, evaluation of patient's response to treatment, development of treatment plan with patient or surrogate, discussions with consultants and examination of patient    Care discussed with: admitting provider             Pedro Hanson DO  Resident  04/30/25 7872    I performed a history and physical examination of the patient and discussed his management with the resident physician.  I agree with the history, physical, assessment, and plan of care, with the following exceptions:   None    I was present for key and critical portions of the following procedures: Critical care  Time Spent in Critical Care of the patient: 35  Time spent in discussions with the patient and family: 30    DO Ezra Koenig DO  05/02/25 1522

## 2025-04-29 NOTE — PROGRESS NOTES
PHARMACY STROKE RESPONSE      Patient Name: Tyshawn Coles  MRN: [id number]  Location: [place] [address]    Patient Weight (kg):   Wt Readings from Last 1 Encounters:   04/29/25 67.6 kg (149 lb)        An acute Brain Attack has been activated, pharmacy participated in multidisciplinary team bedside response for Tyshawn Coles.  Contraindications for fibrinolytic therapy have been reviewed by pharmacy and any issues relating to medication therapy have been discussed directly with the provider(s) caring for this patient.     Pharmacy aided in the bedside response for fibrinolytic therapy. Patient did not receive fibrinolysis.    Orders Placed This Encounter      iohexol (OMNIPaque) 350 mg iodine/mL solution 70 mL      Thank you for allowing me to take part in the care of this patient.     Ad Kearney, PharmD  4/29/2025  4:54 PM         References:    Neurological [place] Stroke Tools   Neurological [place] IV Thrombolysis Checklist

## 2025-04-29 NOTE — ED NOTES
1635 Stroke alert one push activation by ROBERT Silver  Emergency overheasujey Cuellar, EMT  04/29/25 9025

## 2025-04-29 NOTE — H&P
"History Of Present Illness  Tyshawn Coles is a 84 y.o. male with history of severe dysarthria alert and oriented x1 at baseline, DM, hypertension, ischemic stroke, ESRD on dialysis,  presents to [facility] presents to [facility] after arriving back to nursing home from after dialysis today for garbled speech. EMS reported they transport patient to and from consistently and patient was not at his baseline. On exam, patient can only tell me his name. Can say few words in between but no at length conversation. Moves all extremities. Patient poor historian. Ct imaging no acute ischemic or hemorrhagic changes, Neuro stroke was consulted through ED, stroke not suspected, alternative etiology possible encephalopathy post dialysis. WBC 27.9, chart review patient with chronically elevated WBC. Of note patient was admitted 3/31/2025-4/16/2025  \"His hospital stay complicated by leukocytosis (found to have Bacteroides bacteremia on blood culture was collected from dialysis prior to admission).  Patient started on Zosyn and ID was following.  He completed 8-day course of Zosyn.CT findings were not consistent with infection, and tagged WBC scan was negative. Encephalopathy is present but near baseline, and decubitus ulcer care is ongoing with a switch to Santyl for topical treatment\".     Past Medical History  Chronic atrial fibrillation (HCC)   CKD (chronic kidney disease) requiring chronic dialysis (HCC)   Diabetes (HCC)   H/O insulin dependent diabetes mellitus   History of BPH   History of DVT (deep vein thrombosis)   Hypertension   Seizure disorder (HCC)     Surgical History  He has a past surgical history that includes IR CVC tunneled (08/20/2021); IR tunneled dialysis catheter; AV fistula placement; pacemaker placement; Total hip arthroplasty (Right); Thrombectomy; Tonsillectomy; Adenoidectomy; Cholecystectomy; and Colonoscopy.     Social History  He reports that he has quit " "smoking. His smoking use included cigarettes. He has never used smokeless tobacco. He reports that he does not drink alcohol and does not use drugs.    Family History  Family History[1]     Allergies  Patient has no known allergies.    Review of Systems   Unable to perform ROS: Mental status change      ROS: 12 systems reviewed and negative except per HPI above     Physical Exam by System:     Constitutional: Thin awake/alert/oriented x1, no distress   ENMT: mucous membranes moist   Head/Neck: Neck supple  Skin: open sacral wound   Respiratory/Thorax: Patent airways, CTAB, normal breath sounds with good chest expansion, thorax symmetric   Cardiovascular: Regular, rate and rhythm, no murmurs, 2+ equal pulses of the extremities, normal S 1and S 2   Gastrointestinal: Nondistended, soft, non-tender, no rebound tenderness or guarding, no masses palpable, no organomegaly, +BS, no bruits   Musculoskeletal: ROM intact, no joint swelling, normal strength   Extremities: normal extremities, wound on left heal motor sensory intact   Neurological: alert and oriented x1,        Last Recorded Vitals  Blood pressure 161/69, pulse 83, temperature 37.3 °C (99.1 °F), temperature source Temporal, resp. rate 13, height 1.803 m (5' 11\"), weight 67.6 kg (149 lb), SpO2 98%.    Relevant Results  Results for orders placed or performed during the hospital encounter of 04/29/25 (from the past 24 hours)   POCT GLUCOSE   Result Value Ref Range    POCT Glucose 197 (H) 74 - 99 mg/dL   CBC and Auto Differential   Result Value Ref Range    WBC 27.9 (H) 4.4 - 11.3 x10*3/uL    nRBC 0.1 (H) 0.0 - 0.0 /100 WBCs    RBC 3.04 (L) 4.50 - 5.90 x10*6/uL    Hemoglobin 8.6 (L) 13.5 - 17.5 g/dL    Hematocrit 26.1 (L) 41.0 - 52.0 %    MCV 86 80 - 100 fL    MCH 28.3 26.0 - 34.0 pg    MCHC 33.0 32.0 - 36.0 g/dL    RDW 18.7 (H) 11.5 - 14.5 %    Platelets 295 150 - 450 x10*3/uL    Neutrophils % 76.3 40.0 - 80.0 %    Immature Granulocytes %, Automated 1.3 (H) 0.0 - " 0.9 %    Lymphocytes % 12.6 13.0 - 44.0 %    Monocytes % 8.3 2.0 - 10.0 %    Eosinophils % 1.2 0.0 - 6.0 %    Basophils % 0.3 0.0 - 2.0 %    Neutrophils Absolute 21.31 (H) 1.60 - 5.50 x10*3/uL    Immature Granulocytes Absolute, Automated 0.37 0.00 - 0.50 x10*3/uL    Lymphocytes Absolute 3.51 (H) 0.80 - 3.00 x10*3/uL    Monocytes Absolute 2.32 (H) 0.05 - 0.80 x10*3/uL    Eosinophils Absolute 0.34 0.00 - 0.40 x10*3/uL    Basophils Absolute 0.08 0.00 - 0.10 x10*3/uL   Comprehensive metabolic panel   Result Value Ref Range    Glucose 199 (H) 74 - 99 mg/dL    Sodium 136 136 - 145 mmol/L    Potassium 3.5 3.5 - 5.3 mmol/L    Chloride 95 (L) 98 - 107 mmol/L    Bicarbonate 31 21 - 32 mmol/L    Anion Gap 14 10 - 20 mmol/L    Urea Nitrogen 30 (H) 6 - 23 mg/dL    Creatinine 3.15 (H) 0.50 - 1.30 mg/dL    eGFR 18 (L) >60 mL/min/1.73m*2    Calcium 8.9 8.6 - 10.3 mg/dL    Albumin 2.4 (L) 3.4 - 5.0 g/dL    Alkaline Phosphatase 78 33 - 136 U/L    Total Protein 6.9 6.4 - 8.2 g/dL    AST 16 9 - 39 U/L    Bilirubin, Total 0.4 0.0 - 1.2 mg/dL    ALT 11 10 - 52 U/L   Troponin I, High Sensitivity   Result Value Ref Range    Troponin I, High Sensitivity 27 (H) 0 - 20 ng/L   Protime-INR   Result Value Ref Range    Protime 13.9 (H) 9.8 - 12.4 seconds    INR 1.3 (H) 0.9 - 1.1   APTT   Result Value Ref Range    aPTT 33 26 - 36 seconds   Lavender Top   Result Value Ref Range    Extra Tube Hold for add-ons.    Sars-CoV-2 and Influenza A/B PCR   Result Value Ref Range    Flu A Result Not Detected Not Detected    Flu B Result Not Detected Not Detected    Coronavirus 2019, PCR Not Detected Not Detected      Scheduled medications  Scheduled Medications[2]  Continuous medications  Continuous Medications[3]  PRN medications  PRN Medications[4]     Imaging  XR chest 1 view  Result Date: 4/29/2025  No evidence of acute intrathoracic abnormality.   Signed by: Harpal Quiroz 4/29/2025 5:57 PM Dictation workstation:   [id number]    CT brain attack angio head  and neck W and WO IV contrast  Result Date: 4/29/2025  1. No intracranial major vascular occlusion. 2. No flow-limiting stenosis or significant plaque irregularity in the neck.   MACRO: None   Signed by: Kelvin Tong 4/29/2025 5:24 PM Dictation workstation:   [id number]    CT brain attack head wo IV contrast  Result Date: 4/29/2025  No evidence of acute cortical infarct or intracranial hemorrhage. Chronic changes as described.   MACRO: Radha Trent discussed the significance and urgency of this critical finding by secure chat with  ROBERT HUNTER on 4/29/2025 at 5:07 pm. (**-RCF-**) Findings:  See findings.     Signed by: Radha Trent 4/29/2025 5:08 PM Dictation workstation:   [id number]      Cardiology, Vascular, and Other Imaging  No other imaging results found for the past 2 days       Assessment/Plan   #Acute encephalopathy   #Leukocytosis  #History of DM  #History of hypertension  #Sacral wound    Plan:    -Admit to observation acute care with tele and pulse ox monitoring  -Fall precautions  -Ed consulted neurology-await any further recommendations by neurology  -CXR-No evidence of acute intrathoracic abnormality.   -Influenza A B and Covid not detected  -Urinalysis -pending  -diabetic diet  -POC glucose monitoring  -Insulin sliding scale  -Resume patient Lantus 5 units at bedtime  -sacral wound on buttocks-consult wound care  -Resume patient medications hold oxycodone could aid in confusion  -Am labs including cbc, bmp, mag  -Dvtp: resume patient Eliquis  -POC discussed with attending  -Dispo: likely require less than 2 midnight stays to adequately treat and safe dc plan back to facility    Code status: ED spoke to family and patient is DNR no intubation  On last admission-Palliative care has been involved, and although hospice was discussed, the family declined this option     I spent >50 minutes in the professional and overall care of this patient.    SIGNATURE: FRANKIE Fleming-KARLA PATIENT NAME:  Tyshawn Sanket   DATE: April 29, 2025 MRN: [id number]   TIME: 6:47 PM             [1]   Family History  Problem Relation Name Age of Onset    Diabetes Mother      Cancer Father      Lupus Daughter      Kidney failure Daughter      Asthma Son      Hypertension Other      Sickle cell trait Other     [2] [3] [4]

## 2025-04-29 NOTE — CONSULTS
"Inpatient consult to Neuro TeleStroke  Consult performed by: Coy Fuller MD  Consult ordered by: Ezra Ho DO        Virtual Visit start time: 532 pm    History Of Present Illness:  Historian: ED Provider   Tyshawn Coles is a 84 y.o. male presenting with garbled speech. ESRD, was being transported back to nursing home after dialysis when transport crew noticed garbled speech and brought him to ED. H/O sacral wounds. Severe dysarthria and AAO X1 at baseline per nursing home staff, based on chart review - has sacral ulcer, LLE unhealed hip fracture (was a poor surgical candidate), dependant on all ADL s.  Last known well: unclear. Per transport crew, garbled speech noticed less than hour ago  Had stroke symptoms resolved at time of presentation: No   Current antiplatelet/anticoagulant use: Apixaban    Prior Functional Status (Modified [place] Scale):  5 The patient has severe disability; bedridden, incontinent, requires continuous care.     Stroke Risk Factors:  Hypertension, Diabetes Mellitus, Hyperlipidemia, and Previous H/O Ischemic Stroke    Last Recorded Vitals:  Blood pressure 156/68, pulse 81, temperature 37.3 °C (99.1 °F), temperature source Temporal, resp. rate 16, height 1.803 m (5' 11\"), weight 67.6 kg (149 lb), SpO2 99%.     NIHSS:   NIH Stroke Scale:     1A. Level of Consciousness:  Requires repeated stimulation to arouse or responds to pain (+2)    1B. Ask Month and Age:  0 questions right (+2)    1C. Blink Eyes & Squeeze Hands:  Performs 0 tasks (+2)    2. Best Gaze:  Normal (0)    3. Visual:  No visual loss (0)    4. Facial Palsy:  Normal symmetry (0)    5A. Motor - Left Arm:  No effort against gravity (+3)    5B. Motor - Right Arm:  No effort against gravity (+3)    6A. Motor - Left Leg:  No effort against gravity (+3)    6B. Motor - Right Leg:  No effort against gravity (+3)    7. Limb Ataxia:  No ataxia (0)    8. Sensory Loss:  Normal (no sensory loss) (0)    9. Best Language:  " Mute/global aphasia (+3)    10. Dysarthia:  Severe dysarthria (+2)    11. Extinction and Inattention:  No abnormality (0)    NIH Stroke Scale:  23       Relevant Results:  LABS:  Glucose   Date Value Ref Range Status   04/29/2025 199 (H) 74 - 99 mg/dL Final     INR   Date Value Ref Range Status   04/29/2025 1.3 (H) 0.9 - 1.1 Final      Results for orders placed or performed during the hospital encounter of 04/29/25 (from the past 24 hours)   POCT GLUCOSE   Result Value Ref Range    POCT Glucose 197 (H) 74 - 99 mg/dL   CBC and Auto Differential   Result Value Ref Range    WBC 27.9 (H) 4.4 - 11.3 x10*3/uL    nRBC 0.1 (H) 0.0 - 0.0 /100 WBCs    RBC 3.04 (L) 4.50 - 5.90 x10*6/uL    Hemoglobin 8.6 (L) 13.5 - 17.5 g/dL    Hematocrit 26.1 (L) 41.0 - 52.0 %    MCV 86 80 - 100 fL    MCH 28.3 26.0 - 34.0 pg    MCHC 33.0 32.0 - 36.0 g/dL    RDW 18.7 (H) 11.5 - 14.5 %    Platelets 295 150 - 450 x10*3/uL    Neutrophils % 76.3 40.0 - 80.0 %    Immature Granulocytes %, Automated 1.3 (H) 0.0 - 0.9 %    Lymphocytes % 12.6 13.0 - 44.0 %    Monocytes % 8.3 2.0 - 10.0 %    Eosinophils % 1.2 0.0 - 6.0 %    Basophils % 0.3 0.0 - 2.0 %    Neutrophils Absolute 21.31 (H) 1.60 - 5.50 x10*3/uL    Immature Granulocytes Absolute, Automated 0.37 0.00 - 0.50 x10*3/uL    Lymphocytes Absolute 3.51 (H) 0.80 - 3.00 x10*3/uL    Monocytes Absolute 2.32 (H) 0.05 - 0.80 x10*3/uL    Eosinophils Absolute 0.34 0.00 - 0.40 x10*3/uL    Basophils Absolute 0.08 0.00 - 0.10 x10*3/uL   Comprehensive metabolic panel   Result Value Ref Range    Glucose 199 (H) 74 - 99 mg/dL    Sodium 136 136 - 145 mmol/L    Potassium 3.5 3.5 - 5.3 mmol/L    Chloride 95 (L) 98 - 107 mmol/L    Bicarbonate 31 21 - 32 mmol/L    Anion Gap 14 10 - 20 mmol/L    Urea Nitrogen 30 (H) 6 - 23 mg/dL    Creatinine 3.15 (H) 0.50 - 1.30 mg/dL    eGFR 18 (L) >60 mL/min/1.73m*2    Calcium 8.9 8.6 - 10.3 mg/dL    Albumin 2.4 (L) 3.4 - 5.0 g/dL    Alkaline Phosphatase 78 33 - 136 U/L    Total Protein  6.9 6.4 - 8.2 g/dL    AST 16 9 - 39 U/L    Bilirubin, Total 0.4 0.0 - 1.2 mg/dL    ALT 11 10 - 52 U/L   Troponin I, High Sensitivity   Result Value Ref Range    Troponin I, High Sensitivity 27 (H) 0 - 20 ng/L   Protime-INR   Result Value Ref Range    Protime 13.9 (H) 9.8 - 12.4 seconds    INR 1.3 (H) 0.9 - 1.1   APTT   Result Value Ref Range    aPTT 33 26 - 36 seconds        CT Head Imaging:  CTH imaging personally reviewed, showed no acute ischemic / hemorrhagic changes     CTA Head and Neck Imaging:  CTA head and neck imaging personally reviewed, no large vessel occlusion or severe stenosis seen      Assessment:  Stroke not suspected, alternative etiology likely   Encephalopathy post dialysis.     IV Thrombolysis IV Thrombolysis Checklist        IV Thrombolysis Given: No; Thrombolysis contraindication reason: Working diagnosis is NOT a suspected ischemic stroke and Patient receiving direct thrombin inhibitors or direct Factor Xa inhibitors            Patient is a candidate for thrombectomy:  yes/no: No; contraindication reason: No evidence of proximal occlusion    Additional Recommendations:  Infectious/ metabolic work up. Observation for mentation to improve following HD.     Disposition:  Patient will remain at referring facility for further evaluation and management.    Virtual or Telephone Consent    An interactive audio and video telecommunication system which permits real time communications between the patient (at the originating site) and provider (at the distant site) was utilized to provide this telehealth service.   Verbal consent was requested and obtained from Tyshawn Coles on this date, 04/29/25 for a telehealth visit.      Telestroke is covered in shift work. If there are further Neurological questions or concerns please contact your regional neurologist on call during daytime hours or contact the transfer center with an ADT20 order.    Coy Fuller MD

## 2025-04-30 VITALS
OXYGEN SATURATION: 98 % | DIASTOLIC BLOOD PRESSURE: 53 MMHG | HEIGHT: 74 IN | TEMPERATURE: 97.2 F | RESPIRATION RATE: 17 BRPM | WEIGHT: 147.49 LBS | BODY MASS INDEX: 18.93 KG/M2 | SYSTOLIC BLOOD PRESSURE: 106 MMHG | HEART RATE: 68 BPM

## 2025-04-30 LAB
ANION GAP SERPL CALC-SCNC: 15 MMOL/L (ref 10–20)
APPEARANCE UR: ABNORMAL
ATRIAL RATE: 81 BPM
BILIRUB UR STRIP.AUTO-MCNC: NEGATIVE MG/DL
BUN SERPL-MCNC: 39 MG/DL (ref 6–23)
CALCIUM SERPL-MCNC: 9 MG/DL (ref 8.6–10.3)
CHLORIDE SERPL-SCNC: 95 MMOL/L (ref 98–107)
CO2 SERPL-SCNC: 30 MMOL/L (ref 21–32)
COLOR UR: ABNORMAL
CREAT SERPL-MCNC: 3.93 MG/DL (ref 0.5–1.3)
EGFRCR SERPLBLD CKD-EPI 2021: 14 ML/MIN/1.73M*2
ERYTHROCYTE [DISTWIDTH] IN BLOOD BY AUTOMATED COUNT: 18.9 % (ref 11.5–14.5)
GLUCOSE BLD MANUAL STRIP-MCNC: 136 MG/DL (ref 74–99)
GLUCOSE BLD MANUAL STRIP-MCNC: 169 MG/DL (ref 74–99)
GLUCOSE BLD MANUAL STRIP-MCNC: 181 MG/DL (ref 74–99)
GLUCOSE SERPL-MCNC: 163 MG/DL (ref 74–99)
GLUCOSE UR STRIP.AUTO-MCNC: ABNORMAL MG/DL
HCT VFR BLD AUTO: 25.9 % (ref 41–52)
HGB BLD-MCNC: 8.3 G/DL (ref 13.5–17.5)
HOLD SPECIMEN: NORMAL
HOLD SPECIMEN: NORMAL
KETONES UR STRIP.AUTO-MCNC: NEGATIVE MG/DL
LEUKOCYTE ESTERASE UR QL STRIP.AUTO: ABNORMAL
MAGNESIUM SERPL-MCNC: 2.23 MG/DL (ref 1.6–2.4)
MCH RBC QN AUTO: 27.8 PG (ref 26–34)
MCHC RBC AUTO-ENTMCNC: 32 G/DL (ref 32–36)
MCV RBC AUTO: 87 FL (ref 80–100)
NITRITE UR QL STRIP.AUTO: ABNORMAL
NRBC BLD-RTO: 0 /100 WBCS (ref 0–0)
P AXIS: 67 DEGREES
P OFFSET: 206 MS
P ONSET: 148 MS
PH UR STRIP.AUTO: 8.5 [PH]
PLATELET # BLD AUTO: 282 X10*3/UL (ref 150–450)
POTASSIUM SERPL-SCNC: 3.8 MMOL/L (ref 3.5–5.3)
PR INTERVAL: 146 MS
PROCALCITONIN SERPL-MCNC: 2.89 NG/ML
PROT UR STRIP.AUTO-MCNC: ABNORMAL MG/DL
Q ONSET: 221 MS
QRS COUNT: 13 BEATS
QRS DURATION: 76 MS
QT INTERVAL: 398 MS
QTC CALCULATION(BAZETT): 462 MS
QTC FREDERICIA: 439 MS
R AXIS: 70 DEGREES
RBC # BLD AUTO: 2.99 X10*6/UL (ref 4.5–5.9)
RBC # UR STRIP.AUTO: ABNORMAL MG/DL
RBC #/AREA URNS AUTO: >20 /HPF
SODIUM SERPL-SCNC: 136 MMOL/L (ref 136–145)
SP GR UR STRIP.AUTO: 1.02
SQUAMOUS #/AREA URNS AUTO: ABNORMAL /HPF
T AXIS: 77 DEGREES
T OFFSET: 420 MS
UROBILINOGEN UR STRIP.AUTO-MCNC: NORMAL MG/DL
VENTRICULAR RATE: 81 BPM
WBC # BLD AUTO: 24.2 X10*3/UL (ref 4.4–11.3)
WBC #/AREA URNS AUTO: >50 /HPF
WBC CLUMPS #/AREA URNS AUTO: ABNORMAL /HPF

## 2025-04-30 PROCEDURE — 85027 COMPLETE CBC AUTOMATED: CPT | Performed by: NURSE PRACTITIONER

## 2025-04-30 PROCEDURE — 36415 COLL VENOUS BLD VENIPUNCTURE: CPT | Performed by: NURSE PRACTITIONER

## 2025-04-30 PROCEDURE — 82947 ASSAY GLUCOSE BLOOD QUANT: CPT

## 2025-04-30 PROCEDURE — 82374 ASSAY BLOOD CARBON DIOXIDE: CPT | Performed by: NURSE PRACTITIONER

## 2025-04-30 PROCEDURE — 80048 BASIC METABOLIC PNL TOTAL CA: CPT | Performed by: NURSE PRACTITIONER

## 2025-04-30 PROCEDURE — 99239 HOSP IP/OBS DSCHRG MGMT >30: CPT | Performed by: INTERNAL MEDICINE

## 2025-04-30 PROCEDURE — G0378 HOSPITAL OBSERVATION PER HR: HCPCS

## 2025-04-30 PROCEDURE — 2500000004 HC RX 250 GENERAL PHARMACY W/ HCPCS (ALT 636 FOR OP/ED): Performed by: NURSE PRACTITIONER

## 2025-04-30 PROCEDURE — 81001 URINALYSIS AUTO W/SCOPE: CPT | Performed by: STUDENT IN AN ORGANIZED HEALTH CARE EDUCATION/TRAINING PROGRAM

## 2025-04-30 PROCEDURE — 87077 CULTURE AEROBIC IDENTIFY: CPT | Mod: STJLAB | Performed by: STUDENT IN AN ORGANIZED HEALTH CARE EDUCATION/TRAINING PROGRAM

## 2025-04-30 PROCEDURE — 99221 1ST HOSP IP/OBS SF/LOW 40: CPT | Performed by: PSYCHIATRY & NEUROLOGY

## 2025-04-30 PROCEDURE — 83735 ASSAY OF MAGNESIUM: CPT | Performed by: NURSE PRACTITIONER

## 2025-04-30 PROCEDURE — 87086 URINE CULTURE/COLONY COUNT: CPT | Mod: STJLAB | Performed by: STUDENT IN AN ORGANIZED HEALTH CARE EDUCATION/TRAINING PROGRAM

## 2025-04-30 PROCEDURE — 2500000001 HC RX 250 WO HCPCS SELF ADMINISTERED DRUGS (ALT 637 FOR MEDICARE OP): Performed by: NURSE PRACTITIONER

## 2025-04-30 PROCEDURE — 84145 PROCALCITONIN (PCT): CPT | Mod: STJLAB | Performed by: NURSE PRACTITIONER

## 2025-04-30 RX ORDER — CEFDINIR 300 MG/1
300 CAPSULE ORAL DAILY
Qty: 7 CAPSULE | Refills: 0 | Status: SHIPPED | OUTPATIENT
Start: 2025-04-30 | End: 2025-05-07

## 2025-04-30 RX ADMIN — SEVELAMER CARBONATE 1600 MG: 800 TABLET, FILM COATED ORAL at 09:43

## 2025-04-30 RX ADMIN — Medication 25 MCG: at 09:44

## 2025-04-30 RX ADMIN — DOCUSATE SODIUM 100 MG: 100 CAPSULE, LIQUID FILLED ORAL at 09:43

## 2025-04-30 RX ADMIN — FINASTERIDE 5 MG: 5 TABLET, FILM COATED ORAL at 09:44

## 2025-04-30 RX ADMIN — APIXABAN 2.5 MG: 2.5 TABLET, FILM COATED ORAL at 09:44

## 2025-04-30 RX ADMIN — LEVETIRACETAM 500 MG: 500 TABLET, FILM COATED ORAL at 09:43

## 2025-04-30 RX ADMIN — DONEPEZIL HYDROCHLORIDE 22.5 MG: 10 TABLET ORAL at 09:43

## 2025-04-30 RX ADMIN — FOLIC ACID 1 MG: 1 TABLET ORAL at 09:43

## 2025-04-30 RX ADMIN — Medication 1 CAPSULE: at 05:50

## 2025-04-30 RX ADMIN — COLLAGENASE SANTYL: 250 OINTMENT TOPICAL at 13:36

## 2025-04-30 RX ADMIN — FERROUS SULFATE TAB 325 MG (65 MG ELEMENTAL FE) 325 MG: 325 (65 FE) TAB at 10:06

## 2025-04-30 RX ADMIN — METOPROLOL TARTRATE 50 MG: 50 TABLET, FILM COATED ORAL at 09:44

## 2025-04-30 ASSESSMENT — COGNITIVE AND FUNCTIONAL STATUS - GENERAL
HELP NEEDED FOR BATHING: TOTAL
TURNING FROM BACK TO SIDE WHILE IN FLAT BAD: A LOT
DAILY ACTIVITIY SCORE: 6
CLIMB 3 TO 5 STEPS WITH RAILING: TOTAL
MOVING TO AND FROM BED TO CHAIR: A LOT
DRESSING REGULAR LOWER BODY CLOTHING: TOTAL
PERSONAL GROOMING: TOTAL
MOBILITY SCORE: 9
STANDING UP FROM CHAIR USING ARMS: TOTAL
EATING MEALS: TOTAL
DRESSING REGULAR UPPER BODY CLOTHING: TOTAL
WALKING IN HOSPITAL ROOM: TOTAL
MOVING FROM LYING ON BACK TO SITTING ON SIDE OF FLAT BED WITH BEDRAILS: A LOT
TOILETING: TOTAL

## 2025-04-30 ASSESSMENT — ACTIVITIES OF DAILY LIVING (ADL): LACK_OF_TRANSPORTATION: NO

## 2025-04-30 ASSESSMENT — PAIN SCALES - GENERAL: PAINLEVEL_OUTOF10: 0 - NO PAIN

## 2025-04-30 NOTE — HOSPITAL COURSE
84-year-old male with past medical history of ESRD on hemodialysis via tunneled hemodialysis catheter Tuesday Thursday Saturday, hypertension, hyperlipidemia, diabetes mellitus, dementia was recently hospitalized for higher white count with concern for Bacteroides bacteremia presented back due to change in mental status.  Patient seems to be close to his baseline from what I remember from taking care of him last time.  He was found to have abnormal UA.  Urine culture is still not back but he is close to baseline.  He can be discharged on oral antibiotics and can follow-up with primary care provider as outpatient.  Overall patient's prognosis is not good with multiple comorbid condition and recurrent hospitalization.  They can follow-up with palliative care as outpatient.  During previous hospitalization lengthy discussion took place with wife regarding hospice but she was not ready to stop dialysis at that point.  Below is the orthopedic consult And the recommendations from them from previous hospitalization.    Consultation findings discussed with wife in detail.  Discussed complexity of issue.  Prior notes reviewed by Dr. Dumont.  I do agree with his findings and assessment.  Patient did undergo close reduction by him however has recurrent dislocation.  Discussed that revision surgery would be extremely high risk high risk for infection, recurrent instability, mortality.  Does have elevated white count over also chronic decubitus ulcer with chronic osteomyelitis.  Given that he does not really have any erythema about his hip at all or any drainage I have lower concern for infectious etiology about the hip joint.  Wife is not interested in any type of surgical treatment.  Discussed this case with the medical team.  Recommend hospice  We will discuss findings with the infectious disease team  No acute surgical intervention from orthopedic standpoint.   Weight-bear as tolerated for transfers,hygiene.  I did discuss  the case with Dr. Du.  Please reconsult with any other questions or concerns  Patient has severe impairment related to his presenting condition.  It is significantly impairing bodily function.  We did discuss surgical and nonsurgical options  Recommend de-escalation regarding treatment of his hip.  Hospice management.    32 minutes spent in discharge timing

## 2025-04-30 NOTE — CONSULTS
"Inpatient consult to Neurology  Consult performed by: Drake Stephen MD  Consult ordered by: FRANKIE Fleming-KARLA          History Of Present Illness  Tyshawn Coles is a 84 y.o. male presenting with altered mental status.    History largely taken from chart review.  The patient has several medical problems.  He has a history of stroke with resultant left sided hemiparesis.  He is A&O X 1 at baseline.      Yesterday, he was on his way back from dialysis.  He was noted to have an alerted mental status.  His speech was garbled.      He was brought to [facility].  CT and CTA head/neck were unremarkable.      Past Medical History  Medical History[1]  Surgical History  Surgical History[2]  Social History  Social History[3]  Allergies  Patient has no known allergies.  Prescriptions Prior to Admission[4]    Review of Systems  Neurological Exam  Physical Exam  Last Recorded Vitals  Blood pressure 118/58, pulse 79, temperature 37.1 °C (98.8 °F), temperature source Temporal, resp. rate 18, height 1.88 m (6' 2\"), weight 66.9 kg (147 lb 7.8 oz), SpO2 99%.    Gen: NAD  Neuro:  --HIF: A&O X 1, naming 2/2, unable to repeat  --CN:  PERRLA, unable to assess EOM due to not following this command, VFF to threat, left facial droop  --Motor: Spastic left hemiplegia  --Sensory: intact to painful stim in RUE  --Reflex: absent throughout, toes down  --Cerebellum: Unable to perfomr  --Gait: Deferred     Relevant Results  CT Head (I personally reviewed the images/tracings with the following interpretation)  Generalized atrophy  No acute hemorrhage    CTA head/neck (I personally reviewed the images/tracings with the following interpretation)  No evidence of LVO        NIH Stroke Scale  1A. Level of Consciousness: Alert, Keenly Responsive  1B. Ask Month and Age: No Questions Right  1C. Blink Eyes & Squeeze Hands: Performs Both Tasks  2. Best Gaze: Normal  3. Visual: No Visual Loss  4. Facial Palsy: Normal Symmetrical Movements  5A. Motor - " Left Arm: No Effort Against Gravity  5B. Motor - Right Arm: No Effort Against Gravity  6A. Motor - Left Leg: No Movement  6B. Motor - Right Leg: No Effort Against [place]  7. Limb Ataxia: Absent  8. Sensory Loss: Normal  9. Best Language: No Aphasia  10. Dysarthria: Normal  11. Extinction and Inattention: No Abnormality  NIH Stroke Scale: 15           [place] Coma Scale  Best Eye Response: Spontaneous  Best Verbal Response: Confused  Best Motor Response: Follows commands  [place] Coma Scale Score: 14                 I have personally reviewed the following imaging results:   Imaging  XR chest 1 view  Result Date: 4/29/2025  No evidence of acute intrathoracic abnormality.   Signed by: Harpal Quiroz 4/29/2025 5:57 PM Dictation workstation:   [id number]    CT brain attack angio head and neck W and WO IV contrast  Result Date: 4/29/2025  1. No intracranial major vascular occlusion. 2. No flow-limiting stenosis or significant plaque irregularity in the neck.   MACRO: None   Signed by: Kelvin Tong 4/29/2025 5:24 PM Dictation workstation:   [id number]    CT brain attack head wo IV contrast  Result Date: 4/29/2025  No evidence of acute cortical infarct or intracranial hemorrhage. Chronic changes as described.   MACRO: Radha Trent discussed the significance and urgency of this critical finding by secure chat with  ROBERT HUNTER on 4/29/2025 at 5:07 pm. (**-RCF-**) Findings:  See findings.     Signed by: Radha Trent 4/29/2025 5:08 PM Dictation workstation:   [id number]      Cardiology, Vascular, and Other Imaging  ECG 12 lead  Result Date: 4/30/2025  Sinus rhythm with Premature supraventricular complexes and with occasional Premature ventricular complexes Otherwise normal ECG When compared with ECG of 06-APR-2025 15:45, Premature ventricular complexes are now Present Premature supraventricular complexes are now Present         Assessment/Plan   Assessment & Plan  Acute encephalopathy    - likely secondary to UTI  -  currently, on exam, he is A&O X 1, naming intact, he has left hemiparesis which appears to be chronic  - no further neurological workup          Drake Stephen MD       [1]   Past Medical History:  Diagnosis Date    Autonomic dysfunction     Benign prostatic hyperplasia     Chronic anemia     Coronary artery disease     Current use of long term anticoagulation     Apixaban    Dementia     Dyslipidemia     End-stage renal disease on hemodialysis (Multi)     Frequent falls     Gout     Heart failure with preserved ejection fraction     History of anaphylaxis     History of closed head injury     History of Clostridioides difficile colitis     History of deep vein thrombosis     History of non-ST elevation myocardial infarction (NSTEMI)     History of sepsis     Hypertension     Hypoxic ischemic encephalopathy (Multi)     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)     Kidney stones     Multilevel degenerative disc disease     Osteoarthritis     Paroxysmal atrial fibrillation (Multi)     Peripheral neuropathy     Peripheral vascular disease (CMS-HCC)     Secondary hyperparathyroidism of renal origin (Multi)     Seizure disorder (Multi)    [2]   Past Surgical History:  Procedure Laterality Date    ADENOIDECTOMY      AV FISTULA PLACEMENT      CHOLECYSTECTOMY      COLONOSCOPY      IR CVC TUNNELED  08/20/2021    IR CVC TUNNELED 8/20/2021 [facility]ICAL [facility]GAC[facility]    IR TUNNELED DIALYSIS CATHETER      PACEMAKER PLACEMENT      THROMBECTOMY      TONSILLECTOMY      TOTAL HIP ARTHROPLASTY Right    [3]   Social History  Tobacco Use    Smoking status: Former     Types: Cigarettes    Smokeless tobacco: Never   Substance Use Topics    Alcohol use: Never    Drug use: Never   [4]   Medications Prior to Admission   Medication Sig Dispense Refill Last Dose/Taking    apixaban (Eliquis) 2.5 mg tablet Take 1 tablet (2.5 mg) by mouth 2 times a day.   4/29/2025 Morning    cholecalciferol (Vitamin D-3) 25 MCG (1000 UT) capsule Take 1  capsule (25 mcg) by mouth once daily.   4/29/2025 Morning    collagenase (SantyL) 250 unit/gram ointment Apply topically once daily as directed. 30 g 0 4/29/2025 Morning    docusate sodium (Colace) 100 mg capsule Take 1 capsule (100 mg) by mouth once daily.   4/29/2025 Morning    donepezil (Aricept) 23 mg tablet Take 1 tablet (23 mg) by mouth once daily.   4/29/2025 Morning    ferrous sulfate 325 mg (65 mg elemental) tablet Take 1 tablet (325 mg) by mouth once daily with breakfast.   4/29/2025 Morning    finasteride (Proscar) 5 mg tablet Take 1 tablet (5 mg) by mouth once daily.   4/29/2025 Morning    folic acid (Folvite) 1 mg tablet Take 1 tablet (1 mg) by mouth once daily.   4/29/2025 Morning    insulin glargine (Lantus U-100 Insulin) 100 unit/mL injection Inject 5 Units under the skin once daily at bedtime. Take as directed per insulin instructions.   4/28/2025 Bedtime    insulin lispro 100 unit/mL injection Inject under the skin 3 times daily (morning, midday, late afternoon). Per sliding scale  <150 = 0 units  151-200 = 2 units  201-250 = 4 units  251-300 = 6 units  301-350 = 8 units  351-400 = 10 units  >400 = call MD   4/29/2025 Morning    levETIRAcetam (Keppra) 500 mg tablet Take 1 tablet (500 mg) by mouth 2 times a day.   4/29/2025 Morning    melatonin 10 mg tablet Take 1 tablet (10 mg) by mouth once daily at bedtime.   4/28/2025 Bedtime    metoprolol tartrate (Lopressor) 50 mg tablet Take 1 tablet by mouth 2 times a day.   4/29/2025 Morning    oxyCODONE (Roxicodone) 5 mg immediate release tablet Take 0.5 tablets (2.5 mg) by mouth every 6 hours if needed for moderate pain (4 - 6).   Unknown    sevelamer carbonate (Renvela) 0.8 gram packet Take 2 packets (1.6 g) by mouth 2 times a day.   4/29/2025 Morning    Virt-Caps 1 mg capsule Take 1 capsule by mouth once daily.   4/29/2025 Morning    acetaminophen (Tylenol) 325 mg tablet Take 2 tablets (650 mg) by mouth every 4 hours if needed for mild pain (1 - 3),  moderate pain (4 - 6) or fever (temp greater than 38.0 C).   Unknown    acetaminophen (Tylenol) 650 mg suppository Insert 1 suppository (650 mg) into the rectum every 4 hours if needed for mild pain (1 - 3), moderate pain (4 - 6) or fever (temp greater than 38.0 C).   Unknown    albuterol 1.25 mg/3 mL nebulizer solution Take 3 mL (1.25 mg) by nebulization every 6 hours if needed for wheezing.   Unknown    bisacodyl (Dulcolax) 10 mg suppository Insert 1 suppository (10 mg) into the rectum every 24 (twenty four) hours if needed for constipation.   Unknown    glucagon HCL (Glucagon, HCl, Emergency Kit) 1 mg recon soln Inject 1 mg as directed if needed (hypoglycemic emergency).   Unknown    glucose (Glutose) 40 % gel oral gel Take 15 g by mouth 1 time if needed for low blood sugar - see comments. Give 1 tube (15g) po if BG<70 and symptomatic or BG= 60 or without any symptoms. May repeat with 1 tube in 15 min if needed   Unknown    loperamide (Imodium A-D) 2 mg tablet Take 1 tablet (2 mg) by mouth every 12 hours if needed for diarrhea.   Unknown    magnesium hydroxide (Milk of Magnesia) 400 mg/5 mL suspension Take 30 mL by mouth every 24 (twenty four) hours if needed for constipation.   Unknown    ondansetron (Zofran) 4 mg tablet Take 1 tablet (4 mg) by mouth every 6 hours if needed for nausea or vomiting.   Unknown    oxyCODONE (Roxicodone) 5 mg immediate release tablet Take 1 tablet (5 mg) by mouth every 6 hours if needed for severe pain (7 - 10). (Patient not taking: Reported on 4/29/2025)   Not Taking    polyethylene glycol (Glycolax, Miralax) 17 gram packet Take 17 g by mouth once daily as needed (for constipation).   Unknown

## 2025-04-30 NOTE — DISCHARGE INSTRUCTIONS
Patient to have a Low Air Loss mattress at facility.  Patient to follow up at [facility] within one week of discharge. Please call [phone] to schedule. Please coordinate with dialysis days of Tues, Thurs, Sat.

## 2025-04-30 NOTE — PROGRESS NOTES
"Nutrition Initial Assessment:   Nutrition Assessment    Reason for Assessment: Admission nursing screening    Patient is a 84 y.o. male presenting with altered mental status. Neuro on consult and note likely secondary to UTI. No further neuro work up planned. Of note, pt continues as a high risk surgical candidate for left hip along with elevated high WBC with chronic decubitus ulcer and chronic osteomyelitis. Discharge pending back to [facility].      Nutrition History:  Energy Intake: Fair 50-75 % (Pt receives non-select trays and nursing have to feed him.)  Food and Nutrient History: Pt seen 3 times by Dietitians at [facility] already in the month of April while he was here 3/31-4/16. It looks like he weighs about the same now compared to last month which is stable with his estimated dry weight of 67kg. Chronic left hemiparesis - pt needs to be fed. When visited at bedside today, he looks at me and seems to acknowledge me, but does not talk to me. He remains on hemodialysis for ESRD on Tues, Thurs and Sat. Hospice was brought up during his last hospitalization, but the family declined at that time. At baseline, it appears that he eats about one good meal daily and usually accepts the oral nutrition supplements. He resides at [facility] where he receives a No Concentrated Sweets and No Added Salt with Glucerna Shakes x2 and 1-serving Liquicel daily. His skin remains compromised (per chart) to sacral region - described as a \"Maco Ulcer\" and left heel = DTI.       Anthropometrics:  Height: 188 cm (6' 2\")   Weight: 66.9 kg (147 lb 7.8 oz)   BMI (Calculated): 18.93  IBW/kg (Dietitian Calculated): 86.18 kg  Percent of IBW: 77.63 %  Estimated dry weight = 67 kg                      Weight History:   Wt Readings from Last 10 Encounters:   04/29/25 66.9 kg (147 lb 7.8 oz)   03/31/25 67.6 kg (149 lb)   03/11/25 69.9 kg (154 lb)   03/07/25 68.4 kg (150 lb 12.7 oz)   03/05/25 80.3 kg (177 lb 0.8 oz)   10/20/23 81.6 kg (180 lb) "   11/05/21 84 kg (185 lb 3 oz)       Weight Change %:  Weight History / % Weight Change: Significant weight loss since original fall on 3/05/25 of 30 lbs (17%) in 8 weeks.  Significant Weight Loss: Yes    Nutrition Focused Physical Exam Findings:    Subcutaneous Fat Loss:   Orbital Fat Pads: Mild-Moderate (slight dark circles and slight hollowing)  Buccal Fat Pads: Mild-Moderate (flat cheeks, minimal bounce)  Triceps: Defer  Ribs: Defer  Muscle Wasting:  Temporalis: Mild-Moderate (slight depression)  Pectoralis (Clavicular Region): Well nourished (clavicle not visible)  Interosseous: Mild-Moderate (slightly depressed area between thumb and forefinger)  Trapezius/Infraspinatus/Supraspinatus (Scapular Region): Defer  Quadriceps: Defer  Gastrocnemius: Defer  Edema:  Edema: +1 trace, +2 mild  Edema Location: +1 LUE, +2 RLE, +2 LLE  Physical Findings:  Hair: Negative  Eyes: Negative  Nails: Negative  Skin: Positive  Positive Skin Findings: Unstageable pressure injury (Per chart: pressure injury to sacral region (unstageable), pablo ulcer to sacrum (unstageable), pressure injury/DTI to left heel)  Respiratory : Negative  Teeth Findings: Edentulous    Nutrition Significant Labs:  CBC Trend:   Results from last 7 days   Lab Units 04/30/25  0846 04/29/25  1641   WBC AUTO x10*3/uL 24.2* 27.9*   RBC AUTO x10*6/uL 2.99* 3.04*   HEMOGLOBIN g/dL 8.3* 8.6*   HEMATOCRIT % 25.9* 26.1*   MCV fL 87 86   PLATELETS AUTO x10*3/uL 282 295    , BMP Trend:   Results from last 7 days   Lab Units 04/30/25  0846 04/29/25  1641   GLUCOSE mg/dL 163* 199*   CALCIUM mg/dL 9.0 8.9   SODIUM mmol/L 136 136   POTASSIUM mmol/L 3.8 3.5   CO2 mmol/L 30 31   CHLORIDE mmol/L 95* 95*   BUN mg/dL 39* 30*   CREATININE mg/dL 3.93* 3.15*    , Renal Lab Trend:   Results from last 7 days   Lab Units 04/30/25  0846 04/29/25  1641   POTASSIUM mmol/L 3.8 3.5   SODIUM mmol/L 136 136   MAGNESIUM mg/dL 2.23  --    EGFR mL/min/1.73m*2 14* 18*   BUN mg/dL 39* 30*    CREATININE mg/dL 3.93* 3.15*    , Iron Panel:   Lab Results   Component Value Date    IRON 387 (H) 06/08/2021    TIBC 467 (H) 06/08/2021    FERRITIN 2,089 (H) 04/09/2025        Nutrition Specific Medications:  Scheduled medications  Scheduled Medications[1]  Continuous medications  Continuous Medications[2]  PRN medications  PRN Medications[3]      I/O:   Last BM Date: 04/29/25;      Dietary Orders (From admission, onward)       Start     Ordered    04/30/25 0900  Oral nutritional supplements  Until discontinued        Question Answer Comment   Deliver with Lunch    Select supplement: Gelatein Sugar Free        04/30/25 0859    04/29/25 2147  May Participate in Room Service With Assistance  ( ROOM SERVICE MAY PARTICIPATE WITH ASSISTANCE)  Once        Question:  .  Answer:  Yes    04/29/25 2147 04/29/25 1940  Adult diet Consistent Carb; CCD 75 gm/meal; Easy to chew  Diet effective now        Question Answer Comment   Diet type Consistent Carb    Carb diet selection: CCD 75 gm/meal    Texture Easy to chew        04/29/25 1939                     Estimated Needs:   Total Energy Estimated Needs in 24 hours (kCal):  (2222-2480 kcal (30-35 kcal/kg))     Total Protein Estimated Needs in 24 Hours (g):  (84-100g protein (1.25-1.5g/kg))        Method for Estimating 24 Hour Fluid Needs: Follow dialysis guidelines for fluid needs.  Patient on Order Fluid Restriction: No        Nutrition Diagnosis   Malnutrition Diagnosis  Patient has Malnutrition Diagnosis: Yes  Diagnosis Status: New  Malnutrition Diagnosis: Moderate malnutrition related to chronic disease or condition  As Evidenced by: significant weight loss of 30 lbs (17%) in 8 weeks, mild to moderate muscle wasting with chronic decubitus ulcers.            Nutrition Interventions/Recommendations   Nutrition prescription for oral nutrition    Nutrition Recommendations:  Individualized Nutrition Prescription Provided for : Continue CCD 75g/meal, easy to chew  supplemented with NEPRO twice daily and GELATEIN SF once daily.    Nutrition Interventions/Goals:   Medical Food Supplement: Commercial beverage medical food supplement therapy  Goal: NEPRO = 420 kcal/19g protein & GELATEIN SF = 80 kcal/20g protein  Additional Interventions: Nursing to provide opportunities for drinks between meals.      Pt not appropriate for education. Therapeutic diet provided at [facility].       Nutrition Monitoring and Evaluation   Food/Nutrient Related History Monitoring  Monitoring and Evaluation Plan: Intake / amount of food  Intake / Amount of food: Consumes at least 75% or more of meals/snacks/supplements    Anthropometric Measurements  Monitoring and Evaluation Plan: Body weight  Body Weight: Estimated dry body weight  Criteria: 67 kg    Biochemical Data, Medical Tests and Procedures  Monitoring and Evaluation Plan: Electrolyte/renal panel  Criteria: Renal status to remain at baseline.    Physical Exam Findings  Monitoring and Evaluation Plan: Skin  Skin Finding: Impaired wound healing - Skin to heal    Goal Status: New goal(s) identified    Time Spent (min): 30 minutes              [1] apixaban, 2.5 mg, oral, BID  cholecalciferol, 25 mcg, oral, Daily  collagenase, , Topical, Daily  docusate sodium, 100 mg, oral, Daily  donepezil, 22.5 mg, oral, Daily  ferrous sulfate, 65 mg of iron, oral, Daily with breakfast  finasteride, 5 mg, oral, Daily  folic acid, 1 mg, oral, Daily  insulin glargine, 5 Units, subcutaneous, Nightly  levETIRAcetam, 500 mg, oral, BID  metoprolol tartrate, 50 mg, oral, BID  sevelamer carbonate, 1,600 mg, oral, BID  vitamin B complex-vitamin C-folic acid, 1 capsule, oral, Daily     [2]    [3] PRN medications: acetaminophen, albuterol, bisacodyl, ondansetron **OR** ondansetron, [Held by provider] oxyCODONE, polyethylene glycol

## 2025-04-30 NOTE — PROGRESS NOTES
Spiritual Care Visit  Spiritual Care Request    Reason for Visit:  Routine Visit: Introduction     Request Received From:       Focus of Care:  Visited With: Patient         Refer to :          Spiritual Care Assessment    Spiritual Assessment:                      Care Provided:  Intended Effects: Promote sense of peace, Preserve dignity and respect, Meaning-making  Methods: Offer spiritual/Mormon support  Interventions: Share words of hope and inspiration, Daisy    Sense of Community and or Sikhism Affiliation:  Gnosticist         Addressed Needs/Concerns and/or Neal Through:          Outcome:        Advance Directives:         Spiritual Care Annotation    Annotation:  Patient was asleep.   stood by his side and spoke comforting words and prayed.

## 2025-04-30 NOTE — CONSULTS
Wound Care Consult     Visit Date: 4/30/2025      Patient Name: Tyshawn Coles         MRN: [id number]             Reason for Consult: Left Heel wound and Sacral Wound        Wound History: Blister, Stage 4     Pertinent Labs: n/a      Assessment: Received consult to see patient, chart and pictures reviewed. 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 Moderate amount of yellow drainage. Surrounding tissue intact except for a few scattered red areas. Left heel noted to have a blister that appears to be healing, tissue is dry intact firm tissue.                Wound Plan: Recommendation for wound care - cleanse with NS, apply Santyl, cover with NS moistened gauze and cover with foam dressing daily. Will follow. Referral made to [facility] per wife's permission.       Princess Easley RN  4/30/2025  2:36 PM

## 2025-04-30 NOTE — CARE PLAN
The patient's goals for the shift include      The clinical goals for the shift include Remain hemodynamically stable         No

## 2025-04-30 NOTE — NURSING NOTE
Patient arrived to the unit via stretcher. Vitals taken. Pics taken of pt's sacrum and rt heel. Placed a wet to dry dressing on pt's sacral wound, covered with mepilex. Placed telemetry on the patient per order.

## 2025-04-30 NOTE — PROGRESS NOTES
04/30/25 1144   Discharge Planning   Living Arrangements Other (Comment)   Support Systems Family members   Assistance Needed yes   Type of Residence Nursing home/residential care   Do you have animals or pets at home? No   Home or Post Acute Services Post acute facilities (Rehab/SNF/etc)   Type of Post Acute Facility Services Long term care   Expected Discharge Disposition Inter   Does the patient need discharge transport arranged? Yes   RoundTrip coordination needed? Yes   Has discharge transport been arranged? No   Financial Resource Strain   How hard is it for you to pay for the very basics like food, housing, medical care, and heating? Not hard   Housing Stability   In the last 12 months, was there a time when you were not able to pay the mortgage or rent on time? N   At any time in the past 12 months, were you homeless or living in a shelter (including now)? N   Transportation Needs   In the past 12 months, has lack of transportation kept you from medical appointments or from getting medications? no   In the past 12 months, has lack of transportation kept you from meetings, work, or from getting things needed for daily living? No   Patient Choice   Patient / Family choosing to utilize agency / facility established prior to hospitalization Yes   Stroke Family Assessment   Stroke Family Assessment Needed No   Intensity of Service   Intensity of Service 0-30 min     Patient unable to participate in the assessment interview due to confusion. I confirmed with wife he is a long term resident at [facility]. When patient is medically ready for discharge he will return to the facility. Referral for wound care nurse to evaluate and provide recommendations for wound care. Wife would like to receive a call from the wound care nurse after evaluation. Message sent to wound care. Message to [facility] to send a return ICF referral to [facility].    1410: Wound care nurse spoke with patient's spouse. Patient is  "medically ready for discharge to return to [facility] ICF. Message sent to the dsc to arrange for stretcher due to inability to follow safety instructions \"fligh risk\" and send the llanes citlali, mar, and AVS.     1430: Llanes citlali and AVS updated because the wound care nurse added additional wound care instructions. Updated documents sent through [facility]. Spouse notified of the transport time and the new instructions/orders were sent to the facility. Transport time arrange for 1600 to [facility]. Nursing notified.   "

## 2025-04-30 NOTE — DISCHARGE SUMMARY
Discharge Diagnosis  Acute encephalopathy  UTI  Hip dislocation  Poor functional status  Dementia  ESRD on hemodialysis           Issues Requiring Follow-Up  Follow-up final urine culture    Discharge Meds     Medication List      START taking these medications     cefdinir 300 mg capsule; Commonly known as: Omnicef; Take 1 capsule (300   mg) by mouth once daily for 7 days.     CHANGE how you take these medications     oxyCODONE 5 mg immediate release tablet; Commonly known as: Roxicodone;   Take 0.5 tablets (2.5 mg) by mouth every 6 hours if needed for moderate   pain (4 - 6).; What changed: Another medication with the same name was   removed. Continue taking this medication, and follow the directions you   see here.     CONTINUE taking these medications     * acetaminophen 650 mg suppository; Commonly known as: Tylenol   * acetaminophen 325 mg tablet; Commonly known as: Tylenol   albuterol 1.25 mg/3 mL nebulizer solution   apixaban 2.5 mg tablet; Commonly known as: Eliquis   bisacodyl 10 mg suppository; Commonly known as: Dulcolax   cholecalciferol 25 mcg (1,000 units) capsule; Commonly known as: Vitamin   D-3   docusate sodium 100 mg capsule; Commonly known as: Colace   donepezil 23 mg tablet; Commonly known as: Aricept   ferrous sulfate 325 mg (65 mg elemental) tablet   finasteride 5 mg tablet; Commonly known as: Proscar   folic acid 1 mg tablet; Commonly known as: Folvite   Glucagon (HCl) Emergency Kit 1 mg recon soln; Generic drug: glucagon HCL   glucose 40 % gel oral gel; Commonly known as: Glutose   insulin lispro 100 unit/mL injection   Lantus U-100 Insulin 100 unit/mL injection; Generic drug: insulin   glargine   levETIRAcetam 500 mg tablet; Commonly known as: Keppra   loperamide 2 mg tablet; Commonly known as: Imodium A-D   magnesium hydroxide 400 mg/5 mL suspension; Commonly known as: Milk of   Magnesia   melatonin 10 mg tablet   metoprolol tartrate 50 mg tablet; Commonly known as: Lopressor    ondansetron 4 mg tablet; Commonly known as: Zofran   polyethylene glycol 17 gram packet; Commonly known as: Glycolax, Miralax   SantyL 250 unit/gram ointment; Generic drug: collagenase; Apply   topically once daily as directed.   sevelamer carbonate 0.8 gram packet; Commonly known as: Renvela   Virt-Caps 1 mg capsule; Generic drug: vitamin B complex-vitamin C-folic   acid  * This list has 2 medication(s) that are the same as other medications   prescribed for you. Read the directions carefully, and ask your doctor or   other care provider to review them with you.       Test Results Pending At Discharge  Pending Labs       Order Current Status    Extra Urine Gray Tube In process    Procalcitonin In process    Urinalysis with Reflex Culture and Microscopic In process    Urine Culture In process            Hospital Course  84-year-old male with past medical history of ESRD on hemodialysis via tunneled hemodialysis catheter Tuesday Thursday Saturday, hypertension, hyperlipidemia, diabetes mellitus, dementia was recently hospitalized for higher white count with concern for Bacteroides bacteremia presented back due to change in mental status.  Patient seems to be close to his baseline from what I remember from taking care of him last time.  He was found to have abnormal UA.  Urine culture is still not back but he is close to baseline.  He can be discharged on oral antibiotics and can follow-up with primary care provider as outpatient.  Overall patient's prognosis is not good with multiple comorbid condition and recurrent hospitalization.  They can follow-up with palliative care as outpatient.  During previous hospitalization lengthy discussion took place with wife regarding hospice but she was not ready to stop dialysis at that point.  Below is the orthopedic consult And the recommendations from them from previous hospitalization.    Consultation findings discussed with wife in detail.  Discussed complexity of issue.   Prior notes reviewed by Dr. Dumont.  I do agree with his findings and assessment.  Patient did undergo close reduction by him however has recurrent dislocation.  Discussed that revision surgery would be extremely high risk high risk for infection, recurrent instability, mortality.  Does have elevated white count over also chronic decubitus ulcer with chronic osteomyelitis.  Given that he does not really have any erythema about his hip at all or any drainage I have lower concern for infectious etiology about the hip joint.  Wife is not interested in any type of surgical treatment.  Discussed this case with the medical team.  Recommend hospice  We will discuss findings with the infectious disease team  No acute surgical intervention from orthopedic standpoint.   Weight-bear as tolerated for transfers,hygiene.  I did discuss the case with Dr. Du.  Please reconsult with any other questions or concerns  Patient has severe impairment related to his presenting condition.  It is significantly impairing bodily function.  We did discuss surgical and nonsurgical options  Recommend de-escalation regarding treatment of his hip.  Hospice management.    32 minutes spent in discharge timing  Also spoke with wife extensively over the phone and updated Pamela at [phone] and tried explaining overall prognosis and improvement in white count but still overall high.    Pertinent Physical Exam At Time of Discharge  General: Slow, sluggish on room air  HEENT: PERRLA, head intact and normocephalic  Neck: Normal to inspection  Lungs: Clear to auscultation, work of breathing within normal limit  Cardiac: Regular rate and rhythm  Abdomen: Soft nontender, positive bowel sounds  : Exam deferred  Skin: Intact  Hematology: No petechia or excessive ecchymosis  Musculoskeletal: No pain at hip is noted  Neurological: Alert awake oriented x 1 does speak slowly, no focal deficit, cranial nerves grossly intact  Psych: No suicidal ideation or  homicidal ideation    Outpatient Follow-Up  Future Appointments   Date Time Provider Department Center   6/27/2025  1:30 PM Marcel Dawson MD [id number] [place]   10/17/2025  3:40 PM Sona Mckeon MD [id number] [place]         Paul Guevara MD

## 2025-04-30 NOTE — CARE PLAN
The patient's goals for the shift include      The clinical goals for the shift include Remain hemodynamically stable

## 2025-05-02 ENCOUNTER — PREP FOR PROCEDURE (OUTPATIENT)
Dept: SURGERY | Facility: HOSPITAL | Age: 85
End: 2025-05-02
Payer: MEDICARE

## 2025-05-03 LAB
BACTERIA UR CULT: ABNORMAL
LABORATORY COMMENT REPORT: ABNORMAL
LABORATORY COMMENT REPORT: ABNORMAL

## 2025-05-07 ENCOUNTER — HOSPITAL ENCOUNTER (INPATIENT)
Facility: HOSPITAL | Age: 85
DRG: 871 | End: 2025-05-07
Attending: STUDENT IN AN ORGANIZED HEALTH CARE EDUCATION/TRAINING PROGRAM | Admitting: INTERNAL MEDICINE
Payer: MEDICARE

## 2025-05-07 ENCOUNTER — TELEPHONE (OUTPATIENT)
Dept: ORTHOPEDIC SURGERY | Facility: CLINIC | Age: 85
End: 2025-05-07
Payer: MEDICARE

## 2025-05-07 ENCOUNTER — APPOINTMENT (OUTPATIENT)
Dept: WOUND CARE | Facility: CLINIC | Age: 85
End: 2025-05-07
Payer: MEDICARE

## 2025-05-07 ENCOUNTER — APPOINTMENT (OUTPATIENT)
Dept: CARDIOLOGY | Facility: HOSPITAL | Age: 85
DRG: 871 | End: 2025-05-07
Payer: MEDICARE

## 2025-05-07 ENCOUNTER — APPOINTMENT (OUTPATIENT)
Dept: RADIOLOGY | Facility: HOSPITAL | Age: 85
DRG: 871 | End: 2025-05-07
Payer: MEDICARE

## 2025-05-07 DIAGNOSIS — S31.000A WOUND OF SACRAL REGION, INITIAL ENCOUNTER: ICD-10-CM

## 2025-05-07 DIAGNOSIS — N18.6 ESRD ON HEMODIALYSIS (MULTI): ICD-10-CM

## 2025-05-07 DIAGNOSIS — R65.20 SEPSIS WITH ACUTE ORGAN DYSFUNCTION WITHOUT SEPTIC SHOCK, DUE TO UNSPECIFIED ORGANISM, UNSPECIFIED ORGAN DYSFUNCTION TYPE (MULTI): ICD-10-CM

## 2025-05-07 DIAGNOSIS — R11.10 VOMITING, UNSPECIFIED VOMITING TYPE, UNSPECIFIED WHETHER NAUSEA PRESENT: Primary | ICD-10-CM

## 2025-05-07 DIAGNOSIS — E11.22 TYPE 2 DIABETES MELLITUS WITH STAGE 5 CHRONIC KIDNEY DISEASE NOT ON CHRONIC DIALYSIS, WITH LONG-TERM CURRENT USE OF INSULIN (MULTI): ICD-10-CM

## 2025-05-07 DIAGNOSIS — N18.5 TYPE 2 DIABETES MELLITUS WITH STAGE 5 CHRONIC KIDNEY DISEASE NOT ON CHRONIC DIALYSIS, WITH LONG-TERM CURRENT USE OF INSULIN (MULTI): ICD-10-CM

## 2025-05-07 DIAGNOSIS — A41.9 SEPSIS WITH ACUTE ORGAN DYSFUNCTION WITHOUT SEPTIC SHOCK, DUE TO UNSPECIFIED ORGANISM, UNSPECIFIED ORGAN DYSFUNCTION TYPE (MULTI): ICD-10-CM

## 2025-05-07 DIAGNOSIS — L03.90 WOUND CELLULITIS: Primary | ICD-10-CM

## 2025-05-07 DIAGNOSIS — Z79.4 TYPE 2 DIABETES MELLITUS WITH STAGE 5 CHRONIC KIDNEY DISEASE NOT ON CHRONIC DIALYSIS, WITH LONG-TERM CURRENT USE OF INSULIN (MULTI): ICD-10-CM

## 2025-05-07 DIAGNOSIS — Z99.2 ESRD ON HEMODIALYSIS (MULTI): ICD-10-CM

## 2025-05-07 LAB
ALBUMIN SERPL BCP-MCNC: 2.4 G/DL (ref 3.4–5)
ALP SERPL-CCNC: 91 U/L (ref 33–136)
ALT SERPL W P-5'-P-CCNC: 10 U/L (ref 10–52)
ANION GAP SERPL CALC-SCNC: 16 MMOL/L (ref 10–20)
AST SERPL W P-5'-P-CCNC: 36 U/L (ref 9–39)
BASOPHILS # BLD MANUAL: 0 X10*3/UL (ref 0–0.1)
BASOPHILS NFR BLD MANUAL: 0 %
BILIRUB SERPL-MCNC: 0.5 MG/DL (ref 0–1.2)
BUN SERPL-MCNC: 42 MG/DL (ref 6–23)
CALCIUM SERPL-MCNC: 8.7 MG/DL (ref 8.6–10.3)
CHLORIDE SERPL-SCNC: 95 MMOL/L (ref 98–107)
CO2 SERPL-SCNC: 28 MMOL/L (ref 21–32)
CREAT SERPL-MCNC: 4.29 MG/DL (ref 0.5–1.3)
EGFRCR SERPLBLD CKD-EPI 2021: 13 ML/MIN/1.73M*2
EOSINOPHIL # BLD MANUAL: 0 X10*3/UL (ref 0–0.4)
EOSINOPHIL NFR BLD MANUAL: 0 %
ERYTHROCYTE [DISTWIDTH] IN BLOOD BY AUTOMATED COUNT: 18.4 % (ref 11.5–14.5)
GLUCOSE BLD MANUAL STRIP-MCNC: 240 MG/DL (ref 74–99)
GLUCOSE SERPL-MCNC: 192 MG/DL (ref 74–99)
HCT VFR BLD AUTO: 26 % (ref 41–52)
HGB BLD-MCNC: 8.9 G/DL (ref 13.5–17.5)
IMM GRANULOCYTES # BLD AUTO: 0.47 X10*3/UL (ref 0–0.5)
IMM GRANULOCYTES NFR BLD AUTO: 1.1 % (ref 0–0.9)
LACTATE SERPL-SCNC: 1.5 MMOL/L (ref 0.4–2)
LYMPHOCYTES # BLD MANUAL: 1.71 X10*3/UL (ref 0.8–3)
LYMPHOCYTES NFR BLD MANUAL: 4 %
MCH RBC QN AUTO: 28.3 PG (ref 26–34)
MCHC RBC AUTO-ENTMCNC: 34.2 G/DL (ref 32–36)
MCV RBC AUTO: 83 FL (ref 80–100)
MONOCYTES # BLD MANUAL: 1.71 X10*3/UL (ref 0.05–0.8)
MONOCYTES NFR BLD MANUAL: 4 %
NEUTROPHILS # BLD MANUAL: 39.28 X10*3/UL (ref 1.6–5.5)
NEUTS BAND # BLD MANUAL: 1.28 X10*3/UL (ref 0–0.5)
NEUTS BAND NFR BLD MANUAL: 3 %
NEUTS SEG # BLD MANUAL: 38 X10*3/UL (ref 1.6–5)
NEUTS SEG NFR BLD MANUAL: 89 %
NRBC BLD-RTO: 0 /100 WBCS (ref 0–0)
PLATELET # BLD AUTO: 284 X10*3/UL (ref 150–450)
POLYCHROMASIA BLD QL SMEAR: ABNORMAL
POTASSIUM SERPL-SCNC: 4.7 MMOL/L (ref 3.5–5.3)
PROT SERPL-MCNC: 6.9 G/DL (ref 6.4–8.2)
RBC # BLD AUTO: 3.15 X10*6/UL (ref 4.5–5.9)
RBC MORPH BLD: ABNORMAL
SODIUM SERPL-SCNC: 134 MMOL/L (ref 136–145)
TARGETS BLD QL SMEAR: ABNORMAL
TOTAL CELLS COUNTED BLD: 100
WBC # BLD AUTO: 42.7 X10*3/UL (ref 4.4–11.3)

## 2025-05-07 PROCEDURE — 96361 HYDRATE IV INFUSION ADD-ON: CPT

## 2025-05-07 PROCEDURE — 83605 ASSAY OF LACTIC ACID: CPT | Performed by: STUDENT IN AN ORGANIZED HEALTH CARE EDUCATION/TRAINING PROGRAM

## 2025-05-07 PROCEDURE — 96365 THER/PROPH/DIAG IV INF INIT: CPT

## 2025-05-07 PROCEDURE — 5A1D70Z PERFORMANCE OF URINARY FILTRATION, INTERMITTENT, LESS THAN 6 HOURS PER DAY: ICD-10-PCS | Performed by: INTERNAL MEDICINE

## 2025-05-07 PROCEDURE — 36415 COLL VENOUS BLD VENIPUNCTURE: CPT | Performed by: STUDENT IN AN ORGANIZED HEALTH CARE EDUCATION/TRAINING PROGRAM

## 2025-05-07 PROCEDURE — 1210000001 HC SEMI-PRIVATE ROOM DAILY

## 2025-05-07 PROCEDURE — 99291 CRITICAL CARE FIRST HOUR: CPT | Performed by: STUDENT IN AN ORGANIZED HEALTH CARE EDUCATION/TRAINING PROGRAM

## 2025-05-07 PROCEDURE — 71045 X-RAY EXAM CHEST 1 VIEW: CPT

## 2025-05-07 PROCEDURE — 0J2TXYZ CHANGE OTHER DEVICE IN TRUNK SUBCUTANEOUS TISSUE AND FASCIA, EXTERNAL APPROACH: ICD-10-PCS | Performed by: INTERNAL MEDICINE

## 2025-05-07 PROCEDURE — 85007 BL SMEAR W/DIFF WBC COUNT: CPT | Performed by: STUDENT IN AN ORGANIZED HEALTH CARE EDUCATION/TRAINING PROGRAM

## 2025-05-07 PROCEDURE — 80053 COMPREHEN METABOLIC PANEL: CPT | Performed by: STUDENT IN AN ORGANIZED HEALTH CARE EDUCATION/TRAINING PROGRAM

## 2025-05-07 PROCEDURE — 2500000004 HC RX 250 GENERAL PHARMACY W/ HCPCS (ALT 636 FOR OP/ED): Mod: JZ | Performed by: STUDENT IN AN ORGANIZED HEALTH CARE EDUCATION/TRAINING PROGRAM

## 2025-05-07 PROCEDURE — 93005 ELECTROCARDIOGRAM TRACING: CPT

## 2025-05-07 PROCEDURE — 2500000004 HC RX 250 GENERAL PHARMACY W/ HCPCS (ALT 636 FOR OP/ED): Performed by: INTERNAL MEDICINE

## 2025-05-07 PROCEDURE — 87070 CULTURE OTHR SPECIMN AEROBIC: CPT | Mod: ELYLAB | Performed by: STUDENT IN AN ORGANIZED HEALTH CARE EDUCATION/TRAINING PROGRAM

## 2025-05-07 PROCEDURE — 2500000001 HC RX 250 WO HCPCS SELF ADMINISTERED DRUGS (ALT 637 FOR MEDICARE OP): Performed by: STUDENT IN AN ORGANIZED HEALTH CARE EDUCATION/TRAINING PROGRAM

## 2025-05-07 PROCEDURE — 2500000002 HC RX 250 W HCPCS SELF ADMINISTERED DRUGS (ALT 637 FOR MEDICARE OP, ALT 636 FOR OP/ED): Performed by: INTERNAL MEDICINE

## 2025-05-07 PROCEDURE — 85027 COMPLETE CBC AUTOMATED: CPT | Performed by: STUDENT IN AN ORGANIZED HEALTH CARE EDUCATION/TRAINING PROGRAM

## 2025-05-07 PROCEDURE — 02HV33Z INSERTION OF INFUSION DEVICE INTO SUPERIOR VENA CAVA, PERCUTANEOUS APPROACH: ICD-10-PCS | Performed by: INTERNAL MEDICINE

## 2025-05-07 PROCEDURE — 71045 X-RAY EXAM CHEST 1 VIEW: CPT | Performed by: RADIOLOGY

## 2025-05-07 PROCEDURE — 96375 TX/PRO/DX INJ NEW DRUG ADDON: CPT

## 2025-05-07 PROCEDURE — 87040 BLOOD CULTURE FOR BACTERIA: CPT | Mod: ELYLAB | Performed by: STUDENT IN AN ORGANIZED HEALTH CARE EDUCATION/TRAINING PROGRAM

## 2025-05-07 PROCEDURE — 83036 HEMOGLOBIN GLYCOSYLATED A1C: CPT | Mod: ELYLAB | Performed by: INTERNAL MEDICINE

## 2025-05-07 PROCEDURE — 82947 ASSAY GLUCOSE BLOOD QUANT: CPT

## 2025-05-07 PROCEDURE — 2500000001 HC RX 250 WO HCPCS SELF ADMINISTERED DRUGS (ALT 637 FOR MEDICARE OP): Performed by: INTERNAL MEDICINE

## 2025-05-07 RX ORDER — POLYETHYLENE GLYCOL 3350 17 G/17G
17 POWDER, FOR SOLUTION ORAL DAILY PRN
Status: DISCONTINUED | OUTPATIENT
Start: 2025-05-07 | End: 2025-05-14 | Stop reason: HOSPADM

## 2025-05-07 RX ORDER — FOLIC ACID 1 MG/1
1 TABLET ORAL DAILY
Status: DISCONTINUED | OUTPATIENT
Start: 2025-05-08 | End: 2025-05-14 | Stop reason: HOSPADM

## 2025-05-07 RX ORDER — VANCOMYCIN HYDROCHLORIDE 1 G/200ML
1000 INJECTION, SOLUTION INTRAVENOUS ONCE
Status: COMPLETED | OUTPATIENT
Start: 2025-05-07 | End: 2025-05-07

## 2025-05-07 RX ORDER — DONEPEZIL HYDROCHLORIDE 23 MG/1
23 TABLET, FILM COATED ORAL DAILY
Status: DISCONTINUED | OUTPATIENT
Start: 2025-05-08 | End: 2025-05-14 | Stop reason: HOSPADM

## 2025-05-07 RX ORDER — LOPERAMIDE HYDROCHLORIDE 2 MG/1
2 CAPSULE ORAL ONCE AS NEEDED
Status: DISCONTINUED | OUTPATIENT
Start: 2025-05-07 | End: 2025-05-14 | Stop reason: HOSPADM

## 2025-05-07 RX ORDER — DEXTROSE 50 % IN WATER (D50W) INTRAVENOUS SYRINGE
25
Status: DISCONTINUED | OUTPATIENT
Start: 2025-05-07 | End: 2025-05-14 | Stop reason: HOSPADM

## 2025-05-07 RX ORDER — FINASTERIDE 5 MG/1
5 TABLET, FILM COATED ORAL DAILY
Status: DISCONTINUED | OUTPATIENT
Start: 2025-05-08 | End: 2025-05-14 | Stop reason: HOSPADM

## 2025-05-07 RX ORDER — METOPROLOL TARTRATE 50 MG/1
50 TABLET ORAL 2 TIMES DAILY
Status: DISCONTINUED | OUTPATIENT
Start: 2025-05-07 | End: 2025-05-14 | Stop reason: HOSPADM

## 2025-05-07 RX ORDER — ACETAMINOPHEN 325 MG/1
650 TABLET ORAL EVERY 4 HOURS PRN
Status: DISCONTINUED | OUTPATIENT
Start: 2025-05-07 | End: 2025-05-14 | Stop reason: HOSPADM

## 2025-05-07 RX ORDER — DEXTROSE 50 % IN WATER (D50W) INTRAVENOUS SYRINGE
12.5
Status: DISCONTINUED | OUTPATIENT
Start: 2025-05-07 | End: 2025-05-14 | Stop reason: HOSPADM

## 2025-05-07 RX ORDER — FERROUS SULFATE 325(65) MG
65 TABLET ORAL
Status: DISCONTINUED | OUTPATIENT
Start: 2025-05-08 | End: 2025-05-14 | Stop reason: HOSPADM

## 2025-05-07 RX ORDER — CHOLECALCIFEROL (VITAMIN D3) 25 MCG
25 TABLET ORAL DAILY
Status: DISCONTINUED | OUTPATIENT
Start: 2025-05-08 | End: 2025-05-14 | Stop reason: HOSPADM

## 2025-05-07 RX ORDER — ACETAMINOPHEN 500 MG
10 TABLET ORAL NIGHTLY
Status: DISCONTINUED | OUTPATIENT
Start: 2025-05-07 | End: 2025-05-14 | Stop reason: HOSPADM

## 2025-05-07 RX ORDER — VANCOMYCIN HYDROCHLORIDE 1 G/20ML
INJECTION, POWDER, LYOPHILIZED, FOR SOLUTION INTRAVENOUS DAILY PRN
Status: DISCONTINUED | OUTPATIENT
Start: 2025-05-07 | End: 2025-05-08

## 2025-05-07 RX ORDER — SEVELAMER CARBONATE 0.8 G/1
1.6 POWDER, FOR SUSPENSION ORAL 2 TIMES DAILY
Status: DISCONTINUED | OUTPATIENT
Start: 2025-05-07 | End: 2025-05-14 | Stop reason: HOSPADM

## 2025-05-07 RX ORDER — ACETAMINOPHEN 325 MG/1
975 TABLET ORAL ONCE
Status: COMPLETED | OUTPATIENT
Start: 2025-05-07 | End: 2025-05-07

## 2025-05-07 RX ORDER — INSULIN LISPRO 100 [IU]/ML
0-5 INJECTION, SOLUTION INTRAVENOUS; SUBCUTANEOUS
Status: DISCONTINUED | OUTPATIENT
Start: 2025-05-08 | End: 2025-05-14 | Stop reason: HOSPADM

## 2025-05-07 RX ORDER — OXYCODONE HYDROCHLORIDE 5 MG/1
2.5 TABLET ORAL EVERY 6 HOURS PRN
Status: DISCONTINUED | OUTPATIENT
Start: 2025-05-07 | End: 2025-05-14 | Stop reason: HOSPADM

## 2025-05-07 RX ORDER — ADHESIVE BANDAGE
30 BANDAGE TOPICAL
Status: DISCONTINUED | OUTPATIENT
Start: 2025-05-07 | End: 2025-05-14 | Stop reason: HOSPADM

## 2025-05-07 RX ORDER — INSULIN GLARGINE 100 [IU]/ML
5 INJECTION, SOLUTION SUBCUTANEOUS NIGHTLY
Status: DISCONTINUED | OUTPATIENT
Start: 2025-05-07 | End: 2025-05-14 | Stop reason: HOSPADM

## 2025-05-07 RX ORDER — ALBUTEROL SULFATE 0.83 MG/ML
1.25 SOLUTION RESPIRATORY (INHALATION) EVERY 6 HOURS PRN
Status: DISCONTINUED | OUTPATIENT
Start: 2025-05-07 | End: 2025-05-14 | Stop reason: HOSPADM

## 2025-05-07 RX ORDER — DOCUSATE SODIUM 100 MG/1
100 CAPSULE, LIQUID FILLED ORAL DAILY
Status: DISCONTINUED | OUTPATIENT
Start: 2025-05-08 | End: 2025-05-14 | Stop reason: HOSPADM

## 2025-05-07 RX ORDER — LEVETIRACETAM 500 MG/1
500 TABLET ORAL 2 TIMES DAILY
Status: DISCONTINUED | OUTPATIENT
Start: 2025-05-07 | End: 2025-05-14 | Stop reason: HOSPADM

## 2025-05-07 RX ORDER — MEROPENEM 500 MG/1
500 INJECTION, POWDER, FOR SOLUTION INTRAVENOUS EVERY 12 HOURS
Status: DISCONTINUED | OUTPATIENT
Start: 2025-05-07 | End: 2025-05-14 | Stop reason: HOSPADM

## 2025-05-07 RX ORDER — ONDANSETRON 4 MG/1
4 TABLET, FILM COATED ORAL EVERY 8 HOURS PRN
Status: DISCONTINUED | OUTPATIENT
Start: 2025-05-07 | End: 2025-05-14 | Stop reason: HOSPADM

## 2025-05-07 RX ORDER — HEPARIN SODIUM 1000 [USP'U]/ML
2000 INJECTION, SOLUTION INTRAVENOUS; SUBCUTANEOUS
Status: DISCONTINUED | OUTPATIENT
Start: 2025-05-08 | End: 2025-05-14 | Stop reason: HOSPADM

## 2025-05-07 RX ORDER — ACETAMINOPHEN 650 MG/1
650 SUPPOSITORY RECTAL EVERY 4 HOURS PRN
Status: DISCONTINUED | OUTPATIENT
Start: 2025-05-07 | End: 2025-05-14 | Stop reason: HOSPADM

## 2025-05-07 RX ORDER — BISACODYL 10 MG/1
10 SUPPOSITORY RECTAL
Status: DISCONTINUED | OUTPATIENT
Start: 2025-05-07 | End: 2025-05-14 | Stop reason: HOSPADM

## 2025-05-07 RX ADMIN — METOPROLOL TARTRATE 50 MG: 50 TABLET, FILM COATED ORAL at 21:15

## 2025-05-07 RX ADMIN — VANCOMYCIN HYDROCHLORIDE 1000 MG: 1 INJECTION, SOLUTION INTRAVENOUS at 16:54

## 2025-05-07 RX ADMIN — LEVETIRACETAM 500 MG: 500 TABLET, FILM COATED ORAL at 21:15

## 2025-05-07 RX ADMIN — COLLAGENASE SANTYL: 250 OINTMENT TOPICAL at 21:44

## 2025-05-07 RX ADMIN — SEVELAMER CARBONATE 1.6 G: 800 POWDER, FOR SUSPENSION ORAL at 21:44

## 2025-05-07 RX ADMIN — PIPERACILLIN AND TAZOBACTAM 4.5 G: 4; .5 INJECTION, POWDER, FOR SOLUTION INTRAVENOUS at 16:19

## 2025-05-07 RX ADMIN — ACETAMINOPHEN 975 MG: 325 TABLET ORAL at 16:24

## 2025-05-07 RX ADMIN — APIXABAN 2.5 MG: 5 TABLET, FILM COATED ORAL at 21:13

## 2025-05-07 RX ADMIN — Medication 10 MG: at 21:14

## 2025-05-07 RX ADMIN — MEROPENEM 500 MG: 500 INJECTION, POWDER, FOR SOLUTION INTRAVENOUS at 21:12

## 2025-05-07 RX ADMIN — SODIUM CHLORIDE, SODIUM LACTATE, POTASSIUM CHLORIDE, AND CALCIUM CHLORIDE 1000 ML: .6; .31; .03; .02 INJECTION, SOLUTION INTRAVENOUS at 15:20

## 2025-05-07 RX ADMIN — INSULIN GLARGINE 5 UNITS: 100 INJECTION, SOLUTION SUBCUTANEOUS at 21:12

## 2025-05-07 ASSESSMENT — COGNITIVE AND FUNCTIONAL STATUS - GENERAL
MOVING FROM LYING ON BACK TO SITTING ON SIDE OF FLAT BED WITH BEDRAILS: TOTAL
CLIMB 3 TO 5 STEPS WITH RAILING: TOTAL
MOVING TO AND FROM BED TO CHAIR: TOTAL
HELP NEEDED FOR BATHING: TOTAL
DRESSING REGULAR LOWER BODY CLOTHING: TOTAL
PATIENT BASELINE BEDBOUND: YES
DRESSING REGULAR UPPER BODY CLOTHING: TOTAL
TURNING FROM BACK TO SIDE WHILE IN FLAT BAD: TOTAL
PERSONAL GROOMING: TOTAL
MOBILITY SCORE: 6
DAILY ACTIVITIY SCORE: 6
WALKING IN HOSPITAL ROOM: TOTAL
EATING MEALS: TOTAL
STANDING UP FROM CHAIR USING ARMS: TOTAL
TOILETING: TOTAL

## 2025-05-07 ASSESSMENT — PAIN SCALES - GENERAL
PAINLEVEL_OUTOF10: 0 - NO PAIN
PAINLEVEL_OUTOF10: 4

## 2025-05-07 ASSESSMENT — ACTIVITIES OF DAILY LIVING (ADL)
TOILETING: DEPENDENT
BATHING: DEPENDENT
WALKS IN HOME: DEPENDENT
JUDGMENT_ADEQUATE_SAFELY_COMPLETE_DAILY_ACTIVITIES: NO
DRESSING YOURSELF: DEPENDENT
GROOMING: DEPENDENT
LACK_OF_TRANSPORTATION: NO
HEARING - RIGHT EAR: DIFFICULTY WITH NOISE
FEEDING YOURSELF: DEPENDENT
ADEQUATE_TO_COMPLETE_ADL: NO
HEARING - LEFT EAR: DIFFICULTY WITH NOISE
PATIENT'S MEMORY ADEQUATE TO SAFELY COMPLETE DAILY ACTIVITIES?: NO

## 2025-05-07 ASSESSMENT — PAIN - FUNCTIONAL ASSESSMENT
PAIN_FUNCTIONAL_ASSESSMENT: 0-10
PAIN_FUNCTIONAL_ASSESSMENT: WONG-BAKER FACES

## 2025-05-07 ASSESSMENT — COLUMBIA-SUICIDE SEVERITY RATING SCALE - C-SSRS
2. HAVE YOU ACTUALLY HAD ANY THOUGHTS OF KILLING YOURSELF?: NO
6. HAVE YOU EVER DONE ANYTHING, STARTED TO DO ANYTHING, OR PREPARED TO DO ANYTHING TO END YOUR LIFE?: NO
1. IN THE PAST MONTH, HAVE YOU WISHED YOU WERE DEAD OR WISHED YOU COULD GO TO SLEEP AND NOT WAKE UP?: NO

## 2025-05-07 ASSESSMENT — LIFESTYLE VARIABLES
EVER FELT BAD OR GUILTY ABOUT YOUR DRINKING: NO
HAVE PEOPLE ANNOYED YOU BY CRITICIZING YOUR DRINKING: NO
EVER HAD A DRINK FIRST THING IN THE MORNING TO STEADY YOUR NERVES TO GET RID OF A HANGOVER: NO
TOTAL SCORE: 0
HAVE YOU EVER FELT YOU SHOULD CUT DOWN ON YOUR DRINKING: NO

## 2025-05-07 ASSESSMENT — PAIN SCALES - WONG BAKER: WONGBAKER_NUMERICALRESPONSE: HURTS LITTLE MORE

## 2025-05-07 NOTE — ED PROVIDER NOTES
Emergency Department Provider Note       History of Present Illness     History provided by: Spouse  Limitations to History: Dementia  External Records Reviewed with Brief Summary: Outpatient progress note from  which showed past history of ESRD on hemodialysis-year-old tunneled HD catheter every Tuesday, Thursday, Saturday, hypertension, hyperlipidemia, diabetes, dementia, chronic sacral wound with recent hospitalization for Bacteroides bacteremia    HPI:  Tyshawn Coles is a 84 y.o. male with above past medical history presenting from nursing home for evaluation of vomiting.  Patient was on his way to a wound care appointment, sent to ED because he was vomiting.  History limited from patient due to underlying dementia.    Physical Exam   Triage vitals:  T (!) 38 °C (100.4 °F)  HR 98  /53  RR 18  O2 (!) 92 % None (Room air)    Physical Exam  Vitals and nursing note reviewed.   Constitutional:       General: He is not in acute distress.     Appearance: Normal appearance.      Comments: Chronically ill-appearing   HENT:      Head: Atraumatic.   Eyes:      Conjunctiva/sclera: Conjunctivae normal.   Cardiovascular:      Rate and Rhythm: Normal rate.   Pulmonary:      Effort: Pulmonary effort is normal. No respiratory distress.      Breath sounds: Normal breath sounds.   Abdominal:      General: There is no distension.      Palpations: Abdomen is soft.      Tenderness: There is no abdominal tenderness.   Musculoskeletal:         General: No deformity.      Cervical back: Normal range of motion.   Skin:     Comments: Large sacral ulcer with foul-smelling purulent discharge   Neurological:      Mental Status: He is alert. Mental status is at baseline.   Psychiatric:         Behavior: Behavior normal.           Medical Decision Making & ED Course   Medical Decision Makin y.o. male with chronic sacral wound with chronic osteomyelitis, ESRD on hemodialysis, dementia presenting with vomiting.  Found  to be febrile with a temperature of 100.4 °F.  Patient's sacral wound with foul-smelling discharge, concerns for active infection and sepsis.  Ordered broad-spectrum antibiotics including vancomycin IV and Zosyn IV.  Given small fluid bolus given his ESRD history.  Labs and cultures obtained.  Plan to admit.  ----      Differential diagnoses considered include but are not limited to: sepsis due to sacral ulcer, electrolyte derangements, pneumonia    Social Determinants of Health which Significantly Impact Care: Social Determinants of Health which Significantly Impact Care: None identified     EKG Independent Interpretation: EKG interpreted by myself. Please see ED Course for full interpretation.    Independent Result Review and Interpretation: Relevant laboratory and radiographic results were reviewed and independently interpreted by myself.  As necessary, they are commented on in the ED Course.    Chronic conditions affecting the patient's care: As documented above in MDM    The patient was discussed with the following consultants/services: Dr Wise for admit    Care Considerations: None    ED Course:  ED Course as of 05/07/25 1814   Wed May 07, 2025   1456 Single view chest x-ray reviewed by myself demonstrating clear lungs without infiltrate or edema.  Right-sided central venous catheter [MK]   1814 Twelve-lead ECG obtained at 1717 by my interpretation demonstrates normal sinus rhythm with a rate of 90, no acute ST elevation or depression.  QTc 450. [MK]      ED Course User Index  [MK] Doug Chambers MD         Diagnoses as of 05/07/25 1814   Vomiting, unspecified vomiting type, unspecified whether nausea present   Sepsis with acute organ dysfunction without septic shock, due to unspecified organism, unspecified organ dysfunction type (Multi)   Wound of sacral region, initial encounter     SEP-1 CORE MEASURE DATA      I suspected severe sepsis with the following organ dysfunction: acute metabolic  encephalopathy and acute hypoxic/hypercarbic respiratory failure on 5/7/2025  2:22 PM.    Visit Vitals  /61 (BP Location: Right arm, Patient Position: Lying)   Pulse 84   Temp 37 °C (98.6 °F) (Temporal)   Resp 20   SpO2 96%        Lab Results   Component Value Date/Time    WBC 42.7 (H) 05/07/2025 1507    Bands %, Manual 3.0 05/07/2025 1507    Lactate 1.5 05/07/2025 1507    Creatinine 4.29 (H) 05/07/2025 1507    Bilirubin, Total 0.5 05/07/2025 1507    Platelets 284 05/07/2025 1507    Glucose 192 (H) 05/07/2025 1507        A targeted fluid bolus of less than 30ml/kg actual body weight, due to hx of ESRD on HD, concerns for fluid overload.  Targeted fluid bolus of 500mL was given.    Suspected infection source   Skin    Patient recently received an antibiotic (last 24 hours)       None            Medical decision making/complexity  Patient CBC with significant leukocytosis of 42.7.  Baseline anemia with hemoglobin of 8.9.  Metabolic panel consistent with patient's known ESRD.  Normal potassium. lactic acid normal.  Fever treated with acetaminophen.        I performed a sepsis reperfusion exam on Tyshawn Coles on 05/07/25 at 6:14 PM      Disposition   As a result of their workup, the patient will require admission to the hospital.  The patient's spouse was informed of his diagnosis.  The patient's spouse was given the opportunity to ask questions and I answered them. The patient agreed to be admitted to the hospital.    Procedures   Critical Care    Performed by: Doug Chambers MD  Authorized by: Doug Chambers MD    Critical care provider statement:     Critical care time (minutes):  32    Critical care time was exclusive of:  Separately billable procedures and treating other patients    Critical care was necessary to treat or prevent imminent or life-threatening deterioration of the following conditions:  Sepsis    Critical care was time spent personally by me on the following activities:  Ordering and  performing treatments and interventions, ordering and review of laboratory studies, ordering and review of radiographic studies, examination of patient, obtaining history from patient or surrogate and re-evaluation of patient's condition    Care discussed with: admitting provider            Doug Chamebrs MD  Emergency Medicine                                                            Doug Chambers MD  05/07/25 [id number]       Doug Chambers MD  05/07/25 [id number]

## 2025-05-07 NOTE — Clinical Note
Old catheter removed over guidewire and new [place] VectorFLow HD catheter placed.  15 FR 23 cm. Patient tolerated well.  Sutures being placed.

## 2025-05-07 NOTE — Clinical Note
Procedure completed.  Patient tolerated well.  Patient transferred back to bed and will return to 1111A. Report called to Rochelle Cooper RN caring for the patient.

## 2025-05-07 NOTE — Clinical Note
Patient in IR room for exchange of tunneled HD catheter. Consent was do via telephone with wife Pamela Coles between Tangela Davalos RN and Dr. Schoenberger.

## 2025-05-08 ENCOUNTER — APPOINTMENT (OUTPATIENT)
Dept: DIALYSIS | Facility: HOSPITAL | Age: 85
End: 2025-05-08
Payer: MEDICARE

## 2025-05-08 LAB
EST. AVERAGE GLUCOSE BLD GHB EST-MCNC: 114 MG/DL
GLUCOSE BLD MANUAL STRIP-MCNC: 144 MG/DL (ref 74–99)
GLUCOSE BLD MANUAL STRIP-MCNC: 173 MG/DL (ref 74–99)
GLUCOSE BLD MANUAL STRIP-MCNC: 173 MG/DL (ref 74–99)
GLUCOSE BLD MANUAL STRIP-MCNC: 185 MG/DL (ref 74–99)
HBA1C MFR BLD: 5.6 % (ref ?–5.7)
HBV SURFACE AB SER-ACNC: 17.8 MIU/ML
HBV SURFACE AG SERPL QL IA: NONREACTIVE
HOLD SPECIMEN: NORMAL
HOLD SPECIMEN: NORMAL

## 2025-05-08 PROCEDURE — 82947 ASSAY GLUCOSE BLOOD QUANT: CPT

## 2025-05-08 PROCEDURE — 2500000001 HC RX 250 WO HCPCS SELF ADMINISTERED DRUGS (ALT 637 FOR MEDICARE OP): Performed by: PLASTIC SURGERY

## 2025-05-08 PROCEDURE — 2500000002 HC RX 250 W HCPCS SELF ADMINISTERED DRUGS (ALT 637 FOR MEDICARE OP, ALT 636 FOR OP/ED): Performed by: INTERNAL MEDICINE

## 2025-05-08 PROCEDURE — 2500000004 HC RX 250 GENERAL PHARMACY W/ HCPCS (ALT 636 FOR OP/ED): Mod: JZ | Performed by: INTERNAL MEDICINE

## 2025-05-08 PROCEDURE — 99221 1ST HOSP IP/OBS SF/LOW 40: CPT | Performed by: PLASTIC SURGERY

## 2025-05-08 PROCEDURE — 2500000001 HC RX 250 WO HCPCS SELF ADMINISTERED DRUGS (ALT 637 FOR MEDICARE OP): Performed by: INTERNAL MEDICINE

## 2025-05-08 PROCEDURE — 11043 DBRDMT MUSC&/FSCA 1ST 20/<: CPT | Performed by: PLASTIC SURGERY

## 2025-05-08 PROCEDURE — 86706 HEP B SURFACE ANTIBODY: CPT | Mod: ELYLAB | Performed by: INTERNAL MEDICINE

## 2025-05-08 PROCEDURE — 6350000001 HC RX 635 EPOETIN >10,000 UNITS: Mod: JZ | Performed by: INTERNAL MEDICINE

## 2025-05-08 PROCEDURE — 99222 1ST HOSP IP/OBS MODERATE 55: CPT | Performed by: INTERNAL MEDICINE

## 2025-05-08 PROCEDURE — 36415 COLL VENOUS BLD VENIPUNCTURE: CPT | Performed by: INTERNAL MEDICINE

## 2025-05-08 PROCEDURE — 87340 HEPATITIS B SURFACE AG IA: CPT | Mod: ELYLAB | Performed by: INTERNAL MEDICINE

## 2025-05-08 PROCEDURE — 1210000001 HC SEMI-PRIVATE ROOM DAILY

## 2025-05-08 PROCEDURE — 8010000001 HC DIALYSIS - HEMODIALYSIS PER DAY

## 2025-05-08 RX ORDER — ALBUMIN HUMAN 250 G/1000ML
25 SOLUTION INTRAVENOUS ONCE
Status: DISCONTINUED | OUTPATIENT
Start: 2025-05-08 | End: 2025-05-14 | Stop reason: HOSPADM

## 2025-05-08 RX ORDER — HEPARIN SODIUM 1000 [USP'U]/ML
2000 INJECTION, SOLUTION INTRAVENOUS; SUBCUTANEOUS
Status: DISCONTINUED | OUTPATIENT
Start: 2025-05-09 | End: 2025-05-14 | Stop reason: HOSPADM

## 2025-05-08 RX ADMIN — Medication 10 MG: at 20:29

## 2025-05-08 RX ADMIN — DONEPEZIL HYDROCHLORIDE 23 MG: 23 TABLET, FILM COATED ORAL at 13:43

## 2025-05-08 RX ADMIN — MEROPENEM 500 MG: 500 INJECTION, POWDER, FOR SOLUTION INTRAVENOUS at 13:54

## 2025-05-08 RX ADMIN — MEROPENEM 500 MG: 500 INJECTION, POWDER, FOR SOLUTION INTRAVENOUS at 20:29

## 2025-05-08 RX ADMIN — INSULIN GLARGINE 5 UNITS: 100 INJECTION, SOLUTION SUBCUTANEOUS at 20:29

## 2025-05-08 RX ADMIN — Medication: at 14:00

## 2025-05-08 RX ADMIN — INSULIN LISPRO 1 UNITS: 100 INJECTION, SOLUTION INTRAVENOUS; SUBCUTANEOUS at 17:16

## 2025-05-08 RX ADMIN — METOPROLOL TARTRATE 50 MG: 50 TABLET, FILM COATED ORAL at 20:29

## 2025-05-08 RX ADMIN — INSULIN LISPRO 1 UNITS: 100 INJECTION, SOLUTION INTRAVENOUS; SUBCUTANEOUS at 06:38

## 2025-05-08 RX ADMIN — APIXABAN 2.5 MG: 5 TABLET, FILM COATED ORAL at 20:29

## 2025-05-08 RX ADMIN — Medication 1 CAPSULE: at 06:47

## 2025-05-08 RX ADMIN — SODIUM CHLORIDE 200 ML: 0.9 INJECTION, SOLUTION INTRAVENOUS at 02:27

## 2025-05-08 RX ADMIN — SEVELAMER CARBONATE 1.6 G: 800 POWDER, FOR SUSPENSION ORAL at 20:29

## 2025-05-08 RX ADMIN — SODIUM CHLORIDE 200 ML: 0.9 INJECTION, SOLUTION INTRAVENOUS at 02:28

## 2025-05-08 RX ADMIN — HEPARIN SODIUM 2000 UNITS: 1000 INJECTION INTRAVENOUS; SUBCUTANEOUS at 13:00

## 2025-05-08 RX ADMIN — LEVETIRACETAM 500 MG: 500 TABLET, FILM COATED ORAL at 20:29

## 2025-05-08 RX ADMIN — LEVETIRACETAM 500 MG: 500 TABLET, FILM COATED ORAL at 13:43

## 2025-05-08 RX ADMIN — ERYTHROPOIETIN 10000 UNITS: 10000 INJECTION, SOLUTION INTRAVENOUS; SUBCUTANEOUS at 13:45

## 2025-05-08 SDOH — SOCIAL STABILITY: SOCIAL INSECURITY: ABUSE: ADULT

## 2025-05-08 SDOH — SOCIAL STABILITY: SOCIAL INSECURITY: WITHIN THE LAST YEAR, HAVE YOU BEEN AFRAID OF YOUR PARTNER OR EX-PARTNER?: PATIENT UNABLE TO ANSWER

## 2025-05-08 SDOH — SOCIAL STABILITY: SOCIAL INSECURITY: ARE YOU OR HAVE YOU BEEN THREATENED OR ABUSED PHYSICALLY, EMOTIONALLY, OR SEXUALLY BY ANYONE?: UNABLE TO ASSESS

## 2025-05-08 SDOH — SOCIAL STABILITY: SOCIAL INSECURITY: WERE YOU ABLE TO COMPLETE ALL THE BEHAVIORAL HEALTH SCREENINGS?: YES

## 2025-05-08 SDOH — SOCIAL STABILITY: SOCIAL INSECURITY: DO YOU FEEL UNSAFE GOING BACK TO THE PLACE WHERE YOU ARE LIVING?: UNABLE TO ASSESS

## 2025-05-08 SDOH — ECONOMIC STABILITY: HOUSING INSECURITY: AT ANY TIME IN THE PAST 12 MONTHS, WERE YOU HOMELESS OR LIVING IN A SHELTER (INCLUDING NOW)?: PATIENT UNABLE TO ANSWER

## 2025-05-08 SDOH — SOCIAL STABILITY: SOCIAL INSECURITY: DOES ANYONE TRY TO KEEP YOU FROM HAVING/CONTACTING OTHER FRIENDS OR DOING THINGS OUTSIDE YOUR HOME?: UNABLE TO ASSESS

## 2025-05-08 SDOH — SOCIAL STABILITY: SOCIAL INSECURITY: HAS ANYONE EVER THREATENED TO HURT YOUR FAMILY OR YOUR PETS?: UNABLE TO ASSESS

## 2025-05-08 SDOH — ECONOMIC STABILITY: HOUSING INSECURITY: IN THE PAST 12 MONTHS, HOW MANY TIMES HAVE YOU MOVED WHERE YOU WERE LIVING?: 1

## 2025-05-08 SDOH — SOCIAL STABILITY: SOCIAL INSECURITY: ARE THERE ANY APPARENT SIGNS OF INJURIES/BEHAVIORS THAT COULD BE RELATED TO ABUSE/NEGLECT?: NO

## 2025-05-08 SDOH — SOCIAL STABILITY: SOCIAL INSECURITY: HAVE YOU HAD THOUGHTS OF HARMING ANYONE ELSE?: NO

## 2025-05-08 SDOH — SOCIAL STABILITY: SOCIAL INSECURITY: DO YOU FEEL ANYONE HAS EXPLOITED OR TAKEN ADVANTAGE OF YOU FINANCIALLY OR OF YOUR PERSONAL PROPERTY?: UNABLE TO ASSESS

## 2025-05-08 SDOH — SOCIAL STABILITY: SOCIAL INSECURITY: HAVE YOU HAD ANY THOUGHTS OF HARMING ANYONE ELSE?: UNABLE TO ASSESS

## 2025-05-08 ASSESSMENT — ACTIVITIES OF DAILY LIVING (ADL)
PATIENT'S MEMORY ADEQUATE TO SAFELY COMPLETE DAILY ACTIVITIES?: NO
WALKS IN HOME: DEPENDENT
ADEQUATE_TO_COMPLETE_ADL: NO
TOILETING: DEPENDENT
LACK_OF_TRANSPORTATION: PATIENT UNABLE TO ANSWER
JUDGMENT_ADEQUATE_SAFELY_COMPLETE_DAILY_ACTIVITIES: NO
LACK_OF_TRANSPORTATION: PATIENT UNABLE TO ANSWER
HEARING - LEFT EAR: DIFFICULTY WITH NOISE
FEEDING YOURSELF: DEPENDENT
HEARING - RIGHT EAR: DIFFICULTY WITH NOISE
BATHING: DEPENDENT
GROOMING: DEPENDENT
DRESSING YOURSELF: DEPENDENT

## 2025-05-08 ASSESSMENT — COGNITIVE AND FUNCTIONAL STATUS - GENERAL
DRESSING REGULAR UPPER BODY CLOTHING: TOTAL
TURNING FROM BACK TO SIDE WHILE IN FLAT BAD: TOTAL
DRESSING REGULAR LOWER BODY CLOTHING: TOTAL
WALKING IN HOSPITAL ROOM: TOTAL
DRESSING REGULAR LOWER BODY CLOTHING: TOTAL
PERSONAL GROOMING: TOTAL
TURNING FROM BACK TO SIDE WHILE IN FLAT BAD: TOTAL
DAILY ACTIVITIY SCORE: 6
MOVING TO AND FROM BED TO CHAIR: TOTAL
MOBILITY SCORE: 6
EATING MEALS: TOTAL
STANDING UP FROM CHAIR USING ARMS: TOTAL
MOVING FROM LYING ON BACK TO SITTING ON SIDE OF FLAT BED WITH BEDRAILS: TOTAL
DAILY ACTIVITIY SCORE: 6
PERSONAL GROOMING: TOTAL
WALKING IN HOSPITAL ROOM: TOTAL
HELP NEEDED FOR BATHING: TOTAL
TOILETING: TOTAL
HELP NEEDED FOR BATHING: TOTAL
DRESSING REGULAR UPPER BODY CLOTHING: TOTAL
CLIMB 3 TO 5 STEPS WITH RAILING: TOTAL
MOVING TO AND FROM BED TO CHAIR: TOTAL
STANDING UP FROM CHAIR USING ARMS: TOTAL
MOBILITY SCORE: 6
EATING MEALS: TOTAL
MOVING FROM LYING ON BACK TO SITTING ON SIDE OF FLAT BED WITH BEDRAILS: TOTAL
CLIMB 3 TO 5 STEPS WITH RAILING: TOTAL
TOILETING: TOTAL

## 2025-05-08 ASSESSMENT — LIFESTYLE VARIABLES
SKIP TO QUESTIONS 9-10: 0
HOW OFTEN DO YOU HAVE A DRINK CONTAINING ALCOHOL: PATIENT UNABLE TO ANSWER
HOW MANY STANDARD DRINKS CONTAINING ALCOHOL DO YOU HAVE ON A TYPICAL DAY: PATIENT UNABLE TO ANSWER
AUDIT-C TOTAL SCORE: -1
HOW OFTEN DO YOU HAVE 6 OR MORE DRINKS ON ONE OCCASION: PATIENT UNABLE TO ANSWER
AUDIT-C TOTAL SCORE: -1

## 2025-05-08 ASSESSMENT — PAIN - FUNCTIONAL ASSESSMENT
PAIN_FUNCTIONAL_ASSESSMENT: 0-10
PAIN_FUNCTIONAL_ASSESSMENT: WONG-BAKER FACES

## 2025-05-08 ASSESSMENT — PAIN SCALES - GENERAL
PAINLEVEL_OUTOF10: 0 - NO PAIN
PAINLEVEL_OUTOF10: 0 - NO PAIN

## 2025-05-08 NOTE — CONSULTS
PLASTIC  SURGERY  CONSULTATION        Reason for consultation:       Sacral wound      History of present illness:      84-year-old male with stage IV sacral ulcer and chronic osteomyelitis admitted for possible sepsis.  Patient being seen for management of sacral pressure        Past medical history:      Coronary artery disease  Dementia  Dyslipidemia  End-stage renal disease on hemodialysis  Gout  Congestive heart failure  History of deep vein thrombosis  Hypertension  Ischemic encephalopathy  Diabetes type 2  Kidney stones  Osteoarthritis  Peripheral neuropathy  Seizure disorder        Past surgical history:      Adenoidectomy  AV fistula placement  Cholecystectomy  Thrombectomy  Tonsillectomy  Total hip replacement      Medications:       Eliquis  Iron sulfate  Insulin  Keppra  Melatonin  Meropenem  Metoprolol  Renvela  Aricept  Venous steroid  Folic acid  Zosyn  Vancomycin    ALLERGIES:        None      Social history:      Former smoker, nondrinker      Family history:      Diabetes  Lupus  Kidney disease  Asthma  Hypertension  Sickle cell trait            Review of systems:  At least 10 systems reviewed and are otherwise negative except for as noted in the history of present illness                PHYSICAL  EXAMINATION       Height: 5 foot 11 inches                  weight: 150 pounds                         Vital signs: Temperature 36 pulse 61 blood pressure 107/56    General: Well-developed,    male         in no acute distress, alert and oriented ×3    HEENT:   Within normal limits    Neck:   Supple, no observable masses    Lungs: Clear to auscultation    Heart: Regular rate and rhythm    Abdomen: Soft, nontender    Extremities: Full range of motion; large sacral pressure sore with necrotic fascia    Neurological: Grossly within normal limits      Procedure note: At the bedside, using forceps and scissors necrotic fascia was excised in an area 5 x 4 cm.  This was excised with forceps and scissors.   Good bleeding obtained.  Wound With 4 x 4's          Impression:      Stage IV sacral pressure sore    Risk and benefits of procedure discussed with patient to include but not limited to progressive infection, bleeding, need for additional surgery, damage to deeper structures, wound healing issues.  Patient appears to understand and wishes to proceed and has provided verbal consent in agreement    Recommendation:      Wound debrided at bedside  Start Dakin's wet-to-dry dressings  Offload with Clinitron bed        Thank you for the referral.    Roland Reyes, MD        *These notes are being done using Dragon voice recognition technology and may include unintended errors with respect to translation of words, typographical errors or grammar errors which may not have been identified prior to finalization of the chart note.

## 2025-05-08 NOTE — CARE PLAN
The patient's goals for the shift include feel better    The clinical goals for the shift include prevent any further skin breakdown

## 2025-05-08 NOTE — CONSULTS
"Nutrition Initial Assessment:   Nutrition Assessment         Patient is a 84 y.o. male presenting with vomiting      Nutrition History:  Energy Intake: Fair 50-75 %  Pain affecting nutrition status: N/A  Food and Nutrient History: RD consulted for wounds. Per EMR, has a history of ESRD on HD, HTN, HLD, DM, dementia, chronic sacral wound with chronic osteomyelitis, ischemic encephalopathy, afib, seizure disorder, history of stroke with deficits, gout, s/p cholecystectomy. From LT. Per LT diet records, is on no added salt/no concentrate sweets diet with mechanical soft textures/thin consistencies and LiquaCel and Glucerna both BID. Recently seen at [facility] last month by dietitians. During that admission pt was reportedly eating fairly well and was reommended to have 75g carb controlled, Easy to Chew diet and Nepro BID as well as Gelatein SF daily. Per notes from last month pt is a total feed. Receiving dialysis in room at time of RD visit. Tech feeding pt breakfast with good acceptance from pt. Nonverbal. Pt nodded head yes when asked if would be accepting of Glucerna. Nodded head yes again when asked if would be accepting of Remigio. Will send both supplements BID.  Vitamin/Herbal Supplement Use: vitamin D, ferrous sulfate, folic acid, Virt-Caps       Anthropometrics:  Height: 182 cm (5' 11.65\")   Weight: 68.2 kg (150 lb 5.7 oz)   BMI (Calculated): 20.59                            Weight History:     Wt Readings from Last 10 Encounters:   05/07/25 68.2 kg (150 lb 5.7 oz)   04/29/25 66.9 kg (147 lb 7.8 oz)   03/31/25 67.6 kg (149 lb)   03/11/25 69.9 kg (154 lb)   03/07/25 68.4 kg (150 lb 12.7 oz)   03/05/25 80.3 kg (177 lb 0.8 oz)   10/20/23 81.6 kg (180 lb)   11/05/21 84 kg (185 lb 3 oz)       Weight Change %:  Weight History / % Weight Change: Per [facility] RD note, EDW ~67 kg.  Significant Weight Loss: No    Nutrition Focused Physical Exam Findings:    Subcutaneous Fat Loss:   Orbital Fat Pads: Mild-Moderate " (slight dark circles and slight hollowing)  Buccal Fat Pads: Mild-Moderate (flat cheeks, minimal bounce)  Triceps: Mild-Moderate (less than ample fat tissue)  Muscle Wasting:  Temporalis: Severe (hollowed scooping depression)  Pectoralis (Clavicular Region): Mild-Moderate (some protrusion of clavicle)  Deltoid/Trapezius: Mild-Moderate (slight protrusion of acromion process)  Interosseous: Mild-Moderate (slightly depressed area between thumb and forefinger)  Edema:  Edema: none  Physical Findings:  Skin: Positive (stage IV sacral ulcer, ulcers on feet per documentation)  Positive Skin Findings: Pressure injury stage 4, Impaired wound healing    Nutrition Significant Labs:  BMP Trend:   Results from last 7 days   Lab Units 05/07/25  1507 05/01/25  2217   GLUCOSE mg/dL 192* 275*   CALCIUM mg/dL 8.7 8.3*   SODIUM mmol/L 134* 133*   POTASSIUM mmol/L 4.7 3.9   CO2 mmol/L 28 29   CHLORIDE mmol/L 95* 93*   BUN mg/dL 42* 27*   CREATININE mg/dL 4.29* 3.01*    , A1C:  Lab Results   Component Value Date    HGBA1C 5.6 05/07/2025   , Renal Lab Trend:   Results from last 7 days   Lab Units 05/07/25  1507 05/01/25  2217   POTASSIUM mmol/L 4.7 3.9   SODIUM mmol/L 134* 133*   EGFR mL/min/1.73m*2 13* 20*   BUN mg/dL 42* 27*   CREATININE mg/dL 4.29* 3.01*        Nutrition Specific Medications:  Reviewed    I/O:   Last BM Date: 05/08/25; Stool Appearance: Soft, Formed (05/08/25 0326)    Dietary Orders (From admission, onward)       Start     Ordered    05/08/25 0929  Oral nutritional supplements  Until discontinued        Comments: orange   Question Answer Comment   Deliver with Lunch    Deliver with Dinner    Select supplement: Remigio        05/08/25 0928    05/08/25 0928  Adult diet Consistent Carb; CCD 75 gm/meal; Soft and bite sized 6; Thin 0; 1:1 Feeding  Diet effective now        Question Answer Comment   Diet type Consistent Carb    Carb diet selection: CCD 75 gm/meal    Texture Soft and bite sized 6    Fluid consistency Thin 0     Select tray type: 1:1 Feeding        05/08/25 0928    05/08/25 0928  Oral nutritional supplements  Until discontinued        Comments: vanilla   Question Answer Comment   Deliver with Breakfast    Deliver with Dinner    Select supplement: Glucerna Shake        05/08/25 0928    05/07/25 2021  May Participate in Room Service With Assistance  ( ROOM SERVICE MAY PARTICIPATE WITH ASSISTANCE)  Once        Question:  .  Answer:  Yes    05/07/25 2020                     Estimated Needs:   Total Energy Estimated Needs in 24 hours (kCal): 2390 kCal  Method for Estimating Needs: 30 kcal/kg  Total Protein Estimated Needs in 24 Hours (g): 136 g  Method for Estimating 24 Hour Protein Needs: 2 g/kg  Total Fluid Estimated Needs in 24 Hours (mL):  (per MD)  Patient on Order Fluid Restriction: No        Nutrition Diagnosis   Malnutrition Diagnosis  Patient has Malnutrition Diagnosis: Yes  Diagnosis Status: New  Malnutrition Diagnosis: Moderate malnutrition related to chronic disease or condition  Related to: multiple chronic conditions (dementia, ESRD, stage IV wound)  As Evidenced by: moderate-severe muscle wasting, moderate fat losses    Nutrition Diagnosis  Patient has Nutrition Diagnosis: Yes  Diagnosis Status (1): New  Nutrition Diagnosis 1: Increased nutrient needs  Related to (1): physiological causes increasing nutrient needs  As Evidenced by (1): wounds, HD       Nutrition Interventions/Recommendations   Nutrition prescription for oral nutrition    Nutrition Recommendations:  Individualized Nutrition Prescription Provided for : 75g carb controlled diet with ONS. Soft and bite sized/thin or textures/consistencies per SLP/MD. 1:1 Feeding.    Nutrition Interventions/Goals:   Goal: Consumes 3 meals per day  Medical Food Supplement: Commercial beverage medical food supplement therapy  Goal: Glucerna BID (provides 220 kcal, 10 g protein per serving). Remigio BID (provides 90 kcal and 2.5 g protein per packet)  Coordination of  Care with Providers: Other (Comment)  Goal: nursing tech      Education Documentation  No documentation found.            Nutrition Monitoring and Evaluation   Food/Nutrient Related History Monitoring  Monitoring and Evaluation Plan: Intake / amount of food, Estimated Energy Intake  Estimated Energy Intake: Energy intake greater or equal to 75% of estimated energy needs  Intake / Amount of food: Consumes at least 75% or more of meals/snacks/supplements, Meets > 75% estimated energy needs    Anthropometric Measurements  Monitoring and Evaluation Plan: Body weight  Body Weight: Body weight - Maintain stable weight    Biochemical Data, Medical Tests and Procedures  Monitoring and Evaluation Plan: Electrolyte/renal panel, Glucose/endocrine profile  Electrolyte and Renal Panel: Electrolytes within normal limits  Glucose/Endocrine Profile: Glucose within normal limits ( mg/dL)    Physical Exam Findings  Monitoring and Evaluation Plan: Skin  Skin Finding: Impaired wound healing - Improved wound healing         Time Spent (min): 45 minutes

## 2025-05-08 NOTE — H&P
History Of Present Illness  Tyshawn Coles is a 84 y.o. male presenting with nausea vomiting and fever on the way to wound care for appointment.  Patient was diverted to the emergency room.  At the emergency room patient's blood pressure was 104/56 heart rate of 78 pulse oximeter reading of 95%.  Albumin is 2.4.  Liver function tests are normal.  Lactic acid was 1.5.  Electrolytes and acid-base balance are normal with BUN and creatinine compatible with end-stage kidney disease.  White cell count was 42.7, hemoglobin 8.9, platelet at 284.  Chest x-ray shows no active lung disease.  Patient has had deep and large sacral decubitus ulcer and some ulcers on his feet.  Patient is suspected to have bacteremia and was eventually admitted for further evaluation and aggressive treatment of infection.  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     Past Medical History  Medical History[1]    Surgical History  Surgical History[2]     Social History  He reports that he has quit smoking. His smoking use included cigarettes. He has never used smokeless tobacco. He reports that he does not drink alcohol and does not use drugs.    Family History  Family History[3]     Allergies  Patient has no known allergies.    Review of Systems  Unable to get a review of systems patient is demented     Physical Exam  Vital signs are stable with a blood pressure 104/56 heart rate of 78 and pulse oximeter reading  of 95% on room air, with a prior blood pressure of 117/61  Patient is awake and actually ate his supper being fed and took his medications earlier  Patient is nonconversant but did not seem to be in distress but looking chronically ill  Head examination benign no definite facial asymmetry  Neck veins slightly prominent with a permacath in the right side, no bruits  Chest examination shows prominent ribs.  Lungs are clear however without crackles or rhonchi  Heart is regular I did not appreciate any murmur  Abdomen flat soft nontender good bowel sounds no guarding and no obvious organomegaly  Extremities shows markedly atrophic muscles both feet with immobilizers and dressings  Patient with large sacral decubitus ulcer       Last Recorded Vitals  Blood pressure 104/56, pulse 78, temperature 36.8 °C (98.2 °F), temperature source Temporal, resp. rate 18, height 1.829 m (6'), SpO2 95%.  Current Medications[4]   Relevant Results        Results for orders placed or performed during the hospital encounter of 05/07/25 (from the past 24 hours)   CBC and Auto Differential   Result Value Ref Range    WBC 42.7 (H) 4.4 - 11.3 x10*3/uL    nRBC 0.0 0.0 - 0.0 /100 WBCs    RBC 3.15 (L) 4.50 - 5.90 x10*6/uL    Hemoglobin 8.9 (L) 13.5 - 17.5 g/dL    Hematocrit 26.0 (L) 41.0 - 52.0 %    MCV 83 80 - 100 fL    MCH 28.3 26.0 - 34.0 pg    MCHC 34.2 32.0 - 36.0 g/dL    RDW 18.4 (H) 11.5 - 14.5 %    Platelets 284 150 - 450 x10*3/uL    Immature Granulocytes %, Automated 1.1 (H) 0.0 - 0.9 %    Immature Granulocytes Absolute, Automated 0.47 0.00 - 0.50 x10*3/uL   Comprehensive Metabolic Panel   Result Value Ref Range    Glucose 192 (H) 74 - 99 mg/dL    Sodium 134 (L) 136 - 145 mmol/L    Potassium 4.7 3.5 - 5.3 mmol/L    Chloride 95 (L) 98 - 107 mmol/L    Bicarbonate 28 21 - 32 mmol/L    Anion Gap 16 10 - 20 mmol/L    Urea Nitrogen 42 (H) 6 - 23 mg/dL    Creatinine 4.29 (H) 0.50 - 1.30 mg/dL    eGFR 13 (L) >60 mL/min/1.73m*2    Calcium 8.7 8.6 -  10.3 mg/dL    Albumin 2.4 (L) 3.4 - 5.0 g/dL    Alkaline Phosphatase 91 33 - 136 U/L    Total Protein 6.9 6.4 - 8.2 g/dL    AST 36 9 - 39 U/L    Bilirubin, Total 0.5 0.0 - 1.2 mg/dL    ALT 10 10 - 52 U/L   Lactate   Result Value Ref Range    Lactate 1.5 0.4 - 2.0 mmol/L   Blood Culture    Specimen: Peripheral Venipuncture; Blood culture   Result Value Ref Range    Blood Culture Loaded on Instrument - Culture in progress    Manual Differential   Result Value Ref Range    Neutrophils %, Manual 89.0 40.0 - 80.0 %    Bands %, Manual 3.0 0.0 - 5.0 %    Lymphocytes %, Manual 4.0 13.0 - 44.0 %    Monocytes %, Manual 4.0 2.0 - 10.0 %    Eosinophils %, Manual 0.0 0.0 - 6.0 %    Basophils %, Manual 0.0 0.0 - 2.0 %    Seg Neutrophils Absolute, Manual 38.00 (H) 1.60 - 5.00 x10*3/uL    Bands Absolute, Manual 1.28 (H) 0.00 - 0.50 x10*3/uL    Lymphocytes Absolute, Manual 1.71 0.80 - 3.00 x10*3/uL    Monocytes Absolute, Manual 1.71 (H) 0.05 - 0.80 x10*3/uL    Eosinophils Absolute, Manual 0.00 0.00 - 0.40 x10*3/uL    Basophils Absolute, Manual 0.00 0.00 - 0.10 x10*3/uL    Total Cells Counted 100     Neutrophils Absolute, Manual 39.28 (H) 1.60 - 5.50 x10*3/uL    RBC Morphology See Below     Polychromasia Mild     Target Cells Few    ECG 12 lead   Result Value Ref Range    Ventricular Rate 90 BPM    Atrial Rate 90 BPM    IL Interval 162 ms    QRS Duration 76 ms    QT Interval 368 ms    QTC Calculation(Bazett) 450 ms    P Axis 49 degrees    R Axis 65 degrees    T Axis 70 degrees    QRS Count 15 beats    Q Onset 219 ms    P Onset 138 ms    P Offset 196 ms    T Offset 403 ms    QTC Fredericia 421 ms   POCT GLUCOSE   Result Value Ref Range    POCT Glucose 240 (H) 74 - 99 mg/dL    ECG 12 lead  Result Date: 5/7/2025  Normal sinus rhythm Normal ECG When compared with ECG of 29-APR-2025 17:13, Premature ventricular complexes are no longer Present Premature supraventricular complexes are no longer Present    XR chest 1 view  Result Date:  5/7/2025  Interpreted By:  Raheel Powell, STUDY: XR CHEST 1 VIEW;  5/7/2025 2:33 pm   INDICATION: Signs/Symptoms:fever.   COMPARISON: None.   ACCESSION NUMBER(S): [id number]   ORDERING CLINICIAN: PRATIBHA CHI   FINDINGS: The patient is significantly rotated. Prominent skin folds overlying the left hemithorax. Right IJ central line is present. The lungs appear clear without apparent pleural effusion. Unremarkable cardiomediastinal silhouette. Calcified left hilar lymph nodes related to granulomatous disease. No pulmonary vascular congestion.       No active disease in the chest identified.   MACRO: None   Signed by: Raheel Powell 5/7/2025 2:42 PM Dictation workstation:   [id number]        Assessment & Plan  Vomiting, unspecified vomiting type, unspecified whether nausea present      Leukocytosis in the setting of a large sacral decubitus ulcer with possible osteomyelitis as well as ulcers in both feet, cannot rule out septicemia.  Patient was started on vancomycin and Zosyn in the emergency room and was shifted to vancomycin and meropenem.  Infectious disease was consulted.  Dr. Reyes was also consulted for wound care.  End-stage kidney disease, will order dialysis for tomorrow.  Will continue sevelamer for renal osteodystrophy  Anemia of chronic illness and end-stage kidney disease, we will order EPO for tomorrow and check ferritin to see if we can give him IV iron during dialysis  Type 2 diabetes, on Lantus 5 units subcu daily together with short acting insulin sliding scale  Seizure disorder on Keppra 500 mg twice daily  Atrial fibrillation on Eliquis 2.5 mg twice daily and Metroprolol tartrate 50 mg twice daily  Cognitive dysfunction on Aricept 23 mg daily with metabolic encephalopathy  Benign prostatic hypertrophy on Proscar 5 mg daily      I spent 60 minutes in the professional and overall care of this patient.      Edi Wise MD FACP         [1]   Past Medical History:  Diagnosis Date    Autonomic  dysfunction     Benign prostatic hyperplasia     Chronic anemia     Coronary artery disease     Current use of long term anticoagulation     Apixaban    Dementia     Dyslipidemia     End-stage renal disease on hemodialysis (Multi)     Frequent falls     Gout     Heart failure with preserved ejection fraction     History of anaphylaxis     History of closed head injury     History of Clostridioides difficile colitis     History of deep vein thrombosis     History of non-ST elevation myocardial infarction (NSTEMI)     History of sepsis     Hypertension     Hypoxic ischemic encephalopathy (Multi)     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)     Kidney stones     Multilevel degenerative disc disease     Osteoarthritis     Paroxysmal atrial fibrillation (Multi)     Peripheral neuropathy     Peripheral vascular disease (CMS-HCC)     Secondary hyperparathyroidism of renal origin (Multi)     Seizure disorder (Multi)    [2]   Past Surgical History:  Procedure Laterality Date    ADENOIDECTOMY      AV FISTULA PLACEMENT      CHOLECYSTECTOMY      COLONOSCOPY      IR CVC TUNNELED  08/20/2021    IR CVC TUNNELED 8/20/2021 [facility]    IR TUNNELED DIALYSIS CATHETER      PACEMAKER PLACEMENT      THROMBECTOMY      TONSILLECTOMY      TOTAL HIP ARTHROPLASTY Right    [3]   Family History  Problem Relation Name Age of Onset    Diabetes Mother      Cancer Father      Lupus Daughter      Kidney failure Daughter      Asthma Son      Hypertension Other      Sickle cell trait Other     [4]   Current Facility-Administered Medications:     acetaminophen (Tylenol) suppository 650 mg, 650 mg, rectal, q4h PRN, Edi Wise MD    acetaminophen (Tylenol) tablet 650 mg, 650 mg, oral, q4h PRN, Edi Wise MD    albuterol 2.5 mg /3 mL (0.083 %) nebulizer solution 1.25 mg, 1.25 mg, nebulization, q6h PRN, Edi Wise MD    apixaban (Eliquis) tablet 2.5 mg, 2.5 mg, oral, BID, Edi Wise MD, 2.5 mg at 05/07/25 1114     bisacodyl (Dulcolax) suppository 10 mg, 10 mg, rectal, q24h PRN, Edi Wise MD    [START ON 5/8/2025] cholecalciferol (Vitamin D-3) tablet 25 mcg, 25 mcg, oral, Daily, Edi Wise MD    collagenase 250 unit/gram ointment, , Topical, Daily, Edi Wise MD, Given at 05/07/25 2144    dextrose 50 % injection 12.5 g, 12.5 g, intravenous, q15 min PRN, Edi Wise MD    dextrose 50 % injection 25 g, 25 g, intravenous, q15 min PRN, Edi Wise MD    [START ON 5/8/2025] docusate sodium (Colace) capsule 100 mg, 100 mg, oral, Daily, Edi Wise MD    [START ON 5/8/2025] donepezil (Aricept) tablet 23 mg, 23 mg, oral, Daily, Edi Wise MD    [START ON 5/8/2025] ferrous sulfate tablet 325 mg, 65 mg of iron, oral, Daily with breakfast, Edi Wise MD    [START ON 5/8/2025] finasteride (Proscar) tablet 5 mg, 5 mg, oral, Daily, Edi Wise MD    [START ON 5/8/2025] folic acid (Folvite) tablet 1 mg, 1 mg, oral, Daily, Edi Wise MD    glucagon (Glucagen) injection 1 mg, 1 mg, intramuscular, q15 min PRN, Edi Wise MD    glucagon (Glucagen) injection 1 mg, 1 mg, intramuscular, q15 min PRN, Edi Wise MD    insulin glargine (Lantus) injection 5 Units, 5 Units, subcutaneous, Nightly, Edi Wise MD, 5 Units at 05/07/25 2112    [START ON 5/8/2025] insulin lispro injection 0-5 Units, 0-5 Units, subcutaneous, TID AC, Edi Wise MD    levETIRAcetam (Keppra) tablet 500 mg, 500 mg, oral, BID, Edi Wise MD, 500 mg at 05/07/25 2115    loperamide (Imodium) capsule 2 mg, 2 mg, oral, Once PRN, Edi Wise MD    magnesium hydroxide (Milk of Magnesia) 400 mg/5 mL suspension 30 mL, 30 mL, oral, q24h PRN, Edi Wise MD    melatonin tablet 10 mg, 10 mg, oral, Nightly, Edi Wise MD, 10 mg at 05/07/25 2114    meropenem (Merrem) 500 mg in sodium chloride 0.9%  mL, 500 mg, intravenous, q12h, Edi Wise MD, Stopped at 05/07/25 2158    metoprolol tartrate  (Lopressor) tablet 50 mg, 50 mg, oral, BID, Edi Wise MD, 50 mg at 05/07/25 2115    ondansetron (Zofran) tablet 4 mg, 4 mg, oral, q8h PRN, Edi Wise MD    oxyCODONE (Roxicodone) immediate release tablet 2.5 mg, 2.5 mg, oral, q6h PRN, Edi Wise MD    polyethylene glycol (Glycolax, Miralax) packet 17 g, 17 g, oral, Daily PRN, Edi Wise MD    sevelamer carbonate (Renvela) packet 1.6 g, 1.6 g, oral, BID, Edi Wise MD, 1.6 g at 05/07/25 2144    vancomycin (Vancocin) pharmacy to dose - pharmacy monitoring, , miscellaneous, Daily PRN, Edi Wise MD    [START ON 5/8/2025] vitamin B complex-vitamin C-folic acid (Nephrocaps) capsule 1 capsule, 1 capsule, oral, Daily, Edi Wise MD

## 2025-05-08 NOTE — PRE-PROCEDURE NOTE
Report from Sending RN:    Report From: Kristine mendez  Recent Surgery of Procedure: No  Baseline Level of Consciousness (LOC): a&ox1-2  Oxygen Use: No  Type:   Diabetic: Yes  Last BP Med Given Day of Dialysis: see mar  Last Pain Med Given: see mar  Lab Tests to be Obtained with Dialysis: No  Blood Transfusion to be Given During Dialysis: No  Available IV Access: Yes  Medications to be Administered During Dialysis: No  Continuous IV Infusion Running: No  Restraints on Currently or in the Last 24 Hours: No  Hand-Off Communication:   Dialysis Catheter Dressing: yes  Last Dressing Change: 5/825

## 2025-05-08 NOTE — POST-PROCEDURE NOTE
Report to Receiving RN:    Report To: Kristine mendez   Time Report Called: 1300  Hand-Off Communication: 2 liters off  Complications During Treatment: No  Ultrafiltration Treatment: No  Medications Administered During Dialysis: No  Blood Products Administered During Dialysis: No  Labs Sent During Dialysis: No  Heparin Drip Rate Changes:   Dialysis Catheter Dressing: yes  Last Dressing Change: 5/8/25    Electronic Signatures:      Last Updated: 1:17 PM by STANTON KIM

## 2025-05-08 NOTE — CARE PLAN
Problem: Diabetes  Goal: Achieve decreasing blood glucose levels by end of shift  Outcome: Progressing  Goal: Increase stability of blood glucose readings by end of shift  Outcome: Progressing  Goal: Decrease in ketones present in urine by end of shift  Outcome: Progressing  Goal: Maintain electrolyte levels within acceptable range throughout shift  Outcome: Progressing  Goal: Maintain glucose levels >70mg/dl to <250mg/dl throughout shift  Outcome: Progressing  Goal: No changes in neurological exam by end of shift  Outcome: Progressing  Goal: Learn about and adhere to nutrition recommendations by end of shift  Outcome: Progressing  Goal: Vital signs within normal range for age by end of shift  Outcome: Progressing  Goal: Increase self care and/or family involovement by end of shift  Outcome: Progressing  Goal: Receive DSME education by end of shift  Outcome: Progressing     Problem: Fall/Injury  Goal: Not fall by end of shift  Outcome: Progressing  Goal: Be free from injury by end of the shift  Outcome: Progressing  Goal: Verbalize understanding of personal risk factors for fall in the hospital  Outcome: Progressing  Goal: Verbalize understanding of risk factor reduction measures to prevent injury from fall in the home  Outcome: Progressing  Goal: Use assistive devices by end of the shift  Outcome: Progressing  Goal: Pace activities to prevent fatigue by end of the shift  Outcome: Progressing     Problem: ESRD: Dialysis, CAPD  Goal: Electrolytes restored to baseline  Outcome: Progressing  Goal: Fatigue of chronic illness managed  Outcome: Progressing  Goal: Follows dialysis treatment plan  Outcome: Progressing  Goal: Follows fluid restrictions  Outcome: Progressing  Goal: Increase self care/family involvement  Outcome: Progressing  Goal: Participates in bowel regimen (CAPD)  Outcome: Progressing  Goal: Reports no abdominal pain/distension  Outcome: Progressing  Goal: Reports no shortness of breath  Outcome:  Progressing   The patient's goals for the shift include feel better    The clinical goals for the shift include no fever    Over the shift, the patient did not make progress toward the following goals. Barriers to progression include . Recommendations to address these barriers include .

## 2025-05-08 NOTE — CONSULTS
Consults      ID Consult:     HPI 84-year-old male presenting with nausea vomiting fever on the way to wound care appointment.  Was taken to the emergency department where WBCs on CBC were noted to be 42.7, blood pressure 104/56, patient noted to have deep and large sacral decubitus ulcer with some ulcers on the feet as well.  Strong suspicion of bacteremia, and patient admitted for further evaluation and treatment.  Of note patient has chronic osteomyelitis of the sacral area with chronic nonhealing nonsurgical sacral decubitus ulcer.  He is end-stage renal disease on hemodialysis via permacath Tuesday Thursday Saturday and he is also a type II diabetic.  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  Prior smoker of cigarettes      All 14 ROS attempted with the patient but patient is nonverbal with me.  He does look at me and track me but he does not shake his head yes or no either       has a past medical history of Autonomic dysfunction, Benign prostatic hyperplasia, Chronic anemia, Coronary artery disease, Current use of long term anticoagulation, Dementia, Dyslipidemia, End-stage renal disease on hemodialysis (Multi), Frequent falls, Gout, Heart failure with preserved ejection fraction, History of anaphylaxis, History of closed head injury, History of Clostridioides difficile colitis, History of deep vein thrombosis, History of non-ST elevation myocardial infarction (NSTEMI), History of sepsis, Hypertension, Hypoxic ischemic encephalopathy (Multi), 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), Kidney stones, Multilevel degenerative disc disease, Osteoarthritis, Paroxysmal atrial fibrillation (Multi), Peripheral neuropathy, Peripheral vascular disease (CMS-[facility]), Secondary hyperparathyroidism of renal origin (Multi), and Seizure disorder (Multi).     has a past  surgical history that includes IR CVC tunneled (08/20/2021); IR tunneled dialysis catheter; AV fistula placement; pacemaker placement; Total hip arthroplasty (Right); Thrombectomy; Tonsillectomy; Adenoidectomy; Cholecystectomy; and Colonoscopy.     reports that he has quit smoking. His smoking use included cigarettes. He has never used smokeless tobacco. He reports that he does not drink alcohol and does not use drugs.    Family History[1]      Physical exam  Vitals:    05/08/25 1030   BP: 108/58   Pulse: 73   Resp:    Temp:    SpO2:      Muscle atrophy, permacath right side  Patient is awake and alert but not interactive.  He does track me.  Expression seem symmetrical.  NAD  Neck supple  Heart S1S2  Chest: Equal expansion, bilaterally clear to auscultation but when he coughs he has a weak rhonchorous cough, nonproductive  Abdomen: soft, ND, NTTP, no guarding  Extrem: no pain to palpation -has small abrasion to the heel left side  Skin: no rashes, no diaphoresis  Neuro: CNS intact  Photos of large sacral wound reviewed her medical records    Admission on 05/07/2025   Component Date Value Ref Range Status    WBC 05/07/2025 42.7 (H)  4.4 - 11.3 x10*3/uL Final    nRBC 05/07/2025 0.0  0.0 - 0.0 /100 WBCs Final    RBC 05/07/2025 3.15 (L)  4.50 - 5.90 x10*6/uL Final    Hemoglobin 05/07/2025 8.9 (L)  13.5 - 17.5 g/dL Final    Hematocrit 05/07/2025 26.0 (L)  41.0 - 52.0 % Final    MCV 05/07/2025 83  80 - 100 fL Final    MCH 05/07/2025 28.3  26.0 - 34.0 pg Final    MCHC 05/07/2025 34.2  32.0 - 36.0 g/dL Final    RDW 05/07/2025 18.4 (H)  11.5 - 14.5 % Final    Platelets 05/07/2025 284  150 - 450 x10*3/uL Final    Immature Granulocytes %, Automated 05/07/2025 1.1 (H)  0.0 - 0.9 % Final    Immature Granulocyte Count (IG) includes promyelocytes, myelocytes and metamyelocytes but does not include bands. Percent differential counts (%) should be interpreted in the context of the absolute cell counts (cells/UL).    Immature  Granulocytes Absolute, Au* 05/07/2025 0.47  0.00 - 0.50 x10*3/uL Final    Glucose 05/07/2025 192 (H)  74 - 99 mg/dL Final    Sodium 05/07/2025 134 (L)  136 - 145 mmol/L Final    Potassium 05/07/2025 4.7  3.5 - 5.3 mmol/L Final    MILD HEMOLYSIS DETECTED. The result may be falsely elevated due to hemolysis or other interferents. Clinical correlation is recommended. Repeat testing may be considered.    Chloride 05/07/2025 95 (L)  98 - 107 mmol/L Final    Bicarbonate 05/07/2025 28  21 - 32 mmol/L Final    Anion Gap 05/07/2025 16  10 - 20 mmol/L Final    Urea Nitrogen 05/07/2025 42 (H)  6 - 23 mg/dL Final    Creatinine 05/07/2025 4.29 (H)  0.50 - 1.30 mg/dL Final    eGFR 05/07/2025 13 (L)  >60 mL/min/1.73m*2 Final    Calculations of estimated GFR are performed using the 2021 CKD-EPI Study Refit equation without the race variable for the IDMS-Traceable creatinine methods.  https://jasn.asnjournals.org/content/early/2021/09/22/ASN.4050620544    Calcium 05/07/2025 8.7  8.6 - 10.3 mg/dL Final    Albumin 05/07/2025 2.4 (L)  3.4 - 5.0 g/dL Final    Alkaline Phosphatase 05/07/2025 91  33 - 136 U/L Final    Total Protein 05/07/2025 6.9  6.4 - 8.2 g/dL Final    AST 05/07/2025 36  9 - 39 U/L Final    MILD HEMOLYSIS DETECTED. The result may be falsely elevated due to hemolysis or other interferents. Clinical correlation is recommended. Repeat testing may be considered.    Bilirubin, Total 05/07/2025 0.5  0.0 - 1.2 mg/dL Final    ALT 05/07/2025 10  10 - 52 U/L Final    Patients treated with Sulfasalazine may generate falsely decreased results for ALT.    Lactate 05/07/2025 1.5  0.4 - 2.0 mmol/L Final    Blood Culture 05/07/2025 Loaded on Instrument - Culture in progress   Preliminary    Blood Culture 05/07/2025 Identification and susceptibility testing to follow   Preliminary    Gram Stain 05/07/2025 Gram negative bacilli (AA)   Preliminary    Anaerobic Bottle Positive    Gram Stain 05/07/2025 Gram negative bacilli (AA)    Preliminary    Aerobic Bottle Positive    Gram Stain 05/07/2025 No polymorphonuclear leukocytes seen (A)   Preliminary    Gram Stain 05/07/2025 (2+) Few Gram negative bacilli (A)   Preliminary    Ventricular Rate 05/07/2025 90  BPM Preliminary    Atrial Rate 05/07/2025 90  BPM Preliminary    DC Interval 05/07/2025 162  ms Preliminary    QRS Duration 05/07/2025 76  ms Preliminary    QT Interval 05/07/2025 368  ms Preliminary    QTC Calculation(Bazett) 05/07/2025 450  ms Preliminary    P Chloride 05/07/2025 49  degrees Preliminary    R Axis 05/07/2025 65  degrees Preliminary    T Axis 05/07/2025 70  degrees Preliminary    QRS Count 05/07/2025 15  beats Preliminary    Q Onset 05/07/2025 219  ms Preliminary    P Onset 05/07/2025 138  ms Preliminary    P Offset 05/07/2025 196  ms Preliminary    T Offset 05/07/2025 403  ms Preliminary    QTC Fredericia 05/07/2025 421  ms Preliminary    Neutrophils %, Manual 05/07/2025 89.0  40.0 - 80.0 % Final    Percent differential counts (%) should be interpreted in the context of the absolute cell counts (cells/uL).    Bands %, Manual 05/07/2025 3.0  0.0 - 5.0 % Final    Lymphocytes %, Manual 05/07/2025 4.0  13.0 - 44.0 % Final    Monocytes %, Manual 05/07/2025 4.0  2.0 - 10.0 % Final    Eosinophils %, Manual 05/07/2025 0.0  0.0 - 6.0 % Final    Basophils %, Manual 05/07/2025 0.0  0.0 - 2.0 % Final    Seg Neutrophils Absolute, Manual 05/07/2025 38.00 (H)  1.60 - 5.00 x10*3/uL Final    Bands Absolute, Manual 05/07/2025 1.28 (H)  0.00 - 0.50 x10*3/uL Final    Lymphocytes Absolute, Manual 05/07/2025 1.71  0.80 - 3.00 x10*3/uL Final    Monocytes Absolute, Manual 05/07/2025 1.71 (H)  0.05 - 0.80 x10*3/uL Final    Eosinophils Absolute, Manual 05/07/2025 0.00  0.00 - 0.40 x10*3/uL Final    Basophils Absolute, Manual 05/07/2025 0.00  0.00 - 0.10 x10*3/uL Final    Total Cells Counted 05/07/2025 100   Final    Neutrophils Absolute, Manual 05/07/2025 39.28 (H)  1.60 - 5.50 x10*3/uL Final    RBC  Morphology 05/07/2025 See Below   Final    Polychromasia 05/07/2025 Mild   Final    Target Cells 05/07/2025 Few   Final    POCT Glucose 05/07/2025 240 (H)  74 - 99 mg/dL Final    Hemoglobin A1C 05/07/2025 5.6  See comment % Final    Hemoglobin variant detected which does not interfere with determination of Hemoglobin A1c. Hemoglobin identification can be ordered to characterize the variant if clinically indicated.    Estimated Average Glucose 05/07/2025 114  Not Established mg/dL Final    POCT Glucose 05/08/2025 173 (H)  74 - 99 mg/dL Final     Tissue cultures with few gram-negative bacilli from decubitus ulcer    Blood culture 2 out of 2 from 5/7/2025 2 species of gram-negative bacilli, anaerobic and aerobic -Morganella on prelim    Previous tissue culture from 3/31/2025 with mixed gram-positive and gram-negative bacteria as well as anaerobes, beta-lactamase negative    She did have urine culture from 4/30/2025 which grew Morganella morganii I and Enterobacter aerogenes    Assessment:   Gram-negative bacteremia, awaiting susceptibility to Morganella  Large infected sacral decubitus ulcer, nonhealing nonsurgical wound, with underlying chronic osteomyelitis  End-stage renal disease on hemodialysis  Diabetes mellitus type 2   Seizure disorder on Keppra  Atrial fibrillation  Acute metabolic encephalopathy with history of cognitive dysfunction, on Aricept per medical records  BPH    Plan:   Patient started empirically on vancomycin and Zosyn.  Zosyn was changed to meropenem.  Will plan to continue meropenem at renal dose for now while species and susceptibilities pending of gram-negative bacteria BSI.  Discontinue vancomycin if no evidence of MRSA to date  Local wound care  Optimal glycemic control  Monitor for seizure activity on meropenem as this can potentially lower the threshold    Estimated Creatinine Clearance: 11.9 mL/min (A) (by C-G formula based on SCr of 4.29 mg/dL (H)).        All communication directed to  consulting provider.   Thank you very much for this consultation.     Time spent before, during, and after this consult reviewed data and coordinating care on the date of this consultation, including face to face visit with the patient > 60 min         [1]   Family History  Problem Relation Name Age of Onset    Diabetes Mother      Cancer Father      Lupus Daughter      Kidney failure Daughter      Asthma Son      Hypertension Other      Sickle cell trait Other

## 2025-05-08 NOTE — PROGRESS NOTES
Vancomycin Dosing by Pharmacy- Cessation of Therapy    Consult to pharmacy for vancomycin dosing has been discontinued by the prescriber, pharmacy will sign off at this time.    Please call pharmacy if there are further questions or re-enter a consult if vancomycin is resumed.     Violet Linares, [facility]

## 2025-05-08 NOTE — NURSING NOTE
Dr. Reyes at bedside. Debrides sacral wound. Pt. Tolerates fairly well.  New orders on wound care written.

## 2025-05-08 NOTE — PROGRESS NOTES
05/08/25 1406   Discharge Planning   Living Arrangements   (LTC)   Support Systems Spouse/significant other   Assistance Needed PTA - lives in LTC at [facility]. At baseline - ambulates with a walker and assistance. Staff completes all ADLs and IADLs. IHD T-T-S at [facility] under Dr Wise.   Type of Residence Nursing home/residential care   Home or Post Acute Services Post acute facilities (Rehab/SNF/etc)   Type of Post Acute Facility Services Long term care   Expected Discharge Disposition Inter  ([facility])   Does the patient need discharge transport arranged? Yes   RoundTrip coordination needed? Yes   Patient Choice   Provider Choice list and CMS website (https://medicare.gov/care-compare#search) for post-acute Quality and Resource Measure Data were provided and reviewed with: Patient;Family   Patient / Family choosing to utilize agency / facility established prior to hospitalization Yes   Intensity of Service   Intensity of Service 0-30 min     Large sacral pressure ulcer with possible Osteomyelitis. Wound care debrided at bedside today with Dr Reyes, daily Dakins wet to dry ordered. Currently on IV Merrem, ID consult placed for final abx needs. ESRD on IHD T-T-S at [facility] under Dr Wise. Pt from [facility] and can return once medically ready.

## 2025-05-08 NOTE — PROGRESS NOTES
05/08/25 0758   Discharge Planning   Home or Post Acute Services Post acute facilities (Rehab/SNF/etc)   Type of Post Acute Facility Services Long term care   Expected Discharge Disposition Inter  ([facility])   Does the patient need discharge transport arranged? Yes   RoundTrip coordination needed? Yes   Has discharge transport been arranged? No   Patient Choice   Patient / Family choosing to utilize agency / facility established prior to hospitalization Yes   Intensity of Service   Intensity of Service 0-30 min     Referral sent to the facility confirming pt LOC and ability to return when medically ready. Pt is established with [facility] on [address] for HD.

## 2025-05-08 NOTE — CONSULTS
Vancomycin Dosing by Pharmacy- INITIAL (HEMODIALYSIS)    Tyshawn Coles is a 84 y.o. year old male who Pharmacy has been consulted for vancomycin dosing for cellulitis/skin and soft tissue infection. Based on the patient's indication and renal status this patient will be dosed based on a Pre-HD level of 20-25 mcg/mL.     Patient is currently on hemodialysis Tues/Thurs/Sat     Visit Vitals  /56 (BP Location: Right arm, Patient Position: Lying)   Pulse 78   Temp 36.8 °C (98.2 °F) (Temporal)   Resp 18           Lab Results   Component Value Date    CREATININE 4.29 (H) 05/07/2025    CREATININE 3.01 (H) 05/01/2025    CREATININE 3.93 (H) 04/30/2025    CREATININE 3.15 (H) 04/29/2025       No intake/output data recorded.    Assessment/Plan     Initial Loading Dosing:has already been given a loading dose of 1000 mg   Vancomycin maintenance dose: 750 mg after each dialysis session Tues/Thurs/Sat  Pre-HD level will be obtained on 05/10/2025 at 0500. May be obtained sooner if clinically indicated.   Will continue to monitor renal function daily while on vancomycin and order serum creatinine at least every 48 hours if not already ordered.  Follow for continued vancomycin needs, clinical response, and signs/symptoms of toxicity.     PEDRO Palomo, R. Ph.

## 2025-05-09 LAB
ALBUMIN SERPL BCP-MCNC: 2 G/DL (ref 3.4–5)
ALP SERPL-CCNC: 130 U/L (ref 33–136)
ALT SERPL W P-5'-P-CCNC: 8 U/L (ref 10–52)
ANION GAP SERPL CALC-SCNC: 12 MMOL/L (ref 10–20)
AST SERPL W P-5'-P-CCNC: 16 U/L (ref 9–39)
ATRIAL RATE: 90 BPM
BASOPHILS # BLD AUTO: 0.05 X10*3/UL (ref 0–0.1)
BASOPHILS NFR BLD AUTO: 0.3 %
BILIRUB SERPL-MCNC: 0.3 MG/DL (ref 0–1.2)
BUN SERPL-MCNC: 38 MG/DL (ref 6–23)
CALCIUM SERPL-MCNC: 8.3 MG/DL (ref 8.6–10.3)
CHLORIDE SERPL-SCNC: 96 MMOL/L (ref 98–107)
CO2 SERPL-SCNC: 27 MMOL/L (ref 21–32)
CREAT SERPL-MCNC: 3.22 MG/DL (ref 0.5–1.3)
EGFRCR SERPLBLD CKD-EPI 2021: 18 ML/MIN/1.73M*2
EOSINOPHIL # BLD AUTO: 0.25 X10*3/UL (ref 0–0.4)
EOSINOPHIL NFR BLD AUTO: 1.4 %
ERYTHROCYTE [DISTWIDTH] IN BLOOD BY AUTOMATED COUNT: 18 % (ref 11.5–14.5)
GLUCOSE BLD MANUAL STRIP-MCNC: 171 MG/DL (ref 74–99)
GLUCOSE BLD MANUAL STRIP-MCNC: 174 MG/DL (ref 74–99)
GLUCOSE BLD MANUAL STRIP-MCNC: 177 MG/DL (ref 74–99)
GLUCOSE BLD MANUAL STRIP-MCNC: 221 MG/DL (ref 74–99)
GLUCOSE SERPL-MCNC: 191 MG/DL (ref 74–99)
HCT VFR BLD AUTO: 22.6 % (ref 41–52)
HGB BLD-MCNC: 7.7 G/DL (ref 13.5–17.5)
HOLD SPECIMEN: NORMAL
IMM GRANULOCYTES # BLD AUTO: 0.13 X10*3/UL (ref 0–0.5)
IMM GRANULOCYTES NFR BLD AUTO: 0.7 % (ref 0–0.9)
LYMPHOCYTES # BLD AUTO: 3.18 X10*3/UL (ref 0.8–3)
LYMPHOCYTES NFR BLD AUTO: 17.7 %
MCH RBC QN AUTO: 27.6 PG (ref 26–34)
MCHC RBC AUTO-ENTMCNC: 34.1 G/DL (ref 32–36)
MCV RBC AUTO: 81 FL (ref 80–100)
MONOCYTES # BLD AUTO: 1.72 X10*3/UL (ref 0.05–0.8)
MONOCYTES NFR BLD AUTO: 9.6 %
NEUTROPHILS # BLD AUTO: 12.6 X10*3/UL (ref 1.6–5.5)
NEUTROPHILS NFR BLD AUTO: 70.3 %
NRBC BLD-RTO: 0 /100 WBCS (ref 0–0)
P AXIS: 49 DEGREES
P OFFSET: 196 MS
P ONSET: 138 MS
PLATELET # BLD AUTO: 225 X10*3/UL (ref 150–450)
POTASSIUM SERPL-SCNC: 3.8 MMOL/L (ref 3.5–5.3)
PR INTERVAL: 162 MS
PROT SERPL-MCNC: 5.9 G/DL (ref 6.4–8.2)
Q ONSET: 219 MS
QRS COUNT: 15 BEATS
QRS DURATION: 76 MS
QT INTERVAL: 368 MS
QTC CALCULATION(BAZETT): 450 MS
QTC FREDERICIA: 421 MS
R AXIS: 65 DEGREES
RBC # BLD AUTO: 2.79 X10*6/UL (ref 4.5–5.9)
SODIUM SERPL-SCNC: 131 MMOL/L (ref 136–145)
T AXIS: 70 DEGREES
T OFFSET: 403 MS
VENTRICULAR RATE: 90 BPM
WBC # BLD AUTO: 17.9 X10*3/UL (ref 4.4–11.3)

## 2025-05-09 PROCEDURE — 99231 SBSQ HOSP IP/OBS SF/LOW 25: CPT | Performed by: PLASTIC SURGERY

## 2025-05-09 PROCEDURE — 36415 COLL VENOUS BLD VENIPUNCTURE: CPT | Performed by: INTERNAL MEDICINE

## 2025-05-09 PROCEDURE — 84075 ASSAY ALKALINE PHOSPHATASE: CPT | Performed by: INTERNAL MEDICINE

## 2025-05-09 PROCEDURE — 85025 COMPLETE CBC W/AUTO DIFF WBC: CPT | Performed by: INTERNAL MEDICINE

## 2025-05-09 PROCEDURE — 99232 SBSQ HOSP IP/OBS MODERATE 35: CPT | Performed by: INTERNAL MEDICINE

## 2025-05-09 PROCEDURE — 2500000001 HC RX 250 WO HCPCS SELF ADMINISTERED DRUGS (ALT 637 FOR MEDICARE OP): Performed by: INTERNAL MEDICINE

## 2025-05-09 PROCEDURE — 82947 ASSAY GLUCOSE BLOOD QUANT: CPT

## 2025-05-09 PROCEDURE — 2500000002 HC RX 250 W HCPCS SELF ADMINISTERED DRUGS (ALT 637 FOR MEDICARE OP, ALT 636 FOR OP/ED): Performed by: INTERNAL MEDICINE

## 2025-05-09 PROCEDURE — 1210000001 HC SEMI-PRIVATE ROOM DAILY

## 2025-05-09 PROCEDURE — 2500000004 HC RX 250 GENERAL PHARMACY W/ HCPCS (ALT 636 FOR OP/ED): Mod: JZ | Performed by: INTERNAL MEDICINE

## 2025-05-09 RX ORDER — HEPARIN SODIUM 1000 [USP'U]/ML
2000 INJECTION, SOLUTION INTRAVENOUS; SUBCUTANEOUS
Status: CANCELLED | OUTPATIENT
Start: 2025-05-10

## 2025-05-09 RX ORDER — ALBUMIN HUMAN 250 G/1000ML
12.5 SOLUTION INTRAVENOUS ONCE
Status: CANCELLED | OUTPATIENT
Start: 2025-05-10

## 2025-05-09 RX ORDER — HEPARIN SODIUM 1000 [USP'U]/ML
2000 INJECTION, SOLUTION INTRAVENOUS; SUBCUTANEOUS
Status: DISCONTINUED | OUTPATIENT
Start: 2025-05-10 | End: 2025-05-14 | Stop reason: HOSPADM

## 2025-05-09 RX ADMIN — OXYCODONE 2.5 MG: 5 TABLET ORAL at 22:14

## 2025-05-09 RX ADMIN — LEVETIRACETAM 500 MG: 500 TABLET, FILM COATED ORAL at 22:15

## 2025-05-09 RX ADMIN — Medication: at 08:44

## 2025-05-09 RX ADMIN — INSULIN GLARGINE 5 UNITS: 100 INJECTION, SOLUTION SUBCUTANEOUS at 22:16

## 2025-05-09 RX ADMIN — METOPROLOL TARTRATE 50 MG: 50 TABLET, FILM COATED ORAL at 08:44

## 2025-05-09 RX ADMIN — FINASTERIDE 5 MG: 5 TABLET, FILM COATED ORAL at 08:44

## 2025-05-09 RX ADMIN — INSULIN LISPRO 1 UNITS: 100 INJECTION, SOLUTION INTRAVENOUS; SUBCUTANEOUS at 06:52

## 2025-05-09 RX ADMIN — INSULIN LISPRO 2 UNITS: 100 INJECTION, SOLUTION INTRAVENOUS; SUBCUTANEOUS at 12:27

## 2025-05-09 RX ADMIN — APIXABAN 2.5 MG: 5 TABLET, FILM COATED ORAL at 22:14

## 2025-05-09 RX ADMIN — Medication 10 MG: at 22:14

## 2025-05-09 RX ADMIN — SEVELAMER CARBONATE 1.6 G: 800 POWDER, FOR SUSPENSION ORAL at 08:44

## 2025-05-09 RX ADMIN — Medication 25 MCG: at 08:44

## 2025-05-09 RX ADMIN — Medication 1 CAPSULE: at 06:52

## 2025-05-09 RX ADMIN — DONEPEZIL HYDROCHLORIDE 23 MG: 23 TABLET, FILM COATED ORAL at 08:44

## 2025-05-09 RX ADMIN — LEVETIRACETAM 500 MG: 500 TABLET, FILM COATED ORAL at 08:44

## 2025-05-09 RX ADMIN — MEROPENEM 500 MG: 500 INJECTION, POWDER, FOR SOLUTION INTRAVENOUS at 22:15

## 2025-05-09 RX ADMIN — APIXABAN 2.5 MG: 5 TABLET, FILM COATED ORAL at 08:44

## 2025-05-09 RX ADMIN — METOPROLOL TARTRATE 50 MG: 50 TABLET, FILM COATED ORAL at 22:15

## 2025-05-09 RX ADMIN — DOCUSATE SODIUM 100 MG: 100 CAPSULE, LIQUID FILLED ORAL at 08:44

## 2025-05-09 RX ADMIN — MEROPENEM 500 MG: 500 INJECTION, POWDER, FOR SOLUTION INTRAVENOUS at 08:44

## 2025-05-09 RX ADMIN — COLLAGENASE SANTYL: 250 OINTMENT TOPICAL at 08:44

## 2025-05-09 RX ADMIN — FERROUS SULFATE TAB 325 MG (65 MG ELEMENTAL FE) 325 MG: 325 (65 FE) TAB at 08:44

## 2025-05-09 RX ADMIN — SEVELAMER CARBONATE 1.6 G: 800 POWDER, FOR SUSPENSION ORAL at 22:15

## 2025-05-09 RX ADMIN — FOLIC ACID 1 MG: 1 TABLET ORAL at 08:44

## 2025-05-09 RX ADMIN — INSULIN LISPRO 1 UNITS: 100 INJECTION, SOLUTION INTRAVENOUS; SUBCUTANEOUS at 17:44

## 2025-05-09 ASSESSMENT — COGNITIVE AND FUNCTIONAL STATUS - GENERAL
MOVING FROM LYING ON BACK TO SITTING ON SIDE OF FLAT BED WITH BEDRAILS: TOTAL
STANDING UP FROM CHAIR USING ARMS: TOTAL
DAILY ACTIVITIY SCORE: 6
STANDING UP FROM CHAIR USING ARMS: TOTAL
WALKING IN HOSPITAL ROOM: TOTAL
PERSONAL GROOMING: TOTAL
EATING MEALS: TOTAL
HELP NEEDED FOR BATHING: TOTAL
DRESSING REGULAR LOWER BODY CLOTHING: TOTAL
TURNING FROM BACK TO SIDE WHILE IN FLAT BAD: TOTAL
HELP NEEDED FOR BATHING: TOTAL
MOVING TO AND FROM BED TO CHAIR: TOTAL
CLIMB 3 TO 5 STEPS WITH RAILING: TOTAL
DRESSING REGULAR LOWER BODY CLOTHING: TOTAL
TURNING FROM BACK TO SIDE WHILE IN FLAT BAD: TOTAL
DAILY ACTIVITIY SCORE: 6
EATING MEALS: TOTAL
CLIMB 3 TO 5 STEPS WITH RAILING: TOTAL
DRESSING REGULAR UPPER BODY CLOTHING: TOTAL
MOBILITY SCORE: 6
TOILETING: TOTAL
TOILETING: TOTAL
MOBILITY SCORE: 6
WALKING IN HOSPITAL ROOM: TOTAL
MOVING TO AND FROM BED TO CHAIR: TOTAL
DRESSING REGULAR UPPER BODY CLOTHING: TOTAL
MOVING FROM LYING ON BACK TO SITTING ON SIDE OF FLAT BED WITH BEDRAILS: TOTAL
PERSONAL GROOMING: TOTAL

## 2025-05-09 ASSESSMENT — PAIN DESCRIPTION - DESCRIPTORS: DESCRIPTORS: ACHING

## 2025-05-09 ASSESSMENT — PAIN SCALES - GENERAL
PAINLEVEL_OUTOF10: 0 - NO PAIN
PAINLEVEL_OUTOF10: 5 - MODERATE PAIN
PAINLEVEL_OUTOF10: 0 - NO PAIN

## 2025-05-09 ASSESSMENT — PAIN DESCRIPTION - LOCATION: LOCATION: SACRUM

## 2025-05-09 ASSESSMENT — PAIN DESCRIPTION - ORIENTATION: ORIENTATION: MID

## 2025-05-09 ASSESSMENT — PAIN - FUNCTIONAL ASSESSMENT
PAIN_FUNCTIONAL_ASSESSMENT: 0-10

## 2025-05-09 NOTE — CARE PLAN
The patient's goals for the shift include feel better    The clinical goals for the shift include prevent further skin breakdown

## 2025-05-09 NOTE — PROGRESS NOTES
Infectious Disease Progress Note       5/9/2025    Patient is a followup regarding gram-negative bacteremia with large sacral decubitus ulcer with underlying chronic osteomyelitis of the sacral area with nonhealing nonsurgical sacral decubitus wound.  Patient does have a history of end-stage renal disease on hemodialysis via permacath and he is a type II diabetic.    Opens his eyes and tracks me but nonverbal with me  No cough today    Lab Results   Component Value Date    WBC 17.9 (H) 05/09/2025    HGB 7.7 (L) 05/09/2025    HCT 22.6 (L) 05/09/2025    MCV 81 05/09/2025     05/09/2025     Lab Results   Component Value Date    GLUCOSE 191 (H) 05/09/2025    CALCIUM 8.3 (L) 05/09/2025     (L) 05/09/2025    K 3.8 05/09/2025    CO2 27 05/09/2025    CL 96 (L) 05/09/2025    BUN 38 (H) 05/09/2025    CREATININE 3.22 (H) 05/09/2025       WBC trends are being monitored. Antibiotic doses are being adjusted per most recent renal labs.     Vitals:    05/09/25 0731   BP: 118/58   Pulse: 74   Resp: 20   Temp: 36.8 °C (98.2 °F)   SpO2: 97%         Muscle atrophy, permacath in place   patient is awake and alert but not interactive.  He does track me.  Expression seem symmetrical.  NAD  Neck supple  Heart S1S2  Chest: Equal expansion, bilaterally clear to auscultation but when he coughs he has a weak rhonchorous cough, nonproductive  Abdomen: soft, ND, NTTP, no guarding  Extrem: no pain to palpation -has small abrasion to the heel left side  Skin: no rashes, no diaphoresis  Neuro: CNS intact but does not verbalize with me  Photos of large sacral wound reviewed her medical records    Problem List[1]    Morganella morganii 2 out of 2 blood cultures + 5/7/2025  1 out of 2 blood cultures also positive for Citrobacter ffreundii complex  All susceptibilities pending      ASSESSMENT:  Gram-negative bacteremia, awaiting susceptibility to Morganella  Large infected sacral decubitus ulcer, nonhealing nonsurgical wound, with  underlying chronic osteomyelitis  End-stage renal disease on hemodialysis  Diabetes mellitus type 2   Seizure disorder on Keppra  Atrial fibrillation  Acute metabolic encephalopathy with history of cognitive dysfunction, on Aricept per medical records  BPH      PLAN:  Downtrending leukocytosis since starting antibiotics  Plastics following sacral decubitus ulcer  Will plan to continue meropenem at renal dose for now while species and susceptibilities pending of both gram-negative bacteria BSI.    Local wound care  Optimal glycemic control  Monitor for seizure activity on meropenem as this can potentially lower the threshold  Would try and avoid a PICC line if possible.  This will depend on susceptibilities    Imaging and labs were reviewed per medical records and any ID pertinent labs were also addressed  Time spent before, during and after care today, including coordination of care >40 min      Daisy Watson DO            [1]   Patient Active Problem List  Diagnosis    ESRD on hemodialysis (Multi)    Closed fracture of neck of left femur, initial encounter    Falls, initial encounter    Closed head injury    Elevated troponin    Abscess of hip    Hip dislocation, left, initial encounter (Multi)    Acute encephalopathy    Vomiting, unspecified vomiting type, unspecified whether nausea present

## 2025-05-09 NOTE — CONSULTS
Wound Care Consult Removed sacral dressing, Small amount of red bloody drainage, some foul smell, scant slough seen, wound bed red, wound edges red. Karen wound tissue blanches. Wound measurements are 6.0cm x 7.0cm x 2.0cm. Dakins and Mepliex dressing applied.     Visit Date: 5/9/2025      Patient Name: Tyshawn Coles         MRN: [id number]             Reason for Consult: PI Sacrum        Wound History: Chronic     Pertinent Labs:       Assessment:                         Wound Plan: Continue with current dressing changes.      James Croft LPN  5/9/2025  1:17 PM

## 2025-05-09 NOTE — PROGRESS NOTES
Plastic Surgery Note    Afebrile, vital signs stable  Denies pain in sacrum  Sacral wound clean  Impression: Sacral pressure sore  Continue Dakin's wet-to-dry dressings  Clinitron bed pending

## 2025-05-09 NOTE — PROGRESS NOTES
"Tyshawn Coles is a 84 y.o. male on day 1 of admission presenting with Vomiting, unspecified vomiting type, unspecified whether nausea present.    Subjective   Patient seems to be more awake and more conversant today.  Unable to get review of systems due to dementia.  Had hemodialysis today tolerated well  Seen by Dr. Watson and currently kept on vancomycin and meropenem, blood cultures are positive for gram-negative bacilli  Seen by Dr. Reyes ordered wound care     Objective   Vital signs are stable with a predialysis blood pressure 130/42 heart rate of 73 and pulse oximeter reading of 95%  Hemoglobin A1c is 5.6  Blood cultures positive for gram-negative bacilli  Wound culture also positive for bacteria    Physical Exam  Patient is awake oriented and seems to be more conversant tonight.  Ate his dinner and did not throw up seems to be comfortable and did not seem to be in distress  Head examination benign without facial asymmetry  Neck veins slightly prominent with a permacath on the right side no bruits  Chest examination shows prominent ribs but lungs were clear without wheezing crackles or rhonchi  Heart is regular there was no murmur  Abdomen soft and nontender good bowel sounds no guarding  Extremities shows markedly atrophic muscles in both feet with immobilizers and dressings  Patient with large and deep sacral decubitus ulcer    Last Recorded Vitals  Blood pressure 107/67, pulse 73, temperature 36.2 °C (97.2 °F), temperature source Temporal, resp. rate 20, height 1.82 m (5' 11.65\"), weight 68.2 kg (150 lb 5.7 oz), SpO2 95%.  Intake/Output last 3 Shifts:  I/O last 3 completed shifts:  In: 2115 (31 mL/kg) [P.O.:740; IV Piggyback:1375]  Out: 2000 (29.3 mL/kg) [Other:2000]  Weight: 68.2 kg   Current Medications[1]   Relevant Results      Results for orders placed or performed during the hospital encounter of 05/07/25 (from the past 24 hours)   Hepatitis B surface antibody   Result Value Ref Range    Hepatitis B " Surface AB 17.8 (H) <10.0 mIU/mL   Hepatitis B surface antigen   Result Value Ref Range    Hepatitis B Surface AG Nonreactive Nonreactive   Lavender Top   Result Value Ref Range    Extra Tube Hold for add-ons.    PST Top   Result Value Ref Range    Extra Tube Hold for add-ons.    POCT GLUCOSE   Result Value Ref Range    POCT Glucose 173 (H) 74 - 99 mg/dL   POCT GLUCOSE   Result Value Ref Range    POCT Glucose 144 (H) 74 - 99 mg/dL   POCT GLUCOSE   Result Value Ref Range    POCT Glucose 173 (H) 74 - 99 mg/dL   POCT GLUCOSE   Result Value Ref Range    POCT Glucose 185 (H) 74 - 99 mg/dL    ECG 12 lead  Result Date: 5/7/2025  Normal sinus rhythm Normal ECG When compared with ECG of 29-APR-2025 17:13, Premature ventricular complexes are no longer Present Premature supraventricular complexes are no longer Present    XR chest 1 view  Result Date: 5/7/2025  Interpreted By:  Raheel Powell, STUDY: XR CHEST 1 VIEW;  5/7/2025 2:33 pm   INDICATION: Signs/Symptoms:fever.   COMPARISON: None.   ACCESSION NUMBER(S): [id number]   ORDERING CLINICIAN: PRATIBHA CHI   FINDINGS: The patient is significantly rotated. Prominent skin folds overlying the left hemithorax. Right IJ central line is present. The lungs appear clear without apparent pleural effusion. Unremarkable cardiomediastinal silhouette. Calcified left hilar lymph nodes related to granulomatous disease. No pulmonary vascular congestion.       No active disease in the chest identified.   MACRO: None   Signed by: Raheel Powell 5/7/2025 2:42 PM Dictation workstation:   [id number]              This patient has a central line   Reason for the central line remaining today? Dialysis/Hemapheresis            Malnutrition Diagnosis Status: New  Malnutrition Diagnosis: Moderate malnutrition related to chronic disease or condition  Related to: multiple chronic conditions (dementia, ESRD, stage IV wound)  As Evidenced by: moderate-severe muscle wasting, moderate fat losses  I agree  with the dietitian's malnutrition diagnosis.      Assessment & Plan  Vomiting, unspecified vomiting type, unspecified whether nausea present    Leukocytosis in the setting of a large decubitus ulcer with possible osteomyelitis as well as ulcers in both feet with positive blood cultures for gram-negative bacilli.  Currently on vancomycin and meropenem, intensive wound care in conjunction with plastic surgery, and being followed up by infectious disease  End-stage kidney disease had hemodialysis today tolerated well  Anemia of chronic illness currently on EPO, ferritin level pending, will give IV Venofer if needed  Type 2 diabetes on Lantus 5 units subcu together with short acting insulin and a sliding scale with controlled blood sugar  Seizure disorder on Keppra 500 mg twice daily  Atrial fibrillation on Eliquis 2.5 mg twice daily and Metroprolol 50 mg twice daily with controlled rate  Cognitive dysfunction and Aricept 2.5 mg daily also with metabolic encephalopathy with on and off changes in mental status  Benign prostatic hypertrophy on Proscar 5 mg daily      I spent 40 minutes in the professional and overall care of this patient.      Edi Wise MD FACP         [1]   Current Facility-Administered Medications:     acetaminophen (Tylenol) suppository 650 mg, 650 mg, rectal, q4h PRN, Edi Wise MD    acetaminophen (Tylenol) tablet 650 mg, 650 mg, oral, q4h PRN, Edi Wise MD    albumin human 25 % solution 25 g, 25 g, intravenous, Once, Edi Wise MD    albuterol 2.5 mg /3 mL (0.083 %) nebulizer solution 1.25 mg, 1.25 mg, nebulization, q6h PRN, Edi Wise MD    alteplase (Cathflo Activase) injection 2 mg, 2 mg, intra-catheter, Daily, Edi Wise MD    alteplase (Cathflo Activase) injection 2 mg, 2 mg, intra-catheter, Daily, Edi Wise MD    apixaban (Eliquis) tablet 2.5 mg, 2.5 mg, oral, BID, Edi Wsie MD, 2.5 mg at 05/08/25 2029    bisacodyl (Dulcolax) suppository 10 mg, 10  mg, rectal, q24h PRN, Edi Wise MD    cholecalciferol (Vitamin D-3) tablet 25 mcg, 25 mcg, oral, Daily, Edi Wise MD    collagenase 250 unit/gram ointment, , Topical, Daily, Edi Wise MD, Given at 05/07/25 2144    dextrose 50 % injection 12.5 g, 12.5 g, intravenous, q15 min PRN, Edi Wise MD    dextrose 50 % injection 25 g, 25 g, intravenous, q15 min PRN, Edi Wise MD    docusate sodium (Colace) capsule 100 mg, 100 mg, oral, Daily, Edi Wise MD    donepezil (Aricept) tablet 23 mg, 23 mg, oral, Daily, Edi Wise MD, 23 mg at 05/08/25 1343    epoetin sheila (Procrit) injection 10,000 Units, 150 Units/kg, intravenous, After Dialysis, Edi Wise MD, 10,000 Units at 05/08/25 1345    ferrous sulfate tablet 325 mg, 65 mg of iron, oral, Daily with breakfast, Edi Wise MD    finasteride (Proscar) tablet 5 mg, 5 mg, oral, Daily, Edi Wise MD    folic acid (Folvite) tablet 1 mg, 1 mg, oral, Daily, Edi Wise MD    glucagon (Glucagen) injection 1 mg, 1 mg, intramuscular, q15 min PRN, Edi Wise MD    glucagon (Glucagen) injection 1 mg, 1 mg, intramuscular, q15 min PRN, Edi Wise MD    [START ON 5/9/2025] heparin 1,000 unit/mL injection 2,000 Units, 2,000 Units, intra-catheter, After Dialysis, Edi Wise MD    heparin 1,000 unit/mL injection 2,000 Units, 2,000 Units, intra-catheter, After Dialysis, Edi Wise MD, 2,000 Units at 05/08/25 1300    insulin glargine (Lantus) injection 5 Units, 5 Units, subcutaneous, Nightly, Edi Wise MD, 5 Units at 05/08/25 2029    insulin lispro injection 0-5 Units, 0-5 Units, subcutaneous, TID AC, Edi Wise MD, 1 Units at 05/08/25 1716    levETIRAcetam (Keppra) tablet 500 mg, 500 mg, oral, BID, Edi Wise MD, 500 mg at 05/08/25 2029    loperamide (Imodium) capsule 2 mg, 2 mg, oral, Once PRN, Edi Wise MD    magnesium hydroxide (Milk of Magnesia) 400 mg/5 mL suspension 30 mL, 30 mL, oral, q24h  PRN, Edi Wise MD    melatonin tablet 10 mg, 10 mg, oral, Nightly, Edi Wise MD, 10 mg at 05/08/25 2029    meropenem (Merrem) 500 mg in sodium chloride 0.9%  mL, 500 mg, intravenous, q12h, Edi Wise MD, Stopped at 05/08/25 2059    metoprolol tartrate (Lopressor) tablet 50 mg, 50 mg, oral, BID, Edi Wise MD, 50 mg at 05/08/25 2029    ondansetron (Zofran) tablet 4 mg, 4 mg, oral, q8h PRN, Edi Wise MD    oxyCODONE (Roxicodone) immediate release tablet 2.5 mg, 2.5 mg, oral, q6h PRN, Edi Wise MD    polyethylene glycol (Glycolax, Miralax) packet 17 g, 17 g, oral, Daily PRN, Edi Wise MD    sevelamer carbonate (Renvela) packet 1.6 g, 1.6 g, oral, BID, Edi Wise MD, 1.6 g at 05/08/25 2029    sodium chloride 0.9 % bolus 200 mL, 200 mL, intravenous, After Dialysis, Edi Wise MD    sodium hypochlorite (Dakin's Full-Strength) 0.5 % external solution, , irrigation, Daily, Roland J Reyes, MD, Given at 05/08/25 1400    vitamin B complex-vitamin C-folic acid (Nephrocaps) capsule 1 capsule, 1 capsule, oral, Daily, Edi Wise MD, 1 capsule at 05/08/25 0647

## 2025-05-10 ENCOUNTER — APPOINTMENT (OUTPATIENT)
Dept: DIALYSIS | Facility: HOSPITAL | Age: 85
End: 2025-05-10
Payer: MEDICARE

## 2025-05-10 LAB
B-LACTAMASE ORGANISM ISLT: POSITIVE
BACTERIA SPEC CULT: ABNORMAL
BACTERIA SPEC CULT: ABNORMAL
GLUCOSE BLD MANUAL STRIP-MCNC: 152 MG/DL (ref 74–99)
GLUCOSE BLD MANUAL STRIP-MCNC: 153 MG/DL (ref 74–99)
GLUCOSE BLD MANUAL STRIP-MCNC: 182 MG/DL (ref 74–99)
GLUCOSE BLD MANUAL STRIP-MCNC: 195 MG/DL (ref 74–99)
GRAM STN SPEC: ABNORMAL
GRAM STN SPEC: ABNORMAL

## 2025-05-10 PROCEDURE — 2500000004 HC RX 250 GENERAL PHARMACY W/ HCPCS (ALT 636 FOR OP/ED): Performed by: INTERNAL MEDICINE

## 2025-05-10 PROCEDURE — 82947 ASSAY GLUCOSE BLOOD QUANT: CPT

## 2025-05-10 PROCEDURE — 2500000002 HC RX 250 W HCPCS SELF ADMINISTERED DRUGS (ALT 637 FOR MEDICARE OP, ALT 636 FOR OP/ED): Performed by: INTERNAL MEDICINE

## 2025-05-10 PROCEDURE — 1210000001 HC SEMI-PRIVATE ROOM DAILY

## 2025-05-10 PROCEDURE — 8010000001 HC DIALYSIS - HEMODIALYSIS PER DAY

## 2025-05-10 PROCEDURE — 76937 US GUIDE VASCULAR ACCESS: CPT

## 2025-05-10 PROCEDURE — 2500000001 HC RX 250 WO HCPCS SELF ADMINISTERED DRUGS (ALT 637 FOR MEDICARE OP): Performed by: INTERNAL MEDICINE

## 2025-05-10 PROCEDURE — 6350000001 HC RX 635 EPOETIN >10,000 UNITS: Mod: JZ | Performed by: INTERNAL MEDICINE

## 2025-05-10 RX ADMIN — DONEPEZIL HYDROCHLORIDE 23 MG: 23 TABLET, FILM COATED ORAL at 17:27

## 2025-05-10 RX ADMIN — FERROUS SULFATE TAB 325 MG (65 MG ELEMENTAL FE) 325 MG: 325 (65 FE) TAB at 17:27

## 2025-05-10 RX ADMIN — EPOETIN ALFA-EPBX 10000 UNITS: 10000 INJECTION, SOLUTION INTRAVENOUS; SUBCUTANEOUS at 17:29

## 2025-05-10 RX ADMIN — INSULIN LISPRO 1 UNITS: 100 INJECTION, SOLUTION INTRAVENOUS; SUBCUTANEOUS at 11:57

## 2025-05-10 RX ADMIN — COLLAGENASE SANTYL: 250 OINTMENT TOPICAL at 17:30

## 2025-05-10 RX ADMIN — Medication: at 04:58

## 2025-05-10 RX ADMIN — DOCUSATE SODIUM 100 MG: 100 CAPSULE, LIQUID FILLED ORAL at 17:27

## 2025-05-10 RX ADMIN — LEVETIRACETAM 500 MG: 500 TABLET, FILM COATED ORAL at 08:48

## 2025-05-10 RX ADMIN — FOLIC ACID 1 MG: 1 TABLET ORAL at 17:27

## 2025-05-10 RX ADMIN — INSULIN LISPRO 1 UNITS: 100 INJECTION, SOLUTION INTRAVENOUS; SUBCUTANEOUS at 06:36

## 2025-05-10 RX ADMIN — APIXABAN 2.5 MG: 5 TABLET, FILM COATED ORAL at 17:27

## 2025-05-10 RX ADMIN — Medication 1 CAPSULE: at 05:56

## 2025-05-10 RX ADMIN — FINASTERIDE 5 MG: 5 TABLET, FILM COATED ORAL at 17:27

## 2025-05-10 RX ADMIN — ALTEPLASE 2 MG: 2.2 INJECTION, POWDER, LYOPHILIZED, FOR SOLUTION INTRAVENOUS at 10:21

## 2025-05-10 RX ADMIN — ALTEPLASE 2 MG: 2.2 INJECTION, POWDER, LYOPHILIZED, FOR SOLUTION INTRAVENOUS at 10:22

## 2025-05-10 RX ADMIN — Medication 10 MG: at 20:33

## 2025-05-10 RX ADMIN — INSULIN LISPRO 1 UNITS: 100 INJECTION, SOLUTION INTRAVENOUS; SUBCUTANEOUS at 16:51

## 2025-05-10 RX ADMIN — LEVETIRACETAM 500 MG: 500 TABLET, FILM COATED ORAL at 20:33

## 2025-05-10 RX ADMIN — MEROPENEM 500 MG: 500 INJECTION, POWDER, FOR SOLUTION INTRAVENOUS at 20:31

## 2025-05-10 RX ADMIN — INSULIN GLARGINE 5 UNITS: 100 INJECTION, SOLUTION SUBCUTANEOUS at 20:36

## 2025-05-10 RX ADMIN — Medication 25 MCG: at 17:27

## 2025-05-10 RX ADMIN — SEVELAMER CARBONATE 1.6 G: 800 POWDER, FOR SUSPENSION ORAL at 20:33

## 2025-05-10 RX ADMIN — METOPROLOL TARTRATE 50 MG: 50 TABLET, FILM COATED ORAL at 17:27

## 2025-05-10 ASSESSMENT — COGNITIVE AND FUNCTIONAL STATUS - GENERAL
TURNING FROM BACK TO SIDE WHILE IN FLAT BAD: TOTAL
DAILY ACTIVITIY SCORE: 6
DRESSING REGULAR UPPER BODY CLOTHING: TOTAL
MOBILITY SCORE: 6
EATING MEALS: TOTAL
TURNING FROM BACK TO SIDE WHILE IN FLAT BAD: TOTAL
DRESSING REGULAR UPPER BODY CLOTHING: TOTAL
HELP NEEDED FOR BATHING: TOTAL
MOVING TO AND FROM BED TO CHAIR: TOTAL
MOBILITY SCORE: 6
TOILETING: TOTAL
PERSONAL GROOMING: TOTAL
PERSONAL GROOMING: TOTAL
DRESSING REGULAR LOWER BODY CLOTHING: TOTAL
TOILETING: TOTAL
DRESSING REGULAR LOWER BODY CLOTHING: TOTAL
DAILY ACTIVITIY SCORE: 6
MOVING FROM LYING ON BACK TO SITTING ON SIDE OF FLAT BED WITH BEDRAILS: TOTAL
HELP NEEDED FOR BATHING: TOTAL
EATING MEALS: TOTAL
CLIMB 3 TO 5 STEPS WITH RAILING: TOTAL
WALKING IN HOSPITAL ROOM: TOTAL
MOVING FROM LYING ON BACK TO SITTING ON SIDE OF FLAT BED WITH BEDRAILS: TOTAL
MOVING TO AND FROM BED TO CHAIR: TOTAL
CLIMB 3 TO 5 STEPS WITH RAILING: TOTAL
STANDING UP FROM CHAIR USING ARMS: TOTAL
STANDING UP FROM CHAIR USING ARMS: TOTAL
WALKING IN HOSPITAL ROOM: TOTAL

## 2025-05-10 ASSESSMENT — PAIN SCALES - PAIN ASSESSMENT IN ADVANCED DEMENTIA (PAINAD)
BREATHING: NORMAL
TOTALSCORE: 0
BODYLANGUAGE: RELAXED
CONSOLABILITY: NO NEED TO CONSOLE
FACIALEXPRESSION: SMILING OR INEXPRESSIVE

## 2025-05-10 ASSESSMENT — PAIN - FUNCTIONAL ASSESSMENT
PAIN_FUNCTIONAL_ASSESSMENT: NO/DENIES PAIN
PAIN_FUNCTIONAL_ASSESSMENT: NO/DENIES PAIN
PAIN_FUNCTIONAL_ASSESSMENT: 0-10

## 2025-05-10 ASSESSMENT — PAIN SCALES - GENERAL
PAINLEVEL_OUTOF10: 0 - NO PAIN

## 2025-05-10 NOTE — PROGRESS NOTES
"Tyshawn Coles is a 84 y.o. male on day 2 of admission presenting with Vomiting, unspecified vomiting type, unspecified whether nausea present.    Subjective   Patient seems to be comfortable today and even conversant.  Care was discussed with Mrs. Coles earlier today.  Patient was seen by Dr. Watson as well as Dr. Reyes.  Review of systems could not be obtained patient is confused       Objective   White cell count is lower at 17.9 since starting antibiotics and hemoglobin still low at 7.7 platelet 225  Blood sugars controlled at 174, 181, 221 and 177  Albumin is markedly low at 2.0 most likely low albumin secondary to chronic illness as well as protein malnutrition on protein supplements  Vital signs are stable with a blood pressure 114/59 heart rate of 68 pulse oximeter reading 99%  Sodium slightly low at 131, otherwise electrolytes and acid-base balance are normal with BUN and creatinine compatible with end-stage kidney disease for dialysis tomorrow  Ferritin is 2080 we will be unable to give IV Venofer    Physical Exam  Patient is alert and comfortable tonight and conversant.  He recognized me.  We discussed my communication with his wife and also told him about his current condition.  Patient able to tolerate his diet and take his medications  Patient looks cachectic but does not seem to be in distress  Head examination benign with prominent facial bones  Chest examination shows prominent ribs but lungs are clear without wheezing crackles or rhonchi  Heart is regular without any murmur  Abdomen soft and nontender good bowel sounds  Extremities shows markedly atrophic muscles with feet immobilizers and dressings reportedly with ulcers  Sacral decubitus ulcer at 7 x 2.5 cm and seemingly slightly better according to the nurse taking care of patient    Last Recorded Vitals  Blood pressure 118/60, pulse 70, temperature 36.1 °C (97 °F), temperature source Temporal, resp. rate 20, height 1.82 m (5' 11.65\"), weight 68.2 " kg (150 lb 5.7 oz), SpO2 97%.  Intake/Output last 3 Shifts:  I/O last 3 completed shifts:  In: 540 (7.9 mL/kg) [P.O.:240; IV Piggyback:300]  Out: 2000 (29.3 mL/kg) [Other:2000]  Weight: 68.2 kg   Current Medications[1]   Relevant Results    No results found.   Results for orders placed or performed during the hospital encounter of 05/07/25 (from the past 24 hours)   Comprehensive metabolic panel   Result Value Ref Range    Glucose 191 (H) 74 - 99 mg/dL    Sodium 131 (L) 136 - 145 mmol/L    Potassium 3.8 3.5 - 5.3 mmol/L    Chloride 96 (L) 98 - 107 mmol/L    Bicarbonate 27 21 - 32 mmol/L    Anion Gap 12 10 - 20 mmol/L    Urea Nitrogen 38 (H) 6 - 23 mg/dL    Creatinine 3.22 (H) 0.50 - 1.30 mg/dL    eGFR 18 (L) >60 mL/min/1.73m*2    Calcium 8.3 (L) 8.6 - 10.3 mg/dL    Albumin 2.0 (L) 3.4 - 5.0 g/dL    Alkaline Phosphatase 130 33 - 136 U/L    Total Protein 5.9 (L) 6.4 - 8.2 g/dL    AST 16 9 - 39 U/L    Bilirubin, Total 0.3 0.0 - 1.2 mg/dL    ALT 8 (L) 10 - 52 U/L   CBC and Auto Differential   Result Value Ref Range    WBC 17.9 (H) 4.4 - 11.3 x10*3/uL    nRBC 0.0 0.0 - 0.0 /100 WBCs    RBC 2.79 (L) 4.50 - 5.90 x10*6/uL    Hemoglobin 7.7 (L) 13.5 - 17.5 g/dL    Hematocrit 22.6 (L) 41.0 - 52.0 %    MCV 81 80 - 100 fL    MCH 27.6 26.0 - 34.0 pg    MCHC 34.1 32.0 - 36.0 g/dL    RDW 18.0 (H) 11.5 - 14.5 %    Platelets 225 150 - 450 x10*3/uL    Neutrophils % 70.3 40.0 - 80.0 %    Immature Granulocytes %, Automated 0.7 0.0 - 0.9 %    Lymphocytes % 17.7 13.0 - 44.0 %    Monocytes % 9.6 2.0 - 10.0 %    Eosinophils % 1.4 0.0 - 6.0 %    Basophils % 0.3 0.0 - 2.0 %    Neutrophils Absolute 12.60 (H) 1.60 - 5.50 x10*3/uL    Immature Granulocytes Absolute, Automated 0.13 0.00 - 0.50 x10*3/uL    Lymphocytes Absolute 3.18 (H) 0.80 - 3.00 x10*3/uL    Monocytes Absolute 1.72 (H) 0.05 - 0.80 x10*3/uL    Eosinophils Absolute 0.25 0.00 - 0.40 x10*3/uL    Basophils Absolute 0.05 0.00 - 0.10 x10*3/uL   SST TOP   Result Value Ref Range    Extra  Tube Hold for add-ons.    POCT GLUCOSE   Result Value Ref Range    POCT Glucose 177 (H) 74 - 99 mg/dL   POCT GLUCOSE   Result Value Ref Range    POCT Glucose 221 (H) 74 - 99 mg/dL   POCT GLUCOSE   Result Value Ref Range    POCT Glucose 174 (H) 74 - 99 mg/dL   POCT GLUCOSE   Result Value Ref Range    POCT Glucose 171 (H) 74 - 99 mg/dL                  Assessment & Plan  Vomiting, unspecified vomiting type, unspecified whether nausea present    Leukocytosis in the setting of a large decubital ulcer, feet ulcers, possible osteomyelitis and positive blood culture with gram-negative bacilli with white cell count starting to go down.  Current blood white cell count lower at 17.9 on IV antibiotics.  Infectious disease evaluating long-term antibiotic upon discharge.  Seen by Dr. Reyes and seemingly ulcer is looking better.  Patient will get a Clinitron bed.  Continue IV antibiotics  End-stage kidney disease, will order dialysis for tomorrow tolerating hemodialysis  Anemia of chronic illness on EPO unable to give Venofer due to high ferritin levels  Type 2 diabetes on Lantus 5 units subcu together with short acting insulin on a sliding scale with well-controlled blood sugar  Seizure disorder on Keppra 500 mg twice daily  Atrial fibrillation on Eliquis 2.5 mg twice daily and Metroprolol tartrate 50 mg twice daily with controlled rate  Cognitive dysfunction on Aricept 2.5 mg daily with metabolic encephalopathy manifested by on and off changes in mental status  Benign prostatic hypertrophy on Proscar 5 mg daily      I spent 40 minutes in the professional and overall care of this patient.      Edi Wise MD FACP         [1]   Current Facility-Administered Medications:     acetaminophen (Tylenol) suppository 650 mg, 650 mg, rectal, q4h PRN, Edi Wise MD    acetaminophen (Tylenol) tablet 650 mg, 650 mg, oral, q4h PRN, Edi Wise MD    albumin human 25 % solution 25 g, 25 g, intravenous, Once, Edi Wise MD     albuterol 2.5 mg /3 mL (0.083 %) nebulizer solution 1.25 mg, 1.25 mg, nebulization, q6h PRN, Edi Wise MD    [START ON 5/10/2025] alteplase (Cathflo Activase) injection 2 mg, 2 mg, intra-catheter, Daily, Edi Wise MD    [START ON 5/10/2025] alteplase (Cathflo Activase) injection 2 mg, 2 mg, intra-catheter, Daily, Edi Wise MD    apixaban (Eliquis) tablet 2.5 mg, 2.5 mg, oral, BID, Edi Wise MD, 2.5 mg at 05/09/25 2214    bisacodyl (Dulcolax) suppository 10 mg, 10 mg, rectal, q24h PRN, Edi Wise MD    cholecalciferol (Vitamin D-3) tablet 25 mcg, 25 mcg, oral, Daily, Edi Wise MD, 25 mcg at 05/09/25 0844    collagenase 250 unit/gram ointment, , Topical, Daily, Edi Wise MD, Given at 05/09/25 0844    dextrose 50 % injection 12.5 g, 12.5 g, intravenous, q15 min PRN, Edi Wise MD    dextrose 50 % injection 25 g, 25 g, intravenous, q15 min PRN, Edi Wise MD    docusate sodium (Colace) capsule 100 mg, 100 mg, oral, Daily, Edi Wise MD, 100 mg at 05/09/25 0844    donepezil (Aricept) tablet 23 mg, 23 mg, oral, Daily, Edi Wise MD, 23 mg at 05/09/25 0844    [START ON 5/10/2025] epoetin sheila-epbx (Retacrit) injection 10,000 Units, 150 Units/kg, intravenous, Once, Edi Wise MD    ferrous sulfate tablet 325 mg, 65 mg of iron, oral, Daily with breakfast, Edi Wise MD, 325 mg at 05/09/25 0844    finasteride (Proscar) tablet 5 mg, 5 mg, oral, Daily, Edi Wise MD, 5 mg at 05/09/25 0844    folic acid (Folvite) tablet 1 mg, 1 mg, oral, Daily, Edi Wise MD, 1 mg at 05/09/25 0844    glucagon (Glucagen) injection 1 mg, 1 mg, intramuscular, q15 min PRN, Edi Wise MD    glucagon (Glucagen) injection 1 mg, 1 mg, intramuscular, q15 min PRN, Edi Wise MD    heparin 1,000 unit/mL injection 2,000 Units, 2,000 Units, intra-catheter, After Dialysis, Edi Wise MD    heparin 1,000 unit/mL injection 2,000 Units, 2,000 Units, intra-catheter,  After Dialysis, Edi Wise MD, 2,000 Units at 05/08/25 1300    [START ON 5/10/2025] heparin 1,000 unit/mL injection 2,000 Units, 2,000 Units, intra-catheter, After Dialysis, Edi Wise MD    insulin glargine (Lantus) injection 5 Units, 5 Units, subcutaneous, Nightly, Edi Wise MD, 5 Units at 05/09/25 2216    insulin lispro injection 0-5 Units, 0-5 Units, subcutaneous, TID AC, Edi Wise MD, 1 Units at 05/09/25 1744    levETIRAcetam (Keppra) tablet 500 mg, 500 mg, oral, BID, Edi Wise MD, 500 mg at 05/09/25 2215    loperamide (Imodium) capsule 2 mg, 2 mg, oral, Once PRN, Edi Wise MD    magnesium hydroxide (Milk of Magnesia) 400 mg/5 mL suspension 30 mL, 30 mL, oral, q24h PRN, Edi Wise MD    melatonin tablet 10 mg, 10 mg, oral, Nightly, Edi Wise MD, 10 mg at 05/09/25 2214    meropenem (Merrem) 500 mg in sodium chloride 0.9%  mL, 500 mg, intravenous, q12h, Edi Wise MD, Last Rate: 200 mL/hr at 05/09/25 2215, 500 mg at 05/09/25 2215    metoprolol tartrate (Lopressor) tablet 50 mg, 50 mg, oral, BID, Edi Wise MD, 50 mg at 05/09/25 2215    ondansetron (Zofran) tablet 4 mg, 4 mg, oral, q8h PRN, Edi Wise MD    oxyCODONE (Roxicodone) immediate release tablet 2.5 mg, 2.5 mg, oral, q6h PRN, Edi Wise MD, 2.5 mg at 05/09/25 2214    polyethylene glycol (Glycolax, Miralax) packet 17 g, 17 g, oral, Daily PRN, Edi Wise MD    sevelamer carbonate (Renvela) packet 1.6 g, 1.6 g, oral, BID, Edi Wise MD, 1.6 g at 05/09/25 2215    sodium chloride 0.9 % bolus 200 mL, 200 mL, intravenous, After Dialysis, Edi Wise MD    [START ON 5/10/2025] sodium chloride 0.9 % bolus 200 mL, 200 mL, intravenous, After Dialysis, Edi Wise MD    [START ON 5/10/2025] sodium chloride 0.9 % bolus 200 mL, 200 mL, intravenous, Once, Edi Wise MD    [START ON 5/10/2025] sodium chloride 0.9 % bolus 200 mL, 200 mL, intravenous, Once, Edi Wise MD     sodium hypochlorite (Dakin's Full-Strength) 0.5 % external solution, , irrigation, Daily, Roland J Reyes, MD, Given at 05/09/25 0844    vitamin B complex-vitamin C-folic acid (Nephrocaps) capsule 1 capsule, 1 capsule, oral, Daily, Edi Wise MD, 1 capsule at 05/09/25 0667

## 2025-05-10 NOTE — POST-PROCEDURE NOTE
Notes/Events during Treatment:     1000: Dr. Wise messeged for Cath-Rayshawn orders.  1009: Cath-rayshawn orders already in-Tx paused-awaiting Cath-rayshawn to arrive to floor.  1035: Cath-Rayshawn instilled.  1130: Cath-rayshawn removed.  1145: Dr. Wise notified of catheter still needing intervention and possible catheter exchange.  1200: Dr. Wise gave the orders to leave Cath-rayshawn in-between Tx to assess whether the patient will need a catheter exchange.     Report to Receiving RN:     Report To: FERNIE Rader  Time Report Called:  [id number]  Hand-Off Communication to Receiving RN: Tx tolerated well. Had to pause Tx for Cath-Rayshawn  Complications During Treatment: Yes, See Above  Ultrafiltration Treatment: No  Medications Administered During Dialysis: Yes, Cath-Rayshawn  Blood Products Administered During Dialysis: No  Labs Sent During Dialysis: No  Heparin Drip Rate Changes: No  Sending BP:  126/71, 81  Dialysis Catheter Dressing Changed: No-Not due  Significant Events/Interventions: Cath-Rayshawn  Provider Contacted/Time/Response/Orders: Yes, Dr. Wise concerning Tx and catheter/BFR issues.  HD Orders Followed: Yes     Tx Initiated by/Time: [id number] RN  Tx Terminated by/Time:  [id number]  Fluid Removed: 1.7L     Electronic Signatures:                    Authored: FERNIE Ruby     Last Updated: 9:59 AM by MALLORY BACH

## 2025-05-10 NOTE — PRE-PROCEDURE NOTE
Report from Sending RN:    Report From: FERNIE Rader  Recent Surgery or Procedure: No  Baseline Level of Consciousness (LOC): A&Ox2-3  Admission Dx:  N/V  Precautions/Isolations: None  Code Status: DNR-DNI  Oxygen Use: No  Type: Room Air  Diabetic: Yes, 153  Receiving BP: 123/70, 74  Last BP Med Given Day of Dialysis: See EMAR  Last Pain Med Given: See EMAR  Lab Tests to be Obtained with Dialysis: No  Lastest Lab Values:  K+: 3.8  Ca+: 8.3  HGB: 7.7  BUN: 38  Creat: 3.2  INR: 1.3  Blood Transfusion to be Given During Dialysis: No  Available IV Access: Yes, Saline Locked  Dialysis Access: Right CVC  Medications to be Administered During Dialysis: No  Continuous IV Infusion Running: No  Restraints on Currently or in the Last 24 Hours: No  Hand-Off Communication from Sending RN: Patient stable for HD.    Notes/Events during Treatment:    1000: Dr. Wise messeged for Cath-Rayshawn orders.  1009: Cath-rayshawn orders already in-Tx paused-awaiting Cath-rayshawn to arrive to floor.  1035: Cath-Rayshawn instilled.  1130: Cath-rayshawn removed.  1145: Dr. Wise notified of catheter still needing intervention and possible catheter exchange.  1200: Dr. Wise gave the orders to leave Cath-rayshawn in-between Tx to assess whether the patient will need a catheter exchange.    Report to Receiving RN:    Report To: FERNIE Rader  Time Report Called:  1430  Hand-Off Communication to Receiving RN: Tx tolerated well. Had to pause Tx for Cath-Rayshawn  Complications During Treatment: Yes, See Above  Ultrafiltration Treatment: No  Medications Administered During Dialysis: Yes, Cath-Rayshawn  Blood Products Administered During Dialysis: No  Labs Sent During Dialysis: No  Heparin Drip Rate Changes: No  Sending BP:  126/71, 81  Dialysis Catheter Dressing Changed: No-Not due  Significant Events/Interventions: Cath-Rayshawn  Provider Contacted/Time/Response/Orders: Yes, Dr. Wise concerning  Tx and catheter/BFR issues  HD Orders Followed: Yes    Tx Initiated by/Time: Flori Clarke  RN  Tx Terminated by/Time:  1419, Flori  Fluid Removed: 1.7L    Electronic Signatures:       Authored: FERNIE Ruby    Last Updated: 9:59 AM by MALLORY BACH

## 2025-05-10 NOTE — CARE PLAN
Problem: Diabetes  Goal: Maintain electrolyte levels within acceptable range throughout shift  5/10/2025 1744 by Dior Frazier RN  Outcome: Progressing  5/10/2025 0744 by Dior Frazier RN  Flowsheets (Taken 5/10/2025 0744)  Maintain electrolyte levels within acceptable range throughout shift: Med administration/monitoring of effect  Goal: Maintain glucose levels >70mg/dl to <250mg/dl throughout shift  5/10/2025 1744 by Dior Frazier RN  Outcome: Progressing  5/10/2025 0744 by Dior Frazier RN  Flowsheets (Taken 5/10/2025 0744)  Maintain glucose levels >70mg/dl to <250mg/dl throughout shift: Med administration/monitoring of effect  Goal: Learn about and adhere to nutrition recommendations by end of shift  5/10/2025 1744 by Dior Frazier RN  Outcome: Progressing  5/10/2025 0744 by Dior Frazier RN  Flowsheets (Taken 5/10/2025 0744)  Learn about and adhere to nutrition recommendations by end of shift: Ensure/encourage compliance with appropriate diet  Goal: Vital signs within normal range for age by end of shift  5/10/2025 1744 by Dior Frazier RN  Outcome: Progressing  5/10/2025 0744 by Dior Frazier RN  Flowsheets (Taken 5/10/2025 0744)  Vital signs within normal range for age by end of shift: Med administration/monitoring of effect  Goal: Receive DSME education by end of shift  5/10/2025 1744 by Dior Frazier RN  Outcome: Progressing  5/10/2025 0744 by Dior Frazier RN  Flowsheets (Taken 5/10/2025 0744)  Receive DSME education by end of shift: Provide patient centered education on Diabetic Self Management Education     Problem: Fall/Injury  Goal: Not fall by end of shift  Outcome: Progressing  Goal: Be free from injury by end of the shift  Outcome: Progressing  Goal: Verbalize understanding of personal risk factors for fall in the hospital  Outcome: Progressing  Goal: Verbalize understanding of risk factor reduction measures to prevent injury from fall in the home  Outcome:  Progressing  Goal: Use assistive devices by end of the shift  Outcome: Progressing  Goal: Pace activities to prevent fatigue by end of the shift  Outcome: Progressing     Problem: ESRD: Dialysis, CAPD  Goal: Electrolytes restored to baseline  5/10/2025 1744 by Dior Frazier RN  Outcome: Progressing  5/10/2025 0744 by Doir Frazier RN  Flowsheets (Taken 5/10/2025 0744)  Electolytes restored to baseline:   Monitor drug/nephrotic toxicity   Monitor electrolyte/acid base imbalance   Monitor I&O, weight, dietary intake  Goal: Fatigue of chronic illness managed  5/10/2025 1744 by Dior Frazier RN  Outcome: Progressing  5/10/2025 0744 by Dior Frazier RN  Flowsheets (Taken 5/10/2025 0744)  Fatigue of chronic illness managed:   Assess site/manage complications   Encourage activity/cluster to conserve energy  Goal: Follows dialysis treatment plan  5/10/2025 1744 by Dior Frazier RN  Outcome: Progressing  5/10/2025 0744 by Dior Frazier RN  Flowsheets (Taken 5/10/2025 0744)  Follows dialysis treatment plan:   Assess site/manage complications   Monitor drug/nephrotic toxicity   Monitor for infection  Goal: Follows fluid restrictions  5/10/2025 1744 by Dior Frazier RN  Outcome: Progressing  5/10/2025 0744 by Dior Frazier RN  Flowsheets (Taken 5/10/2025 0744)  Follows fluid restrictions: Adjust/anticipate fluid restriction/replacement  Goal: Increase self care/family involvement  5/10/2025 1744 by Dior Frazier RN  Outcome: Progressing  5/10/2025 0744 by Dior Frazier RN  Flowsheets (Taken 5/10/2025 0744)  Increase self care/family involvement: Encourage activity/cluster to conserve energy  Goal: Reports no shortness of breath  5/10/2025 1744 by Dior Frazier RN  Outcome: Progressing  5/10/2025 0745 by Dior Frazier RN  Flowsheets (Taken 5/10/2025 0745)  Reports no shortness of breath:   Assess presence of edema   Monitor I&O, weight, dietary intake     Problem: Skin  Goal: Decreased wound  size/increased tissue granulation at next dressing change  5/10/2025 1744 by Dior Frazier RN  Outcome: Progressing  5/10/2025 0745 by Dior Frazier RN  Flowsheets (Taken 5/10/2025 0745)  Decreased wound size/increased tissue granulation at next dressing change:   Promote sleep for wound healing   Protective dressings over bony prominences   Utilize specialty bed per algorithm  Goal: Participates in plan/prevention/treatment measures  5/10/2025 1744 by Dior Frazier RN  Outcome: Progressing  5/10/2025 0745 by Dior Frazier RN  Flowsheets (Taken 5/10/2025 0745)  Participates in plan/prevention/treatment measures:   Discuss with provider PT/OT consult   Elevate heels  Goal: Prevent/manage excess moisture  5/10/2025 1744 by Dior Frazier RN  Outcome: Progressing  5/10/2025 0745 by Dior Frazier RN  Flowsheets (Taken 5/10/2025 0745)  Prevent/manage excess moisture:   Cleanse incontinence/protect with barrier cream   Moisturize dry skin   Monitor for/manage infection if present  Goal: Prevent/minimize sheer/friction injuries  5/10/2025 1744 by Dior Frazier RN  Outcome: Progressing  5/10/2025 0745 by Dior Frazier RN  Flowsheets (Taken 5/10/2025 0745)  Prevent/minimize sheer/friction injuries:   Complete micro-shifts as needed if patient unable. Adjust patient position to relieve pressure points, not a full turn   Use pull sheet   HOB 30 degrees or less   Turn/reposition every 2 hours/use positioning/transfer devices  Goal: Promote/optimize nutrition  5/10/2025 1744 by Dior Frazier RN  Outcome: Progressing  5/10/2025 0745 by Dior Frazier RN  Flowsheets (Taken 5/10/2025 0745)  Promote/optimize nutrition:   Assist with feeding   Offer water/supplements/favorite foods   Monitor/record intake including meals  Goal: Promote skin healing  5/10/2025 1744 by Dior Frazier RN  Outcome: Progressing  5/10/2025 0745 by Dior Frazier RN  Flowsheets (Taken 5/10/2025 0745)  Promote skin healing:    Assess skin/pad under line(s)/device(s)   Ensure correct size (line/device) and apply per  instructions   Rotate device position/do not position patient on device   Protective dressings over bony prominences   Turn/reposition every 2 hours/use positioning/transfer devices     Problem: Pain - Adult  Goal: Verbalizes/displays adequate comfort level or baseline comfort level  5/10/2025 1744 by Dior Frazier RN  Outcome: Progressing  5/10/2025 0745 by Dior Frazier RN  Flowsheets (Taken 5/10/2025 0745)  Verbalizes/displays adequate comfort level or baseline comfort level:   Encourage patient to monitor pain and request assistance   Administer analgesics based on type and severity of pain and evaluate response   Consider cultural and social influences on pain and pain management   Assess pain using appropriate pain scale   Implement non-pharmacological measures as appropriate and evaluate response   Notify Licensed Independent Practitioner if interventions unsuccessful or patient reports new pain     Problem: Safety - Adult  Goal: Free from fall injury  5/10/2025 1744 by Dior Frazier RN  Outcome: Progressing  5/10/2025 0745 by Dior Frazier RN  Flowsheets (Taken 5/10/2025 0745)  Free from fall injury: Instruct family/caregiver on patient safety     Problem: Discharge Planning  Goal: Discharge to home or other facility with appropriate resources  5/10/2025 1744 by Dior Frazier RN  Outcome: Progressing  5/10/2025 0745 by Dior Frazier RN  Flowsheets (Taken 5/10/2025 0745)  Discharge to home or other facility with appropriate resources:   Identify barriers to discharge with patient and caregiver   Identify discharge learning needs (meds, wound care, etc)   Refer to discharge planning if patient needs post-hospital services based on physician order or complex needs related to functional status, cognitive ability or social support system   Arrange for needed discharge resources and  transportation as appropriate     Problem: Chronic Conditions and Co-morbidities  Goal: Patient's chronic conditions and co-morbidity symptoms are monitored and maintained or improved  5/10/2025 1744 by Dior Frazier RN  Outcome: Progressing  5/10/2025 0745 by Dior Frazier RN  Flowsheets (Taken 5/10/2025 0745)  Care Plan - Patient's Chronic Conditions and Co-Morbidity Symptoms are Monitored and Maintained or Improved:   Monitor and assess patient's chronic conditions and comorbid symptoms for stability, deterioration, or improvement   Collaborate with multidisciplinary team to address chronic and comorbid conditions and prevent exacerbation or deterioration   Update acute care plan with appropriate goals if chronic or comorbid symptoms are exacerbated and prevent overall improvement and discharge     Problem: Nutrition  Goal: Nutrient intake appropriate for maintaining nutritional needs  Outcome: Progressing   The patient's goals for the shift include feel better    The clinical goals for the shift include Patient's pain will be managed to a tolerable level throughout this shift.    Over the shift, the patient did make progress toward care plan goals.

## 2025-05-11 VITALS
HEIGHT: 72 IN | SYSTOLIC BLOOD PRESSURE: 134 MMHG | HEART RATE: 72 BPM | DIASTOLIC BLOOD PRESSURE: 58 MMHG | RESPIRATION RATE: 16 BRPM | TEMPERATURE: 97.7 F | OXYGEN SATURATION: 97 % | WEIGHT: 150.35 LBS | BODY MASS INDEX: 20.36 KG/M2

## 2025-05-11 LAB
BACTERIA BLD AEROBE CULT: ABNORMAL
BACTERIA BLD CULT: ABNORMAL
GLUCOSE BLD MANUAL STRIP-MCNC: 105 MG/DL (ref 74–99)
GLUCOSE BLD MANUAL STRIP-MCNC: 127 MG/DL (ref 74–99)
GLUCOSE BLD MANUAL STRIP-MCNC: 145 MG/DL (ref 74–99)
GLUCOSE BLD MANUAL STRIP-MCNC: 156 MG/DL (ref 74–99)
GRAM STN SPEC: ABNORMAL

## 2025-05-11 PROCEDURE — 99232 SBSQ HOSP IP/OBS MODERATE 35: CPT | Performed by: INTERNAL MEDICINE

## 2025-05-11 PROCEDURE — 82947 ASSAY GLUCOSE BLOOD QUANT: CPT

## 2025-05-11 PROCEDURE — 2500000001 HC RX 250 WO HCPCS SELF ADMINISTERED DRUGS (ALT 637 FOR MEDICARE OP): Performed by: INTERNAL MEDICINE

## 2025-05-11 PROCEDURE — 87040 BLOOD CULTURE FOR BACTERIA: CPT | Mod: ELYLAB | Performed by: INTERNAL MEDICINE

## 2025-05-11 PROCEDURE — 2500000004 HC RX 250 GENERAL PHARMACY W/ HCPCS (ALT 636 FOR OP/ED): Mod: JZ | Performed by: INTERNAL MEDICINE

## 2025-05-11 PROCEDURE — 36415 COLL VENOUS BLD VENIPUNCTURE: CPT | Performed by: INTERNAL MEDICINE

## 2025-05-11 PROCEDURE — 2500000002 HC RX 250 W HCPCS SELF ADMINISTERED DRUGS (ALT 637 FOR MEDICARE OP, ALT 636 FOR OP/ED): Performed by: INTERNAL MEDICINE

## 2025-05-11 PROCEDURE — 1210000001 HC SEMI-PRIVATE ROOM DAILY

## 2025-05-11 RX ORDER — L. ACIDOPHILUS/L.BULGARICUS 1MM CELL
1 TABLET ORAL DAILY
Status: DISCONTINUED | OUTPATIENT
Start: 2025-05-11 | End: 2025-05-14 | Stop reason: HOSPADM

## 2025-05-11 RX ADMIN — Medication 25 MCG: at 08:11

## 2025-05-11 RX ADMIN — MEROPENEM 500 MG: 500 INJECTION, POWDER, FOR SOLUTION INTRAVENOUS at 08:12

## 2025-05-11 RX ADMIN — FERROUS SULFATE TAB 325 MG (65 MG ELEMENTAL FE) 325 MG: 325 (65 FE) TAB at 08:10

## 2025-05-11 RX ADMIN — LEVETIRACETAM 500 MG: 500 TABLET, FILM COATED ORAL at 20:55

## 2025-05-11 RX ADMIN — Medication 1 CAPSULE: at 06:44

## 2025-05-11 RX ADMIN — COLLAGENASE SANTYL: 250 OINTMENT TOPICAL at 08:19

## 2025-05-11 RX ADMIN — APIXABAN 2.5 MG: 5 TABLET, FILM COATED ORAL at 20:55

## 2025-05-11 RX ADMIN — METOPROLOL TARTRATE 50 MG: 50 TABLET, FILM COATED ORAL at 20:56

## 2025-05-11 RX ADMIN — Medication: at 08:18

## 2025-05-11 RX ADMIN — Medication 10 MG: at 20:56

## 2025-05-11 RX ADMIN — MEROPENEM 500 MG: 500 INJECTION, POWDER, FOR SOLUTION INTRAVENOUS at 20:58

## 2025-05-11 RX ADMIN — SEVELAMER CARBONATE 1.6 G: 800 POWDER, FOR SUSPENSION ORAL at 08:11

## 2025-05-11 RX ADMIN — DOCUSATE SODIUM 100 MG: 100 CAPSULE, LIQUID FILLED ORAL at 08:10

## 2025-05-11 RX ADMIN — Medication 1 TABLET: at 20:59

## 2025-05-11 RX ADMIN — INSULIN GLARGINE 5 UNITS: 100 INJECTION, SOLUTION SUBCUTANEOUS at 20:51

## 2025-05-11 RX ADMIN — DONEPEZIL HYDROCHLORIDE 23 MG: 23 TABLET, FILM COATED ORAL at 08:11

## 2025-05-11 RX ADMIN — SEVELAMER CARBONATE 1.6 G: 800 POWDER, FOR SUSPENSION ORAL at 20:54

## 2025-05-11 RX ADMIN — FINASTERIDE 5 MG: 5 TABLET, FILM COATED ORAL at 08:10

## 2025-05-11 RX ADMIN — METOPROLOL TARTRATE 50 MG: 50 TABLET, FILM COATED ORAL at 08:10

## 2025-05-11 RX ADMIN — FOLIC ACID 1 MG: 1 TABLET ORAL at 08:11

## 2025-05-11 RX ADMIN — APIXABAN 2.5 MG: 5 TABLET, FILM COATED ORAL at 08:10

## 2025-05-11 RX ADMIN — LEVETIRACETAM 500 MG: 500 TABLET, FILM COATED ORAL at 08:10

## 2025-05-11 ASSESSMENT — COGNITIVE AND FUNCTIONAL STATUS - GENERAL
TOILETING: TOTAL
MOVING TO AND FROM BED TO CHAIR: TOTAL
TURNING FROM BACK TO SIDE WHILE IN FLAT BAD: TOTAL
MOVING FROM LYING ON BACK TO SITTING ON SIDE OF FLAT BED WITH BEDRAILS: TOTAL
PERSONAL GROOMING: TOTAL
EATING MEALS: TOTAL
DRESSING REGULAR UPPER BODY CLOTHING: TOTAL
WALKING IN HOSPITAL ROOM: TOTAL
STANDING UP FROM CHAIR USING ARMS: TOTAL
CLIMB 3 TO 5 STEPS WITH RAILING: TOTAL
DAILY ACTIVITIY SCORE: 6
MOBILITY SCORE: 6
HELP NEEDED FOR BATHING: TOTAL
DRESSING REGULAR LOWER BODY CLOTHING: TOTAL

## 2025-05-11 ASSESSMENT — PAIN SCALES - GENERAL: PAINLEVEL_OUTOF10: 0 - NO PAIN

## 2025-05-11 ASSESSMENT — PAIN - FUNCTIONAL ASSESSMENT: PAIN_FUNCTIONAL_ASSESSMENT: 0-10

## 2025-05-11 NOTE — CARE PLAN
The patient's goals for the shift include comfort    Problem: Diabetes  Goal: Maintain electrolyte levels within acceptable range throughout shift  Outcome: Progressing  Goal: Maintain glucose levels >70mg/dl to <250mg/dl throughout shift  Outcome: Progressing  Goal: Learn about and adhere to nutrition recommendations by end of shift  Outcome: Progressing  Goal: Vital signs within normal range for age by end of shift  Outcome: Progressing  Goal: Receive DSME education by end of shift  Outcome: Progressing     Problem: Fall/Injury  Goal: Not fall by end of shift  Outcome: Progressing  Goal: Be free from injury by end of the shift  Outcome: Progressing  Goal: Verbalize understanding of personal risk factors for fall in the hospital  Outcome: Progressing  Goal: Verbalize understanding of risk factor reduction measures to prevent injury from fall in the home  Outcome: Progressing  Goal: Use assistive devices by end of the shift  Outcome: Progressing  Goal: Pace activities to prevent fatigue by end of the shift  Outcome: Progressing     Problem: ESRD: Dialysis, CAPD  Goal: Electrolytes restored to baseline  Outcome: Progressing  Goal: Fatigue of chronic illness managed  Outcome: Progressing  Goal: Follows dialysis treatment plan  Outcome: Progressing  Goal: Follows fluid restrictions  Outcome: Progressing  Goal: Increase self care/family involvement  Outcome: Progressing  Goal: Reports no shortness of breath  Outcome: Progressing     Problem: Skin  Goal: Decreased wound size/increased tissue granulation at next dressing change  Outcome: Progressing  Goal: Participates in plan/prevention/treatment measures  Outcome: Progressing  Goal: Prevent/manage excess moisture  Outcome: Progressing  Goal: Prevent/minimize sheer/friction injuries  Outcome: Progressing  Goal: Promote/optimize nutrition  Outcome: Progressing  Goal: Promote skin healing  Outcome: Progressing     Problem: Pain - Adult  Goal: Verbalizes/displays adequate  comfort level or baseline comfort level  Outcome: Progressing     Problem: Safety - Adult  Goal: Free from fall injury  Outcome: Progressing     Problem: Discharge Planning  Goal: Discharge to home or other facility with appropriate resources  Outcome: Progressing     Problem: Chronic Conditions and Co-morbidities  Goal: Patient's chronic conditions and co-morbidity symptoms are monitored and maintained or improved  Outcome: Progressing     Problem: Nutrition  Goal: Nutrient intake appropriate for maintaining nutritional needs  Outcome: Progressing     The clinical goals for the shift include Safety to prevent injury, monitor for signs/symptoms of infection, maintain mobility, update plan of care

## 2025-05-11 NOTE — PROGRESS NOTES
"Tyshawn Coles is a 84 y.o. male on day 3 of admission presenting with Vomiting, unspecified vomiting type, unspecified whether nausea present.    Subjective   Patient started to use a Clinitron bed today.  He seems to be quite comfortable seemingly more oriented and alert.  Wife is concerned about patient not being able to get up on the chair however with a Clinitron bed most likely he does not need to get up in the chair and also will be very difficult to get him in and out of bed.  Otherwise no new problems, he was dialyzed earlier today but had problems with poor flow of the catheter and Cathflo was kept in the catheter for until next treatment.  Hopefully we do not have to change the catheter.       Objective   Vital signs are stable with a blood pressure 160/74 heart rate of 70 pulse oximeter reading of 100%  Blood sugars controlled at 182, 152, 195 and 171    Physical Exam  Patient alert oriented ill looking, seems to be slightly conversant and comfortable alert Clinitron bed  Patient was able to take his diet and medications  He looks cachectic with a lot of muscle loss  Head examination benign with prominent facial bones  Chest examination shows prominent ribs but without any crackles wheezing or rhonchi  Heart regular without murmur  Abdomen soft flat nontender good bowel sounds and no guarding  Extremities with markedly atrophic muscles with dressings on both feet.  Patient noted to have a 7 x 2.5 cm deep sacral decubitus ulcer seemingly better according to nurse taking care of patient    Last Recorded Vitals  Blood pressure 160/74, pulse 70, temperature 36.7 °C (98.1 °F), temperature source Temporal, resp. rate 16, height 1.82 m (5' 11.65\"), weight 68.2 kg (150 lb 5.7 oz), SpO2 100%.  Intake/Output last 3 Shifts:  I/O last 3 completed shifts:  In: 3140 (46 mL/kg) [P.O.:1540; I.V.:600 (8.8 mL/kg); Other:800; IV Piggyback:200]  Out: 4200 (61.6 mL/kg) [Other:4200]  Weight: 68.2 kg   Current Medications[1] "   Relevant Results      Results for orders placed or performed during the hospital encounter of 05/07/25 (from the past 24 hours)   POCT GLUCOSE   Result Value Ref Range    POCT Glucose 153 (H) 74 - 99 mg/dL   POCT GLUCOSE   Result Value Ref Range    POCT Glucose 195 (H) 74 - 99 mg/dL   POCT GLUCOSE   Result Value Ref Range    POCT Glucose 152 (H) 74 - 99 mg/dL   POCT GLUCOSE   Result Value Ref Range    POCT Glucose 182 (H) 74 - 99 mg/dL    No results found.            This patient has a central line   Reason for the central line remaining today? Dialysis/Hemapheresis     Assessment & Plan  Vomiting, unspecified vomiting type, unspecified whether nausea present    Persistent leukocytosis with bacteremia and a large sacral decubitus ulcer with positive blood cultures.  Being seen by infectious disease and Dr. Reyes.  Sacral wound seems to be improving with aggressive dressing changes and with Clinitron bed  End-stage kidney disease had dialysis today with poor catheter flow.  Hopefully we do not have to exchange catheter.  Will left Cathflo in the catheter until next treatment  Anemia of chronic illness on EPO unable to give Venofer due to high ferritin levels  Type 2 diabetes with good control on Lantus 5 units subcu together with sliding scale coverage short acting insulin  Seizure disorder, on Keppra 500 mg twice daily without any recent seizures  Atrial fibrillation on Eliquis 2.5 mg twice daily and Metroprolol tartrate 50 mg twice daily with controlled rate  Cognitive dysfunction with metabolic encephalopathy on Aricept  Benign prostatic hypertrophy on Proscar 5 mg daily      I spent 40 minutes in the professional and overall care of this patient.      Edi Wise MD FACP         [1]   Current Facility-Administered Medications:     acetaminophen (Tylenol) suppository 650 mg, 650 mg, rectal, q4h PRN, Edi Wise MD    acetaminophen (Tylenol) tablet 650 mg, 650 mg, oral, q4h PRN, Edi Wise MD     albumin human 25 % solution 25 g, 25 g, intravenous, Once, Edi Wise MD    albuterol 2.5 mg /3 mL (0.083 %) nebulizer solution 1.25 mg, 1.25 mg, nebulization, q6h PRN, Edi iWse MD    alteplase (Cathflo Activase) injection 2 mg, 2 mg, intra-catheter, Daily, Edi Wise MD, 2 mg at 05/10/25 1022    alteplase (Cathflo Activase) injection 2 mg, 2 mg, intra-catheter, Daily, Edi Wise MD, 2 mg at 05/10/25 1021    apixaban (Eliquis) tablet 2.5 mg, 2.5 mg, oral, BID, Edi Wise MD, 2.5 mg at 05/10/25 1727    bisacodyl (Dulcolax) suppository 10 mg, 10 mg, rectal, q24h PRN, Edi Wise MD    cholecalciferol (Vitamin D-3) tablet 25 mcg, 25 mcg, oral, Daily, Edi Wise MD, 25 mcg at 05/10/25 1727    collagenase 250 unit/gram ointment, , Topical, Daily, Edi Wise MD, Given at 05/10/25 1730    dextrose 50 % injection 12.5 g, 12.5 g, intravenous, q15 min PRN, Edi Wise MD    dextrose 50 % injection 25 g, 25 g, intravenous, q15 min PRN, Edi Wise MD    docusate sodium (Colace) capsule 100 mg, 100 mg, oral, Daily, Edi Wise MD, 100 mg at 05/10/25 1727    donepezil (Aricept) tablet 23 mg, 23 mg, oral, Daily, Edi Wise MD, 23 mg at 05/10/25 1727    ferrous sulfate tablet 325 mg, 65 mg of iron, oral, Daily with breakfast, Edi Wise MD, 325 mg at 05/10/25 1727    finasteride (Proscar) tablet 5 mg, 5 mg, oral, Daily, Edi Wise MD, 5 mg at 05/10/25 1727    folic acid (Folvite) tablet 1 mg, 1 mg, oral, Daily, Edi Wise MD, 1 mg at 05/10/25 1727    glucagon (Glucagen) injection 1 mg, 1 mg, intramuscular, q15 min PRN, Edi Wise MD    glucagon (Glucagen) injection 1 mg, 1 mg, intramuscular, q15 min PRN, Edi Wise MD    heparin 1,000 unit/mL injection 2,000 Units, 2,000 Units, intra-catheter, After Dialysis, Edi Wise MD    heparin 1,000 unit/mL injection 2,000 Units, 2,000 Units, intra-catheter, After Dialysis, Edi Wise MD, 2,000  Units at 05/08/25 1300    heparin 1,000 unit/mL injection 2,000 Units, 2,000 Units, intra-catheter, After Dialysis, Edi Wise MD    insulin glargine (Lantus) injection 5 Units, 5 Units, subcutaneous, Nightly, Edi Wise MD, 5 Units at 05/10/25 2036    insulin lispro injection 0-5 Units, 0-5 Units, subcutaneous, TID AC, Edi Wise MD, 1 Units at 05/10/25 1651    levETIRAcetam (Keppra) tablet 500 mg, 500 mg, oral, BID, Edi Wise MD, 500 mg at 05/10/25 2033    loperamide (Imodium) capsule 2 mg, 2 mg, oral, Once PRN, Edi Wise MD    magnesium hydroxide (Milk of Magnesia) 400 mg/5 mL suspension 30 mL, 30 mL, oral, q24h PRN, Edi Wise MD    melatonin tablet 10 mg, 10 mg, oral, Nightly, Edi Wise MD, 10 mg at 05/10/25 2033    meropenem (Merrem) 500 mg in sodium chloride 0.9%  mL, 500 mg, intravenous, q12h, Edi Wise MD, Last Rate: 200 mL/hr at 05/10/25 2031, 500 mg at 05/10/25 2031    metoprolol tartrate (Lopressor) tablet 50 mg, 50 mg, oral, BID, Edi Wise MD, 50 mg at 05/10/25 1727    ondansetron (Zofran) tablet 4 mg, 4 mg, oral, q8h PRN, Edi Wise MD    oxyCODONE (Roxicodone) immediate release tablet 2.5 mg, 2.5 mg, oral, q6h PRN, Edi Wise MD, 2.5 mg at 05/09/25 2214    polyethylene glycol (Glycolax, Miralax) packet 17 g, 17 g, oral, Daily PRN, Edi Wise MD    sevelamer carbonate (Renvela) packet 1.6 g, 1.6 g, oral, BID, Edi Wise MD, 1.6 g at 05/10/25 2033    sodium chloride 0.9 % bolus 200 mL, 200 mL, intravenous, After Dialysis, Edi Wise MD    sodium chloride 0.9 % bolus 200 mL, 200 mL, intravenous, After Dialysis, Edi Wise MD    sodium chloride 0.9 % bolus 200 mL, 200 mL, intravenous, Once, Edi Wise MD    sodium chloride 0.9 % bolus 200 mL, 200 mL, intravenous, Once, Edi Wise MD    sodium hypochlorite (Dakin's Full-Strength) 0.5 % external solution, , irrigation, Daily, Roland J Reyes, MD, Given at 05/10/25  0458    vitamin B complex-vitamin C-folic acid (Nephrocaps) capsule 1 capsule, 1 capsule, oral, Daily, Edi Wise MD, 1 capsule at 05/10/25 0599

## 2025-05-11 NOTE — CARE PLAN
The patient's goals for the shift include sleep    The clinical goals for the shift include Patient will remain safe without injury throughout this shift.      Problem: Diabetes  Goal: Maintain electrolyte levels within acceptable range throughout shift  Outcome: Progressing  Goal: Maintain glucose levels >70mg/dl to <250mg/dl throughout shift  Outcome: Progressing  Goal: Learn about and adhere to nutrition recommendations by end of shift  Outcome: Progressing  Goal: Vital signs within normal range for age by end of shift  Outcome: Progressing  Goal: Receive DSME education by end of shift  Outcome: Progressing     Problem: Fall/Injury  Goal: Not fall by end of shift  Outcome: Progressing  Goal: Be free from injury by end of the shift  Outcome: Progressing  Goal: Verbalize understanding of personal risk factors for fall in the hospital  Outcome: Progressing  Goal: Verbalize understanding of risk factor reduction measures to prevent injury from fall in the home  Outcome: Progressing  Goal: Use assistive devices by end of the shift  Outcome: Progressing  Goal: Pace activities to prevent fatigue by end of the shift  Outcome: Progressing     Problem: ESRD: Dialysis, CAPD  Goal: Electrolytes restored to baseline  Outcome: Progressing  Goal: Fatigue of chronic illness managed  Outcome: Progressing  Goal: Follows dialysis treatment plan  Outcome: Progressing  Goal: Follows fluid restrictions  Outcome: Progressing  Goal: Increase self care/family involvement  Outcome: Progressing  Goal: Reports no shortness of breath  Outcome: Progressing     Problem: Skin  Goal: Decreased wound size/increased tissue granulation at next dressing change  Outcome: Progressing  Flowsheets (Taken 5/11/2025 0722)  Decreased wound size/increased tissue granulation at next dressing change:   Promote sleep for wound healing   Protective dressings over bony prominences   Utilize specialty bed per algorithm  Goal: Participates in  plan/prevention/treatment measures  Outcome: Progressing  Flowsheets (Taken 5/11/2025 0722)  Participates in plan/prevention/treatment measures:   Discuss with provider PT/OT consult   Elevate heels   Increase activity/out of bed for meals  Goal: Prevent/manage excess moisture  Outcome: Progressing  Flowsheets (Taken 5/11/2025 0722)  Prevent/manage excess moisture:   Cleanse incontinence/protect with barrier cream   Follow provider orders for dressing changes   Moisturize dry skin   Monitor for/manage infection if present   Use wicking fabric (obtain order)  Goal: Prevent/minimize sheer/friction injuries  Outcome: Progressing  Flowsheets (Taken 5/11/2025 0722)  Prevent/minimize sheer/friction injuries:   Complete micro-shifts as needed if patient unable. Adjust patient position to relieve pressure points, not a full turn   HOB 30 degrees or less   Increase activity/out of bed for meals   Turn/reposition every 2 hours/use positioning/transfer devices   Use pull sheet   Utilize specialty bed per algorithm  Goal: Promote/optimize nutrition  Outcome: Progressing  Flowsheets (Taken 5/11/2025 0722)  Promote/optimize nutrition:   Assist with feeding   Consume > 50% meals/supplements   Discuss with provider if NPO > 2 days   Monitor/record intake including meals   Offer water/supplements/favorite foods   Reassess MST if dietician not consulted  Goal: Promote skin healing  Outcome: Progressing  Flowsheets (Taken 5/10/2025 0745 by Dior Frazier RN)  Promote skin healing:   Assess skin/pad under line(s)/device(s)   Ensure correct size (line/device) and apply per  instructions   Rotate device position/do not position patient on device   Protective dressings over bony prominences   Turn/reposition every 2 hours/use positioning/transfer devices     Problem: Pain - Adult  Goal: Verbalizes/displays adequate comfort level or baseline comfort level  Outcome: Progressing     Problem: Safety - Adult  Goal: Free from fall  injury  Outcome: Progressing     Problem: Discharge Planning  Goal: Discharge to home or other facility with appropriate resources  Outcome: Progressing     Problem: Chronic Conditions and Co-morbidities  Goal: Patient's chronic conditions and co-morbidity symptoms are monitored and maintained or improved  Outcome: Progressing     Problem: Nutrition  Goal: Nutrient intake appropriate for maintaining nutritional needs  Outcome: Progressing

## 2025-05-12 LAB
ALBUMIN SERPL BCP-MCNC: 1.9 G/DL (ref 3.4–5)
ALP SERPL-CCNC: 68 U/L (ref 33–136)
ALT SERPL W P-5'-P-CCNC: 9 U/L (ref 10–52)
ANION GAP SERPL CALC-SCNC: 8 MMOL/L (ref 10–20)
AST SERPL W P-5'-P-CCNC: 20 U/L (ref 9–39)
BASOPHILS # BLD AUTO: 0.07 X10*3/UL (ref 0–0.1)
BASOPHILS NFR BLD AUTO: 0.4 %
BILIRUB SERPL-MCNC: 0.3 MG/DL (ref 0–1.2)
BUN SERPL-MCNC: 64 MG/DL (ref 6–23)
CALCIUM SERPL-MCNC: 8.3 MG/DL (ref 8.6–10.3)
CHLORIDE SERPL-SCNC: 95 MMOL/L (ref 98–107)
CO2 SERPL-SCNC: 27 MMOL/L (ref 21–32)
CREAT SERPL-MCNC: 3.85 MG/DL (ref 0.5–1.3)
EGFRCR SERPLBLD CKD-EPI 2021: 15 ML/MIN/1.73M*2
EOSINOPHIL # BLD AUTO: 0.38 X10*3/UL (ref 0–0.4)
EOSINOPHIL NFR BLD AUTO: 2.4 %
ERYTHROCYTE [DISTWIDTH] IN BLOOD BY AUTOMATED COUNT: 18 % (ref 11.5–14.5)
GLUCOSE BLD MANUAL STRIP-MCNC: 128 MG/DL (ref 74–99)
GLUCOSE BLD MANUAL STRIP-MCNC: 77 MG/DL (ref 74–99)
GLUCOSE BLD MANUAL STRIP-MCNC: 90 MG/DL (ref 74–99)
GLUCOSE BLD MANUAL STRIP-MCNC: 99 MG/DL (ref 74–99)
GLUCOSE SERPL-MCNC: 82 MG/DL (ref 74–99)
HCT VFR BLD AUTO: 24.8 % (ref 41–52)
HGB BLD-MCNC: 8.3 G/DL (ref 13.5–17.5)
HOLD SPECIMEN: NORMAL
IMM GRANULOCYTES # BLD AUTO: 0.12 X10*3/UL (ref 0–0.5)
IMM GRANULOCYTES NFR BLD AUTO: 0.7 % (ref 0–0.9)
LYMPHOCYTES # BLD AUTO: 5.73 X10*3/UL (ref 0.8–3)
LYMPHOCYTES NFR BLD AUTO: 35.7 %
MCH RBC QN AUTO: 26.7 PG (ref 26–34)
MCHC RBC AUTO-ENTMCNC: 33.5 G/DL (ref 32–36)
MCV RBC AUTO: 80 FL (ref 80–100)
MONOCYTES # BLD AUTO: 1.33 X10*3/UL (ref 0.05–0.8)
MONOCYTES NFR BLD AUTO: 8.3 %
NEUTROPHILS # BLD AUTO: 8.44 X10*3/UL (ref 1.6–5.5)
NEUTROPHILS NFR BLD AUTO: 52.5 %
NRBC BLD-RTO: 0 /100 WBCS (ref 0–0)
PLATELET # BLD AUTO: 206 X10*3/UL (ref 150–450)
POTASSIUM SERPL-SCNC: 5.1 MMOL/L (ref 3.5–5.3)
PROT SERPL-MCNC: 5.8 G/DL (ref 6.4–8.2)
RBC # BLD AUTO: 3.11 X10*6/UL (ref 4.5–5.9)
SODIUM SERPL-SCNC: 125 MMOL/L (ref 136–145)
WBC # BLD AUTO: 16.1 X10*3/UL (ref 4.4–11.3)

## 2025-05-12 PROCEDURE — 1210000001 HC SEMI-PRIVATE ROOM DAILY

## 2025-05-12 PROCEDURE — 85025 COMPLETE CBC W/AUTO DIFF WBC: CPT | Performed by: INTERNAL MEDICINE

## 2025-05-12 PROCEDURE — 80053 COMPREHEN METABOLIC PANEL: CPT | Performed by: INTERNAL MEDICINE

## 2025-05-12 PROCEDURE — 36415 COLL VENOUS BLD VENIPUNCTURE: CPT | Performed by: INTERNAL MEDICINE

## 2025-05-12 PROCEDURE — 82947 ASSAY GLUCOSE BLOOD QUANT: CPT

## 2025-05-12 PROCEDURE — 2500000001 HC RX 250 WO HCPCS SELF ADMINISTERED DRUGS (ALT 637 FOR MEDICARE OP): Performed by: INTERNAL MEDICINE

## 2025-05-12 PROCEDURE — 2500000002 HC RX 250 W HCPCS SELF ADMINISTERED DRUGS (ALT 637 FOR MEDICARE OP, ALT 636 FOR OP/ED): Performed by: INTERNAL MEDICINE

## 2025-05-12 PROCEDURE — 2500000004 HC RX 250 GENERAL PHARMACY W/ HCPCS (ALT 636 FOR OP/ED): Performed by: INTERNAL MEDICINE

## 2025-05-12 PROCEDURE — 99231 SBSQ HOSP IP/OBS SF/LOW 25: CPT | Performed by: PLASTIC SURGERY

## 2025-05-12 PROCEDURE — 99232 SBSQ HOSP IP/OBS MODERATE 35: CPT | Performed by: INTERNAL MEDICINE

## 2025-05-12 RX ADMIN — FINASTERIDE 5 MG: 5 TABLET, FILM COATED ORAL at 08:28

## 2025-05-12 RX ADMIN — MEROPENEM 500 MG: 500 INJECTION, POWDER, FOR SOLUTION INTRAVENOUS at 08:25

## 2025-05-12 RX ADMIN — Medication 25 MCG: at 08:29

## 2025-05-12 RX ADMIN — INSULIN GLARGINE 5 UNITS: 100 INJECTION, SOLUTION SUBCUTANEOUS at 21:39

## 2025-05-12 RX ADMIN — SEVELAMER CARBONATE 1.6 G: 800 POWDER, FOR SUSPENSION ORAL at 21:41

## 2025-05-12 RX ADMIN — Medication 1 CAPSULE: at 06:17

## 2025-05-12 RX ADMIN — METOPROLOL TARTRATE 50 MG: 50 TABLET, FILM COATED ORAL at 08:29

## 2025-05-12 RX ADMIN — METOPROLOL TARTRATE 50 MG: 50 TABLET, FILM COATED ORAL at 21:38

## 2025-05-12 RX ADMIN — LEVETIRACETAM 500 MG: 500 TABLET, FILM COATED ORAL at 21:38

## 2025-05-12 RX ADMIN — APIXABAN 2.5 MG: 5 TABLET, FILM COATED ORAL at 08:28

## 2025-05-12 RX ADMIN — COLLAGENASE SANTYL: 250 OINTMENT TOPICAL at 08:29

## 2025-05-12 RX ADMIN — LEVETIRACETAM 500 MG: 500 TABLET, FILM COATED ORAL at 08:29

## 2025-05-12 RX ADMIN — FOLIC ACID 1 MG: 1 TABLET ORAL at 08:29

## 2025-05-12 RX ADMIN — SEVELAMER CARBONATE 1.6 G: 800 POWDER, FOR SUSPENSION ORAL at 08:28

## 2025-05-12 RX ADMIN — FERROUS SULFATE TAB 325 MG (65 MG ELEMENTAL FE) 325 MG: 325 (65 FE) TAB at 08:29

## 2025-05-12 RX ADMIN — DOCUSATE SODIUM 100 MG: 100 CAPSULE, LIQUID FILLED ORAL at 08:29

## 2025-05-12 RX ADMIN — MEROPENEM 500 MG: 500 INJECTION, POWDER, FOR SOLUTION INTRAVENOUS at 21:37

## 2025-05-12 RX ADMIN — Medication 1 TABLET: at 08:29

## 2025-05-12 RX ADMIN — OXYCODONE 2.5 MG: 5 TABLET ORAL at 17:29

## 2025-05-12 RX ADMIN — Medication: at 08:25

## 2025-05-12 RX ADMIN — APIXABAN 2.5 MG: 5 TABLET, FILM COATED ORAL at 21:38

## 2025-05-12 RX ADMIN — DONEPEZIL HYDROCHLORIDE 23 MG: 23 TABLET, FILM COATED ORAL at 08:28

## 2025-05-12 RX ADMIN — Medication 10 MG: at 21:38

## 2025-05-12 ASSESSMENT — COGNITIVE AND FUNCTIONAL STATUS - GENERAL
MOVING TO AND FROM BED TO CHAIR: TOTAL
TOILETING: TOTAL
PERSONAL GROOMING: TOTAL
CLIMB 3 TO 5 STEPS WITH RAILING: TOTAL
TURNING FROM BACK TO SIDE WHILE IN FLAT BAD: TOTAL
MOVING FROM LYING ON BACK TO SITTING ON SIDE OF FLAT BED WITH BEDRAILS: TOTAL
EATING MEALS: TOTAL
DAILY ACTIVITIY SCORE: 6
WALKING IN HOSPITAL ROOM: TOTAL
DRESSING REGULAR LOWER BODY CLOTHING: TOTAL
MOBILITY SCORE: 6
STANDING UP FROM CHAIR USING ARMS: TOTAL
DRESSING REGULAR UPPER BODY CLOTHING: TOTAL
HELP NEEDED FOR BATHING: TOTAL

## 2025-05-12 ASSESSMENT — PAIN SCALES - GENERAL
PAINLEVEL_OUTOF10: 0 - NO PAIN
PAINLEVEL_OUTOF10: 0 - NO PAIN
PAINLEVEL_OUTOF10: 2
PAINLEVEL_OUTOF10: 5 - MODERATE PAIN

## 2025-05-12 ASSESSMENT — PAIN SCALES - WONG BAKER
WONGBAKER_NUMERICALRESPONSE: NO HURT
WONGBAKER_NUMERICALRESPONSE: HURTS LITTLE BIT

## 2025-05-12 ASSESSMENT — PAIN - FUNCTIONAL ASSESSMENT
PAIN_FUNCTIONAL_ASSESSMENT: 0-10
PAIN_FUNCTIONAL_ASSESSMENT: 0-10

## 2025-05-12 NOTE — PROGRESS NOTES
05/12/25 1249   Discharge Planning   Home or Post Acute Services Post acute facilities (Rehab/SNF/etc)   Type of Post Acute Facility Services Long term care   Expected Discharge Disposition Inter  (Rtn to [facility])   Does the patient need discharge transport arranged? Yes   RoundTrip coordination needed? Yes   Has discharge transport been arranged? No     Clinical updates sent to the facility. Awaiting medical clearance for discharge.

## 2025-05-12 NOTE — PROGRESS NOTES
"Tyshawn Coles is a 84 y.o. male on day 4 of admission presenting with Vomiting, unspecified vomiting type, unspecified whether nausea present.    Subjective   Patient clinically stable.  Discussed care with wife Pamela yesterday.  Patient seems to be quite comfortable and not in any distress currently on a Clinitron bed.  Reportedly sacral wound seems to be clean and seemingly looking better  Unable to get review of systems patient is demented     Objective   Blood cultures showed Morganella morganii as well as Citrobacter, sensitivities are not available yet currently on meropenem.  Urine culture also showed Morganella Morgagni possible source of infection may be UTI.  Vital signs are stable with a blood pressure 134/58 heart rate of 72 pulse oximeter reading of 97%    Physical Exam  Patient seems to be alert and oriented and conversant when aroused but sleeps a lot  Patient looks cachectic and looking ill  Patient reportedly eats his meals and takes his medications  Head examination shows prominence of facial bones  Lungs are clear without wheezing crackles and rhonchi with prominent ribs on examination  Heart is regular without any murmur  Abdomen soft and nontender good bowel sounds  Extremities are markedly atrophic and both feet with dressings  Sacral decubitus ulcer discussed with nurse taking care of patient    Last Recorded Vitals  Blood pressure 134/58, pulse 72, temperature 36.5 °C (97.7 °F), temperature source Temporal, resp. rate 16, height 1.82 m (5' 11.65\"), weight 68.2 kg (150 lb 5.7 oz), SpO2 97%.  Intake/Output last 3 Shifts:  I/O last 3 completed shifts:  In: 3300 (48.4 mL/kg) [P.O.:1800; I.V.:600 (8.8 mL/kg); Other:800; IV Piggyback:100]  Out: 4200 (61.6 mL/kg) [Other:4200]  Weight: 68.2 kg   Current Medications[1]   Relevant Results      Results for orders placed or performed during the hospital encounter of 05/07/25 (from the past 24 hours)   POCT GLUCOSE   Result Value Ref Range    POCT " Glucose 105 (H) 74 - 99 mg/dL   POCT GLUCOSE   Result Value Ref Range    POCT Glucose 127 (H) 74 - 99 mg/dL   POCT GLUCOSE   Result Value Ref Range    POCT Glucose 145 (H) 74 - 99 mg/dL   POCT GLUCOSE   Result Value Ref Range    POCT Glucose 156 (H) 74 - 99 mg/dL    No results found.               Assessment & Plan  Vomiting, unspecified vomiting type, unspecified whether nausea present    Leukocytosis in the setting of large decubitus ulcer feet ulcer, possible osteomyelitis, as well as UTI.  Both blood cultures and urinary culture showing Morganella Morgagni positive.  Also growing Citrobacter on blood examination.  Currently on meropenem.  Patient currently on a Clinitron bed which seems to be tolerated well.  Reportedly sacral wound is being cleaned daily and seems to be showing healthy tissues now also being managed by Dr. Reyes as well as infectious disease.  Will wait until decision as to what antibiotics to put the patient on discharge and for how long after which we will plan for discharge.  End-stage kidney disease tolerating hemodialysis without any problems Tuesday Thursday Saturdays, will check CMP and CBC tomorrow in preparation for Tuesday's dialysis  Anemia of chronic illness on EPO unable to give Venofer due to high ferritin levels  Seizure disorder on Keppra twice a day without any seizure activity while in the hospital  Atrial fibrillation on Eliquis 2.5 mg twice daily, Metroprolol tartrate 50 mg twice daily with controlled rate  Cognitive dysfunction and Aricept 2.5 mg daily also with metabolic encephalopathy with on and off changes in mental status currently awake and alert and oriented  Benign prostatic hypertrophy on Proscar 5 mg daily      I spent 40 minutes in the professional and overall care of this patient.      Edi Wise MD FACP         [1]   Current Facility-Administered Medications:     acetaminophen (Tylenol) suppository 650 mg, 650 mg, rectal, q4h PRN, Edi Wise MD     acetaminophen (Tylenol) tablet 650 mg, 650 mg, oral, q4h PRN, Edi Wise MD    albumin human 25 % solution 25 g, 25 g, intravenous, Once, Edi Wise MD    albuterol 2.5 mg /3 mL (0.083 %) nebulizer solution 1.25 mg, 1.25 mg, nebulization, q6h PRN, Edi Wise MD    alteplase (Cathflo Activase) injection 2 mg, 2 mg, intra-catheter, Daily, Edi Wise MD, 2 mg at 05/10/25 1022    alteplase (Cathflo Activase) injection 2 mg, 2 mg, intra-catheter, Daily, Edi Wise MD, 2 mg at 05/10/25 1021    apixaban (Eliquis) tablet 2.5 mg, 2.5 mg, oral, BID, Edi Wise MD, 2.5 mg at 05/11/25 2055    bisacodyl (Dulcolax) suppository 10 mg, 10 mg, rectal, q24h PRN, Edi Wise MD    cholecalciferol (Vitamin D-3) tablet 25 mcg, 25 mcg, oral, Daily, Edi Wise MD, 25 mcg at 05/11/25 0811    collagenase 250 unit/gram ointment, , Topical, Daily, Edi Wise MD, Given at 05/11/25 0819    dextrose 50 % injection 12.5 g, 12.5 g, intravenous, q15 min PRN, Edi Wise MD    dextrose 50 % injection 25 g, 25 g, intravenous, q15 min PRN, Edi Wise MD    docusate sodium (Colace) capsule 100 mg, 100 mg, oral, Daily, Edi Wise MD, 100 mg at 05/11/25 0810    donepezil (Aricept) tablet 23 mg, 23 mg, oral, Daily, Edi Wise MD, 23 mg at 05/11/25 0811    ferrous sulfate tablet 325 mg, 65 mg of iron, oral, Daily with breakfast, Edi Wise MD, 325 mg at 05/11/25 0810    finasteride (Proscar) tablet 5 mg, 5 mg, oral, Daily, Edi Wise MD, 5 mg at 05/11/25 0810    folic acid (Folvite) tablet 1 mg, 1 mg, oral, Daily, Edi Wise MD, 1 mg at 05/11/25 0811    glucagon (Glucagen) injection 1 mg, 1 mg, intramuscular, q15 min PRN, Edi Wise MD    glucagon (Glucagen) injection 1 mg, 1 mg, intramuscular, q15 min PRN, Edi Wise MD    heparin 1,000 unit/mL injection 2,000 Units, 2,000 Units, intra-catheter, After Dialysis, Edi Wise MD    heparin 1,000 unit/mL injection  2,000 Units, 2,000 Units, intra-catheter, After Dialysis, Edi Wise MD, 2,000 Units at 05/08/25 1300    heparin 1,000 unit/mL injection 2,000 Units, 2,000 Units, intra-catheter, After Dialysis, Edi Wise MD    insulin glargine (Lantus) injection 5 Units, 5 Units, subcutaneous, Nightly, Edi Wise MD, 5 Units at 05/11/25 2051    insulin lispro injection 0-5 Units, 0-5 Units, subcutaneous, TID AC, Edi Wise MD, 1 Units at 05/10/25 1651    lactobacillus acidophilus tablet 1 tablet, 1 tablet, oral, Daily, Daisy Watson DO, 1 tablet at 05/11/25 2059    levETIRAcetam (Keppra) tablet 500 mg, 500 mg, oral, BID, Edi Wise MD, 500 mg at 05/11/25 2055    loperamide (Imodium) capsule 2 mg, 2 mg, oral, Once PRN, Edi Wise MD    magnesium hydroxide (Milk of Magnesia) 400 mg/5 mL suspension 30 mL, 30 mL, oral, q24h PRN, Edi Wise MD    melatonin tablet 10 mg, 10 mg, oral, Nightly, Edi Wise MD, 10 mg at 05/11/25 2056    meropenem (Merrem) 500 mg in sodium chloride 0.9%  mL, 500 mg, intravenous, q12h, Edi Wise MD, Stopped at 05/11/25 2203    metoprolol tartrate (Lopressor) tablet 50 mg, 50 mg, oral, BID, Edi Wise MD, 50 mg at 05/11/25 2056    ondansetron (Zofran) tablet 4 mg, 4 mg, oral, q8h PRN, Edi Wise MD    oxyCODONE (Roxicodone) immediate release tablet 2.5 mg, 2.5 mg, oral, q6h PRN, Edi Wise MD, 2.5 mg at 05/09/25 2214    polyethylene glycol (Glycolax, Miralax) packet 17 g, 17 g, oral, Daily PRN, Edi Wise MD    sevelamer carbonate (Renvela) packet 1.6 g, 1.6 g, oral, BID, Edi Wise MD, 1.6 g at 05/11/25 2054    sodium chloride 0.9 % bolus 200 mL, 200 mL, intravenous, After Dialysis, Edi Wise MD    sodium chloride 0.9 % bolus 200 mL, 200 mL, intravenous, After Dialysis, Edi Wise MD    sodium chloride 0.9 % bolus 200 mL, 200 mL, intravenous, Once, Edi Wise MD    sodium chloride 0.9 % bolus 200 mL, 200 mL,  intravenous, Once, Edi Wise MD    sodium hypochlorite (Dakin's Full-Strength) 0.5 % external solution, , irrigation, Daily, Roland J Reyes, MD, Given at 05/11/25 0818    vitamin B complex-vitamin C-folic acid (Nephrocaps) capsule 1 capsule, 1 capsule, oral, Daily, Edi Wise MD, 1 capsule at 05/11/25 0695

## 2025-05-12 NOTE — PROGRESS NOTES
Infectious Disease Progress Note       5/12/2025    Patient is a followup regarding gram-negative bacteremia with large sacral decubitus ulcer with underlying chronic osteomyelitis of the sacral area with nonhealing nonsurgical sacral decubitus wound.  Patient does have a history of end-stage renal disease on hemodialysis via permacath and he is a type II diabetic.    Patient is awake No fevers.    Lab Results   Component Value Date    WBC 16.1 (H) 05/12/2025    HGB 8.3 (L) 05/12/2025    HCT 24.8 (L) 05/12/2025    MCV 80 05/12/2025     05/12/2025     Lab Results   Component Value Date    GLUCOSE 82 05/12/2025    CALCIUM 8.3 (L) 05/12/2025     (L) 05/12/2025    K 5.1 05/12/2025    CO2 27 05/12/2025    CL 95 (L) 05/12/2025    BUN 64 (H) 05/12/2025    CREATININE 3.85 (H) 05/12/2025       WBC trends are being monitored. Antibiotic doses are being adjusted per most recent renal labs.     Vitals:    05/12/25 1641   BP: 158/67   Pulse: 60   Resp: 16   Temp: 36.9 °C (98.4 °F)   SpO2: 97%         Muscle atrophy, permacath in place, no erythema around the site  More interactive today  NAD  Neck supple  Heart S1S2  Chest: Equal expansion, no rales  Abdomen: soft, ND, NTTP, no guarding  Extrem: no pain to palpation  Skin: no rashes, no diaphoresis  Neuro: CNS intact but does not verbalize with me  Photos of large sacral wound reviewed her medical records    Problem List[1]    Estimated Creatinine Clearance: 13.3 mL/min (A) (by C-G formula based on SCr of 3.85 mg/dL (H)).    Morganella morganii 2 out of 2 blood cultures + 5/7/2025  1 out of 2 blood cultures also positive for Citrobacter ffreundii complex  All susceptibilities pending      ASSESSMENT:  Gram-negative bacteremia, citrobacter and morganella  Large infected sacral decubitus ulcer, nonhealing nonsurgical wound, with underlying chronic osteomyelitis  End-stage renal disease on hemodialysis  Diabetes mellitus type 2   Seizure disorder on Keppra  Atrial  fibrillation  Acute metabolic encephalopathy with history of cognitive dysfunction, on Aricept per medical records  BPH      PLAN:  Downtrending leukocytosis   Plastics following sacral decubitus ulcer  Will plan to continue meropenem at renal dose for now - sensitivities reviewed. There is some resistance of the morganella to Imipenem but not to Meropenem. Clinically patient is improving and wound is stable. Can consider changing abx if there is any clinical decline.   Repeat BC x 2   Local wound care  Optimal glycemic control  Monitor for seizure activity on meropenem as this can potentially lower the threshold    This is a chronic wound and he has chronic underlying OM that may not heal with extended abx and risk of longterm abx may outweigh benefit.     Lactobacillus ( if he is able to take safely ) and plan on 2-4 weeks of abx to help facilitate wound healing along with wound care.         Orlin José MD            [1]   Patient Active Problem List  Diagnosis    ESRD on hemodialysis (Multi)    Closed fracture of neck of left femur, initial encounter    Falls, initial encounter    Closed head injury    Elevated troponin    Abscess of hip    Hip dislocation, left, initial encounter (Multi)    Acute encephalopathy    Vomiting, unspecified vomiting type, unspecified whether nausea present

## 2025-05-12 NOTE — PROGRESS NOTES
Plastic Surgery Note    Afebrile, vital signs stable  Sacral wound clean granulation tissue noted  Impression: Sacral pressure sore  Continue current dressing changes with Dakin's  Offload with Clinitron bed

## 2025-05-12 NOTE — CARE PLAN
The patient's goals for the shift include feel better    The clinical goals for the shift include safety    Over the shift, the patient did not make progress toward the following goals. Barriers to progression include . Recommendations to address these barriers include .

## 2025-05-12 NOTE — PROGRESS NOTES
Infectious Disease Progress Note       5/11/2025    Patient is a followup regarding gram-negative bacteremia with large sacral decubitus ulcer with underlying chronic osteomyelitis of the sacral area with nonhealing nonsurgical sacral decubitus wound.  Patient does have a history of end-stage renal disease on hemodialysis via permacath and he is a type II diabetic.    Opens his eyes and tracks me but nonverbal with me  No cough today    Lab Results   Component Value Date    WBC 17.9 (H) 05/09/2025    HGB 7.7 (L) 05/09/2025    HCT 22.6 (L) 05/09/2025    MCV 81 05/09/2025     05/09/2025     Lab Results   Component Value Date    GLUCOSE 191 (H) 05/09/2025    CALCIUM 8.3 (L) 05/09/2025     (L) 05/09/2025    K 3.8 05/09/2025    CO2 27 05/09/2025    CL 96 (L) 05/09/2025    BUN 38 (H) 05/09/2025    CREATININE 3.22 (H) 05/09/2025       WBC trends are being monitored. Antibiotic doses are being adjusted per most recent renal labs.     Vitals:    05/11/25 2008   BP: 134/58   Pulse: 72   Resp:    Temp: 36.5 °C (97.7 °F)   SpO2: 97%         Muscle atrophy, permacath in place   patient is awake and alert but not interactive.  He does track me.  Expression seem symmetrical.  NAD  Neck supple  Heart S1S2  Chest: Equal expansion, bilaterally clear to auscultation but when he coughs he has a weak rhonchorous cough, nonproductive  Abdomen: soft, ND, NTTP, no guarding  Extrem: no pain to palpation -has small abrasion to the heel left side  Skin: no rashes, no diaphoresis  Neuro: CNS intact but does not verbalize with me  Photos of large sacral wound reviewed her medical records    Problem List[1]    Estimated Creatinine Clearance: 15.9 mL/min (A) (by C-G formula based on SCr of 3.22 mg/dL (H)).    Morganella morganii 2 out of 2 blood cultures + 5/7/2025  1 out of 2 blood cultures also positive for Citrobacter ffreundii complex  All susceptibilities pending      ASSESSMENT:  Gram-negative bacteremia, citrobacter and  morganella  Large infected sacral decubitus ulcer, nonhealing nonsurgical wound, with underlying chronic osteomyelitis  End-stage renal disease on hemodialysis  Diabetes mellitus type 2   Seizure disorder on Keppra  Atrial fibrillation  Acute metabolic encephalopathy with history of cognitive dysfunction, on Aricept per medical records  BPH      PLAN:  Downtrending leukocytosis   Plastics following sacral decubitus ulcer  Will plan to continue meropenem at renal dose for now - sensitivities reviewed. There is some resistance of the morganella to Imipenem but not to Meropenem. Clinically patient is improving and wound is stable. Can consider changing abx if there is any clinical decline.   Repeat BC x 2 today for clearance.   Local wound care  Optimal glycemic control  Monitor for seizure activity on meropenem as this can potentially lower the threshold    This is a chronic wound and he has chronic underlying OM that may not heal with extended abx and risk of longterm abx may outweigh benefit.     Lactobacillus ( if he is able to take safely ) and plan on 2-4 weeks of abx to help facilitate wound healing along with wound care.     Imaging and labs were reviewed per medical records and any ID pertinent labs were also addressed  Time spent before, during and after care today, including coordination of care >40 min      Daisy Watson DO            [1]   Patient Active Problem List  Diagnosis    ESRD on hemodialysis (Multi)    Closed fracture of neck of left femur, initial encounter    Falls, initial encounter    Closed head injury    Elevated troponin    Abscess of hip    Hip dislocation, left, initial encounter (Multi)    Acute encephalopathy    Vomiting, unspecified vomiting type, unspecified whether nausea present

## 2025-05-12 NOTE — DOCUMENTATION CLARIFICATION NOTE
"    PATIENT:               EVELYN HOU  ACCT #:                  [id number]  MRN:                       [id number]  :                       1940  ADMIT DATE:       2025 1:44 PM  DISCH DATE:  RESPONDING PROVIDER #:        [id number]          PROVIDER RESPONSE TEXT:    No acute respiratory failure    CDI QUERY TEXT:    Clarification    Instruction:    Based on your assessment of the patient and the clinical information, please provide the requested documentation by clicking on the appropriate radio button and enter any additional information if prompted.    Question: Please clarify diagnosis of respiratory failure as    When answering this query, please exercise your independent professional judgment. The fact that a question is being asked, does not imply that any particular answer is desired or expected.    The patient's clinical indicators include:  Clinical Information: 85 yo presents with vomiting    Documented Diagnosis: \" acute hypoxic/hypercarbic respiratory failure\" on  ED by Dr. Chambers    Clinical Indicators and Documentation:  -Vital Signs:  1626 T 37.7 HR 95 RR 19 SPO2 95 on RA  -ABG results: N/Z  -Physical Exam Findings:  -Breath sounds: Normal  -Distress: No  -Cyanosis:No  -Oxygen Therapy: No  -Pulmonary Consult: No    Treatment: CXR    Risk Factors: Metabolic Encephalopathy  Options provided:  -- Acute Respiratory Failure ruled out after further workup  -- Acute Respiratory Failure as evidenced by, Please specify additional information below  -- Other - I will add my own diagnosis  -- Refer to Clinical Documentation Reviewer    Query created by: Rani Sherman on 2025 9:10 AM      Electronically signed by:  MADISON TOLENTINO MD 2025 10:21 PM          "

## 2025-05-12 NOTE — DOCUMENTATION CLARIFICATION NOTE
"    PATIENT:               EVELYN HOU  ACCT #:                  [id number]  MRN:                       [id number]  :                       1940  ADMIT DATE:       2025 1:44 PM  DISCH DATE:  RESPONDING PROVIDER #:        [id number]          PROVIDER RESPONSE TEXT:    Sepsis with neurological organ dysfunction of Metabolic encephalopathy    CDI QUERY TEXT:    Clarification    Instruction:    Based on your assessment of the patient and the clinical information, please provide the requested documentation by clicking on the appropriate radio button and enter any additional information if prompted.    Question: Please further clarify if a relationship exists between the Sepsis and acute organ dysfunction    When answering this query, please exercise your independent professional judgment. The fact that a question is being asked, does not imply that any particular answer is desired or expected.    The patient's clinical indicators include:  Clinical Information: 83 yo presents with vomiting found to be febrile with a temperature of 100.4 ?F.  Patient's sacral wound with foul-smelling discharge    Clinical Indicators:    ED  \" chronic osteomyelitis\" \"Found to be febrile with a temperature of 100.4 ?F.  Patient's sacral wound with foul-smelling discharge, concerns for active infection and sepsis.\" \" I suspected severe sepsis with the following organ dysfunction: acute metabolic encephalopathy and acute hypoxic/hypercarbic respiratory failure\"  \"Sepsis with acute organ dysfunction without septic shock\"     HP \"Cognitive dysfunction on Aricept 23 mg daily with metabolic encephalopathy\" \" White cell count was 42.7\"     ID \"Gram-negative bacteremia, awaiting susceptibility to Morganella  Acute metabolic encephalopathy \"     Nephrology \"Patient seems to be more awake and more conversant today.  Unable to get review of systems due to dementia.\"    Treatment:Daily Labs,   Vanc   Zosyn  Aricept    Risk Factors: " ESRD, osteomyelitis  Options provided:  -- Sepsis with neurological organ dysfunction of Metabolic encephalopathy  -- Sepsis with other organ dysfunction, Please specify sepsis associated organ dysfunction below  -- Other - I will add my own diagnosis  -- Refer to Clinical Documentation Reviewer    Query created by: Rani Sherman on 5/11/2025 9:10 AM      Electronically signed by:  MADISON TOLENTINO MD 5/11/2025 10:21 PM

## 2025-05-13 ENCOUNTER — APPOINTMENT (OUTPATIENT)
Dept: DIALYSIS | Facility: HOSPITAL | Age: 85
End: 2025-05-13
Payer: MEDICARE

## 2025-05-13 LAB
GLUCOSE BLD MANUAL STRIP-MCNC: 107 MG/DL (ref 74–99)
GLUCOSE BLD MANUAL STRIP-MCNC: 118 MG/DL (ref 74–99)
GLUCOSE BLD MANUAL STRIP-MCNC: 143 MG/DL (ref 74–99)

## 2025-05-13 PROCEDURE — 8010000001 HC DIALYSIS - HEMODIALYSIS PER DAY

## 2025-05-13 PROCEDURE — 82947 ASSAY GLUCOSE BLOOD QUANT: CPT

## 2025-05-13 PROCEDURE — 6350000001 HC RX 635 EPOETIN >10,000 UNITS: Mod: JZ | Performed by: INTERNAL MEDICINE

## 2025-05-13 PROCEDURE — 2500000004 HC RX 250 GENERAL PHARMACY W/ HCPCS (ALT 636 FOR OP/ED): Performed by: INTERNAL MEDICINE

## 2025-05-13 PROCEDURE — 2500000001 HC RX 250 WO HCPCS SELF ADMINISTERED DRUGS (ALT 637 FOR MEDICARE OP): Performed by: INTERNAL MEDICINE

## 2025-05-13 PROCEDURE — 1210000001 HC SEMI-PRIVATE ROOM DAILY

## 2025-05-13 PROCEDURE — 2500000002 HC RX 250 W HCPCS SELF ADMINISTERED DRUGS (ALT 637 FOR MEDICARE OP, ALT 636 FOR OP/ED): Performed by: INTERNAL MEDICINE

## 2025-05-13 PROCEDURE — 99231 SBSQ HOSP IP/OBS SF/LOW 25: CPT | Performed by: PLASTIC SURGERY

## 2025-05-13 PROCEDURE — 99232 SBSQ HOSP IP/OBS MODERATE 35: CPT | Performed by: INTERNAL MEDICINE

## 2025-05-13 RX ORDER — COLLAGENASE SANTYL 250 [ARB'U]/G
OINTMENT TOPICAL DAILY
Qty: 60 G | Refills: 11 | Status: SHIPPED | OUTPATIENT
Start: 2025-05-13

## 2025-05-13 RX ORDER — MEROPENEM 500 MG/1
500 INJECTION, POWDER, FOR SOLUTION INTRAVENOUS EVERY 12 HOURS
Qty: 5600 ML | Refills: 0 | Status: SHIPPED | OUTPATIENT
Start: 2025-05-13 | End: 2025-06-10

## 2025-05-13 RX ORDER — HEPARIN SODIUM 1000 [USP'U]/ML
2000 INJECTION, SOLUTION INTRAVENOUS; SUBCUTANEOUS ONCE
Status: COMPLETED | OUTPATIENT
Start: 2025-05-13 | End: 2025-05-13

## 2025-05-13 RX ORDER — ALBUMIN HUMAN 250 G/1000ML
25 SOLUTION INTRAVENOUS ONCE
Status: DISCONTINUED | OUTPATIENT
Start: 2025-05-13 | End: 2025-05-14 | Stop reason: HOSPADM

## 2025-05-13 RX ORDER — L. ACIDOPHILUS/L.BULGARICUS 1MM CELL
1 TABLET ORAL DAILY
Qty: 30 TABLET | Refills: 1 | Status: SHIPPED | OUTPATIENT
Start: 2025-05-13

## 2025-05-13 RX ORDER — INSULIN LISPRO 100 [IU]/ML
0-5 INJECTION, SOLUTION INTRAVENOUS; SUBCUTANEOUS
Qty: 4.5 ML | Refills: 0 | Status: SHIPPED | OUTPATIENT
Start: 2025-05-13 | End: 2025-06-12

## 2025-05-13 RX ADMIN — COLLAGENASE SANTYL: 250 OINTMENT TOPICAL at 10:38

## 2025-05-13 RX ADMIN — Medication 25 MCG: at 11:19

## 2025-05-13 RX ADMIN — FERROUS SULFATE TAB 325 MG (65 MG ELEMENTAL FE) 325 MG: 325 (65 FE) TAB at 11:19

## 2025-05-13 RX ADMIN — Medication 1 CAPSULE: at 06:06

## 2025-05-13 RX ADMIN — INSULIN GLARGINE 5 UNITS: 100 INJECTION, SOLUTION SUBCUTANEOUS at 20:27

## 2025-05-13 RX ADMIN — FINASTERIDE 5 MG: 5 TABLET, FILM COATED ORAL at 11:19

## 2025-05-13 RX ADMIN — MEROPENEM 500 MG: 500 INJECTION, POWDER, FOR SOLUTION INTRAVENOUS at 10:38

## 2025-05-13 RX ADMIN — SEVELAMER CARBONATE 1.6 G: 800 POWDER, FOR SUSPENSION ORAL at 11:20

## 2025-05-13 RX ADMIN — LEVETIRACETAM 500 MG: 500 TABLET, FILM COATED ORAL at 11:19

## 2025-05-13 RX ADMIN — APIXABAN 2.5 MG: 5 TABLET, FILM COATED ORAL at 11:19

## 2025-05-13 RX ADMIN — APIXABAN 2.5 MG: 5 TABLET, FILM COATED ORAL at 20:27

## 2025-05-13 RX ADMIN — Medication 10 MG: at 20:27

## 2025-05-13 RX ADMIN — EPOETIN ALFA 10000 UNITS: 10000 SOLUTION INTRAVENOUS; SUBCUTANEOUS at 02:18

## 2025-05-13 RX ADMIN — METOPROLOL TARTRATE 50 MG: 50 TABLET, FILM COATED ORAL at 20:27

## 2025-05-13 RX ADMIN — DONEPEZIL HYDROCHLORIDE 23 MG: 23 TABLET, FILM COATED ORAL at 11:19

## 2025-05-13 RX ADMIN — HEPARIN SODIUM 2100 UNITS: 1000 INJECTION INTRAVENOUS; SUBCUTANEOUS at 18:29

## 2025-05-13 RX ADMIN — MEROPENEM 500 MG: 500 INJECTION, POWDER, FOR SOLUTION INTRAVENOUS at 20:28

## 2025-05-13 RX ADMIN — LEVETIRACETAM 500 MG: 500 TABLET, FILM COATED ORAL at 20:27

## 2025-05-13 RX ADMIN — Medication 1 TABLET: at 11:19

## 2025-05-13 RX ADMIN — FOLIC ACID 1 MG: 1 TABLET ORAL at 11:19

## 2025-05-13 RX ADMIN — METOPROLOL TARTRATE 50 MG: 50 TABLET, FILM COATED ORAL at 11:19

## 2025-05-13 RX ADMIN — SEVELAMER CARBONATE 1.6 G: 800 POWDER, FOR SUSPENSION ORAL at 20:27

## 2025-05-13 RX ADMIN — Medication: at 10:37

## 2025-05-13 RX ADMIN — HEPARIN SODIUM 2100 UNITS: 1000 INJECTION INTRAVENOUS; SUBCUTANEOUS at 18:30

## 2025-05-13 ASSESSMENT — COGNITIVE AND FUNCTIONAL STATUS - GENERAL
CLIMB 3 TO 5 STEPS WITH RAILING: TOTAL
STANDING UP FROM CHAIR USING ARMS: TOTAL
TURNING FROM BACK TO SIDE WHILE IN FLAT BAD: TOTAL
HELP NEEDED FOR BATHING: TOTAL
TOILETING: TOTAL
DRESSING REGULAR LOWER BODY CLOTHING: TOTAL
MOVING FROM LYING ON BACK TO SITTING ON SIDE OF FLAT BED WITH BEDRAILS: TOTAL
WALKING IN HOSPITAL ROOM: TOTAL
DRESSING REGULAR UPPER BODY CLOTHING: TOTAL
MOVING FROM LYING ON BACK TO SITTING ON SIDE OF FLAT BED WITH BEDRAILS: TOTAL
MOBILITY SCORE: 6
WALKING IN HOSPITAL ROOM: TOTAL
MOBILITY SCORE: 6
PERSONAL GROOMING: TOTAL
CLIMB 3 TO 5 STEPS WITH RAILING: TOTAL
TOILETING: TOTAL
MOVING TO AND FROM BED TO CHAIR: TOTAL
TURNING FROM BACK TO SIDE WHILE IN FLAT BAD: TOTAL
HELP NEEDED FOR BATHING: TOTAL
STANDING UP FROM CHAIR USING ARMS: TOTAL
DRESSING REGULAR UPPER BODY CLOTHING: TOTAL
EATING MEALS: A LOT
PERSONAL GROOMING: TOTAL
DRESSING REGULAR LOWER BODY CLOTHING: TOTAL
DAILY ACTIVITIY SCORE: 7
EATING MEALS: A LOT
DAILY ACTIVITIY SCORE: 7
MOVING TO AND FROM BED TO CHAIR: TOTAL

## 2025-05-13 ASSESSMENT — PAIN - FUNCTIONAL ASSESSMENT
PAIN_FUNCTIONAL_ASSESSMENT: PAINAD (PAIN ASSESSMENT IN ADVANCED DEMENTIA SCALE)
PAIN_FUNCTIONAL_ASSESSMENT: NO/DENIES PAIN
PAIN_FUNCTIONAL_ASSESSMENT: NO/DENIES PAIN

## 2025-05-13 ASSESSMENT — PAIN SCALES - PAIN ASSESSMENT IN ADVANCED DEMENTIA (PAINAD)
FACIALEXPRESSION: SAD, FRIGHTENED, FROWN
BODYLANGUAGE: TENSE, DISTRESSED PACING, FIDGETING
BREATHING: NORMAL
NEGVOCALIZATION: OCCASIONAL MOAN/GROAN, LOW SPEECH, NEGATIVE/DISAPPROVING QUALITY
CONSOLABILITY: NO NEED TO CONSOLE
TOTALSCORE: 3

## 2025-05-13 ASSESSMENT — PAIN SCALES - GENERAL
PAINLEVEL_OUTOF10: 0 - NO PAIN

## 2025-05-13 NOTE — PROGRESS NOTES
Plan remains for return to [facility] . Needs IV Meropenem 500mg q12h for 28days. To have midline placed. Dialysis today. Plan for dc to LTC tomorrow 5/14. Care transitions to cont to follow.

## 2025-05-13 NOTE — PROGRESS NOTES
"Tyshawn Coles is a 84 y.o. male on day 5 of admission presenting with Vomiting, unspecified vomiting type, unspecified whether nausea present.    Subjective   Patient wakes up when aroused and seems to be oriented recognizes me and tries to converse.  He seems to be oriented as to where he is and conscious of what we are doing at this point.  Unable to get a good review of systems however but clinically patient seems to be stable       Objective   Vital signs are stable with a blood pressure of 127/91 heart rate of 60 and pulse oximeter reading of 97% on 2 L per nasal cannula  Sodium is low at 125 potassium at 5.1 otherwise normal electrolytes and acid-base balance and BUN and creatinine compatible with end-stage kidney disease  Serum albumin markedly low at 1.9 most likely secondary to malnutrition  Liver function tests are normal  White cell count is still elevated at 16.1 with hemoglobin of 8.3 on EPO and platelet count of 206  Blood cultures drawn yesterday still negative for any growth    Physical Exam  Patient is ill looking and cachectic  Head examination benign other than prominence of facial bones  Lungs are clear however without wheezing crackles or rhonchi with prominent ribs on examination  Heart is regular without any murmur  Abdomen soft and nontender good bowel sounds and no guarding  Extremities with markedly atrophic muscles with both feet with dressings being followed up by podiatry  Sacral decubitus ulcer reportedly stable currently on Dakin's solution wound cleaning and with Clinitron bed    Last Recorded Vitals  Blood pressure (!) 127/91, pulse 60, temperature 35.8 °C (96.4 °F), temperature source Temporal, resp. rate 16, height 1.82 m (5' 11.65\"), weight 68.2 kg (150 lb 5.7 oz), SpO2 96%.  Intake/Output last 3 Shifts:  I/O last 3 completed shifts:  In: 600 (8.8 mL/kg) [P.O.:600]  Out: - (0 mL/kg)   Weight: 68.2 kg   Current Medications[1]   Relevant Results      Results for orders placed or " performed during the hospital encounter of 05/07/25 (from the past 24 hours)   POCT GLUCOSE   Result Value Ref Range    POCT Glucose 77 74 - 99 mg/dL   SST TOP   Result Value Ref Range    Extra Tube Hold for add-ons.    Comprehensive metabolic panel   Result Value Ref Range    Glucose 82 74 - 99 mg/dL    Sodium 125 (L) 136 - 145 mmol/L    Potassium 5.1 3.5 - 5.3 mmol/L    Chloride 95 (L) 98 - 107 mmol/L    Bicarbonate 27 21 - 32 mmol/L    Anion Gap 8 (L) 10 - 20 mmol/L    Urea Nitrogen 64 (H) 6 - 23 mg/dL    Creatinine 3.85 (H) 0.50 - 1.30 mg/dL    eGFR 15 (L) >60 mL/min/1.73m*2    Calcium 8.3 (L) 8.6 - 10.3 mg/dL    Albumin 1.9 (L) 3.4 - 5.0 g/dL    Alkaline Phosphatase 68 33 - 136 U/L    Total Protein 5.8 (L) 6.4 - 8.2 g/dL    AST 20 9 - 39 U/L    Bilirubin, Total 0.3 0.0 - 1.2 mg/dL    ALT 9 (L) 10 - 52 U/L   CBC and Auto Differential   Result Value Ref Range    WBC 16.1 (H) 4.4 - 11.3 x10*3/uL    nRBC 0.0 0.0 - 0.0 /100 WBCs    RBC 3.11 (L) 4.50 - 5.90 x10*6/uL    Hemoglobin 8.3 (L) 13.5 - 17.5 g/dL    Hematocrit 24.8 (L) 41.0 - 52.0 %    MCV 80 80 - 100 fL    MCH 26.7 26.0 - 34.0 pg    MCHC 33.5 32.0 - 36.0 g/dL    RDW 18.0 (H) 11.5 - 14.5 %    Platelets 206 150 - 450 x10*3/uL    Neutrophils % 52.5 40.0 - 80.0 %    Immature Granulocytes %, Automated 0.7 0.0 - 0.9 %    Lymphocytes % 35.7 13.0 - 44.0 %    Monocytes % 8.3 2.0 - 10.0 %    Eosinophils % 2.4 0.0 - 6.0 %    Basophils % 0.4 0.0 - 2.0 %    Neutrophils Absolute 8.44 (H) 1.60 - 5.50 x10*3/uL    Immature Granulocytes Absolute, Automated 0.12 0.00 - 0.50 x10*3/uL    Lymphocytes Absolute 5.73 (H) 0.80 - 3.00 x10*3/uL    Monocytes Absolute 1.33 (H) 0.05 - 0.80 x10*3/uL    Eosinophils Absolute 0.38 0.00 - 0.40 x10*3/uL    Basophils Absolute 0.07 0.00 - 0.10 x10*3/uL   POCT GLUCOSE   Result Value Ref Range    POCT Glucose 99 74 - 99 mg/dL   POCT GLUCOSE   Result Value Ref Range    POCT Glucose 90 74 - 99 mg/dL   POCT GLUCOSE   Result Value Ref Range    POCT  Glucose 128 (H) 74 - 99 mg/dL    No results found.            This patient has a central line   Reason for the central line remaining today?  Central line being used for hemodialysis     Assessment & Plan  Vomiting, unspecified vomiting type, unspecified whether nausea present    Leukocytosis in the setting of large decubitus ulcer, feet ulcer, possible osteomyelitis as well as urinary tract infection both blood and urine growing Morganella morganii.  Both Morganella morganii and Citrobacter positive on blood culture sensitive to meropenem and will be kept on meropenem between 2 to 4 weeks being followed up by infectious disease.  Will need a midline for IV antibiotics prior to discharge  Patient seems to be doing better on the Clinitron bed, hopefully if this is available at the nursing home on discharge.  End-stage kidney disease, tolerating hemodialysis Tuesday Thursday Saturdays  Anemia of chronic illness on EPO unable to give Venofer due to high ferritin levels  Seizure disorder on Keppra twice a day without any seizure activity while in the hospital  Chronic atrial fibrillation on Eliquis 2.5 mg twice daily, Metroprolol tartrate 50 mg daily with controlled rate  Cognitive dysfunction and Aricept and also with metabolic encephalopathy without and of changes in mental status currently awake alert oriented and conversant  Benign prostatic hypertrophy on Proscar 5 mg daily      I spent 40 minutes in the professional and overall care of this patient.      Edi Wise MD FACP         [1]   Current Facility-Administered Medications:     acetaminophen (Tylenol) suppository 650 mg, 650 mg, rectal, q4h PRN, Edi Wise MD    acetaminophen (Tylenol) tablet 650 mg, 650 mg, oral, q4h PRN, Edi Wise MD    albumin human 25 % solution 25 g, 25 g, intravenous, Once, Edi Wise MD    albuterol 2.5 mg /3 mL (0.083 %) nebulizer solution 1.25 mg, 1.25 mg, nebulization, q6h PRN, Edi Wise MD    alteplase  (Cathflo Activase) injection 2 mg, 2 mg, intra-catheter, Daily, Edi Wise MD, 2 mg at 05/10/25 1022    alteplase (Cathflo Activase) injection 2 mg, 2 mg, intra-catheter, Daily, Edi Wise MD, 2 mg at 05/10/25 1021    apixaban (Eliquis) tablet 2.5 mg, 2.5 mg, oral, BID, Edi Wise MD, 2.5 mg at 05/12/25 2138    bisacodyl (Dulcolax) suppository 10 mg, 10 mg, rectal, q24h PRN, Edi Wise MD    cholecalciferol (Vitamin D-3) tablet 25 mcg, 25 mcg, oral, Daily, Edi Wise MD, 25 mcg at 05/12/25 0829    collagenase 250 unit/gram ointment, , Topical, Daily, Edi Wise MD, Given at 05/12/25 0829    dextrose 50 % injection 12.5 g, 12.5 g, intravenous, q15 min PRN, Edi Wise MD    dextrose 50 % injection 25 g, 25 g, intravenous, q15 min PRN, Edi Wise MD    docusate sodium (Colace) capsule 100 mg, 100 mg, oral, Daily, Edi Wise MD, 100 mg at 05/12/25 0829    donepezil (Aricept) tablet 23 mg, 23 mg, oral, Daily, Edi Wise MD, 23 mg at 05/12/25 0828    ferrous sulfate tablet 325 mg, 65 mg of iron, oral, Daily with breakfast, Edi Wise MD, 325 mg at 05/12/25 0829    finasteride (Proscar) tablet 5 mg, 5 mg, oral, Daily, Edi Wise MD, 5 mg at 05/12/25 0828    folic acid (Folvite) tablet 1 mg, 1 mg, oral, Daily, Edi Wise MD, 1 mg at 05/12/25 0829    glucagon (Glucagen) injection 1 mg, 1 mg, intramuscular, q15 min PRN, Edi Wise MD    glucagon (Glucagen) injection 1 mg, 1 mg, intramuscular, q15 min PRN, Edi Wise MD    heparin 1,000 unit/mL injection 2,000 Units, 2,000 Units, intra-catheter, After Dialysis, Edi Wise MD    heparin 1,000 unit/mL injection 2,000 Units, 2,000 Units, intra-catheter, After Dialysis, Edi Wise MD, 2,000 Units at 05/08/25 1300    heparin 1,000 unit/mL injection 2,000 Units, 2,000 Units, intra-catheter, After Dialysis, Edi Wise MD    insulin glargine (Lantus) injection 5 Units, 5 Units, subcutaneous,  Nightly, Edi Wise MD, 5 Units at 05/12/25 2139    insulin lispro injection 0-5 Units, 0-5 Units, subcutaneous, TID AC, Edi Wise MD, 1 Units at 05/10/25 1651    lactobacillus acidophilus tablet 1 tablet, 1 tablet, oral, Daily, Daisy Watson DO, 1 tablet at 05/12/25 0829    levETIRAcetam (Keppra) tablet 500 mg, 500 mg, oral, BID, Edi Wise MD, 500 mg at 05/12/25 2138    loperamide (Imodium) capsule 2 mg, 2 mg, oral, Once PRN, Edi Wise MD    magnesium hydroxide (Milk of Magnesia) 400 mg/5 mL suspension 30 mL, 30 mL, oral, q24h PRN, Edi Wise MD    melatonin tablet 10 mg, 10 mg, oral, Nightly, Edi Wise MD, 10 mg at 05/12/25 2138    meropenem (Merrem) 500 mg in sodium chloride 0.9%  mL, 500 mg, intravenous, q12h, Edi Wise MD, Stopped at 05/12/25 2228    metoprolol tartrate (Lopressor) tablet 50 mg, 50 mg, oral, BID, Edi Wise MD, 50 mg at 05/12/25 2138    ondansetron (Zofran) tablet 4 mg, 4 mg, oral, q8h PRN, Edi Wise MD    oxyCODONE (Roxicodone) immediate release tablet 2.5 mg, 2.5 mg, oral, q6h PRN, Edi Wise MD, 2.5 mg at 05/12/25 1729    polyethylene glycol (Glycolax, Miralax) packet 17 g, 17 g, oral, Daily PRN, dEi Wise MD    sevelamer carbonate (Renvela) packet 1.6 g, 1.6 g, oral, BID, Edi Wise MD, 1.6 g at 05/12/25 2141    sodium chloride 0.9 % bolus 200 mL, 200 mL, intravenous, After Dialysis, Edi Wise MD    sodium chloride 0.9 % bolus 200 mL, 200 mL, intravenous, After Dialysis, Edi Wise MD    sodium chloride 0.9 % bolus 200 mL, 200 mL, intravenous, Once, Edi Wise MD    sodium chloride 0.9 % bolus 200 mL, 200 mL, intravenous, Once, Edi Wise MD    sodium hypochlorite (Dakin's Full-Strength) 0.5 % external solution, , irrigation, Daily, Roland J Reyes, MD, Given at 05/12/25 0825    vitamin B complex-vitamin C-folic acid (Nephrocaps) capsule 1 capsule, 1 capsule, oral, Daily, Edi Wise MD, 1  capsule at 05/12/25 0617

## 2025-05-13 NOTE — POST-PROCEDURE NOTE
Notes/Events during Treatment:     None  Report to Receiving RN:     Report To: FERNIE Matos  Time Report Called:  1910  Hand-Off Communication to Receiving RN: Tx tolerated well  Complications During Treatment: No  Ultrafiltration Treatment: No  Medications Administered During Dialysis: No  Blood Products Administered During Dialysis: No  Labs Sent During Dialysis: No  Heparin Drip Rate Changes: No  Sending BP:  136/60, 72   Dialysis Catheter Dressing Changed: Yes  Significant Events/Interventions: N/A  Provider Contacted/Time/Response/Orders: Dr. Wise concerning BFR  HD Orders Followed: Yes      Tx Initiated by/Time: [id number] RN  Tx Terminated by/Time: 1853, FERNIE Ruby  Fluid Removed: 1.5     Electronic Signatures:                    Authored: FERNIE Ruby     Last Updated: 1:40 PM by MALLORY BACH

## 2025-05-13 NOTE — PRE-PROCEDURE NOTE
Report from Sending RN:    Report From: FERNIE Matos  Recent Surgery or Procedure: No  Baseline Level of Consciousness (LOC): A&Ox3.  Admission Dx: N/V  Precautions/Isolations: None  Code Status: Full Code  Oxygen Use: No  Type: Room Air  Diabetic: Yes, 143  Receiving BP:  Last BP Med Given Day of Dialysis: See EMAR  Last Pain Med Given: See EMAR  Lab Tests to be Obtained with Dialysis: No  Lastest Lab Values:  K+:   5.1  Ca+: 8.3  HGB: 8.3  BUN: 64  Creat: 3.8  INR: 1.3  Blood Transfusion to be Given During Dialysis: No  Available IV Access: Yes  Dialysis Access: Left CVC  Medications to be Administered During Dialysis: No  Continuous IV Infusion Running: No  Restraints on Currently or in the Last 24 Hours: No  Hand-Off Communication from Sending RN: Patient stable for HD    Notes/Events during Treatment:    None  Report to Receiving RN:    Report To: FERNIE Matos  Time Report Called:  1910  Hand-Off Communication to Receiving RN: Tx tolerated well  Complications During Treatment: No  Ultrafiltration Treatment: No  Medications Administered During Dialysis: No  Blood Products Administered During Dialysis: No  Labs Sent During Dialysis: No  Heparin Drip Rate Changes: No  Sending BP:  136/60,72  Dialysis Catheter Dressing Changed: Yes  Significant Events/Interventions: N/A  Provider Contacted/Time/Response/Orders: Dr. Wise concerning BFR  HD Orders Followed: Yes     Tx Initiated by/Time: [id number] RN  Tx Terminated by/Time: [id number] RN  Fluid Removed: 1.5    Electronic Signatures:       Authored: FERNIE Ruby    Last Updated: 1:40 PM by MALLORY BACH

## 2025-05-13 NOTE — CARE PLAN
The patient's goals for the shift include feel better    The clinical goals for the shift include patient will tolerate sacral w2d dressing during shift    Over the shift, the patient did make progress toward the following goals.

## 2025-05-13 NOTE — PROGRESS NOTES
Plastic Surgery Note    Afebrile, vital signs stable  Patient agrees he has much less pain in the sacral area  Wound is clean with clean granulation tissue minimal necrosis  Impression: Sacral pressure sore  Continue Dakin's wet-to-dry dressings  Continue offloading with Clinitron bed

## 2025-05-14 ENCOUNTER — APPOINTMENT (OUTPATIENT)
Dept: WOUND CARE | Facility: CLINIC | Age: 85
End: 2025-05-14
Payer: MEDICARE

## 2025-05-14 ENCOUNTER — APPOINTMENT (OUTPATIENT)
Dept: RADIOLOGY | Facility: HOSPITAL | Age: 85
DRG: 871 | End: 2025-05-14
Payer: MEDICARE

## 2025-05-14 VITALS
RESPIRATION RATE: 18 BRPM | HEIGHT: 72 IN | BODY MASS INDEX: 20.36 KG/M2 | HEART RATE: 71 BPM | SYSTOLIC BLOOD PRESSURE: 166 MMHG | TEMPERATURE: 96.8 F | OXYGEN SATURATION: 94 % | WEIGHT: 150.35 LBS | DIASTOLIC BLOOD PRESSURE: 81 MMHG

## 2025-05-14 DIAGNOSIS — L03.90 WOUND CELLULITIS: Primary | ICD-10-CM

## 2025-05-14 PROBLEM — R11.10 VOMITING, UNSPECIFIED VOMITING TYPE, UNSPECIFIED WHETHER NAUSEA PRESENT: Status: RESOLVED | Noted: 2025-05-07 | Resolved: 2025-05-14

## 2025-05-14 LAB
GLUCOSE BLD MANUAL STRIP-MCNC: 112 MG/DL (ref 74–99)
GLUCOSE BLD MANUAL STRIP-MCNC: 115 MG/DL (ref 74–99)
GLUCOSE BLD MANUAL STRIP-MCNC: 77 MG/DL (ref 74–99)

## 2025-05-14 PROCEDURE — 2720000007 HC OR 272 NO HCPCS

## 2025-05-14 PROCEDURE — 99232 SBSQ HOSP IP/OBS MODERATE 35: CPT | Performed by: INTERNAL MEDICINE

## 2025-05-14 PROCEDURE — 2500000001 HC RX 250 WO HCPCS SELF ADMINISTERED DRUGS (ALT 637 FOR MEDICARE OP): Performed by: INTERNAL MEDICINE

## 2025-05-14 PROCEDURE — 2780000003 HC OR 278 NO HCPCS

## 2025-05-14 PROCEDURE — C1750 CATH, HEMODIALYSIS,LONG-TERM: HCPCS

## 2025-05-14 PROCEDURE — C1751 CATH, INF, PER/CENT/MIDLINE: HCPCS

## 2025-05-14 PROCEDURE — 77001 FLUOROGUIDE FOR VEIN DEVICE: CPT | Performed by: RADIOLOGY

## 2025-05-14 PROCEDURE — 2500000001 HC RX 250 WO HCPCS SELF ADMINISTERED DRUGS (ALT 637 FOR MEDICARE OP): Performed by: PLASTIC SURGERY

## 2025-05-14 PROCEDURE — 2500000004 HC RX 250 GENERAL PHARMACY W/ HCPCS (ALT 636 FOR OP/ED): Mod: JZ | Performed by: INTERNAL MEDICINE

## 2025-05-14 PROCEDURE — 2500000004 HC RX 250 GENERAL PHARMACY W/ HCPCS (ALT 636 FOR OP/ED): Performed by: RADIOLOGY

## 2025-05-14 PROCEDURE — 36410 VNPNXR 3YR/> PHY/QHP DX/THER: CPT

## 2025-05-14 PROCEDURE — 82947 ASSAY GLUCOSE BLOOD QUANT: CPT

## 2025-05-14 PROCEDURE — 36581 REPLACE TUNNELED CV CATH: CPT | Performed by: RADIOLOGY

## 2025-05-14 RX ORDER — HEPARIN SODIUM 1000 [USP'U]/ML
INJECTION, SOLUTION INTRAVENOUS; SUBCUTANEOUS
Status: COMPLETED | OUTPATIENT
Start: 2025-05-14 | End: 2025-05-14

## 2025-05-14 RX ORDER — LIDOCAINE HYDROCHLORIDE 20 MG/ML
INJECTION, SOLUTION EPIDURAL; INFILTRATION; INTRACAUDAL; PERINEURAL
Status: COMPLETED | OUTPATIENT
Start: 2025-05-14 | End: 2025-05-14

## 2025-05-14 RX ORDER — LIDOCAINE HYDROCHLORIDE 10 MG/ML
5 INJECTION, SOLUTION INFILTRATION; PERINEURAL ONCE
Status: DISCONTINUED | OUTPATIENT
Start: 2025-05-14 | End: 2025-05-14 | Stop reason: HOSPADM

## 2025-05-14 RX ADMIN — FINASTERIDE 5 MG: 5 TABLET, FILM COATED ORAL at 08:55

## 2025-05-14 RX ADMIN — Medication 1 CAPSULE: at 06:18

## 2025-05-14 RX ADMIN — SEVELAMER CARBONATE 1.6 G: 800 POWDER, FOR SUSPENSION ORAL at 08:55

## 2025-05-14 RX ADMIN — Medication 25 MCG: at 08:55

## 2025-05-14 RX ADMIN — DOCUSATE SODIUM 100 MG: 100 CAPSULE, LIQUID FILLED ORAL at 08:55

## 2025-05-14 RX ADMIN — DONEPEZIL HYDROCHLORIDE 23 MG: 23 TABLET, FILM COATED ORAL at 08:55

## 2025-05-14 RX ADMIN — MEROPENEM 500 MG: 500 INJECTION, POWDER, FOR SOLUTION INTRAVENOUS at 08:55

## 2025-05-14 RX ADMIN — COLLAGENASE SANTYL: 250 OINTMENT TOPICAL at 08:54

## 2025-05-14 RX ADMIN — LIDOCAINE HYDROCHLORIDE 4 ML: 20 INJECTION, SOLUTION EPIDURAL; INFILTRATION; INTRACAUDAL; PERINEURAL at 15:13

## 2025-05-14 RX ADMIN — Medication: at 06:18

## 2025-05-14 RX ADMIN — LEVETIRACETAM 500 MG: 500 TABLET, FILM COATED ORAL at 08:55

## 2025-05-14 RX ADMIN — HEPARIN SODIUM 4200 UNITS: 1000 INJECTION INTRAVENOUS; SUBCUTANEOUS at 15:19

## 2025-05-14 RX ADMIN — FOLIC ACID 1 MG: 1 TABLET ORAL at 08:55

## 2025-05-14 RX ADMIN — FERROUS SULFATE TAB 325 MG (65 MG ELEMENTAL FE) 325 MG: 325 (65 FE) TAB at 08:55

## 2025-05-14 RX ADMIN — Medication 1 TABLET: at 08:55

## 2025-05-14 RX ADMIN — METOPROLOL TARTRATE 50 MG: 50 TABLET, FILM COATED ORAL at 08:55

## 2025-05-14 ASSESSMENT — COGNITIVE AND FUNCTIONAL STATUS - GENERAL
STANDING UP FROM CHAIR USING ARMS: TOTAL
TURNING FROM BACK TO SIDE WHILE IN FLAT BAD: TOTAL
WALKING IN HOSPITAL ROOM: TOTAL
MOVING FROM LYING ON BACK TO SITTING ON SIDE OF FLAT BED WITH BEDRAILS: TOTAL
MOVING TO AND FROM BED TO CHAIR: TOTAL
DRESSING REGULAR UPPER BODY CLOTHING: TOTAL
HELP NEEDED FOR BATHING: TOTAL
PERSONAL GROOMING: TOTAL
CLIMB 3 TO 5 STEPS WITH RAILING: TOTAL
MOBILITY SCORE: 6
DRESSING REGULAR LOWER BODY CLOTHING: TOTAL
DAILY ACTIVITIY SCORE: 7
EATING MEALS: A LOT
TOILETING: TOTAL

## 2025-05-14 ASSESSMENT — PAIN SCALES - GENERAL: PAINLEVEL_OUTOF10: 0 - NO PAIN

## 2025-05-14 ASSESSMENT — PAIN - FUNCTIONAL ASSESSMENT: PAIN_FUNCTIONAL_ASSESSMENT: 0-10

## 2025-05-14 NOTE — PROGRESS NOTES
"Nutrition Follow Up Assessment:   Nutrition Assessment         Patient is a 84 y.o. male presenting with vomiting      Nutrition History:  Energy Intake: Good > 75 %  Pain affecting nutrition status: N/A  Food and Nutrient History: RD follow-up. Consistent carb diet discontinued since last RD visit. Blood glucose mostly <180. Diet textures changed to Easy to Chew on 5/13 then later changed to regular textures on 5/13. Defer textures/consistencies to SLP/MD. Currently on renal diet, with potassium currently 5.1. Pt nodded head yes and no to questioning appropriately. Nodded head yes that is eating well and likes current supplements. Confirmed with nursing that pt is eating well and consuming supplements.       Anthropometrics:  Height: 182 cm (5' 11.65\")   Weight: 68.2 kg (150 lb 5.7 oz)   BMI (Calculated): 20.59                              Weight Change %:  Weight History / % Weight Change: no new wt to review    Nutrition Focused Physical Exam Findings:    Subcutaneous Fat Loss:   Defer Subcutaneous Fat Loss Assessment: Defer all  Defer All Reason: previously completed  Muscle Wasting:  Defer Muscle Wasting Assessment: Defer all  Defer All Reason: previously completed  Edema:  Edema: +1 trace, +2 mild  Edema Location: 1+ RUE, 2+ LUE edema, non-pitting BLE edema per nursing assessment  Physical Findings:  Skin: Positive (stage IV sacral ulcer, ulcers on feet, L elbow wound per documentation)  Positive Skin Findings: Pressure injury stage 4, Impaired wound healing  Mouth Findings:  (advaned to regular/thin by MD on 5/13)    Nutrition Significant Labs:  BMP Trend:   Results from last 7 days   Lab Units 05/12/25  0636 05/09/25  0550 05/07/25  1507   GLUCOSE mg/dL 82 191* 192*   CALCIUM mg/dL 8.3* 8.3* 8.7   SODIUM mmol/L 125* 131* 134*   POTASSIUM mmol/L 5.1 3.8 4.7   CO2 mmol/L 27 27 28   CHLORIDE mmol/L 95* 96* 95*   BUN mg/dL 64* 38* 42*   CREATININE mg/dL 3.85* 3.22* 4.29*    , Renal Lab Trend:   Results from last " 7 days   Lab Units 05/12/25  0636 05/09/25  0550 05/07/25  1507   POTASSIUM mmol/L 5.1 3.8 4.7   SODIUM mmol/L 125* 131* 134*   EGFR mL/min/1.73m*2 15* 18* 13*   BUN mg/dL 64* 38* 42*   CREATININE mg/dL 3.85* 3.22* 4.29*        Nutrition Specific Medications:  Reviewed    I/O:   Last BM Date: 05/12/25; Stool Appearance: Unable to assess (05/10/25 2000)    Dietary Orders (From admission, onward)       Start     Ordered    05/13/25 0847  Adult diet Renal; Potassium Restricted 2 gm (50mEq); 2 - 3 grams Sodium  Diet effective now        Question Answer Comment   Diet type Renal    Potassium restriction: Potassium Restricted 2 gm (50mEq)    Sodium restriction: 2 - 3 grams Sodium        05/13/25 0847    05/08/25 0929  Oral nutritional supplements  Until discontinued        Comments: orange   Question Answer Comment   Deliver with Lunch    Deliver with Dinner    Select supplement: Remigio        05/08/25 0928    05/08/25 0928  Oral nutritional supplements  Until discontinued        Comments: vanilla   Question Answer Comment   Deliver with Breakfast    Deliver with Dinner    Select supplement: Glucerna Shake        05/08/25 0928    05/07/25 2021  May Participate in Room Service With Assistance  ( ROOM SERVICE MAY PARTICIPATE WITH ASSISTANCE)  Once        Question:  .  Answer:  Yes    05/07/25 2020                     Estimated Needs:   Total Energy Estimated Needs in 24 hours (kCal): 2390 kCal  Method for Estimating Needs: 30 kcal/kg  Total Protein Estimated Needs in 24 Hours (g): 136 g  Method for Estimating 24 Hour Protein Needs: 2 g/kg  Total Fluid Estimated Needs in 24 Hours (mL):  (per MD)  Patient on Order Fluid Restriction: No        Nutrition Diagnosis   Malnutrition Diagnosis  Patient has Malnutrition Diagnosis: Yes  Diagnosis Status: Active  Malnutrition Diagnosis: Moderate malnutrition related to chronic disease or condition  Related to: multiple chronic conditions (dementia, ESRD, stage IV wound)  As Evidenced  by: moderate-severe muscle wasting, moderate fat losses    Nutrition Diagnosis  Patient has Nutrition Diagnosis: Yes  Diagnosis Status (1): Active  Nutrition Diagnosis 1: Increased nutrient needs  Related to (1): physiological causes increasing nutrient needs  As Evidenced by (1): wounds, HD       Nutrition Interventions/Recommendations   Nutrition prescription for oral nutrition    Nutrition Recommendations:  Individualized Nutrition Prescription Provided for : Renal diet. Defer re-adding 75g carb controlled diet d/t blood glucose with good control. Glucerna BID. Remigio BID. Textures/consistencies per SLP/MD. 1:1 Feeding. If blood glucose consistently >180, recommend re-order 75g carb controlled diet.    Nutrition Interventions/Goals:   Goal: Consumes 3 meals per day  Medical Food Supplement: Commercial beverage medical food supplement therapy  Goal: Glucerna BID (provides 220 kcal, 10 g protein per serving). Remigio BID (provides 90 kcal and 2.5 g protein per packet)  Coordination of Care with Providers: Nursing      Education Documentation  No documentation found.            Nutrition Monitoring and Evaluation   Food/Nutrient Related History Monitoring  Monitoring and Evaluation Plan: Intake / amount of food, Estimated Energy Intake  Estimated Energy Intake: Energy intake greater or equal to 75% of estimated energy needs  Intake / Amount of food: Consumes at least 75% or more of meals/snacks/supplements, Meets > 75% estimated energy needs    Anthropometric Measurements  Monitoring and Evaluation Plan: Body weight  Body Weight: Body weight - Maintain stable weight    Biochemical Data, Medical Tests and Procedures  Monitoring and Evaluation Plan: Electrolyte/renal panel, Glucose/endocrine profile  Electrolyte and Renal Panel: Electrolytes within normal limits  Glucose/Endocrine Profile: Glucose within normal limits ( mg/dL)    Physical Exam Findings  Monitoring and Evaluation Plan: Skin  Skin Finding: Impaired  wound healing - Improved wound healing    Goal Status: Some progress toward goal(s)    Time Spent (min): 30 minutes

## 2025-05-14 NOTE — PROGRESS NOTES
Infectious Disease Progress Note       5/13/2025    Patient is a followup regarding gram-negative bacteremia with large sacral decubitus ulcer with underlying chronic osteomyelitis of the sacral area with nonhealing nonsurgical sacral decubitus wound.  Patient does have a history of end-stage renal disease on hemodialysis via permacath and he is a type II diabetic.    Patient is awake No fevers.    Lab Results   Component Value Date    WBC 16.1 (H) 05/12/2025    HGB 8.3 (L) 05/12/2025    HCT 24.8 (L) 05/12/2025    MCV 80 05/12/2025     05/12/2025     Lab Results   Component Value Date    GLUCOSE 82 05/12/2025    CALCIUM 8.3 (L) 05/12/2025     (L) 05/12/2025    K 5.1 05/12/2025    CO2 27 05/12/2025    CL 95 (L) 05/12/2025    BUN 64 (H) 05/12/2025    CREATININE 3.85 (H) 05/12/2025       WBC trends are being monitored. Antibiotic doses are being adjusted per most recent renal labs.     Vitals:    05/13/25 1942   BP: 115/56   Pulse: 77   Resp: 18   Temp: 36.9 °C (98.4 °F)   SpO2: 91%         Muscle atrophy, permacath in place, no erythema around the site  More interactive today  NAD  Neck supple  Heart S1S2  Chest: Equal expansion, no rales  Abdomen: soft, ND, NTTP, no guarding  Extrem: no pain to palpation  Skin: no rashes, no diaphoresis  Neuro: CNS intact but does not verbalize with me  Photos of large sacral wound reviewed her medical records    Problem List[1]    Estimated Creatinine Clearance: 13.3 mL/min (A) (by C-G formula based on SCr of 3.85 mg/dL (H)).    Morganella morganii 2 out of 2 blood cultures + 5/7/2025  1 out of 2 blood cultures also positive for Citrobacter ffreundii complex  All susceptibilities pending      ASSESSMENT:  Gram-negative bacteremia, citrobacter and morganella  Large infected sacral decubitus ulcer, nonhealing nonsurgical wound, with underlying chronic osteomyelitis  End-stage renal disease on hemodialysis  Diabetes mellitus type 2   Seizure disorder on Keppra  Atrial  fibrillation  Acute metabolic encephalopathy with history of cognitive dysfunction, on Aricept per medical records  BPH      PLAN:  Downtrending leukocytosis   Plastics following sacral decubitus ulcer  Will plan to continue meropenem at renal dose for now - sensitivities reviewed. There is some resistance of the morganella to Imipenem but not to Meropenem. Clinically patient is improving and wound is stable. Can consider changing abx if there is any clinical decline.   Repeat BC x 2   Local wound care  Optimal glycemic control  Monitor for seizure activity on meropenem as this can potentially lower the threshold    This is a chronic wound and he has chronic underlying OM that may not heal with extended abx and risk of longterm abx may outweigh benefit.     Lactobacillus ( if he is able to take safely ) and plan on 2-4 weeks of abx to help facilitate wound healing along with wound care.       Possibly we can do cefepime with dialysis  Orlin José MD            [1]   Patient Active Problem List  Diagnosis    ESRD on hemodialysis (Multi)    Closed fracture of neck of left femur, initial encounter    Falls, initial encounter    Closed head injury    Elevated troponin    Abscess of hip    Hip dislocation, left, initial encounter (Multi)    Acute encephalopathy    Vomiting, unspecified vomiting type, unspecified whether nausea present

## 2025-05-14 NOTE — NURSING NOTE
Report called to Vanessa at [facility] at this time. Pickup scheduled for [facility]pm via [facility].

## 2025-05-14 NOTE — CARE PLAN
The patient's goals for the shift include rest and comfort    The clinical goals for the shift include wound management    Over the shift, the patient did not make progress toward the following goals. Barriers to progression include; none. Recommendations to address these barriers include; continue current plan of care..3        Problem: Skin  Goal: Decreased wound size/increased tissue granulation at next dressing change  Outcome: Progressing  Flowsheets (Taken 5/14/2025 1134)  Decreased wound size/increased tissue granulation at next dressing change:   Promote sleep for wound healing   Protective dressings over bony prominences   Utilize specialty bed per algorithm     Problem: Skin  Goal: Promote skin healing  Outcome: Progressing  Flowsheets (Taken 5/14/2025 1134)  Promote skin healing:   Assess skin/pad under line(s)/device(s)   Protective dressings over bony prominences   Turn/reposition every 2 hours/use positioning/transfer devices   Ensure correct size (line/device) and apply per  instructions

## 2025-05-14 NOTE — POST-PROCEDURE NOTE
Interventional Radiology Brief Postprocedure Note    Attending: Schoenberger    Assistant: None    Diagnosis: Renal Failure    Description of procedure: Tunneled Catheter Exchange     Anesthesia:  Local    Complications: None    Estimated Blood Loss: minimal    Medications  As of 05/14/25 1528      lactated Ringer's bolus 1,000 mL (mL/hr) Total volume:  775 mL*   *From user-documented volume     Date/Time Rate/Dose/Volume Action       05/07/25  1520 1,000 mL - 500 mL/hr (over 120 min) New Bag      1653  (over 120 min) Stopped      2158 775 mL                piperacillin-tazobactam (Zosyn) 4.5 g in sodium chloride 0.9%  mL (g) Total dose:  4.5 g      Date/Time Rate/Dose/Volume Action       05/07/25  1619 4.5 g (over 30 min) New Bag      1654  (over 30 min) Stopped               vancomycin (Vancocin) 1,000 mg in dextrose 5%  mL (mL/hr) Total volume:  Not documented*   *Total volume has not been documented. View each administration to see the amount administered.     Date/Time Rate/Dose/Volume Action       05/07/25  1654 1,000 mg - 200 mL/hr (over 60 min) New Bag      1809  (over 60 min) Stopped               acetaminophen (Tylenol) tablet 975 mg (mg) Total dose:  975 mg      Date/Time Rate/Dose/Volume Action       05/07/25  1624 975 mg Given               apixaban (Eliquis) tablet 2.5 mg (mg) Total dose:  27.5 mg*   *Administration not included in total     Date/Time Rate/Dose/Volume Action       05/07/25  2113 2.5 mg Given     05/08/25  1252 *2.5 mg Missed      2029 2.5 mg Given     05/09/25  0844 2.5 mg Given      2214 2.5 mg Given     05/10/25  1727 2.5 mg Given      2034 *2.5 mg Missed     05/11/25  0810 2.5 mg Given      2055 2.5 mg Given     05/12/25  0828 2.5 mg Given      2138 2.5 mg Given     05/13/25  1119 2.5 mg Given      2027 2.5 mg Given     05/14/25  1028 *2.5 mg Missed      1028 *Not included in total Held by provider               cholecalciferol (Vitamin D-3) tablet 25 mcg (mcg) Total  dose:  150 mcg*   *Administration not included in total     Date/Time Rate/Dose/Volume Action       05/08/25  1252 *25 mcg Missed     05/09/25  0844 25 mcg Given     05/10/25  1727 25 mcg Given     05/11/25  0811 25 mcg Given     05/12/25  0829 25 mcg Given     05/13/25  1119 25 mcg Given     05/14/25  0855 25 mcg Given               collagenase 250 unit/gram ointment Total dose:  Cannot be calculated*   *Administration does not have dose documented     Date/Time Rate/Dose/Volume Action       05/07/25  2144  Given     05/09/25  0844  Given     05/10/25  1730  Given     05/11/25  0819  Given     05/12/25  0829  Given     05/13/25  1038  Given     05/14/25  0854  Given               docusate sodium (Colace) capsule 100 mg (mg) Total dose:  500 mg*   *Administration not included in total     Date/Time Rate/Dose/Volume Action       05/08/25  1252 *100 mg Missed     05/09/25  0844 100 mg Given     05/10/25  1727 100 mg Given     05/11/25  0810 100 mg Given     05/12/25  0829 100 mg Given     05/13/25  1109 *100 mg Missed     05/14/25  0855 100 mg Given               donepezil (Aricept) tablet 23 mg (mg) Total dose:  161 mg      Date/Time Rate/Dose/Volume Action       05/08/25  1343 23 mg Given     05/09/25  0844 23 mg Given     05/10/25  1727 23 mg Given     05/11/25  0811 23 mg Given     05/12/25  0828 23 mg Given     05/13/25  1119 23 mg Given     05/14/25  0855 23 mg Given               ferrous sulfate tablet 325 mg (mg) Total dose:  390 mg of elemental iron*   *Administration not included in total     Date/Time Rate/Dose/Volume Action       05/08/25  1252 *325 mg Missed     05/09/25  0844 325 mg Given     05/10/25  1727 325 mg Given     05/11/25  0810 325 mg Given     05/12/25  0829 325 mg Given     05/13/25  1119 325 mg Given     05/14/25  0855 325 mg Given               finasteride (Proscar) tablet 5 mg (mg) Total dose:  30 mg*   *Administration not included in total     Date/Time Rate/Dose/Volume Action        05/08/25  1252 *5 mg Missed     05/09/25  0844 5 mg Given     05/10/25  1727 5 mg Given     05/11/25  0810 5 mg Given     05/12/25  0828 5 mg Given     05/13/25  1119 5 mg Given     05/14/25  0855 5 mg Given               folic acid (Folvite) tablet 1 mg (mg) Total dose:  6 mg*   *Administration not included in total     Date/Time Rate/Dose/Volume Action       05/08/25  1252 *1 mg Missed     05/09/25  0844 1 mg Given     05/10/25  1727 1 mg Given     05/11/25  0811 1 mg Given     05/12/25  0829 1 mg Given     05/13/25  1119 1 mg Given     05/14/25  0855 1 mg Given               insulin glargine (Lantus) injection 5 Units (Units) Total dose:  35 Units      Date/Time Rate/Dose/Volume Action       05/07/25 2112 5 Units Given     05/08/25 2029 5 Units Given     05/09/25  2216 5 Units Given     05/10/25  2036 5 Units Given     05/11/25 2051 5 Units Given     05/12/25 2139 5 Units Given     05/13/25 2027 5 Units Given               levETIRAcetam (Keppra) tablet 500 mg (mg) Total dose:  7,000 mg      Date/Time Rate/Dose/Volume Action       05/07/25 2115 500 mg Given     05/08/25  1343 500 mg Given      2029 500 mg Given     05/09/25  0844 500 mg Given      2215 500 mg Given     05/10/25  0848 500 mg Given      2033 500 mg Given     05/11/25  0810 500 mg Given      2055 500 mg Given     05/12/25  0829 500 mg Given      2138 500 mg Given     05/13/25  1119 500 mg Given      2027 500 mg Given     05/14/25  0855 500 mg Given               melatonin tablet 10 mg (mg) Total dose:  70 mg      Date/Time Rate/Dose/Volume Action       05/07/25 2114 10 mg Given     05/08/25 2029 10 mg Given     05/09/25  2214 10 mg Given     05/10/25  2033 10 mg Given     05/11/25 2056 10 mg Given     05/12/25  2138 10 mg Given     05/13/25 2027 10 mg Given               metoprolol tartrate (Lopressor) tablet 50 mg (mg) Total dose:  600 mg*   *Administration not included in total     Date/Time Rate/Dose/Volume Action       05/07/25 2115 50  mg Given     05/08/25  1252 *50 mg Missed      2029 50 mg Given     05/09/25  0844 50 mg Given      2215 50 mg Given     05/10/25  1727 50 mg Given      2035 *50 mg Missed     05/11/25  0810 50 mg Given      2056 50 mg Given     05/12/25  0829 50 mg Given      2138 50 mg Given     05/13/25  1119 50 mg Given      2027 50 mg Given     05/14/25  0855 50 mg Given               oxyCODONE (Roxicodone) immediate release tablet 2.5 mg (mg) Total dose:  5 mg      Date/Time Rate/Dose/Volume Action       05/09/25 2214 2.5 mg Given     05/12/25  1729 2.5 mg Given               sevelamer carbonate (Renvela) packet 1.6 g (g) Total dose:  19.2 g*   *Administration not included in total     Date/Time Rate/Dose/Volume Action       05/07/25  2144 1.6 g Given     05/08/25  1252 *1.6 g Missed      2029 1.6 g Given     05/09/25  0844 1.6 g Given      2215 1.6 g Given     05/10/25  0835 *1.6 g Missed      2033 1.6 g Given     05/11/25  0811 1.6 g Given      2054 1.6 g Given     05/12/25  0828 1.6 g Given      2141 1.6 g Given     05/13/25  1120 1.6 g Given      2027 1.6 g Given     05/14/25  0855 1.6 g Given               vitamin B complex-vitamin C-folic acid (Nephrocaps) capsule 1 capsule (capsule) Total dose:  7 capsule      Date/Time Rate/Dose/Volume Action       05/08/25  0647 1 capsule Given     05/09/25  0652 1 capsule Given     05/10/25  0556 1 capsule Given     05/11/25  0644 1 capsule Given     05/12/25  0617 1 capsule Given     05/13/25  0606 1 capsule Given     05/14/25  0618 1 capsule Given               insulin lispro injection 0-5 Units (Units) Total dose:  9 Units      Date/Time Rate/Dose/Volume Action       05/08/25  0638 1 Units Given      1140 *0 Units Missed      1716 1 Units Given     05/09/25  0652 1 Units Given      1227 2 Units Given      1744 1 Units Given     05/10/25  0636 1 Units Given      1157 1 Units Given      1651 1 Units Given     05/11/25  0721 *Not included in total Missed      1145 *Not included in  total Missed      1625 *Not included in total Missed     05/12/25  0613 *Not included in total Missed      1143 *0 Units Missed      1710 *0 Units Missed     05/13/25  0606 *Not included in total Missed      1123 *Not included in total Missed      1547 *Not included in total Missed     05/14/25  0620 *Not included in total Missed      1119 *Not included in total Missed               meropenem (Merrem) 500 mg in sodium chloride 0.9%  mL (mL/hr) Total dose:  3,000 mg*   *From user-documented volume     Date/Time Rate/Dose/Volume Action       05/07/25  2112 500 mg - 200 mL/hr (over 30 min) New Bag      2158 100 mL Stopped     05/08/25  1354 500 mg - 200 mL/hr (over 30 min) New Bag      1435 100 mL Stopped      2029 500 mg - 200 mL/hr (over 30 min) New Bag      2059  (over 30 min) Stopped     05/09/25  0000 100 mL       0844 500 mg - 200 mL/hr (over 30 min) New Bag      0937 100 mL Stopped      2215 500 mg - 200 mL/hr (over 30 min) New Bag      2300  (over 30 min) Stopped     05/10/25  0100 100 mL       1722 *500 mg - 200 mL/hr (over 30 min) Missed      2031 500 mg - 200 mL/hr (over 30 min) New Bag      2200 100 mL Stopped     05/11/25  0812 500 mg - 200 mL/hr (over 30 min) New Bag      0842  (over 30 min) Stopped      2058 500 mg - 200 mL/hr (over 30 min) New Bag      2203  (over 30 min) Stopped     05/12/25  0825 500 mg - 200 mL/hr (over 30 min) New Bag      0838  (over 30 min) Stopped      2137 500 mg - 200 mL/hr (over 30 min) New Bag      2228  (over 30 min) Stopped     05/13/25  1038 500 mg - 200 mL/hr (over 30 min) New Bag      1114  (over 30 min) Stopped      2028 500 mg - 200 mL/hr (over 30 min) New Bag      2107  (over 30 min) Stopped     05/14/25  0855 500 mg - 200 mL/hr (over 30 min) New Bag      0925  (over 30 min) Stopped               sodium chloride 0.9 % bolus 200 mL (mL) Total volume:  0 mL*   *Administration not included in total     Date/Time Rate/Dose/Volume Action       05/09/25  1110 *200 mL  - 400 mL/hr (over 30 min) Missed     05/10/25  1722 *200 mL - 400 mL/hr (over 30 min) Missed     05/11/25  1145 *200 mL - 400 mL/hr (over 30 min) Missed     05/12/25  1144 *200 mL - 400 mL/hr (over 30 min) Missed     05/13/25  1114 *200 mL - 400 mL/hr (over 30 min) Missed     05/14/25  1103 *200 mL - 400 mL/hr (over 30 min) Missed               sodium chloride 0.9 % bolus 200 mL (mL) Total volume:  0 mL* Dosing weight:  68.2   *Administration not included in total     Date/Time Rate/Dose/Volume Action       05/10/25  1723 *200 mL - 400 mL/hr (over 30 min) Missed     05/11/25  1146 *200 mL - 400 mL/hr (over 30 min) Missed     05/12/25  1144 *200 mL - 400 mL/hr (over 30 min) Missed     05/13/25  1115 *200 mL - 400 mL/hr (over 30 min) Missed     05/14/25  1103 *200 mL - 400 mL/hr (over 30 min) Missed               sodium chloride 0.9 % bolus 200 mL (mL) Total volume:  0 mL* Dosing weight:  68.2   *Administration not included in total     Date/Time Rate/Dose/Volume Action       05/10/25  1724 *200 mL - 2,400 mL/hr (over 5 min) Missed               sodium chloride 0.9 % bolus 200 mL (mL) Total volume:  0 mL* Dosing weight:  68.2   *Administration not included in total     Date/Time Rate/Dose/Volume Action       05/10/25  1724 *200 mL - 2,400 mL/hr (over 5 min) Missed               sodium chloride 0.9 % bolus 200 mL (mL) Total volume:  0 mL* Dosing weight:  68.2   *Administration not included in total     Date/Time Rate/Dose/Volume Action       05/13/25  1115 *200 mL - 400 mL/hr (over 30 min) Missed     05/14/25  1103 *200 mL - 400 mL/hr (over 30 min) Missed               sodium chloride 0.9 % bolus 500 mL (mL) Total volume:  0 mL* Dosing weight:  68.2   *Administration not included in total     Date/Time Rate/Dose/Volume Action       05/13/25  1113 *500 mL - 500 mL/hr (over 60 min) Missed               sodium chloride 0.9 % bolus 200 mL (mL/hr) Total volume:  200 mL*   *From user-documented volume     Date/Time  Rate/Dose/Volume Action       05/08/25  0228 200 mL - 2,400 mL/hr (over 5 min) New Bag      0326 200 mL Stopped               sodium chloride 0.9 % bolus 200 mL (mL/hr) Total volume:  200 mL*   *From user-documented volume     Date/Time Rate/Dose/Volume Action       05/08/25  0227 200 mL - 2,400 mL/hr (over 5 min) New Bag      0326 200 mL Stopped               epoetin sheila (Procrit) injection 10,000 Units (Units) Total dose:  10,000 Units* Dosing weight:  68.2   *Administration not included in total     Date/Time Rate/Dose/Volume Action       05/08/25  1345 10,000 Units Given     05/09/25  1109 *10,000 Units Missed               albumin human 25 % solution 25 g (g) Total dose:  0 g* Dosing weight:  68.2   *Administration not included in total     Date/Time Rate/Dose/Volume Action       05/13/25  1112 *25 g - 100 mL/hr (over 60 min) Missed               heparin 1,000 unit/mL injection 2,000 Units (Units) Total dose:  2,000 Units*   *Administration not included in total     Date/Time Rate/Dose/Volume Action       05/08/25  1300 2,000 Units Given     05/09/25  0009 *2,000 Units Missed      1110 *2,000 Units Missed     05/10/25  1723 *2,000 Units Missed      1724 *2,000 Units Missed     05/11/25  0803 *2,000 Units Missed      1145 *2,000 Units Missed      2311 *2,000 Units Missed     05/12/25  1144 *2,000 Units Missed     05/13/25  0209 *2,000 Units Missed      1114 *2,000 Units Missed     05/14/25  0720 *2,000 Units Missed      1102 *2,000 Units Missed               heparin 1,000 unit/mL injection 2,000 Units (Units) Total dose:  0 Units* Dosing weight:  68.2   *Administration not included in total     Date/Time Rate/Dose/Volume Action       05/10/25  1723 *2,000 Units Missed     05/11/25  1145 *2,000 Units Missed     05/12/25  1144 *2,000 Units Missed     05/13/25  1114 *2,000 Units Missed     05/14/25  1102 *2,000 Units Missed               heparin 1,000 unit/mL injection 2,000 Units (Units) Total dose:  2,100 Units*  Dosing weight:  68.2   *Administration not included in total     Date/Time Rate/Dose/Volume Action       05/13/25  1112 *2,000 Units Missed      1830 2,100 Units Given               heparin 1,000 unit/mL injection 2,000 Units (Units) Total dose:  2,100 Units* Dosing weight:  68.2   *Administration not included in total     Date/Time Rate/Dose/Volume Action       05/13/25  1114 *2,000 Units Missed      1829 2,100 Units Given               heparin 1,000 unit/mL injection (Units) Total dose:  4,200 Units      Date/Time Rate/Dose/Volume Action       05/14/25  1519 4,200 Units Given               alteplase (Cathflo Activase) injection 2 mg (mg) Total dose:  0 mg*   *Administration not included in total     Date/Time Rate/Dose/Volume Action       05/09/25  1032 *2 mg Missed      1034 *2 mg Missed               alteplase (Cathflo Activase) injection 2 mg (mg) Total dose:  2 mg* Dosing weight:  68.2   *Administration not included in total     Date/Time Rate/Dose/Volume Action       05/10/25  1022 2 mg Given     05/11/25  0803 *2 mg Missed     05/12/25  0924 *2 mg Missed     05/13/25  1113 *2 mg Missed     05/14/25  0841 *2 mg Missed               alteplase (Cathflo Activase) injection 2 mg (mg) Total dose:  2 mg* Dosing weight:  68.2   *Administration not included in total     Date/Time Rate/Dose/Volume Action       05/10/25  1021 2 mg Given     05/11/25  0803 *2 mg Missed     05/12/25  0924 *2 mg Missed     05/13/25  1113 *2 mg Missed     05/14/25  0842 *2 mg Missed               alteplase (Cathflo Activase) injection 2 mg (mg) Total dose:  0 mg* Dosing weight:  68.2   *Administration not included in total     Date/Time Rate/Dose/Volume Action       05/13/25  1113 *2 mg Missed     05/14/25  0842 *2 mg Missed               alteplase (Cathflo Activase) injection 2 mg (mg) Total dose:  0 mg* Dosing weight:  68.2   *Administration not included in total     Date/Time Rate/Dose/Volume Action       05/13/25  1113 *2 mg Missed      05/14/25  0842 *2 mg Missed               sodium hypochlorite (Dakin's Full-Strength) 0.5 % external solution Total dose:  Cannot be calculated* Dosing weight:  68.2   *Administration does not have dose documented     Date/Time Rate/Dose/Volume Action       05/08/25  1400  Given     05/09/25  0844  Given     05/10/25  0458  Given     05/11/25  0818  Given     05/12/25  0825  Given     05/13/25  1037  Given     05/14/25  0618  Given               epoetin sheila-epbx (Retacrit) injection 10,000 Units (Units) Total dose:  10,000 Units Dosing weight:  68.2      Date/Time Rate/Dose/Volume Action       05/10/25  1729 10,000 Units Given               lactobacillus acidophilus tablet 1 tablet (tablet) Total dose:  4 tablet Dosing weight:  68.2      Date/Time Rate/Dose/Volume Action       05/11/25  2059 1 tablet Given     05/12/25  0829 1 tablet Given     05/13/25  1119 1 tablet Given     05/14/25  0855 1 tablet Given               epoetin sheila (Epogen) injection 10,000 Units (Units) Total dose:  10,000 Units Dosing weight:  68.2      Date/Time Rate/Dose/Volume Action       05/13/25  0218 10,000 Units Given               lidocaine PF (Xylocaine) 20 mg/mL (2 %) injection (mL) Total volume:  4 mL      Date/Time Rate/Dose/Volume Action       05/14/25  1513 4 mL Given                   No specimens collected      See detailed result report with images in PACS.    The patient tolerated the procedure well without incident or complication and is in stable condition.     Catheter tip upper rt. [facility]. Ready to use

## 2025-05-14 NOTE — PROGRESS NOTES
05/14/25 1605   Discharge Planning   Home or Post Acute Services Post acute facilities (Rehab/SNF/etc)   Type of Post Acute Facility Services Long term care   Expected Discharge Disposition Inter  (Rtn to [facility])   Does the patient need discharge transport arranged? Yes   RoundTrip coordination needed? Yes   Has discharge transport been arranged? Yes   What day is the transport expected? 05/14/25   What time is the transport expected? 1700     Pt medically cleared for discharge today. Care team, facility, pt, and pt son Rocael all updated with discharge details. Pt son expressed concerns about discharge today, physician updated and asked to call pt son. Physician updated a short time later that he spoke with son and plan remains for discharge today.

## 2025-05-14 NOTE — PROGRESS NOTES
Pre-Procedure Checklist:  Emergent Line Insertion: No  Type of Line to be Placed: Midline Right brachial 8 cm 20 gauge; arm circumference 33.5 cm.  Right upper arm is edematous.  Dr. Wise placed in midline order that it was ok to use right upper arm for midline placement after discussing vessel preservation as patient is on hemodialysis with left upper arm fistula, that is currently not be used, and possible need for fistula to be placed in right upper arm.  Consent Obtained: N/A  Emergency Medication Necessary: No  Patient Identified with 2 Independent Identifiers: Yes  Review of Allergies, Anticoagulation, Relevant Labs, ECG/Telemetry: Yes  Risks/Benefits/Alternatives Discussed with Patient/POA/Legal Representative: Yes  Stop Sign on Door: Yes  Time Out Performed: Yes  Catheter Exchange: No    Positioning Checklist:  All People, Including Patient, in the Room with Cap and Mask: Yes  Fluoroscopy Used to Identify Vessel and Guide Insertion: No   Sterile Cover Used: Yes  Full Barrier Precautions Followed (Mask, Cap, Gown, Gloves): Yes  Hands Washed: Yes  Monitors Attached with Sound Alarms On: No  Full Body Sterile Drape (Head-to-Toe) Used to Cover Patient: Yes  Trendelenburg Position (For IJ and Subclavian): No  CHG Skin Prep Used and Allowed to Air Dry to Skin Procedure: Yes    Procedure Checklist:  Blood Aspirated From All Lumens, All Ports Subsequently Flushed: Yes  Catheter Caps Placed on All Lumens; Lumens Clamped: Yes  Maintain Guidewire Control Throughout, Ensuring Guidewire Removal: Yes  Maintain Sterile Field Throughout Insertion: Yes  Catheter Secured: Yes  Confirmatory Test of Venous Placement: Non-Pulsatile Blood    Post Procedure Checklist:  Date and Time Written on Dressing: Yes  Sharp and Wire Count and Safe Disposal of all Sharps/Wires: Yes  Sterile Dressing Applied Per Protocol: Yes  X-ray Ordered or ECG Image: N/A    Midline Insertion Details:  Midline expires in 28 days from insertion date of  5/14/25  See LDA assessment for further midline details.  Additional Details: Line was inserted using Modified Seldinger's Technique.   Midline ready for immediate use.  Placed by: Tangela Davalos RN

## 2025-05-14 NOTE — PROGRESS NOTES
Infectious Disease Progress Note       5/14/2025    Patient is a followup regarding gram-negative bacteremia with large sacral decubitus ulcer with underlying chronic osteomyelitis of the sacral area with nonhealing nonsurgical sacral decubitus wound.  Patient does have a history of end-stage renal disease on hemodialysis via permacath and he is a type II diabetic.    Patient is awake No fevers.    Lab Results   Component Value Date    WBC 16.1 (H) 05/12/2025    HGB 8.3 (L) 05/12/2025    HCT 24.8 (L) 05/12/2025    MCV 80 05/12/2025     05/12/2025     Lab Results   Component Value Date    GLUCOSE 82 05/12/2025    CALCIUM 8.3 (L) 05/12/2025     (L) 05/12/2025    K 5.1 05/12/2025    CO2 27 05/12/2025    CL 95 (L) 05/12/2025    BUN 64 (H) 05/12/2025    CREATININE 3.85 (H) 05/12/2025       WBC trends are being monitored. Antibiotic doses are being adjusted per most recent renal labs.     Vitals:    05/14/25 1555   BP: 166/81   Pulse: 71   Resp:    Temp: 36 °C (96.8 °F)   SpO2: 94%         Muscle atrophy, permacath in place, no erythema around the site  More interactive today  NAD  Neck supple  Heart S1S2  Chest: Equal expansion, no rales  Abdomen: soft, ND, NTTP, no guarding  Extrem: no pain to palpation  Skin: no rashes, no diaphoresis  Neuro: CNS intact but does not verbalize with me  Photos of large sacral wound reviewed her medical records    Problem List[1]    Estimated Creatinine Clearance: 13.3 mL/min (A) (by C-G formula based on SCr of 3.85 mg/dL (H)).    Morganella morganii 2 out of 2 blood cultures + 5/7/2025  1 out of 2 blood cultures also positive for Citrobacter ffreundii complex  All susceptibilities pending      ASSESSMENT:  Gram-negative bacteremia, citrobacter and morganella  Large infected sacral decubitus ulcer, nonhealing nonsurgical wound, with underlying chronic osteomyelitis  End-stage renal disease on hemodialysis  Diabetes mellitus type 2   Seizure disorder on Keppra  Atrial  fibrillation  Acute metabolic encephalopathy with history of cognitive dysfunction, on Aricept per medical records  BPH      PLAN:  Downtrending leukocytosis   Plastics following sacral decubitus ulcer  Will plan to continue meropenem at renal dose for now - sensitivities reviewed. There is some resistance of the morganella to Imipenem but not to Meropenem. Clinically patient is improving and wound is stable. Can consider changing abx if there is any clinical decline.   Repeat BC x 2  neagtive  Local wound care  Optimal glycemic control  Monitor for seizure activity on meropenem as this can potentially lower the threshold    This is a chronic wound and he has chronic underlying OM that may not heal with extended abx and risk of longterm abx may outweigh benefit.     Lactobacillus ( if he is able to take safely ) and plan on 2-4 weeks of abx to help facilitate wound healing along with wound care.       Possibly we can do cefepime with dialysis for discharge, will defer line exchange to renal, may be a source of relapse in future if not exchanged  Orlin José MD            [1]   Patient Active Problem List  Diagnosis    ESRD on hemodialysis (Multi)    Closed fracture of neck of left femur, initial encounter    Falls, initial encounter    Closed head injury    Elevated troponin    Abscess of hip    Hip dislocation, left, initial encounter (Multi)    Acute encephalopathy

## 2025-05-14 NOTE — CARE PLAN
The patient's goals for the shift include feel better    The clinical goals for the shift include Wound management      Problem: Skin  Goal: Decreased wound size/increased tissue granulation at next dressing change  Outcome: Progressing  Flowsheets (Taken 5/13/2025 2135)  Decreased wound size/increased tissue granulation at next dressing change: Promote sleep for wound healing  Goal: Participates in plan/prevention/treatment measures  Outcome: Progressing  Goal: Prevent/manage excess moisture  Outcome: Progressing  Goal: Prevent/minimize sheer/friction injuries  Outcome: Progressing  Goal: Promote/optimize nutrition  Outcome: Progressing  Goal: Promote skin healing  Outcome: Progressing

## 2025-05-14 NOTE — PROGRESS NOTES
"Tyshawn Coles is a 84 y.o. male on day 6 of admission presenting with Vomiting, unspecified vomiting type, unspecified whether nausea present.    Subjective   Clinically patient is stable.  He seems to be alert and oriented when awake and able to answer some questions.  He denies any chest pains.  Unable to get a good review of systems patient has dementia.       Objective   Patient had hemodialysis today was tolerated well however blood flow was only at about 200 cc to 250 from the permacath and may need to be exchanged.  We have used Cathflo several times and even left Cathflo in between dialysis treatments with poor response.  Vital signs are stable with a blood pressure of 127/60 heart rate of 64 pulse oximeter reading of 96% on room air  Nursing staff tells me that the sacral wound seems to be granulating and seems to be clean currently on IV antibiotics seen by infectious disease and debating as to whether or not the patient should have prolonged IV antibiotic treatment    Physical Exam  Patient was awake and we have been able to ask questions for from him he also recognized me.  He did not seem to be in any distress and seems to be quite comfortable on a [place] bed  Patient looks ill and cachectic  Head examination benign with prominent facial bones but no facial asymmetry  Lungs are clear without wheezing crackles or rhonchi  Heart regular there was no murmur  Abdomen soft and nontender good bowel sounds and no guarding  Extremities with markedly atrophic muscles with some superficial ulcers on the feet and being managed with podiatry  Sacral decubitus ulcer noted currently on Dakin's solution cleaning and on a [place]ron bed    Last Recorded Vitals  Blood pressure 115/56, pulse 77, temperature 36.9 °C (98.4 °F), temperature source Temporal, resp. rate 18, height 1.82 m (5' 11.65\"), weight 68.2 kg (150 lb 5.7 oz), SpO2 91%.  Intake/Output last 3 Shifts:  I/O last 3 completed shifts:  In: 1000 (14.7 mL/kg) " [I.V.:600 (8.8 mL/kg); Other:400]  Out: 3800 (55.7 mL/kg) [Other:3800]  Weight: 68.2 kg   Current Medications[1]   Relevant Results      Results for orders placed or performed during the hospital encounter of 05/07/25 (from the past 24 hours)   POCT GLUCOSE   Result Value Ref Range    POCT Glucose 107 (H) 74 - 99 mg/dL   POCT GLUCOSE   Result Value Ref Range    POCT Glucose 143 (H) 74 - 99 mg/dL   POCT GLUCOSE   Result Value Ref Range    POCT Glucose 118 (H) 74 - 99 mg/dL    No results found.            This patient has a central line   Reason for the central line remaining today? Dialysis/Hemapheresis                 Assessment & Plan  Vomiting, unspecified vomiting type, unspecified whether nausea present    Leukocytosis in the setting of large decubitus ulcer, foot ulcers, possible osteomyelitis as well as urinary tract infection.  Both urine and blood grew Morganella morganii and blood also positive for Citrobacter currently on meropenem.  Will leave to infectious disease consultants to determine or not patient should be on prolonged IV antibiotics but will put a midline before discharge  End-stage kidney disease on hemodialysis Tuesday Thursday Saturday currently with poor blood flow from the permacath and will need exchange  Anemia of chronic illness on EPO unable to give Venofer due to high ferritin levels  Seizure disorder without seizure activity on Keppra twice a day  Chronic atrial fibrillation on Eliquis 2.5 mg twice daily, Metroprolol tartrate 50 mg daily with controlled rate  Cognitive dysfunction and metabolic encephalopathy on Aricept currently more awake and alert and oriented  Benign prostatic hypertrophy on Proscar 5 mg daily      I spent 40 minutes in the professional and overall care of this patient.      Edi Wise MD FACP         [1]   Current Facility-Administered Medications:     acetaminophen (Tylenol) suppository 650 mg, 650 mg, rectal, q4h PRN, Edi Wise MD    acetaminophen  (Tylenol) tablet 650 mg, 650 mg, oral, q4h PRN, Edi Wise MD    albumin human 25 % solution 25 g, 25 g, intravenous, Once, Edi Wise MD    albumin human 25 % solution 25 g, 25 g, intravenous, Once, Edi Wise MD    albuterol 2.5 mg /3 mL (0.083 %) nebulizer solution 1.25 mg, 1.25 mg, nebulization, q6h PRN, Edi Wise MD    alteplase (Cathflo Activase) injection 2 mg, 2 mg, intra-catheter, Daily, Edi Wise MD, 2 mg at 05/10/25 1022    alteplase (Cathflo Activase) injection 2 mg, 2 mg, intra-catheter, Daily, Edi Wise MD, 2 mg at 05/10/25 1021    alteplase (Cathflo Activase) injection 2 mg, 2 mg, intra-catheter, Daily, Edi Wise MD    alteplase (Cathflo Activase) injection 2 mg, 2 mg, intra-catheter, Daily, Edi Wise MD    apixaban (Eliquis) tablet 2.5 mg, 2.5 mg, oral, BID, Edi Wise MD, 2.5 mg at 05/13/25 2027    bisacodyl (Dulcolax) suppository 10 mg, 10 mg, rectal, q24h PRN, Edi Wise MD    cholecalciferol (Vitamin D-3) tablet 25 mcg, 25 mcg, oral, Daily, Edi Wise MD, 25 mcg at 05/13/25 1119    collagenase 250 unit/gram ointment, , Topical, Daily, Edi Wise MD, Given at 05/13/25 1038    dextrose 50 % injection 12.5 g, 12.5 g, intravenous, q15 min PRN, Edi Wise MD    dextrose 50 % injection 25 g, 25 g, intravenous, q15 min PRN, Edi Wise MD    docusate sodium (Colace) capsule 100 mg, 100 mg, oral, Daily, Edi Wise MD, 100 mg at 05/12/25 0829    donepezil (Aricept) tablet 23 mg, 23 mg, oral, Daily, Edi Wise MD, 23 mg at 05/13/25 1119    ferrous sulfate tablet 325 mg, 65 mg of iron, oral, Daily with breakfast, Edi Wise MD, 325 mg at 05/13/25 1119    finasteride (Proscar) tablet 5 mg, 5 mg, oral, Daily, Edi Wise MD, 5 mg at 05/13/25 1119    folic acid (Folvite) tablet 1 mg, 1 mg, oral, Daily, Edi Wise MD, 1 mg at 05/13/25 1119    glucagon (Glucagen) injection 1 mg, 1 mg, intramuscular, q15 min PRN,  Edi Wise MD    glucagon (Glucagen) injection 1 mg, 1 mg, intramuscular, q15 min PRN, Edi Wise MD    heparin 1,000 unit/mL injection 2,000 Units, 2,000 Units, intra-catheter, After Dialysis, Edi Wise MD    heparin 1,000 unit/mL injection 2,000 Units, 2,000 Units, intra-catheter, After Dialysis, Edi Wise MD, 2,000 Units at 05/08/25 1300    heparin 1,000 unit/mL injection 2,000 Units, 2,000 Units, intra-catheter, After Dialysis, Edi Wise MD    insulin glargine (Lantus) injection 5 Units, 5 Units, subcutaneous, Nightly, Edi Wise MD, 5 Units at 05/13/25 2027    insulin lispro injection 0-5 Units, 0-5 Units, subcutaneous, TID AC, Edi Wise MD, 1 Units at 05/10/25 1651    lactobacillus acidophilus tablet 1 tablet, 1 tablet, oral, Daily, Daisy Watson DO, 1 tablet at 05/13/25 1119    levETIRAcetam (Keppra) tablet 500 mg, 500 mg, oral, BID, Edi Wise MD, 500 mg at 05/13/25 2027    loperamide (Imodium) capsule 2 mg, 2 mg, oral, Once PRN, Edi Wise MD    magnesium hydroxide (Milk of Magnesia) 400 mg/5 mL suspension 30 mL, 30 mL, oral, q24h PRN, Edi Wise MD    melatonin tablet 10 mg, 10 mg, oral, Nightly, Edi Wise MD, 10 mg at 05/13/25 2027    meropenem (Merrem) 500 mg in sodium chloride 0.9%  mL, 500 mg, intravenous, q12h, Edi Wise MD, Stopped at 05/13/25 2107    metoprolol tartrate (Lopressor) tablet 50 mg, 50 mg, oral, BID, Edi Wise MD, 50 mg at 05/13/25 2027    ondansetron (Zofran) tablet 4 mg, 4 mg, oral, q8h PRN, Edi Wise MD    oxyCODONE (Roxicodone) immediate release tablet 2.5 mg, 2.5 mg, oral, q6h PRN, Edi Wise MD, 2.5 mg at 05/12/25 1729    perflutren lipid microspheres (Definity) injection 0.5-10 mL of dilution, 0.5-10 mL of dilution, intravenous, Once in imaging, Sonali Longo, APRN-CNP    polyethylene glycol (Glycolax, Miralax) packet 17 g, 17 g, oral, Daily PRN, MD katherin Ritterelamer  carbonate (Renvela) packet 1.6 g, 1.6 g, oral, BID, Edi Wise MD, 1.6 g at 05/13/25 2027    sodium chloride 0.9 % bolus 200 mL, 200 mL, intravenous, After Dialysis, Edi Wise MD    sodium chloride 0.9 % bolus 200 mL, 200 mL, intravenous, After Dialysis, Edi Wise MD    sodium chloride 0.9 % bolus 200 mL, 200 mL, intravenous, Once, Edi Wise MD    sodium chloride 0.9 % bolus 200 mL, 200 mL, intravenous, Once, Edi Wise MD    sodium chloride 0.9 % bolus 200 mL, 200 mL, intravenous, After Dialysis, Edi Wise MD    sodium chloride 0.9 % bolus 200 mL, 200 mL, intravenous, q1h PRN, Edi Wise MD    sodium chloride 0.9 % bolus 200 mL, 200 mL, intravenous, q1h PRN, Edi Wise MD    sodium chloride 0.9 % bolus 500 mL, 500 mL, intravenous, Once, Edi Wise MD    sodium hypochlorite (Dakin's Full-Strength) 0.5 % external solution, , irrigation, Daily, Roland J Reyes, MD, Given at 05/13/25 1037    vitamin B complex-vitamin C-folic acid (Nephrocaps) capsule 1 capsule, 1 capsule, oral, Daily, Edi Wise MD, 1 capsule at 05/13/25 0606

## 2025-05-15 NOTE — DISCHARGE SUMMARY
Discharge Diagnosis  Vomiting, unspecified vomiting type, unspecified whether nausea present  Sacral decubitus ulcer with possible osteomyelitis  Urinary tract infection  End-stage kidney disease on dialysis  Anemia of chronic illness  Seizure disorder  Chronic atrial fibrillation  Cognitive dysfunction and metabolic encephalopathy  Benign prostatic hypertrophy    Issues Requiring Follow-Up  Patient sent home on meropenem IV with a midline and will need infectious disease follow-ups  Will monitor very closely at the hemodialysis unit    Test Results Pending At Discharge  Pending Labs       Order Current Status    Blood Culture Preliminary result    Blood Culture Preliminary result        Current Medications[1]   Results for orders placed or performed during the hospital encounter of 05/07/25 (from the past 96 hours)   POCT GLUCOSE   Result Value Ref Range    POCT Glucose 105 (H) 74 - 99 mg/dL   POCT GLUCOSE   Result Value Ref Range    POCT Glucose 127 (H) 74 - 99 mg/dL   POCT GLUCOSE   Result Value Ref Range    POCT Glucose 145 (H) 74 - 99 mg/dL   POCT GLUCOSE   Result Value Ref Range    POCT Glucose 156 (H) 74 - 99 mg/dL   Blood Culture    Specimen: Peripheral Venipuncture; Blood culture   Result Value Ref Range    Blood Culture No growth at 2 days    Blood Culture    Specimen: Peripheral Venipuncture; Blood culture   Result Value Ref Range    Blood Culture No growth at 2 days    POCT GLUCOSE   Result Value Ref Range    POCT Glucose 77 74 - 99 mg/dL   SST TOP   Result Value Ref Range    Extra Tube Hold for add-ons.    Comprehensive metabolic panel   Result Value Ref Range    Glucose 82 74 - 99 mg/dL    Sodium 125 (L) 136 - 145 mmol/L    Potassium 5.1 3.5 - 5.3 mmol/L    Chloride 95 (L) 98 - 107 mmol/L    Bicarbonate 27 21 - 32 mmol/L    Anion Gap 8 (L) 10 - 20 mmol/L    Urea Nitrogen 64 (H) 6 - 23 mg/dL    Creatinine 3.85 (H) 0.50 - 1.30 mg/dL    eGFR 15 (L) >60 mL/min/1.73m*2    Calcium 8.3 (L) 8.6 - 10.3 mg/dL     Albumin 1.9 (L) 3.4 - 5.0 g/dL    Alkaline Phosphatase 68 33 - 136 U/L    Total Protein 5.8 (L) 6.4 - 8.2 g/dL    AST 20 9 - 39 U/L    Bilirubin, Total 0.3 0.0 - 1.2 mg/dL    ALT 9 (L) 10 - 52 U/L   CBC and Auto Differential   Result Value Ref Range    WBC 16.1 (H) 4.4 - 11.3 x10*3/uL    nRBC 0.0 0.0 - 0.0 /100 WBCs    RBC 3.11 (L) 4.50 - 5.90 x10*6/uL    Hemoglobin 8.3 (L) 13.5 - 17.5 g/dL    Hematocrit 24.8 (L) 41.0 - 52.0 %    MCV 80 80 - 100 fL    MCH 26.7 26.0 - 34.0 pg    MCHC 33.5 32.0 - 36.0 g/dL    RDW 18.0 (H) 11.5 - 14.5 %    Platelets 206 150 - 450 x10*3/uL    Neutrophils % 52.5 40.0 - 80.0 %    Immature Granulocytes %, Automated 0.7 0.0 - 0.9 %    Lymphocytes % 35.7 13.0 - 44.0 %    Monocytes % 8.3 2.0 - 10.0 %    Eosinophils % 2.4 0.0 - 6.0 %    Basophils % 0.4 0.0 - 2.0 %    Neutrophils Absolute 8.44 (H) 1.60 - 5.50 x10*3/uL    Immature Granulocytes Absolute, Automated 0.12 0.00 - 0.50 x10*3/uL    Lymphocytes Absolute 5.73 (H) 0.80 - 3.00 x10*3/uL    Monocytes Absolute 1.33 (H) 0.05 - 0.80 x10*3/uL    Eosinophils Absolute 0.38 0.00 - 0.40 x10*3/uL    Basophils Absolute 0.07 0.00 - 0.10 x10*3/uL   POCT GLUCOSE   Result Value Ref Range    POCT Glucose 99 74 - 99 mg/dL   POCT GLUCOSE   Result Value Ref Range    POCT Glucose 90 74 - 99 mg/dL   POCT GLUCOSE   Result Value Ref Range    POCT Glucose 128 (H) 74 - 99 mg/dL   POCT GLUCOSE   Result Value Ref Range    POCT Glucose 107 (H) 74 - 99 mg/dL   POCT GLUCOSE   Result Value Ref Range    POCT Glucose 143 (H) 74 - 99 mg/dL   POCT GLUCOSE   Result Value Ref Range    POCT Glucose 118 (H) 74 - 99 mg/dL   POCT GLUCOSE   Result Value Ref Range    POCT Glucose 112 (H) 74 - 99 mg/dL   POCT GLUCOSE   Result Value Ref Range    POCT Glucose 77 74 - 99 mg/dL   POCT GLUCOSE   Result Value Ref Range    POCT Glucose 115 (H) 74 - 99 mg/dL    IR CVC exchange  Result Date: 5/14/2025  Interpreted By:  Schoenberger, Joseph, STUDY: IR CVC EXCHANGE; ;  5/14/2025 3:40 pm    INDICATION: Signs/Symptoms:Poorly functioning permacath for dialysis right side needs to be exchanged.  Multiple tries using Cathflo failed.     COMPARISON: None.   ACCESSION NUMBER(S): [id number]   ORDERING CLINICIAN: MADISON TOLENTINO   CONSENT: The procedure, its indication, its risks, and alternatives were explained to the patient who understands and consents to the procedure. A time-out is completed prior to the procedure to confirm patient identity and procedure to be performed.   MODALITIES: Fluoroscopy   TECHNIQUE: All personnel in the procedure suite used appropriate mask and cap. Additionally, the performing physician and assistant used maximum barrier technique to include a sterile gown and gloves as per standard hospital protocol. The base of the right neck, right upper chest wall, indwelling tunneled dialysis catheter was sterilely prepped with chlorhexidine in the usual manner. A large sterile barrier was used to to completely draped the patient is outside of the sterilely prepped area in the usual manner per standard hospital protocol.   Local anesthesia was administered at the tunnel exit and retention cuff. Using blunt dissection the retention cuff was freed. The catheter was then divided in the portion of the catheter that was extrinsic to the patient was discarded. Using fluoroscopy guidance, a guidewire was advanced through the catheter into the inferior vena cava. The indwelling catheter was removed. Using fluoroscopy guidance, a new 23 cm tip to cuff Arrow vector Flow chronic hemodialysis catheter was advanced over the wire. It was positioned such this distal tip was in the mid to upper right atrium and is retention cuff was 1 cm deep to the tunnel exit. Each port freely aspirated and flushed. Each port was prime with 1000 units of heparin per 1 cc saline. A 2 0 Prolene anchor stitch was placed at the base of the tunnel. No appropriate dressing was placed. Patient tolerated procedure well. There  was no immediate complication.   FINDINGS: See discussion above       Successful fluoroscopy guided exchange of the patient's cuffed tunneled chronic hemodialysis catheter. The catheter is ready to use for hemodialysis     MACRO: None   Signed by: Joseph Schoenberger 5/14/2025 3:55 PM Dictation workstation:   [id number]    Bedside Midline Imaging  Result Date: 5/14/2025  These images are not reportable by radiology and will not be interpreted by  Radiologists.    ECG 12 lead  Result Date: 5/9/2025  Normal sinus rhythm Normal ECG When compared with ECG of 29-APR-2025 17:13, Premature ventricular complexes are no longer Present Premature supraventricular complexes are no longer Present See ED provider note for full interpretation and clinical correlation Confirmed by Amber Javed ([id number]) on 5/9/2025 1:32:02 PM    XR chest 1 view  Result Date: 5/7/2025  Interpreted By:  Raheel Powell, STUDY: XR CHEST 1 VIEW;  5/7/2025 2:33 pm   INDICATION: Signs/Symptoms:fever.   COMPARISON: None.   ACCESSION NUMBER(S): [id number]   ORDERING CLINICIAN: PRATIBHA CHI   FINDINGS: The patient is significantly rotated. Prominent skin folds overlying the left hemithorax. Right IJ central line is present. The lungs appear clear without apparent pleural effusion. Unremarkable cardiomediastinal silhouette. Calcified left hilar lymph nodes related to granulomatous disease. No pulmonary vascular congestion.       No active disease in the chest identified.   MACRO: None   Signed by: Raheel Powell 5/7/2025 2:42 PM Dictation workstation:   [id number]    ECG 12 lead  Result Date: 4/30/2025  Sinus rhythm with Premature supraventricular complexes and with occasional Premature ventricular complexes Otherwise normal ECG When compared with ECG of 06-APR-2025 15:45, Premature ventricular complexes are now Present Premature supraventricular complexes are now Present Confirmed by Austin Britt ([id number]) on 4/30/2025 2:51:46 PM    XR chest 1  view  Result Date: 4/29/2025  Interpreted By:  Harpal Quiroz, STUDY: XR CHEST 1 VIEW   INDICATION: Signs/Symptoms:cough, ams.   COMPARISON: April 4   ACCESSION NUMBER(S): [id number]   ORDERING CLINICIAN: ROBERT HUNTER   FINDINGS: Double lumen catheter again noted unchanged. No consolidation, effusion, edema, pneumothorax. Remote granulomatous disease.       No evidence of acute intrathoracic abnormality.   Signed by: Harpal Quiroz 4/29/2025 5:57 PM Dictation workstation:   [id number]    CT brain attack angio head and neck W and WO IV contrast  Result Date: 4/29/2025  Interpreted By:  Kelvin Tong, STUDY: CT BRAIN ATTACK ANGIO HEAD AND NECK W AND WO IV CONTRAST;  4/29/2025 5:11 pm   INDICATION: Signs/Symptoms:Altered mental status.  Difficult to examine. Questionable left facial droop.     COMPARISON: CT head today.   ACCESSION NUMBER(S): [id number]   ORDERING CLINICIAN: ROBERT HUNTER   TECHNIQUE: 70 ML of Omnipaque 350 was administered intravenously and axial images of the head and neck were acquired.  Coronal, sagittal, and 3-D reconstructions were provided for review.   FINDINGS:   CTA COW: No major vascular occlusion. Moderate atherosclerotic calcifications through the carotid siphons. No aneurysms.  No vascular malformations. Patent dural venous sinuses and intracranial deep venous structures.   CTA CAROTID: No aortic aneurysm or dissection. No significant stenoses at the origins of the great vessels from the aortic arch. No significant stenoses of the brachycephalic artery or bilateral subclavian arteries.   No significant stenoses bilateral carotid arterial systems. No significant stenoses bilateral vertebral arterial systems.   NONVASCULAR NECK: No soft tissue mass. No adenopathy. Salivary glands are unremarkable. Thyroid gland unremarkable. Bones intact. Inspissated mucous retention cyst left maxillary sinus. Multifocal periodontal disease. Nonspecific bilateral mastoid effusions.         1. No  intracranial major vascular occlusion. 2. No flow-limiting stenosis or significant plaque irregularity in the neck.   MACRO: None   Signed by: Kelvin Tong 4/29/2025 5:24 PM Dictation workstation:   [id number]    CT brain attack head wo IV contrast  Result Date: 4/29/2025  Interpreted By:  Radha Trent, STUDY: CT BRAIN ATTACK HEAD WO IV CONTRAST;  4/29/2025 4:59 pm   INDICATION: Signs/Symptoms:Stroke Evaluation.     COMPARISON: 03/09/2025   ACCESSION NUMBER(S): [id number]   ORDERING CLINICIAN: ROBERT HUNTER   TECHNIQUE: Noncontrast axial CT scan of head was performed. Angled reformats in brain and bone windows were generated. The images were reviewed in bone, brain, blood and soft tissue windows.   FINDINGS: The ventricles, cisterns and sulci are prominent, consistent with mild diffuse volume loss. There are areas of nonspecific white matter hypodensity, which are probably age-related or microvascular in nature.   Gray-white differentiation is intact and there is no evidence of acute cortical infarct. Faint bilateral basal ganglia calcification, unchanged. No mass, mass effect or midline shift is seen. There is no evidence of hemorrhage.   The visualized paranasal sinuses are remarkable for tension cyst or polyp in the left maxillary sinus.. Nasal septal deviation left.         No evidence of acute cortical infarct or intracranial hemorrhage. Chronic changes as described.   MACRO: Radha Trent discussed the significance and urgency of this critical finding by secure chat with  ROBERT HUNTER on 4/29/2025 at 5:07 pm. (**-RCF-**) Findings:  See findings.     Signed by: Radha Trent 4/29/2025 5:08 PM Dictation workstation:   [id number]       Hospital Course   The patient was initially seen at the emergency room with nausea and vomiting as well as fever on his way to wound care for appointment.  She was then diverted to the emergency room suspected of bacteremia and was admitted for further evaluation and  aggressive treatment of infection as well as decubitus ulcer.  The patient was initially started on vancomycin and meropenem and was referred to infectious disease.  Eventually the blood culture grew Morganella morganii likewise urine culture also grew the same bacteria.  Patient also had Citrobacter.  Only on blood cultures x 2.  Meropenem was maintained by infect disease and not changed at this point and would like to have him take it for at least 2 weeks maybe 4.  In the meantime patient was referred to Dr. Reyes who aggressively treated his decubitus ulcer and was placed on Dakin's solution, collagenase and Clinitron bed.  The patient seems to be improving, last blood cultures were negative and the sacral decubitus ulcer was pink and starting to granulate.  He had problems with blood close in his hemodialysis catheter and we tried using Cathflo during treatment twice and left Slow in between treatments with poor response.  It was then decided to change the catheter and this was exchanged today prior to discharge together with insertion of a midline for IV antibiotics.  We discussed his care with his son earlier today.  Patient was then discharged back to the nursing home.  While in the hospital the patient was also seen by podiatry.    Pertinent Physical Exam At Time of Discharge  Physical Exam  Patient was actually markedly more improved prior to discharge.  He was more awake and alert, even communicating.  He has had no seizures.  He has had no further nausea and vomiting.  He has been eating his diet as well as taking his medications orally.  Head examination shows prominence of facial bones, patient looks cachectic and chronically ill  Permacath is noted on the right side neck veins are flat  Lungs are clear without wheezing crackles or rhonchi  Heart regular without any murmur  Abdomen soft and nontender flat with good bowel sounds and no guarding  Extremities with markedly atrophic muscles with superficial  ulcers of the feet and being managed by podiatry.  Sacral decubitus ulcer seems to be better with pink tissues and early granulation looks clean.    Outpatient Follow-Up  Future Appointments   Date Time Provider Department Center   5/21/2025  2:30 PM Roland J Reyes, MD [id number] [place]   6/27/2025  1:30 PM Marcel Dawson MD [id number] [facility]   10/17/2025  3:40 PM Sona Mckeon MD [id number] [facility]       Edi Wise MD FACP       [1] No current facility-administered medications for this encounter.    Current Outpatient Medications:     apixaban (Eliquis) 2.5 mg tablet, Take 1 tablet (2.5 mg) by mouth 2 times a day., Disp: , Rfl:     cholecalciferol (Vitamin D-3) 25 MCG (1000 UT) capsule, Take 1 capsule (25 mcg) by mouth once daily., Disp: , Rfl:     docusate sodium (Colace) 100 mg capsule, Take 1 capsule (100 mg) by mouth once daily., Disp: , Rfl:     donepezil (Aricept) 23 mg tablet, Take 1 tablet (23 mg) by mouth once daily., Disp: , Rfl:     ferrous sulfate 325 mg (65 mg elemental) tablet, Take 1 tablet (325 mg) by mouth once daily with breakfast., Disp: , Rfl:     finasteride (Proscar) 5 mg tablet, Take 1 tablet (5 mg) by mouth once daily., Disp: , Rfl:     folic acid (Folvite) 1 mg tablet, Take 1 tablet (1 mg) by mouth once daily., Disp: , Rfl:     insulin glargine (Lantus U-100 Insulin) 100 unit/mL injection, Inject 5 Units under the skin once daily at bedtime. Take as directed per insulin instructions., Disp: , Rfl:     levETIRAcetam (Keppra) 500 mg tablet, Take 1 tablet (500 mg) by mouth 2 times a day., Disp: , Rfl:     melatonin 10 mg tablet, Take 1 tablet (10 mg) by mouth once daily at bedtime., Disp: , Rfl:     metoprolol tartrate (Lopressor) 50 mg tablet, Take 1 tablet by mouth 2 times a day., Disp: , Rfl:     oxyCODONE (Roxicodone) 5 mg immediate release tablet, Take 0.5 tablets (2.5 mg) by mouth every 6 hours if needed for moderate pain (4 - 6)., Disp: , Rfl:     sevelamer carbonate (Renvela) 0.8  gram packet, Take 2 packets (1.6 g) by mouth 2 times a day., Disp: , Rfl:     Virt-Caps 1 mg capsule, Take 1 capsule by mouth once daily., Disp: , Rfl:     acetaminophen (Tylenol) 650 mg suppository, Insert 1 suppository (650 mg) into the rectum every 4 hours if needed for mild pain (1 - 3), moderate pain (4 - 6) or fever (temp greater than 38.0 C)., Disp: , Rfl:     albuterol 1.25 mg/3 mL nebulizer solution, Take 3 mL (1.25 mg) by nebulization every 6 hours if needed for wheezing or shortness of breath., Disp: , Rfl:     bisacodyl (Dulcolax) 10 mg suppository, Insert 1 suppository (10 mg) into the rectum every 24 (twenty four) hours if needed for constipation., Disp: , Rfl:     collagenase (SantyL) 250 unit/gram ointment, Apply topically once daily. Apply to sacral decubitus ulcer daily, Disp: 60 g, Rfl: 11    insulin lispro 100 unit/mL injection, Inject 0-5 Units under the skin 3 times a day before meals. Take as directed per insulin instructions., Disp: 4.5 mL, Rfl: 0    lactobacillus acidophilus tablet tablet, Take 1 tablet by mouth once daily., Disp: 30 tablet, Rfl: 1    loperamide (Imodium A-D) 2 mg tablet, Take 1 tablet (2 mg) by mouth every 12 hours if needed for diarrhea., Disp: , Rfl:     magnesium hydroxide (Milk of Magnesia) 400 mg/5 mL suspension, Take 30 mL by mouth every 24 (twenty four) hours if needed for constipation., Disp: , Rfl:     meropenem (Merrem) 500 mg/100 mL IV, Infuse 100 mL (500 mg) at 200 mL/hr over 0.5 hours into a venous catheter every 12 hours for 28 days., Disp: 5600 mL, Rfl: 0    ondansetron (Zofran) 4 mg tablet, Take 1 tablet (4 mg) by mouth every 6 hours if needed for nausea or vomiting., Disp: , Rfl:     polyethylene glycol (Glycolax, Miralax) 17 gram packet, Take 17 g by mouth once daily as needed (for constipation)., Disp: , Rfl:     sodium hypochlorite (Dakin's Full-Strength) 0.5 % external solution, Apply topically early in the morning.. Use to wash decubitus ulcer on a  daily basis, Disp: 250 mL, Rfl: 11

## 2025-05-16 ENCOUNTER — PREP FOR PROCEDURE (OUTPATIENT)
Dept: SURGERY | Facility: HOSPITAL | Age: 85
End: 2025-05-16
Payer: MEDICARE

## 2025-05-16 LAB
BACTERIA BLD CULT: NORMAL
BACTERIA BLD CULT: NORMAL

## 2025-05-21 ENCOUNTER — OFFICE VISIT (OUTPATIENT)
Dept: WOUND CARE | Facility: CLINIC | Age: 85
End: 2025-05-21
Payer: MEDICARE

## 2025-05-21 DIAGNOSIS — L03.90 WOUND CELLULITIS: Primary | ICD-10-CM

## 2025-05-21 PROCEDURE — 11042 DBRDMT SUBQ TIS 1ST 20SQCM/<: CPT

## 2025-05-21 PROCEDURE — 11045 DBRDMT SUBQ TISS EACH ADDL: CPT

## 2025-05-21 PROCEDURE — 99213 OFFICE O/P EST LOW 20 MIN: CPT | Mod: 25

## 2025-05-25 LAB
BACTERIA SPEC AEROBE CULT: ABNORMAL
BACTERIA SPEC ANAEROBE CULT: ABNORMAL

## 2025-05-27 LAB
BACTERIA SPEC AEROBE CULT: ABNORMAL
BACTERIA SPEC ANAEROBE CULT: ABNORMAL

## 2025-05-28 ENCOUNTER — OFFICE VISIT (OUTPATIENT)
Dept: WOUND CARE | Facility: CLINIC | Age: 85
End: 2025-05-28
Payer: MEDICARE

## 2025-05-28 PROCEDURE — 11046 DBRDMT MUSC&/FSCA EA ADDL: CPT

## 2025-05-28 PROCEDURE — 11043 DBRDMT MUSC&/FSCA 1ST 20/<: CPT | Performed by: PLASTIC SURGERY

## 2025-05-28 PROCEDURE — 11046 DBRDMT MUSC&/FSCA EA ADDL: CPT | Performed by: PLASTIC SURGERY

## 2025-05-28 PROCEDURE — 11043 DBRDMT MUSC&/FSCA 1ST 20/<: CPT

## 2025-05-31 ENCOUNTER — PREP FOR PROCEDURE (OUTPATIENT)
Dept: SURGERY | Facility: HOSPITAL | Age: 85
End: 2025-05-31
Payer: MEDICARE

## 2025-06-04 ENCOUNTER — OFFICE VISIT (OUTPATIENT)
Dept: WOUND CARE | Facility: CLINIC | Age: 85
End: 2025-06-04
Payer: MEDICARE

## 2025-06-04 ENCOUNTER — APPOINTMENT (OUTPATIENT)
Dept: WOUND CARE | Facility: CLINIC | Age: 85
End: 2025-06-04
Payer: MEDICARE

## 2025-06-04 DIAGNOSIS — L03.90 WOUND CELLULITIS: Primary | ICD-10-CM

## 2025-06-04 PROCEDURE — 1111F DSCHRG MED/CURRENT MED MERGE: CPT | Performed by: PLASTIC SURGERY

## 2025-06-04 PROCEDURE — 11042 DBRDMT SUBQ TIS 1ST 20SQCM/<: CPT

## 2025-06-04 PROCEDURE — 11042 DBRDMT SUBQ TIS 1ST 20SQCM/<: CPT | Performed by: PLASTIC SURGERY

## 2025-06-04 PROCEDURE — 11045 DBRDMT SUBQ TISS EACH ADDL: CPT | Performed by: PLASTIC SURGERY

## 2025-06-04 PROCEDURE — 11045 DBRDMT SUBQ TISS EACH ADDL: CPT

## 2025-06-07 LAB
BACTERIA SPEC AEROBE CULT: NORMAL
BACTERIA SPEC ANAEROBE CULT: NORMAL

## 2025-06-10 ENCOUNTER — PREP FOR PROCEDURE (OUTPATIENT)
Dept: SURGERY | Facility: HOSPITAL | Age: 85
End: 2025-06-10
Payer: MEDICARE

## 2025-06-10 LAB
BACTERIA SPEC AEROBE CULT: NORMAL
BACTERIA SPEC ANAEROBE CULT: NORMAL

## 2025-06-11 ENCOUNTER — OFFICE VISIT (OUTPATIENT)
Dept: WOUND CARE | Facility: CLINIC | Age: 85
End: 2025-06-11
Payer: MEDICARE

## 2025-06-11 PROCEDURE — 11043 DBRDMT MUSC&/FSCA 1ST 20/<: CPT | Performed by: PLASTIC SURGERY

## 2025-06-11 PROCEDURE — 11043 DBRDMT MUSC&/FSCA 1ST 20/<: CPT

## 2025-06-11 PROCEDURE — 11046 DBRDMT MUSC&/FSCA EA ADDL: CPT

## 2025-06-11 PROCEDURE — 11046 DBRDMT MUSC&/FSCA EA ADDL: CPT | Performed by: PLASTIC SURGERY

## 2025-06-18 ENCOUNTER — OFFICE VISIT (OUTPATIENT)
Dept: WOUND CARE | Facility: CLINIC | Age: 85
End: 2025-06-18
Payer: MEDICARE

## 2025-06-18 PROCEDURE — 11046 DBRDMT MUSC&/FSCA EA ADDL: CPT

## 2025-06-18 PROCEDURE — 11043 DBRDMT MUSC&/FSCA 1ST 20/<: CPT

## 2025-06-18 PROCEDURE — 11046 DBRDMT MUSC&/FSCA EA ADDL: CPT | Performed by: PLASTIC SURGERY

## 2025-06-18 PROCEDURE — 11043 DBRDMT MUSC&/FSCA 1ST 20/<: CPT | Performed by: PLASTIC SURGERY

## 2025-06-20 ENCOUNTER — PREP FOR PROCEDURE (OUTPATIENT)
Dept: SURGERY | Facility: HOSPITAL | Age: 85
End: 2025-06-20
Payer: MEDICARE

## 2025-06-25 ENCOUNTER — OFFICE VISIT (OUTPATIENT)
Dept: WOUND CARE | Facility: CLINIC | Age: 85
End: 2025-06-25
Payer: MEDICARE

## 2025-06-25 PROCEDURE — 11043 DBRDMT MUSC&/FSCA 1ST 20/<: CPT | Performed by: PLASTIC SURGERY

## 2025-06-25 PROCEDURE — 11046 DBRDMT MUSC&/FSCA EA ADDL: CPT | Performed by: PLASTIC SURGERY

## 2025-06-25 PROCEDURE — 0KBN0ZZ EXCISION OF RIGHT HIP MUSCLE, OPEN APPROACH: ICD-10-PCS | Performed by: PLASTIC SURGERY

## 2025-06-25 PROCEDURE — 11043 DBRDMT MUSC&/FSCA 1ST 20/<: CPT

## 2025-06-25 PROCEDURE — 11046 DBRDMT MUSC&/FSCA EA ADDL: CPT

## 2025-06-25 PROCEDURE — 0KBP0ZZ EXCISION OF LEFT HIP MUSCLE, OPEN APPROACH: ICD-10-PCS | Performed by: PLASTIC SURGERY

## 2025-06-27 ENCOUNTER — OFFICE VISIT (OUTPATIENT)
Dept: ORTHOPEDIC SURGERY | Facility: CLINIC | Age: 85
End: 2025-06-27
Payer: MEDICARE

## 2025-06-27 ENCOUNTER — HOSPITAL ENCOUNTER (INPATIENT)
Facility: HOSPITAL | Age: 85
LOS: 2 days | Discharge: NURSING, REHAB OR LONG-TERM CARE | DRG: 500 | End: 2025-06-29
Attending: STUDENT IN AN ORGANIZED HEALTH CARE EDUCATION/TRAINING PROGRAM | Admitting: INTERNAL MEDICINE
Payer: MEDICARE

## 2025-06-27 ENCOUNTER — APPOINTMENT (OUTPATIENT)
Dept: RADIOLOGY | Facility: HOSPITAL | Age: 85
DRG: 500 | End: 2025-06-27
Payer: MEDICARE

## 2025-06-27 ENCOUNTER — HOSPITAL ENCOUNTER (OUTPATIENT)
Dept: RADIOLOGY | Facility: CLINIC | Age: 85
Discharge: HOME | End: 2025-06-27
Payer: MEDICARE

## 2025-06-27 ENCOUNTER — APPOINTMENT (OUTPATIENT)
Dept: CARDIOLOGY | Facility: HOSPITAL | Age: 85
DRG: 500 | End: 2025-06-27
Payer: MEDICARE

## 2025-06-27 DIAGNOSIS — M25.552 PAIN OF LEFT HIP: Primary | ICD-10-CM

## 2025-06-27 DIAGNOSIS — M25.552 PAIN OF LEFT HIP: ICD-10-CM

## 2025-06-27 DIAGNOSIS — S73.005A CLOSED DISLOCATION OF LEFT HIP, INITIAL ENCOUNTER (MULTI): Primary | ICD-10-CM

## 2025-06-27 DIAGNOSIS — S73.005A HIP DISLOCATION, LEFT, INITIAL ENCOUNTER (MULTI): ICD-10-CM

## 2025-06-27 LAB
ALBUMIN SERPL BCP-MCNC: 2.6 G/DL (ref 3.4–5)
ALP SERPL-CCNC: 93 U/L (ref 33–136)
ALT SERPL W P-5'-P-CCNC: 9 U/L (ref 10–52)
ANION GAP SERPL CALC-SCNC: 14 MMOL/L (ref 10–20)
APTT PPP: 35 SECONDS (ref 26–36)
AST SERPL W P-5'-P-CCNC: 14 U/L (ref 9–39)
ATRIAL RATE: 72 BPM
BASOPHILS # BLD AUTO: 0.05 X10*3/UL (ref 0–0.1)
BASOPHILS NFR BLD AUTO: 0.4 %
BILIRUB SERPL-MCNC: 0.4 MG/DL (ref 0–1.2)
BUN SERPL-MCNC: 46 MG/DL (ref 6–23)
CALCIUM SERPL-MCNC: 8.1 MG/DL (ref 8.6–10.3)
CHLORIDE SERPL-SCNC: 97 MMOL/L (ref 98–107)
CO2 SERPL-SCNC: 27 MMOL/L (ref 21–32)
CREAT SERPL-MCNC: 3.76 MG/DL (ref 0.5–1.3)
EGFRCR SERPLBLD CKD-EPI 2021: 15 ML/MIN/1.73M*2
EOSINOPHIL # BLD AUTO: 0.24 X10*3/UL (ref 0–0.4)
EOSINOPHIL NFR BLD AUTO: 2 %
ERYTHROCYTE [DISTWIDTH] IN BLOOD BY AUTOMATED COUNT: 18.8 % (ref 11.5–14.5)
GLUCOSE BLD MANUAL STRIP-MCNC: 165 MG/DL (ref 74–99)
GLUCOSE SERPL-MCNC: 208 MG/DL (ref 74–99)
HCT VFR BLD AUTO: 36.4 % (ref 41–52)
HGB BLD-MCNC: 12.3 G/DL (ref 13.5–17.5)
IMM GRANULOCYTES # BLD AUTO: 0.04 X10*3/UL (ref 0–0.5)
IMM GRANULOCYTES NFR BLD AUTO: 0.3 % (ref 0–0.9)
INR PPP: 1.3 (ref 0.9–1.1)
LYMPHOCYTES # BLD AUTO: 3.15 X10*3/UL (ref 0.8–3)
LYMPHOCYTES NFR BLD AUTO: 26.3 %
MCH RBC QN AUTO: 26.6 PG (ref 26–34)
MCHC RBC AUTO-ENTMCNC: 33.8 G/DL (ref 32–36)
MCV RBC AUTO: 79 FL (ref 80–100)
MONOCYTES # BLD AUTO: 1.15 X10*3/UL (ref 0.05–0.8)
MONOCYTES NFR BLD AUTO: 9.6 %
NEUTROPHILS # BLD AUTO: 7.36 X10*3/UL (ref 1.6–5.5)
NEUTROPHILS NFR BLD AUTO: 61.4 %
NRBC BLD-RTO: 0 /100 WBCS (ref 0–0)
P AXIS: 8 DEGREES
P OFFSET: 195 MS
P ONSET: 139 MS
PLATELET # BLD AUTO: 198 X10*3/UL (ref 150–450)
POTASSIUM SERPL-SCNC: 3.5 MMOL/L (ref 3.5–5.3)
PR INTERVAL: 164 MS
PROT SERPL-MCNC: 6.9 G/DL (ref 6.4–8.2)
PROTHROMBIN TIME: 14.1 SECONDS (ref 9.8–12.4)
Q ONSET: 221 MS
QRS COUNT: 12 BEATS
QRS DURATION: 72 MS
QT INTERVAL: 412 MS
QTC CALCULATION(BAZETT): 451 MS
QTC FREDERICIA: 438 MS
R AXIS: 65 DEGREES
RBC # BLD AUTO: 4.63 X10*6/UL (ref 4.5–5.9)
SODIUM SERPL-SCNC: 134 MMOL/L (ref 136–145)
T AXIS: 74 DEGREES
T OFFSET: 427 MS
VENTRICULAR RATE: 72 BPM
WBC # BLD AUTO: 12 X10*3/UL (ref 4.4–11.3)

## 2025-06-27 PROCEDURE — G0378 HOSPITAL OBSERVATION PER HR: HCPCS

## 2025-06-27 PROCEDURE — 82947 ASSAY GLUCOSE BLOOD QUANT: CPT

## 2025-06-27 PROCEDURE — 99212 OFFICE O/P EST SF 10 MIN: CPT | Performed by: PHYSICIAN ASSISTANT

## 2025-06-27 PROCEDURE — 85610 PROTHROMBIN TIME: CPT

## 2025-06-27 PROCEDURE — 99285 EMERGENCY DEPT VISIT HI MDM: CPT | Performed by: STUDENT IN AN ORGANIZED HEALTH CARE EDUCATION/TRAINING PROGRAM

## 2025-06-27 PROCEDURE — 76377 3D RENDER W/INTRP POSTPROCES: CPT | Mod: LEFT SIDE | Performed by: RADIOLOGY

## 2025-06-27 PROCEDURE — 73502 X-RAY EXAM HIP UNI 2-3 VIEWS: CPT | Mod: LT

## 2025-06-27 PROCEDURE — 93005 ELECTROCARDIOGRAM TRACING: CPT

## 2025-06-27 PROCEDURE — 80053 COMPREHEN METABOLIC PANEL: CPT

## 2025-06-27 PROCEDURE — 73700 CT LOWER EXTREMITY W/O DYE: CPT | Mod: LT

## 2025-06-27 PROCEDURE — 73700 CT LOWER EXTREMITY W/O DYE: CPT | Mod: LEFT SIDE | Performed by: RADIOLOGY

## 2025-06-27 PROCEDURE — 87340 HEPATITIS B SURFACE AG IA: CPT | Mod: ELYLAB | Performed by: INTERNAL MEDICINE

## 2025-06-27 PROCEDURE — 76377 3D RENDER W/INTRP POSTPROCES: CPT

## 2025-06-27 PROCEDURE — 1100000001 HC PRIVATE ROOM DAILY

## 2025-06-27 PROCEDURE — 36415 COLL VENOUS BLD VENIPUNCTURE: CPT

## 2025-06-27 PROCEDURE — 99221 1ST HOSP IP/OBS SF/LOW 40: CPT | Performed by: ORTHOPAEDIC SURGERY

## 2025-06-27 PROCEDURE — 86706 HEP B SURFACE ANTIBODY: CPT | Mod: ELYLAB | Performed by: INTERNAL MEDICINE

## 2025-06-27 PROCEDURE — 85025 COMPLETE CBC W/AUTO DIFF WBC: CPT

## 2025-06-27 PROCEDURE — 36415 COLL VENOUS BLD VENIPUNCTURE: CPT | Performed by: INTERNAL MEDICINE

## 2025-06-27 RX ORDER — INSULIN LISPRO 100 [IU]/ML
0-10 INJECTION, SOLUTION INTRAVENOUS; SUBCUTANEOUS
Status: DISCONTINUED | OUTPATIENT
Start: 2025-06-28 | End: 2025-06-29 | Stop reason: HOSPADM

## 2025-06-27 RX ORDER — ACETAMINOPHEN 160 MG/5ML
650 SOLUTION ORAL EVERY 4 HOURS PRN
Status: DISCONTINUED | OUTPATIENT
Start: 2025-06-27 | End: 2025-06-29 | Stop reason: HOSPADM

## 2025-06-27 RX ORDER — ENOXAPARIN SODIUM 100 MG/ML
30 INJECTION SUBCUTANEOUS DAILY
Status: DISCONTINUED | OUTPATIENT
Start: 2025-06-28 | End: 2025-06-28

## 2025-06-27 RX ORDER — ACETAMINOPHEN 325 MG/1
650 TABLET ORAL EVERY 4 HOURS PRN
Status: DISCONTINUED | OUTPATIENT
Start: 2025-06-27 | End: 2025-06-29 | Stop reason: HOSPADM

## 2025-06-27 RX ORDER — HEPARIN SODIUM 1000 [USP'U]/ML
2000 INJECTION, SOLUTION INTRAVENOUS; SUBCUTANEOUS
Status: DISCONTINUED | OUTPATIENT
Start: 2025-06-28 | End: 2025-06-29 | Stop reason: HOSPADM

## 2025-06-27 RX ORDER — DEXTROSE 50 % IN WATER (D50W) INTRAVENOUS SYRINGE
25
Status: DISCONTINUED | OUTPATIENT
Start: 2025-06-27 | End: 2025-06-29 | Stop reason: HOSPADM

## 2025-06-27 RX ORDER — ACETAMINOPHEN 650 MG/1
650 SUPPOSITORY RECTAL EVERY 4 HOURS PRN
Status: DISCONTINUED | OUTPATIENT
Start: 2025-06-27 | End: 2025-06-29 | Stop reason: HOSPADM

## 2025-06-27 RX ORDER — POLYETHYLENE GLYCOL 3350 17 G/17G
17 POWDER, FOR SOLUTION ORAL DAILY
Status: DISCONTINUED | OUTPATIENT
Start: 2025-06-28 | End: 2025-06-28

## 2025-06-27 RX ORDER — DEXTROSE 50 % IN WATER (D50W) INTRAVENOUS SYRINGE
12.5
Status: DISCONTINUED | OUTPATIENT
Start: 2025-06-27 | End: 2025-06-29 | Stop reason: HOSPADM

## 2025-06-27 RX ORDER — ONDANSETRON HYDROCHLORIDE 2 MG/ML
4 INJECTION, SOLUTION INTRAVENOUS EVERY 8 HOURS PRN
Status: DISCONTINUED | OUTPATIENT
Start: 2025-06-27 | End: 2025-06-29 | Stop reason: HOSPADM

## 2025-06-27 RX ORDER — ONDANSETRON 4 MG/1
4 TABLET, ORALLY DISINTEGRATING ORAL EVERY 8 HOURS PRN
Status: DISCONTINUED | OUTPATIENT
Start: 2025-06-27 | End: 2025-06-29 | Stop reason: HOSPADM

## 2025-06-27 SDOH — HEALTH STABILITY: MENTAL HEALTH: HOW OFTEN DO YOU HAVE SIX OR MORE DRINKS ON ONE OCCASION?: NEVER

## 2025-06-27 SDOH — ECONOMIC STABILITY: FOOD INSECURITY: WITHIN THE PAST 12 MONTHS, YOU WORRIED THAT YOUR FOOD WOULD RUN OUT BEFORE YOU GOT THE MONEY TO BUY MORE.: NEVER TRUE

## 2025-06-27 SDOH — SOCIAL STABILITY: SOCIAL NETWORK: IN A TYPICAL WEEK, HOW MANY TIMES DO YOU TALK ON THE PHONE WITH FAMILY, FRIENDS, OR NEIGHBORS?: NEVER

## 2025-06-27 SDOH — SOCIAL STABILITY: SOCIAL NETWORK: HOW OFTEN DO YOU ATTEND CHURCH OR RELIGIOUS SERVICES?: PATIENT UNABLE TO ANSWER

## 2025-06-27 SDOH — SOCIAL STABILITY: SOCIAL INSECURITY: ARE YOU MARRIED, WIDOWED, DIVORCED, SEPARATED, NEVER MARRIED, OR LIVING WITH A PARTNER?: MARRIED

## 2025-06-27 SDOH — SOCIAL STABILITY: SOCIAL NETWORK: HOW OFTEN DO YOU GET TOGETHER WITH FRIENDS OR RELATIVES?: PATIENT UNABLE TO ANSWER

## 2025-06-27 SDOH — SOCIAL STABILITY: SOCIAL NETWORK: HOW OFTEN DO YOU ATTEND MEETINGS OF THE CLUBS OR ORGANIZATIONS YOU BELONG TO?: PATIENT UNABLE TO ANSWER

## 2025-06-27 SDOH — SOCIAL STABILITY: SOCIAL INSECURITY: DO YOU FEEL UNSAFE GOING BACK TO THE PLACE WHERE YOU ARE LIVING?: UNABLE TO ASSESS

## 2025-06-27 SDOH — HEALTH STABILITY: PHYSICAL HEALTH
HOW OFTEN DO YOU NEED TO HAVE SOMEONE HELP YOU WHEN YOU READ INSTRUCTIONS, PAMPHLETS, OR OTHER WRITTEN MATERIAL FROM YOUR DOCTOR OR PHARMACY?: PATIENT UNABLE TO RESPOND

## 2025-06-27 SDOH — SOCIAL STABILITY: SOCIAL INSECURITY: ARE THERE ANY APPARENT SIGNS OF INJURIES/BEHAVIORS THAT COULD BE RELATED TO ABUSE/NEGLECT?: UNABLE TO ASSESS

## 2025-06-27 SDOH — SOCIAL STABILITY: SOCIAL INSECURITY: HAVE YOU HAD ANY THOUGHTS OF HARMING ANYONE ELSE?: UNABLE TO ASSESS

## 2025-06-27 SDOH — ECONOMIC STABILITY: FOOD INSECURITY: WITHIN THE PAST 12 MONTHS, THE FOOD YOU BOUGHT JUST DIDN'T LAST AND YOU DIDN'T HAVE MONEY TO GET MORE.: NEVER TRUE

## 2025-06-27 SDOH — HEALTH STABILITY: MENTAL HEALTH: HOW OFTEN DO YOU HAVE A DRINK CONTAINING ALCOHOL?: NEVER

## 2025-06-27 SDOH — HEALTH STABILITY: MENTAL HEALTH: HOW MANY DRINKS CONTAINING ALCOHOL DO YOU HAVE ON A TYPICAL DAY WHEN YOU ARE DRINKING?: PATIENT DOES NOT DRINK

## 2025-06-27 SDOH — SOCIAL STABILITY: SOCIAL INSECURITY: ARE YOU OR HAVE YOU BEEN THREATENED OR ABUSED PHYSICALLY, EMOTIONALLY, OR SEXUALLY BY ANYONE?: UNABLE TO ASSESS

## 2025-06-27 SDOH — SOCIAL STABILITY: SOCIAL INSECURITY: ABUSE: ADULT

## 2025-06-27 SDOH — SOCIAL STABILITY: SOCIAL INSECURITY: WITHIN THE LAST YEAR, HAVE YOU BEEN AFRAID OF YOUR PARTNER OR EX-PARTNER?: PATIENT UNABLE TO ANSWER

## 2025-06-27 SDOH — SOCIAL STABILITY: SOCIAL INSECURITY: DO YOU FEEL ANYONE HAS EXPLOITED OR TAKEN ADVANTAGE OF YOU FINANCIALLY OR OF YOUR PERSONAL PROPERTY?: UNABLE TO ASSESS

## 2025-06-27 SDOH — SOCIAL STABILITY: SOCIAL INSECURITY: HAVE YOU HAD THOUGHTS OF HARMING ANYONE ELSE?: NO

## 2025-06-27 SDOH — SOCIAL STABILITY: SOCIAL INSECURITY: DOES ANYONE TRY TO KEEP YOU FROM HAVING/CONTACTING OTHER FRIENDS OR DOING THINGS OUTSIDE YOUR HOME?: UNABLE TO ASSESS

## 2025-06-27 SDOH — SOCIAL STABILITY: SOCIAL INSECURITY: HAS ANYONE EVER THREATENED TO HURT YOUR FAMILY OR YOUR PETS?: UNABLE TO ASSESS

## 2025-06-27 SDOH — SOCIAL STABILITY: SOCIAL INSECURITY: WERE YOU ABLE TO COMPLETE ALL THE BEHAVIORAL HEALTH SCREENINGS?: YES

## 2025-06-27 ASSESSMENT — LIFESTYLE VARIABLES
HOW OFTEN DO YOU HAVE 6 OR MORE DRINKS ON ONE OCCASION: NEVER
HOW OFTEN DO YOU HAVE A DRINK CONTAINING ALCOHOL: NEVER
SUBSTANCE_ABUSE_PAST_12_MONTHS: NO
AUDIT-C TOTAL SCORE: 0
SKIP TO QUESTIONS 9-10: 1
AUDIT-C TOTAL SCORE: 0
AUDIT-C TOTAL SCORE: 0
PRESCIPTION_ABUSE_PAST_12_MONTHS: NO
SKIP TO QUESTIONS 9-10: 1
HOW MANY STANDARD DRINKS CONTAINING ALCOHOL DO YOU HAVE ON A TYPICAL DAY: PATIENT DOES NOT DRINK

## 2025-06-27 ASSESSMENT — PAIN SCALES - PAIN ASSESSMENT IN ADVANCED DEMENTIA (PAINAD)
BREATHING: NORMAL
FACIALEXPRESSION: SAD, FRIGHTENED, FROWN
TOTALSCORE: 1
TOTALSCORE: 1
BREATHING: NORMAL
CONSOLABILITY: NO NEED TO CONSOLE
BODYLANGUAGE: RELAXED
FACIALEXPRESSION: SAD, FRIGHTENED, FROWN
BODYLANGUAGE: RELAXED
CONSOLABILITY: NO NEED TO CONSOLE

## 2025-06-27 ASSESSMENT — PATIENT HEALTH QUESTIONNAIRE - PHQ9
2. FEELING DOWN, DEPRESSED OR HOPELESS: NOT AT ALL
SUM OF ALL RESPONSES TO PHQ9 QUESTIONS 1 & 2: 0
1. LITTLE INTEREST OR PLEASURE IN DOING THINGS: NOT AT ALL

## 2025-06-27 ASSESSMENT — ACTIVITIES OF DAILY LIVING (ADL)
LACK_OF_TRANSPORTATION: NO
TOILETING: DEPENDENT
ASSISTIVE_DEVICE: WHEELCHAIR
PATIENT'S MEMORY ADEQUATE TO SAFELY COMPLETE DAILY ACTIVITIES?: NO
BATHING: DEPENDENT
JUDGMENT_ADEQUATE_SAFELY_COMPLETE_DAILY_ACTIVITIES: NO
FEEDING YOURSELF: DEPENDENT
HEARING - RIGHT EAR: DIFFICULTY WITH NOISE
HEARING - LEFT EAR: DIFFICULTY WITH NOISE
GROOMING: DEPENDENT
ADEQUATE_TO_COMPLETE_ADL: NO
WALKS IN HOME: DEPENDENT
DRESSING YOURSELF: DEPENDENT

## 2025-06-27 ASSESSMENT — PAIN - FUNCTIONAL ASSESSMENT
PAIN_FUNCTIONAL_ASSESSMENT: PAINAD (PAIN ASSESSMENT IN ADVANCED DEMENTIA SCALE)
PAIN_FUNCTIONAL_ASSESSMENT: PAINAD (PAIN ASSESSMENT IN ADVANCED DEMENTIA SCALE)

## 2025-06-27 ASSESSMENT — COGNITIVE AND FUNCTIONAL STATUS - GENERAL
EATING MEALS: TOTAL
STANDING UP FROM CHAIR USING ARMS: TOTAL
WALKING IN HOSPITAL ROOM: TOTAL
CLIMB 3 TO 5 STEPS WITH RAILING: TOTAL
EATING MEALS: TOTAL
MOBILITY SCORE: 6
MOBILITY SCORE: 6
DAILY ACTIVITIY SCORE: 6
STANDING UP FROM CHAIR USING ARMS: TOTAL
PATIENT BASELINE BEDBOUND: NO
MOVING TO AND FROM BED TO CHAIR: TOTAL
PERSONAL GROOMING: TOTAL
DAILY ACTIVITIY SCORE: 6
DRESSING REGULAR UPPER BODY CLOTHING: TOTAL
DRESSING REGULAR UPPER BODY CLOTHING: TOTAL
TURNING FROM BACK TO SIDE WHILE IN FLAT BAD: TOTAL
MOVING TO AND FROM BED TO CHAIR: TOTAL
TURNING FROM BACK TO SIDE WHILE IN FLAT BAD: TOTAL
WALKING IN HOSPITAL ROOM: TOTAL
DRESSING REGULAR LOWER BODY CLOTHING: TOTAL
TOILETING: TOTAL
HELP NEEDED FOR BATHING: TOTAL
DRESSING REGULAR LOWER BODY CLOTHING: TOTAL
TOILETING: TOTAL
MOVING FROM LYING ON BACK TO SITTING ON SIDE OF FLAT BED WITH BEDRAILS: TOTAL
PERSONAL GROOMING: TOTAL
HELP NEEDED FOR BATHING: TOTAL
CLIMB 3 TO 5 STEPS WITH RAILING: TOTAL
MOVING FROM LYING ON BACK TO SITTING ON SIDE OF FLAT BED WITH BEDRAILS: TOTAL

## 2025-06-27 NOTE — CONSULTS
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 male presenting with recurrent left hip hemiarthroplasty dislocation.     Past Medical History  He has a past medical history of Autonomic dysfunction, Benign prostatic hyperplasia, Chronic anemia, Coronary artery disease, Current use of long term anticoagulation, Dementia, Dyslipidemia, End-stage renal disease on hemodialysis (Multi), Frequent falls, Gout, Heart failure with preserved ejection fraction, History of anaphylaxis, History of closed head injury, History of Clostridioides difficile colitis, History of deep vein thrombosis, History of non-ST elevation myocardial infarction (NSTEMI), History of sepsis, Hypertension, Hypoxic ischemic encephalopathy (Multi), 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    Surgical History  He has a past surgical history that includes IR CVC tunneled (08/20/2021); IR tunneled dialysis catheter; AV fistula placement; pacemaker placement; Total hip arthroplasty (Right); Thrombectomy; Tonsillectomy; Adenoidectomy; Cholecystectomy; and Colonoscopy.     Social History  He reports that he has quit smoking. His smoking use included cigarettes. He has never used smokeless tobacco. He reports that he does not drink alcohol and does not use drugs.    Family History  Family History[1]     Allergies  Patient has no known allergies.    Review of Systems  Noncontributory     Physical Exam  Head normocephalic atraumatic    Heart regular rate and rhythm    Lungs clear    Abdominal exam nontender nondistended    Left hip shortened and internally rotated neuro intact the patient is actually resting comfortably but  has pain with flexion beyond around 30 degrees no ability really to abduct    He can move toes foot and ankle    The old incisions clean healed dry and intact     Last Recorded Vitals  Blood pressure 174/89, pulse 72, temperature 36.8 °C (98.2 °F), temperature source Temporal, resp. rate 18, SpO2 95%.    Relevant Results      Scheduled medications  Scheduled Medications[2]  Continuous medications  Continuous Medications[3]  PRN medications  PRN Medications[4]  Results for orders placed or performed during the hospital encounter of 06/27/25 (from the past 24 hours)   CBC and Auto Differential   Result Value Ref Range    WBC 12.0 (H) 4.4 - 11.3 x10*3/uL    nRBC 0.0 0.0 - 0.0 /100 WBCs    RBC 4.63 4.50 - 5.90 x10*6/uL    Hemoglobin 12.3 (L) 13.5 - 17.5 g/dL    Hematocrit 36.4 (L) 41.0 - 52.0 %    MCV 79 (L) 80 - 100 fL    MCH 26.6 26.0 - 34.0 pg    MCHC 33.8 32.0 - 36.0 g/dL    RDW 18.8 (H) 11.5 - 14.5 %    Platelets 198 150 - 450 x10*3/uL    Neutrophils % 61.4 40.0 - 80.0 %    Immature Granulocytes %, Automated 0.3 0.0 - 0.9 %    Lymphocytes % 26.3 13.0 - 44.0 %    Monocytes % 9.6 2.0 - 10.0 %    Eosinophils % 2.0 0.0 - 6.0 %    Basophils % 0.4 0.0 - 2.0 %    Neutrophils Absolute 7.36 (H) 1.60 - 5.50 x10*3/uL    Immature Granulocytes Absolute, Automated 0.04 0.00 - 0.50 x10*3/uL    Lymphocytes Absolute 3.15 (H) 0.80 - 3.00 x10*3/uL    Monocytes Absolute 1.15 (H) 0.05 - 0.80 x10*3/uL    Eosinophils Absolute 0.24 0.00 - 0.40 x10*3/uL    Basophils Absolute 0.05 0.00 - 0.10 x10*3/uL   Comprehensive metabolic panel   Result Value Ref Range    Glucose 208 (H) 74 - 99 mg/dL    Sodium 134 (L) 136 - 145 mmol/L    Potassium 3.5 3.5 - 5.3 mmol/L    Chloride 97 (L) 98 - 107 mmol/L    Bicarbonate 27 21 - 32 mmol/L    Anion Gap 14 10 - 20 mmol/L    Urea Nitrogen 46 (H) 6 - 23 mg/dL    Creatinine 3.76 (H) 0.50 - 1.30 mg/dL    eGFR 15 (L) >60 mL/min/1.73m*2    Calcium 8.1 (L) 8.6 - 10.3 mg/dL    Albumin 2.6 (L) 3.4 - 5.0 g/dL     Alkaline Phosphatase 93 33 - 136 U/L    Total Protein 6.9 6.4 - 8.2 g/dL    AST 14 9 - 39 U/L    Bilirubin, Total 0.4 0.0 - 1.2 mg/dL    ALT 9 (L) 10 - 52 U/L   Coagulation Screen   Result Value Ref Range    Protime 14.1 (H) 9.8 - 12.4 seconds    INR 1.3 (H) 0.9 - 1.1    aPTT 35 26 - 36 seconds   SST TOP   Result Value Ref Range    Extra Tube Hold for add-ons.    Gray Top   Result Value Ref Range    Extra Tube Hold for add-ons.    ECG 12 lead   Result Value Ref Range    Ventricular Rate 72 BPM    Atrial Rate 72 BPM    KY Interval 164 ms    QRS Duration 72 ms    QT Interval 412 ms    QTC Calculation(Bazett) 451 ms    P Axis 8 degrees    R Axis 65 degrees    T Axis 74 degrees    QRS Count 12 beats    Q Onset 221 ms    P Onset 139 ms    P Offset 195 ms    T Offset 427 ms    QTC Fredericia 438 ms        Assessment/Plan     Recurrent dislocation left hip hemiarthroplasty    CAT scan shows some mild erosive changes around the Aci-Jel but no obvious fracture    Treatment options are limited we had a long discussion with the patient's wife at bedside    Definitive treatment would include revision to a constrained liner total hip replacement    The family at this time is wishing to withhold large open type surgeries due to poor health and overall wishes to minimize open surgeries    They have requested at this time and attempted closed reduction with an abduction brace and then possibly wearing a brace just to allow transfer ADLs and to sit up and eat    They certainly understand the success rate of an attempted closed reduction could be around 50 to 60%    Again if the hip is closed reduced but comes out again we would not repeat closed reduction as this would be his second closed reduction    They will have the patient hold his Eliquis but have a close reduction attempt tomorrow and then additional treatment will be rendered    They also understand that if the closed reduction fails tomorrow he will not have an open  surgery tomorrow as he is not sufficiently prepared for surgery nor is he off his Eliquis    Risks of closed reduction with high failure rate are discussed extensively sites marked identified    Marcel Lezama MD         [1]   Family History  Problem Relation Name Age of Onset    Diabetes Mother      Cancer Father      Lupus Daughter      Kidney failure Daughter      Asthma Son      Hypertension Other      Sickle cell trait Other     [2] [3] [4]

## 2025-06-27 NOTE — PROGRESS NOTES
Emergency Medicine Transition of Care Note.    I received Tyshawn Coles in signout from Mark Martinez PA-C.  Please see the previous ED provider note for all HPI, PE and MDM up to the time of signout at 1600 hrs. This is in addition to the primary record.    In brief Tyshawn Coles is an 84 y.o. male presenting for   Chief Complaint   Patient presents with    Hip Pain     PT dislocated his hip 3 months ago     At the time of signout we were awaiting: Admission    Diagnoses as of 06/27/25 1626   Closed dislocation of left hip, initial encounter (Multi)       Medical Decision Making  1600 hrs.  Patient's care was turned by me by the outgoing [place] Case was discussed.  In summary this is a 84-year-old male who presented with complaints of hip pain.  Patient has a history of dementia, ESRD, stroke with left-sided residual weakness, atrial fibrillation, hypertension and hyperlipidemia, BPH.  Patient presented with worsening left hip pain after fall 3 months ago.  We did speak with Dr. Lezama who recommends getting a CT scan admit to medicine and they will evaluate the patient for the morning.  CBC white count 12 hemoglobin 12.3 medic at 36.4 platelet count 198, PT low 14.1 INR 1.3.  Spoke with Dr. Wise who will admit the patient his service and orthopedics will see the patient on the floor in the morning.  EKG interpreted by me 1700 hrs. normal sinus rhythm with nonspecific T wave abnormality rate 72, , QRS of 72, , QTc was 451 no STEMI no ischemic changes        Final diagnoses:   [S73.005A] Closed dislocation of left hip, initial encounter (Multi)           Procedure  Procedures    Marcel Tatum PA-C

## 2025-06-27 NOTE — PROGRESS NOTES
Tyshawn Coles is a 84 y.o. male who presents for Follow-up of the Left Hip (3/10/25 Lt Hip Luis; xray today).    HPI:  84-year-old gentleman here for follow-up of left hip hemiarthroplasty.  3 months out.    Physical exam:  Patient is on a squad gurney, he is not ambulatory, he has dementia and is nonverbal.  He is here with his wife today.  He has a dislocated left hip, with cranial migration.  Internal/external rotation of the hip creates pain in the hip.  He is not alert and oriented place and time.  He is well-nourished and well-kept.  Muscle strength testing cannot be performed.    Assessment:  84-year-old gentleman here for follow-up of left hip hemiarthroplasty.  3 months out.  He has been in a rehab facility for about the last 2 or 3 months.  Dr. Dumont saw this patient on April 1, 2025 for a left hip dislocation.  He did a closed reduction of that dislocation, the patient had a fall and dislocated that the hip again.  X-rays today show a dislocated hip with cranial migration of the construct and the leg.    Plan:  I spoke directly with Dr. Berrios about this patient, Dr. Berrios reviewed the x-rays as well.  Dr. Marcel Lezama is on-call for the [facility].  We will send him by juan to the emergency department by Dr. Marcel Lezama will be consulted on this patient.  He will either do a closed reduction or convert by hemiarthroplasty into a total hip arthroplasty.    Garth Wong PA-C

## 2025-06-27 NOTE — ED PROVIDER NOTES
HPI   No chief complaint on file.        History provided by:  Patient    Limitations to history:  none    CC: hip dislocation    HPI: 84-year-old male with a history of dementia, ESRD, stroke with residual slurred speech, left-sided facial drooping, left arm weakness, A-fib, hypertension and hyperlipidemia, BPH, seizures presents the emergency department to be evaluated for a left hip dislocation.  According to EMS, patient's left hip has been dislocated for the last 3 months after a fall.  He had a routine follow-up with orthopedics today and they repeated the x-ray of his hip which confirms the dislocated hip.  They recommended that the patient come to the ER to be seen by Dr. Lezama who is on-call for the Ortho team.  Discussed either a closed reduction or total hip arthroplasty.  Patient denies having any pain.  He is A&OX1 (person)  which is similar to his baseline.  Denies any new injuries or trauma.  Patient is anticoagulated on Eliquis due to his history of A-fib.  Patient has residual slurred speech, left-sided facial drooping, left upper extremity weakness from a stroke.  Denies all other systemic symptoms.    ROS: Negative unless mentioned in HPI    Medical Hx:    Allergies reviewed.  Immunizations are up-to-date.      Physical exam:    Constitutional: Sitting comfortably in the room and in no distress.    Oriented to person only.    HEENT: Head is normocephalic, atraumatic. Patient's airway is patent.  Tympanic membranes are clear bilaterally.  Nasal mucosa clear.  Mouth with normal mucosa.  Throat is not erythematous and there are no oropharyngeal exudates, uvula is midline.   Left-sided facial drooping    Eyes: Clear bilaterally.  Pupils are equal round and reactive to light and accommodation.  Extraocular movements intact.      Cardiac: Regular rate, regular rhythm.  Heart sounds S1, S2.  No murmurs, rubs, or gallops.  PMI nondisplaced.  No JVD.    Respiratory: Regular respiratory rate and effort.   Breath sounds are clear and equal bilaterally, no adventitious lung sounds.  Patient is speaking in full sentences and is in no apparent respiratory distress. No use of accessory muscles.      Gastrointestinal: Abdomen is soft, nondistended, and nontender.  There are no obvious deformities.  No rebound tenderness or guarding.  Bowel sounds are normal active.    Genitourinary: No CVA or flank tenderness.    Musculoskeletal:   Reproducible tenderness over the lateral aspect of the hip.  Hip is shortened and internally rotated.  Limited range of motion given his injury. No obvious skin or bony deformities.   left upper extremity weakness compared to the right side No muscle or joint tenderness. No back or neck tenderness.  Capillary refill less than 3 seconds.  Strong peripheral pulses.  No sensory deficits.    Neurological: Patient is alert and oriented to person only.  Patient has chronic slurred speech and left-sided facial drooping.  Patient has chronic left upper extremity weakness.  No new focal neurological deficits.     Skin: Skin is normal color for race and is warm, dry, and intact.  No evidence of trauma.  No lesions, rashes, bruising, jaundice, or masses.    Psych: Appropriate mood and affect.  No apparent risk to self or others.    Heme/lymph: No adenopathy, lymphadenopathy, or splenomegaly    Physical exam is otherwise negative unless stated above or in history of present illness.              Patient History   Medical History[1]  Surgical History[2]  Family History[3]  Social History[4]    Physical Exam   ED Triage Vitals   Temp Pulse Resp BP   -- -- -- --      SpO2 Temp src Heart Rate Source Patient Position   -- -- -- --      BP Location FiO2 (%)     -- --       Physical Exam      ED Course & MDM   Diagnoses as of 06/27/25 1547   Closed dislocation of left hip, initial encounter (Multi)          Patient updated on plan for lab testing, IV insertion, radiology imaging, and medications to be administered  while in the ER (if indicated). Patient updated on expected wait times for testing and results. Patient provided my name and told to ask any staff member for questions or concerns if they should arise. Electronic medical record reviewed.     MDM    Patient presented to the emergency department with the chief complaint hip injury. Reproducible tenderness over the lateral aspect of the hip.  Hip is shortened and internally rotated.  Limited range of motion given his injury. No obvious skin or bony deformities.   left upper extremity weakness compared to the right side No muscle or joint tenderness. No back or neck tenderness.  Capillary refill less than 3 seconds.  Strong peripheral pulses.  No sensory deficits. Patient is alert and oriented to person only.  Patient has chronic slurred speech and left-sided facial drooping.  Patient has chronic left upper extremity weakness.  No new focal neurological deficits. On arrival to the emergency department, vital signs were within normal limits      Will obtain basic blood work, coagulation screen, and EKG for medical clearance.  Will consult with Dr. Lezama.      I did speak to Dr. Lezama who recommended getting a CT with 3D recon and admitting the patient to the hospitalist service for further evaluation.  CT will help determine what the next plan of care is.  Agree that the patient did not have to be emergently reduced given that he is resting comfortably and in no acute distress.  Patient's x-ray shows significant degenerative disease   And I do believe it would be difficult to reduce the patient given this.    Patient handed off to Richard Tatum PA-C. Agreeable with the plan of care.     This note was dictated using a speech recognition program.  While an attempt was made at proof-reading to minimize errors, minor errors in transcription may be present         No data recorded                                 Medical Decision Making      Procedure  Procedures          [1]   Past Medical History:  Diagnosis Date    Autonomic dysfunction     Benign prostatic hyperplasia     Chronic anemia     Coronary artery disease     Current use of long term anticoagulation     Apixaban    Dementia     Dyslipidemia     End-stage renal disease on hemodialysis (Multi)     Frequent falls     Gout     Heart failure with preserved ejection fraction     History of anaphylaxis     History of closed head injury     History of Clostridioides difficile colitis     History of deep vein thrombosis     History of non-ST elevation myocardial infarction (NSTEMI)     History of sepsis     Hypertension     Hypoxic ischemic encephalopathy (Multi)     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)     Kidney stones     Multilevel degenerative disc disease     Osteoarthritis     Paroxysmal atrial fibrillation (Multi)     Peripheral neuropathy     Peripheral vascular disease     Secondary hyperparathyroidism of renal origin (Multi)     Seizure disorder (Multi)    [2]   Past Surgical History:  Procedure Laterality Date    ADENOIDECTOMY      AV FISTULA PLACEMENT      CHOLECYSTECTOMY      COLONOSCOPY      IR CVC TUNNELED  08/20/2021    IR CVC TUNNELED 8/20/2021 [facility] CLINICAL LEGACY    IR TUNNELED DIALYSIS CATHETER      PACEMAKER PLACEMENT      THROMBECTOMY      TONSILLECTOMY      TOTAL HIP ARTHROPLASTY Right    [3]   Family History  Problem Relation Name Age of Onset    Diabetes Mother      Cancer Father      Lupus Daughter      Kidney failure Daughter      Asthma Son      Hypertension Other      Sickle cell trait Other     [4]   Social History  Tobacco Use    Smoking status: Former     Types: Cigarettes    Smokeless tobacco: Never   Substance Use Topics    Alcohol use: Never    Drug use: Never        Mark Martinez PA-C  06/27/25 1603       Mark Martinez PA-C  06/27/25 1601

## 2025-06-28 ENCOUNTER — APPOINTMENT (OUTPATIENT)
Dept: RADIOLOGY | Facility: HOSPITAL | Age: 85
DRG: 500 | End: 2025-06-28
Payer: MEDICARE

## 2025-06-28 ENCOUNTER — ANESTHESIA EVENT (OUTPATIENT)
Dept: OPERATING ROOM | Facility: HOSPITAL | Age: 85
End: 2025-06-28
Payer: MEDICARE

## 2025-06-28 ENCOUNTER — ANESTHESIA (OUTPATIENT)
Dept: OPERATING ROOM | Facility: HOSPITAL | Age: 85
End: 2025-06-28
Payer: MEDICARE

## 2025-06-28 ENCOUNTER — APPOINTMENT (OUTPATIENT)
Dept: DIALYSIS | Facility: HOSPITAL | Age: 85
End: 2025-06-28
Payer: MEDICARE

## 2025-06-28 VITALS
HEART RATE: 69 BPM | RESPIRATION RATE: 16 BRPM | TEMPERATURE: 97.3 F | SYSTOLIC BLOOD PRESSURE: 151 MMHG | DIASTOLIC BLOOD PRESSURE: 67 MMHG | OXYGEN SATURATION: 98 %

## 2025-06-28 PROBLEM — D64.9 ANEMIA: Status: ACTIVE | Noted: 2025-06-28

## 2025-06-28 LAB
ANION GAP SERPL CALC-SCNC: 12 MMOL/L (ref 10–20)
BUN SERPL-MCNC: 50 MG/DL (ref 6–23)
CALCIUM SERPL-MCNC: 7.9 MG/DL (ref 8.6–10.3)
CHLORIDE SERPL-SCNC: 98 MMOL/L (ref 98–107)
CO2 SERPL-SCNC: 29 MMOL/L (ref 21–32)
CREAT SERPL-MCNC: 4.18 MG/DL (ref 0.5–1.3)
EGFRCR SERPLBLD CKD-EPI 2021: 13 ML/MIN/1.73M*2
ERYTHROCYTE [DISTWIDTH] IN BLOOD BY AUTOMATED COUNT: 18.4 % (ref 11.5–14.5)
GLUCOSE BLD MANUAL STRIP-MCNC: 145 MG/DL (ref 74–99)
GLUCOSE BLD MANUAL STRIP-MCNC: 66 MG/DL (ref 74–99)
GLUCOSE BLD MANUAL STRIP-MCNC: 66 MG/DL (ref 74–99)
GLUCOSE BLD MANUAL STRIP-MCNC: 70 MG/DL (ref 74–99)
GLUCOSE SERPL-MCNC: 130 MG/DL (ref 74–99)
HBV SURFACE AB SER-ACNC: 27 MIU/ML
HBV SURFACE AG SERPL QL IA: NONREACTIVE
HCT VFR BLD AUTO: 32.6 % (ref 41–52)
HGB BLD-MCNC: 11 G/DL (ref 13.5–17.5)
MAGNESIUM SERPL-MCNC: 2.38 MG/DL (ref 1.6–2.4)
MCH RBC QN AUTO: 26.2 PG (ref 26–34)
MCHC RBC AUTO-ENTMCNC: 33.7 G/DL (ref 32–36)
MCV RBC AUTO: 78 FL (ref 80–100)
NRBC BLD-RTO: 0 /100 WBCS (ref 0–0)
PHOSPHATE SERPL-MCNC: 3.7 MG/DL (ref 2.5–4.9)
PLATELET # BLD AUTO: 222 X10*3/UL (ref 150–450)
POTASSIUM SERPL-SCNC: 3.5 MMOL/L (ref 3.5–5.3)
PTH-INTACT SERPL-MCNC: 140.7 PG/ML (ref 18.5–88)
RBC # BLD AUTO: 4.2 X10*6/UL (ref 4.5–5.9)
SODIUM SERPL-SCNC: 135 MMOL/L (ref 136–145)
WBC # BLD AUTO: 12.4 X10*3/UL (ref 4.4–11.3)

## 2025-06-28 PROCEDURE — 3600000007 HC OR TIME - EACH INCREMENTAL 1 MINUTE - PROCEDURE LEVEL TWO: Performed by: ORTHOPAEDIC SURGERY

## 2025-06-28 PROCEDURE — 99221 1ST HOSP IP/OBS SF/LOW 40: CPT | Performed by: PLASTIC SURGERY

## 2025-06-28 PROCEDURE — 5A1D70Z PERFORMANCE OF URINARY FILTRATION, INTERMITTENT, LESS THAN 6 HOURS PER DAY: ICD-10-PCS | Performed by: INTERNAL MEDICINE

## 2025-06-28 PROCEDURE — 7100000002 HC RECOVERY ROOM TIME - EACH INCREMENTAL 1 MINUTE: Performed by: ORTHOPAEDIC SURGERY

## 2025-06-28 PROCEDURE — 2500000001 HC RX 250 WO HCPCS SELF ADMINISTERED DRUGS (ALT 637 FOR MEDICARE OP)

## 2025-06-28 PROCEDURE — 80048 BASIC METABOLIC PNL TOTAL CA: CPT

## 2025-06-28 PROCEDURE — 3700000001 HC GENERAL ANESTHESIA TIME - INITIAL BASE CHARGE: Performed by: ORTHOPAEDIC SURGERY

## 2025-06-28 PROCEDURE — G0378 HOSPITAL OBSERVATION PER HR: HCPCS

## 2025-06-28 PROCEDURE — 2500000004 HC RX 250 GENERAL PHARMACY W/ HCPCS (ALT 636 FOR OP/ED): Performed by: INTERNAL MEDICINE

## 2025-06-28 PROCEDURE — 83735 ASSAY OF MAGNESIUM: CPT | Performed by: INTERNAL MEDICINE

## 2025-06-28 PROCEDURE — 8010000001 HC DIALYSIS - HEMODIALYSIS PER DAY

## 2025-06-28 PROCEDURE — 6340000001 HC RX 634 EPOETIN <10,000 UNITS: Mod: JZ

## 2025-06-28 PROCEDURE — 84100 ASSAY OF PHOSPHORUS: CPT | Performed by: INTERNAL MEDICINE

## 2025-06-28 PROCEDURE — 27266 TREAT HIP DISLOCATION: CPT | Performed by: ORTHOPAEDIC SURGERY

## 2025-06-28 PROCEDURE — 82947 ASSAY GLUCOSE BLOOD QUANT: CPT

## 2025-06-28 PROCEDURE — 2500000001 HC RX 250 WO HCPCS SELF ADMINISTERED DRUGS (ALT 637 FOR MEDICARE OP): Performed by: INTERNAL MEDICINE

## 2025-06-28 PROCEDURE — 3700000002 HC GENERAL ANESTHESIA TIME - EACH INCREMENTAL 1 MINUTE: Performed by: ORTHOPAEDIC SURGERY

## 2025-06-28 PROCEDURE — 36415 COLL VENOUS BLD VENIPUNCTURE: CPT

## 2025-06-28 PROCEDURE — 1100000001 HC PRIVATE ROOM DAILY

## 2025-06-28 PROCEDURE — 2500000004 HC RX 250 GENERAL PHARMACY W/ HCPCS (ALT 636 FOR OP/ED)

## 2025-06-28 PROCEDURE — 2720000007 HC OR 272 NO HCPCS: Performed by: ORTHOPAEDIC SURGERY

## 2025-06-28 PROCEDURE — 2500000005 HC RX 250 GENERAL PHARMACY W/O HCPCS: Performed by: ANESTHESIOLOGY

## 2025-06-28 PROCEDURE — 90937 HEMODIALYSIS REPEATED EVAL: CPT

## 2025-06-28 PROCEDURE — 99222 1ST HOSP IP/OBS MODERATE 55: CPT | Performed by: INTERNAL MEDICINE

## 2025-06-28 PROCEDURE — 2500000002 HC RX 250 W HCPCS SELF ADMINISTERED DRUGS (ALT 637 FOR MEDICARE OP, ALT 636 FOR OP/ED): Performed by: INTERNAL MEDICINE

## 2025-06-28 PROCEDURE — 83970 ASSAY OF PARATHORMONE: CPT | Mod: ELYLAB | Performed by: INTERNAL MEDICINE

## 2025-06-28 PROCEDURE — 2500000004 HC RX 250 GENERAL PHARMACY W/ HCPCS (ALT 636 FOR OP/ED): Performed by: ANESTHESIOLOGY

## 2025-06-28 PROCEDURE — 7100000001 HC RECOVERY ROOM TIME - INITIAL BASE CHARGE: Performed by: ORTHOPAEDIC SURGERY

## 2025-06-28 PROCEDURE — 3600000002 HC OR TIME - INITIAL BASE CHARGE - PROCEDURE LEVEL TWO: Performed by: ORTHOPAEDIC SURGERY

## 2025-06-28 PROCEDURE — 85027 COMPLETE CBC AUTOMATED: CPT

## 2025-06-28 RX ORDER — FENTANYL CITRATE 50 UG/ML
50 INJECTION, SOLUTION INTRAMUSCULAR; INTRAVENOUS EVERY 5 MIN PRN
Status: DISCONTINUED | OUTPATIENT
Start: 2025-06-28 | End: 2025-06-28 | Stop reason: HOSPADM

## 2025-06-28 RX ORDER — MEPERIDINE HYDROCHLORIDE 25 MG/ML
12.5 INJECTION INTRAMUSCULAR; INTRAVENOUS; SUBCUTANEOUS EVERY 10 MIN PRN
Status: DISCONTINUED | OUTPATIENT
Start: 2025-06-28 | End: 2025-06-28 | Stop reason: HOSPADM

## 2025-06-28 RX ORDER — ACETAMINOPHEN 500 MG
10 TABLET ORAL NIGHTLY
Status: DISCONTINUED | OUTPATIENT
Start: 2025-06-28 | End: 2025-06-29 | Stop reason: HOSPADM

## 2025-06-28 RX ORDER — CEFAZOLIN SODIUM 2 G/50ML
2 SOLUTION INTRAVENOUS EVERY 8 HOURS
Status: DISCONTINUED | OUTPATIENT
Start: 2025-06-28 | End: 2025-06-28

## 2025-06-28 RX ORDER — LIDOCAINE HYDROCHLORIDE 20 MG/ML
INJECTION, SOLUTION INFILTRATION; PERINEURAL AS NEEDED
Status: DISCONTINUED | OUTPATIENT
Start: 2025-06-28 | End: 2025-06-28

## 2025-06-28 RX ORDER — POLYETHYLENE GLYCOL 3350 17 G/17G
17 POWDER, FOR SOLUTION ORAL DAILY PRN
Status: DISCONTINUED | OUTPATIENT
Start: 2025-06-28 | End: 2025-06-29 | Stop reason: HOSPADM

## 2025-06-28 RX ORDER — PROCHLORPERAZINE EDISYLATE 5 MG/ML
10 INJECTION INTRAMUSCULAR; INTRAVENOUS EVERY 6 HOURS PRN
Status: DISCONTINUED | OUTPATIENT
Start: 2025-06-28 | End: 2025-06-29 | Stop reason: HOSPADM

## 2025-06-28 RX ORDER — DOCUSATE SODIUM 100 MG/1
100 CAPSULE, LIQUID FILLED ORAL DAILY
Status: DISCONTINUED | OUTPATIENT
Start: 2025-06-28 | End: 2025-06-29 | Stop reason: HOSPADM

## 2025-06-28 RX ORDER — SODIUM CHLORIDE, SODIUM LACTATE, POTASSIUM CHLORIDE, CALCIUM CHLORIDE 600; 310; 30; 20 MG/100ML; MG/100ML; MG/100ML; MG/100ML
50 INJECTION, SOLUTION INTRAVENOUS CONTINUOUS
Status: DISCONTINUED | OUTPATIENT
Start: 2025-06-28 | End: 2025-06-29 | Stop reason: HOSPADM

## 2025-06-28 RX ORDER — SEVELAMER CARBONATE 0.8 G/1
1.6 POWDER, FOR SUSPENSION ORAL 2 TIMES DAILY
Status: DISCONTINUED | OUTPATIENT
Start: 2025-06-28 | End: 2025-06-28

## 2025-06-28 RX ORDER — METOPROLOL TARTRATE 50 MG/1
50 TABLET ORAL 2 TIMES DAILY
Status: DISCONTINUED | OUTPATIENT
Start: 2025-06-28 | End: 2025-06-29 | Stop reason: HOSPADM

## 2025-06-28 RX ORDER — FOLIC ACID 1 MG/1
1 TABLET ORAL DAILY
Status: DISCONTINUED | OUTPATIENT
Start: 2025-06-28 | End: 2025-06-29 | Stop reason: HOSPADM

## 2025-06-28 RX ORDER — ONDANSETRON HYDROCHLORIDE 2 MG/ML
4 INJECTION, SOLUTION INTRAVENOUS ONCE AS NEEDED
Status: DISCONTINUED | OUTPATIENT
Start: 2025-06-28 | End: 2025-06-28 | Stop reason: HOSPADM

## 2025-06-28 RX ORDER — PROPOFOL 10 MG/ML
INJECTION, EMULSION INTRAVENOUS AS NEEDED
Status: DISCONTINUED | OUTPATIENT
Start: 2025-06-28 | End: 2025-06-28

## 2025-06-28 RX ORDER — OXYCODONE HYDROCHLORIDE 5 MG/1
5 TABLET ORAL EVERY 4 HOURS PRN
Status: DISCONTINUED | OUTPATIENT
Start: 2025-06-28 | End: 2025-06-29 | Stop reason: HOSPADM

## 2025-06-28 RX ORDER — SUCCINYLCHOLINE CHLORIDE 100 MG/5ML
SYRINGE (ML) INTRAVENOUS AS NEEDED
Status: DISCONTINUED | OUTPATIENT
Start: 2025-06-28 | End: 2025-06-28

## 2025-06-28 RX ORDER — HEPARIN SODIUM 1000 [USP'U]/ML
2000 INJECTION, SOLUTION INTRAVENOUS; SUBCUTANEOUS
Status: DISCONTINUED | OUTPATIENT
Start: 2025-06-28 | End: 2025-06-29 | Stop reason: HOSPADM

## 2025-06-28 RX ORDER — L. ACIDOPHILUS/L.BULGARICUS 1MM CELL
1 TABLET ORAL DAILY
Status: DISCONTINUED | OUTPATIENT
Start: 2025-06-28 | End: 2025-06-29 | Stop reason: HOSPADM

## 2025-06-28 RX ORDER — SODIUM CHLORIDE, SODIUM LACTATE, POTASSIUM CHLORIDE, CALCIUM CHLORIDE 600; 310; 30; 20 MG/100ML; MG/100ML; MG/100ML; MG/100ML
100 INJECTION, SOLUTION INTRAVENOUS CONTINUOUS
Status: DISCONTINUED | OUTPATIENT
Start: 2025-06-28 | End: 2025-06-28 | Stop reason: HOSPADM

## 2025-06-28 RX ORDER — ALBUTEROL SULFATE 0.83 MG/ML
1.25 SOLUTION RESPIRATORY (INHALATION) EVERY 6 HOURS PRN
Status: DISCONTINUED | OUTPATIENT
Start: 2025-06-28 | End: 2025-06-29 | Stop reason: HOSPADM

## 2025-06-28 RX ORDER — LOPERAMIDE HYDROCHLORIDE 2 MG/1
2 CAPSULE ORAL 2 TIMES DAILY PRN
Status: DISCONTINUED | OUTPATIENT
Start: 2025-06-28 | End: 2025-06-29 | Stop reason: HOSPADM

## 2025-06-28 RX ORDER — ONDANSETRON 4 MG/1
4 TABLET, FILM COATED ORAL EVERY 6 HOURS PRN
Status: DISCONTINUED | OUTPATIENT
Start: 2025-06-28 | End: 2025-06-29 | Stop reason: HOSPADM

## 2025-06-28 RX ORDER — CYCLOBENZAPRINE HCL 10 MG
10 TABLET ORAL 3 TIMES DAILY PRN
Qty: 50 TABLET | Refills: 1 | Status: SHIPPED | OUTPATIENT
Start: 2025-06-28

## 2025-06-28 RX ORDER — SODIUM CHLORIDE, SODIUM LACTATE, POTASSIUM CHLORIDE, CALCIUM CHLORIDE 600; 310; 30; 20 MG/100ML; MG/100ML; MG/100ML; MG/100ML
INJECTION, SOLUTION INTRAVENOUS CONTINUOUS PRN
Status: DISCONTINUED | OUTPATIENT
Start: 2025-06-28 | End: 2025-06-28

## 2025-06-28 RX ORDER — PROCHLORPERAZINE 25 MG/1
25 SUPPOSITORY RECTAL EVERY 12 HOURS PRN
Status: DISCONTINUED | OUTPATIENT
Start: 2025-06-28 | End: 2025-06-29 | Stop reason: HOSPADM

## 2025-06-28 RX ORDER — DONEPEZIL HYDROCHLORIDE 23 MG/1
23 TABLET, FILM COATED ORAL DAILY
Status: DISCONTINUED | OUTPATIENT
Start: 2025-06-28 | End: 2025-06-29 | Stop reason: HOSPADM

## 2025-06-28 RX ORDER — CYCLOBENZAPRINE HCL 10 MG
10 TABLET ORAL 3 TIMES DAILY PRN
Status: DISCONTINUED | OUTPATIENT
Start: 2025-06-28 | End: 2025-06-29 | Stop reason: HOSPADM

## 2025-06-28 RX ORDER — CHOLECALCIFEROL (VITAMIN D3) 25 MCG
25 TABLET ORAL DAILY
Status: DISCONTINUED | OUTPATIENT
Start: 2025-06-28 | End: 2025-06-29 | Stop reason: HOSPADM

## 2025-06-28 RX ORDER — PROCHLORPERAZINE MALEATE 5 MG
10 TABLET ORAL EVERY 6 HOURS PRN
Status: DISCONTINUED | OUTPATIENT
Start: 2025-06-28 | End: 2025-06-29 | Stop reason: HOSPADM

## 2025-06-28 RX ORDER — INSULIN GLARGINE 100 [IU]/ML
5 INJECTION, SOLUTION SUBCUTANEOUS NIGHTLY
Status: DISCONTINUED | OUTPATIENT
Start: 2025-06-28 | End: 2025-06-29 | Stop reason: HOSPADM

## 2025-06-28 RX ORDER — BISACODYL 10 MG/1
10 SUPPOSITORY RECTAL
Status: DISCONTINUED | OUTPATIENT
Start: 2025-06-28 | End: 2025-06-29 | Stop reason: HOSPADM

## 2025-06-28 RX ORDER — LEVETIRACETAM 500 MG/1
500 TABLET ORAL 2 TIMES DAILY
Status: DISCONTINUED | OUTPATIENT
Start: 2025-06-28 | End: 2025-06-29 | Stop reason: HOSPADM

## 2025-06-28 RX ORDER — OXYCODONE HYDROCHLORIDE 5 MG/1
2.5 TABLET ORAL EVERY 6 HOURS PRN
Status: DISCONTINUED | OUTPATIENT
Start: 2025-06-28 | End: 2025-06-29 | Stop reason: HOSPADM

## 2025-06-28 RX ORDER — ACETAMINOPHEN 650 MG/1
650 SUPPOSITORY RECTAL EVERY 4 HOURS PRN
Status: DISCONTINUED | OUTPATIENT
Start: 2025-06-28 | End: 2025-06-29 | Stop reason: HOSPADM

## 2025-06-28 RX ORDER — ALBUMIN HUMAN 250 G/1000ML
25 SOLUTION INTRAVENOUS
Status: DISCONTINUED | OUTPATIENT
Start: 2025-06-28 | End: 2025-06-29 | Stop reason: HOSPADM

## 2025-06-28 RX ORDER — ACETAMINOPHEN 325 MG/1
650 TABLET ORAL EVERY 6 HOURS SCHEDULED
Status: DISCONTINUED | OUTPATIENT
Start: 2025-06-28 | End: 2025-06-29 | Stop reason: HOSPADM

## 2025-06-28 RX ORDER — BISACODYL 5 MG
10 TABLET, DELAYED RELEASE (ENTERIC COATED) ORAL DAILY PRN
Status: DISCONTINUED | OUTPATIENT
Start: 2025-06-28 | End: 2025-06-29 | Stop reason: HOSPADM

## 2025-06-28 RX ORDER — FINASTERIDE 5 MG/1
5 TABLET, FILM COATED ORAL DAILY
Status: DISCONTINUED | OUTPATIENT
Start: 2025-06-28 | End: 2025-06-29 | Stop reason: HOSPADM

## 2025-06-28 RX ORDER — ADHESIVE BANDAGE
30 BANDAGE TOPICAL DAILY
Status: DISCONTINUED | OUTPATIENT
Start: 2025-06-28 | End: 2025-06-29 | Stop reason: HOSPADM

## 2025-06-28 RX ADMIN — APIXABAN 2.5 MG: 5 TABLET, FILM COATED ORAL at 21:58

## 2025-06-28 RX ADMIN — Medication 10 MG: at 21:58

## 2025-06-28 RX ADMIN — LEVETIRACETAM 500 MG: 500 TABLET, FILM COATED ORAL at 21:57

## 2025-06-28 RX ADMIN — Medication 100 MG: at 09:00

## 2025-06-28 RX ADMIN — SODIUM CHLORIDE, SODIUM LACTATE, POTASSIUM CHLORIDE, AND CALCIUM CHLORIDE 50 ML/HR: .6; .31; .03; .02 INJECTION, SOLUTION INTRAVENOUS at 15:53

## 2025-06-28 RX ADMIN — HEPARIN SODIUM 2100 UNITS: 1000 INJECTION INTRAVENOUS; SUBCUTANEOUS at 15:02

## 2025-06-28 RX ADMIN — COLLAGENASE SANTYL: 250 OINTMENT TOPICAL at 15:56

## 2025-06-28 RX ADMIN — SODIUM CHLORIDE 500 ML: 0.9 INJECTION, SOLUTION INTRAVENOUS at 00:47

## 2025-06-28 RX ADMIN — INSULIN GLARGINE 5 UNITS: 100 INJECTION, SOLUTION SUBCUTANEOUS at 00:47

## 2025-06-28 RX ADMIN — METOPROLOL TARTRATE 50 MG: 50 TABLET, FILM COATED ORAL at 00:47

## 2025-06-28 RX ADMIN — SODIUM CHLORIDE, POTASSIUM CHLORIDE, SODIUM LACTATE AND CALCIUM CHLORIDE: 600; 310; 30; 20 INJECTION, SOLUTION INTRAVENOUS at 08:45

## 2025-06-28 RX ADMIN — Medication 10 MG: at 00:47

## 2025-06-28 RX ADMIN — LIDOCAINE HYDROCHLORIDE 40 MG: 20 INJECTION, SOLUTION INFILTRATION; PERINEURAL at 08:54

## 2025-06-28 RX ADMIN — ACETAMINOPHEN 650 MG: 325 TABLET ORAL at 00:47

## 2025-06-28 RX ADMIN — FOLIC ACID 1 MG: 1 TABLET ORAL at 16:04

## 2025-06-28 RX ADMIN — APIXABAN 2.5 MG: 5 TABLET, FILM COATED ORAL at 16:03

## 2025-06-28 RX ADMIN — ACETAMINOPHEN 650 MG: 325 TABLET ORAL at 15:50

## 2025-06-28 RX ADMIN — EPOETIN ALFA-EPBX 3000 UNITS: 3000 INJECTION, SOLUTION INTRAVENOUS; SUBCUTANEOUS at 20:04

## 2025-06-28 RX ADMIN — DONEPEZIL HYDROCHLORIDE 23 MG: 23 TABLET, FILM COATED ORAL at 15:53

## 2025-06-28 RX ADMIN — Medication 25 MCG: at 16:04

## 2025-06-28 RX ADMIN — Medication: at 15:57

## 2025-06-28 RX ADMIN — Medication 100 MG: at 08:54

## 2025-06-28 RX ADMIN — PROPOFOL 120 MG: 10 INJECTION, EMULSION INTRAVENOUS at 08:54

## 2025-06-28 RX ADMIN — LEVETIRACETAM 500 MG: 500 TABLET, FILM COATED ORAL at 00:47

## 2025-06-28 RX ADMIN — METOPROLOL TARTRATE 50 MG: 50 TABLET, FILM COATED ORAL at 21:58

## 2025-06-28 SDOH — HEALTH STABILITY: MENTAL HEALTH: CURRENT SMOKER: 0

## 2025-06-28 ASSESSMENT — COGNITIVE AND FUNCTIONAL STATUS - GENERAL
PERSONAL GROOMING: TOTAL
HELP NEEDED FOR BATHING: TOTAL
EATING MEALS: TOTAL
MOVING TO AND FROM BED TO CHAIR: TOTAL
TOILETING: TOTAL
MOBILITY SCORE: 6
STANDING UP FROM CHAIR USING ARMS: TOTAL
CLIMB 3 TO 5 STEPS WITH RAILING: TOTAL
DRESSING REGULAR UPPER BODY CLOTHING: TOTAL
TURNING FROM BACK TO SIDE WHILE IN FLAT BAD: TOTAL
MOVING FROM LYING ON BACK TO SITTING ON SIDE OF FLAT BED WITH BEDRAILS: TOTAL
DRESSING REGULAR LOWER BODY CLOTHING: TOTAL
DAILY ACTIVITIY SCORE: 6
WALKING IN HOSPITAL ROOM: TOTAL

## 2025-06-28 ASSESSMENT — PAIN - FUNCTIONAL ASSESSMENT
PAIN_FUNCTIONAL_ASSESSMENT: UNABLE TO ASSESS
PAIN_FUNCTIONAL_ASSESSMENT: WONG-BAKER FACES
PAIN_FUNCTIONAL_ASSESSMENT: 0-10
PAIN_FUNCTIONAL_ASSESSMENT: WONG-BAKER FACES

## 2025-06-28 ASSESSMENT — PAIN DESCRIPTION - ORIENTATION: ORIENTATION: LEFT

## 2025-06-28 ASSESSMENT — PAIN SCALES - GENERAL
PAIN_LEVEL: 0
PAINLEVEL_OUTOF10: 3

## 2025-06-28 ASSESSMENT — PAIN DESCRIPTION - LOCATION: LOCATION: HIP

## 2025-06-28 ASSESSMENT — PAIN SCALES - WONG BAKER
WONGBAKER_NUMERICALRESPONSE: NO HURT
WONGBAKER_NUMERICALRESPONSE: NO HURT

## 2025-06-28 NOTE — CONSULTS
PLASTIC  SURGERY  CONSULTATION        Reason for consultation:         Stage IV sacral pressure sore    History of present illness:      84-year-old male admitted for left closed hip dislocation who is being followed in the wound clinic for stage IV sacral pressure sore        Past medical history:      Hypertension  End-stage renal disease  Diabetes  History of closed head injury  Seizure disorder  History of DVT  Dyslipidemia  Gout  Peripheral vascular disease  Atrial fibrillation  Peripheral neuropathy  Benign prostatic hyperplasia  Coronary artery disease      Past surgical history:      Adenoidectomy  AV fistula placement  Cholecystectomy  Pacemaker placement  Thrombectomy  Tonsillectomy  Total hip arthroplasty      Medications:       Eliquis  Vitamin D  Aricept  Lovenox  Proscar  Folic acid  Lantus insulin  Keppra  Metoprolol    ALLERGIES:        None      Social history:      Former smoker, nondrinker      Family history:      Asthma  Cancer  Diabetes  Hypertension  Lupus   sickle cell trait            Review of systems:  At least 10 systems reviewed and are otherwise negative except for as noted in the history of present illness                PHYSICAL  EXAMINATION       Height:   5 foot 11 inches                 weight:   150 pounds                       Vital signs:    Temperature 36.9 pulse 75 blood pressure 148/76    General: Well-developed,    male         in no acute distress, answers some questions  HEENT:   Within normal limits    Neck:   Supple, no observable masses    Lungs: Clear to auscultation    Heart: Regular rate and rhythm    Abdomen: Soft, nontender    Extremities: Full range of motion; patient is bedbound with a stage IV sacral pressure sore that is clean and granulating    Neurological: Grossly within normal limits                Impression:      Stage IV sacral pressure sore      Recommendation:      Continue Dakin's wet-to-dry dressings twice daily        Thank you for the  referral.    Roland Reyes, MD        *These notes are being done using Dragon voice recognition technology and may include unintended errors with respect to translation of words, typographical errors or grammar errors which may not have been identified prior to finalization of the chart note.

## 2025-06-28 NOTE — OP NOTE
CLOSED REDUCTION, HIP (L) Operative Note  Preop diagnosis: Dislocated left hip hemiarthroplasty    Postop diagnosis: Dislocated left hip hemiarthroplasty      Assistant: Marcel Lorenzo PA-C was required and present throughout the entire case.    Given the nature of the disease process and the procedure, a skilled surgical first assistant was necessary during the entire case.      The assistant was necessary to provide countertraction throughout the case.      Date: 2025 - 2025  OR Location: [facility]    Name: Tyshawn Coles, : 1940, Age: 84 y.o., MRN: [id number], Sex: male    Diagnosis  Pre-op Diagnosis      * Closed dislocation of left hip, initial encounter (Multi) [S73.005A] Post-op Diagnosis     * Closed dislocation of left hip, initial encounter (Multi) [S73.005A]     Procedures  Failed the reduction of the left hip hemiarthroplasty 71715      Surgeons      * Marcel Lezama - Primary    Resident/Fellow/Other Assistant:  Surgeons and Role:     * Marcel Lorenzo PA-C - Assisting    Staff:   Circulator: Zach Vazquez Person: [place]    Anesthesia Staff: Anesthesiologist: Conner Jenkins MD    Procedure Summary  Anesthesia: General  ASA: IV  Estimated Blood Loss: 0 mL  Intra-op Medications:   Administrations occurring from 0830 to 0905 on 25:   Medication Name Total Dose   apixaban (Eliquis) tablet 2.5 mg Cannot be calculated   enoxaparin (Lovenox) syringe 30 mg Cannot be calculated   lidocaine (Xylocaine) injection 2 % 40 mg   propofol (Diprivan) injection 10 mg/mL 120 mg   succinylcholine 100 mg/5 mL SYRINGE 200 mg              Anesthesia Record               Intraprocedure I/O Totals       None           Specimen: No specimens collected              Drains and/or Catheters: * None in log *    Tourniquet Times:   * Missing tourniquet times found for documented tourniquets in lo *     Implants:     Findings: see procedure details    Indications: Tyshawn Coles  is an 84 y.o. male who is having surgery for Closed dislocation of left hip, initial encounter (Multi) [S73.005A].     The patient was seen in the preoperative area. The risks, benefits, complications, treatment options, non-operative alternatives, expected recovery and outcomes were discussed with the patient. The possibilities of reaction to medication, pulmonary aspiration, injury to surrounding structures, bleeding, recurrent infection, the need for additional procedures, failure to diagnose a condition, and creating a complication requiring transfusion or operation were discussed with the patient. The patient concurred with the proposed plan, giving informed consent.  The site of surgery was properly noted/marked if necessary per policy. The patient has been actively warmed in preoperative area. Preoperative antibiotics have been ordered and given within 1 hours of incision.    Procedure Details:   Patient was brought to operating room had timeout site was marked identified left leg    Anesthesia was induced paralysis was induced    C-arm was brought in and demonstrated the superior position of the dislocated hip hemiarthroplasty with traction and countertraction multiple reduction maneuvers were achieved    We were unable to reduce the hip hemiarthroplasty as a hip lodged superiorly in the pelvis and there was mild diffuse calcification around the prosthetic head.  At this point in time we completed the procedure without successful dislocation under general anesthesia.  The patient was awoken from anesthesia and taken to recovery room.    Complications:  None; patient tolerated the procedure well.    Disposition: PACU - hemodynamically stable.  Condition: stable         Marcel Lezama  Phone Number: [phone]

## 2025-06-28 NOTE — H&P
History and physical is reviewed, patient re evaluated, no changes.  Procedure, risks, benefits, and postoperative course were discussed with the patient and consent is reviewed.    Marcel Lezama MD

## 2025-06-28 NOTE — PROGRESS NOTES
Per review,  pt had a routine follow-up with orthopedics 6/27 and they repeated the x-ray of his hip which confirmed the dislocated hip. They recommended that the patient come to the ER to be seen by Dr. Lezama who is on-call for the Ortho team . Plan is for surgery today     Pt underwent closed reduction of left hi[ for dislocation of left hip hemiarthroplasty.     Pt is L[facility] resident of [facility].  Requested confirmation that pt is lt

## 2025-06-28 NOTE — ANESTHESIA PROCEDURE NOTES
Airway  Date/Time: 6/28/2025 8:55 AM  Reason: elective    Airway not difficult    Staffing  Performed: attending   Authorized by: Conner Jenkins MD    Performed by: Conner Jenkins MD  Patient location during procedure: OR    Patient Condition  Indications for airway management: anesthesia  Patient position: sniffing  MILS maintained throughout  Sedation level: deep     Final Airway Details   Preoxygenated: yes  Final airway type: supraglottic airway  Successful airway: classic  Size: 4   Ventilation between attempts: none  Number of attempts at approach: 1

## 2025-06-28 NOTE — ANESTHESIA PREPROCEDURE EVALUATION
Tyshawn Coles is a 84 y.o. male here for:    CLOSED REDUCTION, HIP  With Marcel Lezama MD  Pre-Op Diagnosis Codes:      * Closed dislocation of left hip, initial encounter [S73.005A]    Relevant Problems   Cardiac   (+) HTN (hypertension)      /Renal   (+) ESRD (end stage renal disease) (Multi)      Hematology   (+) Anemia       Lab Results   Component Value Date    HGB 11.0 (L) 06/28/2025    HCT 32.6 (L) 06/28/2025    WBC 12.4 (H) 06/28/2025     06/28/2025     (L) 06/28/2025    K 3.5 06/28/2025    CL 98 06/28/2025    CREATININE 4.18 (H) 06/28/2025    BUN 50 (H) 06/28/2025       Tobacco Use History[1]    RX Allergies[2]    Current Outpatient Medications   Medication Instructions    acetaminophen (TYLENOL) 650 mg, Every 4 hours PRN    albuterol 1.25 mg, Every 6 hours PRN    apixaban (ELIQUIS) 2.5 mg, 2 times daily    bisacodyl (DULCOLAX) 10 mg, Every 24 hours PRN    cholecalciferol (VITAMIN D-3) 25 mcg, Daily    collagenase (SantyL) 250 unit/gram ointment Topical, Daily, Apply to sacral decubitus ulcer daily    docusate sodium (COLACE) 100 mg, Daily    donepezil (ARICEPT) 23 mg, Daily    finasteride (PROSCAR) 5 mg, Daily    folic acid (FOLVITE) 1 mg, Daily    lactobacillus acidophilus tablet tablet 1 tablet, oral, Daily    Lantus U-100 Insulin 5 Units, Nightly    levETIRAcetam (KEPPRA) 500 mg, 2 times daily    loperamide (IMODIUM A-D) 2 mg, Every 12 hours PRN    magnesium hydroxide (Milk of Magnesia) 400 mg/5 mL suspension 30 mL, Every 24 hours PRN    melatonin 10 mg tablet 1 tablet, Nightly    metoprolol tartrate (LOPRESSOR) 50 mg, 2 times daily    ondansetron (ZOFRAN) 4 mg, Every 6 hours PRN    oxyCODONE (ROXICODONE) 2.5 mg, oral, Every 6 hours PRN    polyethylene glycol (GLYCOLAX, MIRALAX) 17 g, Daily PRN    sevelamer carbonate (RENVELA) 1.6 g, 2 times daily    sodium hypochlorite (Dakin's Full-Strength) 0.5 % external solution Topical, Daily, Use to wash decubitus ulcer on a daily basis     Virt-Caps 1 mg capsule 1 capsule, Daily RT       Surgical History[3]    Family History[4]    NPO Details:  No data recorded    Physical Exam  Airway  Mallampati: II  TM distance: >3 FB  Neck ROM: full  Mouth opening: >= 4 cm    Cardiovascular   Dental - normal exam  Pulmonary   Neurological   Abdominal         Anesthesia Plan    History of general anesthesia?: yes  History of complications of general anesthesia?: no    ASA 4     general     The patient is not a current smoker.    intravenous induction   Anesthetic plan and risks discussed with patient.              [1]   Social History  Tobacco Use   Smoking Status Former    Types: Cigarettes    Passive exposure: Past   Smokeless Tobacco Never   [2] No Known Allergies  [3]   Past Surgical History:  Procedure Laterality Date    ADENOIDECTOMY      AV FISTULA PLACEMENT      CHOLECYSTECTOMY      COLONOSCOPY      IR CVC TUNNELED  08/20/2021    IR CVC TUNNELED 8/20/2021 [facility] CLINICAL LEGACY    IR TUNNELED DIALYSIS CATHETER      PACEMAKER PLACEMENT      THROMBECTOMY      TONSILLECTOMY      TOTAL HIP ARTHROPLASTY Right    [4]   Family History  Problem Relation Name Age of Onset    Diabetes Mother      Cancer Father      Lupus Daughter      Kidney failure Daughter      Asthma Son      Hypertension Other      Sickle cell trait Other

## 2025-06-28 NOTE — PRE-PROCEDURE NOTE
Report from Sending RN:    Report From: CARINA Sabillon  Recent Surgery or Procedure: Yes, Possible surgery for hip  Baseline Level of Consciousness (LOC): A&Ox1  Admission Dx: Left hip closed dislocation  Precautions/Isolations: None  Code Status: DNR-No Intubation  Oxygen Use: No  Type: Room Air  Diabetic: Yes  Receiving BP: 148/76, 75  Last BP Med Given Day of Dialysis: See EMAR  Last Pain Med Given: See EMAR  Lab Tests to be Obtained with Dialysis: No  Lastest Lab Values:  K+: 3.5  Ca+: 7.9  HGB:  11.0  BUN: 50  Creat: 4.1  INR: 1.3  Blood Transfusion to be Given During Dialysis: No  Available IV Access: Yes, Saline Locked  Dialysis Access: Left AVF  Medications to be Administered During Dialysis: No  Continuous IV Infusion Running: No  Restraints on Currently or in the Last 24 Hours: No  Hand-Off Communication from Sending RN: Patient stable for HD-Surgery not completed.    Notes/Events during Treatment:    None    Report to Receiving RN:    Report To: CARINA Sabillon  Time Report Called:  1515  Hand-Off Communication to Receiving RN: Tx tolerated well. VSS.  Complications During Treatment: No  Ultrafiltration Treatment: No  Medications Administered During Dialysis: No  Blood Products Administered During Dialysis: No  Labs Sent During Dialysis: No  Heparin Drip Rate Changes: No  Sending BP:  128/80.90  Dialysis Catheter Dressing Changed: Yes   Significant Events/Interventions: N/A  Provider Contacted/Time/Response/Orders: N/A  HD Orders Followed: Yes     Tx Initiated by/Time: 1133Rose Mary OCDT  Tx Terminated by/Time: 1508, LENNY Bazzi  Fluid Removed: 3L    Electronic Signatures:       Authored: FERNIE Ruby    Last Updated: 10:05 AM by MALLORY BACH

## 2025-06-28 NOTE — CONSULTS
"  Infectious Disease    Patient Name: Tyshawn Coles  Date: 6/28/2025  YOB: 1940  Medical Record Number: [id number]        Chief Complaint   Patient presents with    Hip Pain     PT dislocated his hip 3 months ago         History of Present Illness:   Patient 84-year-old male apparently dislocated left hip previous arthroplasty in the past apparently there is been reported falls in the past patient no history of decubitus ulcer chronic osteomyelitis end-stage renal disease.  Patient currently receiving dialysis at the time of this interview.  Patient had a bacteremia last month thought to be secondary from this wound he had Citrobacter Morganella recovered from the blood.  Reportedly was discharged with cefepime with dialysis    Review of Systems: All other ROS reviewed and are negative other than as stated in HPI            Social History[1]      Medical History[2]        Surgical History[3]      Current Medications[4]     Allergies[5]       Family History[6]      Physical Exam:    Blood pressure 105/70, pulse 90, temperature 36.9 °C (98.4 °F), temperature source Temporal, resp. rate 21, height 1.82 m (5' 11.65\"), weight 68.2 kg (150 lb 5.7 oz), SpO2 91%.  General: Patient appears ok at the present time. NAD  Skin: no new rashes deferred examination of sacrum as patient was on dialysis  HEENT:  Neck is supple, No subconjunctival hemorrhages, no oral exudates  Heart: S1 S2  Lungs: Diminished bases  Abdomen: soft, ND, NTTP,  Back :no CVA tenderness  Extrem: No edema, non tender  Neuro exam: CN II-XII intact  Psych: cooperative    Labs:  I have reviewed all lab results by electronic record, including most recent CBC, metabolic panel, and pertinent abnormalities were addressed from an infectious disease perspective.  Trends are being monitored over time.    Lab Results   Component Value Date    WBC 12.4 (H) 06/28/2025    HGB 11.0 (L) 06/28/2025    HCT 32.6 (L) 06/28/2025    MCV 78 (L) 06/28/2025     " 06/28/2025     Lab Results   Component Value Date    GLUCOSE 130 (H) 06/28/2025    CALCIUM 7.9 (L) 06/28/2025     (L) 06/28/2025    K 3.5 06/28/2025    CO2 29 06/28/2025    CL 98 06/28/2025    BUN 50 (H) 06/28/2025    CREATININE 4.18 (H) 06/28/2025       Radiology:  I have reviewed imaging results per electronic record and most pertinent abnormalities are being addressed from an infectious disease standpoint.            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  From plastic surgery's note he has been following on that as an outpatient wound is clean and granulating.  He reportedly went out on IV antibiotics after his last bacteremia thought from wound.  I am unclear if home any weeks of antibiotics he finished at the nursing home I believe he was due to receive at least 4    PLAN:  If wound stable and no evidence of sepsis would not need any further antibiotic therapy.  Yes there will be infectious risk if he undergoes arthroplasty with the hip but this will be an indefinite risk as this osteomyelitis sacrum sacrum is not medically curable, and patient presumably is not a candidate for any sort of decubitus reconstructive surgery following resection of ulcer material and bone.       [1]   Social History  Tobacco Use    Smoking status: Former     Types: Cigarettes     Passive exposure: Past    Smokeless tobacco: Never   Substance Use Topics    Alcohol use: Never    Drug use: Never   [2]   Past Medical History:  Diagnosis Date    Autonomic dysfunction     Benign prostatic hyperplasia     Chronic anemia     Coronary artery  disease     Current use of long term anticoagulation     Apixaban    Dementia     Dyslipidemia     End-stage renal disease on hemodialysis (Multi)     Frequent falls     Gout     Heart failure with preserved ejection fraction     History of anaphylaxis     History of closed head injury     History of Clostridioides difficile colitis     History of deep vein thrombosis     History of non-ST elevation myocardial infarction (NSTEMI)     History of sepsis     Hypertension     Hypoxic ischemic encephalopathy (Multi)     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)     Kidney stones     Multilevel degenerative disc disease     Osteoarthritis     Paroxysmal atrial fibrillation (Multi)     Peripheral neuropathy     Peripheral vascular disease     Secondary hyperparathyroidism of renal origin (Multi)     Seizure disorder (Multi)    [3]   Past Surgical History:  Procedure Laterality Date    ADENOIDECTOMY      AV FISTULA PLACEMENT      CHOLECYSTECTOMY      COLONOSCOPY      IR CVC TUNNELED  08/20/2021    IR CVC TUNNELED 8/20/2021 [facility]    IR TUNNELED DIALYSIS CATHETER      PACEMAKER PLACEMENT      THROMBECTOMY      TONSILLECTOMY      TOTAL HIP ARTHROPLASTY Right    [4]   Current Facility-Administered Medications:     acetaminophen (Tylenol) tablet 650 mg, 650 mg, oral, q4h PRN, 650 mg at 06/28/25 0047 **OR** acetaminophen (Tylenol) oral liquid 650 mg, 650 mg, oral, q4h PRN **OR** acetaminophen (Tylenol) suppository 650 mg, 650 mg, rectal, q4h PRN, Phyllis L Ray, PA-C    acetaminophen (Tylenol) suppository 650 mg, 650 mg, rectal, q4h PRN, Phyllis L Ray, PA-C    acetaminophen (Tylenol) tablet 650 mg, 650 mg, oral, q6h JEREMY, Phyllis PATTERSON Ray, PA-C, 650 mg at 06/28/25 1550    albumin human 25 % solution 25 g, 25 g, intravenous, Once per day on Tuesday Thursday Saturday, Phyllis Rodriguez PA-C    albuterol 2.5 mg /3 mL (0.083 %) nebulizer solution 1.25 mg, 1.25 mg, nebulization, q6h PRN, Phyllis Rodriguez PA-C     alteplase (Cathflo Activase) injection 2 mg, 2 mg, intra-catheter, Daily, Phyllis L Ray, PA-C    alteplase (Cathflo Activase) injection 2 mg, 2 mg, intra-catheter, Daily, Phyllis L Ray, PA-C    apixaban (Eliquis) tablet 2.5 mg, 2.5 mg, oral, BID, Phyllis L Ray, PA-C, 2.5 mg at 06/28/25 1603    benzocaine-menthol (Cepastat Sore Throat) lozenge 1 lozenge, 1 lozenge, Mouth/Throat, q4h PRN, Phyllis L Ray, PA-C    bisacodyl (Dulcolax) EC tablet 10 mg, 10 mg, oral, Daily PRN, Phyllis L Ray, PA-C    bisacodyl (Dulcolax) suppository 10 mg, 10 mg, rectal, q24h PRN, Phyllis L Ray, PA-C    cholecalciferol (Vitamin D-3) tablet 25 mcg, 25 mcg, oral, Daily, Phyllis L Ray, PA-C, 25 mcg at 06/28/25 1604    collagenase 250 unit/gram ointment, , Topical, Daily, Phyllis L Ray, PA-C, Given at 06/28/25 1556    cyclobenzaprine (Flexeril) tablet 10 mg, 10 mg, oral, TID PRN, Phyllis L Ray, PA-C    dextrose 50 % injection 12.5 g, 12.5 g, intravenous, q15 min PRN, Phyllis L Ray, PA-C    dextrose 50 % injection 25 g, 25 g, intravenous, q15 min PRN, Phyllis L Ray, PA-C    docusate sodium (Colace) capsule 100 mg, 100 mg, oral, Daily, Phyllis L Ray, PA-C    donepezil (Aricept) tablet 23 mg, 23 mg, oral, Daily, Phyllis L Ray, PA-C, 23 mg at 06/28/25 1553    epoetin sheila-epbx (Retacrit) injection 3,000 Units, 50 Units/kg, intravenous, After Dialysis, Phyllis L Ray, PA-C    finasteride (Proscar) tablet 5 mg, 5 mg, oral, Daily, Phyllis Rodriguez PA-C    folic acid (Folvite) tablet 1 mg, 1 mg, oral, Daily, Phyllis Rodriguez PA-C, 1 mg at 06/28/25 1604    glucagon (Glucagen) injection 1 mg, 1 mg, intramuscular, q15 min PRN, Phyllis Rodriguez PA-C    glucagon (Glucagen) injection 1 mg, 1 mg, intramuscular, q15 min PRN, Phyllis Rodriguez PA-C    heparin 1,000 unit/mL injection 2,000 Units, 2,000 Units, intra-catheter, Once per day on Tuesday Thursday Saturday, EMILIANO Witt-C, 2,100 Units at 06/28/25 1502    heparin 1,000 unit/mL injection 2,000 Units, 2,000  Units, intra-catheter, After Dialysis, Phyllis Rodriguez PA-C, 2,100 Units at 06/28/25 1502    insulin glargine (Lantus) injection 5 Units, 5 Units, subcutaneous, Nightly, EMILIANO Witt-C, 5 Units at 06/28/25 0047    insulin lispro injection 0-10 Units, 0-10 Units, subcutaneous, TID AC, Phyllis Rodriguez PA-C    lactated Ringer's infusion, 50 mL/hr, intravenous, Continuous, EMILIANO Witt-YISSEL, Last Rate: 50 mL/hr at 06/28/25 1553, 50 mL/hr at 06/28/25 1553    lactobacillus acidophilus tablet 1 tablet, 1 tablet, oral, Daily, MALIHA WittC    levETIRAcetam (Keppra) tablet 500 mg, 500 mg, oral, BID, EMILIANO Witt-C, 500 mg at 06/28/25 0047    loperamide (Imodium) capsule 2 mg, 2 mg, oral, BID PRN, MALIHA WittC    magnesium hydroxide (Milk of Magnesia) 400 mg/5 mL suspension 30 mL, 30 mL, oral, Daily, MALIHA WittC    melatonin tablet 10 mg, 10 mg, oral, Nightly, EMILIANO Witt-C, 10 mg at 06/28/25 0047    metoprolol tartrate (Lopressor) tablet 50 mg, 50 mg, oral, BID, EMILIANO Witt-C, 50 mg at 06/28/25 0047    ondansetron ODT (Zofran-ODT) disintegrating tablet 4 mg, 4 mg, oral, q8h PRN **OR** ondansetron (Zofran) injection 4 mg, 4 mg, intravenous, q8h PRN, Phyllis Rodriguez PA-C    ondansetron (Zofran) tablet 4 mg, 4 mg, oral, q6h PRN, EMILIANO Witt-C    oxyCODONE (Roxicodone) immediate release tablet 2.5 mg, 2.5 mg, oral, q6h PRN, EMILIANO Witt-C    oxyCODONE (Roxicodone) immediate release tablet 5 mg, 5 mg, oral, q4h PRN, Phyllis Rodriguez PA-C    oxygen (O2) therapy, 2 L/min, inhalation, Continuous, Phyllis Rodriguez PA-C    polyethylene glycol (Glycolax, Miralax) packet 17 g, 17 g, oral, Daily PRN, Phyllis Rodriguez PA-C    prochlorperazine (Compazine) tablet 10 mg, 10 mg, oral, q6h PRN **OR** prochlorperazine (Compazine) injection 10 mg, 10 mg, intravenous, q6h PRN **OR** prochlorperazine (Compazine) suppository 25 mg, 25 mg, rectal, q12h PRN, Phyllis Rodriguez PA-C    sodium chloride 0.9 %  bolus 200 mL, 200 mL, intravenous, After Dialysis, Phyllis Rodriguez PA-C    sodium chloride 0.9 % bolus 200 mL, 200 mL, intravenous, q1h PRN, EMILIANO Witt-YISSEL    sodium chloride 0.9 % bolus 200 mL, 200 mL, intravenous, q1h PRN, Phyllis Rodriguez PA-C    sodium hypochlorite (Dakin's Full-Strength) 0.5 % external solution, , irrigation, BID, Phyllis Rodriguez PA-C, Given at 06/28/25 1137    vitamin B complex-vitamin C-folic acid (Nephrocaps) capsule 1 capsule, 1 capsule, oral, Daily, Phyllis Rodriguez PA-C  [5] No Known Allergies  [6]   Family History  Problem Relation Name Age of Onset    Diabetes Mother      Cancer Father      Lupus Daughter      Kidney failure Daughter      Asthma Son      Hypertension Other      Sickle cell trait Other

## 2025-06-28 NOTE — H&P
History Of Present Illness  Tyshawn Coles is a 84 y.o. male presenting with a follow-up appointment for prior left hip arthroplasty in March send 2025.  Patient has been staying in a nursing facility and is nonambulatory apparently dislocated his left hip with cranial migration.  Internal and external rotation of the hip creates pain in the hip and was felt to have left hip dislocation verified by x-ray.  Apparently the patient has been falling down in the nursing home most likely causing a dislocation of the hip.  Patient was referred to Dr. Berrios who intends to either do a closed reduction or convert by hemiarthroplasty into a total hip arthroplasty and suggested admission..  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     Past Medical History  Medical History[1]    Surgical History  Surgical History[2]     Social History  He reports that he has quit smoking. His smoking use included cigarettes. He has never used smokeless tobacco. He reports that he does not drink alcohol and does not use drugs.    Family History  Family History[3]     Allergies  Patient has no known allergies.    Review of Systems  Review of systems is difficult to assess because patient is confused.  But when questioned he denies  "any chest pains or any shortness of breath.  He is bed ridden and has been chronically in the nursing home.  As far as we know the patient is unable to chew and eating modified diet but has been taking his medications and able to take his food.  The patient still has a sacral decubitus ulcer which is being managed by wound care, he is bedridden, and is basically anuric on hemodialysis 3 times a week Tuesday Thursday Saturdays.     Physical Exam  Patient was alert and seemingly oriented to not responding to questions but obviously chronically ill looking and somewhat cachectic.  Blood pressures have been high lately but has not taken his medications for blood pressure recently.  Head examination benign with a slight facial asymmetry  Neck veins are distended with a permacath in the right side but no bruits  Chest examination shows prominence of the ribs  Lungs are clear without any wheezing crackles or rhonchi  Heart is regular I did not appreciate any murmur  Abdomen soft nontender flat no guarding and with good bowel sounds  Extremities with markedly atrophic muscles.  Left hip somewhat rotated with somewhat displaced left knee  Patient has dependent edema     Last Recorded Vitals  Blood pressure (!) 189/91, pulse 76, temperature 36.1 °C (97 °F), temperature source Temporal, resp. rate 18, height 1.82 m (5' 11.65\"), weight 68.2 kg (150 lb 5.7 oz), SpO2 95%.  Current Medications[4]   Relevant Results        Results for orders placed or performed during the hospital encounter of 06/27/25 (from the past 96 hours)   CBC and Auto Differential   Result Value Ref Range    WBC 12.0 (H) 4.4 - 11.3 x10*3/uL    nRBC 0.0 0.0 - 0.0 /100 WBCs    RBC 4.63 4.50 - 5.90 x10*6/uL    Hemoglobin 12.3 (L) 13.5 - 17.5 g/dL    Hematocrit 36.4 (L) 41.0 - 52.0 %    MCV 79 (L) 80 - 100 fL    MCH 26.6 26.0 - 34.0 pg    MCHC 33.8 32.0 - 36.0 g/dL    RDW 18.8 (H) 11.5 - 14.5 %    Platelets 198 150 - 450 x10*3/uL    Neutrophils % 61.4 40.0 - " 80.0 %    Immature Granulocytes %, Automated 0.3 0.0 - 0.9 %    Lymphocytes % 26.3 13.0 - 44.0 %    Monocytes % 9.6 2.0 - 10.0 %    Eosinophils % 2.0 0.0 - 6.0 %    Basophils % 0.4 0.0 - 2.0 %    Neutrophils Absolute 7.36 (H) 1.60 - 5.50 x10*3/uL    Immature Granulocytes Absolute, Automated 0.04 0.00 - 0.50 x10*3/uL    Lymphocytes Absolute 3.15 (H) 0.80 - 3.00 x10*3/uL    Monocytes Absolute 1.15 (H) 0.05 - 0.80 x10*3/uL    Eosinophils Absolute 0.24 0.00 - 0.40 x10*3/uL    Basophils Absolute 0.05 0.00 - 0.10 x10*3/uL   Comprehensive metabolic panel   Result Value Ref Range    Glucose 208 (H) 74 - 99 mg/dL    Sodium 134 (L) 136 - 145 mmol/L    Potassium 3.5 3.5 - 5.3 mmol/L    Chloride 97 (L) 98 - 107 mmol/L    Bicarbonate 27 21 - 32 mmol/L    Anion Gap 14 10 - 20 mmol/L    Urea Nitrogen 46 (H) 6 - 23 mg/dL    Creatinine 3.76 (H) 0.50 - 1.30 mg/dL    eGFR 15 (L) >60 mL/min/1.73m*2    Calcium 8.1 (L) 8.6 - 10.3 mg/dL    Albumin 2.6 (L) 3.4 - 5.0 g/dL    Alkaline Phosphatase 93 33 - 136 U/L    Total Protein 6.9 6.4 - 8.2 g/dL    AST 14 9 - 39 U/L    Bilirubin, Total 0.4 0.0 - 1.2 mg/dL    ALT 9 (L) 10 - 52 U/L   Coagulation Screen   Result Value Ref Range    Protime 14.1 (H) 9.8 - 12.4 seconds    INR 1.3 (H) 0.9 - 1.1    aPTT 35 26 - 36 seconds   SST TOP   Result Value Ref Range    Extra Tube Hold for add-ons.    Gray Top   Result Value Ref Range    Extra Tube Hold for add-ons.    ECG 12 lead   Result Value Ref Range    Ventricular Rate 72 BPM    Atrial Rate 72 BPM    VA Interval 164 ms    QRS Duration 72 ms    QT Interval 412 ms    QTC Calculation(Bazett) 451 ms    P Axis 8 degrees    R Axis 65 degrees    T Axis 74 degrees    QRS Count 12 beats    Q Onset 221 ms    P Onset 139 ms    P Offset 195 ms    T Offset 427 ms    QTC Fredericia 438 ms   POCT GLUCOSE   Result Value Ref Range    POCT Glucose 165 (H) 74 - 99 mg/dL    ECG 12 lead  Result Date: 6/27/2025  Normal sinus rhythm Nonspecific T wave abnormality Abnormal  ECG When compared with ECG of 07-MAY-2025 17:17, Nonspecific T wave abnormality now evident in Lateral leads    CT hip left wo IV contrast  Result Date: 6/27/2025  Interpreted By:  Estuardo Beaulieu, STUDY: CT HIP LEFT WO IV CONTRAST; CT 3D RECONSTRUCTION;  6/27/2025 4:32 pm   INDICATION: Signs/Symptoms:chronic dislocation. with 3D recon; Signs/Symptoms:dislocation.     COMPARISON: CT left hip 04/11/2025. Same day radiographs left hip.   ACCESSION NUMBER(S): [id number]; [id number]   ORDERING CLINICIAN: CARLY FERMIN   TECHNIQUE: CT imaging of the left hip was obtained without contrast. Coronal and sagittal reformatted images were performed. 3D reconstructed images were created on an independent workstation and reviewed.   FINDINGS: OSSEOUS STRUCTURES:   Status post left hip hemiarthroplasty. There is posterosuperior dislocation of the prosthesis with respect to the acetabulum. Metallic streak artifact limits regional assessment. No definite acute fracture identified. Multiple chronic appearing ossific densities are noted about the head of the prosthesis, some of which may represent extensive vascular calcifications. There is a partially visualized area of erosive change involving the lower sacrum/upper coccyx with a few adjacent air locules and what appears to be an overlying sacral decubitus ulceration. These findings are concerning for osteomyelitis (series 309, image 75).     SOFT TISSUES:   Anasarca with extensive subcutaneous edema throughout the visualized soft tissues. Extensive vascular calcifications are noted diffusely throughout the pelvis, gluteal region, and visualized thigh. Metallic streak artifact limits further assessment of the soft tissues.       1. Left hip hemiarthroplasty with posterosuperior dislocation of the hip/femur with respect to the acetabulum. No definite fracture identified. 2. Generalized body wall edema/anasarca. 3. Area of erosive change involving the lower sacrum/upper coccyx with  overlying sacral decubitus ulcer, only partially visualized on this exam. Findings are concerning for osteomyelitis.   MACRO: Critical Finding:  See findings. Notification was initiated on 6/27/2025 at 5:16 pm by  Estuardo Beaulieu.  (**-YCF-**) Instructions:   Signed by: Estuardo Beaulieu 6/27/2025 5:16 PM Dictation workstation:   [id number]    CT 3D reconstruction  Result Date: 6/27/2025  Interpreted By:  Estuardo Beaulieu, STUDY: CT HIP LEFT WO IV CONTRAST; CT 3D RECONSTRUCTION;  6/27/2025 4:32 pm   INDICATION: Signs/Symptoms:chronic dislocation. with 3D recon; Signs/Symptoms:dislocation.     COMPARISON: CT left hip 04/11/2025. Same day radiographs left hip.   ACCESSION NUMBER(S): [id number]; [id number]   ORDERING CLINICIAN: CARLY FREMIN   TECHNIQUE: CT imaging of the left hip was obtained without contrast. Coronal and sagittal reformatted images were performed. 3D reconstructed images were created on an independent workstation and reviewed.   FINDINGS: OSSEOUS STRUCTURES:   Status post left hip hemiarthroplasty. There is posterosuperior dislocation of the prosthesis with respect to the acetabulum. Metallic streak artifact limits regional assessment. No definite acute fracture identified. Multiple chronic appearing ossific densities are noted about the head of the prosthesis, some of which may represent extensive vascular calcifications. There is a partially visualized area of erosive change involving the lower sacrum/upper coccyx with a few adjacent air locules and what appears to be an overlying sacral decubitus ulceration. These findings are concerning for osteomyelitis (series 309, image 75).     SOFT TISSUES:   Anasarca with extensive subcutaneous edema throughout the visualized soft tissues. Extensive vascular calcifications are noted diffusely throughout the pelvis, gluteal region, and visualized thigh. Metallic streak artifact limits further assessment of the soft tissues.       1. Left hip hemiarthroplasty  with posterosuperior dislocation of the hip/femur with respect to the acetabulum. No definite fracture identified. 2. Generalized body wall edema/anasarca. 3. Area of erosive change involving the lower sacrum/upper coccyx with overlying sacral decubitus ulcer, only partially visualized on this exam. Findings are concerning for osteomyelitis.   MACRO: Critical Finding:  See findings. Notification was initiated on 6/27/2025 at 5:16 pm by  Estuardo Beaulieu.  (**-YCF-**) Instructions:   Signed by: Estuardo Beaulieu 6/27/2025 5:16 PM Dictation workstation:   [id number]        Assessment & Plan  Closed dislocation of left hip, initial encounter (Multi)      Recurrent dislocation left hip hemiarthroplasty most likely needing either close reduction or surgical intervention, seen by orthopedics  End-stage kidney disease on hemodialysis, will order hemodialysis tomorrow  Anemia of chronic kidney disease and chronic illness on EPO  Type 2 diabetes on Lantus and short acting insulin on a sliding scale  History of DVT on Eliquis which will be held placed on Lovenox  Paroxysmal atrial fibrillation on Eliquis being held  Seizure disorder on Keppra 500 mg twice daily  Cognitive dysfunction/metabolic encephalopathy on Aricept 25 mg daily  Benign prostatic hypertrophy on Proscar 5 mg daily  Sacral decubitus with history of osteomyelitis, on Santyl and Dakin's dressing.  Will be referred to wound care, infectious disease, and plastic surgery for reevaluation.  Cultures ordered  Hypomagnesemia on magnesium oxide 400 mg daily will check magnesium level  Hypertension on Metroprolol tartrate 50 mg twice daily, will modify according to blood pressure readings while in the hospital        I spent 60 minutes in the professional and overall care of this patient.      Edi Wise MD FACP         [1]   Past Medical History:  Diagnosis Date    Autonomic dysfunction     Benign prostatic hyperplasia     Chronic anemia     Coronary artery disease      Current use of long term anticoagulation     Apixaban    Dementia     Dyslipidemia     End-stage renal disease on hemodialysis (Multi)     Frequent falls     Gout     Heart failure with preserved ejection fraction     History of anaphylaxis     History of closed head injury     History of Clostridioides difficile colitis     History of deep vein thrombosis     History of non-ST elevation myocardial infarction (NSTEMI)     History of sepsis     Hypertension     Hypoxic ischemic encephalopathy (Multi)     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)     Kidney stones     Multilevel degenerative disc disease     Osteoarthritis     Paroxysmal atrial fibrillation (Multi)     Peripheral neuropathy     Peripheral vascular disease     Secondary hyperparathyroidism of renal origin (Multi)     Seizure disorder (Multi)    [2]   Past Surgical History:  Procedure Laterality Date    ADENOIDECTOMY      AV FISTULA PLACEMENT      CHOLECYSTECTOMY      COLONOSCOPY      IR CVC TUNNELED  08/20/2021    IR CVC TUNNELED 8/20/2021 [facility]    IR TUNNELED DIALYSIS CATHETER      PACEMAKER PLACEMENT      THROMBECTOMY      TONSILLECTOMY      TOTAL HIP ARTHROPLASTY Right    [3]   Family History  Problem Relation Name Age of Onset    Diabetes Mother      Cancer Father      Lupus Daughter      Kidney failure Daughter      Asthma Son      Hypertension Other      Sickle cell trait Other     [4]   Current Facility-Administered Medications:     acetaminophen (Tylenol) tablet 650 mg, 650 mg, oral, q4h PRN **OR** acetaminophen (Tylenol) oral liquid 650 mg, 650 mg, oral, q4h PRN **OR** acetaminophen (Tylenol) suppository 650 mg, 650 mg, rectal, q4h PRN, FRANKIE Castellanos-CNP    [START ON 6/28/2025] alteplase (Cathflo Activase) injection 2 mg, 2 mg, intra-catheter, Daily, Edi Wise MD    [START ON 6/28/2025] alteplase (Cathflo Activase) injection 2 mg, 2 mg, intra-catheter, Daily, Edi Wise MD    [Held by provider]  apixaban (Eliquis) tablet 2.5 mg, 2.5 mg, oral, BID, SOURAV Castellanos    dextrose 50 % injection 12.5 g, 12.5 g, intravenous, q15 min PRN, Edi Wise MD    dextrose 50 % injection 25 g, 25 g, intravenous, q15 min PRN, Edi Wise MD    [START ON 6/28/2025] enoxaparin (Lovenox) syringe 30 mg, 30 mg, subcutaneous, Daily, Edi Wise MD    [START ON 6/28/2025] epoetin sheila-epbx (Retacrit) injection 3,000 Units, 50 Units/kg, intravenous, After Dialysis, Edi Wise MD    glucagon (Glucagen) injection 1 mg, 1 mg, intramuscular, q15 min PRN, Edi Wise MD    glucagon (Glucagen) injection 1 mg, 1 mg, intramuscular, q15 min PRN, Edi Wise MD    [START ON 6/28/2025] heparin 1,000 unit/mL injection 2,000 Units, 2,000 Units, intra-catheter, After Dialysis, Edi Wise MD    [START ON 6/28/2025] insulin lispro injection 0-10 Units, 0-10 Units, subcutaneous, TID AC, Edi Wise MD    ondansetron ODT (Zofran-ODT) disintegrating tablet 4 mg, 4 mg, oral, q8h PRN **OR** ondansetron (Zofran) injection 4 mg, 4 mg, intravenous, q8h PRN, SOURAV Castellanos    [START ON 6/28/2025] polyethylene glycol (Glycolax, Miralax) packet 17 g, 17 g, oral, Daily, SOURAV Castellanos    [START ON 6/28/2025] sodium chloride 0.9 % bolus 200 mL, 200 mL, intravenous, After Dialysis, Edi Wise MD    [START ON 6/28/2025] sodium chloride 0.9 % bolus 200 mL, 200 mL, intravenous, q1h PRN, Edi Wise MD    [START ON 6/28/2025] sodium chloride 0.9 % bolus 200 mL, 200 mL, intravenous, q1h PRN, Edi Wise MD

## 2025-06-28 NOTE — ANESTHESIA POSTPROCEDURE EVALUATION
Patient: Tyshawn Coles    Procedure Summary       Date: 06/28/25 Room / Location: [facility] 11 / [facility]    Anesthesia Start: 0850 Anesthesia Stop: 0927    Procedure: CLOSED REDUCTION, HIP (Left: Hip) Diagnosis:       Closed dislocation of left hip, initial encounter (Multi)      (Closed dislocation of left hip, initial encounter (Multi) [S73.005A])    Surgeons: Marcel Lezama MD Responsible Provider: Conner Jenkins MD    Anesthesia Type: general ASA Status: 4            Anesthesia Type: general    Vitals Value Taken Time   /77 06/28/25 09:27   Temp 36.8 06/28/25 09:27   Pulse 76 06/28/25 09:27   Resp 20 06/28/25 09:27   SpO2 100% 06/28/25 09:27       Anesthesia Post Evaluation    Patient location during evaluation: bedside  Patient participation: complete - patient cannot participate  Level of consciousness: confused  Pain score: 0  Pain management: adequate  Multimodal analgesia pain management approach  Airway patency: patent  Cardiovascular status: acceptable  Respiratory status: acceptable and face mask  Hydration status: acceptable  Postoperative Nausea and Vomiting: none        There were no known notable events for this encounter.

## 2025-06-28 NOTE — DISCHARGE SUMMARY
Discharge Diagnosis  Closed dislocation of left hip, initial encounter (Multi  End-stage kidney disease on hemodialysis  Insulin-dependent type 2 diabetes  History of DVT  Seizure disorder  Cognitive dysfunction/metabolic encephalopathy  Benign prostatic hypertrophy  Sacral decubitus with osteomyelitis  Hypomagnesemia  Hypertension    Issues Requiring Follow-Up  Patient to go to regular scheduled for hemodialysis  Wound care follow-ups  Infectious disease follow-up    Test Results Pending At Discharge  Pending Labs       Order Current Status    Extra Tubes Preliminary result    Saleh Top Preliminary result    SST TOP Preliminary result        Current Medications[1]   Results for orders placed or performed during the hospital encounter of 06/27/25 (from the past 96 hours)   CBC and Auto Differential   Result Value Ref Range    WBC 12.0 (H) 4.4 - 11.3 x10*3/uL    nRBC 0.0 0.0 - 0.0 /100 WBCs    RBC 4.63 4.50 - 5.90 x10*6/uL    Hemoglobin 12.3 (L) 13.5 - 17.5 g/dL    Hematocrit 36.4 (L) 41.0 - 52.0 %    MCV 79 (L) 80 - 100 fL    MCH 26.6 26.0 - 34.0 pg    MCHC 33.8 32.0 - 36.0 g/dL    RDW 18.8 (H) 11.5 - 14.5 %    Platelets 198 150 - 450 x10*3/uL    Neutrophils % 61.4 40.0 - 80.0 %    Immature Granulocytes %, Automated 0.3 0.0 - 0.9 %    Lymphocytes % 26.3 13.0 - 44.0 %    Monocytes % 9.6 2.0 - 10.0 %    Eosinophils % 2.0 0.0 - 6.0 %    Basophils % 0.4 0.0 - 2.0 %    Neutrophils Absolute 7.36 (H) 1.60 - 5.50 x10*3/uL    Immature Granulocytes Absolute, Automated 0.04 0.00 - 0.50 x10*3/uL    Lymphocytes Absolute 3.15 (H) 0.80 - 3.00 x10*3/uL    Monocytes Absolute 1.15 (H) 0.05 - 0.80 x10*3/uL    Eosinophils Absolute 0.24 0.00 - 0.40 x10*3/uL    Basophils Absolute 0.05 0.00 - 0.10 x10*3/uL   Comprehensive metabolic panel   Result Value Ref Range    Glucose 208 (H) 74 - 99 mg/dL    Sodium 134 (L) 136 - 145 mmol/L    Potassium 3.5 3.5 - 5.3 mmol/L    Chloride 97 (L) 98 - 107 mmol/L    Bicarbonate 27 21 - 32 mmol/L    Anion  Gap 14 10 - 20 mmol/L    Urea Nitrogen 46 (H) 6 - 23 mg/dL    Creatinine 3.76 (H) 0.50 - 1.30 mg/dL    eGFR 15 (L) >60 mL/min/1.73m*2    Calcium 8.1 (L) 8.6 - 10.3 mg/dL    Albumin 2.6 (L) 3.4 - 5.0 g/dL    Alkaline Phosphatase 93 33 - 136 U/L    Total Protein 6.9 6.4 - 8.2 g/dL    AST 14 9 - 39 U/L    Bilirubin, Total 0.4 0.0 - 1.2 mg/dL    ALT 9 (L) 10 - 52 U/L   Coagulation Screen   Result Value Ref Range    Protime 14.1 (H) 9.8 - 12.4 seconds    INR 1.3 (H) 0.9 - 1.1    aPTT 35 26 - 36 seconds   SST TOP   Result Value Ref Range    Extra Tube Hold for add-ons.    Gray Top   Result Value Ref Range    Extra Tube Hold for add-ons.    ECG 12 lead   Result Value Ref Range    Ventricular Rate 72 BPM    Atrial Rate 72 BPM    MD Interval 164 ms    QRS Duration 72 ms    QT Interval 412 ms    QTC Calculation(Bazett) 451 ms    P Axis 8 degrees    R Axis 65 degrees    T Axis 74 degrees    QRS Count 12 beats    Q Onset 221 ms    P Onset 139 ms    P Offset 195 ms    T Offset 427 ms    QTC Fredericia 438 ms   POCT GLUCOSE   Result Value Ref Range    POCT Glucose 165 (H) 74 - 99 mg/dL   Hepatitis B surface antibody   Result Value Ref Range    Hepatitis B Surface AB 27.0 (H) <10.0 mIU/mL   Hepatitis B surface antigen   Result Value Ref Range    Hepatitis B Surface AG Nonreactive Nonreactive   CBC   Result Value Ref Range    WBC 12.4 (H) 4.4 - 11.3 x10*3/uL    nRBC 0.0 0.0 - 0.0 /100 WBCs    RBC 4.20 (L) 4.50 - 5.90 x10*6/uL    Hemoglobin 11.0 (L) 13.5 - 17.5 g/dL    Hematocrit 32.6 (L) 41.0 - 52.0 %    MCV 78 (L) 80 - 100 fL    MCH 26.2 26.0 - 34.0 pg    MCHC 33.7 32.0 - 36.0 g/dL    RDW 18.4 (H) 11.5 - 14.5 %    Platelets 222 150 - 450 x10*3/uL   Basic metabolic panel   Result Value Ref Range    Glucose 130 (H) 74 - 99 mg/dL    Sodium 135 (L) 136 - 145 mmol/L    Potassium 3.5 3.5 - 5.3 mmol/L    Chloride 98 98 - 107 mmol/L    Bicarbonate 29 21 - 32 mmol/L    Anion Gap 12 10 - 20 mmol/L    Urea Nitrogen 50 (H) 6 - 23 mg/dL     Creatinine 4.18 (H) 0.50 - 1.30 mg/dL    eGFR 13 (L) >60 mL/min/1.73m*2    Calcium 7.9 (L) 8.6 - 10.3 mg/dL   Magnesium   Result Value Ref Range    Magnesium 2.38 1.60 - 2.40 mg/dL   Phosphorus   Result Value Ref Range    Phosphorus 3.7 2.5 - 4.9 mg/dL   Parathyroid Hormone, Intact   Result Value Ref Range    Parathyroid Hormone, Intact 140.7 (H) 18.5 - 88.0 pg/mL   POCT GLUCOSE   Result Value Ref Range    POCT Glucose 70 (L) 74 - 99 mg/dL   POCT GLUCOSE   Result Value Ref Range    POCT Glucose 66 (L) 74 - 99 mg/dL   POCT GLUCOSE   Result Value Ref Range    POCT Glucose 66 (L) 74 - 99 mg/dL    FL fluoro images no charge  Result Date: 6/28/2025  These images are not reportable by radiology and will not be interpreted by  Radiologists.    ECG 12 lead  Result Date: 6/27/2025  Normal sinus rhythm Nonspecific T wave abnormality Abnormal ECG When compared with ECG of 07-MAY-2025 17:17, Nonspecific T wave abnormality now evident in Lateral leads    CT hip left wo IV contrast  Result Date: 6/27/2025  Interpreted By:  Estuardo Beaulieu, STUDY: CT HIP LEFT WO IV CONTRAST; CT 3D RECONSTRUCTION;  6/27/2025 4:32 pm   INDICATION: Signs/Symptoms:chronic dislocation. with 3D recon; Signs/Symptoms:dislocation.     COMPARISON: CT left hip 04/11/2025. Same day radiographs left hip.   ACCESSION NUMBER(S): [id number]; [id number]   ORDERING CLINICIAN: CARLY FERMIN   TECHNIQUE: CT imaging of the left hip was obtained without contrast. Coronal and sagittal reformatted images were performed. 3D reconstructed images were created on an independent workstation and reviewed.   FINDINGS: OSSEOUS STRUCTURES:   Status post left hip hemiarthroplasty. There is posterosuperior dislocation of the prosthesis with respect to the acetabulum. Metallic streak artifact limits regional assessment. No definite acute fracture identified. Multiple chronic appearing ossific densities are noted about the head of the prosthesis, some of which may represent  extensive vascular calcifications. There is a partially visualized area of erosive change involving the lower sacrum/upper coccyx with a few adjacent air locules and what appears to be an overlying sacral decubitus ulceration. These findings are concerning for osteomyelitis (series 309, image 75).     SOFT TISSUES:   Anasarca with extensive subcutaneous edema throughout the visualized soft tissues. Extensive vascular calcifications are noted diffusely throughout the pelvis, gluteal region, and visualized thigh. Metallic streak artifact limits further assessment of the soft tissues.       1. Left hip hemiarthroplasty with posterosuperior dislocation of the hip/femur with respect to the acetabulum. No definite fracture identified. 2. Generalized body wall edema/anasarca. 3. Area of erosive change involving the lower sacrum/upper coccyx with overlying sacral decubitus ulcer, only partially visualized on this exam. Findings are concerning for osteomyelitis.   MACRO: Critical Finding:  See findings. Notification was initiated on 6/27/2025 at 5:16 pm by  Estuardo Beaulieu.  (**-YCF-**) Instructions:   Signed by: Estuardo Beaulieu 6/27/2025 5:16 PM Dictation workstation:   [id number]    CT 3D reconstruction  Result Date: 6/27/2025  Interpreted By:  Estuardo Beaulieu, STUDY: CT HIP LEFT WO IV CONTRAST; CT 3D RECONSTRUCTION;  6/27/2025 4:32 pm   INDICATION: Signs/Symptoms:chronic dislocation. with 3D recon; Signs/Symptoms:dislocation.     COMPARISON: CT left hip 04/11/2025. Same day radiographs left hip.   ACCESSION NUMBER(S): [id number]; [id number]   ORDERING CLINICIAN: CARLY FERMIN   TECHNIQUE: CT imaging of the left hip was obtained without contrast. Coronal and sagittal reformatted images were performed. 3D reconstructed images were created on an independent workstation and reviewed.   FINDINGS: OSSEOUS STRUCTURES:   Status post left hip hemiarthroplasty. There is posterosuperior dislocation of the prosthesis with respect to  the acetabulum. Metallic streak artifact limits regional assessment. No definite acute fracture identified. Multiple chronic appearing ossific densities are noted about the head of the prosthesis, some of which may represent extensive vascular calcifications. There is a partially visualized area of erosive change involving the lower sacrum/upper coccyx with a few adjacent air locules and what appears to be an overlying sacral decubitus ulceration. These findings are concerning for osteomyelitis (series 309, image 75).     SOFT TISSUES:   Anasarca with extensive subcutaneous edema throughout the visualized soft tissues. Extensive vascular calcifications are noted diffusely throughout the pelvis, gluteal region, and visualized thigh. Metallic streak artifact limits further assessment of the soft tissues.       1. Left hip hemiarthroplasty with posterosuperior dislocation of the hip/femur with respect to the acetabulum. No definite fracture identified. 2. Generalized body wall edema/anasarca. 3. Area of erosive change involving the lower sacrum/upper coccyx with overlying sacral decubitus ulcer, only partially visualized on this exam. Findings are concerning for osteomyelitis.   MACRO: Critical Finding:  See findings. Notification was initiated on 6/27/2025 at 5:16 pm by  Estuardo Beaulieu.  (**-YCF-**) Instructions:   Signed by: Estuardo Beaulieu 6/27/2025 5:16 PM Dictation workstation:   [id number]       Hospital Course   The patient apparently had another dislocation of the prior left hip arthroplasty done in March 2025.  Patient had pains and was seen at the orthopedic clinic and eventually sent for admission.  The patient's other medical issues have been relatively stable.  Patient was seen by orthopedics and eventually had a closed reduction of the hip arthroplasty hemiarthroplasty under general anesthesia done today.  Postoperatively the patient has been fairly stable without any unusual complications.  Patient was  also referred to infectious disease and was seen by Dr. José and felt that no further IV antibiotics are needed at this point, osteomyelitis of the sacrum has been chronic and has had prolonged antibiotic treatments.  Patient was also seen by Dr. Reyes and he felt that the patient's sacral decubitus was healing well with some granulation and no further workup or procedures.  When the patient was seen tonight the patient was eating being fed but awake alert oriented not in distress and very comfortable.  Vital signs as well as chemistries have been stable.  He will then send the patient back to the skilled nursing facility.  He was hemodialyzed today and dialysis treatment was uneventful.    Pertinent Physical Exam At Time of Discharge  Physical Exam  Patient was alert seemingly oriented actually conversant follows commands and did not seem to be in distress very comfortable eating being fed  He looks cachectic however chronically ill looking  Head examination benign with a slight facial asymmetry  Neck veins are prominent with a permacath in the right side but no bruits  Chest examination shows prominent ribs otherwise benign  Lungs are clear without wheezing crackles or rhonchi  Heart is regular there was no murmur  Abdomen soft and nontender good bowel sounds  Extremities with markedly atrophic muscles, dependent edema was noted on the back and thighs.  No new focal neurological deficits    Outpatient Follow-Up  Future Appointments   Date Time Provider Department [place]   7/9/2025  1:45 PM Roland J Reyes, MD [facility]   10/17/2025  3:40 PM Sona Mckeon MD [id number]       Edi Wise MD FAC       [1]   Current Facility-Administered Medications:     acetaminophen (Tylenol) tablet 650 mg, 650 mg, oral, q4h PRN, 650 mg at 06/28/25 0047 **OR** acetaminophen (Tylenol) oral liquid 650 mg, 650 mg, oral, q4h PRN **OR** acetaminophen (Tylenol) suppository 650 mg, 650 mg, rectal, q4h PRN, Phyllis Rodriguez,  PA-C    acetaminophen (Tylenol) suppository 650 mg, 650 mg, rectal, q4h PRN, Phyllis Rodriguez PA-C    acetaminophen (Tylenol) tablet 650 mg, 650 mg, oral, q6h JEREMY, EMILIANO Witt-C, 650 mg at 06/28/25 1550    albumin human 25 % solution 25 g, 25 g, intravenous, Once per day on Tuesday Thursday Saturday, Phyllis Rodriguez PA-C    albuterol 2.5 mg /3 mL (0.083 %) nebulizer solution 1.25 mg, 1.25 mg, nebulization, q6h PRN, EMILIANO Witt-C    alteplase (Cathflo Activase) injection 2 mg, 2 mg, intra-catheter, Daily, EMILIANO Witt-C    alteplase (Cathflo Activase) injection 2 mg, 2 mg, intra-catheter, Daily, MALIHA WittC    apixaban (Eliquis) tablet 2.5 mg, 2.5 mg, oral, BID, EMILIANO Witt-C, 2.5 mg at 06/28/25 1603    benzocaine-menthol (Cepastat Sore Throat) lozenge 1 lozenge, 1 lozenge, Mouth/Throat, q4h PRN, Phyllis Rodriguez PA-C    bisacodyl (Dulcolax) EC tablet 10 mg, 10 mg, oral, Daily PRN, MALIHA WittC    bisacodyl (Dulcolax) suppository 10 mg, 10 mg, rectal, q24h PRN, EMILIANO Witt-C    cholecalciferol (Vitamin D-3) tablet 25 mcg, 25 mcg, oral, Daily, EMILIANO Witt-C, 25 mcg at 06/28/25 1604    collagenase 250 unit/gram ointment, , Topical, Daily, EMILIANO Witt-YISSEL, Given at 06/28/25 1556    cyclobenzaprine (Flexeril) tablet 10 mg, 10 mg, oral, TID PRN, Phyllis Rodriguez PA-C    dextrose 50 % injection 12.5 g, 12.5 g, intravenous, q15 min PRN, Phyllis Rodriguez PA-C    dextrose 50 % injection 25 g, 25 g, intravenous, q15 min PRN, Phyllis Rodriguez PA-C    docusate sodium (Colace) capsule 100 mg, 100 mg, oral, Daily, Phyllis Rodriguez PA-C    donepezil (Aricept) tablet 23 mg, 23 mg, oral, Daily, Phyllis Rodriguez PA-C, 23 mg at 06/28/25 1553    epoetin sheila-epbx (Retacrit) injection 3,000 Units, 50 Units/kg, intravenous, After Dialysis, Phyllis Rodriguez PA-C    finasteride (Proscar) tablet 5 mg, 5 mg, oral, Daily, Phyllis Rodriguez PA-C    folic acid (Folvite) tablet 1 mg, 1 mg, oral, Daily, Phyllis PATTERSON  MALIHA RodriguezC, 1 mg at 06/28/25 1604    glucagon (Glucagen) injection 1 mg, 1 mg, intramuscular, q15 min PRN, Phyllis Rodriguez PA-C    glucagon (Glucagen) injection 1 mg, 1 mg, intramuscular, q15 min PRN, Phyllis Rodriguez PA-C    heparin 1,000 unit/mL injection 2,000 Units, 2,000 Units, intra-catheter, Once per day on Tuesday Thursday Saturday, EMILIANO Witt-C, 2,100 Units at 06/28/25 1502    heparin 1,000 unit/mL injection 2,000 Units, 2,000 Units, intra-catheter, After Dialysis, EMILIANO Witt-C, 2,100 Units at 06/28/25 1502    insulin glargine (Lantus) injection 5 Units, 5 Units, subcutaneous, Nightly, MALIHA WittC, 5 Units at 06/28/25 0047    insulin lispro injection 0-10 Units, 0-10 Units, subcutaneous, TID AC, Phyllis Rodriguez PA-C    lactated Ringer's infusion, 50 mL/hr, intravenous, Continuous, EMILIANO Witt-C, Last Rate: 50 mL/hr at 06/28/25 1553, 50 mL/hr at 06/28/25 1553    lactobacillus acidophilus tablet 1 tablet, 1 tablet, oral, Daily, Phyllis Rodriguez PA-C    levETIRAcetam (Keppra) tablet 500 mg, 500 mg, oral, BID, EMILIANO Witt-C, 500 mg at 06/28/25 0047    loperamide (Imodium) capsule 2 mg, 2 mg, oral, BID PRN, hPyllis Rodriguez PA-C    magnesium hydroxide (Milk of Magnesia) 400 mg/5 mL suspension 30 mL, 30 mL, oral, Daily, EMILIANO Witt-C    melatonin tablet 10 mg, 10 mg, oral, Nightly, Phyllis Rodriguez PA-C, 10 mg at 06/28/25 0047    metoprolol tartrate (Lopressor) tablet 50 mg, 50 mg, oral, BID, EMILIANO Witt-C, 50 mg at 06/28/25 0047    ondansetron ODT (Zofran-ODT) disintegrating tablet 4 mg, 4 mg, oral, q8h PRN **OR** ondansetron (Zofran) injection 4 mg, 4 mg, intravenous, q8h PRN, Phyllis Rodriguez PA-C    ondansetron (Zofran) tablet 4 mg, 4 mg, oral, q6h PRN, Phyllis Rodriguez PA-C    oxyCODONE (Roxicodone) immediate release tablet 2.5 mg, 2.5 mg, oral, q6h PRN, Phyllis Rodriguez PA-C    oxyCODONE (Roxicodone) immediate release tablet 5 mg, 5 mg, oral, q4h PRN, Phyllis Rodriguez PA-C     oxygen (O2) therapy, 2 L/min, inhalation, Continuous, Phyllis Rodriguez PA-C    polyethylene glycol (Glycolax, Miralax) packet 17 g, 17 g, oral, Daily PRN, Phyllis Rodriguez PA-C    prochlorperazine (Compazine) tablet 10 mg, 10 mg, oral, q6h PRN **OR** prochlorperazine (Compazine) injection 10 mg, 10 mg, intravenous, q6h PRN **OR** prochlorperazine (Compazine) suppository 25 mg, 25 mg, rectal, q12h PRN, Phyllis Rodriguez PA-C    sodium chloride 0.9 % bolus 200 mL, 200 mL, intravenous, After Dialysis, Phyllis Rodriguez PA-C    sodium chloride 0.9 % bolus 200 mL, 200 mL, intravenous, q1h PRN, EMILIANO Witt-YISSEL    sodium chloride 0.9 % bolus 200 mL, 200 mL, intravenous, q1h PRN, Phyllis Rodriguez PA-C    sodium hypochlorite (Dakin's Full-Strength) 0.5 % external solution, , irrigation, BID, Phyllis Rodriguez PA-C, Given at 06/28/25 1557    vitamin B complex-vitamin C-folic acid (Nephrocaps) capsule 1 capsule, 1 capsule, oral, Daily, Phyllis Rodriguez PA-C

## 2025-06-28 NOTE — PROGRESS NOTES
Occupational Therapy                 Therapy Communication Note    Patient Name: Tyshawn Coles  MRN: [id number]  Department: [facility]  Room: [facility]/605-A  Today's Date: 6/28/2025     Discipline: Occupational Therapy    Missed Visit: OT Missed Visit: Yes     Missed Time: Attempt    Comment: PT/OT order received and chart reviewed. Pt with L hip hemiarthoplasty dislocation. Pt to undergo closed reduction this date with Dr. PEDRO Lezama. Will hold PT/OT evaluations this date and await ortho recommendations/ new orders post procedure.

## 2025-06-28 NOTE — DISCHARGE INSTRUCTIONS
Patient remains with a dislocated left hip hemiarthroplasty. Patient should be essentially partial to nonweightbearing left lower extremity to tolerance. There is no precautions or restrictions. The patient should be able to sit up in bed and eat and would be transfer only.

## 2025-06-29 VITALS
SYSTOLIC BLOOD PRESSURE: 145 MMHG | OXYGEN SATURATION: 97 % | BODY MASS INDEX: 20.36 KG/M2 | RESPIRATION RATE: 16 BRPM | HEART RATE: 73 BPM | HEIGHT: 72 IN | TEMPERATURE: 97.9 F | WEIGHT: 150.35 LBS | DIASTOLIC BLOOD PRESSURE: 71 MMHG

## 2025-06-29 LAB
GLUCOSE BLD MANUAL STRIP-MCNC: 135 MG/DL (ref 74–99)
GLUCOSE BLD MANUAL STRIP-MCNC: 153 MG/DL (ref 74–99)

## 2025-06-29 PROCEDURE — 97165 OT EVAL LOW COMPLEX 30 MIN: CPT | Mod: GO

## 2025-06-29 PROCEDURE — 97161 PT EVAL LOW COMPLEX 20 MIN: CPT | Mod: GP

## 2025-06-29 PROCEDURE — 2500000001 HC RX 250 WO HCPCS SELF ADMINISTERED DRUGS (ALT 637 FOR MEDICARE OP)

## 2025-06-29 PROCEDURE — G0378 HOSPITAL OBSERVATION PER HR: HCPCS

## 2025-06-29 PROCEDURE — 82947 ASSAY GLUCOSE BLOOD QUANT: CPT

## 2025-06-29 RX ADMIN — ACETAMINOPHEN 650 MG: 325 TABLET ORAL at 00:26

## 2025-06-29 RX ADMIN — APIXABAN 2.5 MG: 5 TABLET, FILM COATED ORAL at 09:40

## 2025-06-29 RX ADMIN — Medication 25 MCG: at 09:40

## 2025-06-29 RX ADMIN — FOLIC ACID 1 MG: 1 TABLET ORAL at 09:43

## 2025-06-29 RX ADMIN — Medication 1 CAPSULE: at 05:47

## 2025-06-29 RX ADMIN — LEVETIRACETAM 500 MG: 500 TABLET, FILM COATED ORAL at 09:40

## 2025-06-29 RX ADMIN — Medication: at 00:25

## 2025-06-29 RX ADMIN — DONEPEZIL HYDROCHLORIDE 23 MG: 23 TABLET, FILM COATED ORAL at 09:42

## 2025-06-29 RX ADMIN — METOPROLOL TARTRATE 50 MG: 50 TABLET, FILM COATED ORAL at 09:40

## 2025-06-29 RX ADMIN — DOCUSATE SODIUM 100 MG: 100 CAPSULE, LIQUID FILLED ORAL at 09:39

## 2025-06-29 RX ADMIN — Medication 1 TABLET: at 09:40

## 2025-06-29 RX ADMIN — MAGNESIUM HYDROXIDE 30 ML: 400 SUSPENSION ORAL at 09:40

## 2025-06-29 RX ADMIN — ACETAMINOPHEN 650 MG: 325 TABLET ORAL at 05:47

## 2025-06-29 RX ADMIN — FINASTERIDE 5 MG: 5 TABLET, FILM COATED ORAL at 09:40

## 2025-06-29 ASSESSMENT — PAIN - FUNCTIONAL ASSESSMENT
PAIN_FUNCTIONAL_ASSESSMENT: WONG-BAKER FACES

## 2025-06-29 ASSESSMENT — COGNITIVE AND FUNCTIONAL STATUS - GENERAL
DAILY ACTIVITIY SCORE: 10
DRESSING REGULAR UPPER BODY CLOTHING: A LOT
MOVING FROM LYING ON BACK TO SITTING ON SIDE OF FLAT BED WITH BEDRAILS: TOTAL
HELP NEEDED FOR BATHING: TOTAL
PERSONAL GROOMING: A LOT
EATING MEALS: A LITTLE
TOILETING: TOTAL
DRESSING REGULAR LOWER BODY CLOTHING: TOTAL
TURNING FROM BACK TO SIDE WHILE IN FLAT BAD: TOTAL
MOBILITY SCORE: 6
WALKING IN HOSPITAL ROOM: TOTAL
MOVING TO AND FROM BED TO CHAIR: TOTAL
STANDING UP FROM CHAIR USING ARMS: TOTAL
CLIMB 3 TO 5 STEPS WITH RAILING: TOTAL

## 2025-06-29 ASSESSMENT — PAIN SCALES - GENERAL
PAINLEVEL_OUTOF10: 0 - NO PAIN

## 2025-06-29 ASSESSMENT — ACTIVITIES OF DAILY LIVING (ADL): BATHING_ASSISTANCE: TOTAL

## 2025-06-29 ASSESSMENT — PAIN SCALES - WONG BAKER: WONGBAKER_NUMERICALRESPONSE: NO HURT

## 2025-06-29 NOTE — PROGRESS NOTES
"    Physical Therapy    Physical Therapy Evaluation    Patient Name: Tyshawn Coles  MRN: [id number]  Today's Date: 6/29/2025   Time Calculation  Start Time: 0818  Stop Time: 0834  Time Calculation (min): 16 min  605/605-A    Assessment/Plan   PT Assessment  PT Assessment Results: Decreased strength, Decreased endurance, Impaired balance, Decreased mobility, Decreased cognition, Impaired judgement, Pain, Orthopedic restrictions  Rehab Prognosis: Poor  Barriers to Discharge Home: Physical needs, Cognition needs  Cognition Needs: Medication and/or medical management daily assist needed, 24hr supervision for safety awareness needed, Cognition-related high falls risk  Physical Needs: Weight bearing precautions unable to be safely maintained, High falls risk due to function or environment  Evaluation/Treatment Tolerance: Patient limited by pain  Medical Staff Made Aware: Yes  End of Session Communication: Bedside nurse  Assessment Comment:  (Pt participated in evaluation today following recurrent L YASMEEN dislocations. Ortho unable to complete 2nd reduction this admission. Pt currently up to 25% WB on LLE and is allowed to perform transfers only. Pt is dependent/total assist with bed mobility and displays no ability to advance Alex LE in bed and poor sitting balance. Pt attempted 1 STS but unable to complete d/t L hip pain and poor strength with a max 2 A and FWW utilized. Pt demonstrated good carryover of precautions when attempting to stand and able to follow directions. Pt then safely returned to bed. Pt stands to benefit from ongoing skilled PT. \"\")  End of Session Patient Position: Bed, 3 rail up, Alarm on  IP OR SWING BED PT PLAN  Inpatient or Swing Bed: Inpatient  PT Plan  Treatment/Interventions: Bed mobility, Transfer training, Therapeutic exercise, Therapeutic activity, Positioning  PT Plan: Ongoing PT  PT Frequency: 4 times per week  PT Discharge Recommendations: High intensity level of continued care  PT Recommended " Transfer Status: Total assist  PT - OK to Discharge:  (PT eval complete, refer to MD for d/c)    Subjective     Current Problem:  1. Closed dislocation of left hip, initial encounter (Multi)  Case Request Operating Room: CLOSED REDUCTION, HIP    Case Request Operating Room: CLOSED REDUCTION, HIP      2. Hip dislocation, left, initial encounter (Multi)  cyclobenzaprine (Flexeril) 10 mg tablet        Problem List[1]    General Visit Information:  General  Reason for Referral: impaired mobility  Referred By: Dr. JOSE Lezama PT/OT  Past Medical History Relevant to Rehab: HTN, Dementia, A fib, ESRD, hyperlipedema, recurrent dislocations of L YASMEEN  Family/Caregiver Present: No  Co-Treatment: OT  Co-Treatment Reason: increase pt safety  Prior to Session Communication: Bedside nurse  Patient Position Received: Bed, 4 rail up, Alarm on  General Comment:  (Pt to ED on 6/27 after follow up appt for L YASMEEN Xr showed dislocation of the prothesis. This is a recurring problem that he has previously needed a reduction for. Last reduction completed on 4/1/25 by Dr. Dumont. Dr. Lezama on current admission unable to complete another reduction. Pt currently light weight on the foot to tolerance of up to 25%. There are no hip restrictions or precautions comfort measures only may sit up and eat.''')    Home Living:  Home Living  Home Living Comments:  (Pt poor historian d/t Hx of dementia and no family present. He did state with yes/no questions that he lives with his wide in [address] [place] home.)    Prior Level of Function:  Prior Function Per Pt/Caregiver Report  Prior Function Comments:  (Pt poor historian d/t Hx of dementia and no family present. He did state with yes/no questions that he uses a FWW and needed assitance with all ADLs and iADLs. Does not drive.)    Precautions:  Precautions  LE Weight Bearing Status: Left Toe-Touch Weight Bearing (up to 25%)  Medical Precautions: Fall precautions  Post-Surgical Precautions: Other (comment)  (Patient has a chronic dislocated left hemiarthroplasty.  Patient should be transfers only    Patient may put light weight on the foot to tolerance of up to 25%.  There are no hip restrictions or precautions comfort measures only may sit up and eat)    Vital Signs:     Objective     Pain:  Pain Assessment  Pain Assessment: Sharma-Baker FACES  0-10 (Numeric) Pain Score: 0 - No pain    Cognition:  Cognition  Overall Cognitive Status: Impaired  Orientation Level: Unable to assess    General Assessments:      Static Sitting Balance  Static Sitting-Level of Assistance: Dependent  Static Standing Balance  Static Standing-Level of Assistance: Dependent    Functional Assessments:     Bed Mobility  Bed Mobility: Yes (Pt total assist x 2)  Transfers  Transfer: Yes (Pt attempt STS with max assist x 2 but not able to complete. Stated increase in pain. Pt seemed to have good carryover with directions and current hip precautions when attempting.)  Ambulation/Gait Training  Ambulation/Gait Training Performed: No  Stairs  Stairs: No       Extremity/Trunk Assessments:  RUE   RUE :  (evaluated by OT)  LUALEXANDRE   LUE:  (evaluated by OT)  RLE   RLE :  (unable to assess d/t poor sitting balance)  LLE   LLE :  (unable to assess d/t poor sitting balance)    Outcome Measures:     [facility] Basic Mobility  Turning from your back to your side while in a flat bed without using bedrails: Total  Moving from lying on your back to sitting on the side of a flat bed without using bedrails: Total  Moving to and from bed to chair (including a wheelchair): Total  Standing up from a chair using your arms (e.g. wheelchair or bedside chair): Total  To walk in hospital room: Total  Climbing 3-5 steps with railing: Total  Basic Mobility - Total Score: 6     Goals:  Encounter Problems       Encounter Problems (Active)       PT Problem       Pt will demonstrate sit to stand and bed/chair transfers with max 2 A with FWW with proper demonstration of weightbearing  precautions.         Start:  06/29/25    Expected End:  07/13/25            Pt will demonstrate max A x 2 with all bed mobility         Start:  06/29/25    Expected End:  07/13/25               PT Problem       Patient will maintain seated balance with feet on the floor and (B) UE support with maximal assist for 1-2 minutes       Start:  06/29/25    Expected End:  07/13/25            Patient will maintain 25% LLE WB during transfers       Start:  06/29/25    Expected End:  07/13/25                 Education Documentation  Mobility Training, taught by Sole Locke, PT at 6/29/2025 10:36 AM.  Learner: Patient  Readiness: Acceptance  Method: Explanation, Demonstration  Response: Demonstrated Understanding, Needs Reinforcement    Education Comments  No comments found.                [1]   Patient Active Problem List  Diagnosis    ESRD (end stage renal disease) (Multi)    HTN (hypertension)    Closed fracture of neck of left femur, initial encounter    Falls, initial encounter    Closed head injury    Elevated troponin    Abscess of hip    Hip dislocation, left, initial encounter (Multi)    Acute encephalopathy    Closed dislocation of left hip, initial encounter (Multi)    Anemia

## 2025-06-29 NOTE — CARE PLAN
Problem: Fall/Injury  Goal: Not fall by end of shift  Outcome: Progressing  Goal: Be free from injury by end of the shift  Outcome: Progressing  Goal: Pace activities to prevent fatigue by end of the shift  Outcome: Progressing

## 2025-06-29 NOTE — PROGRESS NOTES
"Tyshawn Coles is a 84 y.o. male on day 2 of admission presenting with Closed dislocation of left hip, initial encounter (Multi).    Subjective   Unsuccessful closed reduction left dislocated hemiarthroplasty    Patient anticipates just transfers only    Patient is otherwise comfortable resting    Family would have to decide if they would like to proceed with significant open surgery with conversion to full total hip replacement       Objective     Physical Exam    Incisions clean and dry  Leg is shortened internally rotated with consistent with chronic dislocated left hemiarthroplasty  Pain control is excellent with limited discomfort  Patient essentially has no restrictions or precautions  Patient may essentially put weight as tolerated up to 25 to 50% of his weight on his left hip during transfers    Last Recorded Vitals  Blood pressure 145/71, pulse 73, temperature 36.6 °C (97.9 °F), temperature source Temporal, resp. rate 16, height 1.82 m (5' 11.65\"), weight 68.2 kg (150 lb 5.7 oz), SpO2 97%.  Intake/Output last 3 Shifts:  I/O last 3 completed shifts:  In: 2035.8 (29.9 mL/kg) [P.O.:480; I.V.:1155.8 (16.9 mL/kg); Other:400]  Out: 6400 (93.8 mL/kg) [Other:6400]  Weight: 68.2 kg     Relevant Results      Scheduled medications  Scheduled Medications[1]  Continuous medications  Continuous Medications[2]  PRN medications  PRN Medications[3]  Results for orders placed or performed during the hospital encounter of 06/27/25 (from the past 24 hours)   POCT GLUCOSE   Result Value Ref Range    POCT Glucose 66 (L) 74 - 99 mg/dL   POCT GLUCOSE   Result Value Ref Range    POCT Glucose 66 (L) 74 - 99 mg/dL   POCT GLUCOSE   Result Value Ref Range    POCT Glucose 145 (H) 74 - 99 mg/dL   POCT GLUCOSE   Result Value Ref Range    POCT Glucose 135 (H) 74 - 99 mg/dL             This patient has a central line   Reason for the central line remaining today? Line unnecessary, will be removed today                 Assessment & " Plan  Closed dislocation of left hip, initial encounter (Multi)    ESRD (end stage renal disease) (Multi)    HTN (hypertension)    Anemia    Left dislocated hemiarthroplasty but stable  Dementia  Minimal pain  Really no precautions at this point transferring position as comfort allows with up to 25 to 50% weight-bear left side    May be return to nursing home as tolerated      Marcel Lezama MD           [1] acetaminophen, 650 mg, oral, q6h JEREMY  albumin human, 25 g, intravenous, Once per day on Tuesday Thursday Saturday  alteplase, 2 mg, intra-catheter, Daily  alteplase, 2 mg, intra-catheter, Daily  apixaban, 2.5 mg, oral, BID  cholecalciferol, 25 mcg, oral, Daily  collagenase, , Topical, Daily  docusate sodium, 100 mg, oral, Daily  donepezil, 23 mg, oral, Daily  epoetin sheila or biosimilar, 50 Units/kg, intravenous, After Dialysis  finasteride, 5 mg, oral, Daily  folic acid, 1 mg, oral, Daily  heparin, 2,000 Units, intra-catheter, Once per day on Tuesday Thursday Saturday  heparin, 2,000 Units, intra-catheter, After Dialysis  insulin glargine, 5 Units, subcutaneous, Nightly  insulin lispro, 0-10 Units, subcutaneous, TID AC  lactobacillus acidophilus, 1 tablet, oral, Daily  levETIRAcetam, 500 mg, oral, BID  magnesium hydroxide, 30 mL, oral, Daily  melatonin, 10 mg, oral, Nightly  metoprolol tartrate, 50 mg, oral, BID  sodium chloride, 200 mL, intravenous, After Dialysis  sodium hypochlorite, , irrigation, BID  vitamin B complex-vitamin C-folic acid, 1 capsule, oral, Daily    [2] lactated Ringer's, 50 mL/hr, Last Rate: 50 mL/hr (06/28/25 1553)  oxygen, 2 L/min    [3] PRN medications: acetaminophen **OR** acetaminophen **OR** acetaminophen, acetaminophen, albuterol, benzocaine-menthol, bisacodyl, bisacodyl, cyclobenzaprine, dextrose, dextrose, glucagon, glucagon, loperamide, ondansetron ODT **OR** ondansetron, ondansetron, oxyCODONE, oxyCODONE, polyethylene glycol, prochlorperazine **OR** prochlorperazine **OR**  prochlorperazine, sodium chloride, sodium chloride

## 2025-06-29 NOTE — PROGRESS NOTES
Occupational Therapy    Evaluation    Patient Name: Tyshawn Coles  MRN: [id number]  Department: [facility]  Room: [address] [place] [address]  Today's Date: 6/29/2025  Time Calculation  Start Time: 0819  Stop Time: 0835  Time Calculation (min): 16 min    Assessment  IP OT Assessment  OT Assessment: At this time, pt participation in ADL & IADL tasks significantly impaired by pain, decreased endurance, WB prx w/ questionable adherence d/t dementia hx. Pt would benefit from continued OT services to increase independence & safety during functional tasks.  Prognosis: Fair  Barriers to Discharge Home: Cognition needs, Physical needs  Cognition Needs: Recollection or understanding of precautions/restrictions limited, Insight of patient limited regarding functional ability/needs, Medication and/or medical management daily assist needed  Evaluation/Treatment Tolerance: Patient limited by pain, Patient limited by fatigue  Medical Staff Made Aware: Yes  End of Session Communication: Bedside nurse  End of Session Patient Position: Bed, 3 rail up, Alarm on  Plan:  Treatment Interventions: ADL retraining, Functional transfer training, Endurance training, Cognitive reorientation, Patient/family training, Equipment evaluation/education  OT Frequency: 4 times per week  OT Discharge Recommendations: Moderate intensity level of continued care  Equipment Recommended upon Discharge:  (Unsure what pt already owns due to communication limitations & no family present to provide collateral. Continue to assess.)  OT Recommended Transfer Status: Dependent, Assist of 2  OT - OK to Discharge: Yes (when medically cleared)    Subjective   Current Problem:  1. Closed dislocation of left hip, initial encounter (Multi)  Case Request Operating Room: CLOSED REDUCTION, HIP    Case Request Operating Room: CLOSED REDUCTION, HIP      2. Hip dislocation, left, initial encounter (Multi)  cyclobenzaprine (Flexeril) 10 mg tablet        OT Visit Info:  OT Received On: 06/29/25  General  Visit Info:  General  Reason for Referral: ADL impairment  Referred By: Dr. JOSE Lezama PT/OT  Past Medical History Relevant to Rehab: HTN, Dementia, non-verbal, A fib, ESRD, hyperlipedema, recurrent dislocations of L YASMEEN  Family/Caregiver Present: No  Co-Treatment: PT  Co-Treatment Reason: to increase pt safety & ability to particiate in functional activities  Prior to Session Communication: Bedside nurse  Patient Position Received: Bed, 4 rail up, Alarm on  General Comment: Pt to ED on 6/27 after follow up appt for L YASMEEN Xr showed dislocation of the prothesis. This is a recurring problem that he has previously needed a reduction for. Last reduction completed on 4/1/25 by Dr. Dumont. Dr. Lezama on current admission.  Precautions:  LE Weight Bearing Status: Left Toe-Touch Weight Bearing  Medical Precautions: Fall precautions  Post-Surgical Precautions: Other (comment) (Patient has a chronic dislocated left hemiarthroplasty.  Patient should be transfers only    Patient may put light weight on the foot to tolerance of up to 25%.  There are no hip restrictions or precautions comfort measures only may sit up and eat.)           Pain:  Pain Assessment  Pain Assessment: Sharma-Baker FACES  0-10 (Numeric) Pain Score: 0 - No pain    Objective   Cognition:  Overall Cognitive Status: Impaired  Arousal/Alertness: Delayed responses to stimuli  Orientation Level: Unable to assess (pt mostly nonverbal)  Following Commands: Follows one step commands with repetition  Memory: Exceptions to WFL  Memory Comments: Pt has hx dementia  Problem Solving: Exceptions to WFL  Insight: Severe  Impulsive: Mildly  Processing Speed: Delayed           Home Living:  Home Living Comments: Pt is a questionable historian d/t hx dementia, nonverbal, no family present. Could only really answer yes or no questions. Through yes/no responses, pt confirmed he lives w/ his wife in a 1-level home. Owns a FWW.   Prior Function:  Prior Function Comments: Pt  questionable historian d/t hx dementia, no family prsent, communication deficits. Through head movements, pt indicates he uses a FWW & needed assistance w/ ADLs & IADLs prior. Does not drive. Multiple falls in past 3 months.  IADL History:     ADL:  Eating Assistance: Stand by  Grooming Assistance: Moderate  Bathing Assistance: Total  UE Dressing Assistance: Maximal  LE Dressing Assistance: Total  Toileting Assistance with Device: Total  Activity Tolerance:  Endurance: Decreased tolerance for upright activites  Bed Mobility/Transfers: Bed Mobility  Bed Mobility: Yes  Bed Mobility 1  Bed Mobility 1: Supine to sitting, Sitting to supine  Level of Assistance 1: Dependent, +2  Bed Mobility Comments 1: Pt DEP x2 helpers for transferring supine to sitting EOB & back.    Transfers  Transfer: Yes (Pt attempt STS with max assist x 2 but not able to complete. Stated increase in pain. Pt seemed to have good carryover with directions and current hip precautions when attempting.)    Sitting Balance:  Static Sitting Balance  Static Sitting-Level of Assistance: Contact guard, Minimum assistance  Dynamic Sitting Balance  Dynamic Sitting-Level of Assistance: Moderate assistance    Hand Function:  Hand Function  Gross Grasp: Functional  Coordination: Impaired  Extremities: RUE   RUE : Within Functional Limits and LUE   LUE: Within Functional Limits    Outcome Measures: [facility] Daily Activity  Putting on and taking off regular lower body clothing: Total  Bathing (including washing, rinsing, drying): Total  Putting on and taking off regular upper body clothing: A lot  Toileting, which includes using toilet, bedpan or urinal: Total  Taking care of personal grooming such as brushing teeth: A lot  Eating Meals: A little  Daily Activity - Total Score: 10      Goals:   Encounter Problems       Encounter Problems (Active)       ADLs       Patient with complete LB dressing w/ MOD A prior to discharge, using AE/DME as needed.       Start:   06/29/25    Expected End:  07/13/25            Patient will complete toileting including hygiene clothing management/hygiene with moderate assist level prior to discharge, using AE/DME as needed.       Start:  06/29/25    Expected End:  07/13/25               COGNITION/SAFETY       Patient will recall & adhere to LLE 25% WB restrictions during ADL & functional mobility in order to promote healing and safety with functional tasks, w/ MIN verbal cues.       Start:  06/29/25    Expected End:  07/13/25

## 2025-06-29 NOTE — PROGRESS NOTES
Patient discharging to [facility]. [facility] updated and able to admit today. Roundtrip confirmed a 11:30 am pickup. Writer called and left message with spouse of pickup time. Discharge information sent in [facility]. DEBI James

## 2025-06-30 LAB
HOLD SPECIMEN: NORMAL
HOLD SPECIMEN: NORMAL

## 2025-07-03 ENCOUNTER — LAB REQUISITION (OUTPATIENT)
Dept: LAB | Facility: HOSPITAL | Age: 85
End: 2025-07-03
Payer: COMMERCIAL

## 2025-07-03 DIAGNOSIS — E78.5 HYPERLIPIDEMIA, UNSPECIFIED: ICD-10-CM

## 2025-07-03 DIAGNOSIS — I10 ESSENTIAL (PRIMARY) HYPERTENSION: ICD-10-CM

## 2025-07-03 LAB
ANION GAP SERPL CALC-SCNC: 16 MMOL/L (ref 10–20)
BUN SERPL-MCNC: 23 MG/DL (ref 6–23)
CALCIUM SERPL-MCNC: 7.8 MG/DL (ref 8.6–10.3)
CHLORIDE SERPL-SCNC: 94 MMOL/L (ref 98–107)
CO2 SERPL-SCNC: 30 MMOL/L (ref 21–32)
CREAT SERPL-MCNC: 2.44 MG/DL (ref 0.5–1.3)
EGFRCR SERPLBLD CKD-EPI 2021: 25 ML/MIN/1.73M*2
ERYTHROCYTE [DISTWIDTH] IN BLOOD BY AUTOMATED COUNT: 18.6 % (ref 11.5–14.5)
GLUCOSE SERPL-MCNC: 113 MG/DL (ref 74–99)
HCT VFR BLD AUTO: 36.9 % (ref 41–52)
HGB BLD-MCNC: 12.3 G/DL (ref 13.5–17.5)
MCH RBC QN AUTO: 25.9 PG (ref 26–34)
MCHC RBC AUTO-ENTMCNC: 33.3 G/DL (ref 32–36)
MCV RBC AUTO: 78 FL (ref 80–100)
NRBC BLD-RTO: 0 /100 WBCS (ref 0–0)
PLATELET # BLD AUTO: 308 X10*3/UL (ref 150–450)
POTASSIUM SERPL-SCNC: 3.7 MMOL/L (ref 3.5–5.3)
RBC # BLD AUTO: 4.74 X10*6/UL (ref 4.5–5.9)
SODIUM SERPL-SCNC: 136 MMOL/L (ref 136–145)
WBC # BLD AUTO: 13.5 X10*3/UL (ref 4.4–11.3)

## 2025-07-03 PROCEDURE — 85027 COMPLETE CBC AUTOMATED: CPT | Mod: OUT | Performed by: INTERNAL MEDICINE

## 2025-07-03 PROCEDURE — 80048 BASIC METABOLIC PNL TOTAL CA: CPT | Mod: OUT | Performed by: INTERNAL MEDICINE

## 2025-07-06 ENCOUNTER — PREP FOR PROCEDURE (OUTPATIENT)
Dept: SURGERY | Facility: HOSPITAL | Age: 85
End: 2025-07-06
Payer: COMMERCIAL

## 2025-07-07 ENCOUNTER — APPOINTMENT (OUTPATIENT)
Dept: ORTHOPEDIC SURGERY | Facility: CLINIC | Age: 85
End: 2025-07-07
Payer: COMMERCIAL

## 2025-07-09 ENCOUNTER — OFFICE VISIT (OUTPATIENT)
Dept: WOUND CARE | Facility: CLINIC | Age: 85
End: 2025-07-09
Payer: COMMERCIAL

## 2025-07-09 PROCEDURE — 11043 DBRDMT MUSC&/FSCA 1ST 20/<: CPT

## 2025-07-09 PROCEDURE — 11046 DBRDMT MUSC&/FSCA EA ADDL: CPT

## 2025-07-18 ENCOUNTER — PREP FOR PROCEDURE (OUTPATIENT)
Dept: SURGERY | Facility: HOSPITAL | Age: 85
End: 2025-07-18
Payer: COMMERCIAL

## 2025-07-19 LAB
ATRIAL RATE: 72 BPM
P AXIS: 8 DEGREES
P OFFSET: 195 MS
P ONSET: 139 MS
PR INTERVAL: 164 MS
Q ONSET: 221 MS
QRS COUNT: 12 BEATS
QRS DURATION: 72 MS
QT INTERVAL: 412 MS
QTC CALCULATION(BAZETT): 451 MS
QTC FREDERICIA: 438 MS
R AXIS: 65 DEGREES
T AXIS: 74 DEGREES
T OFFSET: 427 MS
VENTRICULAR RATE: 72 BPM

## 2025-07-23 ENCOUNTER — OFFICE VISIT (OUTPATIENT)
Dept: WOUND CARE | Facility: CLINIC | Age: 85
End: 2025-07-23
Payer: COMMERCIAL

## 2025-07-23 PROCEDURE — 11043 DBRDMT MUSC&/FSCA 1ST 20/<: CPT

## 2025-08-01 ENCOUNTER — PREP FOR PROCEDURE (OUTPATIENT)
Dept: SURGERY | Facility: HOSPITAL | Age: 85
End: 2025-08-01
Payer: COMMERCIAL

## 2025-08-06 ENCOUNTER — OFFICE VISIT (OUTPATIENT)
Dept: WOUND CARE | Facility: CLINIC | Age: 85
End: 2025-08-06
Payer: COMMERCIAL

## 2025-08-06 DIAGNOSIS — L03.90 WOUND CELLULITIS: Primary | ICD-10-CM

## 2025-08-06 PROCEDURE — 11043 DBRDMT MUSC&/FSCA 1ST 20/<: CPT | Performed by: PLASTIC SURGERY

## 2025-08-06 PROCEDURE — 11043 DBRDMT MUSC&/FSCA 1ST 20/<: CPT

## 2025-08-10 LAB
BACTERIA SPEC AEROBE CULT: NORMAL
BACTERIA SPEC ANAEROBE CULT: NORMAL

## 2025-08-12 LAB
BACTERIA SPEC AEROBE CULT: NORMAL
BACTERIA SPEC ANAEROBE CULT: NORMAL

## 2025-08-15 ENCOUNTER — PREP FOR PROCEDURE (OUTPATIENT)
Dept: SURGERY | Facility: HOSPITAL | Age: 85
End: 2025-08-15
Payer: COMMERCIAL

## 2025-08-20 ENCOUNTER — OFFICE VISIT (OUTPATIENT)
Dept: WOUND CARE | Facility: CLINIC | Age: 85
End: 2025-08-20
Payer: COMMERCIAL

## 2025-08-20 PROCEDURE — 11046 DBRDMT MUSC&/FSCA EA ADDL: CPT

## 2025-08-20 PROCEDURE — 11046 DBRDMT MUSC&/FSCA EA ADDL: CPT | Performed by: PLASTIC SURGERY

## 2025-08-20 PROCEDURE — 11043 DBRDMT MUSC&/FSCA 1ST 20/<: CPT

## 2025-08-20 PROCEDURE — 11043 DBRDMT MUSC&/FSCA 1ST 20/<: CPT | Performed by: PLASTIC SURGERY

## 2025-08-27 ENCOUNTER — PREP FOR PROCEDURE (OUTPATIENT)
Dept: SURGERY | Facility: HOSPITAL | Age: 85
End: 2025-08-27
Payer: COMMERCIAL

## 2025-09-03 ENCOUNTER — APPOINTMENT (OUTPATIENT)
Dept: WOUND CARE | Facility: CLINIC | Age: 85
End: 2025-09-03
Payer: COMMERCIAL

## 2025-09-03 DIAGNOSIS — L03.90 WOUND CELLULITIS: Primary | ICD-10-CM

## 2025-10-17 ENCOUNTER — APPOINTMENT (OUTPATIENT)
Dept: CARDIOLOGY | Facility: CLINIC | Age: 85
End: 2025-10-17
Payer: MEDICARE

## (undated) DEVICE — SUTURE, VICRYL, 2-0, 36 IN, CT-1, UNDYED

## (undated) DEVICE — WOUND SYSTEM, DEBRIDEMENT & CLEANING, O.R DUOPAK

## (undated) DEVICE — PILLOW, ABDUCTION, MEDIUM

## (undated) DEVICE — DRAPE, INSTRUMENT, W/POUCH, STERI DRAPE, 7 X 11 IN, DISPOSABLE, STERILE

## (undated) DEVICE — DRAPE, SHEET, U, W/ADHESIVE STRIP, IMPERVIOUS, 60 X 70 IN, DISPOSABLE, LF, STERILE

## (undated) DEVICE — PREP KIT, BONE, BIO-PREP

## (undated) DEVICE — APPLICATOR, CHLORAPREP, W/ORANGE TINT, 26ML

## (undated) DEVICE — SUTURE, VICRYL, 0, 36 IN, CT-1, UNDYED

## (undated) DEVICE — DRESSING, MEPILEX BORDER, POST-OP AG, 4 X 10 IN

## (undated) DEVICE — GLOVE, SURGICAL, PROTEXIS PI BLUE W/NEUTHERA, 8.0, PF, LF

## (undated) DEVICE — SOLUTION, IRRIGATION, SODIUM CHLORIDE 0.9%, 1000 ML, POUR BOTTLE

## (undated) DEVICE — TOWEL PACK, STERILE, 4/PACK, BLUE

## (undated) DEVICE — PRESSURIZER, FEMORAL, ADVANCED, MEDIUM

## (undated) DEVICE — GOWN, SURGICAL, SMARTGOWN, XX-LARGE, STERILE

## (undated) DEVICE — Device

## (undated) DEVICE — BANDAGE, COFLEX, 6 X 5 YDS, FOAM TAN, STERILE, LF

## (undated) DEVICE — DRAPE, SHEET, U, STERI DRAPE, 47 X 51 IN, DISPOSABLE, STERILE

## (undated) DEVICE — RETRIEVER, SUTURE, HEWSON

## (undated) DEVICE — GLOVE, SURGICAL, PROTEXIS PI , 7.5, PF, LF

## (undated) DEVICE — SUTURE, ETHIBOND EXCEL 1, TAPER POINT CT-1 GREEN 30 INCH

## (undated) DEVICE — SOLUTION, IRRIGATION, STERILE WATER, 1000 ML, POUR BOTTLE

## (undated) DEVICE — SUTURE, VICRYL, 2-0, 18 IN CP-2, UNDYED

## (undated) DEVICE — DRESSING, GAUZE, PETROLATUM, PATCH, XEROFORM, 1 X 8 IN, STERILE

## (undated) DEVICE — INTERPULSE HANDPIECE SET W/ 10FT SUCTION TUBING

## (undated) DEVICE — BLADE, SAGITTAL, THIN, SHORT, LF

## (undated) DEVICE — HIGH FLOW TIP FOR INTERPULSE HANDPIECE SET

## (undated) DEVICE — CEMENT MIX KIT, REVOLUTION, W/BREAKAWAY